# Patient Record
Sex: MALE | Race: BLACK OR AFRICAN AMERICAN | NOT HISPANIC OR LATINO | Employment: OTHER | URBAN - METROPOLITAN AREA
[De-identification: names, ages, dates, MRNs, and addresses within clinical notes are randomized per-mention and may not be internally consistent; named-entity substitution may affect disease eponyms.]

---

## 2021-04-09 ENCOUNTER — DOCUMENTATION ONLY (OUTPATIENT)
Dept: CARDIOTHORACIC SURGERY | Facility: CLINIC | Age: 61
End: 2021-04-09

## 2021-04-12 DIAGNOSIS — I25.10 CORONARY ARTERY DISEASE INVOLVING NATIVE CORONARY ARTERY OF NATIVE HEART WITHOUT ANGINA PECTORIS: Primary | ICD-10-CM

## 2021-04-12 DIAGNOSIS — I25.110 CORONARY ARTERY DISEASE INVOLVING NATIVE CORONARY ARTERY OF NATIVE HEART WITH UNSTABLE ANGINA PECTORIS: Primary | ICD-10-CM

## 2021-04-12 DIAGNOSIS — I25.9 CHEST PAIN DUE TO MYOCARDIAL ISCHEMIA, UNSPECIFIED ISCHEMIC CHEST PAIN TYPE: ICD-10-CM

## 2021-04-13 ENCOUNTER — TELEPHONE (OUTPATIENT)
Dept: PREADMISSION TESTING | Facility: HOSPITAL | Age: 61
End: 2021-04-13

## 2021-04-13 ENCOUNTER — TELEPHONE (OUTPATIENT)
Dept: CARDIOTHORACIC SURGERY | Facility: CLINIC | Age: 61
End: 2021-04-13

## 2021-04-14 ENCOUNTER — TELEPHONE (OUTPATIENT)
Dept: PREADMISSION TESTING | Facility: HOSPITAL | Age: 61
End: 2021-04-14

## 2021-04-14 ENCOUNTER — DOCUMENTATION ONLY (OUTPATIENT)
Dept: CARDIOTHORACIC SURGERY | Facility: CLINIC | Age: 61
End: 2021-04-14

## 2021-04-14 ENCOUNTER — HOSPITAL ENCOUNTER (OUTPATIENT)
Dept: PREADMISSION TESTING | Facility: HOSPITAL | Age: 61
Discharge: HOME | End: 2021-04-14
Attending: THORACIC SURGERY (CARDIOTHORACIC VASCULAR SURGERY)
Payer: OTHER GOVERNMENT

## 2021-04-27 ENCOUNTER — LAB VISIT (OUTPATIENT)
Dept: INTERNAL MEDICINE | Facility: CLINIC | Age: 61
End: 2021-04-27
Payer: OTHER GOVERNMENT

## 2021-04-27 DIAGNOSIS — I25.110 CORONARY ARTERY DISEASE INVOLVING NATIVE CORONARY ARTERY OF NATIVE HEART WITH UNSTABLE ANGINA PECTORIS: ICD-10-CM

## 2021-04-27 PROCEDURE — U0003 INFECTIOUS AGENT DETECTION BY NUCLEIC ACID (DNA OR RNA); SEVERE ACUTE RESPIRATORY SYNDROME CORONAVIRUS 2 (SARS-COV-2) (CORONAVIRUS DISEASE [COVID-19]), AMPLIFIED PROBE TECHNIQUE, MAKING USE OF HIGH THROUGHPUT TECHNOLOGIES AS DESCRIBED BY CMS-2020-01-R: HCPCS | Performed by: THORACIC SURGERY (CARDIOTHORACIC VASCULAR SURGERY)

## 2021-04-27 PROCEDURE — U0005 INFEC AGEN DETEC AMPLI PROBE: HCPCS | Performed by: THORACIC SURGERY (CARDIOTHORACIC VASCULAR SURGERY)

## 2021-04-28 ENCOUNTER — OFFICE VISIT (OUTPATIENT)
Dept: CARDIOTHORACIC SURGERY | Facility: CLINIC | Age: 61
End: 2021-04-28
Payer: OTHER GOVERNMENT

## 2021-04-28 ENCOUNTER — DOCUMENTATION ONLY (OUTPATIENT)
Dept: CARDIOTHORACIC SURGERY | Facility: CLINIC | Age: 61
End: 2021-04-28

## 2021-04-28 ENCOUNTER — HOSPITAL ENCOUNTER (OUTPATIENT)
Dept: CARDIOLOGY | Facility: CLINIC | Age: 61
Discharge: HOME | End: 2021-04-28
Payer: OTHER GOVERNMENT

## 2021-04-28 VITALS
BODY MASS INDEX: 20.88 KG/M2 | WEIGHT: 141 LBS | HEIGHT: 69 IN | DIASTOLIC BLOOD PRESSURE: 68 MMHG | HEART RATE: 58 BPM | SYSTOLIC BLOOD PRESSURE: 151 MMHG | OXYGEN SATURATION: 99 % | RESPIRATION RATE: 18 BRPM

## 2021-04-28 DIAGNOSIS — I25.110 CORONARY ARTERY DISEASE INVOLVING NATIVE CORONARY ARTERY OF NATIVE HEART WITH UNSTABLE ANGINA PECTORIS: Primary | ICD-10-CM

## 2021-04-28 DIAGNOSIS — I25.110 CORONARY ARTERY DISEASE INVOLVING NATIVE CORONARY ARTERY OF NATIVE HEART WITH UNSTABLE ANGINA PECTORIS: ICD-10-CM

## 2021-04-28 DIAGNOSIS — I20.0 UNSTABLE ANGINA PECTORIS: ICD-10-CM

## 2021-04-28 LAB — SARS-COV-2 RNA RESP QL NAA+PROBE: NOT DETECTED

## 2021-04-28 PROCEDURE — 99204 PR OFFICE/OUTPT VISIT, NEW, LEVL IV, 45-59 MIN: ICD-10-PCS | Mod: S$PBB,,, | Performed by: THORACIC SURGERY (CARDIOTHORACIC VASCULAR SURGERY)

## 2021-04-28 PROCEDURE — 99999 PR PBB SHADOW E&M-EST. PATIENT-LVL V: CPT | Mod: PBBFAC,,, | Performed by: THORACIC SURGERY (CARDIOTHORACIC VASCULAR SURGERY)

## 2021-04-28 PROCEDURE — 99204 OFFICE O/P NEW MOD 45 MIN: CPT | Mod: S$PBB,,, | Performed by: THORACIC SURGERY (CARDIOTHORACIC VASCULAR SURGERY)

## 2021-04-28 PROCEDURE — 99999 PR PBB SHADOW E&M-EST. PATIENT-LVL V: ICD-10-PCS | Mod: PBBFAC,,, | Performed by: THORACIC SURGERY (CARDIOTHORACIC VASCULAR SURGERY)

## 2021-04-28 PROCEDURE — 93005 ELECTROCARDIOGRAM TRACING: CPT | Mod: PBBFAC | Performed by: INTERNAL MEDICINE

## 2021-04-28 PROCEDURE — 93010 ELECTROCARDIOGRAM REPORT: CPT | Mod: S$PBB,,, | Performed by: INTERNAL MEDICINE

## 2021-04-28 PROCEDURE — 99215 OFFICE O/P EST HI 40 MIN: CPT | Mod: PBBFAC,25 | Performed by: THORACIC SURGERY (CARDIOTHORACIC VASCULAR SURGERY)

## 2021-04-28 PROCEDURE — 93010 EKG 12-LEAD: ICD-10-PCS | Mod: S$PBB,,, | Performed by: INTERNAL MEDICINE

## 2021-04-28 RX ORDER — AMLODIPINE BESYLATE 10 MG/1
10 TABLET ORAL
Status: ON HOLD | COMMUNITY
End: 2022-02-09 | Stop reason: HOSPADM

## 2021-04-28 RX ORDER — CITALOPRAM 10 MG/1
TABLET ORAL
COMMUNITY

## 2021-04-28 RX ORDER — AMLODIPINE AND BENAZEPRIL HYDROCHLORIDE 5; 20 MG/1; MG/1
CAPSULE ORAL
Status: ON HOLD | COMMUNITY
Start: 2021-02-09 | End: 2022-02-09 | Stop reason: HOSPADM

## 2021-04-28 RX ORDER — TAMSULOSIN HYDROCHLORIDE 0.4 MG/1
CAPSULE ORAL
COMMUNITY
Start: 2021-04-20

## 2021-04-28 RX ORDER — NITROGLYCERIN 0.4 MG/1
TABLET SUBLINGUAL
COMMUNITY
Start: 2021-04-08

## 2021-04-28 RX ORDER — ROSUVASTATIN CALCIUM 20 MG/1
20 TABLET, COATED ORAL
Status: ON HOLD | COMMUNITY
End: 2022-01-10 | Stop reason: HOSPADM

## 2021-04-28 RX ORDER — METFORMIN HYDROCHLORIDE 500 MG/1
500 TABLET ORAL
Status: ON HOLD | COMMUNITY
End: 2022-02-09 | Stop reason: HOSPADM

## 2021-04-28 RX ORDER — DIVALPROEX SODIUM 125 MG/1
500 TABLET, DELAYED RELEASE ORAL
Status: ON HOLD | COMMUNITY
End: 2022-01-10 | Stop reason: HOSPADM

## 2021-04-28 RX ORDER — METFORMIN HYDROCHLORIDE 1000 MG/1
TABLET ORAL
Status: ON HOLD | COMMUNITY
Start: 2021-02-09 | End: 2022-02-09 | Stop reason: HOSPADM

## 2021-04-28 RX ORDER — INSULIN GLARGINE 100 [IU]/ML
INJECTION, SOLUTION SUBCUTANEOUS
Status: ON HOLD | COMMUNITY
Start: 2021-02-26 | End: 2022-02-09 | Stop reason: HOSPADM

## 2021-04-28 RX ORDER — LISINOPRIL 2.5 MG/1
2.5 TABLET ORAL
Status: ON HOLD | COMMUNITY
End: 2022-01-10 | Stop reason: HOSPADM

## 2021-04-28 RX ORDER — LISINOPRIL 10 MG/1
TABLET ORAL
Status: ON HOLD | COMMUNITY
Start: 2020-07-31 | End: 2022-02-09 | Stop reason: HOSPADM

## 2021-04-28 RX ORDER — QUETIAPINE FUMARATE 25 MG/1
TABLET, FILM COATED ORAL
COMMUNITY
Start: 2020-10-13

## 2021-04-28 RX ORDER — ATORVASTATIN CALCIUM 80 MG/1
TABLET, FILM COATED ORAL
COMMUNITY
Start: 2021-02-09

## 2021-04-28 RX ORDER — LAMOTRIGINE 200 MG/1
TABLET ORAL
COMMUNITY
Start: 2021-04-07

## 2021-04-28 RX ORDER — METOPROLOL TARTRATE 50 MG/1
TABLET ORAL
Status: ON HOLD | COMMUNITY
Start: 2020-10-14 | End: 2022-01-10 | Stop reason: HOSPADM

## 2021-04-28 RX ORDER — POTASSIUM CHLORIDE 750 MG/1
TABLET, EXTENDED RELEASE ORAL
Status: ON HOLD | COMMUNITY
Start: 2021-04-09 | End: 2022-02-09 | Stop reason: HOSPADM

## 2021-04-28 RX ORDER — CETIRIZINE HYDROCHLORIDE 10 MG/1
TABLET ORAL
COMMUNITY
Start: 2020-08-14

## 2021-04-28 RX ORDER — VARENICLINE TARTRATE 0.5 (11)-1
KIT ORAL
Status: ON HOLD | COMMUNITY
Start: 2021-04-09 | End: 2022-02-09 | Stop reason: HOSPADM

## 2021-04-28 RX ORDER — TRIAMCINOLONE ACETONIDE 1 MG/G
CREAM TOPICAL
COMMUNITY
Start: 2021-04-20

## 2021-04-28 RX ORDER — LACOSAMIDE 200 MG/1
200 TABLET ORAL
COMMUNITY

## 2021-04-28 RX ORDER — INSULIN GLARGINE 100 [IU]/ML
20 INJECTION, SOLUTION SUBCUTANEOUS
Status: ON HOLD | COMMUNITY
End: 2022-02-09 | Stop reason: HOSPADM

## 2021-04-28 RX ORDER — ROSUVASTATIN CALCIUM 40 MG/1
TABLET, COATED ORAL
COMMUNITY
Start: 2020-08-06

## 2021-04-28 RX ORDER — CHOLECALCIFEROL (VITAMIN D3) 50 MCG
TABLET ORAL
COMMUNITY
Start: 2021-02-09

## 2021-06-07 ENCOUNTER — HOSPITAL ENCOUNTER (OUTPATIENT)
Dept: CARDIOLOGY | Facility: HOSPITAL | Age: 61
Discharge: HOME | End: 2021-06-07
Attending: THORACIC SURGERY (CARDIOTHORACIC VASCULAR SURGERY)
Payer: OTHER GOVERNMENT

## 2021-06-07 VITALS
WEIGHT: 141 LBS | BODY MASS INDEX: 20.88 KG/M2 | HEIGHT: 69 IN | DIASTOLIC BLOOD PRESSURE: 70 MMHG | SYSTOLIC BLOOD PRESSURE: 105 MMHG | HEART RATE: 57 BPM

## 2021-06-07 DIAGNOSIS — I25.110 CORONARY ARTERY DISEASE INVOLVING NATIVE CORONARY ARTERY OF NATIVE HEART WITH UNSTABLE ANGINA PECTORIS: ICD-10-CM

## 2021-06-07 LAB
ASCENDING AORTA: 3.23 CM
AV INDEX (PROSTH): 0.55
AV MEAN GRADIENT: 3 MMHG
AV PEAK GRADIENT: 6 MMHG
AV VALVE AREA: 2.24 CM2
AV VELOCITY RATIO: 0.62
BSA FOR ECHO PROCEDURE: 1.76 M2
CV ECHO LV RWT: 0.3 CM
DOP CALC AO PEAK VEL: 1.27 M/S
DOP CALC AO VTI: 29.5 CM
DOP CALC LVOT AREA: 4.1 CM2
DOP CALC LVOT DIAMETER: 2.28 CM
DOP CALC LVOT PEAK VEL: 0.79 M/S
DOP CALC LVOT STROKE VOLUME: 66.07 CM3
DOP CALCLVOT PEAK VEL VTI: 16.19 CM
E WAVE DECELERATION TIME: 106.67 MSEC
E/A RATIO: 2.08
E/E' RATIO: 9.22 M/S
ECHO LV POSTERIOR WALL: 0.79 CM (ref 0.6–1.1)
EJECTION FRACTION: 50 %
FRACTIONAL SHORTENING: 29 % (ref 28–44)
INTERVENTRICULAR SEPTUM: 0.86 CM (ref 0.6–1.1)
LA MAJOR: 4.65 CM
LA MINOR: 4.21 CM
LA WIDTH: 3.83 CM
LEFT ATRIUM SIZE: 3.71 CM
LEFT ATRIUM VOLUME INDEX MOD: 28.5 ML/M2
LEFT ATRIUM VOLUME INDEX: 30 ML/M2
LEFT ATRIUM VOLUME MOD: 50.79 CM3
LEFT ATRIUM VOLUME: 53.37 CM3
LEFT INTERNAL DIMENSION IN SYSTOLE: 3.72 CM (ref 2.1–4)
LEFT VENTRICLE DIASTOLIC VOLUME INDEX: 75.16 ML/M2
LEFT VENTRICLE DIASTOLIC VOLUME: 133.79 ML
LEFT VENTRICLE MASS INDEX: 87 G/M2
LEFT VENTRICLE SYSTOLIC VOLUME INDEX: 33.2 ML/M2
LEFT VENTRICLE SYSTOLIC VOLUME: 59.02 ML
LEFT VENTRICULAR INTERNAL DIMENSION IN DIASTOLE: 5.27 CM (ref 3.5–6)
LEFT VENTRICULAR MASS: 154.53 G
LV LATERAL E/E' RATIO: 6.92 M/S
LV SEPTAL E/E' RATIO: 13.83 M/S
MV A" WAVE DURATION": 12.18 MSEC
MV PEAK A VEL: 0.4 M/S
MV PEAK E VEL: 0.83 M/S
MV STENOSIS PRESSURE HALF TIME: 30.93 MS
MV VALVE AREA P 1/2 METHOD: 7.11 CM2
PISA TR MAX VEL: 2.25 M/S
PULM VEIN S/D RATIO: 0.88
PV PEAK D VEL: 0.49 M/S
PV PEAK S VEL: 0.43 M/S
QEF: 50 %
RA MAJOR: 4.08 CM
RA PRESSURE: 3 MMHG
RA WIDTH: 3.56 CM
RIGHT VENTRICULAR END-DIASTOLIC DIMENSION: 2.84 CM
RV TISSUE DOPPLER FREE WALL SYSTOLIC VELOCITY 1 (APICAL 4 CHAMBER VIEW): 14.71 CM/S
SINUS: 3.2 CM
STJ: 2.1 CM
TDI LATERAL: 0.12 M/S
TDI SEPTAL: 0.06 M/S
TDI: 0.09 M/S
TR MAX PG: 20 MMHG
TRICUSPID ANNULAR PLANE SYSTOLIC EXCURSION: 2.05 CM
TV REST PULMONARY ARTERY PRESSURE: 23 MMHG

## 2021-06-07 PROCEDURE — 93306 TTE W/DOPPLER COMPLETE: CPT | Mod: 26,,, | Performed by: INTERNAL MEDICINE

## 2021-06-07 PROCEDURE — 93306 TTE W/DOPPLER COMPLETE: CPT

## 2021-06-07 PROCEDURE — 93306 ECHO (CUPID ONLY): ICD-10-PCS | Mod: 26,,, | Performed by: INTERNAL MEDICINE

## 2021-07-15 ENCOUNTER — TELEPHONE (OUTPATIENT)
Dept: SURGERY | Facility: CLINIC | Age: 61
End: 2021-07-15

## 2021-07-15 DIAGNOSIS — I25.110 CORONARY ARTERY DISEASE INVOLVING NATIVE CORONARY ARTERY OF NATIVE HEART WITH UNSTABLE ANGINA PECTORIS: Primary | ICD-10-CM

## 2021-07-19 ENCOUNTER — DOCUMENTATION ONLY (OUTPATIENT)
Dept: CARDIOTHORACIC SURGERY | Facility: CLINIC | Age: 61
End: 2021-07-19

## 2022-01-04 ENCOUNTER — HOSPITAL ENCOUNTER (INPATIENT)
Facility: HOSPITAL | Age: 62
LOS: 35 days | Discharge: HOSPICE | DRG: 871 | End: 2022-02-09
Attending: EMERGENCY MEDICINE | Admitting: HOSPITALIST
Payer: OTHER GOVERNMENT

## 2022-01-04 DIAGNOSIS — N49.2 SCROTAL ABSCESS: Primary | ICD-10-CM

## 2022-01-04 DIAGNOSIS — R73.9 HYPERGLYCEMIA: ICD-10-CM

## 2022-01-04 DIAGNOSIS — R41.82 ALTERED MENTAL STATUS: ICD-10-CM

## 2022-01-04 DIAGNOSIS — G93.40 ACUTE ENCEPHALOPATHY: ICD-10-CM

## 2022-01-04 DIAGNOSIS — R07.9 CHEST PAIN: ICD-10-CM

## 2022-01-04 DIAGNOSIS — N50.89 SCROTUM SWELLING: ICD-10-CM

## 2022-01-04 DIAGNOSIS — N48.21 PENILE ABSCESS: ICD-10-CM

## 2022-01-04 DIAGNOSIS — I50.9 CONGESTIVE HEART FAILURE: ICD-10-CM

## 2022-01-04 DIAGNOSIS — I10 HTN (HYPERTENSION): ICD-10-CM

## 2022-01-04 PROBLEM — I51.7 CARDIOMEGALY: Status: ACTIVE | Noted: 2022-01-04

## 2022-01-04 PROBLEM — I51.7 CARDIOMEGALY: Chronic | Status: ACTIVE | Noted: 2022-01-04

## 2022-01-04 PROBLEM — G40.909 SEIZURE DISORDER: Status: ACTIVE | Noted: 2022-01-04

## 2022-01-04 PROBLEM — Z72.0 TOBACCO USER: Chronic | Status: ACTIVE | Noted: 2022-01-04

## 2022-01-04 PROBLEM — E11.9 DIABETES MELLITUS TYPE 2, CONTROLLED: Chronic | Status: ACTIVE | Noted: 2020-08-03

## 2022-01-04 PROBLEM — A41.9 SEPSIS: Status: ACTIVE | Noted: 2022-01-04

## 2022-01-04 PROBLEM — E11.9 DIABETES MELLITUS TYPE 2, CONTROLLED: Status: ACTIVE | Noted: 2020-08-03

## 2022-01-04 PROBLEM — F14.21 COCAINE DEPENDENCE IN REMISSION: Status: ACTIVE | Noted: 2022-01-04

## 2022-01-04 PROBLEM — F14.21 COCAINE DEPENDENCE IN REMISSION: Chronic | Status: ACTIVE | Noted: 2022-01-04

## 2022-01-04 PROBLEM — F43.21 ADJUSTMENT DISORDER WITH DEPRESSED MOOD: Status: ACTIVE | Noted: 2022-01-04

## 2022-01-04 PROBLEM — I25.9 CHRONIC ISCHEMIC HEART DISEASE: Chronic | Status: ACTIVE | Noted: 2022-01-04

## 2022-01-04 PROBLEM — I25.9 CHRONIC ISCHEMIC HEART DISEASE: Status: ACTIVE | Noted: 2022-01-04

## 2022-01-04 PROBLEM — G40.909 SEIZURE DISORDER: Chronic | Status: ACTIVE | Noted: 2022-01-04

## 2022-01-04 PROBLEM — Z72.0 TOBACCO USER: Status: ACTIVE | Noted: 2022-01-04

## 2022-01-04 PROBLEM — E78.5 HYPERLIPIDEMIA: Status: ACTIVE | Noted: 2022-01-04

## 2022-01-04 PROBLEM — E78.5 HYPERLIPIDEMIA: Chronic | Status: ACTIVE | Noted: 2022-01-04

## 2022-01-04 PROBLEM — F43.21 ADJUSTMENT DISORDER WITH DEPRESSED MOOD: Chronic | Status: ACTIVE | Noted: 2022-01-04

## 2022-01-04 LAB
ALBUMIN SERPL-MCNC: 2.3 G/DL (ref 3.3–5.5)
ALLENS TEST: ABNORMAL
ALLENS TEST: ABNORMAL
ALP SERPL-CCNC: 258 U/L (ref 42–141)
BACTERIA #/AREA URNS HPF: ABNORMAL /HPF
BILIRUB SERPL-MCNC: 0.6 MG/DL (ref 0.2–1.6)
BILIRUB UR QL STRIP: NEGATIVE
BUN SERPL-MCNC: 12 MG/DL (ref 7–22)
CALCIUM SERPL-MCNC: 9.7 MG/DL (ref 8–10.3)
CHLORIDE SERPL-SCNC: 99 MMOL/L (ref 98–108)
CLARITY UR: ABNORMAL
COLOR UR: YELLOW
CREAT SERPL-MCNC: 1.4 MG/DL (ref 0.6–1.2)
CTP QC/QA: YES
GLUCOSE SERPL-MCNC: 318 MG/DL (ref 73–118)
GLUCOSE UR QL STRIP: ABNORMAL
HCO3 UR-SCNC: 22.8 MMOL/L (ref 24–28)
HCO3 UR-SCNC: 24.8 MMOL/L (ref 24–28)
HGB UR QL STRIP: ABNORMAL
HYALINE CASTS #/AREA URNS LPF: 0 /LPF
KETONES UR QL STRIP: NEGATIVE
LDH SERPL L TO P-CCNC: 2.14 MMOL/L (ref 0.5–2.2)
LDH SERPL L TO P-CCNC: 3.11 MMOL/L (ref 0.5–2.2)
LEUKOCYTE ESTERASE UR QL STRIP: ABNORMAL
MICROSCOPIC COMMENT: ABNORMAL
NITRITE UR QL STRIP: NEGATIVE
PCO2 BLDA: 42.9 MMHG (ref 35–45)
PCO2 BLDA: 43.5 MMHG (ref 35–45)
PH SMN: 7.33 [PH] (ref 7.35–7.45)
PH SMN: 7.37 [PH] (ref 7.35–7.45)
PH UR STRIP: 5 [PH] (ref 5–8)
PO2 BLDA: 15 MMHG (ref 40–60)
PO2 BLDA: 18 MMHG (ref 40–60)
POC ALT (SGPT): 34 U/L (ref 10–47)
POC AST (SGOT): 29 U/L (ref 11–38)
POC B-TYPE NATRIURETIC PEPTIDE: 807 PG/ML (ref 0–100)
POC BE: -1 MMOL/L
POC BE: -3 MMOL/L
POC CARDIAC TROPONIN I: 0.03 NG/ML
POC SATURATED O2: 17 % (ref 95–100)
POC SATURATED O2: 25 % (ref 95–100)
POC TCO2: 24 MMOL/L (ref 18–33)
POC TCO2: 24 MMOL/L (ref 24–29)
POC TCO2: 26 MMOL/L (ref 24–29)
POCT GLUCOSE: 213 MG/DL (ref 70–110)
POCT GLUCOSE: 243 MG/DL (ref 70–110)
POCT GLUCOSE: 370 MG/DL (ref 70–110)
POTASSIUM BLD-SCNC: 3.3 MMOL/L (ref 3.6–5.1)
PROT UR QL STRIP: ABNORMAL
PROTEIN, POC: 7.1 G/DL (ref 6.4–8.1)
RBC #/AREA URNS HPF: 10 /HPF (ref 0–4)
SAMPLE: ABNORMAL
SAMPLE: ABNORMAL
SAMPLE: NORMAL
SARS-COV-2 RDRP RESP QL NAA+PROBE: NEGATIVE
SITE: ABNORMAL
SITE: ABNORMAL
SODIUM BLD-SCNC: 138 MMOL/L (ref 128–145)
SP GR UR STRIP: 1.01 (ref 1–1.03)
URN SPEC COLLECT METH UR: ABNORMAL
UROBILINOGEN UR STRIP-ACNC: NEGATIVE EU/DL
WBC #/AREA URNS HPF: 100 /HPF (ref 0–5)
WBC CLUMPS URNS QL MICRO: ABNORMAL
YEAST URNS QL MICRO: ABNORMAL

## 2022-01-04 PROCEDURE — 84484 ASSAY OF TROPONIN QUANT: CPT | Mod: ER

## 2022-01-04 PROCEDURE — 96365 THER/PROPH/DIAG IV INF INIT: CPT | Mod: ER

## 2022-01-04 PROCEDURE — 93010 EKG 12-LEAD: ICD-10-PCS | Mod: ,,, | Performed by: INTERNAL MEDICINE

## 2022-01-04 PROCEDURE — 99291 CRITICAL CARE FIRST HOUR: CPT | Mod: 25,ER

## 2022-01-04 PROCEDURE — G0378 HOSPITAL OBSERVATION PER HR: HCPCS

## 2022-01-04 PROCEDURE — 87086 URINE CULTURE/COLONY COUNT: CPT | Performed by: EMERGENCY MEDICINE

## 2022-01-04 PROCEDURE — 25500020 PHARM REV CODE 255: Mod: ER | Performed by: EMERGENCY MEDICINE

## 2022-01-04 PROCEDURE — 96375 TX/PRO/DX INJ NEW DRUG ADDON: CPT | Mod: ER

## 2022-01-04 PROCEDURE — 25000003 PHARM REV CODE 250: Mod: ER | Performed by: EMERGENCY MEDICINE

## 2022-01-04 PROCEDURE — 96372 THER/PROPH/DIAG INJ SC/IM: CPT | Mod: 59,ER

## 2022-01-04 PROCEDURE — 87106 FUNGI IDENTIFICATION YEAST: CPT | Performed by: EMERGENCY MEDICINE

## 2022-01-04 PROCEDURE — 25000003 PHARM REV CODE 250: Performed by: HOSPITALIST

## 2022-01-04 PROCEDURE — 96361 HYDRATE IV INFUSION ADD-ON: CPT | Mod: ER

## 2022-01-04 PROCEDURE — U0002 COVID-19 LAB TEST NON-CDC: HCPCS | Mod: ER | Performed by: EMERGENCY MEDICINE

## 2022-01-04 PROCEDURE — 82803 BLOOD GASES ANY COMBINATION: CPT | Mod: ER

## 2022-01-04 PROCEDURE — 96367 TX/PROPH/DG ADDL SEQ IV INF: CPT | Mod: ER

## 2022-01-04 PROCEDURE — 81000 URINALYSIS NONAUTO W/SCOPE: CPT | Performed by: EMERGENCY MEDICINE

## 2022-01-04 PROCEDURE — 81003 URINALYSIS AUTO W/O SCOPE: CPT | Mod: ER

## 2022-01-04 PROCEDURE — 85025 COMPLETE CBC W/AUTO DIFF WBC: CPT | Mod: ER

## 2022-01-04 PROCEDURE — 80053 COMPREHEN METABOLIC PANEL: CPT | Mod: ER

## 2022-01-04 PROCEDURE — 87040 BLOOD CULTURE FOR BACTERIA: CPT | Mod: 59 | Performed by: EMERGENCY MEDICINE

## 2022-01-04 PROCEDURE — 93010 ELECTROCARDIOGRAM REPORT: CPT | Mod: ,,, | Performed by: INTERNAL MEDICINE

## 2022-01-04 PROCEDURE — 82962 GLUCOSE BLOOD TEST: CPT | Mod: ER

## 2022-01-04 PROCEDURE — 87088 URINE BACTERIA CULTURE: CPT | Performed by: EMERGENCY MEDICINE

## 2022-01-04 PROCEDURE — 93005 ELECTROCARDIOGRAM TRACING: CPT | Mod: ER

## 2022-01-04 PROCEDURE — 83880 ASSAY OF NATRIURETIC PEPTIDE: CPT | Mod: ER

## 2022-01-04 PROCEDURE — 63600175 PHARM REV CODE 636 W HCPCS: Mod: ER | Performed by: EMERGENCY MEDICINE

## 2022-01-04 RX ORDER — MORPHINE SULFATE 4 MG/ML
4 INJECTION, SOLUTION INTRAMUSCULAR; INTRAVENOUS
Status: COMPLETED | OUTPATIENT
Start: 2022-01-04 | End: 2022-01-04

## 2022-01-04 RX ORDER — ATORVASTATIN CALCIUM 40 MG/1
80 TABLET, FILM COATED ORAL DAILY
Status: DISCONTINUED | OUTPATIENT
Start: 2022-01-05 | End: 2022-01-25

## 2022-01-04 RX ORDER — IPRATROPIUM BROMIDE AND ALBUTEROL SULFATE 2.5; .5 MG/3ML; MG/3ML
3 SOLUTION RESPIRATORY (INHALATION) EVERY 4 HOURS PRN
Status: DISCONTINUED | OUTPATIENT
Start: 2022-01-04 | End: 2022-01-10

## 2022-01-04 RX ORDER — CIPROFLOXACIN 2 MG/ML
400 INJECTION, SOLUTION INTRAVENOUS
Status: COMPLETED | OUTPATIENT
Start: 2022-01-04 | End: 2022-01-04

## 2022-01-04 RX ORDER — TAMSULOSIN HYDROCHLORIDE 0.4 MG/1
0.4 CAPSULE ORAL DAILY
Status: DISCONTINUED | OUTPATIENT
Start: 2022-01-05 | End: 2022-01-25

## 2022-01-04 RX ORDER — AMLODIPINE BESYLATE 5 MG/1
10 TABLET ORAL DAILY
Status: DISCONTINUED | OUTPATIENT
Start: 2022-01-05 | End: 2022-01-25

## 2022-01-04 RX ORDER — POLYETHYLENE GLYCOL 3350 17 G/17G
17 POWDER, FOR SOLUTION ORAL DAILY
Status: DISCONTINUED | OUTPATIENT
Start: 2022-01-05 | End: 2022-01-25

## 2022-01-04 RX ORDER — QUETIAPINE FUMARATE 25 MG/1
25 TABLET, FILM COATED ORAL NIGHTLY
Status: DISCONTINUED | OUTPATIENT
Start: 2022-01-04 | End: 2022-01-10

## 2022-01-04 RX ORDER — MORPHINE SULFATE 4 MG/ML
4 INJECTION, SOLUTION INTRAMUSCULAR; INTRAVENOUS EVERY 6 HOURS PRN
Status: DISCONTINUED | OUTPATIENT
Start: 2022-01-05 | End: 2022-01-05 | Stop reason: SDUPTHER

## 2022-01-04 RX ORDER — PROCHLORPERAZINE EDISYLATE 5 MG/ML
5 INJECTION INTRAMUSCULAR; INTRAVENOUS EVERY 6 HOURS PRN
Status: DISCONTINUED | OUTPATIENT
Start: 2022-01-04 | End: 2022-02-09 | Stop reason: HOSPADM

## 2022-01-04 RX ORDER — TALC
6 POWDER (GRAM) TOPICAL NIGHTLY PRN
Status: DISCONTINUED | OUTPATIENT
Start: 2022-01-04 | End: 2022-01-20

## 2022-01-04 RX ORDER — LAMOTRIGINE 100 MG/1
200 TABLET ORAL 2 TIMES DAILY
Status: DISCONTINUED | OUTPATIENT
Start: 2022-01-04 | End: 2022-01-18

## 2022-01-04 RX ORDER — GLUCAGON 1 MG
1 KIT INJECTION
Status: DISCONTINUED | OUTPATIENT
Start: 2022-01-04 | End: 2022-02-09 | Stop reason: HOSPADM

## 2022-01-04 RX ORDER — IBUPROFEN 200 MG
16 TABLET ORAL
Status: DISCONTINUED | OUTPATIENT
Start: 2022-01-04 | End: 2022-02-09 | Stop reason: HOSPADM

## 2022-01-04 RX ORDER — IBUPROFEN 200 MG
24 TABLET ORAL
Status: DISCONTINUED | OUTPATIENT
Start: 2022-01-04 | End: 2022-02-09 | Stop reason: HOSPADM

## 2022-01-04 RX ORDER — MAG HYDROX/ALUMINUM HYD/SIMETH 200-200-20
30 SUSPENSION, ORAL (FINAL DOSE FORM) ORAL 4 TIMES DAILY PRN
Status: DISCONTINUED | OUTPATIENT
Start: 2022-01-04 | End: 2022-02-09 | Stop reason: HOSPADM

## 2022-01-04 RX ORDER — VANCOMYCIN HCL IN 5 % DEXTROSE 1G/250ML
1000 PLASTIC BAG, INJECTION (ML) INTRAVENOUS
Status: COMPLETED | OUTPATIENT
Start: 2022-01-04 | End: 2022-01-04

## 2022-01-04 RX ORDER — SIMETHICONE 80 MG
1 TABLET,CHEWABLE ORAL 4 TIMES DAILY PRN
Status: DISCONTINUED | OUTPATIENT
Start: 2022-01-04 | End: 2022-02-09 | Stop reason: HOSPADM

## 2022-01-04 RX ORDER — NALOXONE HCL 0.4 MG/ML
0.02 VIAL (ML) INJECTION
Status: DISCONTINUED | OUTPATIENT
Start: 2022-01-04 | End: 2022-02-09 | Stop reason: HOSPADM

## 2022-01-04 RX ORDER — ACETAMINOPHEN 325 MG/1
650 TABLET ORAL EVERY 6 HOURS PRN
Status: DISCONTINUED | OUTPATIENT
Start: 2022-01-04 | End: 2022-02-09 | Stop reason: HOSPADM

## 2022-01-04 RX ORDER — ASPIRIN 81 MG/1
81 TABLET ORAL DAILY
Status: DISCONTINUED | OUTPATIENT
Start: 2022-01-05 | End: 2022-01-25

## 2022-01-04 RX ORDER — ONDANSETRON 2 MG/ML
4 INJECTION INTRAMUSCULAR; INTRAVENOUS EVERY 8 HOURS PRN
Status: DISCONTINUED | OUTPATIENT
Start: 2022-01-04 | End: 2022-02-09 | Stop reason: HOSPADM

## 2022-01-04 RX ORDER — METOPROLOL SUCCINATE 50 MG/1
50 TABLET, EXTENDED RELEASE ORAL DAILY
Status: DISCONTINUED | OUTPATIENT
Start: 2022-01-05 | End: 2022-01-25

## 2022-01-04 RX ORDER — SODIUM CHLORIDE 0.9 % (FLUSH) 0.9 %
10 SYRINGE (ML) INJECTION EVERY 8 HOURS PRN
Status: DISCONTINUED | OUTPATIENT
Start: 2022-01-04 | End: 2022-01-21

## 2022-01-04 RX ORDER — DIVALPROEX SODIUM 250 MG/1
500 TABLET, FILM COATED, EXTENDED RELEASE ORAL DAILY
Status: DISCONTINUED | OUTPATIENT
Start: 2022-01-05 | End: 2022-01-12

## 2022-01-04 RX ORDER — POTASSIUM CHLORIDE 7.45 MG/ML
10 INJECTION INTRAVENOUS
Status: COMPLETED | OUTPATIENT
Start: 2022-01-04 | End: 2022-01-04

## 2022-01-04 RX ORDER — CITALOPRAM 10 MG/1
10 TABLET ORAL DAILY
Status: DISCONTINUED | OUTPATIENT
Start: 2022-01-05 | End: 2022-01-06

## 2022-01-04 RX ADMIN — CIPROFLOXACIN 400 MG: 2 INJECTION, SOLUTION INTRAVENOUS at 07:01

## 2022-01-04 RX ADMIN — POTASSIUM CHLORIDE 10 MEQ: 7.46 INJECTION, SOLUTION INTRAVENOUS at 06:01

## 2022-01-04 RX ADMIN — QUETIAPINE FUMARATE 25 MG: 25 TABLET ORAL at 11:01

## 2022-01-04 RX ADMIN — IOHEXOL 100 ML: 350 INJECTION, SOLUTION INTRAVENOUS at 05:01

## 2022-01-04 RX ADMIN — LAMOTRIGINE 200 MG: 100 TABLET ORAL at 11:01

## 2022-01-04 RX ADMIN — VANCOMYCIN HYDROCHLORIDE 1000 MG: 1 INJECTION, POWDER, LYOPHILIZED, FOR SOLUTION INTRAVENOUS at 08:01

## 2022-01-04 RX ADMIN — MORPHINE SULFATE 4 MG: 4 INJECTION, SOLUTION INTRAMUSCULAR; INTRAVENOUS at 11:01

## 2022-01-04 RX ADMIN — INSULIN HUMAN 4 UNITS: 100 INJECTION, SOLUTION PARENTERAL at 06:01

## 2022-01-04 RX ADMIN — PIPERACILLIN AND TAZOBACTAM 4.5 G: 4; .5 INJECTION, POWDER, LYOPHILIZED, FOR SOLUTION INTRAVENOUS; PARENTERAL at 06:01

## 2022-01-04 RX ADMIN — SODIUM CHLORIDE 1000 ML: 0.9 INJECTION, SOLUTION INTRAVENOUS at 05:01

## 2022-01-04 RX ADMIN — INSULIN HUMAN 2 UNITS: 100 INJECTION, SOLUTION PARENTERAL at 06:01

## 2022-01-04 RX ADMIN — MORPHINE SULFATE 4 MG: 4 INJECTION, SOLUTION INTRAMUSCULAR; INTRAVENOUS at 05:01

## 2022-01-04 NOTE — LETTER
February 7, 2022         [place] [address]  Phone: [phone]  Fax: [phone]       Patient: Abhishek Zhao   YOB: 1960  Date of Visit: 02/07/2022    To Whom It May Concern:    Dionna Zhao  was hospitalized at [facility] on 1/5/22 and remains in our inpatient unit. The patient has diagnoses of Acute respiratory failure with advance care planning. According to our assessments, the patient is not able to make decisions or sign paperwork for himself at this time.     The patient's spouse:Rossi Zhao, will need acecess to patient's financial banking statements due to the patient's incapacitated mental state.     If you have any questions or concerns, or if I can be of further assistance, please do not hesitate to contact me.    Sincerely,    Yahaira Nix RN

## 2022-01-04 NOTE — ED PROVIDER NOTES
Encounter Date: 1/4/2022    SCRIBE #1 NOTE: I, Rodrick Long, am scribing for, and in the presence of, Anali Hagen DO.       History     Chief Complaint   Patient presents with    Testicle Pain     Pt states he is having severe testicle swelling that started 3 days ago     61 year old male with PMHx of HLD, HTN, and Seizures who presents to the ED with complaints of testicular pain and swelling for four days. Endorses radiation of pain to rectum and difficulty urinating. Denies any abdominal pain, chest pain, shortness of breath, nausea, vomiting, diarrhea, fever, or chills. No other complaints at this time.      The history is provided by the patient. No  was used.     Review of patient's allergies indicates:  No Known Allergies  Past Medical History:   Diagnosis Date    High cholesterol     Hypertension     Seizures      History reviewed. No pertinent surgical history.  History reviewed. No pertinent family history.  Social History     Tobacco Use    Smoking status: Current Every Day Smoker     Packs/day: 1.50     Types: Cigarettes   Substance Use Topics    Alcohol use: Yes     Review of Systems   Constitutional: Negative for chills and fever.   HENT: Negative for sore throat.    Respiratory: Negative for shortness of breath.    Cardiovascular: Negative for chest pain.   Gastrointestinal: Negative for abdominal pain, diarrhea, nausea and vomiting.   Genitourinary: Positive for difficulty urinating, scrotal swelling and testicular pain. Negative for dysuria.   Musculoskeletal: Negative for back pain.   Skin: Negative for rash.   Neurological: Negative for weakness.   Hematological: Does not bruise/bleed easily.   All other systems reviewed and are negative.      Physical Exam     Patient gave consent to have physical exam performed.    Initial Vitals [01/04/22 1312]   BP Pulse Resp Temp SpO2   136/77 97 18 98.2 °F (36.8 °C) 99 %      MAP       --         Physical Exam    Nursing note and  vitals reviewed.  Constitutional: He appears well-developed and well-nourished.   HENT:   Head: Normocephalic and atraumatic.   Right Ear: External ear normal.   Left Ear: External ear normal.   Mouth/Throat: Oropharynx is clear and moist.   Eyes: Conjunctivae and EOM are normal. Pupils are equal, round, and reactive to light.   Neck: Neck supple.   Normal range of motion.  Cardiovascular: Regular rhythm, normal heart sounds and intact distal pulses. Tachycardia present.  Exam reveals no gallop and no friction rub.    No murmur heard.  Pulmonary/Chest: Breath sounds normal. No respiratory distress. He has no wheezes. He has no rhonchi. He has no rales.   Abdominal: Abdomen is soft. Bowel sounds are normal. He exhibits no distension. There is no abdominal tenderness. There is no rebound and no guarding.   Genitourinary: Right testis shows mass, swelling and tenderness. Left testis shows mass, swelling and tenderness.    Genitourinary Comments: Diffuse swelling of the entire  area including scrotum, penis, and shaft of penis. Tenderness to the perineum extending back to the rectum.      Musculoskeletal:         General: No tenderness or edema. Normal range of motion.      Cervical back: Normal range of motion and neck supple.     Lymphadenopathy: Inguinal adenopathy noted on the right and left side.   Neurological: He is alert and oriented to person, place, and time.   Skin: Skin is warm and dry.   Psychiatric: He has a normal mood and affect. His behavior is normal.             ED Course   Critical Care    Date/Time: 1/4/2022 5:52 PM  Performed by: Anali Hagen DO  Authorized by: Anali Hagen DO   Direct patient critical care time: 8 minutes  Additional history critical care time: 8 minutes  Ordering / reviewing critical care time: 8 minutes  Documentation critical care time: 8 minutes  Consulting other physicians critical care time: 12 minutes  Total critical care time (exclusive of procedural time) : 44  minutes  Critical care was necessary to treat or prevent imminent or life-threatening deterioration of the following conditions: metabolic crisis, sepsis, circulatory failure and renal failure.  Critical care was time spent personally by me on the following activities: evaluation of patient's response to treatment, examination of patient, obtaining history from patient or surrogate, ordering and performing treatments and interventions, ordering and review of laboratory studies, ordering and review of radiographic studies, pulse oximetry, re-evaluation of patient's condition and review of old charts.        Labs Reviewed   POCT GLUCOSE - Abnormal; Notable for the following components:       Result Value    POCT Glucose 370 (*)     All other components within normal limits   ISTAT PROCEDURE - Abnormal; Notable for the following components:    POC PH 7.327 (*)     POC PO2 15 (*)     POC HCO3 22.8 (*)     POC SATURATED O2 17 (*)     POC Lactate 3.11 (*)     All other components within normal limits   POCT CMP - Abnormal; Notable for the following components:    Alkaline Phosphatase,  (*)     POC Creatinine 1.4 (*)     POC Glucose 318 (*)     POC Potassium 3.3 (*)     All other components within normal limits   POCT B-TYPE NATRIURETIC PEPTIDE (BNP) - Abnormal; Notable for the following components:    POC B-Type Natriuretic Peptide 807 (*)     All other components within normal limits   ISTAT PROCEDURE - Abnormal; Notable for the following components:    POC PO2 18 (*)     POC SATURATED O2 25 (*)     All other components within normal limits   CULTURE, BLOOD   CULTURE, BLOOD   CULTURE, URINE   TROPONIN ISTAT   URINALYSIS   POCT CBC   SARS-COV-2 RDRP GENE   POCT B-TYPE NATRIURETIC PEPTIDE (BNP)   POCT CMP   POCT TROPONIN             EKG Readings: (Independently Interpreted)   No STEMI. Rate of 88 bpm. Normal Sinus Rhythm. Normal Axis. Abnormal EKG. QTc normal at 452. When compared to prior EKG dated 4/8/21 rate  increased by 30 bpm.       ECG Results          EKG 12-lead (Final result)  Result time 01/04/22 16:28:05    Final result by Interface, Lab In [facility] (01/04/22 16:28:05)                 Narrative:    Test Reason : R73.9,    Vent. Rate : 088 BPM     Atrial Rate : 088 BPM     P-R Int : 126 ms          QRS Dur : 084 ms      QT Int : 374 ms       P-R-T Axes : 056 037 058 degrees     QTc Int : 452 ms    Normal sinus rhythm  Cannot rule out Anterior infarct ,age undetermined  Abnormal ECG  When compared with ECG of 28-APR-2021 07:56,  Vent. rate has increased BY  30 BPM  ST no longer elevated in Anterior leads  Confirmed by Emigdio Prado MD ([id number]) on 1/4/2022 4:27:52 PM    Referred By: [email or web address]   SELF           Confirmed By:Emigdio Prado MD                            Imaging Results          CT Abdomen Pelvis With Contrast (Final result)  Result time 01/04/22 17:44:38    Final result by Chuck Bustamante MD (01/04/22 17:44:38)                 Impression:      Abnormal examination findings of marked inflammatory changes in the region of the scrotum and perineum with multiple rim enhancing collections as above.    Largest collection within the posterior scrotal sac measuring 7.0 x 5.8 cm, additional rim enhancing collection in the right aspect of the perineum and additional enhancement along the penile shaft extending into the prostatic urethra.  These are concerning for multiple abscesses.  Urology consultation is recommended.    Circumferential wall thickening of the urinary bladder with chronic appearing severe bilateral hydroureteronephrosis.  Findings progressed compared to the prior examination.    Extensive calcifications involving the pancreas, suggestive of chronic pancreatitis.    Additional findings as above.    Case discussed with Dr. Hagen on 01/04/2012 17:41.      Electronically signed by: Chuck Bustamante MD  Date:    01/04/2022  Time:    17:44             Narrative:    EXAMINATION:  CT ABDOMEN PELVIS WITH  CONTRAST    CLINICAL HISTORY:  Scrotal mass or lump (Ped 0-18y);Rule out Berna's gangrene;    TECHNIQUE:  Low dose axial images, sagittal and coronal reformations were obtained from the lung bases to the pubic symphysis following the IV administration of 100 mL of Omnipaque 350 .  Oral contrast was not given.    COMPARISON:  CT scan abdomen pelvis dated 08/29/2016 and scrotal ultrasound dated 01/04/2022.    FINDINGS:  There are no pleural effusions.  There is no evidence of a pneumothorax.  There is no evidence of pneumomediastinum.  No airspace opacity is present.  No pulmonary nodule is identified.    The heart is unremarkable.  There are extensive calcifications along the course of the abdominal aorta and its branch vessels.  There are multiple foci of calcified and noncalcified thrombi in the bilateral common iliac, internal, and external iliac arteries.  The celiac trunk, SMA, and JUSTYN are intact.  The portal veins and mesenteric veins appear unremarkable.  The IVC and the remainder of the venous structures are unremarkable.    The esophagus is unremarkable.  There is persistent wall thickening of the stomach.  This has slightly progressed compared to the prior examination.  The duodenum is within normal limits.  The small bowel loops are unremarkable.  The appendix is not definitely identified.  There are no secondary findings of acute appendicitis.  There is colonic diverticula without evidence of acute diverticulitis.    The liver is unremarkable.  The gallbladder is unremarkable.  There is mild intrahepatic and extrahepatic biliary dilatation.  The spleen is unremarkable.  There are interval calcifications involving the entire pancreas.  The adrenal glands are unremarkable.    There is no evidence of nephrolithiasis.  There has been progression of severe bilateral hydroureteronephrosis.  There is also persistent wall thickening involving the urinary bladder.  The prostate gland is enlarged.  There are  calcifications in the prostate gland.    There is a dorsal collection in the perineum within the scrotal sac measuring 7.0 x 5.8 cm with peripheral enhancement.  An additional 3.0 x 1.8 cm rim enhancing collection identified in the right aspect of the perineum.  There is also a large collection extending from the penile shaft into the region of prostatic urethra with peripheral rim enhancement.    There is no evidence of free air.  There is no evidence of pneumatosis.  No portal venous air is identified.    The psoas margins are unremarkable.  There is edematous appearance of the abdominal wall.  There are degenerative changes in the osseous structures.  There is no evidence of a fracture.                                US Scrotum And Testicles (Final result)  Result time 01/04/22 15:41:46    Final result by Trevor Hatfield MD (01/04/22 15:41:46)                 Impression:      Complex mass within the scrotum caudal to the testicles of mixed echogenicity although there appear to be fluid components.  This may represent an abscess.  Diffuse scrotal wall thickening is also present.  Equivocal increased vascularity of the tail of the right epididymis raises the possibility of epididymitis.    Small bilateral hydroceles.    This report was flagged in Epic as abnormal.      Electronically signed by: Trevor Hatfield MD  Date:    01/04/2022  Time:    15:41             Narrative:    EXAMINATION:  US SCROTUM AND TESTICLES    CLINICAL HISTORY:  Other specified disorders of the male genital organs    TECHNIQUE:  Sonography of the scrotum and testes.    COMPARISON:  None.    FINDINGS:  Right Testicle:    *Size: 3.7 x 2.3 x 2.6 cm  *Appearance: Normal.  *Flow: Normal arterial and venous flow  *Epididymis: There is a complex cyst within the right epididymal head measuring 0.8 x 0.8 x 1.0 cm which may represent spermatocele.  There appears to be an epididymal appendix on the right measuring 0.4 x 0.4 x 0.4 cm.  There is equivocal  increased vascularity of the tail of the right epididymis and epididymitis cannot be excluded.  *Hydrocele: Small.  *Varicocele: None.  .    Left Testicle:    *Size: 3.7 x 2.4 x 2.6 cm  *Appearance: Normal.  *Flow: Normal arterial and venous flow  *Epididymis: Normal.  *Hydrocele: Small.  *Varicocele: None.  .    Other findings: There is diffuse scrotal thickening present.  There is a complex collection within the scrotum caudal to the testicles measuring approximately 6.0 x 6.1 x 6.6 cm.  This complex collection may be partially solid although internal blood flow is not clearly delineated.  There are areas within this mass which appear fluid-filled.  Scrotal abscess cannot be excluded.                                 Medications   vancomycin in dextrose 5 % 1 gram/250 mL IVPB 1,000 mg (has no administration in time range)   ciprofloxacin (CIPRO)400mg/200ml D5W IVPB 400 mg (has no administration in time range)   piperacillin-tazobactam 4.5 g in dextrose 5 % 100 mL IVPB (ready to mix system) (has no administration in time range)   sodium chloride 0.9% bolus 1,000 mL (1,000 mLs Intravenous New Bag 1/4/22 1757)   potassium chloride 10 mEq in 100 mL IVPB (has no administration in time range)   iohexoL (OMNIPAQUE 350) injection 100 mL (100 mLs Intravenous Given 1/4/22 1727)   morphine injection 4 mg (4 mg Intravenous Given 1/4/22 1753)         Medical decision making   Chief complaint: testicular pain and swelling x 4 days  Differential diagnosis: DKA, hyperglycemia, sepsis, za's gangrene, scrotal abscess, penile abscess  Treatment in the ED Physical Exam, X-ray, CT, US, EKG, labs, Vancomycin, Cipro, piperacillin-tazobactam 4.5 g in dextrose 5 % 100 mL IVPB, NaCl Bolus.   Patient reports feeling better after treatment in the ER.    Discussed labs, imaging results, diagnosis, and need for further evaluation and services not available at this facility with the patient.      Limited IV fluids secondary to CHF history  with elevated BNP.    Patient and family are agreeable to transfer & admission if necessary at [facility].    Consulted Urology Dr Rangel.  Urology requesting admit to hospitalist.  NPO after midnight.  Notify specialist immediately if patient's clinical picture changes.    I contacted Bella with [facility] at 5:50pm.  Requesting consultation with hospitalist for services not available at this facility.   Discussed patient's presentation, past medical history, physical exam, labs, radiology results, vital signs, and ED course.  Consultation with DR Layne for transfer and admission.  At this time patient will be transferred via EMS to accepting facility.     [facility]  6 Hours Sepsis Perfusion Exam   Provider Attestation    I attest, a sepsis perfusion exam was performed at 6pm within 6 hours of Sirs presentation, following fluid resuscitation.  Repeat lactic significantly improved.      At time of transfer patient is awake alert oriented x4 speaking clearly in full sentences and moving all 4 extremities.          Scribe Attestation:   Scribe #1: I performed the above scribed service and the documentation accurately describes the services I performed. I attest to the accuracy of the note.                  I, Dr. Anali Hagen, personally performed the services described in this documentation. This document was produced by a scribe under my direction and in my presence. All medical record entries made by the scribe were at my direction and in my presence.  I have reviewed the chart and agree that the record reflects my personal performance and is accurate and complete. Anali Hagen, .     01/04/2022 5:52 PM      Clinical Impression:   Final diagnoses:  [N50.89] Scrotum swelling  [R73.9] Hyperglycemia  [I10] HTN (hypertension)  [N49.2] Scrotal abscess (Primary)  [N48.21] Penile abscess          ED Disposition Condition    Transfer to Another Facility Stable              Anali Hagen  DO  01/04/22 1927

## 2022-01-05 ENCOUNTER — ANESTHESIA EVENT (OUTPATIENT)
Dept: SURGERY | Facility: HOSPITAL | Age: 62
DRG: 871 | End: 2022-01-05
Payer: OTHER GOVERNMENT

## 2022-01-05 ENCOUNTER — ANESTHESIA (OUTPATIENT)
Dept: SURGERY | Facility: HOSPITAL | Age: 62
DRG: 871 | End: 2022-01-05
Payer: OTHER GOVERNMENT

## 2022-01-05 LAB
ANION GAP SERPL CALC-SCNC: 12 MMOL/L (ref 8–16)
ANISOCYTOSIS BLD QL SMEAR: SLIGHT
BASOPHILS # BLD AUTO: ABNORMAL K/UL (ref 0–0.2)
BASOPHILS NFR BLD: 0 % (ref 0–1.9)
BUN SERPL-MCNC: 14 MG/DL (ref 6–20)
BURR CELLS BLD QL SMEAR: ABNORMAL
CALCIUM SERPL-MCNC: 8.5 MG/DL (ref 8.7–10.5)
CHLORIDE SERPL-SCNC: 106 MMOL/L (ref 95–110)
CO2 SERPL-SCNC: 16 MMOL/L (ref 23–29)
CREAT SERPL-MCNC: 1.4 MG/DL (ref 0.5–1.4)
DIFFERENTIAL METHOD: ABNORMAL
EOSINOPHIL # BLD AUTO: ABNORMAL K/UL (ref 0–0.5)
EOSINOPHIL NFR BLD: 0 % (ref 0–8)
ERYTHROCYTE [DISTWIDTH] IN BLOOD BY AUTOMATED COUNT: 12.5 % (ref 11.5–14.5)
EST. GFR  (AFRICAN AMERICAN): >60 ML/MIN/1.73 M^2
EST. GFR  (NON AFRICAN AMERICAN): 55 ML/MIN/1.73 M^2
ESTIMATED AVG GLUCOSE: 220 MG/DL (ref 68–131)
GLUCOSE SERPL-MCNC: 93 MG/DL (ref 70–110)
HBA1C MFR BLD: 9.3 % (ref 4–5.6)
HCT VFR BLD AUTO: 32.4 % (ref 40–54)
HGB BLD-MCNC: 10.7 G/DL (ref 14–18)
IMM GRANULOCYTES # BLD AUTO: ABNORMAL K/UL (ref 0–0.04)
IMM GRANULOCYTES NFR BLD AUTO: ABNORMAL % (ref 0–0.5)
LYMPHOCYTES # BLD AUTO: ABNORMAL K/UL (ref 1–4.8)
LYMPHOCYTES NFR BLD: 7 % (ref 18–48)
MCH RBC QN AUTO: 29.8 PG (ref 27–31)
MCHC RBC AUTO-ENTMCNC: 33 G/DL (ref 32–36)
MCV RBC AUTO: 90 FL (ref 82–98)
MONOCYTES # BLD AUTO: ABNORMAL K/UL (ref 0.3–1)
MONOCYTES NFR BLD: 6 % (ref 4–15)
MYELOCYTES NFR BLD MANUAL: 1 %
NEUTROPHILS NFR BLD: 19 % (ref 38–73)
NEUTS BAND NFR BLD MANUAL: 67 %
NRBC BLD-RTO: 0 /100 WBC
PLATELET # BLD AUTO: 281 K/UL (ref 150–450)
PLATELET BLD QL SMEAR: ABNORMAL
PMV BLD AUTO: 11.4 FL (ref 9.2–12.9)
POCT GLUCOSE: 102 MG/DL (ref 70–110)
POCT GLUCOSE: 213 MG/DL (ref 70–110)
POIKILOCYTOSIS BLD QL SMEAR: SLIGHT
POTASSIUM SERPL-SCNC: 3.7 MMOL/L (ref 3.5–5.1)
RBC # BLD AUTO: 3.59 M/UL (ref 4.6–6.2)
SODIUM SERPL-SCNC: 134 MMOL/L (ref 136–145)
WBC # BLD AUTO: 17.3 K/UL (ref 3.9–12.7)

## 2022-01-05 PROCEDURE — 11404 PR EXC SKIN BENIG 3.1-4 CM TRUNK,ARM,LEG: ICD-10-PCS | Mod: ,,, | Performed by: UROLOGY

## 2022-01-05 PROCEDURE — 11000001 HC ACUTE MED/SURG PRIVATE ROOM

## 2022-01-05 PROCEDURE — 87070 CULTURE OTHR SPECIMN AEROBIC: CPT | Performed by: HOSPITALIST

## 2022-01-05 PROCEDURE — 80048 BASIC METABOLIC PNL TOTAL CA: CPT | Performed by: HOSPITALIST

## 2022-01-05 PROCEDURE — 85007 BL SMEAR W/DIFF WBC COUNT: CPT | Performed by: ANESTHESIOLOGY

## 2022-01-05 PROCEDURE — 63600175 PHARM REV CODE 636 W HCPCS: Performed by: UROLOGY

## 2022-01-05 PROCEDURE — 25000003 PHARM REV CODE 250: Performed by: UROLOGY

## 2022-01-05 PROCEDURE — 88305 TISSUE EXAM BY PATHOLOGIST: CPT | Performed by: PATHOLOGY

## 2022-01-05 PROCEDURE — 27200651 HC AIRWAY, LMA: Performed by: ANESTHESIOLOGY

## 2022-01-05 PROCEDURE — 25000003 PHARM REV CODE 250: Performed by: STUDENT IN AN ORGANIZED HEALTH CARE EDUCATION/TRAINING PROGRAM

## 2022-01-05 PROCEDURE — 63600175 PHARM REV CODE 636 W HCPCS: Performed by: STUDENT IN AN ORGANIZED HEALTH CARE EDUCATION/TRAINING PROGRAM

## 2022-01-05 PROCEDURE — 87075 CULTR BACTERIA EXCEPT BLOOD: CPT | Performed by: HOSPITALIST

## 2022-01-05 PROCEDURE — D9220A PRA ANESTHESIA: Mod: ,,, | Performed by: ANESTHESIOLOGY

## 2022-01-05 PROCEDURE — 36000706: Performed by: UROLOGY

## 2022-01-05 PROCEDURE — 88305 TISSUE EXAM BY PATHOLOGIST: ICD-10-PCS | Mod: 26,,, | Performed by: PATHOLOGY

## 2022-01-05 PROCEDURE — 99214 PR OFFICE/OUTPT VISIT, EST, LEVL IV, 30-39 MIN: ICD-10-PCS | Mod: 25,,, | Performed by: UROLOGY

## 2022-01-05 PROCEDURE — C1758 CATHETER, URETERAL: HCPCS | Performed by: UROLOGY

## 2022-01-05 PROCEDURE — 25000003 PHARM REV CODE 250: Performed by: HOSPITALIST

## 2022-01-05 PROCEDURE — C1729 CATH, DRAINAGE: HCPCS | Performed by: UROLOGY

## 2022-01-05 PROCEDURE — 87206 SMEAR FLUORESCENT/ACID STAI: CPT | Performed by: UROLOGY

## 2022-01-05 PROCEDURE — 87102 FUNGUS ISOLATION CULTURE: CPT | Performed by: UROLOGY

## 2022-01-05 PROCEDURE — 87186 SC STD MICRODIL/AGAR DIL: CPT | Performed by: HOSPITALIST

## 2022-01-05 PROCEDURE — 87106 FUNGI IDENTIFICATION YEAST: CPT | Performed by: UROLOGY

## 2022-01-05 PROCEDURE — 87116 MYCOBACTERIA CULTURE: CPT | Performed by: UROLOGY

## 2022-01-05 PROCEDURE — 74450 X-RAY URETHRA/BLADDER: CPT | Mod: 26,,, | Performed by: UROLOGY

## 2022-01-05 PROCEDURE — 74450 PR  X-RAY URETHROCYSTOGRAM: ICD-10-PCS | Mod: 26,,, | Performed by: UROLOGY

## 2022-01-05 PROCEDURE — 99214 OFFICE O/P EST MOD 30 MIN: CPT | Mod: 25,,, | Performed by: UROLOGY

## 2022-01-05 PROCEDURE — 71000033 HC RECOVERY, INTIAL HOUR: Performed by: UROLOGY

## 2022-01-05 PROCEDURE — 63600175 PHARM REV CODE 636 W HCPCS: Performed by: HOSPITALIST

## 2022-01-05 PROCEDURE — 36415 COLL VENOUS BLD VENIPUNCTURE: CPT | Performed by: ANESTHESIOLOGY

## 2022-01-05 PROCEDURE — 88305 TISSUE EXAM BY PATHOLOGIST: CPT | Mod: 26,,, | Performed by: PATHOLOGY

## 2022-01-05 PROCEDURE — 11404 EXC TR-EXT B9+MARG 3.1-4 CM: CPT | Mod: ,,, | Performed by: UROLOGY

## 2022-01-05 PROCEDURE — 52000 PR CYSTOURETHROSCOPY: ICD-10-PCS | Mod: 51,,, | Performed by: UROLOGY

## 2022-01-05 PROCEDURE — 71000039 HC RECOVERY, EACH ADD'L HOUR: Performed by: UROLOGY

## 2022-01-05 PROCEDURE — 37000008 HC ANESTHESIA 1ST 15 MINUTES: Performed by: UROLOGY

## 2022-01-05 PROCEDURE — 36000707: Performed by: UROLOGY

## 2022-01-05 PROCEDURE — 37000009 HC ANESTHESIA EA ADD 15 MINS: Performed by: UROLOGY

## 2022-01-05 PROCEDURE — 83036 HEMOGLOBIN GLYCOSYLATED A1C: CPT | Performed by: FAMILY MEDICINE

## 2022-01-05 PROCEDURE — 87077 CULTURE AEROBIC IDENTIFY: CPT | Performed by: HOSPITALIST

## 2022-01-05 PROCEDURE — 25500020 PHARM REV CODE 255: Performed by: UROLOGY

## 2022-01-05 PROCEDURE — D9220A PRA ANESTHESIA: ICD-10-PCS | Mod: ,,, | Performed by: ANESTHESIOLOGY

## 2022-01-05 PROCEDURE — 52000 CYSTOURETHROSCOPY: CPT | Mod: 51,,, | Performed by: UROLOGY

## 2022-01-05 PROCEDURE — 85027 COMPLETE CBC AUTOMATED: CPT | Performed by: ANESTHESIOLOGY

## 2022-01-05 RX ORDER — FENTANYL CITRATE 50 UG/ML
INJECTION, SOLUTION INTRAMUSCULAR; INTRAVENOUS
Status: DISCONTINUED | OUTPATIENT
Start: 2022-01-05 | End: 2022-01-05

## 2022-01-05 RX ORDER — MIDAZOLAM HYDROCHLORIDE 1 MG/ML
INJECTION, SOLUTION INTRAMUSCULAR; INTRAVENOUS
Status: DISCONTINUED | OUTPATIENT
Start: 2022-01-05 | End: 2022-01-05

## 2022-01-05 RX ORDER — INSULIN ASPART 100 [IU]/ML
0-5 INJECTION, SOLUTION INTRAVENOUS; SUBCUTANEOUS EVERY 6 HOURS PRN
Status: DISCONTINUED | OUTPATIENT
Start: 2022-01-05 | End: 2022-02-09 | Stop reason: HOSPADM

## 2022-01-05 RX ORDER — HYDROMORPHONE HYDROCHLORIDE 2 MG/ML
0.2 INJECTION, SOLUTION INTRAMUSCULAR; INTRAVENOUS; SUBCUTANEOUS EVERY 5 MIN PRN
Status: DISCONTINUED | OUTPATIENT
Start: 2022-01-05 | End: 2022-01-05 | Stop reason: HOSPADM

## 2022-01-05 RX ORDER — GENTAMICIN SULFATE 40 MG/ML
INJECTION, SOLUTION INTRAMUSCULAR; INTRAVENOUS
Status: DISCONTINUED | OUTPATIENT
Start: 2022-01-05 | End: 2022-01-05 | Stop reason: HOSPADM

## 2022-01-05 RX ORDER — ONDANSETRON 2 MG/ML
INJECTION INTRAMUSCULAR; INTRAVENOUS
Status: DISCONTINUED | OUTPATIENT
Start: 2022-01-05 | End: 2022-01-05

## 2022-01-05 RX ORDER — PHENYLEPHRINE HYDROCHLORIDE 10 MG/ML
INJECTION INTRAVENOUS
Status: DISCONTINUED | OUTPATIENT
Start: 2022-01-05 | End: 2022-01-05

## 2022-01-05 RX ORDER — HYDROCODONE BITARTRATE AND ACETAMINOPHEN 10; 325 MG/1; MG/1
1 TABLET ORAL EVERY 4 HOURS PRN
Status: DISCONTINUED | OUTPATIENT
Start: 2022-01-05 | End: 2022-01-10

## 2022-01-05 RX ORDER — PROPOFOL 10 MG/ML
VIAL (ML) INTRAVENOUS
Status: DISCONTINUED | OUTPATIENT
Start: 2022-01-05 | End: 2022-01-05

## 2022-01-05 RX ORDER — SODIUM CHLORIDE 0.9 % (FLUSH) 0.9 %
10 SYRINGE (ML) INJECTION
Status: DISCONTINUED | OUTPATIENT
Start: 2022-01-05 | End: 2022-01-21

## 2022-01-05 RX ORDER — HYDROCODONE BITARTRATE AND ACETAMINOPHEN 5; 325 MG/1; MG/1
1 TABLET ORAL EVERY 4 HOURS PRN
Status: DISCONTINUED | OUTPATIENT
Start: 2022-01-05 | End: 2022-01-10

## 2022-01-05 RX ORDER — LIDOCAINE HYDROCHLORIDE 20 MG/ML
INJECTION INTRAVENOUS
Status: DISCONTINUED | OUTPATIENT
Start: 2022-01-05 | End: 2022-01-05

## 2022-01-05 RX ORDER — DEXAMETHASONE SODIUM PHOSPHATE 4 MG/ML
INJECTION, SOLUTION INTRA-ARTICULAR; INTRALESIONAL; INTRAMUSCULAR; INTRAVENOUS; SOFT TISSUE
Status: DISCONTINUED | OUTPATIENT
Start: 2022-01-05 | End: 2022-01-05

## 2022-01-05 RX ADMIN — FENTANYL CITRATE 25 MCG: 50 INJECTION, SOLUTION INTRAMUSCULAR; INTRAVENOUS at 10:01

## 2022-01-05 RX ADMIN — ONDANSETRON 4 MG: 2 INJECTION, SOLUTION INTRAMUSCULAR; INTRAVENOUS at 10:01

## 2022-01-05 RX ADMIN — PHENYLEPHRINE HYDROCHLORIDE 100 MCG: 10 INJECTION INTRAVENOUS at 10:01

## 2022-01-05 RX ADMIN — CITALOPRAM HYDROBROMIDE 10 MG: 10 TABLET ORAL at 05:01

## 2022-01-05 RX ADMIN — PIPERACILLIN AND TAZOBACTAM 100 G: 4; .5 INJECTION, POWDER, LYOPHILIZED, FOR SOLUTION INTRAVENOUS; PARENTERAL at 10:01

## 2022-01-05 RX ADMIN — LAMOTRIGINE 200 MG: 100 TABLET ORAL at 09:01

## 2022-01-05 RX ADMIN — PROPOFOL 120 MG: 10 INJECTION, EMULSION INTRAVENOUS at 10:01

## 2022-01-05 RX ADMIN — LIDOCAINE HYDROCHLORIDE 100 MG: 20 INJECTION, SOLUTION INTRAVENOUS at 10:01

## 2022-01-05 RX ADMIN — HYDROCODONE BITARTRATE AND ACETAMINOPHEN 1 TABLET: 10; 325 TABLET ORAL at 09:01

## 2022-01-05 RX ADMIN — GLYCOPYRROLATE 0.2 MG: 0.2 INJECTION, SOLUTION INTRAMUSCULAR; INTRAVITREAL at 10:01

## 2022-01-05 RX ADMIN — PIPERACILLIN AND TAZOBACTAM 4.5 G: 4; .5 INJECTION, POWDER, LYOPHILIZED, FOR SOLUTION INTRAVENOUS; PARENTERAL at 06:01

## 2022-01-05 RX ADMIN — MORPHINE SULFATE 4 MG: 4 INJECTION, SOLUTION INTRAMUSCULAR; INTRAVENOUS at 06:01

## 2022-01-05 RX ADMIN — METOPROLOL SUCCINATE 50 MG: 50 TABLET, EXTENDED RELEASE ORAL at 06:01

## 2022-01-05 RX ADMIN — QUETIAPINE FUMARATE 25 MG: 25 TABLET ORAL at 09:01

## 2022-01-05 RX ADMIN — ATORVASTATIN CALCIUM 80 MG: 40 TABLET, FILM COATED ORAL at 05:01

## 2022-01-05 RX ADMIN — DIVALPROEX SODIUM 500 MG: 250 TABLET, EXTENDED RELEASE ORAL at 05:01

## 2022-01-05 RX ADMIN — DEXAMETHASONE SODIUM PHOSPHATE 4 MG: 4 INJECTION, SOLUTION INTRAMUSCULAR; INTRAVENOUS at 10:01

## 2022-01-05 RX ADMIN — MIDAZOLAM HYDROCHLORIDE 2 MG: 1 INJECTION, SOLUTION INTRAMUSCULAR; INTRAVENOUS at 10:01

## 2022-01-05 RX ADMIN — HYDROCODONE BITARTRATE AND ACETAMINOPHEN 1 TABLET: 10; 325 TABLET ORAL at 03:01

## 2022-01-05 RX ADMIN — PIPERACILLIN AND TAZOBACTAM 4.5 G: 4; .5 INJECTION, POWDER, LYOPHILIZED, FOR SOLUTION INTRAVENOUS; PARENTERAL at 02:01

## 2022-01-05 RX ADMIN — SODIUM CHLORIDE, SODIUM LACTATE, POTASSIUM CHLORIDE, AND CALCIUM CHLORIDE: .6; .31; .03; .02 INJECTION, SOLUTION INTRAVENOUS at 10:01

## 2022-01-05 RX ADMIN — AMLODIPINE BESYLATE 10 MG: 5 TABLET ORAL at 05:01

## 2022-01-05 RX ADMIN — SODIUM CHLORIDE, SODIUM LACTATE, POTASSIUM CHLORIDE, AND CALCIUM CHLORIDE: .6; .31; .03; .02 INJECTION, SOLUTION INTRAVENOUS at 09:01

## 2022-01-05 RX ADMIN — VANCOMYCIN HYDROCHLORIDE 750 MG: 750 INJECTION, POWDER, LYOPHILIZED, FOR SOLUTION INTRAVENOUS at 09:01

## 2022-01-05 RX ADMIN — TAMSULOSIN HYDROCHLORIDE 0.4 MG: 0.4 CAPSULE ORAL at 05:01

## 2022-01-05 NOTE — ANESTHESIA PREPROCEDURE EVALUATION
01/05/2022  Abhishek Zhao is a 61 y.o., male.  Pre-operative evaluation for Procedure(s) (LRB):  CYSTOSCOPY, WITH RETROGRADE PYELOGRAM I &D SCROTAL ABSCESS (N/A)    NPO >8    Vitals:    01/04/22 2236 01/04/22 2339 01/05/22 0448 01/05/22 0614   BP: (!) 143/67  (!) 120/56    BP Location: Left arm  Left arm    Patient Position: Lying  Lying    Pulse: 84  81    Resp: 18 20 17 (!) 21   Temp: 37.2 °C (98.9 °F)  37 °C (98.6 °F)    TempSrc: Oral  Oral    SpO2: 99%  97%    Weight: 57.4 kg (126 lb 8.7 oz)      Height:             Patient Active Problem List   Diagnosis    Scrotal abscess    Adjustment disorder with depressed mood    Chronic ischemic heart disease    Cocaine dependence in remission    Cardiomegaly    Hyperlipidemia    Essential hypertension    Seizure disorder    Tobacco user    Diabetes mellitus type 2, controlled    Sepsis       Review of patient's allergies indicates:  No Known Allergies    No current facility-administered medications on file prior to encounter.     Current Outpatient Medications on File Prior to Encounter   Medication Sig Dispense Refill    amLODIPine (NORVASC) 10 MG tablet Take 10 mg by mouth.      amlodipine-benazepril 5-20 mg (LOTREL) 5-20 mg per capsule TAKE 1 CAPSULE BY MOUTH EVERY DAY FOR HEART AND BLOOD PRESSURE      ASCORBATE CALCIUM (VITAMIN C ORAL) Take by mouth.      aspirin (ECOTRIN) 81 MG EC tablet Take 81 mg by mouth once daily.      atorvastatin (LIPITOR) 80 MG tablet TAKE ONE TABLET BY MOUTH EVERY DAY FOR CHOLESTEROL      cetirizine (ZYRTEC) 10 MG tablet TAKE ONE TABLET BY MOUTH DAILY .  TAKE ONE DAILY AS NEEDED FOR ITCHING. MAY TAKE UP TO 4 TIMES DAILY AS  NEEDD .  TAKE ONE DAILY AS NEEDED FOR ITCHING. MAY TAKE UP TO 4 TIMES DAILY AS  NEEDD      cholecalciferol, vitamin D3, (VITAMIN D3) 50 mcg (2,000 unit) Tab TAKE ONE TABLET BY MOUTH EVERY DAY AS  A VITAMIN SUPPLEMENT      citalopram (CELEXA) 10 MG tablet Take by mouth.      divalproex (DEPAKOTE) 125 MG EC tablet Take 500 mg by mouth.      divalproex (DEPAKOTE) 500 MG Tb24 Take 500 mg by mouth once daily.      HYDROPHILIC CREAM TOP APPLY LIBERAL AMOUNT TO THE SKIN TWICE A DAY . APPLY TO THE SKIN TWICE DAILY AS NEEDED FOR DRY SKIN AND ITCING      insulin detemir U-100 (LEVEMIR) 100 unit/mL injection Inject 15 Units into the skin.      insulin glargine (LANTUS) 100 unit/mL injection Inject 20 Units into the skin.      insulin glargine 100 units/mL (3mL) SubQ pen INJECT 18 UNITS SUBCUTANEOUSLY EVERY MORNING AND INJECT 14 UNITS AT BEDTIME      lacosamide (VIMPAT) 200 mg Tab tablet Take 200 mg by mouth.      lamoTRIgine (LAMICTAL) 200 MG tablet TAKE ONE TABLET BY MOUTH TWICE A DAY FOR SEIZURES      lisinopriL (PRINIVIL,ZESTRIL) 2.5 MG tablet Take 2.5 mg by mouth.      lisinopriL 10 MG tablet TAKE ONE TABLET BY MOUTH DAILY FOR HEART/ BLOOD PRESSURE      metFORMIN (GLUCOPHAGE) 1000 MG tablet TAKE ONE TABLET BY MOUTH TWICE A DAY WITH FOOD FOR DIABETES      metFORMIN (GLUCOPHAGE) 500 MG tablet Take 500 mg by mouth.      metoprolol succinate (TOPROL-XL) 50 MG 24 hr tablet Take 50 mg by mouth once daily.      metoprolol tartrate (LOPRESSOR) 50 MG tablet TAKE ONE-HALF TABLET BY MOUTH TWICE A DAY FOR HEART/ BLOOD PRESSURE      nitroGLYCERIN (NITROSTAT) 0.4 MG SL tablet DISSOLVE ONE TABLET UNDER TONGUE Q5MX3 FOR CHEST PAIN      oxycodone-acetaminophen (PERCOCET) 5-325 mg per tablet Take 1 tablet by mouth every 4 (four) hours as needed for Pain (do not drink alcohol, drive, or operate heavy machinery while taking this medication.). (Patient not taking: Reported on 4/28/2021) 20 tablet 0    pantoprazole (PROTONIX) 40 MG tablet Take 1 tablet (40 mg total) by mouth once daily. 30 tablet 3    potassium chloride (KLOR-CON) 10 MEQ TbSR TAKE FOUR TABLETS BY MOUTH ONCE DAILY TO INCREASE POTASSIUM      QUEtiapine  (SEROQUEL) 25 MG Tab TAKE ONE-HALF TABLET BY MOUTH AT BEDTIME FOR MOOD OR PSYCHOSIS AND INSOMNIA.. MAY USE WHOLE TABLET IF NECESSARY      rosuvastatin (CRESTOR) 20 MG tablet Take 20 mg by mouth.      rosuvastatin (CRESTOR) 40 MG Tab TAKE ONE TABLET BY MOUTH DAILY FOR CHOLESTEROL (REPLACES ATORVASTATIN)      tamsulosin (FLOMAX) 0.4 mg Cap TAKE 1 CAPSULE BY MOUTH EVERY DAY FOR BPH      triamcinolone acetonide 0.1% (KENALOG) 0.1 % cream APPLY MODERATE AMOUNT TOPICALLY TWICE A DAY      varenicline (CHANTIX JOSE) 0.5 mg (11)- 1 mg (42) tablet TAKE AS DIRECTED BY MOUTH UD FOR SMOKING CESSATION         History reviewed. No pertinent surgical history.    Social History     Socioeconomic History    Marital status:    Tobacco Use    Smoking status: Current Every Day Smoker     Packs/day: 1.50     Types: Cigarettes   Substance and Sexual Activity    Alcohol use: Yes         CBC: No results for input(s): WBC, RBC, HGB, HCT, PLT, MCV, MCH, MCHC in the last 72 hours.    CMP:   Recent Labs     01/05/22  0400   *   K 3.7      CO2 16*   BUN 14   CREATININE 1.4   GLU 93   CALCIUM 8.5*       INR  No results for input(s): PT, INR, PROTIME, APTT in the last 72 hours.    covid neg 1/4/22    Diagnostic Studies:    CT abd/pelvis  Impression:     Abnormal examination findings of marked inflammatory changes in the region of the scrotum and perineum with multiple rim enhancing collections as above.     Largest collection within the posterior scrotal sac measuring 7.0 x 5.8 cm, additional rim enhancing collection in the right aspect of the perineum and additional enhancement along the penile shaft extending into the prostatic urethra.  These are concerning for multiple abscesses.  Urology consultation is recommended.     Circumferential wall thickening of the urinary bladder with chronic appearing severe bilateral hydroureteronephrosis.  Findings progressed compared to the prior examination.     Extensive  calcifications involving the pancreas, suggestive of chronic pancreatitis.     Additional findings as above.     Case discussed with Dr. Hagen on 01/04/2012 17:41.  CXR  Impression:     No acute intrathoracic abnormality detected.    EKG:  Vent. Rate : 088 BPM     Atrial Rate : 088 BPM      P-R Int : 126 ms          QRS Dur : 084 ms       QT Int : 374 ms       P-R-T Axes : 056 037 058 degrees      QTc Int : 452 ms     Normal sinus rhythm   Cannot rule out Anterior infarct ,age undetermined   Abnormal ECG   When compared with ECG of 28-APR-2021 07:56,   Vent. rate has increased BY  30 BPM   ST no longer elevated in Anterior leads   Confirmed by Emigdio Prado MD ([id number]) on 1/4/2022 4:27:52 PM    2D Echo:  No results found for this or any previous visit.  6/7/21  · The left ventricle is normal in size with low normal systolic function and evidence of prior RCA territory infarction. The estimated ejection fraction is 50%.  · Normal right ventricular size with normal right ventricular systolic function.  · Normal left ventricular diastolic function.  · The estimated PA systolic pressure is 23 mmHg.  · Normal central venous pressure (3 mmHg).        Anesthesia Evaluation    I have reviewed the Patient Summary Reports.    I have reviewed the Nursing Notes. I have reviewed the NPO Status.   I have reviewed the Medications.     Review of Systems  Anesthesia Hx:  No problems with previous Anesthesia  History of prior surgery of interest to airway management or planning:  Denies Personal Hx of Anesthesia complications.   Social:  Smoker, Alcohol Use    Hematology/Oncology:  Hematology Normal   Oncology Normal     EENT/Dental:EENT/Dental Normal   Cardiovascular:   Hypertension hyperlipidemia ECG has been reviewed.    Renal/:  Renal/ Normal     Hepatic/GI:   GERD    Neurological:   CVA Seizures Last seizure 3 weeks ago, no adjustments to medications made.    Residual left UE weakness. Walks with cane   Endocrine:    Diabetes, type 2, using insulin    Psych:   Psychiatric History          Physical Exam  General:  Malnutrition     Eyes/Ears/Nose:  EYES/EARS/NOSE FINDINGS: Normal         Mental Status:  Mental Status Findings: Normal        Anesthesia Plan  Type of Anesthesia, risks & benefits discussed:  Anesthesia Type:  general    Patient's Preference:   Plan Factors:          Intra-op Monitoring Plan: standard ASA monitors  Intra-op Monitoring Plan Comments:   Post Op Pain Control Plan: multimodal analgesia  Post Op Pain Control Plan Comments:     Induction:   IV  Beta Blocker:  Patient is on a Beta-Blocker and has received one dose within the past 24 hours (No further documentation required).       Informed Consent: Patient understands risks and agrees with Anesthesia plan.  Questions answered. Anesthesia consent signed with patient.  ASA Score: 3     Day of Surgery Review of History & Physical:  There are no significant changes.          Ready For Surgery From Anesthesia Perspective.

## 2022-01-05 NOTE — ANESTHESIA PROCEDURE NOTES
Intubation    Date/Time: 1/5/2022 10:11 AM  Performed by: Monica Wahl CRNA  Authorized by: Brandyn Judd MD     Intubation:     Induction:  Intravenous    Intubated:  Postinduction    Mask Ventilation:  N/a    Attempts:  1    Attempted By:  CRNA    Difficult Airway Encountered?: No      Complications:  None    Airway Device:  Supraglottic airway/LMA    Airway Device Size:  4.0    Style/Cuff Inflation:  Cuffed (inflated to minimal occlusive pressure)    Secured at:  The lips    Placement Verified By:  Capnometry    Complicating Factors:  None    Findings Post-Intubation:  BS equal bilateral and atraumatic/condition of teeth unchanged

## 2022-01-05 NOTE — SUBJECTIVE & OBJECTIVE
Past Medical History:   Diagnosis Date    High cholesterol     Hypertension     Seizures        History reviewed. No pertinent surgical history.    Review of patient's allergies indicates:  No Known Allergies    Family History    None         Tobacco Use    Smoking status: Current Every Day Smoker     Packs/day: 1.50     Types: Cigarettes    Smokeless tobacco: Not on file   Substance and Sexual Activity    Alcohol use: Yes    Drug use: Not on file    Sexual activity: Not on file       Review of Systems   Constitutional: Negative.  Negative for fever.   HENT: Negative.    Eyes: Negative.    Respiratory: Negative for cough, chest tightness and shortness of breath.    Cardiovascular: Negative for chest pain.   Gastrointestinal: Negative.  Negative for constipation, diarrhea and nausea.   Genitourinary: Positive for difficulty urinating, penile pain, penile swelling, scrotal swelling and testicular pain.   Musculoskeletal: Negative.    Neurological: Negative.    Psychiatric/Behavioral: Negative.        Objective:     Temp:  [98.2 °F (36.8 °C)-99.2 °F (37.3 °C)] 98.3 °F (36.8 °C)  Pulse:  [76-97] 76  Resp:  [17-25] 18  SpO2:  [97 %-100 %] 98 %  BP: (111-187)/(56-88) 111/57     Body mass index is 18.69 kg/m².           Drains     None                 Physical Exam  Vitals and nursing note reviewed.   Constitutional:       Appearance: He is well-developed and well-nourished.   HENT:      Head: Normocephalic.   Eyes:      Conjunctiva/sclera: Conjunctivae normal.   Neck:      Thyroid: No thyromegaly.      Trachea: No tracheal deviation.   Cardiovascular:      Rate and Rhythm: Normal rate.      Heart sounds: Normal heart sounds.   Pulmonary:      Effort: Pulmonary effort is normal. No respiratory distress.      Breath sounds: Normal breath sounds. No wheezing.   Abdominal:      General: Bowel sounds are normal.      Palpations: Abdomen is soft. There is no hepatosplenomegaly.      Tenderness: There is no abdominal  tenderness. There is no CVA tenderness or rebound.      Hernia: No hernia is present.   Genitourinary:     Penis: Erythema and discharge present.       Comments: Purulence noted around the meatus.  Penile shaft edema.  He has significant scrotal edema with denuded skin inferiorly.  Significant tenderness on examination.    Suprapubic examination reveals an old suprapubic site.  No abdominal scars.  Musculoskeletal:         General: No tenderness or edema. Normal range of motion.      Cervical back: Normal range of motion and neck supple.   Lymphadenopathy:      Cervical: No cervical adenopathy.   Skin:     General: Skin is warm and dry.      Findings: No erythema or rash.   Neurological:      Mental Status: He is alert and oriented to person, place, and time.   Psychiatric:         Mood and Affect: Mood and affect normal.         Behavior: Behavior normal.         Thought Content: Thought content normal.         Judgment: Judgment normal.         Significant Labs:    BMP:  Recent Labs   Lab 01/05/22  0400   *   K 3.7      CO2 16*   BUN 14   CREATININE 1.4   CALCIUM 8.5*       CBC:  No results for input(s): WBC, HGB, HCT, PLT in the last 168 hours.    Blood Culture: No results for input(s): LABBLOO in the last 168 hours.  Urine Culture: No results for input(s): LABURIN in the last 168 hours.    Significant Imaging:  CT: I have reviewed all results within the past 24 hours and my personal findings are:  Bilateral hydronephrosis hydroureter thickening noted of the ureters down to a thickened bladder wall.  Stranding within the structures of the penis and possible abscesses within the corpora.  Scrotal edema with a fluid collection at the posterior inferior portion of the scrotum.  U/S: I have reviewed all results within the past 24 hours and my personal findings are:  Complex fluid collection within the scrotum.

## 2022-01-05 NOTE — HOSPITAL COURSE
61 y.o. 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 He underwent debridement with urology.     His hospital course was long (one month) and had numerous complications, including:  - aspiration  - abrupt changes in mental status (EEG/MRI negative)  - inability to tolerate po requiring TPN and then PEG tube  - severe debility/deconditioning  - recurrent  abscesses  - new covid diagosis w/ hypoxia  - severe protein calorie nutrition  - worsening mentation, lethargy    Ultimately, after a prolonged hospital course and minimal chance to return to a meaningful quality of life, the patient's wife decided to take him home w/ hospice. These wishes were honored, and the patient was discharged to hospice.

## 2022-01-05 NOTE — ED NOTES
Report given to EUGENE Chadwick at [facility] at this time; patient resting comfortable; vital signs stable. Awaiting transport at this time.

## 2022-01-05 NOTE — PROGRESS NOTES
Pharmacokinetic Initial Assessment: IV Vancomycin    Assessment/Plan:    Initiate intravenous vancomycin with loading dose of 1000 mg once followed by a maintenance dose of vancomycin 750 mg IV every 24 hours  Desired empiric serum trough concentration is 10 to 20 mcg/mL  Draw vancomycin trough level 60 min prior to third dose on 1/6/22 at approximately 19:00  Pharmacy will continue to follow and monitor vancomycin.      Please contact pharmacy at extension [phone] with any questions regarding this assessment.     Thank you for the consult,   Britney Muñoz       Patient brief summary:  Abhishek Zhao is a 61 y.o. male initiated on antimicrobial therapy with IV Vancomycin for treatment of suspected skin & soft tissue infection    Drug Allergies:   Review of patient's allergies indicates:  No Known Allergies    Actual Body Weight:   57.4 kg    Renal Function:   Estimated Creatinine Clearance: 46.1 mL/min (based on SCr of 1.4 mg/dL).,     Dialysis Method (if applicable):  N/A    CBC (last 72 hours):  No results for input(s): WHITE BLOOD CELL COUNT, HEMOGLOBIN, HEMATOCRIT, PLATELETS, GRAN%, LYMPH%, MONO%, EOSINOPHIL%, BASOPHIL%, DIFFERENTIAL METHOD in the last 72 hours.    Metabolic Panel (last 72 hours):  Recent Labs   Lab Result Units 01/04/22  1544 01/05/22  0400   Sodium mmol/L  --  134*   Potassium mmol/L  --  3.7   Chloride mmol/L  --  106   CO2 mmol/L  --  16*   Glucose mg/dL  --  93   Glucose, UA  4+*  --    BUN mg/dL  --  14   Creatinine mg/dL  --  1.4       Drug levels (last 3 results):  No results for input(s): VANCOMYCINRA, VANCOMYCINPE, VANCOMYCINTR in the last 72 hours.    Microbiologic Results:  Microbiology Results (last 7 days)       Procedure Component Value Units Date/Time    Culture, Anaerobe [[id number]]     Order Status: No result Specimen: Abscess     Aerobic culture [[id number]]     Order Status: No result Specimen: Abscess     Urine culture **CANNOT BE ORDERED STAT** [[id number]] Collected:  01/04/22 1804    Order Status: Sent Specimen: Urine, Clean Catch Updated: 01/04/22 2229    Blood Culture #2 **CANNOT BE ORDERED STAT** [[id number]] Collected: 01/04/22 1744    Order Status: Sent Specimen: Blood from Peripheral, Antecubital, Right Updated: 01/04/22 1745    Blood Culture #1 **CANNOT BE ORDERED STAT** [[id number]] Collected: 01/04/22 1744    Order Status: Sent Specimen: Blood from Peripheral, Lower Arm, Left Updated: 01/04/22 1745

## 2022-01-05 NOTE — SUBJECTIVE & OBJECTIVE
Past Medical History:   Diagnosis Date    High cholesterol     Hypertension     Seizures        History reviewed. No pertinent surgical history.    Review of patient's allergies indicates:  No Known Allergies    No current facility-administered medications on file prior to encounter.     Current Outpatient Medications on File Prior to Encounter   Medication Sig    amLODIPine (NORVASC) 10 MG tablet Take 10 mg by mouth.    amlodipine-benazepril 5-20 mg (LOTREL) 5-20 mg per capsule TAKE 1 CAPSULE BY MOUTH EVERY DAY FOR HEART AND BLOOD PRESSURE    ASCORBATE CALCIUM (VITAMIN C ORAL) Take by mouth.    aspirin (ECOTRIN) 81 MG EC tablet Take 81 mg by mouth once daily.    atorvastatin (LIPITOR) 80 MG tablet TAKE ONE TABLET BY MOUTH EVERY DAY FOR CHOLESTEROL    cetirizine (ZYRTEC) 10 MG tablet TAKE ONE TABLET BY MOUTH DAILY .  TAKE ONE DAILY AS NEEDED FOR ITCHING. MAY TAKE UP TO 4 TIMES DAILY AS  NEEDD .  TAKE ONE DAILY AS NEEDED FOR ITCHING. MAY TAKE UP TO 4 TIMES DAILY AS  NEEDD    cholecalciferol, vitamin D3, (VITAMIN D3) 50 mcg (2,000 unit) Tab TAKE ONE TABLET BY MOUTH EVERY DAY AS A VITAMIN SUPPLEMENT    citalopram (CELEXA) 10 MG tablet Take by mouth.    divalproex (DEPAKOTE) 125 MG EC tablet Take 500 mg by mouth.    divalproex (DEPAKOTE) 500 MG Tb24 Take 500 mg by mouth once daily.    HYDROPHILIC CREAM TOP APPLY LIBERAL AMOUNT TO THE SKIN TWICE A DAY . APPLY TO THE SKIN TWICE DAILY AS NEEDED FOR DRY SKIN AND ITCING    insulin detemir U-100 (LEVEMIR) 100 unit/mL injection Inject 15 Units into the skin.    insulin glargine (LANTUS) 100 unit/mL injection Inject 20 Units into the skin.    insulin glargine 100 units/mL (3mL) SubQ pen INJECT 18 UNITS SUBCUTANEOUSLY EVERY MORNING AND INJECT 14 UNITS AT BEDTIME    lacosamide (VIMPAT) 200 mg Tab tablet Take 200 mg by mouth.    lamoTRIgine (LAMICTAL) 200 MG tablet TAKE ONE TABLET BY MOUTH TWICE A DAY FOR SEIZURES    lisinopriL (PRINIVIL,ZESTRIL) 2.5 MG tablet  Take 2.5 mg by mouth.    lisinopriL 10 MG tablet TAKE ONE TABLET BY MOUTH DAILY FOR HEART/ BLOOD PRESSURE    metFORMIN (GLUCOPHAGE) 1000 MG tablet TAKE ONE TABLET BY MOUTH TWICE A DAY WITH FOOD FOR DIABETES    metFORMIN (GLUCOPHAGE) 500 MG tablet Take 500 mg by mouth.    metoprolol succinate (TOPROL-XL) 50 MG 24 hr tablet Take 50 mg by mouth once daily.    metoprolol tartrate (LOPRESSOR) 50 MG tablet TAKE ONE-HALF TABLET BY MOUTH TWICE A DAY FOR HEART/ BLOOD PRESSURE    nitroGLYCERIN (NITROSTAT) 0.4 MG SL tablet DISSOLVE ONE TABLET UNDER TONGUE Q5MX3 FOR CHEST PAIN    oxycodone-acetaminophen (PERCOCET) 5-325 mg per tablet Take 1 tablet by mouth every 4 (four) hours as needed for Pain (do not drink alcohol, drive, or operate heavy machinery while taking this medication.). (Patient not taking: Reported on 4/28/2021)    pantoprazole (PROTONIX) 40 MG tablet Take 1 tablet (40 mg total) by mouth once daily.    potassium chloride (KLOR-CON) 10 MEQ TbSR TAKE FOUR TABLETS BY MOUTH ONCE DAILY TO INCREASE POTASSIUM    QUEtiapine (SEROQUEL) 25 MG Tab TAKE ONE-HALF TABLET BY MOUTH AT BEDTIME FOR MOOD OR PSYCHOSIS AND INSOMNIA.. MAY USE WHOLE TABLET IF NECESSARY    rosuvastatin (CRESTOR) 20 MG tablet Take 20 mg by mouth.    rosuvastatin (CRESTOR) 40 MG Tab TAKE ONE TABLET BY MOUTH DAILY FOR CHOLESTEROL (REPLACES ATORVASTATIN)    tamsulosin (FLOMAX) 0.4 mg Cap TAKE 1 CAPSULE BY MOUTH EVERY DAY FOR BPH    triamcinolone acetonide 0.1% (KENALOG) 0.1 % cream APPLY MODERATE AMOUNT TOPICALLY TWICE A DAY    varenicline (CHANTIX JOSE) 0.5 mg (11)- 1 mg (42) tablet TAKE AS DIRECTED BY MOUTH UD FOR SMOKING CESSATION     Family History    None       Tobacco Use    Smoking status: Current Every Day Smoker     Packs/day: 1.50     Types: Cigarettes    Smokeless tobacco: Not on file   Substance and Sexual Activity    Alcohol use: Yes    Drug use: Not on file    Sexual activity: Not on file     Review of Systems    Constitutional: Negative for chills and fever.   Eyes: Negative for photophobia and visual disturbance.   Respiratory: Negative for cough and shortness of breath.    Cardiovascular: Negative for chest pain, palpitations and leg swelling.   Gastrointestinal: Negative for abdominal pain, diarrhea, nausea and vomiting.   Genitourinary: Positive for decreased urine volume, difficulty urinating, penile discharge, penile pain, penile swelling, scrotal swelling and testicular pain. Negative for frequency, hematuria and urgency.   Skin: Negative for pallor, rash and wound.   Neurological: Negative for light-headedness and headaches.   Psychiatric/Behavioral: Negative for confusion and decreased concentration.     Objective:     Vital Signs (Most Recent):  Temp: 98.9 °F (37.2 °C) (01/04/22 2236)  Pulse: 84 (01/04/22 2236)  Resp: 18 (01/04/22 2236)  BP: (!) 143/67 (01/04/22 2236)  SpO2: 99 % (01/04/22 2236) Vital Signs (24h Range):  Temp:  [98.2 °F (36.8 °C)-99.2 °F (37.3 °C)] 98.9 °F (37.2 °C)  Pulse:  [79-97] 84  Resp:  [18-25] 18  SpO2:  [98 %-100 %] 99 %  BP: (136-187)/(67-88) 143/67     Weight: 57.4 kg (126 lb 8.7 oz)  Body mass index is 18.69 kg/m².    Physical Exam  Vitals and nursing note reviewed. Exam conducted with a chaperone present.   Constitutional:       General: He is not in acute distress.     Appearance: He is well-developed and well-nourished.   HENT:      Head: Normocephalic and atraumatic.      Right Ear: External ear normal.      Left Ear: External ear normal.      Nose: Nose normal.      Mouth/Throat:      Mouth: Oropharynx is clear and moist.   Eyes:      Extraocular Movements: EOM normal.      Conjunctiva/sclera: Conjunctivae normal.      Pupils: Pupils are equal, round, and reactive to light.   Cardiovascular:      Rate and Rhythm: Normal rate and regular rhythm.      Pulses: Intact distal pulses.   Pulmonary:      Effort: Pulmonary effort is normal. No respiratory distress.      Breath sounds:  Normal breath sounds. No wheezing or rales.   Abdominal:      General: Bowel sounds are normal. There is no distension.      Palpations: Abdomen is soft.      Tenderness: There is no abdominal tenderness.      Comments: No palpable hepatomegaly or splenomegaly   Genitourinary:     Penis: Erythema, tenderness, discharge and swelling present.       Testes:         Right: Tenderness and swelling present.         Left: Tenderness and swelling present.   Musculoskeletal:         General: No tenderness or edema. Normal range of motion.      Cervical back: Normal range of motion and neck supple.   Skin:     General: Skin is warm and dry.   Neurological:      Mental Status: He is alert and oriented to person, place, and time.   Psychiatric:         Mood and Affect: Mood and affect normal. Affect is tearful.         Thought Content: Thought content normal.           CRANIAL NERVES     CN III, IV, VI   Pupils are equal, round, and reactive to light.  Extraocular motions are normal.        Significant Labs: All pertinent labs within the past 24 hours have been reviewed.    Significant Imaging: I have reviewed all pertinent imaging results/findings within the past 24 hours.

## 2022-01-05 NOTE — NURSING
Pt transferred to [facility] via stretcher. VSS. White board updated and explained.  Pt oriented to room and call light, bed locked and in lowest position, bedside table and call light in reach. Pt in no distress. Will cont to monitor.

## 2022-01-05 NOTE — PLAN OF CARE
01/05/22 1000   Discharge Planning   Assessment Type Discharge Planning Brief Assessment   Resource/Environmental Concerns none   Support Systems Spouse/significant other   Equipment Currently Used at Home none   Current Living Arrangements home/apartment/condo   Patient/Family Anticipates Transition to home   Patient/Family Anticipated Services at Transition none   DME Needed Upon Discharge  none   Discharge Plan A Home with family  (with follow up)

## 2022-01-05 NOTE — HPI
Scrotal Swelling  Abhishek Zhao is a 61 y.o. male who was been experiencing scrotal pain and swelling since 12/31/2021.  He denies any fever.  He has had issues voiding since the swelling began.  Hemostat having issues in the past with urinary problems.  He denies having any previous issues with scrotal infection or swelling.  He recalls that he had procedures in the past but cannot recall specifically what to place.  He does not have urologist locally but moved back from [place] about 1 year ago.    Review of care everywhere shows that he had a cystoscopy with urethral dilation of a urethral stricture in the bulbar urethra in 2019 at [facility].  And there are reports that in approximately 2015 [facility]

## 2022-01-05 NOTE — PLAN OF CARE
Problem: Adult Inpatient Plan of Care  Goal: Plan of Care Review  Outcome: Ongoing, Progressing  Goal: Patient-Specific Goal (Individualized)  Outcome: Ongoing, Progressing  Goal: Optimal Comfort and Wellbeing  Outcome: Ongoing, Progressing  Goal: Readiness for Transition of Care  Outcome: Ongoing, Progressing   Pt alert and oriented. Pt free from falls, injury or any further trauma throughout shift. Continued medications as ordered. Complaints of pain, prn pain medication given. Pt on room air. Voids per urinal. Pt in no distress. Will cont to monitor.

## 2022-01-05 NOTE — ED NOTES
Patient personal cane, sweater, pants, underwear, phone, and cellular phone  label with patient name and given to EMS at this time.

## 2022-01-05 NOTE — ASSESSMENT & PLAN NOTE
Continue IV antibiotics, Urology consult,Urology doing,cystoscopy with possible DVIU, possible urethral dilation, possible suprapubic tube placement.  Plan for incision and drainage of scrotal abscess

## 2022-01-05 NOTE — ASSESSMENT & PLAN NOTE
Keep NPO  Continue Cipro and Zosyn    Plan for cystoscopy with possible DVIU, possible urethral dilation, possible suprapubic tube placement.  Plan for incision and drainage of scrotal abscess    I explained to the patient that he will likely have a prolonged hospital stay requiring dressing changes.  Depending on the exact findings in the OR he will require a urethral catheter possible suprapubic tube for an extended period of time.  Structures within his penis do appear to be somewhat involved with the infection and it is unlikely, however possible, that he would need portions of his penis removed.  Ultimately he would need repair of his stricture disease a [facility].    The patient understands and agrees consent performed.

## 2022-01-05 NOTE — HPI
61 y.o. male with adjustment disorder with depressed mood, chronic ischemic heart disease, cocaine dependence in remission, cardiomegaly, HLD, HTN, swizure disorder, tobacco use, and DM 2 presents with a complaint of testicular pain.  Associated with swelling and redness to the scrotum and penis along with difficulty urinating, pain is rated as severe and radiates to the rectum.  Denies fever, chills, cough, SOB, chest pain, n/v/d.  In the ED, he was found to be tachypneic, with leukocytosis and elevated lactic.  CT and US shows evidence of multiple scrotal and perineal abscesses with some extension into the penile shaft.  UA with evidence of infection.  Sepsis treatment initiated, blood and urine cultures obtained, IV fluids and IV antibiotics initiated.  Urology consulted.

## 2022-01-05 NOTE — ASSESSMENT & PLAN NOTE
"This patient does have evidence of infective focus  My overall impression is sepsis. Vital signs were reviewed and noted in progress note.  Antibiotics given-   Antibiotics (From admission, onward)            Start     Stop Route Frequency Ordered    01/05/22 0200  piperacillin-tazobactam 4.5 g in dextrose 5 % 100 mL IVPB (ready to mix system)         -- IV Every 8 hours (non-standard times) 01/04/22 2241 01/04/22 2241  vancomycin - pharmacy to dose  (vancomycin IVPB)        "And" Linked Group Details    -- IV pharmacy to manage frequency 01/04/22 2241        Cultures were taken-   Microbiology Results (last 7 days)     Procedure Component Value Units Date/Time    Urine culture **CANNOT BE ORDERED STAT** [[id number]] Collected: 01/04/22 1804    Order Status: Sent Specimen: Urine, Clean Catch Updated: 01/04/22 2229    Blood Culture #2 **CANNOT BE ORDERED STAT** [[id number]] Collected: 01/04/22 1744    Order Status: Sent Specimen: Blood from Peripheral, Antecubital, Right Updated: 01/04/22 1745    Blood Culture #1 **CANNOT BE ORDERED STAT** [[id number]] Collected: 01/04/22 1744    Order Status: Sent Specimen: Blood from Peripheral, Lower Arm, Left Updated: 01/04/22 1745        Latest lactate reviewed, they are-  No results for input(s): LACTATE in the last 72 hours.    Organ dysfunction indicated by n/a  Source- abscesses    Source control Achieved by- Urology consult    "

## 2022-01-05 NOTE — NURSING TRANSFER
Nursing Transfer Note      1/5/2022     Reason patient is being transferred: Inpatient Admit    Transfer From: PACU; To: Med-Surg unit 407    Transfer via stretcher    Transfer with dentures    Transported by House transport tech    Medicines sent: LR infusing    Any special needs or follow-up needed: No    Chart send with patient: Yes    Notified: patient    Patient reassessed at: 1300 on 1/5/2022    Pt is AAO x4 and denies having any pain at current time. Pt appears to be in no apparent distress at current time. Pt has IV fluid infusing and is on room air. Incision to scrotum  which is dressed in gauze with mesh underwear remains c/d/I. Vitals are stable and Jorge score documented per flow sheet. Pt was given dentures while in PACU and dentures placed on pt's stretcher to be brought to unit.

## 2022-01-05 NOTE — ED NOTES
Patient still awaiting transport at this time; attempted to call report to [facility] 4W.  Charge RN reports primary RN for patient admission bed 407B unavailable at this time; callback number left with charge RN.  Will call back to give report prior to patient transport.

## 2022-01-05 NOTE — SUBJECTIVE & OBJECTIVE
Past Medical History:   Diagnosis Date    High cholesterol     Hypertension     Seizures        History reviewed. No pertinent surgical history.    Review of patient's allergies indicates:  No Known Allergies    No current facility-administered medications on file prior to encounter.     Current Outpatient Medications on File Prior to Encounter   Medication Sig    amLODIPine (NORVASC) 10 MG tablet Take 10 mg by mouth.    amlodipine-benazepril 5-20 mg (LOTREL) 5-20 mg per capsule TAKE 1 CAPSULE BY MOUTH EVERY DAY FOR HEART AND BLOOD PRESSURE    ASCORBATE CALCIUM (VITAMIN C ORAL) Take by mouth.    aspirin (ECOTRIN) 81 MG EC tablet Take 81 mg by mouth once daily.    atorvastatin (LIPITOR) 80 MG tablet TAKE ONE TABLET BY MOUTH EVERY DAY FOR CHOLESTEROL    cetirizine (ZYRTEC) 10 MG tablet TAKE ONE TABLET BY MOUTH DAILY .  TAKE ONE DAILY AS NEEDED FOR ITCHING. MAY TAKE UP TO 4 TIMES DAILY AS  NEEDD .  TAKE ONE DAILY AS NEEDED FOR ITCHING. MAY TAKE UP TO 4 TIMES DAILY AS  NEEDD    cholecalciferol, vitamin D3, (VITAMIN D3) 50 mcg (2,000 unit) Tab TAKE ONE TABLET BY MOUTH EVERY DAY AS A VITAMIN SUPPLEMENT    citalopram (CELEXA) 10 MG tablet Take by mouth.    divalproex (DEPAKOTE) 125 MG EC tablet Take 500 mg by mouth.    divalproex (DEPAKOTE) 500 MG Tb24 Take 500 mg by mouth once daily.    HYDROPHILIC CREAM TOP APPLY LIBERAL AMOUNT TO THE SKIN TWICE A DAY . APPLY TO THE SKIN TWICE DAILY AS NEEDED FOR DRY SKIN AND ITCING    insulin detemir U-100 (LEVEMIR) 100 unit/mL injection Inject 15 Units into the skin.    insulin glargine (LANTUS) 100 unit/mL injection Inject 20 Units into the skin.    insulin glargine 100 units/mL (3mL) SubQ pen INJECT 18 UNITS SUBCUTANEOUSLY EVERY MORNING AND INJECT 14 UNITS AT BEDTIME    lacosamide (VIMPAT) 200 mg Tab tablet Take 200 mg by mouth.    lamoTRIgine (LAMICTAL) 200 MG tablet TAKE ONE TABLET BY MOUTH TWICE A DAY FOR SEIZURES    lisinopriL (PRINIVIL,ZESTRIL) 2.5 MG tablet  Take 2.5 mg by mouth.    lisinopriL 10 MG tablet TAKE ONE TABLET BY MOUTH DAILY FOR HEART/ BLOOD PRESSURE    metFORMIN (GLUCOPHAGE) 1000 MG tablet TAKE ONE TABLET BY MOUTH TWICE A DAY WITH FOOD FOR DIABETES    metFORMIN (GLUCOPHAGE) 500 MG tablet Take 500 mg by mouth.    metoprolol succinate (TOPROL-XL) 50 MG 24 hr tablet Take 50 mg by mouth once daily.    metoprolol tartrate (LOPRESSOR) 50 MG tablet TAKE ONE-HALF TABLET BY MOUTH TWICE A DAY FOR HEART/ BLOOD PRESSURE    nitroGLYCERIN (NITROSTAT) 0.4 MG SL tablet DISSOLVE ONE TABLET UNDER TONGUE [id number] FOR CHEST PAIN    oxycodone-acetaminophen (PERCOCET) 5-325 mg per tablet Take 1 tablet by mouth every 4 (four) hours as needed for Pain (do not drink alcohol, drive, or operate heavy machinery while taking this medication.). (Patient not taking: Reported on 4/28/2021)    pantoprazole (PROTONIX) 40 MG tablet Take 1 tablet (40 mg total) by mouth once daily.    potassium chloride (KLOR-CON) 10 MEQ TbSR TAKE FOUR TABLETS BY MOUTH ONCE DAILY TO INCREASE POTASSIUM    QUEtiapine (SEROQUEL) 25 MG Tab TAKE ONE-HALF TABLET BY MOUTH AT BEDTIME FOR MOOD OR PSYCHOSIS AND INSOMNIA.. MAY USE WHOLE TABLET IF NECESSARY    rosuvastatin (CRESTOR) 20 MG tablet Take 20 mg by mouth.    rosuvastatin (CRESTOR) 40 MG Tab TAKE ONE TABLET BY MOUTH DAILY FOR CHOLESTEROL (REPLACES ATORVASTATIN)    tamsulosin (FLOMAX) 0.4 mg Cap TAKE 1 CAPSULE BY MOUTH EVERY DAY FOR BPH    triamcinolone acetonide 0.1% (KENALOG) 0.1 % cream APPLY MODERATE AMOUNT TOPICALLY TWICE A DAY    varenicline (CHANTIX JOSE) 0.5 mg (11)- 1 mg (42) tablet TAKE AS DIRECTED BY MOUTH UD FOR SMOKING CESSATION     Family History    None       Tobacco Use    Smoking status: Current Every Day Smoker     Packs/day: 1.50     Types: Cigarettes    Smokeless tobacco: Not on file   Substance and Sexual Activity    Alcohol use: Yes    Drug use: Not on file    Sexual activity: Not on file     Review of Systems    Constitutional: Negative for chills and fever.   Eyes: Negative for photophobia and visual disturbance.   Respiratory: Negative for cough and shortness of breath.    Cardiovascular: Negative for chest pain, palpitations and leg swelling.   Gastrointestinal: Negative for abdominal pain, diarrhea, nausea and vomiting.   Genitourinary: Positive for decreased urine volume, difficulty urinating, penile discharge, penile pain, penile swelling, scrotal swelling and testicular pain. Negative for frequency, hematuria and urgency.   Skin: Negative for pallor, rash and wound.   Neurological: Negative for light-headedness and headaches.   Psychiatric/Behavioral: Negative for confusion and decreased concentration.     Objective:     Vital Signs (Most Recent):  Temp: 98.3 °F (36.8 °C) (01/05/22 0740)  Pulse: 76 (01/05/22 0740)  Resp: 18 (01/05/22 0740)  BP: (!) 111/57 (01/05/22 0740)  SpO2: 98 % (01/05/22 0740) Vital Signs (24h Range):  Temp:  [98.2 °F (36.8 °C)-99.2 °F (37.3 °C)] 98.3 °F (36.8 °C)  Pulse:  [76-97] 76  Resp:  [17-25] 18  SpO2:  [97 %-100 %] 98 %  BP: (111-187)/(56-88) 111/57     Weight: 57.4 kg (126 lb 8.7 oz)  Body mass index is 18.69 kg/m².    Physical Exam  Vitals and nursing note reviewed. Exam conducted with a chaperone present.   Constitutional:       General: He is not in acute distress.     Appearance: He is well-developed.   HENT:      Head: Normocephalic and atraumatic.      Right Ear: External ear normal.      Left Ear: External ear normal.      Nose: Nose normal.   Eyes:      Extraocular Movements: EOM normal.      Conjunctiva/sclera: Conjunctivae normal.      Pupils: Pupils are equal, round, and reactive to light.   Cardiovascular:      Rate and Rhythm: Normal rate and regular rhythm.   Pulmonary:      Effort: Pulmonary effort is normal. No respiratory distress.      Breath sounds: Normal breath sounds. No wheezing or rales.   Abdominal:      General: Bowel sounds are normal. There is no  distension.      Palpations: Abdomen is soft.      Tenderness: There is no abdominal tenderness.      Comments: No palpable hepatomegaly or splenomegaly   Genitourinary:     Penis: Erythema, tenderness, discharge and swelling present.       Testes:         Right: Tenderness and swelling present.         Left: Tenderness and swelling present.   Musculoskeletal:         General: No tenderness. Normal range of motion.      Cervical back: Normal range of motion and neck supple.   Skin:     General: Skin is warm and dry.   Neurological:      Mental Status: He is alert and oriented to person, place, and time.   Psychiatric:         Mood and Affect: Affect is tearful.         Thought Content: Thought content normal.           CRANIAL NERVES     CN III, IV, VI   Pupils are equal, round, and reactive to light.  Extraocular motions are normal.        Significant Labs: All pertinent labs within the past 24 hours have been reviewed.    Significant Imaging: I have reviewed all pertinent imaging results/findings within the past 24 hours.

## 2022-01-05 NOTE — CONSULTS
[facility] Surg  Urology  Consult Note    Patient Name: Abhishek Zhao  MRN: [id number]  Admission Date: 1/4/2022  Hospital Length of Stay: 0   Code Status: Full Code   Attending Provider: Quinten Rosario MD   Consulting Provider: LATASHA Rangel MD  Primary Care Physician: To Obtain Unable  Principal Problem:Scrotal abscess    Inpatient consult to Urology  Consult performed by: ANIBAL Rangel MD  Consult ordered by: Anali Hagen DO          Subjective:     HPI:  Scrotal Swelling  Abhishek Zhao is a 61 y.o. male who was been experiencing scrotal pain and swelling since 12/31/2021.  He denies any fever.  He has had issues voiding since the swelling began.  Hemostat having issues in the past with urinary problems.  He denies having any previous issues with scrotal infection or swelling.  He recalls that he had procedures in the past but cannot recall specifically what to place.  He does not have urologist locally but moved back from [place] about 1 year ago.    Review of care everywhere shows that he had a cystoscopy with urethral dilation of a urethral stricture in the bulbar urethra in 2019 at [facility].  And there are reports that [facility] approximately [facility]      Past Medical History:   Diagnosis Date    High cholesterol     Hypertension     Seizures        History reviewed. No pertinent surgical history.    Review of patient's allergies indicates:  No Known Allergies    Family History    None         Tobacco Use    Smoking status: Current Every Day Smoker     Packs/day: 1.50     Types: Cigarettes    Smokeless tobacco: Not on file   Substance and Sexual Activity    Alcohol use: Yes    Drug use: Not on file    Sexual activity: Not on file       Review of Systems   Constitutional: Negative.  Negative for fever.   HENT: Negative.    Eyes: Negative.    Respiratory: Negative for cough, chest tightness and shortness of breath.    Cardiovascular: Negative  for chest pain.   Gastrointestinal: Negative.  Negative for constipation, diarrhea and nausea.   Genitourinary: Positive for difficulty urinating, penile pain, penile swelling, scrotal swelling and testicular pain.   Musculoskeletal: Negative.    Neurological: Negative.    Psychiatric/Behavioral: Negative.        Objective:     Temp:  [98.2 °F (36.8 °C)-99.2 °F (37.3 °C)] 98.3 °F (36.8 °C)  Pulse:  [76-97] 76  Resp:  [17-25] 18  SpO2:  [97 %-100 %] 98 %  BP: (111-187)/(56-88) 111/57     Body mass index is 18.69 kg/m².           Drains     None                 Physical Exam  Vitals and nursing note reviewed.   Constitutional:       Appearance: He is well-developed and well-nourished.   HENT:      Head: Normocephalic.   Eyes:      Conjunctiva/sclera: Conjunctivae normal.   Neck:      Thyroid: No thyromegaly.      Trachea: No tracheal deviation.   Cardiovascular:      Rate and Rhythm: Normal rate.      Heart sounds: Normal heart sounds.   Pulmonary:      Effort: Pulmonary effort is normal. No respiratory distress.      Breath sounds: Normal breath sounds. No wheezing.   Abdominal:      General: Bowel sounds are normal.      Palpations: Abdomen is soft. There is no hepatosplenomegaly.      Tenderness: There is no abdominal tenderness. There is no CVA tenderness or rebound.      Hernia: No hernia is present.   Genitourinary:     Penis: Erythema and discharge present.       Comments: Purulence noted around the meatus.  Penile shaft edema.  He has significant scrotal edema with denuded skin inferiorly.  Significant tenderness on examination.    Suprapubic examination reveals an old suprapubic site.  No abdominal scars.  Musculoskeletal:         General: No tenderness or edema. Normal range of motion.      Cervical back: Normal range of motion and neck supple.   Lymphadenopathy:      Cervical: No cervical adenopathy.   Skin:     General: Skin is warm and dry.      Findings: No erythema or rash.   Neurological:      Mental  Status: He is alert and oriented to person, place, and time.   Psychiatric:         Mood and Affect: Mood and affect normal.         Behavior: Behavior normal.         Thought Content: Thought content normal.         Judgment: Judgment normal.         Significant Labs:    BMP:  Recent Labs   Lab 01/05/22  0400   *   K 3.7      CO2 16*   BUN 14   CREATININE 1.4   CALCIUM 8.5*       CBC:  No results for input(s): WBC, HGB, HCT, PLT in the last 168 hours.    Blood Culture: No results for input(s): LABBLOO in the last 168 hours.  Urine Culture: No results for input(s): LABURIN in the last 168 hours.    Significant Imaging:  CT: I have reviewed all results within the past 24 hours and my personal findings are:  Bilateral hydronephrosis hydroureter thickening noted of the ureters down to a thickened bladder wall.  Stranding within the structures of the penis and possible abscesses within the corpora.  Scrotal edema with a fluid collection at the posterior inferior portion of the scrotum.  U/S: I have reviewed all results within the past 24 hours and my personal findings are:  Complex fluid collection within the scrotum.                    Assessment and Plan:     * Scrotal abscess  Keep NPO  Continue Cipro and Zosyn    Plan for cystoscopy with possible DVIU, possible urethral dilation, possible suprapubic tube placement.  Plan for incision and drainage of scrotal abscess    I explained to the patient that he will likely have a prolonged hospital stay requiring dressing changes.  Depending on the exact findings in the OR he will require a urethral catheter possible suprapubic tube for an extended period of time.  Structures within his penis do appear to be somewhat involved with the infection and it is unlikely, however possible, that he would need portions of his penis removed.  Ultimately he would need repair of his stricture disease a [facility].    The patient  understands and agrees consent performed.        VTE Risk Mitigation (From admission, onward)         Ordered     IP VTE LOW RISK PATIENT  Once         01/04/22 2241     Place sequential compression device  Until discontinued         01/04/22 2241                Thank you for your consult. I will follow-up with patient. Please contact us if you have any additional questions.    LATASHA Rangel MD  Urology  [facility] S[facility]

## 2022-01-05 NOTE — PROGRESS NOTES
[facility] Medicine  Progress Note    Patient Name: Abhishek Zhao  MRN: [id number]  Patient Class: OP- Observation   Admission Date: 1/4/2022  Length of Stay: 0 days  Attending Physician: Quinten Rosario MD  Primary Care Provider: To Obtain Unable        Subjective:     Principal Problem:Scrotal abscess        HPI:  61 y.o. male with adjustment disorder with depressed mood, chronic ischemic heart disease, cocaine dependence in remission, cardiomegaly, HLD, HTN, swizure disorder, tobacco use, and DM 2 presents with a complaint of testicular pain.  Associated with swelling and redness to the scrotum and penis along with difficulty urinating, pain is rated as severe and radiates to the rectum.  Denies fever, chills, cough, SOB, chest pain, n/v/d.  In the ED, he was found to be tachypneic, with leukocytosis and elevated lactic.  CT and US shows evidence of multiple scrotal and perineal abscesses with some extension into the penile shaft.  UA with evidence of infection.  Sepsis treatment initiated, blood and urine cultures obtained, IV fluids and IV antibiotics initiated.  Urology consulted.      Overview/Hospital Course:  61 y.o. male with adjustment disorder with depressed mood, chronic ischemic heart disease, cocaine dependence in remission, cardiomegaly, HLD, HTN, swizure disorder, tobacco use, and DM 2 presents with a complaint of testicular pain.  Associated with swelling and redness to the scrotum and penis along with difficulty urinating, pain is rated as severe and radiates to the rectum.  Denies fever, chills, cough, SOB, chest pain, n/v/d.  In the ED, he was found to be tachypneic, with leukocytosis and elevated lactic.  CT and US shows evidence of multiple scrotal and perineal abscesses with some extension into the penile shaft.  UA with evidence of infection.  Sepsis treatment initiated, blood and urine cultures obtained, IV fluids and IV antibiotics initiated.  Urology  consulted.  Urology doing,cystoscopy with possible DVIU, possible urethral dilation, possible suprapubic tube placement.  Plan for incision and drainage of scrotal abscess      Past Medical History:   Diagnosis Date    High cholesterol     Hypertension     Seizures        History reviewed. No pertinent surgical history.    Review of patient's allergies indicates:  No Known Allergies    No current facility-administered medications on file prior to encounter.     Current Outpatient Medications on File Prior to Encounter   Medication Sig    amLODIPine (NORVASC) 10 MG tablet Take 10 mg by mouth.    amlodipine-benazepril 5-20 mg (LOTREL) 5-20 mg per capsule TAKE 1 CAPSULE BY MOUTH EVERY DAY FOR HEART AND BLOOD PRESSURE    ASCORBATE CALCIUM (VITAMIN C ORAL) Take by mouth.    aspirin (ECOTRIN) 81 MG EC tablet Take 81 mg by mouth once daily.    atorvastatin (LIPITOR) 80 MG tablet TAKE ONE TABLET BY MOUTH EVERY DAY FOR CHOLESTEROL    cetirizine (ZYRTEC) 10 MG tablet TAKE ONE TABLET BY MOUTH DAILY .  TAKE ONE DAILY AS NEEDED FOR ITCHING. MAY TAKE UP TO 4 TIMES DAILY AS  NEEDD .  TAKE ONE DAILY AS NEEDED FOR ITCHING. MAY TAKE UP TO 4 TIMES DAILY AS  NEEDD    cholecalciferol, vitamin D3, (VITAMIN D3) 50 mcg (2,000 unit) Tab TAKE ONE TABLET BY MOUTH EVERY DAY AS A VITAMIN SUPPLEMENT    citalopram (CELEXA) 10 MG tablet Take by mouth.    divalproex (DEPAKOTE) 125 MG EC tablet Take 500 mg by mouth.    divalproex (DEPAKOTE) 500 MG Tb24 Take 500 mg by mouth once daily.    HYDROPHILIC CREAM TOP APPLY LIBERAL AMOUNT TO THE SKIN TWICE A DAY . APPLY TO THE SKIN TWICE DAILY AS NEEDED FOR DRY SKIN AND ITCING    insulin detemir U-100 (LEVEMIR) 100 unit/mL injection Inject 15 Units into the skin.    insulin glargine (LANTUS) 100 unit/mL injection Inject 20 Units into the skin.    insulin glargine 100 units/mL (3mL) SubQ pen INJECT 18 UNITS SUBCUTANEOUSLY EVERY MORNING AND INJECT 14 UNITS AT BEDTIME    lacosamide (VIMPAT)  200 mg Tab tablet Take 200 mg by mouth.    lamoTRIgine (LAMICTAL) 200 MG tablet TAKE ONE TABLET BY MOUTH TWICE A DAY FOR SEIZURES    lisinopriL (PRINIVIL,ZESTRIL) 2.5 MG tablet Take 2.5 mg by mouth.    lisinopriL 10 MG tablet TAKE ONE TABLET BY MOUTH DAILY FOR HEART/ BLOOD PRESSURE    metFORMIN (GLUCOPHAGE) 1000 MG tablet TAKE ONE TABLET BY MOUTH TWICE A DAY WITH FOOD FOR DIABETES    metFORMIN (GLUCOPHAGE) 500 MG tablet Take 500 mg by mouth.    metoprolol succinate (TOPROL-XL) 50 MG 24 hr tablet Take 50 mg by mouth once daily.    metoprolol tartrate (LOPRESSOR) 50 MG tablet TAKE ONE-HALF TABLET BY MOUTH TWICE A DAY FOR HEART/ BLOOD PRESSURE    nitroGLYCERIN (NITROSTAT) 0.4 MG SL tablet DISSOLVE ONE TABLET UNDER TONGUE [id number] FOR CHEST PAIN    oxycodone-acetaminophen (PERCOCET) 5-325 mg per tablet Take 1 tablet by mouth every 4 (four) hours as needed for Pain (do not drink alcohol, drive, or operate heavy machinery while taking this medication.). (Patient not taking: Reported on 4/28/2021)    pantoprazole (PROTONIX) 40 MG tablet Take 1 tablet (40 mg total) by mouth once daily.    potassium chloride (KLOR-CON) 10 MEQ TbSR TAKE FOUR TABLETS BY MOUTH ONCE DAILY TO INCREASE POTASSIUM    QUEtiapine (SEROQUEL) 25 MG Tab TAKE ONE-HALF TABLET BY MOUTH AT BEDTIME FOR MOOD OR PSYCHOSIS AND INSOMNIA.. MAY USE WHOLE TABLET IF NECESSARY    rosuvastatin (CRESTOR) 20 MG tablet Take 20 mg by mouth.    rosuvastatin (CRESTOR) 40 MG Tab TAKE ONE TABLET BY MOUTH DAILY FOR CHOLESTEROL (REPLACES ATORVASTATIN)    tamsulosin (FLOMAX) 0.4 mg Cap TAKE 1 CAPSULE BY MOUTH EVERY DAY FOR BPH    triamcinolone acetonide 0.1% (KENALOG) 0.1 % cream APPLY MODERATE AMOUNT TOPICALLY TWICE A DAY    varenicline (CHANTIX JOSE) 0.5 mg (11)- 1 mg (42) tablet TAKE AS DIRECTED BY MOUTH UD FOR SMOKING CESSATION     Family History    None       Tobacco Use    Smoking status: Current Every Day Smoker     Packs/day: 1.50     Types: Cigarettes     Smokeless tobacco: Not on file   Substance and Sexual Activity    Alcohol use: Yes    Drug use: Not on file    Sexual activity: Not on file     Review of Systems   Constitutional: Negative for chills and fever.   Eyes: Negative for photophobia and visual disturbance.   Respiratory: Negative for cough and shortness of breath.    Cardiovascular: Negative for chest pain, palpitations and leg swelling.   Gastrointestinal: Negative for abdominal pain, diarrhea, nausea and vomiting.   Genitourinary: Positive for decreased urine volume, difficulty urinating, penile discharge, penile pain, penile swelling, scrotal swelling and testicular pain. Negative for frequency, hematuria and urgency.   Skin: Negative for pallor, rash and wound.   Neurological: Negative for light-headedness and headaches.   Psychiatric/Behavioral: Negative for confusion and decreased concentration.     Objective:     Vital Signs (Most Recent):  Temp: 98.3 °F (36.8 °C) (01/05/22 0740)  Pulse: 76 (01/05/22 0740)  Resp: 18 (01/05/22 0740)  BP: (!) 111/57 (01/05/22 0740)  SpO2: 98 % (01/05/22 0740) Vital Signs (24h Range):  Temp:  [98.2 °F (36.8 °C)-99.2 °F (37.3 °C)] 98.3 °F (36.8 °C)  Pulse:  [76-97] 76  Resp:  [17-25] 18  SpO2:  [97 %-100 %] 98 %  BP: (111-187)/(56-88) 111/57     Weight: 57.4 kg (126 lb 8.7 oz)  Body mass index is 18.69 kg/m².    Physical Exam  Vitals and nursing note reviewed. Exam conducted with a chaperone present.   Constitutional:       General: He is not in acute distress.     Appearance: He is well-developed.   HENT:      Head: Normocephalic and atraumatic.      Right Ear: External ear normal.      Left Ear: External ear normal.      Nose: Nose normal.   Eyes:      Extraocular Movements: EOM normal.      Conjunctiva/sclera: Conjunctivae normal.      Pupils: Pupils are equal, round, and reactive to light.   Cardiovascular:      Rate and Rhythm: Normal rate and regular rhythm.   Pulmonary:      Effort: Pulmonary effort is  "normal. No respiratory distress.      Breath sounds: Normal breath sounds. No wheezing or rales.   Abdominal:      General: Bowel sounds are normal. There is no distension.      Palpations: Abdomen is soft.      Tenderness: There is no abdominal tenderness.      Comments: No palpable hepatomegaly or splenomegaly   Genitourinary:     Penis: Erythema, tenderness, discharge and swelling present.       Testes:         Right: Tenderness and swelling present.         Left: Tenderness and swelling present.   Musculoskeletal:         General: No tenderness. Normal range of motion.      Cervical back: Normal range of motion and neck supple.   Skin:     General: Skin is warm and dry.   Neurological:      Mental Status: He is alert and oriented to person, place, and time.   Psychiatric:         Mood and Affect: Affect is tearful.         Thought Content: Thought content normal.           CRANIAL NERVES     CN III, IV, VI   Pupils are equal, round, and reactive to light.  Extraocular motions are normal.        Significant Labs: All pertinent labs within the past 24 hours have been reviewed.    Significant Imaging: I have reviewed all pertinent imaging results/findings within the past 24 hours.      Assessment/Plan:      * Scrotal abscess  Continue IV antibiotics, Urology consult,Urology doing,cystoscopy with possible DVIU, possible urethral dilation, possible suprapubic tube placement.  Plan for incision and drainage of scrotal abscess    Sepsis  This patient does have evidence of infective focus  My overall impression is sepsis. Vital signs were reviewed and noted in progress note.  Antibiotics given-   Antibiotics (From admission, onward)            Start     Stop Route Frequency Ordered    01/05/22 0200  piperacillin-tazobactam 4.5 g in dextrose 5 % 100 mL IVPB (ready to mix system)         -- IV Every 8 hours (non-standard times) 01/04/22 2241    01/04/22 2241  vancomycin - pharmacy to dose  (vancomycin IVPB)        "And" " Linked Group Details    -- IV pharmacy to manage frequency 01/04/22 2241        Cultures were taken-   Microbiology Results (last 7 days)     Procedure Component Value Units Date/Time    Urine culture **CANNOT BE ORDERED STAT** [[id number]] Collected: 01/04/22 1804    Order Status: Sent Specimen: Urine, Clean Catch Updated: 01/04/22 2229    Blood Culture #2 **CANNOT BE ORDERED STAT** [[id number]] Collected: 01/04/22 1744    Order Status: Sent Specimen: Blood from Peripheral, Antecubital, Right Updated: 01/04/22 1745    Blood Culture #1 **CANNOT BE ORDERED STAT** [[id number]] Collected: 01/04/22 1744    Order Status: Sent Specimen: Blood from Peripheral, Lower Arm, Left Updated: 01/04/22 1745        Latest lactate reviewed, they are-  No results for input(s): LACTATE in the last 72 hours.    Organ dysfunction indicated by n/a  Source- abscesses    Source control Achieved by- Urology consult      Diabetes mellitus type 2, controlled  Check a1c  Hold oral antihyperglycemics while inpatient  PRN sliding scale insulin  ACHS glucose monitoring   ADA diet     Tobacco user  5 minutes spent counseling the patient on smoking cessation and he is not currently ready to stop smoking. He will be offereded a nicotine transdermal patch while hospitalized and monitored for withdrawal.  Will provide additional smoking cessation counseling prior to discharge.     Seizure disorder  Continue home regimen of depakote and lamotrigine    Essential hypertension  Well controlled, continue home medications and monitor blood pressure, adjust as needed.     Hyperlipidemia  Continue statin    Cardiomegaly  No evidence of acute decompensation or fluid overload, continue home regimen    Cocaine dependence in remission  Counseled     Chronic ischemic heart disease  No acute issue    Adjustment disorder with depressed mood  Continue home regimen of citalopram and quetiapine      VTE Risk Mitigation (From admission, onward)         Ordered     IP VTE  LOW RISK PATIENT  Once         01/04/22 2241     Place sequential compression device  Until discontinued         01/04/22 2241                Discharge Planning   CHUCK:      Code Status: Full Code   Is the patient medically ready for discharge?:     Reason for patient still in hospital (select all that apply): Patient trending condition                     Quinten Rosario MD  Department of [facility]   [facility] [place] - [facility]

## 2022-01-05 NOTE — TRANSFER OF CARE
"Anesthesia Transfer of Care Note    Patient: Abhishek Zhao    Procedure(s) Performed: Procedure(s) (LRB):  CYSTOSCOPY, WITH RETROGRADE PYELOGRAM I &D SCROTAL ABSCESS (N/A)    Patient location: PACU    Anesthesia Type: general    Transport from OR: Transported from OR on 6-10 L/min O2 by face mask with adequate spontaneous ventilation    Post pain: adequate analgesia    Post assessment: tolerated procedure well and no apparent anesthetic complications    Post vital signs: stable    Level of consciousness: sedated and responds to stimulation    Nausea/Vomiting: no nausea/vomiting    Complications: none    Transfer of care protocol was followed      Last vitals:   Visit Vitals  /60 (Patient Position: Lying)   Pulse 75   Temp 36.2 °C (97.2 °F) (Oral)   Resp 16   Ht 5' 9" (1.753 m)   Wt 57.4 kg (126 lb 8.7 oz)   SpO2 100%   BMI 18.69 kg/m²     "

## 2022-01-05 NOTE — OP NOTE
Date of Procedure: 01/05/2022     Preoperative Diagnosis:  Scrotal abscess, history of urethral stricture, bilateral hydronephrosis    Postoperative Diagnosis:  Berna's gangrene, urethrocutaneous fistula, bilateral hydronephrosis    Primary Surgeon: ANIBAL Rangel MD    Anesthesia:  General    Procedure Performed:  Cystoscopy, retrograde urethrogram, fistulogram, Sahu catheter placement, fluoroscopy, excision and debridement of Berna's gangrene of the scrotum    Estimated Blood Loss:  Less than 20 mL    Drains:  20 [place] Ailey tip catheter, Kerlix packing    Specimens Removed:  Wound cultures, necrotic scrotal skin    Complications:  None    Indications: Abhishek Zhao is a 61-year-old male with a 6 day history of scrotal swelling and pain.  Preoperative imaging showed a complex fluid collection.  Imaging did not suggest air within the wound.  He has a history of urethral stricture last dilated in 2019 at an outside facility.    Procedure in Detail:    Abhishek Zhao was taken to the operating room where he was positively identified by armband.  He has placed supine the operating room table.  Following induction of adequate general anesthesia he was placed in the dorsal lithotomy position his external genitalia and lower abdomen were prepped and draped in usual sterile fashion.    A  film was taking using fluoroscopy.  Contrast from his CT scan was still within the renal collecting systems, ureters, and bladder.     The inferior scrotal skin appeared necrotic.  On examination in the operating room purulence began draining from midline perineal wound.    A 22 [place] rigid cystoscope was passed per urethra.  There is since significant amount of purulence noted within the urethra.  I was able to follow the wall of the urethra until I countered the bulbar urethra then the prostatic urethra and into the bladder.  The bladder was drained.    The bladder was irrigated and drain with significant purulence  draining.    Fluoroscopy was used once the bladder was drained.  Both renal collecting systems appear less dilated and the ureters had drained.  At this point I did not feel as though retrograde pyelogram or stent placement was necessary.    I then inspected the bladder I did not see any evidence of any obvious fistula.  Position was somewhat limited due to continued purulence and edema within the bladder wall.    A repeat pull the scope back to further inspect the urethra.  There was less purulence upon further inspection.  Within the mid bulbar urethra evidence of his previous stricture was noted.  There was an opening at the 06:00 o'clock 07:00 o'clock and 05:00 o'clock positions.    Then performed a retrograde urethrogram contrast was seen traveling through the prostatic urethra and into the bladder.  There was no evidence of any fistula within the bladder prostatic urethra appeared normal.  Within the bulbar urethra there was evidence of contrast traveling beyond the urethra and into the abscess cavity.    I placed the scope back into the bladder and then inserted a superstiff wire.  I then withdrew the scope.    I then passed a 20 [place] tip catheter over the wire to the bladder without difficulty.  The catheter was then placed to gravity drainage.    I then used electrocautery to excise the necrotic skin.  Purulence was seen draining from the wound.  Cultures were taken.  I excised the skin and necrotic tissue until the tissues began to bleed.  There was a pocket of abscess tracking down the perineum towards the rectum.  On upon palpation it did not appear to enter into the rectum.    I then cauterized the wound edges.  A cauterized the base of the wound areas of bleeding.  I then packed the wound with gentamicin soaked Kerlix gauze.    Sponge counts needle counts were instrument count was reported correct.    His anesthesia was then reversed was taken to recovery room in stable condition.

## 2022-01-05 NOTE — H&P
[facility] Medicine  History & Physical    Patient Name: Abhishek Zhao  MRN: [id number]  Patient Class: OP- Observation  Admission Date: 1/4/2022  Attending Physician: Jaswinder Simms MD   Primary Care Provider: To Obtain Unable         Patient information was obtained from patient, past medical records and ER records.     Subjective:     Principal Problem:Scrotal abscess    Chief Complaint:   Chief Complaint   Patient presents with    Testicle Pain     Pt states he is having severe testicle swelling that started 3 days ago        HPI: 61 y.o. male with adjustment disorder with depressed mood, chronic ischemic heart disease, cocaine dependence in remission, cardiomegaly, HLD, HTN, swizure disorder, tobacco use, and DM 2 presents with a complaint of testicular pain.  Associated with swelling and redness to the scrotum and penis along with difficulty urinating, pain is rated as severe and radiates to the rectum.  Denies fever, chills, cough, SOB, chest pain, n/v/d.  In the ED, he was found to be tachypneic, with leukocytosis and elevated lactic.  CT and US shows evidence of multiple scrotal and perineal abscesses with some extension into the penile shaft.  UA with evidence of infection.  Sepsis treatment initiated, blood and urine cultures obtained, IV fluids and IV antibiotics initiated.  Urology consulted.      Past Medical History:   Diagnosis Date    High cholesterol     Hypertension     Seizures        History reviewed. No pertinent surgical history.    Review of patient's allergies indicates:  No Known Allergies    No current facility-administered medications on file prior to encounter.     Current Outpatient Medications on File Prior to Encounter   Medication Sig    amLODIPine (NORVASC) 10 MG tablet Take 10 mg by mouth.    amlodipine-benazepril 5-20 mg (LOTREL) 5-20 mg per capsule TAKE 1 CAPSULE BY MOUTH EVERY DAY FOR HEART AND BLOOD PRESSURE    ASCORBATE CALCIUM (VITAMIN C ORAL) Take by  mouth.    aspirin (ECOTRIN) 81 MG EC tablet Take 81 mg by mouth once daily.    atorvastatin (LIPITOR) 80 MG tablet TAKE ONE TABLET BY MOUTH EVERY DAY FOR CHOLESTEROL    cetirizine (ZYRTEC) 10 MG tablet TAKE ONE TABLET BY MOUTH DAILY .  TAKE ONE DAILY AS NEEDED FOR ITCHING. MAY TAKE UP TO 4 TIMES DAILY AS  NEEDD .  TAKE ONE DAILY AS NEEDED FOR ITCHING. MAY TAKE UP TO 4 TIMES DAILY AS  NEEDD    cholecalciferol, vitamin D3, (VITAMIN D3) 50 mcg (2,000 unit) Tab TAKE ONE TABLET BY MOUTH EVERY DAY AS A VITAMIN SUPPLEMENT    citalopram (CELEXA) 10 MG tablet Take by mouth.    divalproex (DEPAKOTE) 125 MG EC tablet Take 500 mg by mouth.    divalproex (DEPAKOTE) 500 MG Tb24 Take 500 mg by mouth once daily.    HYDROPHILIC CREAM TOP APPLY LIBERAL AMOUNT TO THE SKIN TWICE A DAY . APPLY TO THE SKIN TWICE DAILY AS NEEDED FOR DRY SKIN AND ITCING    insulin detemir U-100 (LEVEMIR) 100 unit/mL injection Inject 15 Units into the skin.    insulin glargine (LANTUS) 100 unit/mL injection Inject 20 Units into the skin.    insulin glargine 100 units/mL (3mL) SubQ pen INJECT 18 UNITS SUBCUTANEOUSLY EVERY MORNING AND INJECT 14 UNITS AT BEDTIME    lacosamide (VIMPAT) 200 mg Tab tablet Take 200 mg by mouth.    lamoTRIgine (LAMICTAL) 200 MG tablet TAKE ONE TABLET BY MOUTH TWICE A DAY FOR SEIZURES    lisinopriL (PRINIVIL,ZESTRIL) 2.5 MG tablet Take 2.5 mg by mouth.    lisinopriL 10 MG tablet TAKE ONE TABLET BY MOUTH DAILY FOR HEART/ BLOOD PRESSURE    metFORMIN (GLUCOPHAGE) 1000 MG tablet TAKE ONE TABLET BY MOUTH TWICE A DAY WITH FOOD FOR DIABETES    metFORMIN (GLUCOPHAGE) 500 MG tablet Take 500 mg by mouth.    metoprolol succinate (TOPROL-XL) 50 MG 24 hr tablet Take 50 mg by mouth once daily.    metoprolol tartrate (LOPRESSOR) 50 MG tablet TAKE ONE-HALF TABLET BY MOUTH TWICE A DAY FOR HEART/ BLOOD PRESSURE    nitroGLYCERIN (NITROSTAT) 0.4 MG SL tablet DISSOLVE ONE TABLET UNDER TONGUE [id number] FOR CHEST PAIN     oxycodone-acetaminophen (PERCOCET) 5-325 mg per tablet Take 1 tablet by mouth every 4 (four) hours as needed for Pain (do not drink alcohol, drive, or operate heavy machinery while taking this medication.). (Patient not taking: Reported on 4/28/2021)    pantoprazole (PROTONIX) 40 MG tablet Take 1 tablet (40 mg total) by mouth once daily.    potassium chloride (KLOR-CON) 10 MEQ TbSR TAKE FOUR TABLETS BY MOUTH ONCE DAILY TO INCREASE POTASSIUM    QUEtiapine (SEROQUEL) 25 MG Tab TAKE ONE-HALF TABLET BY MOUTH AT BEDTIME FOR MOOD OR PSYCHOSIS AND INSOMNIA.. MAY USE WHOLE TABLET IF NECESSARY    rosuvastatin (CRESTOR) 20 MG tablet Take 20 mg by mouth.    rosuvastatin (CRESTOR) 40 MG Tab TAKE ONE TABLET BY MOUTH DAILY FOR CHOLESTEROL (REPLACES ATORVASTATIN)    tamsulosin (FLOMAX) 0.4 mg Cap TAKE 1 CAPSULE BY MOUTH EVERY DAY FOR BPH    triamcinolone acetonide 0.1% (KENALOG) 0.1 % cream APPLY MODERATE AMOUNT TOPICALLY TWICE A DAY    varenicline (CHANTIX JOSE) 0.5 mg (11)- 1 mg (42) tablet TAKE AS DIRECTED BY MOUTH UD FOR SMOKING CESSATION     Family History    None       Tobacco Use    Smoking status: Current Every Day Smoker     Packs/day: 1.50     Types: Cigarettes    Smokeless tobacco: Not on file   Substance and Sexual Activity    Alcohol use: Yes    Drug use: Not on file    Sexual activity: Not on file     Review of Systems   Constitutional: Negative for chills and fever.   Eyes: Negative for photophobia and visual disturbance.   Respiratory: Negative for cough and shortness of breath.    Cardiovascular: Negative for chest pain, palpitations and leg swelling.   Gastrointestinal: Negative for abdominal pain, diarrhea, nausea and vomiting.   Genitourinary: Positive for decreased urine volume, difficulty urinating, penile discharge, penile pain, penile swelling, scrotal swelling and testicular pain. Negative for frequency, hematuria and urgency.   Skin: Negative for pallor, rash and wound.   Neurological:  Negative for light-headedness and headaches.   Psychiatric/Behavioral: Negative for confusion and decreased concentration.     Objective:     Vital Signs (Most Recent):  Temp: 98.9 °F (37.2 °C) (01/04/22 2236)  Pulse: 84 (01/04/22 2236)  Resp: 18 (01/04/22 2236)  BP: (!) 143/67 (01/04/22 2236)  SpO2: 99 % (01/04/22 2236) Vital Signs (24h Range):  Temp:  [98.2 °F (36.8 °C)-99.2 °F (37.3 °C)] 98.9 °F (37.2 °C)  Pulse:  [79-97] 84  Resp:  [18-25] 18  SpO2:  [98 %-100 %] 99 %  BP: (136-187)/(67-88) 143/67     Weight: 57.4 kg (126 lb 8.7 oz)  Body mass index is 18.69 kg/m².    Physical Exam  Vitals and nursing note reviewed. Exam conducted with a chaperone present.   Constitutional:       General: He is not in acute distress.     Appearance: He is well-developed and well-nourished.   HENT:      Head: Normocephalic and atraumatic.      Right Ear: External ear normal.      Left Ear: External ear normal.      Nose: Nose normal.      Mouth/Throat:      Mouth: Oropharynx is clear and moist.   Eyes:      Extraocular Movements: EOM normal.      Conjunctiva/sclera: Conjunctivae normal.      Pupils: Pupils are equal, round, and reactive to light.   Cardiovascular:      Rate and Rhythm: Normal rate and regular rhythm.      Pulses: Intact distal pulses.   Pulmonary:      Effort: Pulmonary effort is normal. No respiratory distress.      Breath sounds: Normal breath sounds. No wheezing or rales.   Abdominal:      General: Bowel sounds are normal. There is no distension.      Palpations: Abdomen is soft.      Tenderness: There is no abdominal tenderness.      Comments: No palpable hepatomegaly or splenomegaly   Genitourinary:     Penis: Erythema, tenderness, discharge and swelling present.       Testes:         Right: Tenderness and swelling present.         Left: Tenderness and swelling present.   Musculoskeletal:         General: No tenderness or edema. Normal range of motion.      Cervical back: Normal range of motion and neck  "supple.   Skin:     General: Skin is warm and dry.   Neurological:      Mental Status: He is alert and oriented to person, place, and time.   Psychiatric:         Mood and Affect: Mood and affect normal. Affect is tearful.         Thought Content: Thought content normal.           CRANIAL NERVES     CN III, IV, VI   Pupils are equal, round, and reactive to light.  Extraocular motions are normal.        Significant Labs: All pertinent labs within the past 24 hours have been reviewed.    Significant Imaging: I have reviewed all pertinent imaging results/findings within the past 24 hours.    Assessment/Plan:     * Scrotal abscess  Continue IV antibiotics, Urology consult, NPO p MN    Sepsis  This patient does have evidence of infective focus  My overall impression is sepsis. Vital signs were reviewed and noted in progress note.  Antibiotics given-   Antibiotics (From admission, onward)            Start     Stop Route Frequency Ordered    01/05/22 0200  piperacillin-tazobactam 4.5 g in dextrose 5 % 100 mL IVPB (ready to mix system)         -- IV Every 8 hours (non-standard times) 01/04/22 2241 01/04/22 2241  vancomycin - pharmacy to dose  (vancomycin IVPB)        "And" Linked Group Details    -- IV pharmacy to manage frequency 01/04/22 2241        Cultures were taken-   Microbiology Results (last 7 days)     Procedure Component Value Units Date/Time    Urine culture **CANNOT BE ORDERED STAT** [[id number]] Collected: 01/04/22 1804    Order Status: Sent Specimen: Urine, Clean Catch Updated: 01/04/22 2229    Blood Culture #2 **CANNOT BE ORDERED STAT** [[id number]] Collected: 01/04/22 1744    Order Status: Sent Specimen: Blood from Peripheral, Antecubital, Right Updated: 01/04/22 1745    Blood Culture #1 **CANNOT BE ORDERED STAT** [[id number]] Collected: 01/04/22 1744    Order Status: Sent Specimen: Blood from Peripheral, Lower Arm, Left Updated: 01/04/22 1745        Latest lactate reviewed, they are-  No results for " input(s): LACTATE in the last 72 hours.    Organ dysfunction indicated by n/a  Source- abscesses    Source control Achieved by- Urology consult      Diabetes mellitus type 2, controlled  Check a1c  Hold oral antihyperglycemics while inpatient  PRN sliding scale insulin  ACHS glucose monitoring   ADA diet     Tobacco user  5 minutes spent counseling the patient on smoking cessation and he is not currently ready to stop smoking. He will be offereded a nicotine transdermal patch while hospitalized and monitored for withdrawal.  Will provide additional smoking cessation counseling prior to discharge.     Seizure disorder  Continue home regimen of depakote and lamotrigine    Essential hypertension  Well controlled, continue home medications and monitor blood pressure, adjust as needed.     Hyperlipidemia  Continue statin    Cardiomegaly  No evidence of acute decompensation or fluid overload, continue home regimen    Cocaine dependence in remission  Counseled     Chronic ischemic heart disease  No acute issue    Adjustment disorder with depressed mood  Continue home regimen of citalopram and quetiapine      VTE Risk Mitigation (From admission, onward)         Ordered     IP VTE LOW RISK PATIENT  Once         01/04/22 2241     Place sequential compression device  Until discontinued         01/04/22 2241               As clarification, on 01/04/2022, patient should be placed in hospital observation services under my care in collaboration with MD Gopi Barbour Jr., APRN, AGACNP-BC  Hospitalist - Department of Hospital Medicine  [facility] [address] [place] [address]  Office #: [phone]; Pager #: [phone]

## 2022-01-05 NOTE — BRIEF OP NOTE
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    Estimated Blood Loss: * No values recorded between 1/5/2022 10:22 AM and 1/5/2022 10:56 AM *    Estimated Blood Loss has not been documented. EBL = 20 ml.         Specimens:   Specimen (24h ago, onward)            None          [id number]

## 2022-01-06 PROBLEM — N49.3 FOURNIER'S GANGRENE: Status: ACTIVE | Noted: 2022-01-06

## 2022-01-06 LAB
ALBUMIN SERPL BCP-MCNC: 1.5 G/DL (ref 3.5–5.2)
ALP SERPL-CCNC: 238 U/L (ref 55–135)
ALT SERPL W/O P-5'-P-CCNC: 21 U/L (ref 10–44)
ANION GAP SERPL CALC-SCNC: 12 MMOL/L (ref 8–16)
AST SERPL-CCNC: 19 U/L (ref 10–40)
BILIRUB SERPL-MCNC: 0.2 MG/DL (ref 0.1–1)
BUN SERPL-MCNC: 30 MG/DL (ref 6–20)
CALCIUM SERPL-MCNC: 8.3 MG/DL (ref 8.7–10.5)
CHLORIDE SERPL-SCNC: 105 MMOL/L (ref 95–110)
CO2 SERPL-SCNC: 20 MMOL/L (ref 23–29)
CREAT SERPL-MCNC: 1.6 MG/DL (ref 0.5–1.4)
EST. GFR  (AFRICAN AMERICAN): 54 ML/MIN/1.73 M^2
EST. GFR  (NON AFRICAN AMERICAN): 46 ML/MIN/1.73 M^2
FINAL PATHOLOGIC DIAGNOSIS: NORMAL
GLUCOSE SERPL-MCNC: 280 MG/DL (ref 70–110)
GROSS: NORMAL
Lab: NORMAL
POCT GLUCOSE: 303 MG/DL (ref 70–110)
POCT GLUCOSE: 354 MG/DL (ref 70–110)
POCT GLUCOSE: 362 MG/DL (ref 70–110)
POCT GLUCOSE: 375 MG/DL (ref 70–110)
POTASSIUM SERPL-SCNC: 3.7 MMOL/L (ref 3.5–5.1)
PROT SERPL-MCNC: 5.5 G/DL (ref 6–8.4)
SODIUM SERPL-SCNC: 137 MMOL/L (ref 136–145)
VANCOMYCIN TROUGH SERPL-MCNC: 8.5 UG/ML (ref 10–22)

## 2022-01-06 PROCEDURE — 36415 COLL VENOUS BLD VENIPUNCTURE: CPT | Performed by: UROLOGY

## 2022-01-06 PROCEDURE — 99233 SBSQ HOSP IP/OBS HIGH 50: CPT | Mod: ,,, | Performed by: UROLOGY

## 2022-01-06 PROCEDURE — 99233 PR SUBSEQUENT HOSPITAL CARE,LEVL III: ICD-10-PCS | Mod: ,,, | Performed by: UROLOGY

## 2022-01-06 PROCEDURE — 25000003 PHARM REV CODE 250: Performed by: HOSPITALIST

## 2022-01-06 PROCEDURE — 25000003 PHARM REV CODE 250: Performed by: UROLOGY

## 2022-01-06 PROCEDURE — 11000001 HC ACUTE MED/SURG PRIVATE ROOM

## 2022-01-06 PROCEDURE — 80053 COMPREHEN METABOLIC PANEL: CPT | Performed by: UROLOGY

## 2022-01-06 PROCEDURE — 63700000 PHARM REV CODE 250 ALT 637 W/O HCPCS: Performed by: HOSPITALIST

## 2022-01-06 PROCEDURE — 80202 ASSAY OF VANCOMYCIN: CPT | Performed by: UROLOGY

## 2022-01-06 PROCEDURE — 63600175 PHARM REV CODE 636 W HCPCS: Performed by: UROLOGY

## 2022-01-06 RX ORDER — VANCOMYCIN HCL IN 5 % DEXTROSE 1G/250ML
1000 PLASTIC BAG, INJECTION (ML) INTRAVENOUS
Status: DISCONTINUED | OUTPATIENT
Start: 2022-01-07 | End: 2022-01-07

## 2022-01-06 RX ORDER — SODIUM CHLORIDE 450 MG/100ML
INJECTION, SOLUTION INTRAVENOUS CONTINUOUS
Status: DISCONTINUED | OUTPATIENT
Start: 2022-01-06 | End: 2022-01-10

## 2022-01-06 RX ORDER — FLUCONAZOLE 100 MG/1
400 TABLET ORAL DAILY
Status: DISCONTINUED | OUTPATIENT
Start: 2022-01-06 | End: 2022-01-16

## 2022-01-06 RX ORDER — MUPIROCIN 20 MG/G
OINTMENT TOPICAL 2 TIMES DAILY
Status: COMPLETED | OUTPATIENT
Start: 2022-01-06 | End: 2022-01-11

## 2022-01-06 RX ADMIN — QUETIAPINE FUMARATE 25 MG: 25 TABLET ORAL at 09:01

## 2022-01-06 RX ADMIN — MUPIROCIN: 20 OINTMENT TOPICAL at 10:01

## 2022-01-06 RX ADMIN — ASPIRIN 81 MG: 81 TABLET, COATED ORAL at 08:01

## 2022-01-06 RX ADMIN — PIPERACILLIN AND TAZOBACTAM 4.5 G: 4; .5 INJECTION, POWDER, LYOPHILIZED, FOR SOLUTION INTRAVENOUS; PARENTERAL at 09:01

## 2022-01-06 RX ADMIN — INSULIN ASPART 3 UNITS: 100 INJECTION, SOLUTION INTRAVENOUS; SUBCUTANEOUS at 09:01

## 2022-01-06 RX ADMIN — HYDROCODONE BITARTRATE AND ACETAMINOPHEN 1 TABLET: 10; 325 TABLET ORAL at 09:01

## 2022-01-06 RX ADMIN — LAMOTRIGINE 200 MG: 100 TABLET ORAL at 08:01

## 2022-01-06 RX ADMIN — PIPERACILLIN AND TAZOBACTAM 4.5 G: 4; .5 INJECTION, POWDER, LYOPHILIZED, FOR SOLUTION INTRAVENOUS; PARENTERAL at 04:01

## 2022-01-06 RX ADMIN — HYDROCODONE BITARTRATE AND ACETAMINOPHEN 1 TABLET: 10; 325 TABLET ORAL at 01:01

## 2022-01-06 RX ADMIN — AMLODIPINE BESYLATE 10 MG: 5 TABLET ORAL at 08:01

## 2022-01-06 RX ADMIN — LAMOTRIGINE 200 MG: 100 TABLET ORAL at 09:01

## 2022-01-06 RX ADMIN — FLUCONAZOLE 400 MG: 100 TABLET ORAL at 02:01

## 2022-01-06 RX ADMIN — INSULIN ASPART 5 UNITS: 100 INJECTION, SOLUTION INTRAVENOUS; SUBCUTANEOUS at 01:01

## 2022-01-06 RX ADMIN — CITALOPRAM HYDROBROMIDE 10 MG: 10 TABLET ORAL at 08:01

## 2022-01-06 RX ADMIN — SODIUM CHLORIDE: 0.45 INJECTION, SOLUTION INTRAVENOUS at 03:01

## 2022-01-06 RX ADMIN — METOPROLOL SUCCINATE 50 MG: 50 TABLET, EXTENDED RELEASE ORAL at 08:01

## 2022-01-06 RX ADMIN — TAMSULOSIN HYDROCHLORIDE 0.4 MG: 0.4 CAPSULE ORAL at 08:01

## 2022-01-06 RX ADMIN — INSULIN ASPART 5 UNITS: 100 INJECTION, SOLUTION INTRAVENOUS; SUBCUTANEOUS at 05:01

## 2022-01-06 RX ADMIN — HYDROCODONE BITARTRATE AND ACETAMINOPHEN 1 TABLET: 10; 325 TABLET ORAL at 08:01

## 2022-01-06 RX ADMIN — DIVALPROEX SODIUM 500 MG: 250 TABLET, EXTENDED RELEASE ORAL at 08:01

## 2022-01-06 RX ADMIN — ATORVASTATIN CALCIUM 80 MG: 40 TABLET, FILM COATED ORAL at 08:01

## 2022-01-06 RX ADMIN — VANCOMYCIN HYDROCHLORIDE 750 MG: 750 INJECTION, POWDER, LYOPHILIZED, FOR SOLUTION INTRAVENOUS at 09:01

## 2022-01-06 RX ADMIN — HYDROCODONE BITARTRATE AND ACETAMINOPHEN 1 TABLET: 10; 325 TABLET ORAL at 04:01

## 2022-01-06 RX ADMIN — INSULIN ASPART 3 UNITS: 100 INJECTION, SOLUTION INTRAVENOUS; SUBCUTANEOUS at 08:01

## 2022-01-06 RX ADMIN — PIPERACILLIN AND TAZOBACTAM 4.5 G: 4; .5 INJECTION, POWDER, LYOPHILIZED, FOR SOLUTION INTRAVENOUS; PARENTERAL at 02:01

## 2022-01-06 RX ADMIN — POLYETHYLENE GLYCOL 3350 17 G: 17 POWDER, FOR SOLUTION ORAL at 08:01

## 2022-01-06 NOTE — PROGRESS NOTES
[facility] Medicine  Progress Note    Patient Name: Abhishek Zhao  MRN: [id number]  Patient Class: IP- Inpatient   Admission Date: 1/4/2022  Length of Stay: 1 days  Attending Physician: Quinten Rosario MD  Primary Care Provider: To Obtain Unable        Subjective:     Principal Problem:Scrotal abscess        HPI:  61 y.o. male with adjustment disorder with depressed mood, chronic ischemic heart disease, cocaine dependence in remission, cardiomegaly, HLD, HTN, swizure disorder, tobacco use, and DM 2 presents with a complaint of testicular pain.  Associated with swelling and redness to the scrotum and penis along with difficulty urinating, pain is rated as severe and radiates to the rectum.  Denies fever, chills, cough, SOB, chest pain, n/v/d.  In the ED, he was found to be tachypneic, with leukocytosis and elevated lactic.  CT and US shows evidence of multiple scrotal and perineal abscesses with some extension into the penile shaft.  UA with evidence of infection.  Sepsis treatment initiated, blood and urine cultures obtained, IV fluids and IV antibiotics initiated.  Urology consulted.      Overview/Hospital Course:  61 y.o. male with adjustment disorder with depressed mood, chronic ischemic heart disease, cocaine dependence in remission, cardiomegaly, HLD, HTN, swizure disorder, tobacco use, and DM 2 presents with a complaint of testicular pain.  Associated with swelling and redness to the scrotum and penis along with difficulty urinating, pain is rated as severe and radiates to the rectum.  Denies fever, chills, cough, SOB, chest pain, n/v/d.  In the ED, he was found to be tachypneic, with leukocytosis and elevated lactic.  CT and US shows evidence of multiple scrotal and perineal abscesses with some extension into the penile shaft.  UA with evidence of infection.  Sepsis treatment initiated, blood and urine cultures obtained, IV fluids and IV antibiotics initiated.  Urology  consulted.  Urology doing,cystoscopy with possible DVIU, possible urethral dilation, possible suprapubic tube placement.  Plan for incision and drainage of scrotal abscess  S/P I&D by Urology,cultures are pending.      Past Medical History:   Diagnosis Date    High cholesterol     Hypertension     Seizures        History reviewed. No pertinent surgical history.    Review of patient's allergies indicates:  No Known Allergies    No current facility-administered medications on file prior to encounter.     Current Outpatient Medications on File Prior to Encounter   Medication Sig    amLODIPine (NORVASC) 10 MG tablet Take 10 mg by mouth.    amlodipine-benazepril 5-20 mg (LOTREL) 5-20 mg per capsule TAKE 1 CAPSULE BY MOUTH EVERY DAY FOR HEART AND BLOOD PRESSURE    ASCORBATE CALCIUM (VITAMIN C ORAL) Take by mouth.    aspirin (ECOTRIN) 81 MG EC tablet Take 81 mg by mouth once daily.    atorvastatin (LIPITOR) 80 MG tablet TAKE ONE TABLET BY MOUTH EVERY DAY FOR CHOLESTEROL    cetirizine (ZYRTEC) 10 MG tablet TAKE ONE TABLET BY MOUTH DAILY .  TAKE ONE DAILY AS NEEDED FOR ITCHING. MAY TAKE UP TO 4 TIMES DAILY AS  NEEDD .  TAKE ONE DAILY AS NEEDED FOR ITCHING. MAY TAKE UP TO 4 TIMES DAILY AS  NEEDD    cholecalciferol, vitamin D3, (VITAMIN D3) 50 mcg (2,000 unit) Tab TAKE ONE TABLET BY MOUTH EVERY DAY AS A VITAMIN SUPPLEMENT    citalopram (CELEXA) 10 MG tablet Take by mouth.    divalproex (DEPAKOTE) 125 MG EC tablet Take 500 mg by mouth.    divalproex (DEPAKOTE) 500 MG Tb24 Take 500 mg by mouth once daily.    HYDROPHILIC CREAM TOP APPLY LIBERAL AMOUNT TO THE SKIN TWICE A DAY . APPLY TO THE SKIN TWICE DAILY AS NEEDED FOR DRY SKIN AND ITCING    insulin detemir U-100 (LEVEMIR) 100 unit/mL injection Inject 15 Units into the skin.    insulin glargine (LANTUS) 100 unit/mL injection Inject 20 Units into the skin.    insulin glargine 100 units/mL (3mL) SubQ pen INJECT 18 UNITS SUBCUTANEOUSLY EVERY MORNING AND INJECT 14  UNITS AT BEDTIME    lacosamide (VIMPAT) 200 mg Tab tablet Take 200 mg by mouth.    lamoTRIgine (LAMICTAL) 200 MG tablet TAKE ONE TABLET BY MOUTH TWICE A DAY FOR SEIZURES    lisinopriL (PRINIVIL,ZESTRIL) 2.5 MG tablet Take 2.5 mg by mouth.    lisinopriL 10 MG tablet TAKE ONE TABLET BY MOUTH DAILY FOR HEART/ BLOOD PRESSURE    metFORMIN (GLUCOPHAGE) 1000 MG tablet TAKE ONE TABLET BY MOUTH TWICE A DAY WITH FOOD FOR DIABETES    metFORMIN (GLUCOPHAGE) 500 MG tablet Take 500 mg by mouth.    metoprolol succinate (TOPROL-XL) 50 MG 24 hr tablet Take 50 mg by mouth once daily.    metoprolol tartrate (LOPRESSOR) 50 MG tablet TAKE ONE-HALF TABLET BY MOUTH TWICE A DAY FOR HEART/ BLOOD PRESSURE    nitroGLYCERIN (NITROSTAT) 0.4 MG SL tablet DISSOLVE ONE TABLET UNDER TONGUE [id number] FOR CHEST PAIN    oxycodone-acetaminophen (PERCOCET) 5-325 mg per tablet Take 1 tablet by mouth every 4 (four) hours as needed for Pain (do not drink alcohol, drive, or operate heavy machinery while taking this medication.). (Patient not taking: Reported on 4/28/2021)    pantoprazole (PROTONIX) 40 MG tablet Take 1 tablet (40 mg total) by mouth once daily.    potassium chloride (KLOR-CON) 10 MEQ TbSR TAKE FOUR TABLETS BY MOUTH ONCE DAILY TO INCREASE POTASSIUM    QUEtiapine (SEROQUEL) 25 MG Tab TAKE ONE-HALF TABLET BY MOUTH AT BEDTIME FOR MOOD OR PSYCHOSIS AND INSOMNIA.. MAY USE WHOLE TABLET IF NECESSARY    rosuvastatin (CRESTOR) 20 MG tablet Take 20 mg by mouth.    rosuvastatin (CRESTOR) 40 MG Tab TAKE ONE TABLET BY MOUTH DAILY FOR CHOLESTEROL (REPLACES ATORVASTATIN)    tamsulosin (FLOMAX) 0.4 mg Cap TAKE 1 CAPSULE BY MOUTH EVERY DAY FOR BPH    triamcinolone acetonide 0.1% (KENALOG) 0.1 % cream APPLY MODERATE AMOUNT TOPICALLY TWICE A DAY    varenicline (CHANTIX JOSE) 0.5 mg (11)- 1 mg (42) tablet TAKE AS DIRECTED BY MOUTH UD FOR SMOKING CESSATION     Family History    None       Tobacco Use    Smoking status: Current Every Day Smoker      Packs/day: 1.50     Types: Cigarettes    Smokeless tobacco: Not on file   Substance and Sexual Activity    Alcohol use: Yes    Drug use: Not on file    Sexual activity: Not on file     Review of Systems   Constitutional: Negative for chills and fever.   Eyes: Negative for photophobia and visual disturbance.   Respiratory: Negative for cough and shortness of breath.    Cardiovascular: Negative for chest pain, palpitations and leg swelling.   Gastrointestinal: Negative for abdominal pain, diarrhea, nausea and vomiting.   Genitourinary: Positive for decreased urine volume, difficulty urinating, penile discharge, penile pain, penile swelling, scrotal swelling and testicular pain. Negative for frequency, hematuria and urgency.   Skin: Negative for pallor, rash and wound.   Neurological: Negative for light-headedness and headaches.   Psychiatric/Behavioral: Negative for confusion and decreased concentration.     Objective:     Vital Signs (Most Recent):  Temp: 96.5 °F (35.8 °C) (01/06/22 1106)  Pulse: (!) 55 (01/06/22 1106)  Resp: 19 (01/06/22 1312)  BP: (!) 93/51 (01/06/22 1106)  SpO2: 98 % (01/06/22 1106) Vital Signs (24h Range):  Temp:  [96.5 °F (35.8 °C)-98.3 °F (36.8 °C)] 96.5 °F (35.8 °C)  Pulse:  [55-75] 55  Resp:  [17-19] 19  SpO2:  [96 %-100 %] 98 %  BP: ()/(51-67) 93/51     Weight: 57.4 kg (126 lb 8.7 oz)  Body mass index is 18.69 kg/m².    Physical Exam  Vitals and nursing note reviewed. Exam conducted with a chaperone present.   Constitutional:       General: He is not in acute distress.     Appearance: He is well-developed.   HENT:      Head: Normocephalic and atraumatic.      Right Ear: External ear normal.      Left Ear: External ear normal.      Nose: Nose normal.   Eyes:      Extraocular Movements: EOM normal.      Conjunctiva/sclera: Conjunctivae normal.      Pupils: Pupils are equal, round, and reactive to light.   Cardiovascular:      Rate and Rhythm: Normal rate and regular rhythm.    Pulmonary:      Effort: Pulmonary effort is normal. No respiratory distress.      Breath sounds: Normal breath sounds. No wheezing or rales.   Abdominal:      General: Bowel sounds are normal. There is no distension.      Palpations: Abdomen is soft.      Tenderness: There is no abdominal tenderness.      Comments: No palpable hepatomegaly or splenomegaly   Genitourinary:     Penis: Erythema, tenderness, discharge and swelling present.       Testes:         Right: Tenderness and swelling present.         Left: Tenderness and swelling present.   Musculoskeletal:         General: No tenderness. Normal range of motion.      Cervical back: Normal range of motion and neck supple.   Skin:     General: Skin is warm and dry.   Neurological:      Mental Status: He is alert and oriented to person, place, and time.   Psychiatric:         Mood and Affect: Affect is tearful.         Thought Content: Thought content normal.           CRANIAL NERVES     CN III, IV, VI   Pupils are equal, round, and reactive to light.  Extraocular motions are normal.        Significant Labs: All pertinent labs within the past 24 hours have been reviewed.    Significant Imaging: I have reviewed all pertinent imaging results/findings within the past 24 hours.      Assessment/Plan:      * Scrotal abscess  Continue IV antibiotics, Urology consult,Urology doing,cystoscopy with possible DVIU, possible urethral dilation, possible suprapubic tube placement.  Plan for incision and drainage of scrotal abscess.  S/P I&D by Urology on 1.5.22,cultures are pending.    Berna's gangrene  S/P I&D by Urology,cultures are pending.on gracie sp[ectrum IV Abx,wound care per urology.      Sepsis  This patient does have evidence of infective focus  My overall impression is sepsis. Vital signs were reviewed and noted in progress note.  Antibiotics given-   Antibiotics (From admission, onward)            Start     Stop Route Frequency Ordered    01/05/22 0200   "piperacillin-tazobactam 4.5 g in dextrose 5 % 100 mL IVPB (ready to mix system)         -- IV Every 8 hours (non-standard times) 01/04/22 2241 01/04/22 2241  vancomycin - pharmacy to dose  (vancomycin IVPB)        "And" Linked Group Details    -- IV pharmacy to manage frequency 01/04/22 2241        Cultures were taken-   Microbiology Results (last 7 days)     Procedure Component Value Units Date/Time    Urine culture **CANNOT BE ORDERED STAT** [[id number]] Collected: 01/04/22 1804    Order Status: Sent Specimen: Urine, Clean Catch Updated: 01/04/22 2229    Blood Culture #2 **CANNOT BE ORDERED STAT** [[id number]] Collected: 01/04/22 1744    Order Status: Sent Specimen: Blood from Peripheral, Antecubital, Right Updated: 01/04/22 1745    Blood Culture #1 **CANNOT BE ORDERED STAT** [[id number]] Collected: 01/04/22 1744    Order Status: Sent Specimen: Blood from Peripheral, Lower Arm, Left Updated: 01/04/22 1745        Latest lactate reviewed, they are-  No results for input(s): LACTATE in the last 72 hours.    Organ dysfunction indicated by n/a  Source- abscesses    Source control Achieved by- Urology consult      Diabetes mellitus type 2, controlled  Check a1c  Hold oral antihyperglycemics while inpatient  PRN sliding scale insulin  ACHS glucose monitoring   ADA diet     Tobacco user  5 minutes spent counseling the patient on smoking cessation and he is not currently ready to stop smoking. He will be offereded a nicotine transdermal patch while hospitalized and monitored for withdrawal.  Will provide additional smoking cessation counseling prior to discharge.     Seizure disorder  Continue home regimen of depakote and lamotrigine    Essential hypertension  Well controlled, continue home medications and monitor blood pressure, adjust as needed.     Hyperlipidemia  Continue statin    Cardiomegaly  No evidence of acute decompensation or fluid overload, continue home regimen    Cocaine dependence in remission  Counseled "     Chronic ischemic heart disease  No acute issue    Adjustment disorder with depressed mood  Continue home regimen of citalopram and quetiapine      VTE Risk Mitigation (From admission, onward)         Ordered     IP VTE LOW RISK PATIENT  Once         01/04/22 2241     Place sequential compression device  Until discontinued         01/04/22 2241                Discharge Planning   CHUCK:      Code Status: Full Code   Is the patient medically ready for discharge?:     Reason for patient still in hospital (select all that apply): Patient trending condition  Discharge Plan A: Home with family (with follow up)                  Quinten Rosario MD  Department of Hospital Medicine   [facility]

## 2022-01-06 NOTE — PROGRESS NOTES
[facility] S[facility]  Urology  Progress Note    Patient Name: Abhishek Zhao  MRN: [id number]  Admission Date: 1/4/2022  Hospital Length of Stay: 1 days  Code Status: Full Code   Attending Provider: Quinten Rosario MD   Primary Care Physician: To Obtain Unable    Subjective:     HPI:  Scrotal Swelling  Abhishek Zhao is a 61 y.o. male who was been experiencing scrotal pain and swelling since 12/31/2021.  He denies any fever.  He has had issues voiding since the swelling began.  Hemostat having issues in the past with urinary problems.  He denies having any previous issues with scrotal infection or swelling.  He recalls that he had procedures in the past but cannot recall specifically what to place.  He does not have urologist locally but moved back from [place] about 1 year ago.    Review of care everywhere shows that he had a cystoscopy with urethral dilation of a urethral stricture in the bulbar urethra in 2019 at [facility].  And there are reports that [facility] approximately 2015 [facility]      Interval History: c/o pain at wound site.     Review of Systems   Constitutional: Negative.  Negative for fever.   HENT: Negative.    Eyes: Negative.    Respiratory: Negative for cough, chest tightness and shortness of breath.    Cardiovascular: Negative for chest pain.   Gastrointestinal: Negative.  Negative for constipation, diarrhea and nausea.   Genitourinary: Positive for difficulty urinating, penile pain, penile swelling, scrotal swelling and testicular pain.   Musculoskeletal: Negative.    Neurological: Negative.    Psychiatric/Behavioral: Negative.      Objective:     Temp:  [96.6 °F (35.9 °C)-98.3 °F (36.8 °C)] 96.6 °F (35.9 °C)  Pulse:  [58-76] 58  Resp:  [11-19] 18  SpO2:  [96 %-100 %] 96 %  BP: (100-153)/(54-71) 107/60     Body mass index is 18.69 kg/m².           Drains     Drain                 Urethral Catheter 01/05/22 1050 Double-lumen 20 Fr. <1 day                 Physical Exam  Vitals and nursing note reviewed.   Constitutional:       Appearance: He is well-developed.   HENT:      Head: Normocephalic.   Eyes:      Conjunctiva/sclera: Conjunctivae normal.   Neck:      Thyroid: No thyromegaly.      Trachea: No tracheal deviation.   Cardiovascular:      Rate and Rhythm: Normal rate.      Heart sounds: Normal heart sounds.   Pulmonary:      Effort: Pulmonary effort is normal. No respiratory distress.      Breath sounds: Normal breath sounds. No wheezing.   Abdominal:      General: Bowel sounds are normal.      Palpations: Abdomen is soft.      Tenderness: There is no abdominal tenderness. There is no rebound.      Hernia: No hernia is present.   Genitourinary:     Penis: Erythema and discharge present.       Comments: Penile shaft edema. Sahu draining raissa urine. Large scrotal defect inferiorly with viable skin edges. No necrotic areas noted. No fluctuance.    Musculoskeletal:         General: No tenderness. Normal range of motion.      Cervical back: Normal range of motion and neck supple.   Lymphadenopathy:      Cervical: No cervical adenopathy.   Skin:     General: Skin is warm and dry.      Findings: No erythema or rash.   Neurological:      Mental Status: He is alert and oriented to person, place, and time.   Psychiatric:         Behavior: Behavior normal.         Thought Content: Thought content normal.         Judgment: Judgment normal.         Significant Labs:    BMP:  Recent Labs   Lab 01/05/22  0400 01/06/22  0406   * 137   K 3.7 3.7    105   CO2 16* 20*   BUN 14 30*   CREATININE 1.4 1.6*   CALCIUM 8.5* 8.3*       CBC:   Recent Labs   Lab 01/05/22  0955   WBC 17.30*   HGB 10.7*   HCT 32.4*          Blood Culture:   Recent Labs   Lab 01/04/22  1744   LABBLOO No Growth to date  No Growth to date     Urine Culture:   Recent Labs   Lab 01/04/22  1804   LABURIN YEAST   10,000 - 49,999 cfu/ml  Identification pending  *     Urine Studies:   Recent  Labs   Lab 01/04/22  1544   COLORU Yellow   APPEARANCEUA Hazy*   PHUR 5.0   SPECGRAV 1.010   PROTEINUA 1+*   GLUCUA 4+*   KETONESU Negative   BILIRUBINUA Negative   OCCULTUA 1+*   NITRITE Negative   UROBILINOGEN Negative   LEUKOCYTESUR 3+*   RBCUA 10*   WBCUA 100*   BACTERIA Many*   HYALINECASTS 0       Significant Imaging:  All pertinent imaging results/findings from the past 24 hours have been reviewed.                  Assessment/Plan:     * Scrotal abscess  Continue Cipro and Zosyn    s/p cystoscopy with santiago placement and debridement of abscess/necrosis of scrotum 1/5/22.   Dressing changed today by MD. Wet Kerlix gauze placed.     Wound care consultation. Dressing can be changed by RN.   Will likely need plastics consultation in future for wound closure/grafting    Berna's gangrene   - s/p debridement 1/5/22        VTE Risk Mitigation (From admission, onward)         Ordered     IP VTE LOW RISK PATIENT  Once         01/04/22 2241     Place sequential compression device  Until discontinued         01/04/22 2241                Mary Booth MD  Urology  [facility] [place] [facility]

## 2022-01-06 NOTE — CONSULTS
[facility] Surg  Wound Care    Patient Name:  Abhishek Zhao   MRN:  [id number]  Date: 1/6/2022  Diagnosis: Scrotal abscess    History:     Past Medical History:   Diagnosis Date    High cholesterol     Hypertension     Seizures        Social History     Socioeconomic History    Marital status:    Tobacco Use    Smoking status: Current Every Day Smoker     Packs/day: 1.50     Types: Cigarettes   Substance and Sexual Activity    Alcohol use: Yes       Precautions:     Allergies as of 01/04/2022    (No Known Allergies)       [facility] Assessment Details/Treatment   Consulted for wounds scrotum  A 61 year old male admitted 1/4/22 from home with scrotal abscess; sepsis; DM II controlled; tobacco user; seizure disorder; essential hypertension; HLD; cardiomegaly; cocaine dependence in remission; adjustment disorder with depressed mood  According to reports, patient had cystoscopy with urethral dilation of urethral stricture in the bulbar urethra in 2019 at [facility]; in approximately [facility]  1/5 WBC 17.3 Hgb 10.7 Hct 32.4  A1C 9.3   1/6 Alb 1.5  1/5 S/P Cystoscopy, retrograde urethrogram, fistulogram, santiago catheter placement, fluoroscopy, excision and debridement of Berna's gangrene of the scrotum per Dr. Rangel for Berna's gangrene, urethrocutaneous fistula and bilateral hydronephrosis  On Isoflex mattress; independent bed mobility; Constantin score 20; continent; santiago catheter  Assessment:  Dressing changed today by MD with wet Kerlix gauze; dressing can be changed by RN; likely need plastics consultation in future for wound closure/grafting  Photodocumentation    Inferior scrotum- Wound 7.5 cm circular opening with moderate amount serous drainage. No purulence on dressing removed. Edematous. Pain when wound care performed.   Treatment:  Applied Vashe moistened gauze to open wound and covered with ABD pad secured with mesh pants.   Nursing to change dressing every  shift and prn soiled or not intact. Treatment plan discussed with nursing.     01/06/2022

## 2022-01-06 NOTE — SUBJECTIVE & OBJECTIVE
Past Medical History:   Diagnosis Date    High cholesterol     Hypertension     Seizures        History reviewed. No pertinent surgical history.    Review of patient's allergies indicates:  No Known Allergies    No current facility-administered medications on file prior to encounter.     Current Outpatient Medications on File Prior to Encounter   Medication Sig    amLODIPine (NORVASC) 10 MG tablet Take 10 mg by mouth.    amlodipine-benazepril 5-20 mg (LOTREL) 5-20 mg per capsule TAKE 1 CAPSULE BY MOUTH EVERY DAY FOR HEART AND BLOOD PRESSURE    ASCORBATE CALCIUM (VITAMIN C ORAL) Take by mouth.    aspirin (ECOTRIN) 81 MG EC tablet Take 81 mg by mouth once daily.    atorvastatin (LIPITOR) 80 MG tablet TAKE ONE TABLET BY MOUTH EVERY DAY FOR CHOLESTEROL    cetirizine (ZYRTEC) 10 MG tablet TAKE ONE TABLET BY MOUTH DAILY .  TAKE ONE DAILY AS NEEDED FOR ITCHING. MAY TAKE UP TO 4 TIMES DAILY AS  NEEDD .  TAKE ONE DAILY AS NEEDED FOR ITCHING. MAY TAKE UP TO 4 TIMES DAILY AS  NEEDD    cholecalciferol, vitamin D3, (VITAMIN D3) 50 mcg (2,000 unit) Tab TAKE ONE TABLET BY MOUTH EVERY DAY AS A VITAMIN SUPPLEMENT    citalopram (CELEXA) 10 MG tablet Take by mouth.    divalproex (DEPAKOTE) 125 MG EC tablet Take 500 mg by mouth.    divalproex (DEPAKOTE) 500 MG Tb24 Take 500 mg by mouth once daily.    HYDROPHILIC CREAM TOP APPLY LIBERAL AMOUNT TO THE SKIN TWICE A DAY . APPLY TO THE SKIN TWICE DAILY AS NEEDED FOR DRY SKIN AND ITCING    insulin detemir U-100 (LEVEMIR) 100 unit/mL injection Inject 15 Units into the skin.    insulin glargine (LANTUS) 100 unit/mL injection Inject 20 Units into the skin.    insulin glargine 100 units/mL (3mL) SubQ pen INJECT 18 UNITS SUBCUTANEOUSLY EVERY MORNING AND INJECT 14 UNITS AT BEDTIME    lacosamide (VIMPAT) 200 mg Tab tablet Take 200 mg by mouth.    lamoTRIgine (LAMICTAL) 200 MG tablet TAKE ONE TABLET BY MOUTH TWICE A DAY FOR SEIZURES    lisinopriL (PRINIVIL,ZESTRIL) 2.5 MG tablet  Take 2.5 mg by mouth.    lisinopriL 10 MG tablet TAKE ONE TABLET BY MOUTH DAILY FOR HEART/ BLOOD PRESSURE    metFORMIN (GLUCOPHAGE) 1000 MG tablet TAKE ONE TABLET BY MOUTH TWICE A DAY WITH FOOD FOR DIABETES    metFORMIN (GLUCOPHAGE) 500 MG tablet Take 500 mg by mouth.    metoprolol succinate (TOPROL-XL) 50 MG 24 hr tablet Take 50 mg by mouth once daily.    metoprolol tartrate (LOPRESSOR) 50 MG tablet TAKE ONE-HALF TABLET BY MOUTH TWICE A DAY FOR HEART/ BLOOD PRESSURE    nitroGLYCERIN (NITROSTAT) 0.4 MG SL tablet DISSOLVE ONE TABLET UNDER TONGUE Q5MX3 FOR CHEST PAIN    oxycodone-acetaminophen (PERCOCET) 5-325 mg per tablet Take 1 tablet by mouth every 4 (four) hours as needed for Pain (do not drink alcohol, drive, or operate heavy machinery while taking this medication.). (Patient not taking: Reported on 4/28/2021)    pantoprazole (PROTONIX) 40 MG tablet Take 1 tablet (40 mg total) by mouth once daily.    potassium chloride (KLOR-CON) 10 MEQ TbSR TAKE FOUR TABLETS BY MOUTH ONCE DAILY TO INCREASE POTASSIUM    QUEtiapine (SEROQUEL) 25 MG Tab TAKE ONE-HALF TABLET BY MOUTH AT BEDTIME FOR MOOD OR PSYCHOSIS AND INSOMNIA.. MAY USE WHOLE TABLET IF NECESSARY    rosuvastatin (CRESTOR) 20 MG tablet Take 20 mg by mouth.    rosuvastatin (CRESTOR) 40 MG Tab TAKE ONE TABLET BY MOUTH DAILY FOR CHOLESTEROL (REPLACES ATORVASTATIN)    tamsulosin (FLOMAX) 0.4 mg Cap TAKE 1 CAPSULE BY MOUTH EVERY DAY FOR BPH    triamcinolone acetonide 0.1% (KENALOG) 0.1 % cream APPLY MODERATE AMOUNT TOPICALLY TWICE A DAY    varenicline (CHANTIX JOSE) 0.5 mg (11)- 1 mg (42) tablet TAKE AS DIRECTED BY MOUTH UD FOR SMOKING CESSATION     Family History    None       Tobacco Use    Smoking status: Current Every Day Smoker     Packs/day: 1.50     Types: Cigarettes    Smokeless tobacco: Not on file   Substance and Sexual Activity    Alcohol use: Yes    Drug use: Not on file    Sexual activity: Not on file     Review of Systems    Constitutional: Negative for chills and fever.   Eyes: Negative for photophobia and visual disturbance.   Respiratory: Negative for cough and shortness of breath.    Cardiovascular: Negative for chest pain, palpitations and leg swelling.   Gastrointestinal: Negative for abdominal pain, diarrhea, nausea and vomiting.   Genitourinary: Positive for decreased urine volume, difficulty urinating, penile discharge, penile pain, penile swelling, scrotal swelling and testicular pain. Negative for frequency, hematuria and urgency.   Skin: Negative for pallor, rash and wound.   Neurological: Negative for light-headedness and headaches.   Psychiatric/Behavioral: Negative for confusion and decreased concentration.     Objective:     Vital Signs (Most Recent):  Temp: 96.5 °F (35.8 °C) (01/06/22 1106)  Pulse: (!) 55 (01/06/22 1106)  Resp: 19 (01/06/22 1312)  BP: (!) 93/51 (01/06/22 1106)  SpO2: 98 % (01/06/22 1106) Vital Signs (24h Range):  Temp:  [96.5 °F (35.8 °C)-98.3 °F (36.8 °C)] 96.5 °F (35.8 °C)  Pulse:  [55-75] 55  Resp:  [17-19] 19  SpO2:  [96 %-100 %] 98 %  BP: ()/(51-67) 93/51     Weight: 57.4 kg (126 lb 8.7 oz)  Body mass index is 18.69 kg/m².    Physical Exam  Vitals and nursing note reviewed. Exam conducted with a chaperone present.   Constitutional:       General: He is not in acute distress.     Appearance: He is well-developed.   HENT:      Head: Normocephalic and atraumatic.      Right Ear: External ear normal.      Left Ear: External ear normal.      Nose: Nose normal.   Eyes:      Extraocular Movements: EOM normal.      Conjunctiva/sclera: Conjunctivae normal.      Pupils: Pupils are equal, round, and reactive to light.   Cardiovascular:      Rate and Rhythm: Normal rate and regular rhythm.   Pulmonary:      Effort: Pulmonary effort is normal. No respiratory distress.      Breath sounds: Normal breath sounds. No wheezing or rales.   Abdominal:      General: Bowel sounds are normal. There is no  distension.      Palpations: Abdomen is soft.      Tenderness: There is no abdominal tenderness.      Comments: No palpable hepatomegaly or splenomegaly   Genitourinary:     Penis: Erythema, tenderness, discharge and swelling present.       Testes:         Right: Tenderness and swelling present.         Left: Tenderness and swelling present.   Musculoskeletal:         General: No tenderness. Normal range of motion.      Cervical back: Normal range of motion and neck supple.   Skin:     General: Skin is warm and dry.   Neurological:      Mental Status: He is alert and oriented to person, place, and time.   Psychiatric:         Mood and Affect: Affect is tearful.         Thought Content: Thought content normal.           CRANIAL NERVES     CN III, IV, VI   Pupils are equal, round, and reactive to light.  Extraocular motions are normal.        Significant Labs: All pertinent labs within the past 24 hours have been reviewed.    Significant Imaging: I have reviewed all pertinent imaging results/findings within the past 24 hours.

## 2022-01-06 NOTE — ASSESSMENT & PLAN NOTE
Continue IV antibiotics, Urology consult,Urology doing,cystoscopy with possible DVIU, possible urethral dilation, possible suprapubic tube placement.  Plan for incision and drainage of scrotal abscess.  S/P I&D by Urology on 1.5.22,cultures are pending.

## 2022-01-06 NOTE — SUBJECTIVE & OBJECTIVE
Interval History: c/o pain at wound site.     Review of Systems   Constitutional: Negative.  Negative for fever.   HENT: Negative.    Eyes: Negative.    Respiratory: Negative for cough, chest tightness and shortness of breath.    Cardiovascular: Negative for chest pain.   Gastrointestinal: Negative.  Negative for constipation, diarrhea and nausea.   Genitourinary: Positive for difficulty urinating, penile pain, penile swelling, scrotal swelling and testicular pain.   Musculoskeletal: Negative.    Neurological: Negative.    Psychiatric/Behavioral: Negative.      Objective:     Temp:  [96.6 °F (35.9 °C)-98.3 °F (36.8 °C)] 96.6 °F (35.9 °C)  Pulse:  [58-76] 58  Resp:  [11-19] 18  SpO2:  [96 %-100 %] 96 %  BP: (100-153)/(54-71) 107/60     Body mass index is 18.69 kg/m².           Drains     Drain                 Urethral Catheter 01/05/22 1050 Double-lumen 20 Fr. <1 day                Physical Exam  Vitals and nursing note reviewed.   Constitutional:       Appearance: He is well-developed.   HENT:      Head: Normocephalic.   Eyes:      Conjunctiva/sclera: Conjunctivae normal.   Neck:      Thyroid: No thyromegaly.      Trachea: No tracheal deviation.   Cardiovascular:      Rate and Rhythm: Normal rate.      Heart sounds: Normal heart sounds.   Pulmonary:      Effort: Pulmonary effort is normal. No respiratory distress.      Breath sounds: Normal breath sounds. No wheezing.   Abdominal:      General: Bowel sounds are normal.      Palpations: Abdomen is soft.      Tenderness: There is no abdominal tenderness. There is no rebound.      Hernia: No hernia is present.   Genitourinary:     Penis: Erythema and discharge present.       Comments: Penile shaft edema. Sahu draining raissa urine. Large scrotal defect inferiorly with viable skin edges. No necrotic areas noted. No fluctuance.    Musculoskeletal:         General: No tenderness. Normal range of motion.      Cervical back: Normal range of motion and neck supple.    Lymphadenopathy:      Cervical: No cervical adenopathy.   Skin:     General: Skin is warm and dry.      Findings: No erythema or rash.   Neurological:      Mental Status: He is alert and oriented to person, place, and time.   Psychiatric:         Behavior: Behavior normal.         Thought Content: Thought content normal.         Judgment: Judgment normal.         Significant Labs:    BMP:  Recent Labs   Lab 01/05/22  0400 01/06/22  0406   * 137   K 3.7 3.7    105   CO2 16* 20*   BUN 14 30*   CREATININE 1.4 1.6*   CALCIUM 8.5* 8.3*       CBC:   Recent Labs   Lab 01/05/22  0955   WBC 17.30*   HGB 10.7*   HCT 32.4*          Blood Culture:   Recent Labs   Lab 01/04/22  1744   LABBLOO No Growth to date  No Growth to date     Urine Culture:   Recent Labs   Lab 01/04/22  1804   LABURIN YEAST   10,000 - 49,999 cfu/ml  Identification pending  *     Urine Studies:   Recent Labs   Lab 01/04/22  1544   COLORU Yellow   APPEARANCEUA Hazy*   PHUR 5.0   SPECGRAV 1.010   PROTEINUA 1+*   GLUCUA 4+*   KETONESU Negative   BILIRUBINUA Negative   OCCULTUA 1+*   NITRITE Negative   UROBILINOGEN Negative   LEUKOCYTESUR 3+*   RBCUA 10*   WBCUA 100*   BACTERIA Many*   HYALINECASTS 0       Significant Imaging:  All pertinent imaging results/findings from the past 24 hours have been reviewed.

## 2022-01-07 PROBLEM — A41.50 SEPSIS DUE TO GRAM NEGATIVE BACTERIA: Status: ACTIVE | Noted: 2022-01-07

## 2022-01-07 PROBLEM — N17.9 AKI (ACUTE KIDNEY INJURY): Status: ACTIVE | Noted: 2022-01-07

## 2022-01-07 LAB
ALBUMIN SERPL BCP-MCNC: 1.5 G/DL (ref 3.5–5.2)
ALP SERPL-CCNC: 242 U/L (ref 55–135)
ALT SERPL W/O P-5'-P-CCNC: 24 U/L (ref 10–44)
ANION GAP SERPL CALC-SCNC: 8 MMOL/L (ref 8–16)
AST SERPL-CCNC: 20 U/L (ref 10–40)
BACTERIA SPEC AEROBE CULT: ABNORMAL
BACTERIA SPEC AEROBE CULT: ABNORMAL
BACTERIA UR CULT: ABNORMAL
BASOPHILS # BLD AUTO: 0.04 K/UL (ref 0–0.2)
BASOPHILS NFR BLD: 0.3 % (ref 0–1.9)
BILIRUB SERPL-MCNC: 0.1 MG/DL (ref 0.1–1)
BUN SERPL-MCNC: 25 MG/DL (ref 6–20)
CALCIUM SERPL-MCNC: 8.4 MG/DL (ref 8.7–10.5)
CHLORIDE SERPL-SCNC: 109 MMOL/L (ref 95–110)
CO2 SERPL-SCNC: 18 MMOL/L (ref 23–29)
CREAT SERPL-MCNC: 1.5 MG/DL (ref 0.5–1.4)
DIFFERENTIAL METHOD: ABNORMAL
EOSINOPHIL # BLD AUTO: 0 K/UL (ref 0–0.5)
EOSINOPHIL NFR BLD: 0.2 % (ref 0–8)
ERYTHROCYTE [DISTWIDTH] IN BLOOD BY AUTOMATED COUNT: 12.5 % (ref 11.5–14.5)
EST. GFR  (AFRICAN AMERICAN): 58 ML/MIN/1.73 M^2
EST. GFR  (NON AFRICAN AMERICAN): 50 ML/MIN/1.73 M^2
GLUCOSE SERPL-MCNC: 160 MG/DL (ref 70–110)
HCT VFR BLD AUTO: 33.2 % (ref 40–54)
HGB BLD-MCNC: 10.4 G/DL (ref 14–18)
IMM GRANULOCYTES # BLD AUTO: 0.06 K/UL (ref 0–0.04)
IMM GRANULOCYTES NFR BLD AUTO: 0.5 % (ref 0–0.5)
LYMPHOCYTES # BLD AUTO: 1.9 K/UL (ref 1–4.8)
LYMPHOCYTES NFR BLD: 15.9 % (ref 18–48)
MCH RBC QN AUTO: 29.1 PG (ref 27–31)
MCHC RBC AUTO-ENTMCNC: 31.3 G/DL (ref 32–36)
MCV RBC AUTO: 93 FL (ref 82–98)
MONOCYTES # BLD AUTO: 0.7 K/UL (ref 0.3–1)
MONOCYTES NFR BLD: 5.4 % (ref 4–15)
NEUTROPHILS # BLD AUTO: 9.4 K/UL (ref 1.8–7.7)
NEUTROPHILS NFR BLD: 77.7 % (ref 38–73)
NRBC BLD-RTO: 0 /100 WBC
PLATELET # BLD AUTO: 345 K/UL (ref 150–450)
PMV BLD AUTO: 10.2 FL (ref 9.2–12.9)
POCT GLUCOSE: 127 MG/DL (ref 70–110)
POCT GLUCOSE: 149 MG/DL (ref 70–110)
POCT GLUCOSE: 165 MG/DL (ref 70–110)
POCT GLUCOSE: 198 MG/DL (ref 70–110)
POTASSIUM SERPL-SCNC: 3.8 MMOL/L (ref 3.5–5.1)
PROT SERPL-MCNC: 5.8 G/DL (ref 6–8.4)
RBC # BLD AUTO: 3.58 M/UL (ref 4.6–6.2)
SODIUM SERPL-SCNC: 135 MMOL/L (ref 136–145)
WBC # BLD AUTO: 12.11 K/UL (ref 3.9–12.7)

## 2022-01-07 PROCEDURE — 63700000 PHARM REV CODE 250 ALT 637 W/O HCPCS: Performed by: HOSPITALIST

## 2022-01-07 PROCEDURE — 94761 N-INVAS EAR/PLS OXIMETRY MLT: CPT

## 2022-01-07 PROCEDURE — 94799 UNLISTED PULMONARY SVC/PX: CPT

## 2022-01-07 PROCEDURE — 25000003 PHARM REV CODE 250: Performed by: UROLOGY

## 2022-01-07 PROCEDURE — 99233 SBSQ HOSP IP/OBS HIGH 50: CPT | Mod: ,,, | Performed by: STUDENT IN AN ORGANIZED HEALTH CARE EDUCATION/TRAINING PROGRAM

## 2022-01-07 PROCEDURE — 25000003 PHARM REV CODE 250: Performed by: HOSPITALIST

## 2022-01-07 PROCEDURE — 85025 COMPLETE CBC W/AUTO DIFF WBC: CPT | Performed by: HOSPITALIST

## 2022-01-07 PROCEDURE — 99900035 HC TECH TIME PER 15 MIN (STAT)

## 2022-01-07 PROCEDURE — 63600175 PHARM REV CODE 636 W HCPCS: Performed by: UROLOGY

## 2022-01-07 PROCEDURE — 36415 COLL VENOUS BLD VENIPUNCTURE: CPT | Performed by: HOSPITALIST

## 2022-01-07 PROCEDURE — 80053 COMPREHEN METABOLIC PANEL: CPT | Performed by: HOSPITALIST

## 2022-01-07 PROCEDURE — 99233 PR SUBSEQUENT HOSPITAL CARE,LEVL III: ICD-10-PCS | Mod: ,,, | Performed by: STUDENT IN AN ORGANIZED HEALTH CARE EDUCATION/TRAINING PROGRAM

## 2022-01-07 PROCEDURE — 11000001 HC ACUTE MED/SURG PRIVATE ROOM

## 2022-01-07 RX ADMIN — SODIUM CHLORIDE: 0.45 INJECTION, SOLUTION INTRAVENOUS at 08:01

## 2022-01-07 RX ADMIN — LAMOTRIGINE 200 MG: 100 TABLET ORAL at 08:01

## 2022-01-07 RX ADMIN — DIVALPROEX SODIUM 500 MG: 250 TABLET, EXTENDED RELEASE ORAL at 08:01

## 2022-01-07 RX ADMIN — HYDROCODONE BITARTRATE AND ACETAMINOPHEN 1 TABLET: 10; 325 TABLET ORAL at 01:01

## 2022-01-07 RX ADMIN — PIPERACILLIN AND TAZOBACTAM 4.5 G: 4; .5 INJECTION, POWDER, LYOPHILIZED, FOR SOLUTION INTRAVENOUS; PARENTERAL at 01:01

## 2022-01-07 RX ADMIN — TAMSULOSIN HYDROCHLORIDE 0.4 MG: 0.4 CAPSULE ORAL at 08:01

## 2022-01-07 RX ADMIN — MUPIROCIN: 20 OINTMENT TOPICAL at 08:01

## 2022-01-07 RX ADMIN — FLUCONAZOLE 400 MG: 100 TABLET ORAL at 08:01

## 2022-01-07 RX ADMIN — PIPERACILLIN AND TAZOBACTAM 4.5 G: 4; .5 INJECTION, POWDER, LYOPHILIZED, FOR SOLUTION INTRAVENOUS; PARENTERAL at 11:01

## 2022-01-07 RX ADMIN — ASPIRIN 81 MG: 81 TABLET, COATED ORAL at 08:01

## 2022-01-07 RX ADMIN — QUETIAPINE FUMARATE 25 MG: 25 TABLET ORAL at 08:01

## 2022-01-07 RX ADMIN — ATORVASTATIN CALCIUM 80 MG: 40 TABLET, FILM COATED ORAL at 08:01

## 2022-01-07 RX ADMIN — HYDROCODONE BITARTRATE AND ACETAMINOPHEN 1 TABLET: 10; 325 TABLET ORAL at 05:01

## 2022-01-07 RX ADMIN — HYDROCODONE BITARTRATE AND ACETAMINOPHEN 1 TABLET: 10; 325 TABLET ORAL at 07:01

## 2022-01-07 RX ADMIN — PIPERACILLIN AND TAZOBACTAM 4.5 G: 4; .5 INJECTION, POWDER, LYOPHILIZED, FOR SOLUTION INTRAVENOUS; PARENTERAL at 08:01

## 2022-01-07 NOTE — SUBJECTIVE & OBJECTIVE
Interval History: increased confusion with pain medication , RNs changing dressing without complication, afebrile      Review of Systems   Constitutional: Negative for activity change, appetite change, chills, diaphoresis and fever.   HENT: Negative for congestion and rhinorrhea.    Eyes: Positive for visual disturbance.   Respiratory: Negative for choking and shortness of breath.    Gastrointestinal: Negative for abdominal distention, abdominal pain, constipation, diarrhea, nausea and vomiting.   Genitourinary: Positive for difficulty urinating, penile swelling and scrotal swelling. Negative for dysuria, flank pain, hematuria, testicular pain and urgency.   Skin: Negative for color change, pallor and wound.   Neurological: Negative for dizziness and syncope.   Psychiatric/Behavioral: Positive for confusion. Negative for hallucinations.     Objective:     Temp:  [96.5 °F (35.8 °C)-98.4 °F (36.9 °C)] 97.2 °F (36.2 °C)  Pulse:  [51-57] 56  Resp:  [17-56] 19  SpO2:  [95 %-99 %] 99 %  BP: ()/(51-62) 109/61     Body mass index is 18.69 kg/m².           Drains     Drain                 Urethral Catheter 01/05/22 1050 Double-lumen 20 Fr. 1 day                Physical Exam  Constitutional:       General: He is not in acute distress.     Appearance: He is well-developed. He is not ill-appearing, toxic-appearing or diaphoretic.   HENT:      Head: Normocephalic and atraumatic.      Mouth/Throat:      Mouth: Mucous membranes are moist.   Eyes:      Conjunctiva/sclera: Conjunctivae normal.   Cardiovascular:      Rate and Rhythm: Normal rate and regular rhythm.   Pulmonary:      Effort: Pulmonary effort is normal. No respiratory distress.   Abdominal:      General: Abdomen is flat. There is no distension.      Palpations: Abdomen is soft.      Tenderness: There is no abdominal tenderness. There is no right CVA tenderness, left CVA tenderness or guarding.   Genitourinary:     Comments: Scrotal wound, viable  edges  Penoscrotal swelling  Sahu in place not secured with stat lock or leg strap  Musculoskeletal:         General: No swelling or deformity.      Cervical back: Neck supple.   Skin:     General: Skin is warm.      Capillary Refill: Capillary refill takes less than 2 seconds.      Findings: No rash.   Neurological:      Mental Status: He is alert and oriented to person, place, and time.      Gait: Gait normal.   Psychiatric:         Mood and Affect: Mood normal.         Thought Content: Thought content normal.         Judgment: Judgment normal.         Significant Labs:    BMP:  Recent Labs   Lab 01/05/22  0400 01/06/22  0406   * 137   K 3.7 3.7    105   CO2 16* 20*   BUN 14 30*   CREATININE 1.4 1.6*   CALCIUM 8.5* 8.3*       CBC:   Recent Labs   Lab 01/05/22  0955 01/07/22  0652   WBC 17.30* 12.11   HGB 10.7* 10.4*   HCT 32.4* 33.2*    345

## 2022-01-07 NOTE — ASSESSMENT & PLAN NOTE
s/p cystoscopy with santiago placement and debridement of abscess/necrosis of scrotum 1/5/22 with Dr. Rangel.    Continue abx per primary  Cultures growing yeast and GNR  Improved leukocytosis, afebrile  Appreciate wound care RN and floor RN dressing changes  Will continue to monitor condition    Will likely need plastics consultation in future for wound closure/grafting

## 2022-01-07 NOTE — PROGRESS NOTES
Vancomycin consult follow-up:    Patient reviewed, renal function stable, no new levels, continue current therapy; Next levels due: trough due 1/9/2022 at 2000

## 2022-01-07 NOTE — PROGRESS NOTES
POD #2  Nursing changed dressing this am with Vashe.   Patient sleeping at this time. Nursing to continue dressing changes every shift with Vashe moistened gauze.

## 2022-01-07 NOTE — PROGRESS NOTES
Pharmacokinetic Assessment Follow Up: IV Vancomycin    Vancomycin serum concentration assessment(s):    The trough level was drawn correctly and can be used to guide therapy at this time. The measurement is below the desired definitive target range of 10 to 20 mcg/mL.    Vancomycin Regimen Plan:    Change regimen to Vancomycin 1000 mg IV every 24 hours with next serum trough concentration measured at 2000 prior to 3rd dose on 1/9/22    Drug levels (last 3 results):  Recent Labs   Lab Result Units 01/06/22  1858   Vancomycin-Trough ug/mL 8.5*       Pharmacy will continue to follow and monitor vancomycin.    Please contact pharmacy at extension [phone] for questions regarding this assessment.    Thank you for the consult,   Javier Miles       Patient brief summary:  Abhishek Zhao is a 61 y.o. male initiated on antimicrobial therapy with IV Vancomycin for treatment of skin & soft tissue infection    The patient's current regimen is Vancomycin 1000mg q24h    Drug Allergies:   Review of patient's allergies indicates:  No Known Allergies    Actual Body Weight:   57.4 kg    Renal Function:   Estimated Creatinine Clearance: 40.4 mL/min (A) (based on SCr of 1.6 mg/dL (H)).,     Dialysis Method (if applicable):  N/A    CBC (last 72 hours):  Recent Labs   Lab Result Units 01/05/22  0400 01/05/22  0955   WBC K/uL  --  17.30*   Hemoglobin g/dL  --  10.7*   Hemoglobin A1C % 9.3*  --    Hematocrit %  --  32.4*   Platelets K/uL  --  281   Gran % %  --  19.0*   Lymph % %  --  7.0*   Mono % %  --  6.0   Eosinophil % %  --  0.0   Basophil % %  --  0.0   Differential Method   --  Manual       Metabolic Panel (last 72 hours):  Recent Labs   Lab Result Units 01/04/22  1544 01/05/22  0400 01/06/22  0406   Sodium mmol/L  --  134* 137   Potassium mmol/L  --  3.7 3.7   Chloride mmol/L  --  106 105   CO2 mmol/L  --  16* 20*   Glucose mg/dL  --  93 280*   Glucose, UA  4+*  --   --    BUN mg/dL  --  14 30*   Creatinine mg/dL  --  1.4  1.6*   Albumin g/dL  --   --  1.5*   Total Bilirubin mg/dL  --   --  0.2   Alkaline Phosphatase U/L  --   --  238*   AST U/L  --   --  19   ALT U/L  --   --  21       Vancomycin Administrations:  vancomycin given in the last 96 hours                     vancomycin 750 mg in dextrose 5 % 250 mL IVPB (ready to mix system) (mg) 750 mg New Bag 01/06/22 2103     750 mg New Bag 01/05/22 2117    vancomycin in dextrose 5 % 1 gram/250 mL IVPB 1,000 mg (mg) 1,000 mg New Bag 01/04/22 2017                    Microbiologic Results:  Microbiology Results (last 7 days)       Procedure Component Value Units Date/Time    Blood Culture #1 **CANNOT BE ORDERED STAT** [[id number]] Collected: 01/04/22 1744    Order Status: Completed Specimen: Blood from Peripheral, Lower Arm, Left Updated: 01/06/22 1303     Blood Culture, Routine No Growth to date      No Growth to date    Blood Culture #2 **CANNOT BE ORDERED STAT** [[id number]] Collected: 01/04/22 1744    Order Status: Completed Specimen: Blood from Peripheral, Antecubital, Right Updated: 01/06/22 1303     Blood Culture, Routine No Growth to date      No Growth to date    AFB Culture & Smear [[id number]] Collected: 01/05/22 1055    Order Status: Sent Specimen: Abscess from Groin Updated: 01/06/22 1141    Aerobic culture [[id number]]  (Abnormal) Collected: 01/05/22 1055    Order Status: Completed Specimen: Abscess from Groin Updated: 01/06/22 0925     Aerobic Bacterial Culture GRAM NEGATIVE RICHIE  Moderate  Identification and susceptibility pending      Urine culture **CANNOT BE ORDERED STAT** [[id number]]  (Abnormal) Collected: 01/04/22 1804    Order Status: Completed Specimen: Urine, Clean Catch Updated: 01/06/22 0837     Urine Culture, Routine YEAST   10,000 - 49,999 cfu/ml  Identification pending      Narrative:      Indicated criteria for high risk culture:->Other  Other (specify):->Protocol    Fungus culture [[id number]] Collected: 01/05/22 1055    Order Status: Sent Specimen: Abscess from  Groin Updated: 01/05/22 1401    Culture, Anaerobe [[id number]] Collected: 01/05/22 1055    Order Status: Sent Specimen: Abscess from Groin Updated: 01/05/22 1359    Culture, Anaerobe [[id number]] Collected: 01/05/22 1055    Order Status: Canceled Specimen: Abscess from Groin     Aerobic culture [[id number]] Collected: 01/05/22 1055    Order Status: Canceled Specimen: Abscess from Groin

## 2022-01-07 NOTE — PLAN OF CARE
Problem: Adult Inpatient Plan of Care  Goal: Plan of Care Review  Outcome: Ongoing, Progressing     Pt A&Ox4, free from falls/injury, and able to make needs known during shift. VSS on RA. Medications and fluids continued per orders. PRN pain meds given during shift with moderate relief. Telemetry monitoring continued on box #9691, visible and audible on monitor at nurse's station. Blood glucose monitoring continued and supplemental insulin given per orders. Sahu catheter in place draining yellow urine. Wound care to be completed per orders. No acute distress noted. Bed locked and in lowest position. Bedside table and call light in reach. Will cont to monitor.

## 2022-01-07 NOTE — PROGRESS NOTES
[facility] Surg  Urology  Progress Note    Patient Name: Abhishek Zhao  MRN: [id number]  Admission Date: 1/4/2022  Hospital Length of Stay: 2 days  Code Status: Full Code   Attending Provider: Elijah Ann MD   Primary Care Physician: To Obtain Unable    Subjective:     HPI:  Scrotal Swelling  Abhishek Zhao is a 61 y.o. male who was been experiencing scrotal pain and swelling since 12/31/2021.  He denies any fever.  He has had issues voiding since the swelling began.  Hemostat having issues in the past with urinary problems.  He denies having any previous issues with scrotal infection or swelling.  He recalls that he had procedures in the past but cannot recall specifically what to place.  He does not have urologist locally but moved back from [place] about 1 year ago.    Review of care everywhere shows that he had a cystoscopy with urethral dilation of a urethral stricture in the bulbar urethra in 2019 at [facility].  And there are reports that [facility] approximately 2015 [facility]      Interval History: increased confusion with pain medication , RNs changing dressing without complication, afebrile      Review of Systems   Constitutional: Negative for activity change, appetite change, chills, diaphoresis and fever.   HENT: Negative for congestion and rhinorrhea.    Eyes: Positive for visual disturbance.   Respiratory: Negative for choking and shortness of breath.    Gastrointestinal: Negative for abdominal distention, abdominal pain, constipation, diarrhea, nausea and vomiting.   Genitourinary: Positive for difficulty urinating, penile swelling and scrotal swelling. Negative for dysuria, flank pain, hematuria, testicular pain and urgency.   Skin: Negative for color change, pallor and wound.   Neurological: Negative for dizziness and syncope.   Psychiatric/Behavioral: Positive for confusion. Negative for hallucinations.     Objective:     Temp:  [96.5 °F (35.8 °C)-98.4 °F  (36.9 °C)] 97.2 °F (36.2 °C)  Pulse:  [51-57] 56  Resp:  [17-56] 19  SpO2:  [95 %-99 %] 99 %  BP: ()/(51-62) 109/61     Body mass index is 18.69 kg/m².           Drains     Drain                 Urethral Catheter 01/05/22 1050 Double-lumen 20 Fr. 1 day                Physical Exam  Constitutional:       General: He is not in acute distress.     Appearance: He is well-developed. He is not ill-appearing, toxic-appearing or diaphoretic.   HENT:      Head: Normocephalic and atraumatic.      Mouth/Throat:      Mouth: Mucous membranes are moist.   Eyes:      Conjunctiva/sclera: Conjunctivae normal.   Cardiovascular:      Rate and Rhythm: Normal rate and regular rhythm.   Pulmonary:      Effort: Pulmonary effort is normal. No respiratory distress.   Abdominal:      General: Abdomen is flat. There is no distension.      Palpations: Abdomen is soft.      Tenderness: There is no abdominal tenderness. There is no right CVA tenderness, left CVA tenderness or guarding.   Genitourinary:     Comments: Scrotal wound, viable edges  Penoscrotal swelling  Sahu in place not secured with stat lock or leg strap  Musculoskeletal:         General: No swelling or deformity.      Cervical back: Neck supple.   Skin:     General: Skin is warm.      Capillary Refill: Capillary refill takes less than 2 seconds.      Findings: No rash.   Neurological:      Mental Status: He is alert and oriented to person, place, and time.      Gait: Gait normal.   Psychiatric:         Mood and Affect: Mood normal.         Thought Content: Thought content normal.         Judgment: Judgment normal.         Significant Labs:    BMP:  Recent Labs   Lab 01/05/22  0400 01/06/22  0406   * 137   K 3.7 3.7    105   CO2 16* 20*   BUN 14 30*   CREATININE 1.4 1.6*   CALCIUM 8.5* 8.3*       CBC:   Recent Labs   Lab 01/05/22  0955 01/07/22  0652   WBC 17.30* 12.11   HGB 10.7* 10.4*   HCT 32.4* 33.2*    345                   Assessment/Plan:     *  Scrotal abscess  s/p cystoscopy with santiago placement and debridement of abscess/necrosis of scrotum 1/5/22 with Dr. Rangel.    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

## 2022-01-07 NOTE — CONSULTS
[facility]  Telemedicine Consult Note    Thank you for your consult to [facility]. We have reviewed the patient chart. This patient does meet criteria for [facility] service at this time. Will assume care on 1/7/22 at 7AM.      Elijah Ann MD  Department of Hospital Medicine   [facility]

## 2022-01-08 LAB
POCT GLUCOSE: 103 MG/DL (ref 70–110)
POCT GLUCOSE: 105 MG/DL (ref 70–110)
POCT GLUCOSE: 111 MG/DL (ref 70–110)
POCT GLUCOSE: 96 MG/DL (ref 70–110)

## 2022-01-08 PROCEDURE — 25000003 PHARM REV CODE 250: Performed by: UROLOGY

## 2022-01-08 PROCEDURE — 25000003 PHARM REV CODE 250: Performed by: INTERNAL MEDICINE

## 2022-01-08 PROCEDURE — 63700000 PHARM REV CODE 250 ALT 637 W/O HCPCS: Performed by: HOSPITALIST

## 2022-01-08 PROCEDURE — 11000001 HC ACUTE MED/SURG PRIVATE ROOM

## 2022-01-08 PROCEDURE — 99232 PR SUBSEQUENT HOSPITAL CARE,LEVL II: ICD-10-PCS | Mod: ,,, | Performed by: UROLOGY

## 2022-01-08 PROCEDURE — 63600175 PHARM REV CODE 636 W HCPCS: Performed by: UROLOGY

## 2022-01-08 PROCEDURE — 99223 PR INITIAL HOSPITAL CARE,LEVL III: ICD-10-PCS | Mod: ,,, | Performed by: INTERNAL MEDICINE

## 2022-01-08 PROCEDURE — 63600175 PHARM REV CODE 636 W HCPCS: Performed by: INTERNAL MEDICINE

## 2022-01-08 PROCEDURE — 25000003 PHARM REV CODE 250: Performed by: HOSPITALIST

## 2022-01-08 PROCEDURE — 99232 SBSQ HOSP IP/OBS MODERATE 35: CPT | Mod: ,,, | Performed by: UROLOGY

## 2022-01-08 PROCEDURE — 99223 1ST HOSP IP/OBS HIGH 75: CPT | Mod: ,,, | Performed by: INTERNAL MEDICINE

## 2022-01-08 RX ORDER — METRONIDAZOLE 500 MG/1
500 TABLET ORAL EVERY 8 HOURS
Status: DISCONTINUED | OUTPATIENT
Start: 2022-01-08 | End: 2022-01-12

## 2022-01-08 RX ADMIN — METRONIDAZOLE 500 MG: 500 TABLET ORAL at 04:01

## 2022-01-08 RX ADMIN — MUPIROCIN: 20 OINTMENT TOPICAL at 08:01

## 2022-01-08 RX ADMIN — DIVALPROEX SODIUM 500 MG: 250 TABLET, EXTENDED RELEASE ORAL at 09:01

## 2022-01-08 RX ADMIN — CEFTRIAXONE 1 G: 1 INJECTION, SOLUTION INTRAVENOUS at 12:01

## 2022-01-08 RX ADMIN — LAMOTRIGINE 200 MG: 100 TABLET ORAL at 09:01

## 2022-01-08 RX ADMIN — METOPROLOL SUCCINATE 50 MG: 50 TABLET, EXTENDED RELEASE ORAL at 09:01

## 2022-01-08 RX ADMIN — ASPIRIN 81 MG: 81 TABLET, COATED ORAL at 09:01

## 2022-01-08 RX ADMIN — QUETIAPINE FUMARATE 25 MG: 25 TABLET ORAL at 08:01

## 2022-01-08 RX ADMIN — HYDROCODONE BITARTRATE AND ACETAMINOPHEN 1 TABLET: 10; 325 TABLET ORAL at 04:01

## 2022-01-08 RX ADMIN — SODIUM CHLORIDE: 0.45 INJECTION, SOLUTION INTRAVENOUS at 08:01

## 2022-01-08 RX ADMIN — METRONIDAZOLE 500 MG: 500 TABLET ORAL at 10:01

## 2022-01-08 RX ADMIN — TAMSULOSIN HYDROCHLORIDE 0.4 MG: 0.4 CAPSULE ORAL at 09:01

## 2022-01-08 RX ADMIN — AMLODIPINE BESYLATE 10 MG: 5 TABLET ORAL at 09:01

## 2022-01-08 RX ADMIN — SODIUM CHLORIDE: 0.45 INJECTION, SOLUTION INTRAVENOUS at 04:01

## 2022-01-08 RX ADMIN — LAMOTRIGINE 200 MG: 100 TABLET ORAL at 08:01

## 2022-01-08 RX ADMIN — ATORVASTATIN CALCIUM 80 MG: 40 TABLET, FILM COATED ORAL at 09:01

## 2022-01-08 RX ADMIN — MUPIROCIN: 20 OINTMENT TOPICAL at 09:01

## 2022-01-08 RX ADMIN — PIPERACILLIN AND TAZOBACTAM 4.5 G: 4; .5 INJECTION, POWDER, LYOPHILIZED, FOR SOLUTION INTRAVENOUS; PARENTERAL at 04:01

## 2022-01-08 RX ADMIN — FLUCONAZOLE 400 MG: 100 TABLET ORAL at 09:01

## 2022-01-08 NOTE — ASSESSMENT & PLAN NOTE
- Berna's gangrene, s/p debridement, followed by Urology  - E.coli and Candida albicans growing on culture, thus far  - urine culture also grew Candida albicans  - okay to de-escalate to CTX and po Flagyl  - continue fluconazole  - continue wound care  - hopeful transition to po soon (e.g., Augmentin plus Diflucan)

## 2022-01-08 NOTE — HPI
Abhishek Zhao is a 61-year-old male with HTN and seizures who presented to the ED with complaints of testicular pain and swelling for four days. At that time, he endorsed radiation of pain to rectum and difficulty urinating. He was originally planning on going to the ED before th [place] game but decided to wait until afterwards. In the ED, imaging showed a complex fluid collection(s).  He has a history of urethral stricture last dilated in 2019 at an outside facility ([facility]). The patient was admitted and started on empiric abx. 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 Post-operatively, the patient has improved with diminishing leukocytosis. Surgical cultures have grown E.coli and Candida, thus far. ID is consulted for Berna's gangrene.

## 2022-01-08 NOTE — SUBJECTIVE & OBJECTIVE
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 Negative for activity change and fever.   Respiratory: Negative for cough and shortness of breath.    Gastrointestinal: Negative for abdominal pain and nausea.     Objective:     Vital Signs (Most Recent):  Temp: 97.9 °F (36.6 °C) (01/07/22 2007)  Pulse: 66 (01/07/22 2007)  Resp: 18 (01/07/22 2007)  BP: 134/68 (01/07/22 2007)  SpO2: 97 % (01/07/22 2007) Vital Signs (24h Range):  Temp:  [96.6 °F (35.9 °C)-98.4 °F (36.9 °C)] 97.9 °F (36.6 °C)  Pulse:  [51-66] 66  Resp:  [17-19] 18  SpO2:  [95 %-99 %] 97 %  BP: (105-134)/(57-68) 134/68     Weight: 57.4 kg (126 lb 8.7 oz)  Body mass index is 18.69 kg/m².    Intake/Output Summary (Last 24 hours) at 1/7/2022 2137  Last data filed at 1/7/2022 1730  Gross per 24 hour   Intake 720 ml   Output 2400 ml   Net -1680 ml      Physical Exam  Constitutional:       Appearance: Normal appearance.   Pulmonary:      Effort: Pulmonary effort is normal.      Breath sounds: No wheezing.   Abdominal:      General: Abdomen is flat. There is no distension.   Musculoskeletal:         General: No swelling or tenderness.   Skin:     Coloration: Skin is not jaundiced or pale.   Neurological:      General: No focal deficit present.      Mental Status: He is alert and oriented to person, place, and time.   Psychiatric:         Mood and Affect: Mood normal.         Behavior: Behavior normal.         Significant Labs: All pertinent labs within the past 24 hours have been reviewed.    Significant Imaging: I have reviewed all pertinent imaging results/findings within the past 24 hours.

## 2022-01-08 NOTE — SUBJECTIVE & OBJECTIVE
Interval History:        Review of Systems   Constitutional: Negative for activity change, appetite change, chills, diaphoresis and fever.   HENT: Negative for congestion and rhinorrhea.    Eyes: Positive for visual disturbance.   Respiratory: Negative for choking and shortness of breath.    Gastrointestinal: Negative for abdominal distention, abdominal pain, constipation, diarrhea, nausea and vomiting.   Genitourinary: Positive for difficulty urinating, penile swelling and scrotal swelling. Negative for dysuria, flank pain, hematuria, testicular pain and urgency.   Skin: Negative for color change, pallor and wound.   Neurological: Negative for dizziness and syncope.   Psychiatric/Behavioral: Positive for confusion. Negative for hallucinations.     Objective:     Temp:  [96.6 °F (35.9 °C)-98.1 °F (36.7 °C)] 98.1 °F (36.7 °C)  Pulse:  [60-66] 63  Resp:  [17-19] 17  SpO2:  [97 %-99 %] 99 %  BP: (119-134)/(62-70) 134/68     Body mass index is 18.69 kg/m².    Date 01/08/22 0700 - 01/09/22 0659   Shift 2403-7564 9885-2119 0010-3946 24 Hour Total   INTAKE   P.O. 120   120   Shift Total(mL/kg) 120(2.1)   120(2.1)   OUTPUT   Shift Total(mL/kg)       Weight (kg) 57.4 57.4 57.4 57.4          Drains     Drain                 Urethral Catheter 01/05/22 1050 Double-lumen 20 Fr. 1 day                Physical Exam  Constitutional:       General: He is not in acute distress.     Appearance: He is well-developed. He is not ill-appearing, toxic-appearing or diaphoretic.   HENT:      Head: Normocephalic and atraumatic.      Mouth/Throat:      Mouth: Mucous membranes are moist.   Eyes:      Conjunctiva/sclera: Conjunctivae normal.   Cardiovascular:      Rate and Rhythm: Normal rate and regular rhythm.   Pulmonary:      Effort: Pulmonary effort is normal. No respiratory distress.   Abdominal:      General: Abdomen is flat. There is no distension.      Palpations: Abdomen is soft.      Tenderness: There is no abdominal tenderness. There  is no right CVA tenderness, left CVA tenderness or guarding.   Genitourinary:     Comments: Scrotal wound, viable edges  Penoscrotal swelling  Musculoskeletal:         General: No swelling or deformity.      Cervical back: Neck supple.   Skin:     General: Skin is warm.      Capillary Refill: Capillary refill takes less than 2 seconds.      Findings: No rash.   Neurological:      Mental Status: He is alert and oriented to person, place, and time.      Gait: Gait normal.   Psychiatric:         Mood and Affect: Mood normal.         Thought Content: Thought content normal.         Judgment: Judgment normal.         Significant Labs:    BMP:  Recent Labs   Lab 01/05/22  0400 01/06/22  0406 01/07/22  0652   * 137 135*   K 3.7 3.7 3.8    105 109   CO2 16* 20* 18*   BUN 14 30* 25*   CREATININE 1.4 1.6* 1.5*   CALCIUM 8.5* 8.3* 8.4*       CBC:   Recent Labs   Lab 01/05/22  0955 01/07/22  0652   WBC 17.30* 12.11   HGB 10.7* 10.4*   HCT 32.4* 33.2*    345

## 2022-01-08 NOTE — CONSULTS
[facility]  Infectious Disease  Consult Note    Patient Name: Abhishek Zhao  MRN: [id number]  Admission Date: 1/4/2022  Hospital Length of Stay: 3 days  Attending Physician: Elijah Ann MD  Primary Care Provider: To Obtain Unable     Isolation Status: No active isolations    Patient information was obtained from patient, past medical records and ER records.      Inpatient consult to Infectious Diseases  Consult performed by: Demarco Combs MD  Consult ordered by: Elijah Ann MD        Assessment/Plan:     * Scrotal abscess  - Berna's gangrene, s/p debridement, followed by Urology  - E.coli and Candida albicans growing on culture, thus far  - urine culture also grew Candida albicans  - okay to de-escalate to CTX and po Flagyl  - continue fluconazole  - continue wound care  - hopeful transition to po soon (e.g., Augmentin plus Diflucan)    Thank you for your consult. I will follow-up with patient. Please contact us if you have any additional questions.    Demarco Combs MD  Infectious Disease  [facility]    Subjective:     Principal Problem: Scrotal abscess    HPI: Abhishek Zhao is a 61-year-old male with HTN and seizures who presented to the ED with complaints of testicular pain and swelling for four days. At that time, he endorsed radiation of pain to rectum and difficulty urinating. He was originally planning on going to the ED before th [place] game but decided to wait until afterwards. In the ED, imaging showed a complex fluid collection(s).  He has a history of urethral stricture last dilated in 2019 at an outside facility ([facility]). The patient was admitted and started on empiric abx. Urology was consulted and performed cystoscopy, retrograde urethrogram, fistulogram, Sahu catheter placement, fluoroscopy, excision and debridement of Benra's gangrene of the scrotum. 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 Post-operatively, the patient has improved with diminishing  leukocytosis. Surgical cultures have grown E.coli and Candida, thus far. ID is consulted for Berna's gangrene.      Past Medical History:   Diagnosis Date    High cholesterol     Hypertension     Seizures        Past Surgical History:   Procedure Laterality Date    CYSTOSCOPY  1/5/2022    Procedure: CYSTOSCOPY;  Surgeon: ANIBAL Rangel MD;  Location: [facility] OR;  Service: Urology;;    FISTULOGRAM N/A 1/5/2022    Procedure: FISTULOGRAM;  Surgeon: ANIBAL Rangel MD;  Location: [facility] OR;  Service: Urology;  Laterality: N/A;    SURGICAL REMOVAL OF ABSCESS  1/5/2022    Procedure: EXCISION, ABSCESS;  Surgeon: ANIBAL Rangel MD;  Location: [facility] OR;  Service: Urology;;       Review of patient's allergies indicates:  No Known Allergies    Medications:  Medications Prior to Admission   Medication Sig    amLODIPine (NORVASC) 10 MG tablet Take 10 mg by mouth.    amlodipine-benazepril 5-20 mg (LOTREL) 5-20 mg per capsule TAKE 1 CAPSULE BY MOUTH EVERY DAY FOR HEART AND BLOOD PRESSURE    ASCORBATE CALCIUM (VITAMIN C ORAL) Take by mouth.    aspirin (ECOTRIN) 81 MG EC tablet Take 81 mg by mouth once daily.    atorvastatin (LIPITOR) 80 MG tablet TAKE ONE TABLET BY MOUTH EVERY DAY FOR CHOLESTEROL    cetirizine (ZYRTEC) 10 MG tablet TAKE ONE TABLET BY MOUTH DAILY .  TAKE ONE DAILY AS NEEDED FOR ITCHING. MAY TAKE UP TO 4 TIMES DAILY AS  NEEDD .  TAKE ONE DAILY AS NEEDED FOR ITCHING. MAY TAKE UP TO 4 TIMES DAILY AS  NEEDD    cholecalciferol, vitamin D3, (VITAMIN D3) 50 mcg (2,000 unit) Tab TAKE ONE TABLET BY MOUTH EVERY DAY AS A VITAMIN SUPPLEMENT    citalopram (CELEXA) 10 MG tablet Take by mouth.    divalproex (DEPAKOTE) 125 MG EC tablet Take 500 mg by mouth.    divalproex (DEPAKOTE) 500 MG Tb24 Take 500 mg by mouth once daily.    HYDROPHILIC CREAM TOP APPLY LIBERAL AMOUNT TO THE SKIN TWICE A DAY . APPLY TO THE SKIN TWICE DAILY AS NEEDED FOR DRY SKIN AND ITCING    insulin detemir U-100 (LEVEMIR) 100 unit/mL  injection Inject 15 Units into the skin.    insulin glargine (LANTUS) 100 unit/mL injection Inject 20 Units into the skin.    insulin glargine 100 units/mL (3mL) SubQ pen INJECT 18 UNITS SUBCUTANEOUSLY EVERY MORNING AND INJECT 14 UNITS AT BEDTIME    lacosamide (VIMPAT) 200 mg Tab tablet Take 200 mg by mouth.    lamoTRIgine (LAMICTAL) 200 MG tablet TAKE ONE TABLET BY MOUTH TWICE A DAY FOR SEIZURES    lisinopriL (PRINIVIL,ZESTRIL) 2.5 MG tablet Take 2.5 mg by mouth.    lisinopriL 10 MG tablet TAKE ONE TABLET BY MOUTH DAILY FOR HEART/ BLOOD PRESSURE    metFORMIN (GLUCOPHAGE) 1000 MG tablet TAKE ONE TABLET BY MOUTH TWICE A DAY WITH FOOD FOR DIABETES    metFORMIN (GLUCOPHAGE) 500 MG tablet Take 500 mg by mouth.    metoprolol succinate (TOPROL-XL) 50 MG 24 hr tablet Take 50 mg by mouth once daily.    metoprolol tartrate (LOPRESSOR) 50 MG tablet TAKE ONE-HALF TABLET BY MOUTH TWICE A DAY FOR HEART/ BLOOD PRESSURE    nitroGLYCERIN (NITROSTAT) 0.4 MG SL tablet DISSOLVE ONE TABLET UNDER TONGUE Q5MX3 FOR CHEST PAIN    oxycodone-acetaminophen (PERCOCET) 5-325 mg per tablet Take 1 tablet by mouth every 4 (four) hours as needed for Pain (do not drink alcohol, drive, or operate heavy machinery while taking this medication.). (Patient not taking: Reported on 4/28/2021)    pantoprazole (PROTONIX) 40 MG tablet Take 1 tablet (40 mg total) by mouth once daily.    potassium chloride (KLOR-CON) 10 MEQ TbSR TAKE FOUR TABLETS BY MOUTH ONCE DAILY TO INCREASE POTASSIUM    QUEtiapine (SEROQUEL) 25 MG Tab TAKE ONE-HALF TABLET BY MOUTH AT BEDTIME FOR MOOD OR PSYCHOSIS AND INSOMNIA.. MAY USE WHOLE TABLET IF NECESSARY    rosuvastatin (CRESTOR) 20 MG tablet Take 20 mg by mouth.    rosuvastatin (CRESTOR) 40 MG Tab TAKE ONE TABLET BY MOUTH DAILY FOR CHOLESTEROL (REPLACES ATORVASTATIN)    tamsulosin (FLOMAX) 0.4 mg Cap TAKE 1 CAPSULE BY MOUTH EVERY DAY FOR BPH    triamcinolone acetonide 0.1% (KENALOG) 0.1 % cream APPLY MODERATE  AMOUNT TOPICALLY TWICE A DAY    varenicline (CHANTIX JOSE) 0.5 mg (11)- 1 mg (42) tablet TAKE AS DIRECTED BY MOUTH UD FOR SMOKING CESSATION     Antibiotics (From admission, onward)            Start     Stop Route Frequency Ordered    01/06/22 2100  mupirocin 2 % ointment         01/11 2059 Nasl 2 times daily 01/06/22 1337    01/05/22 0200  piperacillin-tazobactam 4.5 g in dextrose 5 % 100 mL IVPB (ready to mix system)         -- IV Every 8 hours (non-standard times) 01/04/22 2241        Antifungals (From admission, onward)            Start     Stop Route Frequency Ordered    01/06/22 1345  fluconazole tablet 400 mg         -- Oral Daily 01/06/22 1334        Antivirals (From admission, onward)    None           There is no immunization history for the selected administration types on file for this patient.    Family History    None       Social History     Socioeconomic History    Marital status:    Tobacco Use    Smoking status: Current Every Day Smoker     Packs/day: 1.50     Types: Cigarettes   Substance and Sexual Activity    Alcohol use: Yes     Review of Systems   Constitutional: Negative for chills and fever.   Genitourinary: Positive for difficulty urinating, scrotal swelling and urgency.   Skin: Positive for wound.   All other systems reviewed and are negative.    Objective:     Vital Signs (Most Recent):  Temp: 98.1 °F (36.7 °C) (01/08/22 0744)  Pulse: 63 (01/08/22 0744)  Resp: 17 (01/08/22 0744)  BP: 134/68 (01/08/22 0744)  SpO2: 99 % (01/08/22 0744) Vital Signs (24h Range):  Temp:  [96.6 °F (35.9 °C)-98.1 °F (36.7 °C)] 98.1 °F (36.7 °C)  Pulse:  [60-66] 63  Resp:  [17-19] 17  SpO2:  [97 %-99 %] 99 %  BP: (119-134)/(62-70) 134/68     Weight: 57.4 kg (126 lb 8.7 oz)  Body mass index is 18.69 kg/m².    Estimated Creatinine Clearance: 43.1 mL/min (A) (based on SCr of 1.5 mg/dL (H)).    Physical Exam  Vitals and nursing note reviewed.   Constitutional:       General: He is not in acute distress.      Appearance: Normal appearance. He is not ill-appearing, toxic-appearing or diaphoretic.   HENT:      Head: Normocephalic.      Right Ear: External ear normal.      Left Ear: External ear normal.   Eyes:      Extraocular Movements: Extraocular movements intact.      Conjunctiva/sclera: Conjunctivae normal.      Pupils: Pupils are equal, round, and reactive to light.   Genitourinary:     Comments: Dressing in place (dry); tender  Musculoskeletal:         General: No swelling.   Skin:     Findings: No erythema or rash.   Neurological:      General: No focal deficit present.      Mental Status: He is alert and oriented to person, place, and time. Mental status is at baseline.   Psychiatric:         Mood and Affect: Mood normal.         Behavior: Behavior normal.         Thought Content: Thought content normal.         Judgment: Judgment normal.       Pre-op:         Significant Labs:   Blood Culture:   Recent Labs   Lab 01/04/22  1744   LABBLOO No Growth to date  No Growth to date  No Growth to date  No Growth to date  No Growth to date  No Growth to date     CBC:   Recent Labs   Lab 01/07/22  0652   WBC 12.11   HGB 10.4*   HCT 33.2*        CMP:   Recent Labs   Lab 01/07/22  0652   *   K 3.8      CO2 18*   *   BUN 25*   CREATININE 1.5*   CALCIUM 8.4*   PROT 5.8*   ALBUMIN 1.5*   BILITOT 0.1   ALKPHOS 242*   AST 20   ALT 24   ANIONGAP 8   EGFRNONAA 50*     Urine Culture:   Recent Labs   Lab 01/04/22  1804   LABURIN CANDIDA ALBICANS  10,000 - 49,999 cfu/ml  Treatment of asymptomatic candiduria is not recommended (except for   specific populations). Candida isolated in the urine typically   represents colonization. If an indwelling urinary catheter is present  it should be removed or replaced.  *     Wound Culture:   Recent Labs   Lab 01/05/22  1055   LABAERO ESCHERICHIA COLI  Moderate  *  YAMILET ALBICANS  Few  *       Significant Imaging: I have reviewed all pertinent imaging  results/findings within the past 24 hours.

## 2022-01-08 NOTE — NURSING
Report received from Yahaira KNIGHT. Patient remains free from falls and injury. No distress noted. VSS. Questions encouraged and answered. Patient verbalized understanding. Bed locked and in low position; safety maintained. Call light in reach. White board updated, and explained. No complaint of pain, n/v, diarrhea, or SOB.  Will continue to monitor, will continue with plan of care.

## 2022-01-08 NOTE — ASSESSMENT & PLAN NOTE
Continue IV antibiotics, Urology consult,Urology doing,cystoscopy with possible DVIU, possible urethral dilation, possible suprapubic tube placement.  Plan for incision and drainage of scrotal abscess.  S/P I&D by Urology on 1.5.22,cultures are pending.    1/7- POD 2 from I an D; cultures are growing E. Coli and candida; stopping vanc; cont zosyn and fluconazole; ID has been consulted for long term abx recs

## 2022-01-08 NOTE — PROGRESS NOTES
[facility]  Urology  Progress Note    Patient Name: Abhishek Zhao  MRN: [id number]  Admission Date: 1/4/2022  Hospital Length of Stay: 3 days  Code Status: Full Code   Attending Provider: Elijah Ann MD   Primary Care Physician: To Obtain Unable    Subjective:     HPI:  Scrotal Swelling  Abhishek Zhao is a 61 y.o. male who was been experiencing scrotal pain and swelling since 12/31/2021.  He denies any fever.  He has had issues voiding since the swelling began.  Hemostat having issues in the past with urinary problems.  He denies having any previous issues with scrotal infection or swelling.  He recalls that he had procedures in the past but cannot recall specifically what to place.  He does not have urologist locally but moved back from [place] about 1 year ago.    Review of care everywhere shows that he had a cystoscopy with urethral dilation of a urethral stricture in the bulbar urethra in 2019 at [facility].  And there are reports that in approximately 2015 [facility].      Interval History:        Review of Systems   Constitutional: Negative for activity change, appetite change, chills, diaphoresis and fever.   HENT: Negative for congestion and rhinorrhea.    Eyes: Positive for visual disturbance.   Respiratory: Negative for choking and shortness of breath.    Gastrointestinal: Negative for abdominal distention, abdominal pain, constipation, diarrhea, nausea and vomiting.   Genitourinary: Positive for difficulty urinating, penile swelling and scrotal swelling. Negative for dysuria, flank pain, hematuria, testicular pain and urgency.   Skin: Negative for color change, pallor and wound.   Neurological: Negative for dizziness and syncope.   Psychiatric/Behavioral: Positive for confusion. Negative for hallucinations.     Objective:     Temp:  [96.6 °F (35.9 °C)-98.1 °F (36.7 °C)] 98.1 °F (36.7 °C)  Pulse:  [60-66] 63  Resp:  [17-19] 17  SpO2:  [97 %-99 %] 99 %  BP:  (119-134)/(62-70) 134/68     Body mass index is 18.69 kg/m².    Date 01/08/22 0700 - 01/09/22 0659   Shift 4362-3104 6434-6757 5263-0963 24 Hour Total   INTAKE   P.O. 120   120   Shift Total(mL/kg) 120(2.1)   120(2.1)   OUTPUT   Shift Total(mL/kg)       Weight (kg) 57.4 57.4 57.4 57.4          Drains     Drain                 Urethral Catheter 01/05/22 1050 Double-lumen 20 Fr. 1 day                Physical Exam  Constitutional:       General: He is not in acute distress.     Appearance: He is well-developed. He is not ill-appearing, toxic-appearing or diaphoretic.   HENT:      Head: Normocephalic and atraumatic.      Mouth/Throat:      Mouth: Mucous membranes are moist.   Eyes:      Conjunctiva/sclera: Conjunctivae normal.   Cardiovascular:      Rate and Rhythm: Normal rate and regular rhythm.   Pulmonary:      Effort: Pulmonary effort is normal. No respiratory distress.   Abdominal:      General: Abdomen is flat. There is no distension.      Palpations: Abdomen is soft.      Tenderness: There is no abdominal tenderness. There is no right CVA tenderness, left CVA tenderness or guarding.   Genitourinary:     Comments: Scrotal wound, viable edges  Penoscrotal swelling  Musculoskeletal:         General: No swelling or deformity.      Cervical back: Neck supple.   Skin:     General: Skin is warm.      Capillary Refill: Capillary refill takes less than 2 seconds.      Findings: No rash.   Neurological:      Mental Status: He is alert and oriented to person, place, and time.      Gait: Gait normal.   Psychiatric:         Mood and Affect: Mood normal.         Thought Content: Thought content normal.         Judgment: Judgment normal.         Significant Labs:    BMP:  Recent Labs   Lab 01/05/22  0400 01/06/22  0406 01/07/22  0652   * 137 135*   K 3.7 3.7 3.8    105 109   CO2 16* 20* 18*   BUN 14 30* 25*   CREATININE 1.4 1.6* 1.5*   CALCIUM 8.5* 8.3* 8.4*       CBC:   Recent Labs   Lab 01/05/22  0994  01/07/22  0652   WBC 17.30* 12.11   HGB 10.7* 10.4*   HCT 32.4* 33.2*    345                   Assessment/Plan:     * Scrotal abscess  s/p cystoscopy with santiaog placement and debridement of abscess/necrosis of scrotum 1/5/22 with Dr. Rangel.    Continue abx per primary  Cultures growing yeast and GNR  Improved leukocytosis, afebrile  Appreciate wound care RN and floor RN dressing changes  Will continue to monitor condition    Will likely need plastics consultation in future for wound closure/grafting    Berna's gangrene   - s/p debridement 1/5/22        VTE Risk Mitigation (From admission, onward)         Ordered     IP VTE LOW RISK PATIENT  Once         01/04/22 2241     Place sequential compression device  Until discontinued         01/04/22 2241                Herrera Qiu MD  Urology  [facility]

## 2022-01-08 NOTE — SUBJECTIVE & OBJECTIVE
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 Negative for activity change and fever.   Respiratory: Negative for cough and shortness of breath.    Gastrointestinal: Negative for abdominal pain and nausea.     Objective:     Vital Signs (Most Recent):  Temp: 98.3 °F (36.8 °C) (01/08/22 1638)  Pulse: 62 (01/08/22 1638)  Resp: 19 (01/08/22 1638)  BP: 130/69 (01/08/22 1638)  SpO2: 98 % (01/08/22 1638) Vital Signs (24h Range):  Temp:  [97.6 °F (36.4 °C)-98.3 °F (36.8 °C)] 98.3 °F (36.8 °C)  Pulse:  [62-66] 62  Resp:  [17-19] 19  SpO2:  [97 %-99 %] 98 %  BP: (126-163)/(65-72) 130/69     Weight: 57.4 kg (126 lb 8.7 oz)  Body mass index is 18.69 kg/m².    Intake/Output Summary (Last 24 hours) at 1/8/2022 1723  Last data filed at 1/8/2022 1210  Gross per 24 hour   Intake 600 ml   Output 2400 ml   Net -1800 ml      Physical Exam  Constitutional:       Appearance: Normal appearance.   Pulmonary:      Effort: Pulmonary effort is normal.      Breath sounds: No wheezing.   Abdominal:      General: Abdomen is flat. There is no distension.   Musculoskeletal:         General: No swelling or tenderness.   Skin:     Coloration: Skin is not jaundiced or pale.   Neurological:      General: No focal deficit present.      Mental Status: He is alert and oriented to person, place, and time.   Psychiatric:         Mood and Affect: Mood normal.         Behavior: Behavior normal.         Significant Labs: All pertinent labs within the past 24 hours have been reviewed.    Significant Imaging: I have reviewed all pertinent imaging  results/findings within the past 24 hours.

## 2022-01-08 NOTE — SUBJECTIVE & OBJECTIVE
Past Medical History:   Diagnosis Date    High cholesterol     Hypertension     Seizures        Past Surgical History:   Procedure Laterality Date    CYSTOSCOPY  1/5/2022    Procedure: CYSTOSCOPY;  Surgeon: ANIBAL Rangel MD;  Location: [facility] OR;  Service: Urology;;    FISTULOGRAM N/A 1/5/2022    Procedure: FISTULOGRAM;  Surgeon: ANIBAL Rangel MD;  Location: [facility] OR;  Service: Urology;  Laterality: N/A;    SURGICAL REMOVAL OF ABSCESS  1/5/2022    Procedure: EXCISION, ABSCESS;  Surgeon: ANIBAL Rangel MD;  Location: [facility] OR;  Service: Urology;;       Review of patient's allergies indicates:  No Known Allergies    Medications:  Medications Prior to Admission   Medication Sig    amLODIPine (NORVASC) 10 MG tablet Take 10 mg by mouth.    amlodipine-benazepril 5-20 mg (LOTREL) 5-20 mg per capsule TAKE 1 CAPSULE BY MOUTH EVERY DAY FOR HEART AND BLOOD PRESSURE    ASCORBATE CALCIUM (VITAMIN C ORAL) Take by mouth.    aspirin (ECOTRIN) 81 MG EC tablet Take 81 mg by mouth once daily.    atorvastatin (LIPITOR) 80 MG tablet TAKE ONE TABLET BY MOUTH EVERY DAY FOR CHOLESTEROL    cetirizine (ZYRTEC) 10 MG tablet TAKE ONE TABLET BY MOUTH DAILY .  TAKE ONE DAILY AS NEEDED FOR ITCHING. MAY TAKE UP TO 4 TIMES DAILY AS  NEEDD .  TAKE ONE DAILY AS NEEDED FOR ITCHING. MAY TAKE UP TO 4 TIMES DAILY AS  NEEDD    cholecalciferol, vitamin D3, (VITAMIN D3) 50 mcg (2,000 unit) Tab TAKE ONE TABLET BY MOUTH EVERY DAY AS A VITAMIN SUPPLEMENT    citalopram (CELEXA) 10 MG tablet Take by mouth.    divalproex (DEPAKOTE) 125 MG EC tablet Take 500 mg by mouth.    divalproex (DEPAKOTE) 500 MG Tb24 Take 500 mg by mouth once daily.    HYDROPHILIC CREAM TOP APPLY LIBERAL AMOUNT TO THE SKIN TWICE A DAY . APPLY TO THE SKIN TWICE DAILY AS NEEDED FOR DRY SKIN AND ITCING    insulin detemir U-100 (LEVEMIR) 100 unit/mL injection Inject 15 Units into the skin.    insulin glargine (LANTUS) 100 unit/mL injection Inject 20 Units into the  skin.    insulin glargine 100 units/mL (3mL) SubQ pen INJECT 18 UNITS SUBCUTANEOUSLY EVERY MORNING AND INJECT 14 UNITS AT BEDTIME    lacosamide (VIMPAT) 200 mg Tab tablet Take 200 mg by mouth.    lamoTRIgine (LAMICTAL) 200 MG tablet TAKE ONE TABLET BY MOUTH TWICE A DAY FOR SEIZURES    lisinopriL (PRINIVIL,ZESTRIL) 2.5 MG tablet Take 2.5 mg by mouth.    lisinopriL 10 MG tablet TAKE ONE TABLET BY MOUTH DAILY FOR HEART/ BLOOD PRESSURE    metFORMIN (GLUCOPHAGE) 1000 MG tablet TAKE ONE TABLET BY MOUTH TWICE A DAY WITH FOOD FOR DIABETES    metFORMIN (GLUCOPHAGE) 500 MG tablet Take 500 mg by mouth.    metoprolol succinate (TOPROL-XL) 50 MG 24 hr tablet Take 50 mg by mouth once daily.    metoprolol tartrate (LOPRESSOR) 50 MG tablet TAKE ONE-HALF TABLET BY MOUTH TWICE A DAY FOR HEART/ BLOOD PRESSURE    nitroGLYCERIN (NITROSTAT) 0.4 MG SL tablet DISSOLVE ONE TABLET UNDER TONGUE Q5MX3 FOR CHEST PAIN    oxycodone-acetaminophen (PERCOCET) 5-325 mg per tablet Take 1 tablet by mouth every 4 (four) hours as needed for Pain (do not drink alcohol, drive, or operate heavy machinery while taking this medication.). (Patient not taking: Reported on 4/28/2021)    pantoprazole (PROTONIX) 40 MG tablet Take 1 tablet (40 mg total) by mouth once daily.    potassium chloride (KLOR-CON) 10 MEQ TbSR TAKE FOUR TABLETS BY MOUTH ONCE DAILY TO INCREASE POTASSIUM    QUEtiapine (SEROQUEL) 25 MG Tab TAKE ONE-HALF TABLET BY MOUTH AT BEDTIME FOR MOOD OR PSYCHOSIS AND INSOMNIA.. MAY USE WHOLE TABLET IF NECESSARY    rosuvastatin (CRESTOR) 20 MG tablet Take 20 mg by mouth.    rosuvastatin (CRESTOR) 40 MG Tab TAKE ONE TABLET BY MOUTH DAILY FOR CHOLESTEROL (REPLACES ATORVASTATIN)    tamsulosin (FLOMAX) 0.4 mg Cap TAKE 1 CAPSULE BY MOUTH EVERY DAY FOR BPH    triamcinolone acetonide 0.1% (KENALOG) 0.1 % cream APPLY MODERATE AMOUNT TOPICALLY TWICE A DAY    varenicline (CHANTIX JOSE) 0.5 mg (11)- 1 mg (42) tablet TAKE AS DIRECTED BY MOUTH UD FOR  SMOKING CESSATION     Antibiotics (From admission, onward)            Start     Stop Route Frequency Ordered    01/06/22 2100  mupirocin 2 % ointment         01/11 2059 Nasl 2 times daily 01/06/22 1337    01/05/22 0200  piperacillin-tazobactam 4.5 g in dextrose 5 % 100 mL IVPB (ready to mix system)         -- IV Every 8 hours (non-standard times) 01/04/22 2241        Antifungals (From admission, onward)            Start     Stop Route Frequency Ordered    01/06/22 1345  fluconazole tablet 400 mg         -- Oral Daily 01/06/22 1334        Antivirals (From admission, onward)    None           There is no immunization history for the selected administration types on file for this patient.    Family History    None       Social History     Socioeconomic History    Marital status:    Tobacco Use    Smoking status: Current Every Day Smoker     Packs/day: 1.50     Types: Cigarettes   Substance and Sexual Activity    Alcohol use: Yes     Review of Systems   Constitutional: Negative for chills and fever.   Genitourinary: Positive for difficulty urinating, scrotal swelling and urgency.   Skin: Positive for wound.   All other systems reviewed and are negative.    Objective:     Vital Signs (Most Recent):  Temp: 98.1 °F (36.7 °C) (01/08/22 0744)  Pulse: 63 (01/08/22 0744)  Resp: 17 (01/08/22 0744)  BP: 134/68 (01/08/22 0744)  SpO2: 99 % (01/08/22 0744) Vital Signs (24h Range):  Temp:  [96.6 °F (35.9 °C)-98.1 °F (36.7 °C)] 98.1 °F (36.7 °C)  Pulse:  [60-66] 63  Resp:  [17-19] 17  SpO2:  [97 %-99 %] 99 %  BP: (119-134)/(62-70) 134/68     Weight: 57.4 kg (126 lb 8.7 oz)  Body mass index is 18.69 kg/m².    Estimated Creatinine Clearance: 43.1 mL/min (A) (based on SCr of 1.5 mg/dL (H)).    Physical Exam  Vitals and nursing note reviewed.   Constitutional:       General: He is not in acute distress.     Appearance: Normal appearance. He is not ill-appearing, toxic-appearing or diaphoretic.   HENT:      Head: Normocephalic.       Right Ear: External ear normal.      Left Ear: External ear normal.   Eyes:      Extraocular Movements: Extraocular movements intact.      Conjunctiva/sclera: Conjunctivae normal.      Pupils: Pupils are equal, round, and reactive to light.   Genitourinary:     Comments: Dressing in place (dry); tender  Musculoskeletal:         General: No swelling.   Skin:     Findings: No erythema or rash.   Neurological:      General: No focal deficit present.      Mental Status: He is alert and oriented to person, place, and time. Mental status is at baseline.   Psychiatric:         Mood and Affect: Mood normal.         Behavior: Behavior normal.         Thought Content: Thought content normal.         Judgment: Judgment normal.       Pre-op:         Significant Labs:   Blood Culture:   Recent Labs   Lab 01/04/22  1744   LABBLOO No Growth to date  No Growth to date  No Growth to date  No Growth to date  No Growth to date  No Growth to date     CBC:   Recent Labs   Lab 01/07/22  0652   WBC 12.11   HGB 10.4*   HCT 33.2*        CMP:   Recent Labs   Lab 01/07/22  0652   *   K 3.8      CO2 18*   *   BUN 25*   CREATININE 1.5*   CALCIUM 8.4*   PROT 5.8*   ALBUMIN 1.5*   BILITOT 0.1   ALKPHOS 242*   AST 20   ALT 24   ANIONGAP 8   EGFRNONAA 50*     Urine Culture:   Recent Labs   Lab 01/04/22  1804   LABURIN CANDIDA ALBICANS  10,000 - 49,999 cfu/ml  Treatment of asymptomatic candiduria is not recommended (except for   specific populations). Candida isolated in the urine typically   represents colonization. If an indwelling urinary catheter is present  it should be removed or replaced.  *     Wound Culture:   Recent Labs   Lab 01/05/22  1055   LABAERO ESCHERICHIA COLI  Moderate  *  YAMILET ALBICANS  Few  *       Significant Imaging: I have reviewed all pertinent imaging results/findings within the past 24 hours.

## 2022-01-08 NOTE — ASSESSMENT & PLAN NOTE
Continue IV antibiotics, Urology consult,Urology doing,cystoscopy with possible DVIU, possible urethral dilation, possible suprapubic tube placement.  Plan for incision and drainage of scrotal abscess.  S/P I&D by Urology on 1.5.22,cultures are pending.    1/7- POD 2 from I an D; cultures are growing E. Coli and candida; stopping vanc; cont zosyn and fluconazole; ID has been consulted for long term abx recs     1/8- POD 3 from I an D; patient is having pus come out of his urethra today on the side of his santiago; will repeat U/S scrotum and re-consult Urology to evaluate for another debridement;

## 2022-01-08 NOTE — PLAN OF CARE
Problem: Adult Inpatient Plan of Care  Goal: Plan of Care Review  Outcome: Ongoing, Progressing        Pt A&Ox4, free from falls/injury, and able to make needs known during shift. VSS on RA. Medications and fluids continued per orders. PRN pain meds given during shift with moderate relief. Telemetry monitoring continued on box #[id number], visible and audible on monitor at nurse's station. Blood glucose monitoring continued per orders. Sahu catheter in place draining clear, yellow urine. Wound care to be completed per orders. No acute distress noted. Bed locked and in lowest position. Bedside table and call light in reach. Will cont to monitor.

## 2022-01-08 NOTE — ASSESSMENT & PLAN NOTE
- see scrotal abscess     This patient does have evidence of infective focus  My overall impression is sepsis. Vital signs were reviewed and noted in progress note.  Antibiotics given-   Antibiotics (From admission, onward)            Start     Stop Route Frequency Ordered    01/06/22 2100  mupirocin 2 % ointment         01/11 2059 Nasl 2 times daily 01/06/22 1337    01/05/22 0200  piperacillin-tazobactam 4.5 g in dextrose 5 % 100 mL IVPB (ready to mix system)         -- IV Every 8 hours (non-standard times) 01/04/22 2241        Cultures were taken-   Microbiology Results (last 7 days)     Procedure Component Value Units Date/Time    AFB Culture & Smear [[id number]] Collected: 01/05/22 1055    Order Status: Completed Specimen: Abscess from Groin Updated: 01/07/22 2127     AFB Culture & Smear Culture in progress     AFB CULTURE STAIN No acid fast bacilli seen.    Blood Culture #1 **CANNOT BE ORDERED STAT** [[id number]] Collected: 01/04/22 1744    Order Status: Completed Specimen: Blood from Peripheral, Lower Arm, Left Updated: 01/07/22 1303     Blood Culture, Routine No Growth to date      No Growth to date      No Growth to date    Blood Culture #2 **CANNOT BE ORDERED STAT** [[id number]] Collected: 01/04/22 1744    Order Status: Completed Specimen: Blood from Peripheral, Antecubital, Right Updated: 01/07/22 1303     Blood Culture, Routine No Growth to date      No Growth to date      No Growth to date    Urine culture **CANNOT BE ORDERED STAT** [[id number]]  (Abnormal) Collected: 01/04/22 1804    Order Status: Completed Specimen: Urine, Clean Catch Updated: 01/07/22 1118     Urine Culture, Routine YAMILET ALBICANS  10,000 - 49,999 cfu/ml  Treatment of asymptomatic candiduria is not recommended (except for   specific populations). Candida isolated in the urine typically   represents colonization. If an indwelling urinary catheter is present  it should be removed or replaced.      Narrative:      Indicated criteria for  high risk culture:->Other  Other (specify):->Protocol    Aerobic culture [[id number]]  (Abnormal)  (Susceptibility) Collected: 01/05/22 1055    Order Status: Completed Specimen: Abscess from Groin Updated: 01/07/22 0846     Aerobic Bacterial Culture ESCHERICHIA COLI  Moderate        YAMILET ALBICANS  Few      Culture, Anaerobe [[id number]] Collected: 01/05/22 1055    Order Status: Completed Specimen: Abscess from Groin Updated: 01/07/22 0733     Anaerobic Culture Culture in progress    Fungus culture [[id number]] Collected: 01/05/22 1055    Order Status: Sent Specimen: Abscess from Groin Updated: 01/05/22 1401    Culture, Anaerobe [[id number]] Collected: 01/05/22 1055    Order Status: Canceled Specimen: Abscess from Groin     Aerobic culture [[id number]] Collected: 01/05/22 1055    Order Status: Canceled Specimen: Abscess from Groin         Latest lactate reviewed, they are-  No results for input(s): LACTATE in the last 72 hours.    Organ dysfunction indicated by n/a  Source- abscesses    Source control Achieved by- Urology consult

## 2022-01-08 NOTE — PROGRESS NOTES
[facility] Medicine  Telemedicine Progress Note    Patient Name: Abhishek Zhao  MRN: [id number]  Patient Class: IP- Inpatient   Admission Date: 1/4/2022  Length of Stay: 3 days  Attending Physician: Elijah Ann MD  Primary Care Provider: To Obtain Unable          Subjective:     Principal Problem:Scrotal abscess        HPI:  61 y.o. male with adjustment disorder with depressed mood, chronic ischemic heart disease, cocaine dependence in remission, cardiomegaly, HLD, HTN, swizure disorder, tobacco use, and DM 2 presents with a complaint of testicular pain.  Associated with swelling and redness to the scrotum and penis along with difficulty urinating, pain is rated as severe and radiates to the rectum.  Denies fever, chills, cough, SOB, chest pain, n/v/d.  In the ED, he was found to be tachypneic, with leukocytosis and elevated lactic.  CT and US shows evidence of multiple scrotal and perineal abscesses with some extension into the penile shaft.  UA with evidence of infection.  Sepsis treatment initiated, blood and urine cultures obtained, IV fluids and IV antibiotics initiated.  Urology consulted.      Overview/Hospital Course:  61 y.o. male with adjustment disorder with depressed mood, chronic ischemic heart disease, cocaine dependence in remission, cardiomegaly, HLD, HTN, swizure disorder, tobacco use, and DM 2 presents with a complaint of testicular pain.  Associated with swelling and redness to the scrotum and penis along with difficulty urinating, pain is rated as severe and radiates to the rectum.  Denies fever, chills, cough, SOB, chest pain, n/v/d.  In the ED, he was found to be tachypneic, with leukocytosis and elevated lactic.  CT and US shows evidence of multiple scrotal and perineal abscesses with some extension into the penile shaft.  UA with evidence of infection.  Sepsis treatment initiated, blood and urine cultures obtained, IV fluids and IV antibiotics initiated.  Urology  consulted.  Urology doing,cystoscopy with possible DVIU, possible urethral dilation, possible suprapubic tube placement.  Plan for incision and drainage of scrotal abscess  S/P I&D by Urology,cultures are pending.      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 Negative for activity change and fever.   Respiratory: Negative for cough and shortness of breath.    Gastrointestinal: Negative for abdominal pain and nausea.     Objective:     Vital Signs (Most Recent):  Temp: 98.3 °F (36.8 °C) (01/08/22 1638)  Pulse: 62 (01/08/22 1638)  Resp: 19 (01/08/22 1638)  BP: 130/69 (01/08/22 1638)  SpO2: 98 % (01/08/22 1638) Vital Signs (24h Range):  Temp:  [97.6 °F (36.4 °C)-98.3 °F (36.8 °C)] 98.3 °F (36.8 °C)  Pulse:  [62-66] 62  Resp:  [17-19] 19  SpO2:  [97 %-99 %] 98 %  BP: (126-163)/(65-72) 130/69     Weight: 57.4 kg (126 lb 8.7 oz)  Body mass index is 18.69 kg/m².    Intake/Output Summary (Last 24 hours) at 1/8/2022 1723  Last data filed at 1/8/2022 1210  Gross per 24 hour   Intake 600 ml   Output 2400 ml   Net -1800 ml      Physical Exam  Constitutional:       Appearance: Normal appearance.   Pulmonary:      Effort: Pulmonary effort is normal.      Breath sounds: No wheezing.   Abdominal:      General: Abdomen is flat. There is no distension.   Musculoskeletal:         General: No swelling or tenderness.   Skin:     Coloration: Skin is not jaundiced or pale.   Neurological:      General: No focal deficit present.      Mental Status: He is alert and oriented to person, place, and time.   Psychiatric:         Mood  and Affect: Mood normal.         Behavior: Behavior normal.         Significant Labs: All pertinent labs within the past 24 hours have been reviewed.    Significant Imaging: I have reviewed all pertinent imaging results/findings within the past 24 hours.      Assessment/Plan:      * Scrotal abscess  Continue IV antibiotics, Urology consult,Urology doing,cystoscopy with possible DVIU, possible urethral dilation, possible suprapubic tube placement.  Plan for incision and drainage of scrotal abscess.  S/P I&D by Urology on 1.5.22,cultures are pending.    1/7- POD 2 from I an D; cultures are growing E. Coli and candida; stopping vanc; cont zosyn and fluconazole; ID has been consulted for long term abx recs     1/8- POD 3 from I an D; patient is having pus come out of his urethra today on the side of his santiago; will repeat U/S scrotum and re-consult Urology to evaluate for another debridement;     Berna's gangrene  S/P I&D by Urology,cultures are pending.on gracie sp[ectrum IV Abx,wound care per urology.      JOI (acute kidney injury)  - improving with IVFs, cont  - cont santiago cath       Sepsis  - see scrotal abscess     This patient does have evidence of infective focus  My overall impression is sepsis. Vital signs were reviewed and noted in progress note.  Antibiotics given-   Antibiotics (From admission, onward)            Start     Stop Route Frequency Ordered    01/06/22 2100  mupirocin 2 % ointment         01/11 2059 Nasl 2 times daily 01/06/22 1337    01/05/22 0200  piperacillin-tazobactam 4.5 g in dextrose 5 % 100 mL IVPB (ready to mix system)         -- IV Every 8 hours (non-standard times) 01/04/22 2241        Cultures were taken-   Microbiology Results (last 7 days)     Procedure Component Value Units Date/Time    AFB Culture & Smear [[id number]] Collected: 01/05/22 1055    Order Status: Completed Specimen: Abscess from Groin Updated: 01/07/22 2127     AFB Culture & Smear Culture in progress     AFB CULTURE  STAIN No acid fast bacilli seen.    Blood Culture #1 **CANNOT BE ORDERED STAT** [[id number]] Collected: 01/04/22 1744    Order Status: Completed Specimen: Blood from Peripheral, Lower Arm, Left Updated: 01/07/22 1303     Blood Culture, Routine No Growth to date      No Growth to date      No Growth to date    Blood Culture #2 **CANNOT BE ORDERED STAT** [[id number]] Collected: 01/04/22 1744    Order Status: Completed Specimen: Blood from Peripheral, Antecubital, Right Updated: 01/07/22 1303     Blood Culture, Routine No Growth to date      No Growth to date      No Growth to date    Urine culture **CANNOT BE ORDERED STAT** [[id number]]  (Abnormal) Collected: 01/04/22 1804    Order Status: Completed Specimen: Urine, Clean Catch Updated: 01/07/22 1118     Urine Culture, Routine YAMILET ALBICANS  10,000 - 49,999 cfu/ml  Treatment of asymptomatic candiduria is not recommended (except for   specific populations). Candida isolated in the urine typically   represents colonization. If an indwelling urinary catheter is present  it should be removed or replaced.      Narrative:      Indicated criteria for high risk culture:->Other  Other (specify):->Protocol    Aerobic culture [[id number]]  (Abnormal)  (Susceptibility) Collected: 01/05/22 1055    Order Status: Completed Specimen: Abscess from Groin Updated: 01/07/22 0846     Aerobic Bacterial Culture ESCHERICHIA COLI  Moderate        YAMILET ALBICANS  Few      Culture, Anaerobe [[id number]] Collected: 01/05/22 1055    Order Status: Completed Specimen: Abscess from Groin Updated: 01/07/22 0733     Anaerobic Culture Culture in progress    Fungus culture [[id number]] Collected: 01/05/22 1055    Order Status: Sent Specimen: Abscess from Groin Updated: 01/05/22 1401    Culture, Anaerobe [[id number]] Collected: 01/05/22 1055    Order Status: Canceled Specimen: Abscess from Groin     Aerobic culture [[id number]] Collected: 01/05/22 1055    Order Status: Canceled Specimen: Abscess from Groin          Latest lactate reviewed, they are-  No results for input(s): LACTATE in the last 72 hours.    Organ dysfunction indicated by n/a  Source- abscesses    Source control Achieved by- Urology consult      Diabetes mellitus type 2, controlled  Check a1c  Hold oral antihyperglycemics while inpatient  PRN sliding scale insulin  ACHS glucose monitoring   ADA diet     Tobacco user  5 minutes spent counseling the patient on smoking cessation and he is not currently ready to stop smoking. He will be offereded a nicotine transdermal patch while hospitalized and monitored for withdrawal.  Will provide additional smoking cessation counseling prior to discharge.     Seizure disorder  Continue home regimen of depakote and lamotrigine    Essential hypertension  Well controlled, continue home medications and monitor blood pressure, adjust as needed.     Hyperlipidemia  Continue statin    Cardiomegaly  No evidence of acute decompensation or fluid overload, continue home regimen    Cocaine dependence in remission  Counseled     Chronic ischemic heart disease  No acute issue    Adjustment disorder with depressed mood  Continue home regimen of citalopram and quetiapine      VTE Risk Mitigation (From admission, onward)         Ordered     IP VTE LOW RISK PATIENT  Once         01/04/22 2241     Place sequential compression device  Until discontinued         01/04/22 2241                      I have assessed these finding virtually using telemed platform and with assistance of bedside nurse                 The attending portion of this evaluation, treatment, and documentation was performed per Elijah Ann MD via Telemedicine AudioVisual using the secure [employer] software platform with 2 way audio/video. The provider was located off-site and the patient is located in the hospital. The aforementioned video software was utilized to document the relevant history and physical exam    Elijah Ann MD  Department of Hospital Medicine    [facility] [place] [facility]

## 2022-01-08 NOTE — PROGRESS NOTES
[facility] Medicine  Telemedicine Progress Note    Patient Name: Abhishek Zhao  MRN: [id number]  Patient Class: IP- Inpatient   Admission Date: 1/4/2022  Length of Stay: 2 days  Attending Physician: Elijah Ann MD  Primary Care Provider: To Obtain Unable          Subjective:     Principal Problem:Scrotal abscess        HPI:  61 y.o. male with adjustment disorder with depressed mood, chronic ischemic heart disease, cocaine dependence in remission, cardiomegaly, HLD, HTN, swizure disorder, tobacco use, and DM 2 presents with a complaint of testicular pain.  Associated with swelling and redness to the scrotum and penis along with difficulty urinating, pain is rated as severe and radiates to the rectum.  Denies fever, chills, cough, SOB, chest pain, n/v/d.  In the ED, he was found to be tachypneic, with leukocytosis and elevated lactic.  CT and US shows evidence of multiple scrotal and perineal abscesses with some extension into the penile shaft.  UA with evidence of infection.  Sepsis treatment initiated, blood and urine cultures obtained, IV fluids and IV antibiotics initiated.  Urology consulted.      Overview/Hospital Course:  61 y.o. male with adjustment disorder with depressed mood, chronic ischemic heart disease, cocaine dependence in remission, cardiomegaly, HLD, HTN, swizure disorder, tobacco use, and DM 2 presents with a complaint of testicular pain.  Associated with swelling and redness to the scrotum and penis along with difficulty urinating, pain is rated as severe and radiates to the rectum.  Denies fever, chills, cough, SOB, chest pain, n/v/d.  In the ED, he was found to be tachypneic, with leukocytosis and elevated lactic.  CT and US shows evidence of multiple scrotal and perineal abscesses with some extension into the penile shaft.  UA with evidence of infection.  Sepsis treatment initiated, blood and urine cultures obtained, IV fluids and IV antibiotics initiated.  Urology  consulted.  Urology doing,cystoscopy with possible DVIU, possible urethral dilation, possible suprapubic tube placement.  Plan for incision and drainage of scrotal abscess  S/P I&D by Urology,cultures are pending.      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 Negative for activity change and fever.   Respiratory: Negative for cough and shortness of breath.    Gastrointestinal: Negative for abdominal pain and nausea.     Objective:     Vital Signs (Most Recent):  Temp: 97.9 °F (36.6 °C) (01/07/22 2007)  Pulse: 66 (01/07/22 2007)  Resp: 18 (01/07/22 2007)  BP: 134/68 (01/07/22 2007)  SpO2: 97 % (01/07/22 2007) Vital Signs (24h Range):  Temp:  [96.6 °F (35.9 °C)-98.4 °F (36.9 °C)] 97.9 °F (36.6 °C)  Pulse:  [51-66] 66  Resp:  [17-19] 18  SpO2:  [95 %-99 %] 97 %  BP: (105-134)/(57-68) 134/68     Weight: 57.4 kg (126 lb 8.7 oz)  Body mass index is 18.69 kg/m².    Intake/Output Summary (Last 24 hours) at 1/7/2022 2137  Last data filed at 1/7/2022 1730  Gross per 24 hour   Intake 720 ml   Output 2400 ml   Net -1680 ml      Physical Exam  Constitutional:       Appearance: Normal appearance.   Pulmonary:      Effort: Pulmonary effort is normal.      Breath sounds: No wheezing.   Abdominal:      General: Abdomen is flat. There is no distension.   Musculoskeletal:         General: No swelling or tenderness.   Skin:     Coloration: Skin is not jaundiced or pale.   Neurological:      General: No focal deficit present.      Mental Status: He is alert and oriented to person, place, and time.   Psychiatric:         Mood and Affect: Mood normal.         Behavior: Behavior normal.         Significant Labs: All pertinent labs within the past 24  hours have been reviewed.    Significant Imaging: I have reviewed all pertinent imaging results/findings within the past 24 hours.      Assessment/Plan:      * Scrotal abscess  Continue IV antibiotics, Urology consult,Urology doing,cystoscopy with possible DVIU, possible urethral dilation, possible suprapubic tube placement.  Plan for incision and drainage of scrotal abscess.  S/P I&D by Urology on 1.5.22,cultures are pending.    1/7- POD 2 from I an D; cultures are growing E. Coli and candida; stopping vanc; cont zosyn and fluconazole; ID has been consulted for long term abx recs     Berna's gangrene  S/P I&D by Urology,cultures are pending.on gracie sp[ectrum IV Abx,wound care per urology.      JOI (acute kidney injury)  - improving with IVFs, cont  - cont santiago cath       Sepsis  - see scrotal abscess     This patient does have evidence of infective focus  My overall impression is sepsis. Vital signs were reviewed and noted in progress note.  Antibiotics given-   Antibiotics (From admission, onward)            Start     Stop Route Frequency Ordered    01/06/22 2100  mupirocin 2 % ointment         01/11 2059 Nasl 2 times daily 01/06/22 1337    01/05/22 0200  piperacillin-tazobactam 4.5 g in dextrose 5 % 100 mL IVPB (ready to mix system)         -- IV Every 8 hours (non-standard times) 01/04/22 2241        Cultures were taken-   Microbiology Results (last 7 days)     Procedure Component Value Units Date/Time    AFB Culture & Smear [[id number]] Collected: 01/05/22 1055    Order Status: Completed Specimen: Abscess from Groin Updated: 01/07/22 2127     AFB Culture & Smear Culture in progress     AFB CULTURE STAIN No acid fast bacilli seen.    Blood Culture #1 **CANNOT BE ORDERED STAT** [[id number]] Collected: 01/04/22 1744    Order Status: Completed Specimen: Blood from Peripheral, Lower Arm, Left Updated: 01/07/22 1303     Blood Culture, Routine No Growth to date      No Growth to date      No Growth to date    Blood  Culture #2 **CANNOT BE ORDERED STAT** [[id number]] Collected: 01/04/22 1744    Order Status: Completed Specimen: Blood from Peripheral, Antecubital, Right Updated: 01/07/22 1303     Blood Culture, Routine No Growth to date      No Growth to date      No Growth to date    Urine culture **CANNOT BE ORDERED STAT** [[id number]]  (Abnormal) Collected: 01/04/22 1804    Order Status: Completed Specimen: Urine, Clean Catch Updated: 01/07/22 1118     Urine Culture, Routine YAMILET ALBICANS  10,000 - 49,999 cfu/ml  Treatment of asymptomatic candiduria is not recommended (except for   specific populations). Candida isolated in the urine typically   represents colonization. If an indwelling urinary catheter is present  it should be removed or replaced.      Narrative:      Indicated criteria for high risk culture:->Other  Other (specify):->Protocol    Aerobic culture [[id number]]  (Abnormal)  (Susceptibility) Collected: 01/05/22 1055    Order Status: Completed Specimen: Abscess from Groin Updated: 01/07/22 0846     Aerobic Bacterial Culture ESCHERICHIA COLI  Moderate        YAMILET ALBICANS  Few      Culture, Anaerobe [[id number]] Collected: 01/05/22 1055    Order Status: Completed Specimen: Abscess from Groin Updated: 01/07/22 0733     Anaerobic Culture Culture in progress    Fungus culture [[id number]] Collected: 01/05/22 1055    Order Status: Sent Specimen: Abscess from Groin Updated: 01/05/22 1401    Culture, Anaerobe [[id number]] Collected: 01/05/22 1055    Order Status: Canceled Specimen: Abscess from Groin     Aerobic culture [[id number]] Collected: 01/05/22 1055    Order Status: Canceled Specimen: Abscess from Groin         Latest lactate reviewed, they are-  No results for input(s): LACTATE in the last 72 hours.    Organ dysfunction indicated by n/a  Source- abscesses    Source control Achieved by- Urology consult      Diabetes mellitus type 2, controlled  Check a1c  Hold oral antihyperglycemics while inpatient  PRN sliding  scale insulin  ACHS glucose monitoring   ADA diet     Tobacco user  5 minutes spent counseling the patient on smoking cessation and he is not currently ready to stop smoking. He will be offereded a nicotine transdermal patch while hospitalized and monitored for withdrawal.  Will provide additional smoking cessation counseling prior to discharge.     Seizure disorder  Continue home regimen of depakote and lamotrigine    Essential hypertension  Well controlled, continue home medications and monitor blood pressure, adjust as needed.     Hyperlipidemia  Continue statin    Cardiomegaly  No evidence of acute decompensation or fluid overload, continue home regimen    Cocaine dependence in remission  Counseled     Chronic ischemic heart disease  No acute issue    Adjustment disorder with depressed mood  Continue home regimen of citalopram and quetiapine      VTE Risk Mitigation (From admission, onward)         Ordered     IP VTE LOW RISK PATIENT  Once         01/04/22 2241     Place sequential compression device  Until discontinued         01/04/22 2241                      I have assessed these finding virtually using telemed platform and with assistance of bedside nurse                 The attending portion of this evaluation, treatment, and documentation was performed per Elijah Ann MD via Telemedicine AudioVisual using the secure [employer] software platform with 2 way audio/video. The provider was located off-site and the patient is located in the hospital. The aforementioned video software was utilized to document the relevant history and physical exam    Elijah Ann MD  Department of Hospital Medicine   [facility] S[facility]

## 2022-01-09 PROBLEM — E87.6 HYPOKALEMIA: Status: ACTIVE | Noted: 2022-01-09

## 2022-01-09 LAB
ANION GAP SERPL CALC-SCNC: 6 MMOL/L (ref 8–16)
BACTERIA BLD CULT: NORMAL
BACTERIA BLD CULT: NORMAL
BACTERIA SPEC ANAEROBE CULT: NORMAL
BASOPHILS # BLD AUTO: 0.04 K/UL (ref 0–0.2)
BASOPHILS NFR BLD: 0.3 % (ref 0–1.9)
BUN SERPL-MCNC: 15 MG/DL (ref 6–20)
CALCIUM SERPL-MCNC: 8 MG/DL (ref 8.7–10.5)
CHLORIDE SERPL-SCNC: 111 MMOL/L (ref 95–110)
CO2 SERPL-SCNC: 22 MMOL/L (ref 23–29)
CREAT SERPL-MCNC: 1.1 MG/DL (ref 0.5–1.4)
DIFFERENTIAL METHOD: ABNORMAL
EOSINOPHIL # BLD AUTO: 0 K/UL (ref 0–0.5)
EOSINOPHIL NFR BLD: 0.2 % (ref 0–8)
ERYTHROCYTE [DISTWIDTH] IN BLOOD BY AUTOMATED COUNT: 12.5 % (ref 11.5–14.5)
EST. GFR  (AFRICAN AMERICAN): >60 ML/MIN/1.73 M^2
EST. GFR  (NON AFRICAN AMERICAN): >60 ML/MIN/1.73 M^2
GLUCOSE SERPL-MCNC: 66 MG/DL (ref 70–110)
HCT VFR BLD AUTO: 33.1 % (ref 40–54)
HGB BLD-MCNC: 10.5 G/DL (ref 14–18)
IMM GRANULOCYTES # BLD AUTO: 0.12 K/UL (ref 0–0.04)
IMM GRANULOCYTES NFR BLD AUTO: 0.9 % (ref 0–0.5)
LYMPHOCYTES # BLD AUTO: 1.9 K/UL (ref 1–4.8)
LYMPHOCYTES NFR BLD: 15.2 % (ref 18–48)
MCH RBC QN AUTO: 29 PG (ref 27–31)
MCHC RBC AUTO-ENTMCNC: 31.7 G/DL (ref 32–36)
MCV RBC AUTO: 91 FL (ref 82–98)
MONOCYTES # BLD AUTO: 0.6 K/UL (ref 0.3–1)
MONOCYTES NFR BLD: 5.1 % (ref 4–15)
NEUTROPHILS # BLD AUTO: 9.9 K/UL (ref 1.8–7.7)
NEUTROPHILS NFR BLD: 78.3 % (ref 38–73)
NRBC BLD-RTO: 0 /100 WBC
PLATELET # BLD AUTO: 305 K/UL (ref 150–450)
PMV BLD AUTO: 10.3 FL (ref 9.2–12.9)
POCT GLUCOSE: 125 MG/DL (ref 70–110)
POCT GLUCOSE: 145 MG/DL (ref 70–110)
POCT GLUCOSE: 55 MG/DL (ref 70–110)
POTASSIUM SERPL-SCNC: 2.9 MMOL/L (ref 3.5–5.1)
RBC # BLD AUTO: 3.62 M/UL (ref 4.6–6.2)
SODIUM SERPL-SCNC: 139 MMOL/L (ref 136–145)
WBC # BLD AUTO: 12.65 K/UL (ref 3.9–12.7)

## 2022-01-09 PROCEDURE — 25000003 PHARM REV CODE 250: Performed by: UROLOGY

## 2022-01-09 PROCEDURE — 25000003 PHARM REV CODE 250: Performed by: HOSPITALIST

## 2022-01-09 PROCEDURE — 36415 COLL VENOUS BLD VENIPUNCTURE: CPT | Performed by: HOSPITALIST

## 2022-01-09 PROCEDURE — 63700000 PHARM REV CODE 250 ALT 637 W/O HCPCS: Performed by: HOSPITALIST

## 2022-01-09 PROCEDURE — 25000003 PHARM REV CODE 250: Performed by: INTERNAL MEDICINE

## 2022-01-09 PROCEDURE — 11000001 HC ACUTE MED/SURG PRIVATE ROOM

## 2022-01-09 PROCEDURE — 99232 SBSQ HOSP IP/OBS MODERATE 35: CPT | Mod: ,,, | Performed by: UROLOGY

## 2022-01-09 PROCEDURE — 80048 BASIC METABOLIC PNL TOTAL CA: CPT | Performed by: HOSPITALIST

## 2022-01-09 PROCEDURE — 63600175 PHARM REV CODE 636 W HCPCS: Performed by: INTERNAL MEDICINE

## 2022-01-09 PROCEDURE — 85025 COMPLETE CBC W/AUTO DIFF WBC: CPT | Performed by: HOSPITALIST

## 2022-01-09 PROCEDURE — 99232 PR SUBSEQUENT HOSPITAL CARE,LEVL II: ICD-10-PCS | Mod: ,,, | Performed by: UROLOGY

## 2022-01-09 RX ORDER — POTASSIUM CHLORIDE 20 MEQ/1
40 TABLET, EXTENDED RELEASE ORAL ONCE
Status: COMPLETED | OUTPATIENT
Start: 2022-01-09 | End: 2022-01-09

## 2022-01-09 RX ADMIN — CEFTRIAXONE 1 G: 1 INJECTION, SOLUTION INTRAVENOUS at 02:01

## 2022-01-09 RX ADMIN — ASPIRIN 81 MG: 81 TABLET, COATED ORAL at 10:01

## 2022-01-09 RX ADMIN — POLYETHYLENE GLYCOL 3350 17 G: 17 POWDER, FOR SOLUTION ORAL at 10:01

## 2022-01-09 RX ADMIN — DIVALPROEX SODIUM 500 MG: 250 TABLET, EXTENDED RELEASE ORAL at 10:01

## 2022-01-09 RX ADMIN — METRONIDAZOLE 500 MG: 500 TABLET ORAL at 02:01

## 2022-01-09 RX ADMIN — SODIUM CHLORIDE: 0.45 INJECTION, SOLUTION INTRAVENOUS at 04:01

## 2022-01-09 RX ADMIN — ATORVASTATIN CALCIUM 80 MG: 40 TABLET, FILM COATED ORAL at 10:01

## 2022-01-09 RX ADMIN — POTASSIUM CHLORIDE 40 MEQ: 1500 TABLET, EXTENDED RELEASE ORAL at 04:01

## 2022-01-09 RX ADMIN — QUETIAPINE FUMARATE 25 MG: 25 TABLET ORAL at 09:01

## 2022-01-09 RX ADMIN — AMLODIPINE BESYLATE 10 MG: 5 TABLET ORAL at 10:01

## 2022-01-09 RX ADMIN — LAMOTRIGINE 200 MG: 100 TABLET ORAL at 10:01

## 2022-01-09 RX ADMIN — MUPIROCIN: 20 OINTMENT TOPICAL at 09:01

## 2022-01-09 RX ADMIN — SODIUM CHLORIDE: 0.45 INJECTION, SOLUTION INTRAVENOUS at 02:01

## 2022-01-09 RX ADMIN — POTASSIUM CHLORIDE 40 MEQ: 1500 TABLET, EXTENDED RELEASE ORAL at 06:01

## 2022-01-09 RX ADMIN — MUPIROCIN: 20 OINTMENT TOPICAL at 10:01

## 2022-01-09 RX ADMIN — LAMOTRIGINE 200 MG: 100 TABLET ORAL at 09:01

## 2022-01-09 RX ADMIN — FLUCONAZOLE 400 MG: 100 TABLET ORAL at 10:01

## 2022-01-09 RX ADMIN — HYDROCODONE BITARTRATE AND ACETAMINOPHEN 1 TABLET: 10; 325 TABLET ORAL at 04:01

## 2022-01-09 RX ADMIN — METRONIDAZOLE 500 MG: 500 TABLET ORAL at 09:01

## 2022-01-09 RX ADMIN — METOPROLOL SUCCINATE 50 MG: 50 TABLET, EXTENDED RELEASE ORAL at 10:01

## 2022-01-09 RX ADMIN — METRONIDAZOLE 500 MG: 500 TABLET ORAL at 05:01

## 2022-01-09 RX ADMIN — SODIUM CHLORIDE: 0.45 INJECTION, SOLUTION INTRAVENOUS at 11:01

## 2022-01-09 RX ADMIN — TAMSULOSIN HYDROCHLORIDE 0.4 MG: 0.4 CAPSULE ORAL at 10:01

## 2022-01-09 NOTE — PROGRESS NOTES
[facility] Surg  Urology  Progress Note    Patient Name: Abhishek Zhao  MRN: [id number]  Admission Date: 1/4/2022  Hospital Length of Stay: 4 days  Code Status: Full Code   Attending Provider: Elijah Ann MD   Primary Care Physician: To Obtain Unable    Subjective:     HPI:  Scrotal Swelling  Abhishek Zhao is a 61 y.o. male who was been experiencing scrotal pain and swelling since 12/31/2021.  He denies any fever.  He has had issues voiding since the swelling began.  Hemostat having issues in the past with urinary problems.  He denies having any previous issues with scrotal infection or swelling.  He recalls that he had procedures in the past but cannot recall specifically what to place.  He does not have urologist locally but moved back from [place] about 1 year ago.    Review of care everywhere shows that he had a cystoscopy with urethral dilation of a urethral stricture in the bulbar urethra in 2019 at [facility].  And there are reports that in approximately 2015 [facility]      Interval History:        Review of Systems  Objective:     Temp:  [97.5 °F (36.4 °C)-98.3 °F (36.8 °C)] 98.2 °F (36.8 °C)  Pulse:  [51-62] 58  Resp:  [16-19] 18  SpO2:  [96 %-99 %] 96 %  BP: (127-157)/(58-76) 148/76     Body mass index is 18.69 kg/m².           Drains     Drain                 Urethral Catheter 01/05/22 1050 Double-lumen 20 Fr. 1 day                Physical Exam  Constitutional:       General: He is not in acute distress.     Appearance: He is well-developed. He is not ill-appearing, toxic-appearing or diaphoretic.   HENT:      Head: Normocephalic and atraumatic.      Mouth/Throat:      Mouth: Mucous membranes are moist.   Eyes:      Conjunctiva/sclera: Conjunctivae normal.   Cardiovascular:      Rate and Rhythm: Normal rate and regular rhythm.   Pulmonary:      Effort: Pulmonary effort is normal. No respiratory distress.   Abdominal:      General: Abdomen is flat. There is no  distension.      Palpations: Abdomen is soft.      Tenderness: There is no abdominal tenderness. There is no right CVA tenderness, left CVA tenderness or guarding.   Genitourinary:     Comments: Scrotal wound, viable edges  Penoscrotal swelling  Musculoskeletal:         General: No swelling or deformity.      Cervical back: Neck supple.   Skin:     General: Skin is warm.      Capillary Refill: Capillary refill takes less than 2 seconds.      Findings: No rash.   Neurological:      Mental Status: He is alert and oriented to person, place, and time.      Gait: Gait normal.   Psychiatric:         Mood and Affect: Mood normal.         Thought Content: Thought content normal.         Judgment: Judgment normal.         Significant Labs:    BMP:  Recent Labs   Lab 01/06/22  0406 01/07/22  0652 01/09/22  0430    135* 139   K 3.7 3.8 2.9*    109 111*   CO2 20* 18* 22*   BUN 30* 25* 15   CREATININE 1.6* 1.5* 1.1   CALCIUM 8.3* 8.4* 8.0*       CBC:   Recent Labs   Lab 01/05/22  0955 01/07/22  0652 01/09/22  0430   WBC 17.30* 12.11 12.65   HGB 10.7* 10.4* 10.5*   HCT 32.4* 33.2* 33.1*    345 305                   Assessment/Plan:     * Scrotal abscess  s/p cystoscopy with santiago placement and debridement of abscess/necrosis of scrotum 1/5/22 with Dr. Rangel.    Continue abx per primary  Cultures growing yeast and GNR  Improved leukocytosis, afebrile  Appreciate wound care RN and floor RN dressing changes  Will continue to monitor condition    Will likely need plastics consultation in future for wound closure/grafting    Berna's gangrene   - s/p debridement 1/5/22        VTE Risk Mitigation (From admission, onward)         Ordered     IP VTE LOW RISK PATIENT  Once         01/04/22 2241     Place sequential compression device  Until discontinued         01/04/22 2241                Herrera Qiu MD  Urology  [facility] Surg

## 2022-01-09 NOTE — SUBJECTIVE & OBJECTIVE
Interval History:        Review of Systems  Objective:     Temp:  [97.5 °F (36.4 °C)-98.3 °F (36.8 °C)] 98.2 °F (36.8 °C)  Pulse:  [51-62] 58  Resp:  [16-19] 18  SpO2:  [96 %-99 %] 96 %  BP: (127-157)/(58-76) 148/76     Body mass index is 18.69 kg/m².           Drains     Drain                 Urethral Catheter 01/05/22 1050 Double-lumen 20 Fr. 1 day                Physical Exam  Constitutional:       General: He is not in acute distress.     Appearance: He is well-developed. He is not ill-appearing, toxic-appearing or diaphoretic.   HENT:      Head: Normocephalic and atraumatic.      Mouth/Throat:      Mouth: Mucous membranes are moist.   Eyes:      Conjunctiva/sclera: Conjunctivae normal.   Cardiovascular:      Rate and Rhythm: Normal rate and regular rhythm.   Pulmonary:      Effort: Pulmonary effort is normal. No respiratory distress.   Abdominal:      General: Abdomen is flat. There is no distension.      Palpations: Abdomen is soft.      Tenderness: There is no abdominal tenderness. There is no right CVA tenderness, left CVA tenderness or guarding.   Genitourinary:     Comments: Scrotal wound, viable edges  Penoscrotal swelling  Musculoskeletal:         General: No swelling or deformity.      Cervical back: Neck supple.   Skin:     General: Skin is warm.      Capillary Refill: Capillary refill takes less than 2 seconds.      Findings: No rash.   Neurological:      Mental Status: He is alert and oriented to person, place, and time.      Gait: Gait normal.   Psychiatric:         Mood and Affect: Mood normal.         Thought Content: Thought content normal.         Judgment: Judgment normal.         Significant Labs:    BMP:  Recent Labs   Lab 01/06/22  0406 01/07/22  0652 01/09/22  0430    135* 139   K 3.7 3.8 2.9*    109 111*   CO2 20* 18* 22*   BUN 30* 25* 15   CREATININE 1.6* 1.5* 1.1   CALCIUM 8.3* 8.4* 8.0*       CBC:   Recent Labs   Lab 01/05/22  0955 01/07/22  0652 01/09/22  0430   WBC 17.30*  12.11 12.65   HGB 10.7* 10.4* 10.5*   HCT 32.4* 33.2* 33.1*    345 305

## 2022-01-09 NOTE — PLAN OF CARE
Problem: Adult Inpatient Plan of Care  Goal: Plan of Care Review  Outcome: Ongoing, Progressing       Pt A&Ox4, free from falls/injury, and able to make needs known during shift. VSS on RA. Medications and fluids continued per orders. PRN pain meds given during shift with moderate relief. Telemetry monitoring continued on box #[id number], visible and audible on monitor at nurse's station. Blood glucose monitoring continued per orders. Sahu catheter in place draining clear, yellow urine. Wound care to be completed per orders. No acute distress noted. Bed locked and in lowest position. Bedside table and call light in reach. Will cont to monitor.

## 2022-01-10 PROBLEM — J96.01 ACUTE RESPIRATORY FAILURE WITH HYPOXIA AND HYPERCARBIA: Status: ACTIVE | Noted: 2022-01-10

## 2022-01-10 PROBLEM — J96.02 ACUTE RESPIRATORY FAILURE WITH HYPOXIA AND HYPERCARBIA: Status: ACTIVE | Noted: 2022-01-10

## 2022-01-10 PROBLEM — J18.9 HCAP (HEALTHCARE-ASSOCIATED PNEUMONIA): Status: ACTIVE | Noted: 2022-01-10

## 2022-01-10 PROBLEM — G93.41 ENCEPHALOPATHY, METABOLIC: Status: ACTIVE | Noted: 2022-01-10

## 2022-01-10 LAB
ALLENS TEST: ABNORMAL
AMMONIA PLAS-SCNC: 18 UMOL/L (ref 10–50)
ANION GAP SERPL CALC-SCNC: 5 MMOL/L (ref 8–16)
BUN SERPL-MCNC: 11 MG/DL (ref 6–20)
CALCIUM SERPL-MCNC: 8.1 MG/DL (ref 8.7–10.5)
CHLORIDE SERPL-SCNC: 110 MMOL/L (ref 95–110)
CO2 SERPL-SCNC: 23 MMOL/L (ref 23–29)
CREAT SERPL-MCNC: 1 MG/DL (ref 0.5–1.4)
DELSYS: ABNORMAL
EST. GFR  (AFRICAN AMERICAN): >60 ML/MIN/1.73 M^2
EST. GFR  (NON AFRICAN AMERICAN): >60 ML/MIN/1.73 M^2
GLUCOSE SERPL-MCNC: 102 MG/DL (ref 70–110)
HCO3 UR-SCNC: 21 MMOL/L (ref 24–28)
LACTATE SERPL-SCNC: 0.9 MMOL/L (ref 0.5–2.2)
MAGNESIUM SERPL-MCNC: 1.9 MG/DL (ref 1.6–2.6)
PCO2 BLDA: 39.7 MMHG (ref 35–45)
PH SMN: 7.33 [PH] (ref 7.35–7.45)
PO2 BLDA: 167 MMHG (ref 80–100)
POC BE: -5 MMOL/L
POC PCO2 TEMP: 39.7 MMHG
POC PH TEMP: 7.33
POC PO2 TEMP: 167 MMHG
POC SATURATED O2: 99 % (ref 95–100)
POC TCO2: 22 MMOL/L (ref 23–27)
POC TEMPERATURE: ABNORMAL
POCT GLUCOSE: 100 MG/DL (ref 70–110)
POCT GLUCOSE: 143 MG/DL (ref 70–110)
POCT GLUCOSE: 146 MG/DL (ref 70–110)
POCT GLUCOSE: 153 MG/DL (ref 70–110)
POCT GLUCOSE: 173 MG/DL (ref 70–110)
POTASSIUM SERPL-SCNC: 3.4 MMOL/L (ref 3.5–5.1)
SAMPLE: ABNORMAL
SITE: ABNORMAL
SODIUM SERPL-SCNC: 138 MMOL/L (ref 136–145)

## 2022-01-10 PROCEDURE — 95816 PR EEG,W/AWAKE & DROWSY RECORD: ICD-10-PCS | Mod: 26,,, | Performed by: PSYCHIATRY & NEUROLOGY

## 2022-01-10 PROCEDURE — 11000001 HC ACUTE MED/SURG PRIVATE ROOM

## 2022-01-10 PROCEDURE — 87205 SMEAR GRAM STAIN: CPT | Performed by: HOSPITALIST

## 2022-01-10 PROCEDURE — 25000003 PHARM REV CODE 250: Performed by: UROLOGY

## 2022-01-10 PROCEDURE — 63600175 PHARM REV CODE 636 W HCPCS: Performed by: HOSPITALIST

## 2022-01-10 PROCEDURE — 95816 EEG AWAKE AND DROWSY: CPT | Mod: 26,,, | Performed by: PSYCHIATRY & NEUROLOGY

## 2022-01-10 PROCEDURE — 99233 SBSQ HOSP IP/OBS HIGH 50: CPT | Mod: ,,, | Performed by: INTERNAL MEDICINE

## 2022-01-10 PROCEDURE — 87070 CULTURE OTHR SPECIMN AEROBIC: CPT | Performed by: HOSPITALIST

## 2022-01-10 PROCEDURE — 87186 SC STD MICRODIL/AGAR DIL: CPT | Performed by: HOSPITALIST

## 2022-01-10 PROCEDURE — 99222 PR INITIAL HOSPITAL CARE,LEVL II: ICD-10-PCS | Mod: ,,, | Performed by: PSYCHIATRY & NEUROLOGY

## 2022-01-10 PROCEDURE — 99233 PR SUBSEQUENT HOSPITAL CARE,LEVL III: ICD-10-PCS | Mod: ,,, | Performed by: STUDENT IN AN ORGANIZED HEALTH CARE EDUCATION/TRAINING PROGRAM

## 2022-01-10 PROCEDURE — 83735 ASSAY OF MAGNESIUM: CPT | Performed by: HOSPITALIST

## 2022-01-10 PROCEDURE — 25000003 PHARM REV CODE 250: Performed by: INTERNAL MEDICINE

## 2022-01-10 PROCEDURE — 63600175 PHARM REV CODE 636 W HCPCS: Performed by: INTERNAL MEDICINE

## 2022-01-10 PROCEDURE — 82140 ASSAY OF AMMONIA: CPT | Performed by: HOSPITALIST

## 2022-01-10 PROCEDURE — 25000003 PHARM REV CODE 250: Performed by: HOSPITALIST

## 2022-01-10 PROCEDURE — 87040 BLOOD CULTURE FOR BACTERIA: CPT | Performed by: HOSPITALIST

## 2022-01-10 PROCEDURE — 94640 AIRWAY INHALATION TREATMENT: CPT

## 2022-01-10 PROCEDURE — 99233 SBSQ HOSP IP/OBS HIGH 50: CPT | Mod: ,,, | Performed by: STUDENT IN AN ORGANIZED HEALTH CARE EDUCATION/TRAINING PROGRAM

## 2022-01-10 PROCEDURE — 25000242 PHARM REV CODE 250 ALT 637 W/ HCPCS: Performed by: STUDENT IN AN ORGANIZED HEALTH CARE EDUCATION/TRAINING PROGRAM

## 2022-01-10 PROCEDURE — 80048 BASIC METABOLIC PNL TOTAL CA: CPT | Performed by: HOSPITALIST

## 2022-01-10 PROCEDURE — 63700000 PHARM REV CODE 250 ALT 637 W/O HCPCS: Performed by: HOSPITALIST

## 2022-01-10 PROCEDURE — 87077 CULTURE AEROBIC IDENTIFY: CPT | Performed by: HOSPITALIST

## 2022-01-10 PROCEDURE — 95816 EEG AWAKE AND DROWSY: CPT

## 2022-01-10 PROCEDURE — 36415 COLL VENOUS BLD VENIPUNCTURE: CPT | Performed by: HOSPITALIST

## 2022-01-10 PROCEDURE — 83605 ASSAY OF LACTIC ACID: CPT | Performed by: HOSPITALIST

## 2022-01-10 PROCEDURE — 99233 PR SUBSEQUENT HOSPITAL CARE,LEVL III: ICD-10-PCS | Mod: ,,, | Performed by: INTERNAL MEDICINE

## 2022-01-10 PROCEDURE — 99222 1ST HOSP IP/OBS MODERATE 55: CPT | Mod: ,,, | Performed by: PSYCHIATRY & NEUROLOGY

## 2022-01-10 RX ORDER — ACETYLCYSTEINE 100 MG/ML
4 SOLUTION ORAL; RESPIRATORY (INHALATION)
Status: DISCONTINUED | OUTPATIENT
Start: 2022-01-10 | End: 2022-01-22

## 2022-01-10 RX ORDER — IPRATROPIUM BROMIDE AND ALBUTEROL SULFATE 2.5; .5 MG/3ML; MG/3ML
3 SOLUTION RESPIRATORY (INHALATION) EVERY 4 HOURS
Status: DISCONTINUED | OUTPATIENT
Start: 2022-01-10 | End: 2022-01-10

## 2022-01-10 RX ORDER — ACETYLCYSTEINE 100 MG/ML
4 SOLUTION ORAL; RESPIRATORY (INHALATION)
Status: DISCONTINUED | OUTPATIENT
Start: 2022-01-10 | End: 2022-01-10

## 2022-01-10 RX ORDER — IPRATROPIUM BROMIDE AND ALBUTEROL SULFATE 2.5; .5 MG/3ML; MG/3ML
3 SOLUTION RESPIRATORY (INHALATION) EVERY 4 HOURS PRN
Status: DISCONTINUED | OUTPATIENT
Start: 2022-01-10 | End: 2022-02-09 | Stop reason: HOSPADM

## 2022-01-10 RX ORDER — AMOXICILLIN AND CLAVULANATE POTASSIUM 875; 125 MG/1; MG/1
1 TABLET, FILM COATED ORAL EVERY 12 HOURS
Qty: 20 TABLET | Refills: 0 | Status: SHIPPED | OUTPATIENT
Start: 2022-01-10 | End: 2022-02-09 | Stop reason: HOSPADM

## 2022-01-10 RX ORDER — IPRATROPIUM BROMIDE AND ALBUTEROL SULFATE 2.5; .5 MG/3ML; MG/3ML
3 SOLUTION RESPIRATORY (INHALATION)
Status: DISCONTINUED | OUTPATIENT
Start: 2022-01-10 | End: 2022-01-22

## 2022-01-10 RX ORDER — METRONIDAZOLE 500 MG/1
500 TABLET ORAL EVERY 8 HOURS
Qty: 30 TABLET | Refills: 0 | Status: SHIPPED | OUTPATIENT
Start: 2022-01-10 | End: 2022-02-09

## 2022-01-10 RX ORDER — FLUCONAZOLE 200 MG/1
400 TABLET ORAL DAILY
Qty: 20 TABLET | Refills: 0 | Status: SHIPPED | OUTPATIENT
Start: 2022-01-11 | End: 2022-02-09 | Stop reason: HOSPADM

## 2022-01-10 RX ORDER — LACOSAMIDE 50 MG/1
200 TABLET ORAL EVERY 12 HOURS
Status: DISCONTINUED | OUTPATIENT
Start: 2022-01-10 | End: 2022-01-14

## 2022-01-10 RX ORDER — POTASSIUM CHLORIDE 20 MEQ/1
40 TABLET, EXTENDED RELEASE ORAL ONCE
Status: COMPLETED | OUTPATIENT
Start: 2022-01-10 | End: 2022-01-10

## 2022-01-10 RX ADMIN — HYDROCODONE BITARTRATE AND ACETAMINOPHEN 1 TABLET: 10; 325 TABLET ORAL at 10:01

## 2022-01-10 RX ADMIN — MUPIROCIN: 20 OINTMENT TOPICAL at 09:01

## 2022-01-10 RX ADMIN — FLUCONAZOLE 400 MG: 100 TABLET ORAL at 09:01

## 2022-01-10 RX ADMIN — HYDROCODONE BITARTRATE AND ACETAMINOPHEN 1 TABLET: 10; 325 TABLET ORAL at 03:01

## 2022-01-10 RX ADMIN — ACETYLCYSTEINE 4 ML: 100 SOLUTION ORAL; RESPIRATORY (INHALATION) at 07:01

## 2022-01-10 RX ADMIN — ATORVASTATIN CALCIUM 80 MG: 40 TABLET, FILM COATED ORAL at 09:01

## 2022-01-10 RX ADMIN — CEFTRIAXONE 1 G: 1 INJECTION, SOLUTION INTRAVENOUS at 03:01

## 2022-01-10 RX ADMIN — IPRATROPIUM BROMIDE AND ALBUTEROL SULFATE 3 ML: 2.5; .5 SOLUTION RESPIRATORY (INHALATION) at 07:01

## 2022-01-10 RX ADMIN — LACOSAMIDE 200 MG: 50 TABLET, FILM COATED ORAL at 03:01

## 2022-01-10 RX ADMIN — METRONIDAZOLE 500 MG: 500 TABLET ORAL at 09:01

## 2022-01-10 RX ADMIN — LAMOTRIGINE 200 MG: 100 TABLET ORAL at 09:01

## 2022-01-10 RX ADMIN — TAMSULOSIN HYDROCHLORIDE 0.4 MG: 0.4 CAPSULE ORAL at 09:01

## 2022-01-10 RX ADMIN — METOPROLOL SUCCINATE 50 MG: 50 TABLET, EXTENDED RELEASE ORAL at 09:01

## 2022-01-10 RX ADMIN — ASPIRIN 81 MG: 81 TABLET, COATED ORAL at 09:01

## 2022-01-10 RX ADMIN — AMLODIPINE BESYLATE 10 MG: 5 TABLET ORAL at 09:01

## 2022-01-10 RX ADMIN — LACOSAMIDE 200 MG: 50 TABLET, FILM COATED ORAL at 09:01

## 2022-01-10 RX ADMIN — METRONIDAZOLE 500 MG: 500 TABLET ORAL at 06:01

## 2022-01-10 RX ADMIN — DIVALPROEX SODIUM 500 MG: 250 TABLET, EXTENDED RELEASE ORAL at 09:01

## 2022-01-10 RX ADMIN — PIPERACILLIN AND TAZOBACTAM 4.5 G: 4; .5 INJECTION, POWDER, LYOPHILIZED, FOR SOLUTION INTRAVENOUS; PARENTERAL at 08:01

## 2022-01-10 RX ADMIN — METRONIDAZOLE 500 MG: 500 TABLET ORAL at 03:01

## 2022-01-10 RX ADMIN — POTASSIUM CHLORIDE 40 MEQ: 1500 TABLET, EXTENDED RELEASE ORAL at 03:01

## 2022-01-10 NOTE — PLAN OF CARE
JUSTO notified JUSTO Douglass [facility] that patient has a potential discharge for today. JUSTO informed that patient will need a follow up with Urology, Plastics, and ID. JUSTO informed that patient will need home health for wound care. Evonne inquired if JUSTO could send a list of wound care supplies needed.     JUSTO sent message to Maryann for wound care supplies.   Maryann stated that patient will need 4x4 gauze, ABD pads, Vashe and mesh pants.

## 2022-01-10 NOTE — ASSESSMENT & PLAN NOTE
- Berna's gangrene, s/p debridement, followed by Urology  - E.coli and Candida albicans growing on culture  - urine culture also grew Candida albicans  - continue fluconazole  - continue wound care  - ok to transition to po upon discharge (e.g., Augmentin plus Diflucan x 10 days)

## 2022-01-10 NOTE — ANESTHESIA POSTPROCEDURE EVALUATION
Anesthesia Post Evaluation    Patient: Abhishek Zhao    Procedure(s) Performed: Procedure(s) (LRB):  CYSTOSCOPY, RETROGRADE URETHROGRAM, FISTULAGRAM, EXCISION OF NECROTIC SCROTAL TISSUE ABSCESS, BARKER PLACEMENT (N/A)  EXCISION, ABSCESS  CYSTOSCOPY  FISTULOGRAM (N/A)    Final Anesthesia Type: general      Patient location during evaluation: PACU  Patient participation: Yes- Able to Participate  Level of consciousness: awake and alert  Post-procedure vital signs: reviewed and stable  Pain management: adequate  Airway patency: patent    PONV status at discharge: No PONV  Anesthetic complications: no      Cardiovascular status: blood pressure returned to baseline and hemodynamically stable  Respiratory status: unassisted and spontaneous ventilation  Hydration status: euvolemic  Follow-up not needed.          Vitals Value Taken Time   /67 01/10/22 0337   Temp 36.4 °C (97.6 °F) 01/10/22 0337   Pulse 54 01/10/22 0337   Resp 18 01/10/22 0340   SpO2 96 % 01/10/22 0337         Event Time   Out of Recovery 01/05/2022 13:18:00         Pain/Jorge Score: Pain Rating Prior to Med Admin: 8 (1/10/2022  3:40 AM)

## 2022-01-10 NOTE — SUBJECTIVE & OBJECTIVE
Interval History: denies pain, afebrile      Review of Systems   Constitutional: Negative for activity change, appetite change, chills, diaphoresis and fever.   HENT: Negative for congestion and rhinorrhea.    Respiratory: Negative for choking and shortness of breath.    Gastrointestinal: Negative for abdominal distention, abdominal pain, constipation, diarrhea, nausea and vomiting.   Genitourinary: Positive for difficulty urinating, penile swelling and scrotal swelling. Negative for dysuria, flank pain, hematuria, testicular pain and urgency.   Skin: Negative for color change, pallor and wound.   Neurological: Negative for dizziness and syncope.   Psychiatric/Behavioral: Negative for confusion and hallucinations.     Objective:     Temp:  [97.5 °F (36.4 °C)-97.8 °F (36.6 °C)] 97.8 °F (36.6 °C)  Pulse:  [53-63] 55  Resp:  [17-18] 18  SpO2:  [93 %-99 %] 98 %  BP: (104-170)/(57-83) 161/79     Body mass index is 20.15 kg/m².    Date 01/10/22 0700 - 01/11/22 0659   Shift 1803-6850 0086-0300 9598-5674 24 Hour Total   INTAKE   P.O. 120   120   Shift Total(mL/kg) 120(1.9)   120(1.9)   OUTPUT   Shift Total(mL/kg)       Weight (kg) 61.9 61.9 61.9 61.9          Drains     Drain                 Urethral Catheter 01/05/22 1050 Double-lumen 20 Fr. 5 days                Physical Exam  Constitutional:       General: He is not in acute distress.     Appearance: He is well-developed. He is not ill-appearing, toxic-appearing or diaphoretic.   HENT:      Head: Normocephalic and atraumatic.      Mouth/Throat:      Mouth: Mucous membranes are moist.   Eyes:      Conjunctiva/sclera: Conjunctivae normal.   Cardiovascular:      Rate and Rhythm: Normal rate and regular rhythm.   Pulmonary:      Effort: Pulmonary effort is normal. No respiratory distress.   Abdominal:      General: Abdomen is flat. There is no distension.      Palpations: Abdomen is soft. There is no mass.      Tenderness: There is no abdominal tenderness. There is no right  CVA tenderness, left CVA tenderness or guarding.   Genitourinary:     Comments: Sahu with CYU  No purulence noted from meatus  Wound dressing changed by me, viable wound edges, granulation tissue noted, no additional purulence expressed while probing wound for additional pockets  Penoscrotal swelling improving  Musculoskeletal:         General: No swelling or deformity.      Cervical back: Neck supple.   Skin:     General: Skin is warm.      Capillary Refill: Capillary refill takes less than 2 seconds.      Findings: No rash.   Neurological:      Mental Status: He is alert and oriented to person, place, and time.      Gait: Gait normal.   Psychiatric:         Mood and Affect: Mood normal.         Thought Content: Thought content normal.         Judgment: Judgment normal.         Significant Labs:    BMP:  Recent Labs   Lab 01/07/22  0652 01/09/22  0430 01/10/22  0506   * 139 138   K 3.8 2.9* 3.4*    111* 110   CO2 18* 22* 23   BUN 25* 15 11   CREATININE 1.5* 1.1 1.0   CALCIUM 8.4* 8.0* 8.1*       CBC:   Recent Labs   Lab 01/05/22  0955 01/07/22  0652 01/09/22  0430   WBC 17.30* 12.11 12.65   HGB 10.7* 10.4* 10.5*   HCT 32.4* 33.2* 33.1*    345 305

## 2022-01-10 NOTE — PLAN OF CARE
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]  Yes     Priority: 2     Sepsis due to Gram negative bacteria [A41.50]  Yes     Priority: 3     JOI (acute kidney injury) [N17.9]  Yes     Priority: 4     Hypokalemia [E87.6]  No    Adjustment disorder with depressed mood [F43.21]  Yes     Chronic    Chronic ischemic heart disease [I25.9]  Yes     Chronic    Cocaine dependence in remission [F14.21]  Yes     Chronic    Cardiomegaly [I51.7]  Yes     Chronic    Hyperlipidemia [E78.5]  Yes     Chronic    Essential hypertension [I10]  Yes     Chronic    Seizure disorder [G40.909]  Yes     Chronic    Tobacco user [Z72.0]  Yes     Chronic    Diabetes mellitus type 2, controlled [E11.9]  Yes     Chronic     Last Assessment & Plan:   Formatting of this note might be different from the original.  Today we note that you have Diabetes and it is stable.    Continue our treatment plan as discussed.    Make sure to monitor for any side effects or symptoms.    We are ordering labs to reassess your health.    Please complete labs as instructed, preferably while fasting for a minimum of 8 hours.    Follow up in our office as planned.        Resolved Hospital Problems   No resolved problems to display.       Follow Up Appointments:  Future Appointments   Date Time Provider Department Center   1/10/2022  3:00 PM EEG, [facility]       Allergies:Review of patient's allergies indicates:  No Known Allergies    Medications: Review discharge medications with patient and family and provide education.    Current Facility-Administered Medications   Medication  Dose Route Frequency Provider Last Rate Last Admin    0.45% NaCl infusion   Intravenous Continuous Quinten Rosario  mL/hr at 01/09/22 2333 New Bag at 01/09/22 2333    acetaminophen tablet 650 mg  650 mg Oral Q6H PRN W. Manny Rangel MD        albuterol-ipratropium 2.5 mg-0.5 mg/3 mL nebulizer solution 3 mL  3 mL Nebulization Q4H PRN W. Manny Rangel MD        aluminum-magnesium hydroxide-simethicone 200-200-20 mg/5 mL suspension 30 mL  30 mL Oral QID PRN W. Manny Rangel MD        amLODIPine tablet 10 mg  10 mg Oral Daily W. Manny Rangel MD   10 mg at 01/10/22 0953    aspirin EC tablet 81 mg  81 mg Oral Daily W. Manny Rangel MD   81 mg at 01/10/22 0953    atorvastatin tablet 80 mg  80 mg Oral Daily W. Manny Rangel MD   80 mg at 01/10/22 0953    cefTRIAXone (ROCEPHIN) 1 g/50 mL D5W IVPB  1 g Intravenous Q24H Demarco Combs MD   Stopped at 01/09/22 1521    dextrose 50% injection 12.5 g  12.5 g Intravenous PRN W. Manny Rangel MD        dextrose 50% injection 25 g  25 g Intravenous PRN W. Manny Rangel MD        divalproex ER 24 hr tablet 500 mg  500 mg Oral Daily W. Manny Rangel MD   500 mg at 01/10/22 0953    fluconazole tablet 400 mg  400 mg Oral Daily Quinten Rosario MD   400 mg at 01/10/22 0953    glucagon (human recombinant) injection 1 mg  1 mg Intramuscular PRN W. Manny Rangel MD        glucose chewable tablet 16 g  16 g Oral PRN W. Manny Rangel MD        glucose chewable tablet 24 g  24 g Oral PRN W. Manny Rangel MD        influenza (QUADRIVALENT PF) vaccine 0.5 mL  0.5 mL Intramuscular vaccine x 1 dose Elijah Ann MD        insulin aspart U-100 pen 0-5 Units  0-5 Units Subcutaneous Q6H PRN W. Manny Rangel MD   3 Units at 01/06/22 2108    lacosamide tablet 200 mg  200 mg Oral Q12H Elijah Ann MD        lamoTRIgine tablet 200 mg  200 mg Oral BID WLidia Rangel MD   200 mg at 01/10/22 0953    melatonin tablet 6 mg  6 mg Oral Nightly PRN  W. Manny Rangel MD        metoprolol succinate (TOPROL-XL) 24 hr tablet 50 mg  50 mg Oral Daily WLidia Rangel MD   50 mg at 01/10/22 0953    metroNIDAZOLE tablet 500 mg  500 mg Oral Q8H Demarco Combs MD   500 mg at 01/10/22 0650    mupirocin 2 % ointment   Nasal BID Quinten Rosario MD   Given at 01/10/22 0953    naloxone 0.4 mg/mL injection 0.02 mg  0.02 mg Intravenous PRN W. Manny Rangel MD        ondansetron injection 4 mg  4 mg Intravenous Q8H PRN W. Manny Rangel MD        polyethylene glycol packet 17 g  17 g Oral Daily W. Manny Rangel MD   17 g at 01/09/22 1046    potassium chloride SA CR tablet 40 mEq  40 mEq Oral Once Elijah Ann MD        prochlorperazine injection Soln 5 mg  5 mg Intravenous Q6H PRN W. Manny Rangel MD        QUEtiapine tablet 25 mg  25 mg Oral QHS W. Manny Rangel MD   25 mg at 01/09/22 2101    simethicone chewable tablet 80 mg  1 tablet Oral QID PRN W. Manny Rangel MD        sodium chloride 0.9% flush 10 mL  10 mL Intravenous Q8H PRN W. Manny Rangel MD        sodium chloride 0.9% flush 10 mL  10 mL Intravenous PRN W. Manny Rangel MD        tamsulosin 24 hr capsule 0.4 mg  0.4 mg Oral Daily W. Manny Rangel MD   0.4 mg at 01/10/22 0953     Current Discharge Medication List      START taking these medications    Details   amoxicillin-clavulanate 875-125mg (AUGMENTIN) 875-125 mg per tablet Take 1 tablet by mouth every 12 (twelve) hours. for 10 days  Qty: 20 tablet, Refills: 0      fluconazole (DIFLUCAN) 200 MG Tab Take 2 tablets (400 mg total) by mouth once daily. for 10 days  Qty: 20 tablet, Refills: 0      metroNIDAZOLE (FLAGYL) 500 MG tablet Take 1 tablet (500 mg total) by mouth every 8 (eight) hours. for 10 days  Qty: 30 tablet, Refills: 0         CONTINUE these medications which have NOT CHANGED    Details   amLODIPine (NORVASC) 10 MG tablet Take 10 mg by mouth.      amlodipine-benazepril 5-20 mg (LOTREL) 5-20 mg per capsule TAKE 1  CAPSULE BY MOUTH EVERY DAY FOR HEART AND BLOOD PRESSURE      ASCORBATE CALCIUM (VITAMIN C ORAL) Take by mouth.      aspirin (ECOTRIN) 81 MG EC tablet Take 81 mg by mouth once daily.      atorvastatin (LIPITOR) 80 MG tablet TAKE ONE TABLET BY MOUTH EVERY DAY FOR CHOLESTEROL      cetirizine (ZYRTEC) 10 MG tablet TAKE ONE TABLET BY MOUTH DAILY .  TAKE ONE DAILY AS NEEDED FOR ITCHING. MAY TAKE UP TO 4 TIMES DAILY AS  NEEDD .  TAKE ONE DAILY AS NEEDED FOR ITCHING. MAY TAKE UP TO 4 TIMES DAILY AS  NEEDD      cholecalciferol, vitamin D3, (VITAMIN D3) 50 mcg (2,000 unit) Tab TAKE ONE TABLET BY MOUTH EVERY DAY AS A VITAMIN SUPPLEMENT      citalopram (CELEXA) 10 MG tablet Take by mouth.      divalproex (DEPAKOTE) 500 MG Tb24 Take 500 mg by mouth once daily.      HYDROPHILIC CREAM TOP APPLY LIBERAL AMOUNT TO THE SKIN TWICE A DAY . APPLY TO THE SKIN TWICE DAILY AS NEEDED FOR DRY SKIN AND ITCING      insulin detemir U-100 (LEVEMIR) 100 unit/mL injection Inject 15 Units into the skin.      insulin glargine (LANTUS) 100 unit/mL injection Inject 20 Units into the skin.      insulin glargine 100 units/mL (3mL) SubQ pen INJECT 18 UNITS SUBCUTANEOUSLY EVERY MORNING AND INJECT 14 UNITS AT BEDTIME      lacosamide (VIMPAT) 200 mg Tab tablet Take 200 mg by mouth.      lamoTRIgine (LAMICTAL) 200 MG tablet TAKE ONE TABLET BY MOUTH TWICE A DAY FOR SEIZURES      lisinopriL 10 MG tablet TAKE ONE TABLET BY MOUTH DAILY FOR HEART/ BLOOD PRESSURE      !! metFORMIN (GLUCOPHAGE) 1000 MG tablet TAKE ONE TABLET BY MOUTH TWICE A DAY WITH FOOD FOR DIABETES      !! metFORMIN (GLUCOPHAGE) 500 MG tablet Take 500 mg by mouth.      metoprolol succinate (TOPROL-XL) 50 MG 24 hr tablet Take 50 mg by mouth once daily.      nitroGLYCERIN (NITROSTAT) 0.4 MG SL tablet DISSOLVE ONE TABLET UNDER TONGUE Q5MX3 FOR CHEST PAIN      oxycodone-acetaminophen (PERCOCET) 5-325 mg per tablet Take 1 tablet by mouth every 4 (four) hours as needed for Pain (do not drink alcohol,  drive, or operate heavy machinery while taking this medication.).  Qty: 20 tablet, Refills: 0      pantoprazole (PROTONIX) 40 MG tablet Take 1 tablet (40 mg total) by mouth once daily.  Qty: 30 tablet, Refills: 3      potassium chloride (KLOR-CON) 10 MEQ TbSR TAKE FOUR TABLETS BY MOUTH ONCE DAILY TO INCREASE POTASSIUM      QUEtiapine (SEROQUEL) 25 MG Tab TAKE ONE-HALF TABLET BY MOUTH AT BEDTIME FOR MOOD OR PSYCHOSIS AND INSOMNIA.. MAY USE WHOLE TABLET IF NECESSARY      rosuvastatin (CRESTOR) 40 MG Tab TAKE ONE TABLET BY MOUTH DAILY FOR CHOLESTEROL (REPLACES ATORVASTATIN)      tamsulosin (FLOMAX) 0.4 mg Cap TAKE 1 CAPSULE BY MOUTH EVERY DAY FOR BPH      triamcinolone acetonide 0.1% (KENALOG) 0.1 % cream APPLY MODERATE AMOUNT TOPICALLY TWICE A DAY      varenicline (CHANTIX JOSE) 0.5 mg (11)- 1 mg (42) tablet TAKE AS DIRECTED BY MOUTH UD FOR SMOKING CESSATION       !! - Potential duplicate medications found. Please discuss with provider.      STOP taking these medications       divalproex (DEPAKOTE) 125 MG EC tablet Comments:   Reason for Stopping:         metoprolol tartrate (LOPRESSOR) 50 MG tablet Comments:   Reason for Stopping:                 I have seen and examined this patient within the last 30 days. My clinical findings that support the need for the home health skilled services and home bound status are the following:no   Weakness/numbness causing balance and gait disturbance due to Infection and Weakness/Debility making it taxing to leave home.     Diet:   diabetic diet 2000 calorie        Referrals/ Consults  Physical Therapy to evaluate and treat. Evaluate for home safety and equipment needs; Establish/upgrade home exercise program. Perform / instruct on therapeutic exercises, gait training, transfer training, and Range of Motion.  Occupational Therapy to evaluate and treat. Evaluate home environment for safety and equipment needs. Perform/Instruct on transfers, ADL training, ROM, and therapeutic  exercises.    Activities:   activity as tolerated    Nursing:   Agency to admit patient within 24 hours of hospital discharge unless specified on physician order or at patient request    SN to complete comprehensive assessment including routine vital signs. Instruct on disease process and s/s of complications to report to MD. Review/verify medication list sent home with the patient at time of discharge  and instruct patient/caregiver as needed. Frequency may be adjusted depending on start of care date.     Skilled nurse to perform up to 3 visits PRN for symptoms related to diagnosis    Notify MD if SBP > 160 or < 90; DBP > 90 or < 50; HR > 120 or < 50; Temp > 101; O2 < 88%; Other:       Ok to schedule additional visits based on staff availability and patient request on consecutive days within the home health episode.    When multiple disciplines ordered:    Start of Care occurs on Sunday - Wednesday schedule remaining discipline evaluations as ordered on separate consecutive days following the start of care.    Thursday SOC -schedule subsequent evaluations Friday and Monday the following week.     Friday - Saturday SOC - schedule subsequent discipline evaluations on consecutive days starting Monday of the following week.    For all post-discharge communication and subsequent orders please contact patient's primary care physician. If unable to reach primary care physician or do not receive response within 30 minutes, please contact [phone] for clinical staff order clarification        SN to do santiago care    Wound Care Orders    Local wound care to scrotal wound every 12 hours and prn soiled or not intact- Cleanse with Vashe. Fill wound with Vashe moistened gauze. Cover with ABD pad and secure with mesh pants.    I certify that this patient is confined to his home and needs intermittent skilled nursing care, physical therapy and occupational therapy.

## 2022-01-10 NOTE — PROGRESS NOTES
[facility]  Infectious Disease  Progress Note    Patient Name: Abhishek Zhao  MRN: [id number]  Admission Date: 1/4/2022  Length of Stay: 5 days  Attending Physician: Elijah Ann MD  Primary Care Provider: To Obtain Unable    Isolation Status: No active isolations     Assessment/Plan:      * Scrotal abscess  - Berna's gangrene, s/p debridement, followed by Urology  - E.coli and Candida albicans growing on culture  - urine culture also grew Candida albicans  - continue fluconazole  - continue wound care  - ok to transition to po upon discharge (e.g., Augmentin plus Diflucan x 10 days)      Demarco Combs MD  Infectious Diseases    Subjective:     Principal Problem:Scrotal abscess    HPI: Abhishek Zhao is a 61-year-old male with HTN and seizures who presented to the ED with complaints of testicular pain and swelling for four days. At that time, he endorsed radiation of pain to rectum and difficulty urinating. He was originally planning on going to the ED before th [place] game but decided to wait until afterwards. In the ED, imaging showed a complex fluid collection(s).  He has a history of urethral stricture last dilated in 2019 at an outside facility ([facility]). The patient was admitted and started on empiric abx. 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 Post-operatively, the patient has improved with diminishing leukocytosis. Surgical cultures have grown E.coli and Candida, thus far. ID is consulted for Berna's gangrene.    Interval History: Feeling better. Denies fever or chills. Wound hurts less.    Review of Systems   Skin: Positive for wound.   All other systems reviewed and are negative.    Objective:     Vital Signs (Most Recent):  Temp: 97.5 °F (36.4 °C) (01/10/22 0735)  Pulse: (!) 55 (01/10/22  0735)  Resp: 18 (01/10/22 1033)  BP: (!) 147/67 (01/10/22 0735)  SpO2: (!) 93 % (01/10/22 0735) Vital Signs (24h Range):  Temp:  [97.5 °F (36.4 °C)-98.2 °F (36.8 °C)] 97.5 °F (36.4 °C)  Pulse:  [53-63] 55  Resp:  [17-18] 18  SpO2:  [93 %-99 %] 93 %  BP: (104-170)/(57-83) 147/67     Weight: 61.9 kg (136 lb 7.4 oz)  Body mass index is 20.15 kg/m².    Estimated Creatinine Clearance: 69.6 mL/min (based on SCr of 1 mg/dL).    Physical Exam  Vitals and nursing note reviewed.   Constitutional:       General: He is not in acute distress.     Appearance: Normal appearance. He is not ill-appearing, toxic-appearing or diaphoretic.   HENT:      Head: Normocephalic and atraumatic.      Right Ear: External ear normal.      Left Ear: External ear normal.      Mouth/Throat:      Mouth: Mucous membranes are moist.   Eyes:      Extraocular Movements: Extraocular movements intact.      Conjunctiva/sclera: Conjunctivae normal.      Pupils: Pupils are equal, round, and reactive to light.   Cardiovascular:      Rate and Rhythm: Normal rate.   Pulmonary:      Effort: Pulmonary effort is normal.      Breath sounds: Normal breath sounds.   Abdominal:      Tenderness: There is no abdominal tenderness. There is no guarding.   Genitourinary:     Comments: Floey, scrotum less edematous  Neurological:      General: No focal deficit present.      Mental Status: He is alert and oriented to person, place, and time.   Psychiatric:         Mood and Affect: Mood normal.         Behavior: Behavior normal.         Thought Content: Thought content normal.         Judgment: Judgment normal.         Significant Labs:   CBC:   Recent Labs   Lab 01/09/22  0430   WBC 12.65   HGB 10.5*   HCT 33.1*        Wound Culture:   Recent Labs   Lab 01/05/22  1055   LABAERO ESCHERICHIA COLI  Moderate  *  YAMILET ALBICANS  Few  *       Significant Imaging: None

## 2022-01-10 NOTE — ASSESSMENT & PLAN NOTE
Continue IV antibiotics, Urology consult,Urology doing,cystoscopy with possible DVIU, possible urethral dilation, possible suprapubic tube placement.  Plan for incision and drainage of scrotal abscess.  S/P I&D by Urology on 1.5.22,cultures are pending.    1/7- POD 2 from I an D; cultures are growing E. Coli and candida; stopping vanc; cont zosyn and fluconazole; ID has been consulted for long term abx recs     1/8- POD 3 from I an D; patient is having pus come out of his urethra today on the side of his santiago; will repeat U/S scrotum and re-consult Urology to evaluate for another debridement;     1/9- POD 4 from I an D; ID has placed patient on rocephin and flagyl and cont diflucan ; hopefully can transition to PO abx for d/c; cont wound care

## 2022-01-10 NOTE — NURSING
Patient appears more lethargic and increasingly confused. Family at the bedside confirms worsening confusion. Patient experiencing a new onset cough with copious amounts of clear/cloudy thick oral secretions. Suction at the bedside. Patient and family educated on yanker use, with return demonstration. MD notified. CT of the head, Chest XRAY, respiratory cultures,  blood cultures, and EEG ordered.

## 2022-01-10 NOTE — SIGNIFICANT EVENT
Hospital Medicine Rapid Response Note      Admit Date: 1/4/2022  LOS: 5  Code Status: Full Code   CC: Scrotal abscess  Date of Consult: 01/10/2022  RRT Indication(s): Hypoxia  Attending Physician: Hugo Aviles MD  Primary Service: Networked reference to record PCT     SUBJECTIVE:     Interval history: 60 yo admitted with scrotal abscess, DM and seizure disorder admitted for Berna's gangrene.  Urology consulted, underwent debridement of scrotal wounds 1/5 and cystoscopy with santiago placement. Placed on virtual medicine with persistent wound care consulted. On appropriate IV antibiotics and seizure medications. Today, patient was more lethargic and has been having oral secretions with difficulty clearing. On virtual medicine team. Patient has had work up today including CXR, CT head as well as EEG. CXR noting possible layering left pleural effusion and CT head with no acute findings. Pending EEG report but no obvious seizure activity. No new medications started today and lab work unrevealing. ABG with O2 167. Rapid response called due to hypoxia and difficulty recovering. Placed on rebreather and saturating 100% on my arrival. Patient is alert and oriented but does appear somewhat lethargic though wakes up to verbal stimuli. Ordered mucomyst and duonebs to help with secretion clearance. Nasal suctioning and significant improvement in breath sounds and oxygenation with deep suctioning. Recommend patient be placed back on in person team. Will go to team 1 now. Secure chatted Dr. Ann to notify him of changes. Neurology also consulted. Family at bedside and updated on plan of care.      OBJECTIVE:     Vital Signs (Most Recent):  Temp: 98 °F (36.7 °C) (01/10/22 1713)  Pulse: (!) 59 (01/10/22 1729)  Resp: 19 (01/10/22 1713)  BP: (!) 161/77 (01/10/22 1729)  SpO2: 100 % (01/10/22 1729) Vital Signs (24h Range):  Temp:  [97.5 °F (36.4 °C)-98 °F (36.7 °C)] 98 °F (36.7 °C)  Pulse:  [53-62] 59  Resp:  [17-19] 19  SpO2:   "[84 %-100 %] 100 %  BP: (104-163)/(57-79) 161/77     I & O (24h):    Intake/Output Summary (Last 24 hours) at 1/10/2022 1750  Last data filed at 1/10/2022 1230  Gross per 24 hour   Intake 600 ml   Output 2275 ml   Net -1675 ml        Physical Exam: Physical Exam   Constitutional: He appears ill. No distress.   HENT:   Mouth/Throat: Oropharynx is clear.   Eyes: Conjunctivae are normal.   Cardiovascular: Normal rate, regular rhythm and normal pulses.   Abdominal: Bowel sounds are normal. He exhibits no distension.   Neurological: He is alert.   No focal deficit but slowed reaction time. Follows commands appropriately, answers appropriately     Normal effort. Patient does have transmitted breath sounds throughout lungs as well as crackles at left base. Once deep suctioned and patient able to clear secretions, these rhonchi resolved.  Lines/Drains/Airway:          Urethral Catheter 01/05/22 1050 Double-lumen 20 Fr. (Active)   Site Assessment Clean;Intact 01/10/22 0800   Collection Container Urimeter 01/09/22 2100   Securement Method secured to top of thigh w/ adhesive device 01/09/22 2100   Catheter Care Performed yes 01/09/22 2100   Reason for Continuing Urinary Catheterization Placed by Urology Service 01/09/22 2100   CAUTI Prevention Bundle StatLock in place w 1" slack;Drainage bag/urimeter off the floor;Intact seal between catheter & drainage tubing;Sheeting clip in use;No dependent loops or kinks;Drainage bag/urimeter not overfilled (<2/3 full);Drainage bag/urimeter below bladder 01/09/22 2100   Output (mL) 800 mL 01/10/22 0337        Nutrition:  Current Diet Order   Procedures    Diet diabetic [facility]; 2000 Calorie     Order Specific Question:   Indicate patient location for additional diet options:     Answer:   [facility]     Order Specific Question:   Total calories:     Answer:   2000 Calorie         Labs/Imaging- All pertinent labs within the past 24 hours have been reviewed.  X-Ray Chest 1 " View   Final Result      1. Left lower lobe atelectasis or consolidation with a suspected layering effusion.         Electronically signed by: Charlie Tomas MD   Date:    01/10/2022   Time:    15:49      CT Head Without Contrast   Final Result      No acute intracranial process.      Minor right maxillary sinusitis.         Electronically signed by: Zion Colmenares MD   Date:    01/10/2022   Time:    14:06      US Scrotum And Testicles   Final Result      Decreased size of complex scrotal fluid collection following drainage as above.         Electronically signed by: James Mcduffie MD   Date:    01/09/2022   Time:    13:30      SURG FL Surgery Retrograde Pyelogram   Final Result      X-Ray Chest AP Portable   Final Result      No acute intrathoracic abnormality detected.         Electronically signed by: Amelia Nogueira   Date:    01/04/2022   Time:    19:44      CT Abdomen Pelvis With Contrast   Final Result      Abnormal examination findings of marked inflammatory changes in the region of the scrotum and perineum with multiple rim enhancing collections as above.      Largest collection within the posterior scrotal sac measuring 7.0 x 5.8 cm, additional rim enhancing collection in the right aspect of the perineum and additional enhancement along the penile shaft extending into the prostatic urethra.  These are concerning for multiple abscesses.  Urology consultation is recommended.      Circumferential wall thickening of the urinary bladder with chronic appearing severe bilateral hydroureteronephrosis.  Findings progressed compared to the prior examination.      Extensive calcifications involving the pancreas, suggestive of chronic pancreatitis.      Additional findings as above.      Case discussed with Dr. Hagen on 01/04/2012 17:41.         Electronically signed by: Chuck Bustamante MD   Date:    01/04/2022   Time:    17:44      US Scrotum And Testicles   Final Result   Abnormal      Complex mass within the  scrotum caudal to the testicles of mixed echogenicity although there appear to be fluid components.  This may represent an abscess.  Diffuse scrotal wall thickening is also present.  Equivocal increased vascularity of the tail of the right epididymis raises the possibility of epididymitis.      Small bilateral hydroceles.      This report was flagged in Epic as abnormal.         Electronically signed by: Trevor Hatfield MD   Date:    01/04/2022   Time:    15:41          Diagnostics/Interventions during Rapid response     Deep suctioning, mucomyst and duonebs ordered. NPO for now. Dentures removed. Place back on in person team.    ASSESSMENT/PLAN:     Active Hospital Problems    Diagnosis    *Scrotal abscess    Hypokalemia    Sepsis due to Gram negative bacteria    JOI (acute kidney injury)    Berna's gangrene    Adjustment disorder with depressed mood    Chronic ischemic heart disease    Cocaine dependence in remission    Cardiomegaly    Hyperlipidemia    Essential hypertension    Seizure disorder    Tobacco user    Diabetes mellitus type 2, controlled     Last Assessment & Plan:   Formatting of this note might be different from the original.  Today we note that you have Diabetes and it is stable.    Continue our treatment plan as discussed.    Make sure to monitor for any side effects or symptoms.    We are ordering labs to reassess your health.    Please complete labs as instructed, preferably while fasting for a minimum of 8 hours.    Follow up in our office as planned.                  Uninterrupted Critical Care time (not including procedures): 40

## 2022-01-10 NOTE — SUBJECTIVE & OBJECTIVE
Interval History: Feeling better. Denies fever or chills. Wound hurts less.    Review of Systems   Skin: Positive for wound.   All other systems reviewed and are negative.    Objective:     Vital Signs (Most Recent):  Temp: 97.5 °F (36.4 °C) (01/10/22 0735)  Pulse: (!) 55 (01/10/22 0735)  Resp: 18 (01/10/22 1033)  BP: (!) 147/67 (01/10/22 0735)  SpO2: (!) 93 % (01/10/22 0735) Vital Signs (24h Range):  Temp:  [97.5 °F (36.4 °C)-98.2 °F (36.8 °C)] 97.5 °F (36.4 °C)  Pulse:  [53-63] 55  Resp:  [17-18] 18  SpO2:  [93 %-99 %] 93 %  BP: (104-170)/(57-83) 147/67     Weight: 61.9 kg (136 lb 7.4 oz)  Body mass index is 20.15 kg/m².    Estimated Creatinine Clearance: 69.6 mL/min (based on SCr of 1 mg/dL).    Physical Exam  Vitals and nursing note reviewed.   Constitutional:       General: He is not in acute distress.     Appearance: Normal appearance. He is not ill-appearing, toxic-appearing or diaphoretic.   HENT:      Head: Normocephalic and atraumatic.      Right Ear: External ear normal.      Left Ear: External ear normal.      Mouth/Throat:      Mouth: Mucous membranes are moist.   Eyes:      Extraocular Movements: Extraocular movements intact.      Conjunctiva/sclera: Conjunctivae normal.      Pupils: Pupils are equal, round, and reactive to light.   Cardiovascular:      Rate and Rhythm: Normal rate.   Pulmonary:      Effort: Pulmonary effort is normal.      Breath sounds: Normal breath sounds.   Abdominal:      Tenderness: There is no abdominal tenderness. There is no guarding.   Genitourinary:     Comments: Floey, scrotum less edematous  Neurological:      General: No focal deficit present.      Mental Status: He is alert and oriented to person, place, and time.   Psychiatric:         Mood and Affect: Mood normal.         Behavior: Behavior normal.         Thought Content: Thought content normal.         Judgment: Judgment normal.         Significant Labs:   CBC:   Recent Labs   Lab 01/09/22  0430   WBC 12.65   HGB  10.5*   HCT 33.1*        Wound Culture:   Recent Labs   Lab 01/05/22  1055   LABAERO ESCHERICHIA COLI  Moderate  *  YAMILET ALBICANS  Few  *       Significant Imaging: None

## 2022-01-10 NOTE — ASSESSMENT & PLAN NOTE
s/p cystoscopy with santiago placement and debridement of abscess/necrosis of scrotum 1/5/22 with Dr. Rangel.    Continue abx per primary  Cultures growing yeast and GNR  Improved leukocytosis, afebrile  Appreciate wound care RN and floor RN dressing changes  Will continue to monitor condition    Scrotal US obtained 1/9/2022 with concern for fluid pocket posterior to testicles, exam did not reveal additional loculation. Can consider repeat CT pelvis with contrast to assess residual collections.     Will likely need plastics consultation in future for wound closure/grafting

## 2022-01-10 NOTE — PROGRESS NOTES
[facility]  Urology  Progress Note    Patient Name: Abhishek Zhao  MRN: [id number]  Admission Date: 1/4/2022  Hospital Length of Stay: 5 days  Code Status: Full Code   Attending Provider: Elijah Ann MD   Primary Care Physician: To Obtain Unable    Subjective:     HPI:  Scrotal Swelling  Abhishek Zhao is a 61 y.o. male who was been experiencing scrotal pain and swelling since 12/31/2021.  He denies any fever.  He has had issues voiding since the swelling began.  Hemostat having issues in the past with urinary problems.  He denies having any previous issues with scrotal infection or swelling.  He recalls that he had procedures in the past but cannot recall specifically what to place.  He does not have urologist locally but moved back from [place] about 1 year ago.    Review of care everywhere shows that he had a cystoscopy with urethral dilation of a urethral stricture in the bulbar urethra in 2019 at [facility].  And there are reports that in approximately 2015 [facility].      Interval History: denies pain, afebrile      Review of Systems   Constitutional: Negative for activity change, appetite change, chills, diaphoresis and fever.   HENT: Negative for congestion and rhinorrhea.    Respiratory: Negative for choking and shortness of breath.    Gastrointestinal: Negative for abdominal distention, abdominal pain, constipation, diarrhea, nausea and vomiting.   Genitourinary: Positive for difficulty urinating, penile swelling and scrotal swelling. Negative for dysuria, flank pain, hematuria, testicular pain and urgency.   Skin: Negative for color change, pallor and wound.   Neurological: Negative for dizziness and syncope.   Psychiatric/Behavioral: Negative for confusion and hallucinations.     Objective:     Temp:  [97.5 °F (36.4 °C)-97.8 °F (36.6 °C)] 97.8 °F (36.6 °C)  Pulse:  [53-63] 55  Resp:  [17-18] 18  SpO2:  [93 %-99 %] 98 %  BP: (104-170)/(57-83) 161/79      Body mass index is 20.15 kg/m².    Date 01/10/22 0700 - 01/11/22 0659   Shift 6703-9962 0153-4075 3963-4686 24 Hour Total   INTAKE   P.O. 120   120   Shift Total(mL/kg) 120(1.9)   120(1.9)   OUTPUT   Shift Total(mL/kg)       Weight (kg) 61.9 61.9 61.9 61.9          Drains     Drain                 Urethral Catheter 01/05/22 1050 Double-lumen 20 Fr. 5 days                Physical Exam  Constitutional:       General: He is not in acute distress.     Appearance: He is well-developed. He is not ill-appearing, toxic-appearing or diaphoretic.   HENT:      Head: Normocephalic and atraumatic.      Mouth/Throat:      Mouth: Mucous membranes are moist.   Eyes:      Conjunctiva/sclera: Conjunctivae normal.   Cardiovascular:      Rate and Rhythm: Normal rate and regular rhythm.   Pulmonary:      Effort: Pulmonary effort is normal. No respiratory distress.   Abdominal:      General: Abdomen is flat. There is no distension.      Palpations: Abdomen is soft. There is no mass.      Tenderness: There is no abdominal tenderness. There is no right CVA tenderness, left CVA tenderness or guarding.   Genitourinary:     Comments: Sahu with CYU  No purulence noted from meatus  Wound dressing changed by me, viable wound edges, granulation tissue noted, no additional purulence expressed while probing wound for additional pockets  Penoscrotal swelling improving  Musculoskeletal:         General: No swelling or deformity.      Cervical back: Neck supple.   Skin:     General: Skin is warm.      Capillary Refill: Capillary refill takes less than 2 seconds.      Findings: No rash.   Neurological:      Mental Status: He is alert and oriented to person, place, and time.      Gait: Gait normal.   Psychiatric:         Mood and Affect: Mood normal.         Thought Content: Thought content normal.         Judgment: Judgment normal.         Significant Labs:    BMP:  Recent Labs   Lab 01/07/22  0652 01/09/22  0430 01/10/22  0506   * 139 138   K  3.8 2.9* 3.4*    111* 110   CO2 18* 22* 23   BUN 25* 15 11   CREATININE 1.5* 1.1 1.0   CALCIUM 8.4* 8.0* 8.1*       CBC:   Recent Labs   Lab 01/05/22  0955 01/07/22  0652 01/09/22  0430   WBC 17.30* 12.11 12.65   HGB 10.7* 10.4* 10.5*   HCT 32.4* 33.2* 33.1*    345 305                 Assessment/Plan:     * Scrotal abscess  s/p cystoscopy with santiago placement and debridement of abscess/necrosis of scrotum 1/5/22 with Dr. Rangel.    Continue abx per primary  Cultures growing yeast and GNR  Improved leukocytosis, afebrile  Appreciate wound care RN and floor RN dressing changes  Will continue to monitor condition    Scrotal US obtained 1/9/2022 with concern for fluid pocket posterior to testicles, exam did not reveal additional loculation. Can consider repeat CT pelvis with contrast to assess residual collections.     Will likely need plastics consultation in future for wound closure/grafting    Berna's gangrene   - s/p debridement 1/5/22        VTE Risk Mitigation (From admission, onward)         Ordered     IP VTE LOW RISK PATIENT  Once         01/04/22 2241     Place sequential compression device  Until discontinued         01/04/22 2241                Melinda Mitchell MD  Urology  [facility]

## 2022-01-10 NOTE — SUBJECTIVE & OBJECTIVE
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 Negative for activity change and fever.   Respiratory: Negative for cough and shortness of breath.    Gastrointestinal: Negative for abdominal pain and nausea.     Objective:     Vital Signs (Most Recent):  Temp: 97.6 °F (36.4 °C) (01/09/22 1931)  Pulse: 62 (01/09/22 1931)  Resp: 17 (01/09/22 1931)  BP: 132/72 (01/09/22 1931)  SpO2: 99 % (01/09/22 1931) Vital Signs (24h Range):  Temp:  [97.5 °F (36.4 °C)-98.2 °F (36.8 °C)] 97.6 °F (36.4 °C)  Pulse:  [51-63] 62  Resp:  [16-19] 17  SpO2:  [96 %-99 %] 99 %  BP: (127-170)/(58-83) 132/72     Weight: 57.4 kg (126 lb 8.7 oz)  Body mass index is 18.69 kg/m².    Intake/Output Summary (Last 24 hours) at 1/9/2022 2149  Last data filed at 1/9/2022 1800  Gross per 24 hour   Intake 480 ml   Output 1375 ml   Net -895 ml      Physical Exam  Constitutional:       Appearance: Normal appearance.   Pulmonary:      Effort: Pulmonary effort is normal.      Breath sounds: No wheezing.   Abdominal:      General: Abdomen is flat. There is no distension.   Musculoskeletal:         General: No swelling or tenderness.   Skin:     Coloration: Skin is not jaundiced or pale.   Neurological:      General: No focal deficit present.      Mental Status: He is alert and oriented to person, place, and time.   Psychiatric:         Mood and Affect: Mood normal.         Behavior: Behavior normal.         Significant Labs: All pertinent labs within the past 24 hours have been reviewed.    Significant Imaging: I have reviewed all pertinent imaging results/findings within the  past 24 hours.

## 2022-01-10 NOTE — PROGRESS NOTES
[facility] Medicine  Telemedicine Progress Note    Patient Name: Abhishek Zhao  MRN: [id number]  Patient Class: IP- Inpatient   Admission Date: 1/4/2022  Length of Stay: 4 days  Attending Physician: Elijah Ann MD  Primary Care Provider: To Obtain Unable          Subjective:     Principal Problem:Scrotal abscess        HPI:  61 y.o. male with adjustment disorder with depressed mood, chronic ischemic heart disease, cocaine dependence in remission, cardiomegaly, HLD, HTN, swizure disorder, tobacco use, and DM 2 presents with a complaint of testicular pain.  Associated with swelling and redness to the scrotum and penis along with difficulty urinating, pain is rated as severe and radiates to the rectum.  Denies fever, chills, cough, SOB, chest pain, n/v/d.  In the ED, he was found to be tachypneic, with leukocytosis and elevated lactic.  CT and US shows evidence of multiple scrotal and perineal abscesses with some extension into the penile shaft.  UA with evidence of infection.  Sepsis treatment initiated, blood and urine cultures obtained, IV fluids and IV antibiotics initiated.  Urology consulted.      Overview/Hospital Course:  61 y.o. male with adjustment disorder with depressed mood, chronic ischemic heart disease, cocaine dependence in remission, cardiomegaly, HLD, HTN, swizure disorder, tobacco use, and DM 2 presents with a complaint of testicular pain.  Associated with swelling and redness to the scrotum and penis along with difficulty urinating, pain is rated as severe and radiates to the rectum.  Denies fever, chills, cough, SOB, chest pain, n/v/d.  In the ED, he was found to be tachypneic, with leukocytosis and elevated lactic.  CT and US shows evidence of multiple scrotal and perineal abscesses with some extension into the penile shaft.  UA with evidence of infection.  Sepsis treatment initiated, blood and urine cultures obtained, IV fluids and IV antibiotics initiated.  Urology  consulted.  Urology doing,cystoscopy with possible DVIU, possible urethral dilation, possible suprapubic tube placement.  Plan for incision and drainage of scrotal abscess  S/P I&D by Urology,cultures are pending.      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 Negative for activity change and fever.   Respiratory: Negative for cough and shortness of breath.    Gastrointestinal: Negative for abdominal pain and nausea.     Objective:     Vital Signs (Most Recent):  Temp: 97.6 °F (36.4 °C) (01/09/22 1931)  Pulse: 62 (01/09/22 1931)  Resp: 17 (01/09/22 1931)  BP: 132/72 (01/09/22 1931)  SpO2: 99 % (01/09/22 1931) Vital Signs (24h Range):  Temp:  [97.5 °F (36.4 °C)-98.2 °F (36.8 °C)] 97.6 °F (36.4 °C)  Pulse:  [51-63] 62  Resp:  [16-19] 17  SpO2:  [96 %-99 %] 99 %  BP: (127-170)/(58-83) 132/72     Weight: 57.4 kg (126 lb 8.7 oz)  Body mass index is 18.69 kg/m².    Intake/Output Summary (Last 24 hours) at 1/9/2022 8049  Last data filed at 1/9/2022 1800  Gross per 24 hour   Intake 480 ml   Output 1375 ml   Net -895 ml      Physical Exam  Constitutional:       Appearance: Normal appearance.   Pulmonary:      Effort: Pulmonary effort is normal.      Breath sounds: No wheezing.   Abdominal:      General: Abdomen is flat. There is no distension.   Musculoskeletal:         General: No swelling or tenderness.   Skin:     Coloration: Skin is not jaundiced or pale.   Neurological:      General: No focal deficit present.      Mental Status: He is alert and oriented to person, place, and time.   Psychiatric:         Mood and Affect: Mood  normal.         Behavior: Behavior normal.         Significant Labs: All pertinent labs within the past 24 hours have been reviewed.    Significant Imaging: I have reviewed all pertinent imaging results/findings within the past 24 hours.      Assessment/Plan:      * Scrotal abscess  Continue IV antibiotics, Urology consult,Urology doing,cystoscopy with possible DVIU, possible urethral dilation, possible suprapubic tube placement.  Plan for incision and drainage of scrotal abscess.  S/P I&D by Urology on 1.5.22,cultures are pending.    1/7- POD 2 from I an D; cultures are growing E. Coli and candida; stopping vanc; cont zosyn and fluconazole; ID has been consulted for long term abx recs     1/8- POD 3 from I an D; patient is having pus come out of his urethra today on the side of his santiago; will repeat U/S scrotum and re-consult Urology to evaluate for another debridement;     1/9- POD 4 from I an D; ID has placed patient on rocephin and flagyl and cont diflucan ; hopefully can transition to PO abx for d/c; cont wound care     Berna's gangrene  S/P I&D by Urology,cultures are pending.on gracie sp[ectrum IV Abx,wound care per urology.      JOI (acute kidney injury)  - improving with IVFs, cont  - cont santiago cath     1/9- back to baseline       Hypokalemia  1/9- replace     Sepsis  - see scrotal abscess     This patient does have evidence of infective focus  My overall impression is sepsis. Vital signs were reviewed and noted in progress note.  Antibiotics given-   Antibiotics (From admission, onward)            Start     Stop Route Frequency Ordered    01/06/22 2100  mupirocin 2 % ointment         01/11 2059 Nasl 2 times daily 01/06/22 1337    01/05/22 0200  piperacillin-tazobactam 4.5 g in dextrose 5 % 100 mL IVPB (ready to mix system)         -- IV Every 8 hours (non-standard times) 01/04/22 2241        Cultures were taken-   Microbiology Results (last 7 days)     Procedure Component Value Units Date/Time    AFB  Culture & Smear [[id number]] Collected: 01/05/22 1055    Order Status: Completed Specimen: Abscess from Groin Updated: 01/07/22 2127     AFB Culture & Smear Culture in progress     AFB CULTURE STAIN No acid fast bacilli seen.    Blood Culture #1 **CANNOT BE ORDERED STAT** [[id number]] Collected: 01/04/22 1744    Order Status: Completed Specimen: Blood from Peripheral, Lower Arm, Left Updated: 01/07/22 1303     Blood Culture, Routine No Growth to date      No Growth to date      No Growth to date    Blood Culture #2 **CANNOT BE ORDERED STAT** [[id number]] Collected: 01/04/22 1744    Order Status: Completed Specimen: Blood from Peripheral, Antecubital, Right Updated: 01/07/22 1303     Blood Culture, Routine No Growth to date      No Growth to date      No Growth to date    Urine culture **CANNOT BE ORDERED STAT** [[id number]]  (Abnormal) Collected: 01/04/22 1804    Order Status: Completed Specimen: Urine, Clean Catch Updated: 01/07/22 1118     Urine Culture, Routine YAMILET ALBICANS  10,000 - 49,999 cfu/ml  Treatment of asymptomatic candiduria is not recommended (except for   specific populations). Candida isolated in the urine typically   represents colonization. If an indwelling urinary catheter is present  it should be removed or replaced.      Narrative:      Indicated criteria for high risk culture:->Other  Other (specify):->Protocol    Aerobic culture [[id number]]  (Abnormal)  (Susceptibility) Collected: 01/05/22 1055    Order Status: Completed Specimen: Abscess from Groin Updated: 01/07/22 0846     Aerobic Bacterial Culture ESCHERICHIA COLI  Moderate        YAMILET ALBICANS  Few      Culture, Anaerobe [[id number]] Collected: 01/05/22 1055    Order Status: Completed Specimen: Abscess from Groin Updated: 01/07/22 0733     Anaerobic Culture Culture in progress    Fungus culture [[id number]] Collected: 01/05/22 1055    Order Status: Sent Specimen: Abscess from Groin Updated: 01/05/22 1401    Culture, Anaerobe [[id number]]  Collected: 01/05/22 1055    Order Status: Canceled Specimen: Abscess from Groin     Aerobic culture [[id number]] Collected: 01/05/22 1055    Order Status: Canceled Specimen: Abscess from Groin         Latest lactate reviewed, they are-  No results for input(s): LACTATE in the last 72 hours.    Organ dysfunction indicated by n/a  Source- abscesses    Source control Achieved by- Urology consult      Diabetes mellitus type 2, controlled  Check a1c  Hold oral antihyperglycemics while inpatient  PRN sliding scale insulin  ACHS glucose monitoring   ADA diet     Tobacco user  5 minutes spent counseling the patient on smoking cessation and he is not currently ready to stop smoking. He will be offereded a nicotine transdermal patch while hospitalized and monitored for withdrawal.  Will provide additional smoking cessation counseling prior to discharge.     Seizure disorder  Continue home regimen of depakote and lamotrigine    Essential hypertension  Well controlled, continue home medications and monitor blood pressure, adjust as needed.     Hyperlipidemia  Continue statin    Cardiomegaly  No evidence of acute decompensation or fluid overload, continue home regimen    Cocaine dependence in remission  Counseled     Chronic ischemic heart disease  No acute issue    Adjustment disorder with depressed mood  Continue home regimen of citalopram and quetiapine      VTE Risk Mitigation (From admission, onward)         Ordered     IP VTE LOW RISK PATIENT  Once         01/04/22 2241     Place sequential compression device  Until discontinued         01/04/22 2241                      I have assessed these finding virtually using telemed platform and with assistance of bedside nurse                 The attending portion of this evaluation, treatment, and documentation was performed per Elijah Ann MD via Telemedicine AudioVisual using the secure [employer] software platform with 2 way audio/video. The provider was located off-site and the  patient is located in the hospital. The aforementioned video software was utilized to document the relevant history and physical exam    Elijah Ann MD  Department of Hospital Medicine   [facility]

## 2022-01-11 LAB
ALBUMIN SERPL BCP-MCNC: 1.6 G/DL (ref 3.5–5.2)
ALP SERPL-CCNC: 588 U/L (ref 55–135)
ALT SERPL W/O P-5'-P-CCNC: 24 U/L (ref 10–44)
ANION GAP SERPL CALC-SCNC: 12 MMOL/L (ref 8–16)
AST SERPL-CCNC: 23 U/L (ref 10–40)
BASOPHILS # BLD AUTO: 0.05 K/UL (ref 0–0.2)
BASOPHILS NFR BLD: 0.3 % (ref 0–1.9)
BILIRUB SERPL-MCNC: <0.1 MG/DL (ref 0.1–1)
BUN SERPL-MCNC: 8 MG/DL (ref 6–20)
CALCIUM SERPL-MCNC: 8.2 MG/DL (ref 8.7–10.5)
CHLORIDE SERPL-SCNC: 108 MMOL/L (ref 95–110)
CO2 SERPL-SCNC: 20 MMOL/L (ref 23–29)
CREAT SERPL-MCNC: 1.1 MG/DL (ref 0.5–1.4)
DIFFERENTIAL METHOD: ABNORMAL
EOSINOPHIL # BLD AUTO: 0 K/UL (ref 0–0.5)
EOSINOPHIL NFR BLD: 0 % (ref 0–8)
ERYTHROCYTE [DISTWIDTH] IN BLOOD BY AUTOMATED COUNT: 12.8 % (ref 11.5–14.5)
EST. GFR  (AFRICAN AMERICAN): >60 ML/MIN/1.73 M^2
EST. GFR  (NON AFRICAN AMERICAN): >60 ML/MIN/1.73 M^2
GLUCOSE SERPL-MCNC: 151 MG/DL (ref 70–110)
HCT VFR BLD AUTO: 39.3 % (ref 40–54)
HGB BLD-MCNC: 13 G/DL (ref 14–18)
IMM GRANULOCYTES # BLD AUTO: 0.16 K/UL (ref 0–0.04)
IMM GRANULOCYTES NFR BLD AUTO: 1 % (ref 0–0.5)
LYMPHOCYTES # BLD AUTO: 1 K/UL (ref 1–4.8)
LYMPHOCYTES NFR BLD: 6.1 % (ref 18–48)
MCH RBC QN AUTO: 29.1 PG (ref 27–31)
MCHC RBC AUTO-ENTMCNC: 33.1 G/DL (ref 32–36)
MCV RBC AUTO: 88 FL (ref 82–98)
MONOCYTES # BLD AUTO: 0.4 K/UL (ref 0.3–1)
MONOCYTES NFR BLD: 2.5 % (ref 4–15)
NEUTROPHILS # BLD AUTO: 15 K/UL (ref 1.8–7.7)
NEUTROPHILS NFR BLD: 90.1 % (ref 38–73)
NRBC BLD-RTO: 0 /100 WBC
PLATELET # BLD AUTO: 387 K/UL (ref 150–450)
PMV BLD AUTO: 9.7 FL (ref 9.2–12.9)
POCT GLUCOSE: 166 MG/DL (ref 70–110)
POCT GLUCOSE: 171 MG/DL (ref 70–110)
POCT GLUCOSE: 193 MG/DL (ref 70–110)
POCT GLUCOSE: 294 MG/DL (ref 70–110)
POTASSIUM SERPL-SCNC: 3.6 MMOL/L (ref 3.5–5.1)
PROT SERPL-MCNC: 6.7 G/DL (ref 6–8.4)
RBC # BLD AUTO: 4.47 M/UL (ref 4.6–6.2)
SODIUM SERPL-SCNC: 140 MMOL/L (ref 136–145)
VALPROATE SERPL-MCNC: 35.8 UG/ML (ref 50–100)
WBC # BLD AUTO: 16.66 K/UL (ref 3.9–12.7)

## 2022-01-11 PROCEDURE — 94640 AIRWAY INHALATION TREATMENT: CPT

## 2022-01-11 PROCEDURE — 80175 DRUG SCREEN QUAN LAMOTRIGINE: CPT | Performed by: PSYCHIATRY & NEUROLOGY

## 2022-01-11 PROCEDURE — 25000003 PHARM REV CODE 250: Performed by: UROLOGY

## 2022-01-11 PROCEDURE — 63700000 PHARM REV CODE 250 ALT 637 W/O HCPCS: Performed by: HOSPITALIST

## 2022-01-11 PROCEDURE — 80053 COMPREHEN METABOLIC PANEL: CPT | Performed by: HOSPITALIST

## 2022-01-11 PROCEDURE — 63600175 PHARM REV CODE 636 W HCPCS: Performed by: HOSPITALIST

## 2022-01-11 PROCEDURE — 25000003 PHARM REV CODE 250: Performed by: HOSPITALIST

## 2022-01-11 PROCEDURE — 11000001 HC ACUTE MED/SURG PRIVATE ROOM

## 2022-01-11 PROCEDURE — 80235 DRUG ASSAY LACOSAMIDE: CPT | Performed by: PSYCHIATRY & NEUROLOGY

## 2022-01-11 PROCEDURE — 25000003 PHARM REV CODE 250: Performed by: INTERNAL MEDICINE

## 2022-01-11 PROCEDURE — 80164 ASSAY DIPROPYLACETIC ACD TOT: CPT | Performed by: PSYCHIATRY & NEUROLOGY

## 2022-01-11 PROCEDURE — 85025 COMPLETE CBC W/AUTO DIFF WBC: CPT | Performed by: HOSPITALIST

## 2022-01-11 PROCEDURE — 92610 EVALUATE SWALLOWING FUNCTION: CPT

## 2022-01-11 PROCEDURE — 27100171 HC OXYGEN HIGH FLOW UP TO 24 HOURS

## 2022-01-11 PROCEDURE — 25000242 PHARM REV CODE 250 ALT 637 W/ HCPCS: Performed by: STUDENT IN AN ORGANIZED HEALTH CARE EDUCATION/TRAINING PROGRAM

## 2022-01-11 PROCEDURE — 94761 N-INVAS EAR/PLS OXIMETRY MLT: CPT

## 2022-01-11 PROCEDURE — 99232 PR SUBSEQUENT HOSPITAL CARE,LEVL II: ICD-10-PCS | Mod: ,,, | Performed by: UROLOGY

## 2022-01-11 PROCEDURE — 36415 COLL VENOUS BLD VENIPUNCTURE: CPT | Performed by: PSYCHIATRY & NEUROLOGY

## 2022-01-11 PROCEDURE — 99232 SBSQ HOSP IP/OBS MODERATE 35: CPT | Mod: ,,, | Performed by: UROLOGY

## 2022-01-11 RX ADMIN — INSULIN ASPART 1 UNITS: 100 INJECTION, SOLUTION INTRAVENOUS; SUBCUTANEOUS at 11:01

## 2022-01-11 RX ADMIN — IPRATROPIUM BROMIDE AND ALBUTEROL SULFATE 3 ML: 2.5; .5 SOLUTION RESPIRATORY (INHALATION) at 08:01

## 2022-01-11 RX ADMIN — PIPERACILLIN AND TAZOBACTAM 4.5 G: 4; .5 INJECTION, POWDER, LYOPHILIZED, FOR SOLUTION INTRAVENOUS; PARENTERAL at 04:01

## 2022-01-11 RX ADMIN — FLUCONAZOLE 400 MG: 100 TABLET ORAL at 08:01

## 2022-01-11 RX ADMIN — ACETYLCYSTEINE 4 ML: 100 SOLUTION ORAL; RESPIRATORY (INHALATION) at 07:01

## 2022-01-11 RX ADMIN — ATORVASTATIN CALCIUM 80 MG: 40 TABLET, FILM COATED ORAL at 08:01

## 2022-01-11 RX ADMIN — MUPIROCIN: 20 OINTMENT TOPICAL at 08:01

## 2022-01-11 RX ADMIN — ACETAMINOPHEN 650 MG: 325 TABLET ORAL at 03:01

## 2022-01-11 RX ADMIN — LAMOTRIGINE 200 MG: 100 TABLET ORAL at 08:01

## 2022-01-11 RX ADMIN — POLYETHYLENE GLYCOL 3350 17 G: 17 POWDER, FOR SOLUTION ORAL at 08:01

## 2022-01-11 RX ADMIN — METRONIDAZOLE 500 MG: 500 TABLET ORAL at 03:01

## 2022-01-11 RX ADMIN — LACOSAMIDE 200 MG: 50 TABLET, FILM COATED ORAL at 08:01

## 2022-01-11 RX ADMIN — ACETYLCYSTEINE 4 ML: 100 SOLUTION ORAL; RESPIRATORY (INHALATION) at 12:01

## 2022-01-11 RX ADMIN — IPRATROPIUM BROMIDE AND ALBUTEROL SULFATE 3 ML: 2.5; .5 SOLUTION RESPIRATORY (INHALATION) at 07:01

## 2022-01-11 RX ADMIN — IPRATROPIUM BROMIDE AND ALBUTEROL SULFATE 3 ML: 2.5; .5 SOLUTION RESPIRATORY (INHALATION) at 12:01

## 2022-01-11 RX ADMIN — METOPROLOL SUCCINATE 50 MG: 50 TABLET, EXTENDED RELEASE ORAL at 08:01

## 2022-01-11 RX ADMIN — ASPIRIN 81 MG: 81 TABLET, COATED ORAL at 08:01

## 2022-01-11 RX ADMIN — METRONIDAZOLE 500 MG: 500 TABLET ORAL at 09:01

## 2022-01-11 RX ADMIN — TAMSULOSIN HYDROCHLORIDE 0.4 MG: 0.4 CAPSULE ORAL at 08:01

## 2022-01-11 RX ADMIN — METRONIDAZOLE 500 MG: 500 TABLET ORAL at 05:01

## 2022-01-11 RX ADMIN — ACETYLCYSTEINE 4 ML: 100 SOLUTION ORAL; RESPIRATORY (INHALATION) at 08:01

## 2022-01-11 RX ADMIN — PIPERACILLIN AND TAZOBACTAM 4.5 G: 4; .5 INJECTION, POWDER, LYOPHILIZED, FOR SOLUTION INTRAVENOUS; PARENTERAL at 08:01

## 2022-01-11 RX ADMIN — AMLODIPINE BESYLATE 10 MG: 5 TABLET ORAL at 08:01

## 2022-01-11 RX ADMIN — DIVALPROEX SODIUM 500 MG: 250 TABLET, EXTENDED RELEASE ORAL at 08:01

## 2022-01-11 RX ADMIN — PIPERACILLIN AND TAZOBACTAM 4.5 G: 4; .5 INJECTION, POWDER, LYOPHILIZED, FOR SOLUTION INTRAVENOUS; PARENTERAL at 01:01

## 2022-01-11 NOTE — PLAN OF CARE
[place] [facility]  Discharge Reassessment    Primary Care Provider: To Obtain Unable    Expected Discharge Date:      [facility].     Reassessment (most recent)       Discharge Reassessment - 01/11/22 1416          Discharge Reassessment    Assessment Type Discharge Planning Reassessment (P)      Did the patient's condition or plan change since previous assessment? No (P)      Discharge Plan discussed with: Spouse/sig other (P)      Name(s) and Number(s) Rossi Zhao [phone] (P)      Communicated CHUCK with patient/caregiver Date not available/Unable to determine (P)      Discharge Plan A Home Health (P)      Discharge Plan B Home with family (P)      DME Needed Upon Discharge  none (P)      Discharge Barriers Identified None (P)      Why the patient remains in the hospital Requires continued medical care (P)         Post-Acute Status    Post-Acute Authorization Other (P)      Other Status No Post-Acute Service Needs (P)      Discharge Delays None known at this time (P)

## 2022-01-11 NOTE — PROCEDURES
EEG REPORT      Abhishek Zhao  [id number]  1960    DATE OF SERVICE: 1/10/2022         METHODOLOGY      Extended electroencephalographic recording is made while the patient is ambulatory and continuing normal daily activities.  Electrodes are placed according to the International 10-20 placement system and included T1 and T2 electrode placement.  Twenty four (24) channels of digital signal (sampling rate of 512/sec) was simultaneously recorded from the scalp including EKG and eye monitors.  Recording band pass was 0.1 to 100 hz and all data was stored digitally on the recorder.  The patient is instructed to press an event button when clinical symptoms occur and write the symptoms into a diary. Activation procedures which include photic stimulation, hyperventilation and instructing patients to perform simple task are done in selected patients.        The EEG is displayed on a monitor screen and can be reformatted into different montages for evaluation.  The entire recoding is submitted for computer assisted analysis to detect spike and electrographic seizure activity.  The entire recording is visually reviewed and the times identified by computer analysis as being spikes or seizures are reviewed again.  Compresses spectral analysis (CSA) is also performed on the activity recorded from each individual channel.  This is displayed as a power display of frequencies from 0 to 30 Hz over time.   The CSA analysis is done and displayed continuously.  This is reviewed for asymmetries in power between homologous areas of the scalp and for presence of changes in power which canbe seen when seizures occur.  Sections of suspected abnormalities on the CSA is then compared with the original EEG recording.  .     [employer] software was also utilized in the review of this study.  This software suite analyzes the EEG recording in multiple domains.  Coherence and rhythmicity is computed to identify EEG sections which may  contain organized seizures.  Each channel undergoes analysis to detect presence of spike and sharp waves which have special and morphological characteristic of epileptic activity.  The routine EEG recording is converted from spacial into frequency domain.  This is then displayed comparing homologous areas to identify areas of significant asymmetry.  Algorithm to identify non-cortically generated artifact is used to separate eye movement, EMG and other artifact from the EEG     Recording Times    A total of 00:25:55 hours of EEG was recorded.      EEG FINDINGS:  Background activity:   The background rhythm was characterized by theta and alpha activity with poor organization and absent posterior dominant alpha rhythm.   Symmetry and continuity: the background was continuous and symmetric     Sleep:   No sleep transients but there are periods of increased slowing consistent with state change.    Activation procedures:   Answers some but not all questions correctly    Abnormal activity:   No epileptiform discharges, periodic discharges, lateralized rhythmic delta activity or electrographic seizures were seen.    IMPRESSION:   Abnormal EEG due to a mild generalized nonspecific cerebral dysfunction.  No electrographic seizures or indications of seizure tendency      Medhat Alexis MD  Neurology-Epilepsy.  [facility].

## 2022-01-11 NOTE — SUBJECTIVE & OBJECTIVE
Interval History: Confusion noted yesterday. Wound stable. WBC rising.     Review of Systems   Constitutional: Negative.  Negative for fever.   HENT: Negative.    Eyes: Negative.    Respiratory: Negative for cough, chest tightness and shortness of breath.    Cardiovascular: Negative for chest pain.   Gastrointestinal: Negative.  Negative for constipation, diarrhea and nausea.   Genitourinary: Positive for difficulty urinating, penile pain, penile swelling, scrotal swelling and testicular pain.   Musculoskeletal: Negative.    Neurological: Negative.    Psychiatric/Behavioral: Negative.      Objective:     Temp:  [97 °F (36.1 °C)-98.8 °F (37.1 °C)] 97.5 °F (36.4 °C)  Pulse:  [55-72] 71  Resp:  [18-30] 19  SpO2:  [81 %-100 %] 93 %  BP: (125-168)/(66-80) 149/75     Body mass index is 20.15 kg/m².    Date 01/11/22 0700 - 01/12/22 0659   Shift 8225-9881 7494-4901 7765-0062 24 Hour Total   INTAKE   P.O. 0   0   Shift Total(mL/kg) 0(0)   0(0)   OUTPUT   Shift Total(mL/kg)       Weight (kg) 61.9 61.9 61.9 61.9          Drains     Drain                 Urethral Catheter 01/05/22 1050 Double-lumen 20 Fr. 5 days                Physical Exam  Vitals and nursing note reviewed.   Constitutional:       Appearance: He is well-developed.   HENT:      Head: Normocephalic.   Eyes:      Conjunctiva/sclera: Conjunctivae normal.   Neck:      Thyroid: No thyromegaly.      Trachea: No tracheal deviation.   Cardiovascular:      Rate and Rhythm: Normal rate.      Heart sounds: Normal heart sounds.   Pulmonary:      Effort: Pulmonary effort is normal. No respiratory distress.      Breath sounds: Normal breath sounds. No wheezing.   Abdominal:      General: Bowel sounds are normal.      Palpations: Abdomen is soft.      Tenderness: There is no abdominal tenderness. There is no rebound.      Hernia: No hernia is present.   Genitourinary:     Penis: Erythema and discharge present.       Comments: Penile shaft edema. Erythema of glans. Sahu  draining raissa urine. Large scrotal defect inferiorly with viable skin edges. No necrotic areas noted. No fluctuance.    Musculoskeletal:         General: No tenderness. Normal range of motion.      Cervical back: Normal range of motion and neck supple.   Lymphadenopathy:      Cervical: No cervical adenopathy.   Skin:     General: Skin is warm and dry.      Findings: No erythema or rash.   Neurological:      Mental Status: He is alert.   Psychiatric:         Behavior: Behavior normal.         Thought Content: Thought content normal.         Judgment: Judgment normal.         Significant Labs:    BMP:  Recent Labs   Lab 01/09/22  0430 01/10/22  0506 01/11/22  0403    138 140   K 2.9* 3.4* 3.6   * 110 108   CO2 22* 23 20*   BUN 15 11 8   CREATININE 1.1 1.0 1.1   CALCIUM 8.0* 8.1* 8.2*       CBC:   Recent Labs   Lab 01/07/22  0652 01/09/22  0430 01/11/22  0403   WBC 12.11 12.65 16.66*   HGB 10.4* 10.5* 13.0*   HCT 33.2* 33.1* 39.3*    305 387       Blood Culture:   Recent Labs   Lab 01/04/22  1744 01/10/22  1411   LABBLOO No Growth after 4 days.   No Growth after 4 days.  No Growth to date  No Growth to date     Urine Culture:   Recent Labs   Lab 01/04/22  1804   LABURIN CANDIDA ALBICANS  10,000 - 49,999 cfu/ml  Treatment of asymptomatic candiduria is not recommended (except for   specific populations). Candida isolated in the urine typically   represents colonization. If an indwelling urinary catheter is present  it should be removed or replaced.  *     Urine Studies:   Recent Labs   Lab 01/04/22  1544   COLORU Yellow   APPEARANCEUA Hazy*   PHUR 5.0   SPECGRAV 1.010   PROTEINUA 1+*   GLUCUA 4+*   KETONESU Negative   BILIRUBINUA Negative   OCCULTUA 1+*   NITRITE Negative   UROBILINOGEN Negative   LEUKOCYTESUR 3+*   RBCUA 10*   WBCUA 100*   BACTERIA Many*   HYALINECASTS 0       Significant Imaging:  All pertinent imaging results/findings from the past 24 hours have been reviewed.

## 2022-01-11 NOTE — ASSESSMENT & PLAN NOTE
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 Cultures growing ampicillin-resistant Ecoli and C albicans.   - urology following  - cont zosyn (re-started for HCAP)  - ID recommending augmentin at dc, however cultures show intermediate susceptibility, will likely use alternate agent

## 2022-01-11 NOTE — ASSESSMENT & PLAN NOTE
- patient was cough productive sputum, cultures ordered  - CXR shows new LLL PNA  - rocephin switched to zosyn   - placed on oxygen  - ABG reviewed;   - ledy  - follow up blood cultures

## 2022-01-11 NOTE — PROGRESS NOTES
[facility] Surg  Urology  Progress Note    Patient Name: Abhishek Zhao  MRN: [id number]  Admission Date: 1/4/2022  Hospital Length of Stay: 6 days  Code Status: Full Code   Attending Provider: Hugo Aviles MD   Primary Care Physician: To Obtain Unable    Subjective:     HPI:  Scrotal Swelling  Abhishek Zhao is a 61 y.o. male who was been experiencing scrotal pain and swelling since 12/31/2021.  He denies any fever.  He has had issues voiding since the swelling began.  Hemostat having issues in the past with urinary problems.  He denies having any previous issues with scrotal infection or swelling.  He recalls that he had procedures in the past but cannot recall specifically what to place.  He does not have urologist locally but moved back from [place] about 1 year ago.    Review of care everywhere shows that he had a cystoscopy with urethral dilation of a urethral stricture in the bulbar urethra in 2019 at [facility].  And there are reports that in approximately 2015 [facility].      Interval History: Confusion noted yesterday. Wound stable. WBC rising.     Review of Systems   Constitutional: Negative.  Negative for fever.   HENT: Negative.    Eyes: Negative.    Respiratory: Negative for cough, chest tightness and shortness of breath.    Cardiovascular: Negative for chest pain.   Gastrointestinal: Negative.  Negative for constipation, diarrhea and nausea.   Genitourinary: Positive for difficulty urinating, penile pain, penile swelling, scrotal swelling and testicular pain.   Musculoskeletal: Negative.    Neurological: Negative.    Psychiatric/Behavioral: Negative.      Objective:     Temp:  [97 °F (36.1 °C)-98.8 °F (37.1 °C)] 97.5 °F (36.4 °C)  Pulse:  [55-72] 71  Resp:  [18-30] 19  SpO2:  [81 %-100 %] 93 %  BP: (125-168)/(66-80) 149/75     Body mass index is 20.15 kg/m².    Date 01/11/22 0700 - 01/12/22 0659   Shift 1013-0888 1400-7545 6509-0026 24 Hour Total   INTAKE    P.O. 0   0   Shift Total(mL/kg) 0(0)   0(0)   OUTPUT   Shift Total(mL/kg)       Weight (kg) 61.9 61.9 61.9 61.9          Drains     Drain                 Urethral Catheter 01/05/22 1050 Double-lumen 20 Fr. 5 days                Physical Exam  Vitals and nursing note reviewed.   Constitutional:       Appearance: He is well-developed.   HENT:      Head: Normocephalic.   Eyes:      Conjunctiva/sclera: Conjunctivae normal.   Neck:      Thyroid: No thyromegaly.      Trachea: No tracheal deviation.   Cardiovascular:      Rate and Rhythm: Normal rate.      Heart sounds: Normal heart sounds.   Pulmonary:      Effort: Pulmonary effort is normal. No respiratory distress.      Breath sounds: Normal breath sounds. No wheezing.   Abdominal:      General: Bowel sounds are normal.      Palpations: Abdomen is soft.      Tenderness: There is no abdominal tenderness. There is no rebound.      Hernia: No hernia is present.   Genitourinary:     Penis: Erythema and discharge present.       Comments: Penile shaft edema. Erythema of glans. Sahu draining raissa urine. Large scrotal defect inferiorly with viable skin edges. No necrotic areas noted. No fluctuance.    Musculoskeletal:         General: No tenderness. Normal range of motion.      Cervical back: Normal range of motion and neck supple.   Lymphadenopathy:      Cervical: No cervical adenopathy.   Skin:     General: Skin is warm and dry.      Findings: No erythema or rash.   Neurological:      Mental Status: He is alert.   Psychiatric:         Behavior: Behavior normal.         Thought Content: Thought content normal.         Judgment: Judgment normal.         Significant Labs:    BMP:  Recent Labs   Lab 01/09/22  0430 01/10/22  0506 01/11/22  0403    138 140   K 2.9* 3.4* 3.6   * 110 108   CO2 22* 23 20*   BUN 15 11 8   CREATININE 1.1 1.0 1.1   CALCIUM 8.0* 8.1* 8.2*       CBC:   Recent Labs   Lab 01/07/22  0652 01/09/22  0430 01/11/22  0403   WBC 12.11 12.65 16.66*    HGB 10.4* 10.5* 13.0*   HCT 33.2* 33.1* 39.3*    305 387       Blood Culture:   Recent Labs   Lab 01/04/22  1744 01/10/22  1411   LABBLOO No Growth after 4 days.   No Growth after 4 days.  No Growth to date  No Growth to date     Urine Culture:   Recent Labs   Lab 01/04/22  1804   LABURIN CANDIDA ALBICANS  10,000 - 49,999 cfu/ml  Treatment of asymptomatic candiduria is not recommended (except for   specific populations). Candida isolated in the urine typically   represents colonization. If an indwelling urinary catheter is present  it should be removed or replaced.  *     Urine Studies:   Recent Labs   Lab 01/04/22  1544   COLORU Yellow   APPEARANCEUA Hazy*   PHUR 5.0   SPECGRAV 1.010   PROTEINUA 1+*   GLUCUA 4+*   KETONESU Negative   BILIRUBINUA Negative   OCCULTUA 1+*   NITRITE Negative   UROBILINOGEN Negative   LEUKOCYTESUR 3+*   RBCUA 10*   WBCUA 100*   BACTERIA Many*   HYALINECASTS 0       Significant Imaging:  All pertinent imaging results/findings from the past 24 hours have been reviewed.                  Assessment/Plan:     * Scrotal abscess  s/p cystoscopy with santiago placement and debridement of abscess/necrosis of scrotum 1/5/22 with Dr. Rangel.    Continue abx per primary  Cultures growing Candida and E coli  Appreciate wound care RN and floor RN dressing changes  Will continue to monitor condition    Scrotal US obtained 1/9/2022 with concern for fluid pocket posterior to testicles, exam did not reveal additional loculation. Can consider repeat CT pelvis with contrast to assess residual collections.     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

## 2022-01-11 NOTE — CONSULTS
"[facility] Surg  Neurology  Consult Note    Patient Name: Abhishek Zhao  MRN: [id number]  Admission Date: 1/4/2022  Hospital Length of Stay: 6 days  Code Status: Full Code   Attending Provider: Hugo Aviles MD   Consulting Provider: Ashvin Giraldo MD  Primary Care Physician: To Obtain Unable  Principal Problem:Scrotal abscess    Inpatient consult to Neurology  Consult performed by: Ashvin Giraldo MD  Consult ordered by: Aaron Pineda MD        Subjective:     Chief Complaint: Seizures?    HPI: 62 y/o male presented initially with a complaint of testicular pain. Associated with swelling and redness to the scrotum and penis. Pt was found to have a scrotal abscess with Berna's gangrene. Urology is following as well as ID. Today pt had an episode in which he became poorly responsive. He had a persistent cough with copious amounts of mucus. He has slowly recovered but is exhibiting "picking" behavior and is intermittently answering questions.    Past Medical History:   Diagnosis Date    High cholesterol     Hypertension     Seizures        Past Surgical History:   Procedure Laterality Date    CYSTOSCOPY  1/5/2022    Procedure: CYSTOSCOPY;  Surgeon: ANIBAL Rangel MD;  Location: [facility] OR;  Service: Urology;;    FISTULOGRAM N/A 1/5/2022    Procedure: FISTULOGRAM;  Surgeon: ANIBAL Rangel MD;  Location: [facility] OR;  Service: Urology;  Laterality: N/A;    SURGICAL REMOVAL OF ABSCESS  1/5/2022    Procedure: EXCISION, ABSCESS;  Surgeon: ANIBAL Rangel MD;  Location: [facility] OR;  Service: Urology;;       Review of patient's allergies indicates:  No Known Allergies    Current Neurological Medications:     No current facility-administered medications on file prior to encounter.     Current Outpatient Medications on File Prior to Encounter   Medication Sig    amLODIPine (NORVASC) 10 MG tablet Take 10 mg by mouth.    amlodipine-benazepril 5-20 mg (LOTREL) 5-20 mg per capsule TAKE 1 CAPSULE BY MOUTH EVERY DAY " FOR HEART AND BLOOD PRESSURE    ASCORBATE CALCIUM (VITAMIN C ORAL) Take by mouth.    aspirin (ECOTRIN) 81 MG EC tablet Take 81 mg by mouth once daily.    atorvastatin (LIPITOR) 80 MG tablet TAKE ONE TABLET BY MOUTH EVERY DAY FOR CHOLESTEROL    cetirizine (ZYRTEC) 10 MG tablet TAKE ONE TABLET BY MOUTH DAILY .  TAKE ONE DAILY AS NEEDED FOR ITCHING. MAY TAKE UP TO 4 TIMES DAILY AS  NEEDD .  TAKE ONE DAILY AS NEEDED FOR ITCHING. MAY TAKE UP TO 4 TIMES DAILY AS  NEEDD    cholecalciferol, vitamin D3, (VITAMIN D3) 50 mcg (2,000 unit) Tab TAKE ONE TABLET BY MOUTH EVERY DAY AS A VITAMIN SUPPLEMENT    citalopram (CELEXA) 10 MG tablet Take by mouth.    divalproex (DEPAKOTE) 500 MG Tb24 Take 500 mg by mouth once daily.    HYDROPHILIC CREAM TOP APPLY LIBERAL AMOUNT TO THE SKIN TWICE A DAY . APPLY TO THE SKIN TWICE DAILY AS NEEDED FOR DRY SKIN AND ITCING    insulin detemir U-100 (LEVEMIR) 100 unit/mL injection Inject 15 Units into the skin.    insulin glargine (LANTUS) 100 unit/mL injection Inject 20 Units into the skin.    insulin glargine 100 units/mL (3mL) SubQ pen INJECT 18 UNITS SUBCUTANEOUSLY EVERY MORNING AND INJECT 14 UNITS AT BEDTIME    lacosamide (VIMPAT) 200 mg Tab tablet Take 200 mg by mouth.    lamoTRIgine (LAMICTAL) 200 MG tablet TAKE ONE TABLET BY MOUTH TWICE A DAY FOR SEIZURES    lisinopriL 10 MG tablet TAKE ONE TABLET BY MOUTH DAILY FOR HEART/ BLOOD PRESSURE    metFORMIN (GLUCOPHAGE) 1000 MG tablet TAKE ONE TABLET BY MOUTH TWICE A DAY WITH FOOD FOR DIABETES    metFORMIN (GLUCOPHAGE) 500 MG tablet Take 500 mg by mouth.    metoprolol succinate (TOPROL-XL) 50 MG 24 hr tablet Take 50 mg by mouth once daily.    nitroGLYCERIN (NITROSTAT) 0.4 MG SL tablet DISSOLVE ONE TABLET UNDER TONGUE Q5MX3 FOR CHEST PAIN    oxycodone-acetaminophen (PERCOCET) 5-325 mg per tablet Take 1 tablet by mouth every 4 (four) hours as needed for Pain (do not drink alcohol, drive, or operate heavy machinery while taking this  medication.). (Patient not taking: Reported on 4/28/2021)    pantoprazole (PROTONIX) 40 MG tablet Take 1 tablet (40 mg total) by mouth once daily.    potassium chloride (KLOR-CON) 10 MEQ TbSR TAKE FOUR TABLETS BY MOUTH ONCE DAILY TO INCREASE POTASSIUM    QUEtiapine (SEROQUEL) 25 MG Tab TAKE ONE-HALF TABLET BY MOUTH AT BEDTIME FOR MOOD OR PSYCHOSIS AND INSOMNIA.. MAY USE WHOLE TABLET IF NECESSARY    rosuvastatin (CRESTOR) 40 MG Tab TAKE ONE TABLET BY MOUTH DAILY FOR CHOLESTEROL (REPLACES ATORVASTATIN)    tamsulosin (FLOMAX) 0.4 mg Cap TAKE 1 CAPSULE BY MOUTH EVERY DAY FOR BPH    triamcinolone acetonide 0.1% (KENALOG) 0.1 % cream APPLY MODERATE AMOUNT TOPICALLY TWICE A DAY    varenicline (CHANTIX JOSE) 0.5 mg (11)- 1 mg (42) tablet TAKE AS DIRECTED BY MOUTH UD FOR SMOKING CESSATION      Family History    None       Tobacco Use    Smoking status: Current Every Day Smoker     Packs/day: 1.50     Types: Cigarettes    Smokeless tobacco: Not on file   Substance and Sexual Activity    Alcohol use: Yes    Drug use: Not on file    Sexual activity: Not on file     Review of Systems   Unable to perform ROS: Mental status change     Objective:     Vital Signs (Most Recent):  Temp: 97.5 °F (36.4 °C) (01/11/22 0731)  Pulse: 71 (01/11/22 0742)  Resp: 19 (01/11/22 0742)  BP: (!) 149/75 (01/11/22 0731)  SpO2: (!) 93 % (01/11/22 0742) Vital Signs (24h Range):  Temp:  [97 °F (36.1 °C)-98.8 °F (37.1 °C)] 97.5 °F (36.4 °C)  Pulse:  [55-72] 71  Resp:  [18-30] 19  SpO2:  [81 %-100 %] 93 %  BP: (125-168)/(66-80) 149/75     Weight: 61.9 kg (136 lb 7.4 oz)  Body mass index is 20.15 kg/m².    Physical Exam  Constitutional:       General: He is not in acute distress.  HENT:      Head: Normocephalic.      Right Ear: External ear normal.      Left Ear: External ear normal.   Eyes:      General:         Right eye: No discharge.         Left eye: No discharge.   Cardiovascular:      Rate and Rhythm: Normal rate.   Pulmonary:      Effort:  Pulmonary effort is normal.   Abdominal:      Palpations: Abdomen is soft.   Musculoskeletal:         General: No tenderness.      Cervical back: Neck supple.   Skin:     General: Skin is warm.   Psychiatric:         Speech: Speech is slurred.         NEUROLOGICAL EXAMINATION:     MENTAL STATUS   Speech: slurred   Level of consciousness: drowsy       Oriented to self  Follow commands     CRANIAL NERVES     CN III, IV, VI   Right pupil: Size: 2 mm. Shape: regular.   Left pupil: Size: 2 mm. Shape: regular.   Nystagmus: none   Conjugate gaze: present    CN VII   Right facial weakness: none  Left facial weakness: none    CN XII   Tongue deviation: none    MOTOR EXAM        AROM of UE's and LE's against gravity       Significant Labs:   CBC:   Recent Labs   Lab 01/11/22  0403 01/12/22  1208   WBC 16.66* 28.22*   HGB 13.0* 11.7*   HCT 39.3* 35.4*    342     CMP:   Recent Labs   Lab 01/11/22  0403 01/12/22  1208   * 229*    140   K 3.6 3.0*    109   CO2 20* 24   BUN 8 7   CREATININE 1.1 1.1   CALCIUM 8.2* 8.1*   PROT 6.7 6.3   ALBUMIN 1.6* 1.5*   BILITOT <0.1* 0.2   ALKPHOS 588* 493*   AST 23 24   ALT 24 21   ANIONGAP 12 7*   EGFRNONAA >60 >60       Significant Imaging: I have reviewed all pertinent imaging results/findings within the past 24 hours.     Head CT  FINDINGS:  No acute intracranial blood products.  No large territory vascular infarction.  No extra-axial mass or fluid collection.  No mass or mass effect.     Ventricles are symmetric and within normal limits.  No evidence of hydrocephalus.  Mild confluent hypodensity in the periventricular white matter, likely sequelae of microvascular ischemic change.     Osseous structures are intact.  Minimal mucosal thickening of the right maxillary sinus.  Mastoid air cells are clear.     Impression:     No acute intracranial process.     Minor right maxillary sinusitis.        Electronically signed by: Zion Colmenares MD  Date:                                             01/10/2022  Time:                                           14:06    Assessment and Plan:     62 y/o male consulted for AMS    1. Encephalopathy: on exam pt seem to be in a state of delirium but improving. Uncertain if this was preceded by a seizure given his Hx.   On exam no unilateral findings suggesting stroke.   -Head CT showed no acute abnormalities.   -EEG showed no ongoing seizures   -Will obtain level of AED's    Active Diagnoses:    Diagnosis Date Noted POA    PRINCIPAL PROBLEM:  Scrotal abscess [N49.2] 01/04/2022 Yes    Acute respiratory failure with hypoxia and hypercarbia [J96.01, J96.02] 01/10/2022 No    HCAP (healthcare-associated pneumonia) [J18.9] 01/10/2022 No    Encephalopathy, metabolic [G93.41] 01/10/2022 No    Hypokalemia [E87.6] 01/09/2022 No    Sepsis due to Gram negative bacteria [A41.50] 01/07/2022 Yes    JOI (acute kidney injury) [N17.9] 01/07/2022 Yes    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] 01/06/2022 Yes    Adjustment disorder with depressed mood [F43.21] 01/04/2022 Yes     Chronic    Chronic ischemic heart disease [I25.9] 01/04/2022 Yes     Chronic    Cocaine dependence in remission [F14.21] 01/04/2022 Yes     Chronic    Cardiomegaly [I51.7] 01/04/2022 Yes     Chronic    Hyperlipidemia [E78.5] 01/04/2022 Yes     Chronic    Essential hypertension [I10] 01/04/2022 Yes     Chronic    Seizure disorder [G40.909] 01/04/2022 Yes     Chronic    Tobacco user [Z72.0] 01/04/2022 Yes     Chronic    Diabetes mellitus type 2, controlled [E11.9] 08/03/2020 Yes     Chronic      Problems Resolved During this Admission:       VTE Risk Mitigation (From admission, onward)         Ordered     IP VTE LOW RISK PATIENT  Once         01/04/22 2241     Place sequential compression device  Until discontinued         01/04/22 2241                Thank you for your consult. I will follow-up with patient. Please contact us if you have any additional questions.    Ashvin Giraldo  MD  Neurology  [facility] Surg

## 2022-01-11 NOTE — PROGRESS NOTES
[facility] Medicine  Telemedicine Progress Note    Patient Name: Abhishek Zhao  MRN: [id number]  Patient Class: IP- Inpatient   Admission Date: 1/4/2022  Length of Stay: 5 days  Attending Physician: Hugo Aviles MD  Primary Care Provider: To Obtain Unable          Subjective:     Principal Problem:Scrotal abscess        HPI:  61 y.o. male with adjustment disorder with depressed mood, chronic ischemic heart disease, cocaine dependence in remission, cardiomegaly, HLD, HTN, swizure disorder, tobacco use, and DM 2 presents with a complaint of testicular pain.  Associated with swelling and redness to the scrotum and penis along with difficulty urinating, pain is rated as severe and radiates to the rectum.  Denies fever, chills, cough, SOB, chest pain, n/v/d.  In the ED, he was found to be tachypneic, with leukocytosis and elevated lactic.  CT and US shows evidence of multiple scrotal and perineal abscesses with some extension into the penile shaft.  UA with evidence of infection.  Sepsis treatment initiated, blood and urine cultures obtained, IV fluids and IV antibiotics initiated.  Urology consulted.      Overview/Hospital Course:  61 y.o. male with adjustment disorder with depressed mood, chronic ischemic heart disease, cocaine dependence in remission, cardiomegaly, HLD, HTN, swizure disorder, tobacco use, and DM 2 presents with a complaint of testicular pain.  Associated with swelling and redness to the scrotum and penis along with difficulty urinating, pain is rated as severe and radiates to the rectum.  Denies fever, chills, cough, SOB, chest pain, n/v/d.  In the ED, he was found to be tachypneic, with leukocytosis and elevated lactic.  CT and US shows evidence of multiple scrotal and perineal abscesses with some extension into the penile shaft.  UA with evidence of infection.  Sepsis treatment initiated, blood and urine cultures obtained, IV fluids and IV antibiotics initiated.  Urology  consulted.  Urology doing,cystoscopy with possible DVIU, possible urethral dilation, possible suprapubic tube placement.  Plan for incision and drainage of scrotal abscess  S/P I&D by Urology,cultures are pending.      Interval History: patient is very lethargic and confused today; sister at the bedside; very acute change;   - patient is now coughing up green sputum and sob  - CT head shows no acute process  - CXR shows new LLL PNA  - resp cultures and blood cultures ordered    - rocephin switched to zosyn   - patient was a rapid response and was placed on oxygen and ABG done and reviewed  - patient being transferred to inpatient team    - EEG also pending as patient has h/o seizures; vimpat restarted  - all narcotics stopped      Review of Systems   Constitutional: Positive for fatigue. Negative for activity change and fever.   Respiratory: Positive for cough and shortness of breath.    Gastrointestinal: Negative for abdominal pain and nausea.   Psychiatric/Behavioral: Positive for confusion.     Objective:     Vital Signs (Most Recent):  Temp: 98 °F (36.7 °C) (01/10/22 1713)  Pulse: (!) 59 (01/10/22 1804)  Resp: 19 (01/10/22 1713)  BP: (!) 156/76 (01/10/22 1804)  SpO2: 95 % (01/10/22 1815) Vital Signs (24h Range):  Temp:  [97.5 °F (36.4 °C)-98 °F (36.7 °C)] 98 °F (36.7 °C)  Pulse:  [53-62] 59  Resp:  [17-19] 19  SpO2:  [81 %-100 %] 95 %  BP: (104-163)/(57-79) 156/76     Weight: 61.9 kg (136 lb 7.4 oz)  Body mass index is 20.15 kg/m².    Intake/Output Summary (Last 24 hours) at 1/10/2022 1929  Last data filed at 1/10/2022 1730  Gross per 24 hour   Intake 480 ml   Output 2600 ml   Net -2120 ml      Physical Exam  Constitutional:       General: He is in acute distress.      Appearance: Normal appearance. He is ill-appearing.   HENT:      Head: Normocephalic and atraumatic.   Pulmonary:      Effort: Respiratory distress present.      Breath sounds: No wheezing.   Abdominal:      General: Abdomen is flat. There is no  distension.   Musculoskeletal:         General: No swelling or tenderness.   Skin:     Coloration: Skin is not jaundiced or pale.   Neurological:      General: No focal deficit present.      Mental Status: He is alert. He is disoriented.      Comments: Patient is very lethargic and confused          Significant Labs: All pertinent labs within the past 24 hours have been reviewed.    Significant Imaging: I have reviewed all pertinent imaging results/findings within the past 24 hours.      Assessment/Plan:      * Scrotal abscess  Continue IV antibiotics, Urology consult,Urology doing,cystoscopy with possible DVIU, possible urethral dilation, possible suprapubic tube placement.  Plan for incision and drainage of scrotal abscess.  S/P I&D by Urology on 1.5.22,cultures are pending.    1/7- POD 2 from I an D; cultures are growing E. Coli and candida; stopping vanc; cont zosyn and fluconazole; ID has been consulted for long term abx recs     1/8- POD 3 from I an D; patient is having pus come out of his urethra today on the side of his santiago; will repeat U/S scrotum and re-consult Urology to evaluate for another debridement;     1/9- POD 4 from I an D; ID has placed patient on rocephin and flagyl and cont diflucan ; hopefully can transition to PO abx for d/c; cont wound care     Acute respiratory failure with hypoxia and hypercarbia  - patient was cough productive sputum, cultures ordered  - CXR shows new LLL PNA  - rocephin switched to zosyn   - placed on oxygen  - ABG reviewed;   - duonebs  - follow up blood cultures       HCAP (healthcare-associated pneumonia)  - see respiratory failure       Berna's gangrene  S/P I&D by Urology,cultures are pending.on gracie sp[ectrum IV Abx,wound care per urology.      Encephalopathy, metabolic  - likely due to infection  - CT head no acute process  - EEG pending       JOI (acute kidney injury)  - improving with IVFs, cont  - cont santiago cath     1/9- back to baseline        Hypokalemia  1/9- replace     Sepsis  - see scrotal abscess     This patient does have evidence of infective focus  My overall impression is sepsis. Vital signs were reviewed and noted in progress note.  Antibiotics given-   Antibiotics (From admission, onward)            Start     Stop Route Frequency Ordered    01/06/22 2100  mupirocin 2 % ointment         01/11 2059 Nasl 2 times daily 01/06/22 1337    01/05/22 0200  piperacillin-tazobactam 4.5 g in dextrose 5 % 100 mL IVPB (ready to mix system)         -- IV Every 8 hours (non-standard times) 01/04/22 2241        Cultures were taken-   Microbiology Results (last 7 days)     Procedure Component Value Units Date/Time    AFB Culture & Smear [[id number]] Collected: 01/05/22 1055    Order Status: Completed Specimen: Abscess from Groin Updated: 01/07/22 2127     AFB Culture & Smear Culture in progress     AFB CULTURE STAIN No acid fast bacilli seen.    Blood Culture #1 **CANNOT BE ORDERED STAT** [[id number]] Collected: 01/04/22 1744    Order Status: Completed Specimen: Blood from Peripheral, Lower Arm, Left Updated: 01/07/22 1303     Blood Culture, Routine No Growth to date      No Growth to date      No Growth to date    Blood Culture #2 **CANNOT BE ORDERED STAT** [[id number]] Collected: 01/04/22 1744    Order Status: Completed Specimen: Blood from Peripheral, Antecubital, Right Updated: 01/07/22 1303     Blood Culture, Routine No Growth to date      No Growth to date      No Growth to date    Urine culture **CANNOT BE ORDERED STAT** [[id number]]  (Abnormal) Collected: 01/04/22 1804    Order Status: Completed Specimen: Urine, Clean Catch Updated: 01/07/22 1118     Urine Culture, Routine YAMILET ALBICANS  10,000 - 49,999 cfu/ml  Treatment of asymptomatic candiduria is not recommended (except for   specific populations). Candida isolated in the urine typically   represents colonization. If an indwelling urinary catheter is present  it should be removed or replaced.       Narrative:      Indicated criteria for high risk culture:->Other  Other (specify):->Protocol    Aerobic culture [[id number]]  (Abnormal)  (Susceptibility) Collected: 01/05/22 1055    Order Status: Completed Specimen: Abscess from Groin Updated: 01/07/22 0846     Aerobic Bacterial Culture ESCHERICHIA COLI  Moderate        YAMILET ALBICANS  Few      Culture, Anaerobe [[id number]] Collected: 01/05/22 1055    Order Status: Completed Specimen: Abscess from Groin Updated: 01/07/22 0733     Anaerobic Culture Culture in progress    Fungus culture [[id number]] Collected: 01/05/22 1055    Order Status: Sent Specimen: Abscess from Groin Updated: 01/05/22 1401    Culture, Anaerobe [[id number]] Collected: 01/05/22 1055    Order Status: Canceled Specimen: Abscess from Groin     Aerobic culture [[id number]] Collected: 01/05/22 1055    Order Status: Canceled Specimen: Abscess from Groin         Latest lactate reviewed, they are-  No results for input(s): LACTATE in the last 72 hours.    Organ dysfunction indicated by n/a  Source- abscesses    Source control Achieved by- Urology consult      Diabetes mellitus type 2, controlled  Check a1c  Hold oral antihyperglycemics while inpatient  PRN sliding scale insulin  ACHS glucose monitoring   ADA diet     Tobacco user  5 minutes spent counseling the patient on smoking cessation and he is not currently ready to stop smoking. He will be offereded a nicotine transdermal patch while hospitalized and monitored for withdrawal.  Will provide additional smoking cessation counseling prior to discharge.     Seizure disorder  Continue home regimen of depakote and lamotrigine    1/10- restarting vimpat; getting EEG;    Essential hypertension  Well controlled, continue home medications and monitor blood pressure, adjust as needed.     Hyperlipidemia  Continue statin    Cardiomegaly  No evidence of acute decompensation or fluid overload, continue home regimen    Cocaine dependence in remission  Counseled      Chronic ischemic heart disease  No acute issue    Adjustment disorder with depressed mood  Continue home regimen of citalopram and quetiapine      VTE Risk Mitigation (From admission, onward)         Ordered     IP VTE LOW RISK PATIENT  Once         01/04/22 2241     Place sequential compression device  Until discontinued         01/04/22 2241                      I have assessed these finding virtually using telemed platform and with assistance of bedside nurse                 The attending portion of this evaluation, treatment, and documentation was performed per Elijah Ann MD via Telemedicine AudioVisual using the secure [email or web address] software platform with 2 way audio/video. The provider was located off-site and the patient is located in the hospital. The aforementioned video software was utilized to document the relevant history and physical exam    Elijah Ann MD  Department of Hospital Medicine   [facility] Surg

## 2022-01-11 NOTE — SUBJECTIVE & OBJECTIVE
Interval History: Yesterday pt became altered and hypoxic, imaging concerning for new LLL PNA    Review of Systems   Constitutional: Negative for chills and fever.   Respiratory: Positive for cough. Negative for shortness of breath.    Cardiovascular: Negative for chest pain and leg swelling.   Gastrointestinal: Negative for abdominal distention and abdominal pain.     Objective:     Vital Signs (Most Recent):  Temp: 97.6 °F (36.4 °C) (01/11/22 1126)  Pulse: 73 (01/11/22 1126)  Resp: 19 (01/11/22 1126)  BP: (!) 163/76 (01/11/22 1126)  SpO2: 100 % (01/11/22 1126) Vital Signs (24h Range):  Temp:  [97 °F (36.1 °C)-98.8 °F (37.1 °C)] 97.6 °F (36.4 °C)  Pulse:  [58-73] 73  Resp:  [19-30] 19  SpO2:  [81 %-100 %] 100 %  BP: (125-168)/(66-80) 163/76     Weight: 61.9 kg (136 lb 7.4 oz)  Body mass index is 20.15 kg/m².    Intake/Output Summary (Last 24 hours) at 1/11/2022 1253  Last data filed at 1/11/2022 1100  Gross per 24 hour   Intake 100 ml   Output 1450 ml   Net -1350 ml      Physical Exam  Constitutional:       General: He is not in acute distress.     Appearance: He is ill-appearing. He is not toxic-appearing or diaphoretic.   Cardiovascular:      Rate and Rhythm: Normal rate and regular rhythm.      Heart sounds: No murmur heard.  No gallop.    Pulmonary:      Comments: Reduced breath sounds at L base, good aeration, no wheezing or crackles  Abdominal:      General: Bowel sounds are normal. There is no distension.      Palpations: Abdomen is soft.      Tenderness: There is no abdominal tenderness.   Genitourinary:     Comments: No palpable pockets of induration        Significant Labs: All pertinent labs within the past 24 hours have been reviewed.    Significant Imaging: I have reviewed all pertinent imaging results/findings within the past 24 hours.

## 2022-01-11 NOTE — PLAN OF CARE
Problem: SLP Goal  Goal: SLP Goal  Description: Goals:  1. Pt will tolerate mech soft diet (IDDSI-5) with thin liquids w/o overt s/s of aspiration.   Outcome: Ongoing, Progressing     Pt b/s assessment completed. ST recs mech soft diet (IDDSI-5) with thin liquids at this time 2/2 pt being edentulous. ST will cont to follow.

## 2022-01-11 NOTE — SUBJECTIVE & OBJECTIVE
Interval History: patient is very lethargic and confused today; sister at the bedside; very acute change;   - patient is now coughing up green sputum and sob  - CT head shows no acute process  - CXR shows new LLL PNA  - resp cultures and blood cultures ordered    - rocephin switched to zosyn   - patient was a rapid response and was placed on oxygen and ABG done and reviewed  - patient being transferred to inpatient team    - EEG also pending as patient has h/o seizures; vimpat restarted  - all narcotics stopped      Review of Systems   Constitutional: Positive for fatigue. Negative for activity change and fever.   Respiratory: Positive for cough and shortness of breath.    Gastrointestinal: Negative for abdominal pain and nausea.   Psychiatric/Behavioral: Positive for confusion.     Objective:     Vital Signs (Most Recent):  Temp: 98 °F (36.7 °C) (01/10/22 1713)  Pulse: (!) 59 (01/10/22 1804)  Resp: 19 (01/10/22 1713)  BP: (!) 156/76 (01/10/22 1804)  SpO2: 95 % (01/10/22 1815) Vital Signs (24h Range):  Temp:  [97.5 °F (36.4 °C)-98 °F (36.7 °C)] 98 °F (36.7 °C)  Pulse:  [53-62] 59  Resp:  [17-19] 19  SpO2:  [81 %-100 %] 95 %  BP: (104-163)/(57-79) 156/76     Weight: 61.9 kg (136 lb 7.4 oz)  Body mass index is 20.15 kg/m².    Intake/Output Summary (Last 24 hours) at 1/10/2022 1929  Last data filed at 1/10/2022 1730  Gross per 24 hour   Intake 480 ml   Output 2600 ml   Net -2120 ml      Physical Exam  Constitutional:       General: He is in acute distress.      Appearance: Normal appearance. He is ill-appearing.   HENT:      Head: Normocephalic and atraumatic.   Pulmonary:      Effort: Respiratory distress present.      Breath sounds: No wheezing.   Abdominal:      General: Abdomen is flat. There is no distension.   Musculoskeletal:         General: No swelling or tenderness.   Skin:     Coloration: Skin is not jaundiced or pale.   Neurological:      General: No focal deficit present.      Mental Status: He is alert.  He is disoriented.      Comments: Patient is very lethargic and confused          Significant Labs: All pertinent labs within the past 24 hours have been reviewed.    Significant Imaging: I have reviewed all pertinent imaging results/findings within the past 24 hours.

## 2022-01-11 NOTE — ASSESSMENT & PLAN NOTE
Episode of AMS 1/10 likely due to aspiration/infection not seizure  - cont home AEDs  - neurology consulted

## 2022-01-11 NOTE — PROGRESS NOTES
[facility] Medicine  Progress Note    Patient Name: Abhishek Zhao  MRN: [id number]  Patient Class: IP- Inpatient   Admission Date: 1/4/2022  Length of Stay: 6 days  Attending Physician: Hugo Aviles MD  Primary Care Provider: To Obtain Unable        Subjective:     Principal Problem:Scrotal abscess        HPI:  61 y.o. male with adjustment disorder with depressed mood, chronic ischemic heart disease, cocaine dependence in remission, cardiomegaly, HLD, HTN, swizure disorder, tobacco use, and DM 2 presents with a complaint of testicular pain.  Associated with swelling and redness to the scrotum and penis along with difficulty urinating, pain is rated as severe and radiates to the rectum.  Denies fever, chills, cough, SOB, chest pain, n/v/d.  In the ED, he was found to be tachypneic, with leukocytosis and elevated lactic.  CT and US shows evidence of multiple scrotal and perineal abscesses with some extension into the penile shaft.  UA with evidence of infection.  Sepsis treatment initiated, blood and urine cultures obtained, IV fluids and IV antibiotics initiated.  Urology consulted.      Overview/Hospital Course:  61 y.o. male with adjustment disorder with depressed mood, chronic ischemic heart disease, cocaine dependence in remission, cardiomegaly, HLD, HTN, swizure disorder, tobacco use, and DM 2 presents with a complaint of testicular pain, found to have multiple scrotal and perineal abscesses. Placed on broad spectrum antibiotics, underwent I&D with urology.          Interval History: Yesterday pt became altered and hypoxic, imaging concerning for new LLL PNA    Review of Systems   Constitutional: Negative for chills and fever.   Respiratory: Positive for cough. Negative for shortness of breath.    Cardiovascular: Negative for chest pain and leg swelling.   Gastrointestinal: Negative for abdominal distention and abdominal pain.     Objective:     Vital Signs (Most Recent):  Temp: 97.6 °F  (36.4 °C) (01/11/22 1126)  Pulse: 73 (01/11/22 1126)  Resp: 19 (01/11/22 1126)  BP: (!) 163/76 (01/11/22 1126)  SpO2: 100 % (01/11/22 1126) Vital Signs (24h Range):  Temp:  [97 °F (36.1 °C)-98.8 °F (37.1 °C)] 97.6 °F (36.4 °C)  Pulse:  [58-73] 73  Resp:  [19-30] 19  SpO2:  [81 %-100 %] 100 %  BP: (125-168)/(66-80) 163/76     Weight: 61.9 kg (136 lb 7.4 oz)  Body mass index is 20.15 kg/m².    Intake/Output Summary (Last 24 hours) at 1/11/2022 1253  Last data filed at 1/11/2022 1100  Gross per 24 hour   Intake 100 ml   Output 1450 ml   Net -1350 ml      Physical Exam  Constitutional:       General: He is not in acute distress.     Appearance: He is ill-appearing. He is not toxic-appearing or diaphoretic.   Cardiovascular:      Rate and Rhythm: Normal rate and regular rhythm.      Heart sounds: No murmur heard.  No gallop.    Pulmonary:      Comments: Reduced breath sounds at L base, good aeration, no wheezing or crackles  Abdominal:      General: Bowel sounds are normal. There is no distension.      Palpations: Abdomen is soft.      Tenderness: There is no abdominal tenderness.   Genitourinary:     Comments: No palpable pockets of induration        Significant Labs: All pertinent labs within the past 24 hours have been reviewed.    Significant Imaging: I have reviewed all pertinent imaging results/findings within the past 24 hours.      Assessment/Plan:      * Scrotal abscess  Multiple abscesses c/w Berna's gangrene, s/p I&D by urology on 1/5. Cultures growing ampicillin-resistant Ecoli and C albicans.   - urology following  - cont zosyn (re-started for HCAP)  - ID recommending augmentin at dc, however cultures show intermediate susceptibility, will likely use alternate agent          Encephalopathy, metabolic  CT head and EEG without acute process; likely due to infection.   - neurology consulted      HCAP (healthcare-associated pneumonia)  - see respiratory failure       Acute respiratory failure with hypoxia and  hypercarbia  Cough + new LLL opacity on imaging c/f pneumonia.   - respiratory cultures pending; gram stain w/ GNR  - cont zosyn  - stop vancomycin  - will interrogate w/ US for effusion      Hypokalemia  Replete PRN    JOI (acute kidney injury)  Resolved      Sepsis due to Gram negative bacteria  Resolved      Berna's gangrene  As above    Sepsis  - see scrotal abscess     This patient does have evidence of infective focus  My overall impression is sepsis. Vital signs were reviewed and noted in progress note.  Antibiotics given-   Antibiotics (From admission, onward)            Start     Stop Route Frequency Ordered    01/06/22 2100  mupirocin 2 % ointment         01/11 2059 Nasl 2 times daily 01/06/22 1337    01/05/22 0200  piperacillin-tazobactam 4.5 g in dextrose 5 % 100 mL IVPB (ready to mix system)         -- IV Every 8 hours (non-standard times) 01/04/22 2241        Cultures were taken-   Microbiology Results (last 7 days)     Procedure Component Value Units Date/Time    AFB Culture & Smear [[id number]] Collected: 01/05/22 1055    Order Status: Completed Specimen: Abscess from Groin Updated: 01/07/22 2127     AFB Culture & Smear Culture in progress     AFB CULTURE STAIN No acid fast bacilli seen.    Blood Culture #1 **CANNOT BE ORDERED STAT** [[id number]] Collected: 01/04/22 1744    Order Status: Completed Specimen: Blood from Peripheral, Lower Arm, Left Updated: 01/07/22 1303     Blood Culture, Routine No Growth to date      No Growth to date      No Growth to date    Blood Culture #2 **CANNOT BE ORDERED STAT** [[id number]] Collected: 01/04/22 1744    Order Status: Completed Specimen: Blood from Peripheral, Antecubital, Right Updated: 01/07/22 1303     Blood Culture, Routine No Growth to date      No Growth to date      No Growth to date    Urine culture **CANNOT BE ORDERED STAT** [[id number]]  (Abnormal) Collected: 01/04/22 1804    Order Status: Completed Specimen: Urine, Clean Catch Updated: 01/07/22 1118      Urine Culture, Routine YAMILET ALBICANS  10,000 - 49,999 cfu/ml  Treatment of asymptomatic candiduria is not recommended (except for   specific populations). Candida isolated in the urine typically   represents colonization. If an indwelling urinary catheter is present  it should be removed or replaced.      Narrative:      Indicated criteria for high risk culture:->Other  Other (specify):->Protocol    Aerobic culture [[id number]]  (Abnormal)  (Susceptibility) Collected: 01/05/22 1055    Order Status: Completed Specimen: Abscess from Groin Updated: 01/07/22 0846     Aerobic Bacterial Culture ESCHERICHIA COLI  Moderate        YAMILET ALBICANS  Few      Culture, Anaerobe [[id number]] Collected: 01/05/22 1055    Order Status: Completed Specimen: Abscess from Groin Updated: 01/07/22 0733     Anaerobic Culture Culture in progress    Fungus culture [[id number]] Collected: 01/05/22 1055    Order Status: Sent Specimen: Abscess from Groin Updated: 01/05/22 1401    Culture, Anaerobe [[id number]] Collected: 01/05/22 1055    Order Status: Canceled Specimen: Abscess from Groin     Aerobic culture [[id number]] Collected: 01/05/22 1055    Order Status: Canceled Specimen: Abscess from Groin         Latest lactate reviewed, they are-  No results for input(s): LACTATE in the last 72 hours.    Organ dysfunction indicated by n/a  Source- abscesses    Source control Achieved by- Urology consult      Diabetes mellitus type 2, controlled  Check a1c  Hold oral antihyperglycemics while inpatient  PRN sliding scale insulin  ACHS glucose monitoring   ADA diet     Tobacco user  5 minutes spent counseling the patient on smoking cessation and he is not currently ready to stop smoking. He will be offereded a nicotine transdermal patch while hospitalized and monitored for withdrawal.  Will provide additional smoking cessation counseling prior to discharge.     Seizure disorder  Episode of AMS 1/10 likely due to aspiration/infection not seizure  -  cont home AEDs  - neurology consulted    Essential hypertension  Well controlled, continue home medications and monitor blood pressure, adjust as needed.     Hyperlipidemia  Continue statin    Cardiomegaly  No evidence of acute decompensation or fluid overload, continue home regimen    Cocaine dependence in remission  Counseled     Chronic ischemic heart disease  No acute issue    Adjustment disorder with depressed mood  Continue home regimen of citalopram and quetiapine      VTE Risk Mitigation (From admission, onward)         Ordered     IP VTE LOW RISK PATIENT  Once         01/04/22 2241     Place sequential compression device  Until discontinued         01/04/22 2241                Discharge Planning   CHUCK:      Code Status: Full Code   Is the patient medically ready for discharge?:     Reason for patient still in hospital (select all that apply): Patient trending condition, Laboratory test, Treatment, Consult recommendations and Pending disposition  Discharge Plan A: Home with family (with follow up)                  Hugo Aviles MD  Department of Hospital Medicine   [facility]

## 2022-01-11 NOTE — ASSESSMENT & PLAN NOTE
Cough + new LLL opacity on imaging c/f pneumonia.   - respiratory cultures pending; gram stain w/ GNR  - cont zosyn  - stop vancomycin  - will interrogate w/ US for effusion

## 2022-01-11 NOTE — PLAN OF CARE
Problem: Adult Inpatient Plan of Care  Goal: Plan of Care Review  Outcome: Ongoing, Progressing  Flowsheets (Taken 1/11/2022 1158)  Plan of Care Reviewed With: patient  Goal: Patient-Specific Goal (Individualized)  Outcome: Ongoing, Progressing  Goal: Absence of Hospital-Acquired Illness or Injury  Outcome: Ongoing, Progressing  Intervention: Identify and Manage Fall Risk  Flowsheets (Taken 1/11/2022 1158)  Safety Promotion/Fall Prevention:   assistive device/personal item within reach   bed alarm set   Fall Risk reviewed with patient/family   high risk medications identified   nonskid shoes/socks when out of bed   medications reviewed   side rails raised x 2   room near unit station   instructed to call staff for mobility  Intervention: Prevent and Manage VTE (Venous Thromboembolism) Risk  Flowsheets (Taken 1/11/2022 1158)  Activity Management:   Ankle pumps - L1   Arm raise - L1   Rolling - L1   Straight leg raise - L1  VTE Prevention/Management:   ambulation promoted   bleeding precations maintained   bleeding risk assessed  Range of Motion: active ROM (range of motion) encouraged  Goal: Optimal Comfort and Wellbeing  Outcome: Ongoing, Progressing  Goal: Readiness for Transition of Care  Outcome: Ongoing, Progressing     Problem: Diabetes Comorbidity  Goal: Blood Glucose Level Within Targeted Range  Outcome: Ongoing, Progressing     Problem: Infection  Goal: Absence of Infection Signs and Symptoms  Outcome: Ongoing, Progressing  Intervention: Prevent or Manage Infection  Flowsheets (Taken 1/11/2022 1158)  Infection Management: aseptic technique maintained     Problem: Adjustment to Illness (Sepsis/Septic Shock)  Goal: Optimal Coping  Outcome: Ongoing, Progressing     Problem: Bleeding (Sepsis/Septic Shock)  Goal: Absence of Bleeding  Outcome: Ongoing, Progressing     Problem: Glycemic Control Impaired (Sepsis/Septic Shock)  Goal: Blood Glucose Level Within Desired Range  Outcome: Ongoing, Progressing      Problem: Infection Progression (Sepsis/Septic Shock)  Goal: Absence of Infection Signs and Symptoms  Outcome: Ongoing, Progressing  Intervention: Initiate Sepsis Management  Flowsheets (Taken 1/11/2022 1156)  Infection Management: aseptic technique maintained     Problem: Nutrition Impaired (Sepsis/Septic Shock)  Goal: Optimal Nutrition Intake  Outcome: Ongoing, Progressing     Problem: Fall Injury Risk  Goal: Absence of Fall and Fall-Related Injury  Outcome: Ongoing, Progressing  Intervention: Identify and Manage Contributors  Flowsheets (Taken 1/11/2022 1154)  Self-Care Promotion:   BADL personal objects within reach   BADL personal routines maintained   independence encouraged   meal set-up provided  Medication Review/Management:   medications reviewed   high-risk medications identified  Intervention: Promote Injury-Free Environment  Flowsheets (Taken 1/11/2022 115)  Safety Promotion/Fall Prevention:   assistive device/personal item within reach   bed alarm set   Fall Risk reviewed with patient/family   high risk medications identified   nonskid shoes/socks when out of bed   medications reviewed   side rails raised x 2   room near unit station   instructed to call staff for mobility     Problem: Fluid and Electrolyte Imbalance (Acute Kidney Injury/Impairment)  Goal: Fluid and Electrolyte Balance  Outcome: Ongoing, Progressing     Problem: Oral Intake Inadequate (Acute Kidney Injury/Impairment)  Goal: Optimal Nutrition Intake  Outcome: Ongoing, Progressing     Problem: Renal Function Impairment (Acute Kidney Injury/Impairment)  Goal: Effective Renal Function  Outcome: Ongoing, Progressing     Problem: Fluid Imbalance (Pneumonia)  Goal: Fluid Balance  Outcome: Ongoing, Progressing     Problem: Infection (Pneumonia)  Goal: Resolution of Infection Signs and Symptoms  Outcome: Ongoing, Progressing  Intervention: Prevent Infection Progression  Flowsheets (Taken 1/11/2022 1154)  Infection Management: aseptic  technique maintained     Problem: Respiratory Compromise (Pneumonia)  Goal: Effective Oxygenation and Ventilation  Outcome: Ongoing, Progressing     Problem: Skin Injury Risk Increased  Goal: Skin Health and Integrity  Outcome: Ongoing, Progressing  Intervention: Optimize Skin Protection  Flowsheets (Taken 1/11/2022 1158)  Pressure Reduction Techniques:   frequent weight shift encouraged   weight shift assistance provided  Skin Protection:   tubing/devices free from skin contact   adhesive use limited  Head of Bed (HOB) Positioning: HOB at 30-45 degrees

## 2022-01-11 NOTE — PT/OT/SLP EVAL
"Speech Language Pathology Evaluation  Bedside Swallow    Patient Name:  Abhishek Zhao   MRN:  [id number]  Admitting Diagnosis: Scrotal abscess    Recommendations:                 General Recommendations:  Dysphagia therapy  Diet recommendations:  Mechanical soft,Finely chopped meat, Thin   Aspiration Precautions: 1 bite/sip at a time, Alternating bites/sips, Assistance with meals, Meds whole 1 at a time and Small bites/sips   General Precautions: Standard,    Communication strategies:  none    History:     Past Medical History:   Diagnosis Date    High cholesterol     Hypertension     Seizures        Past Surgical History:   Procedure Laterality Date    CYSTOSCOPY  1/5/2022    Procedure: CYSTOSCOPY;  Surgeon: ANIBAL Rangel MD;  Location: [facility] OR;  Service: Urology;;    FISTULOGRAM N/A 1/5/2022    Procedure: FISTULOGRAM;  Surgeon: ANIBAL Rangel MD;  Location: [facility] OR;  Service: Urology;  Laterality: N/A;    SURGICAL REMOVAL OF ABSCESS  1/5/2022    Procedure: EXCISION, ABSCESS;  Surgeon: ANIBAL Rangel MD;  Location: [facility] OR;  Service: Urology;;       Social History: Patient lives with his wife, and was indep for all ADL's prior to admit, per pt's sister. Pt walked with a cane, and has had a "steady decline" in physical strength over the past few years, however, his sister states that cognitively he has been intact. New, disorientation and confusion is new, per sister.     Modified Barium Swallow: none    Chest X-Rays:   1/10/2022    FINDINGS:  Mediastinal structures are midline.  Hilar contours are unremarkable.  Cardiac silhouette is normal in size.  Lung volumes are symmetric.  There is increased attenuation within the left lower lung zone most concerning for atelectasis or consolidation with probable layering effusion.  No pneumothorax.  No free air beneath the diaphragm.  No acute osseous abnormalities.  Visualized soft tissues are within normal limits.     Impression:     1. Left lower lobe " atelectasis or consolidation with a suspected layering effusion.    Prior diet: Unrestricted per sister. Pt has upper and lower dentures, however, he takes them out to eat.     Subjective     The pt was awake, with sister present at bedside upon ST entrance. The pt greeted ST and was agreeable to swallowing assessment.     Pt's sister notified ST that the pt has had a change in cognition over the past few days, stating he is not oriented to place or situation, which is different from baseline. Upon ST questioning, the pt was able to state his name only, and was disoriented to place and situation, as well as his birthday.     Patient goals: Pt did not state.      Pain/Comfort:  · Pain Rating 1: 0/10    Respiratory Status: High flow nasal cannula 10L/min    Objective:     Oral Musculature Evaluation  · Oral Musculature: WFL  · Dentition: edentulous  · Oral Labial Strength and Mobility: WFL  · Lingual Strength and Mobility: WFL  · Voice Prior to PO Intake: Clear and audible    Bedside Swallow Eval:   Consistencies Assessed:  · Thin liquids ~One cup of water via straw  · Puree ~Half a cup of applesauce  · Solids - x1 bite of dino cracker     Oral Phase:   · Prolonged mastication  · Lingual residue    Pharyngeal Phase:   · no overt clinical signs/symptoms of aspiration  · no overt clinical signs/symptoms of pharyngeal dysphagia    Compensatory Strategies  · None    Treatment: It should be noted that silent aspiration cannot be r/o via b/s assessment. Pt's SpO2 remained btw % during all po trials.     Assessment:     Abhishek Zhao is a 61 y.o. male with a dx of Scrotal abscess, as well as dcr respiratory status, requiring 10L/min on high-flow nasal cannula. The pt presents with adequate oral motor function, and is safe to advance to [facility] soft diet (IDDSI-5) with thin liquids at this time. ST noted prolonged mastication of solids which is negatively impacted by pt being edentulous. ST will f/u x1 to ensure pt's  safety with current diet.     Goals:   Multidisciplinary Problems     SLP Goals        Problem: SLP Goal    Goal Priority Disciplines Outcome   SLP Goal    Medium SLP Ongoing, Progressing   Description: Goals:  1. Pt will tolerate mech soft diet (IDDSI-5) with thin liquids w/o overt s/s of aspiration.                    Plan:     · Patient to be seen:  3 x/week   · Plan of Care expires:     · Plan of Care reviewed with:  patient,sibling   · SLP Follow-Up:  Yes       Discharge recommendations:      Barriers to Discharge:  None    Time Tracking:     SLP Treatment Date:      Speech Start Time:  0155  Speech Stop Time:  0207     Speech Total Time (min):  12 min    Billable Minutes: Eval Swallow and Oral Function 12 01/11/2022

## 2022-01-11 NOTE — RESPIRATORY THERAPY
Patient received on non-rebreather. sp02 98%. States patient took oxygen off. sp02 decreased 86% Aerosol treatment given. Will placed patient on HFNC 10 lpm after nurse assessment. Patient is now on non-rebreather    2030:  patient placed HFNC 10 LPM. SP02 100%. Will wean as tolerated.    2350: sp02 100%. Currently HFNC 8 LPM

## 2022-01-11 NOTE — ASSESSMENT & PLAN NOTE
s/p cystoscopy with santiago placement and debridement of abscess/necrosis of scrotum 1/5/22 with Dr. Rangel.    Continue abx per primary  Cultures growing Candida and E coli  Appreciate wound care RN and floor RN dressing changes  Will continue to monitor condition    Scrotal US obtained 1/9/2022 with concern for fluid pocket posterior to testicles, exam did not reveal additional loculation. Can consider repeat CT pelvis with contrast to assess residual collections.     Will likely need plastics consultation in future for wound closure/grafting

## 2022-01-11 NOTE — PLAN OF CARE
Problem: Adult Inpatient Plan of Care  Goal: Plan of Care Review  Outcome: Ongoing, Progressing  Goal: Patient-Specific Goal (Individualized)  Outcome: Ongoing, Progressing  Goal: Absence of Hospital-Acquired Illness or Injury  Outcome: Ongoing, Progressing  Goal: Optimal Comfort and Wellbeing  Outcome: Ongoing, Progressing  Goal: Readiness for Transition of Care  Outcome: Ongoing, Progressing     Problem: Diabetes Comorbidity  Goal: Blood Glucose Level Within Targeted Range  Outcome: Ongoing, Progressing     Problem: Infection  Goal: Absence of Infection Signs and Symptoms  Outcome: Ongoing, Progressing     Problem: Adjustment to Illness (Sepsis/Septic Shock)  Goal: Optimal Coping  Outcome: Ongoing, Progressing     Problem: Bleeding (Sepsis/Septic Shock)  Goal: Absence of Bleeding  Outcome: Ongoing, Progressing     Problem: Glycemic Control Impaired (Sepsis/Septic Shock)  Goal: Blood Glucose Level Within Desired Range  Outcome: Ongoing, Progressing     Problem: Infection Progression (Sepsis/Septic Shock)  Goal: Absence of Infection Signs and Symptoms  Outcome: Ongoing, Progressing     Problem: Nutrition Impaired (Sepsis/Septic Shock)  Goal: Optimal Nutrition Intake  Outcome: Ongoing, Progressing     Problem: Fall Injury Risk  Goal: Absence of Fall and Fall-Related Injury  Outcome: Ongoing, Progressing     Problem: Fluid and Electrolyte Imbalance (Acute Kidney Injury/Impairment)  Goal: Fluid and Electrolyte Balance  Outcome: Ongoing, Progressing     Problem: Oral Intake Inadequate (Acute Kidney Injury/Impairment)  Goal: Optimal Nutrition Intake  Outcome: Ongoing, Progressing     Problem: Renal Function Impairment (Acute Kidney Injury/Impairment)  Goal: Effective Renal Function  Outcome: Ongoing, Progressing     Problem: Fluid Imbalance (Pneumonia)  Goal: Fluid Balance  Outcome: Ongoing, Progressing     Problem: Infection (Pneumonia)  Goal: Resolution of Infection Signs and Symptoms  Outcome: Ongoing, Progressing      Problem: Respiratory Compromise (Pneumonia)  Goal: Effective Oxygenation and Ventilation  Outcome: Ongoing, Progressing     Problem: Skin Injury Risk Increased  Goal: Skin Health and Integrity  Outcome: Ongoing, Progressing

## 2022-01-12 LAB
ALBUMIN SERPL BCP-MCNC: 1.5 G/DL (ref 3.5–5.2)
ALP SERPL-CCNC: 493 U/L (ref 55–135)
ALT SERPL W/O P-5'-P-CCNC: 21 U/L (ref 10–44)
ANION GAP SERPL CALC-SCNC: 7 MMOL/L (ref 8–16)
AST SERPL-CCNC: 24 U/L (ref 10–40)
BACTERIA SPEC AEROBE CULT: ABNORMAL
BASOPHILS # BLD AUTO: 0.11 K/UL (ref 0–0.2)
BASOPHILS NFR BLD: 0.4 % (ref 0–1.9)
BILIRUB SERPL-MCNC: 0.2 MG/DL (ref 0.1–1)
BUN SERPL-MCNC: 7 MG/DL (ref 6–20)
CALCIUM SERPL-MCNC: 8.1 MG/DL (ref 8.7–10.5)
CHLORIDE SERPL-SCNC: 109 MMOL/L (ref 95–110)
CO2 SERPL-SCNC: 24 MMOL/L (ref 23–29)
CREAT SERPL-MCNC: 1.1 MG/DL (ref 0.5–1.4)
DIFFERENTIAL METHOD: ABNORMAL
EOSINOPHIL # BLD AUTO: 0 K/UL (ref 0–0.5)
EOSINOPHIL NFR BLD: 0.1 % (ref 0–8)
ERYTHROCYTE [DISTWIDTH] IN BLOOD BY AUTOMATED COUNT: 13 % (ref 11.5–14.5)
EST. GFR  (AFRICAN AMERICAN): >60 ML/MIN/1.73 M^2
EST. GFR  (NON AFRICAN AMERICAN): >60 ML/MIN/1.73 M^2
GLUCOSE SERPL-MCNC: 229 MG/DL (ref 70–110)
GRAM STN SPEC: ABNORMAL
HCT VFR BLD AUTO: 35.4 % (ref 40–54)
HGB BLD-MCNC: 11.7 G/DL (ref 14–18)
IMM GRANULOCYTES # BLD AUTO: 0.36 K/UL (ref 0–0.04)
IMM GRANULOCYTES NFR BLD AUTO: 1.3 % (ref 0–0.5)
LYMPHOCYTES # BLD AUTO: 1 K/UL (ref 1–4.8)
LYMPHOCYTES NFR BLD: 3.4 % (ref 18–48)
MCH RBC QN AUTO: 28.7 PG (ref 27–31)
MCHC RBC AUTO-ENTMCNC: 33.1 G/DL (ref 32–36)
MCV RBC AUTO: 87 FL (ref 82–98)
MONOCYTES # BLD AUTO: 0.3 K/UL (ref 0.3–1)
MONOCYTES NFR BLD: 0.9 % (ref 4–15)
NEUTROPHILS # BLD AUTO: 26.5 K/UL (ref 1.8–7.7)
NEUTROPHILS NFR BLD: 93.9 % (ref 38–73)
NRBC BLD-RTO: 0 /100 WBC
PLATELET # BLD AUTO: 342 K/UL (ref 150–450)
PMV BLD AUTO: 9.6 FL (ref 9.2–12.9)
POCT GLUCOSE: 159 MG/DL (ref 70–110)
POCT GLUCOSE: 250 MG/DL (ref 70–110)
POTASSIUM SERPL-SCNC: 3 MMOL/L (ref 3.5–5.1)
PROT SERPL-MCNC: 6.3 G/DL (ref 6–8.4)
RBC # BLD AUTO: 4.08 M/UL (ref 4.6–6.2)
SODIUM SERPL-SCNC: 140 MMOL/L (ref 136–145)
WBC # BLD AUTO: 28.22 K/UL (ref 3.9–12.7)

## 2022-01-12 PROCEDURE — 63600175 PHARM REV CODE 636 W HCPCS: Performed by: HOSPITALIST

## 2022-01-12 PROCEDURE — 36415 COLL VENOUS BLD VENIPUNCTURE: CPT | Performed by: STUDENT IN AN ORGANIZED HEALTH CARE EDUCATION/TRAINING PROGRAM

## 2022-01-12 PROCEDURE — 25000003 PHARM REV CODE 250: Performed by: UROLOGY

## 2022-01-12 PROCEDURE — 25000242 PHARM REV CODE 250 ALT 637 W/ HCPCS: Performed by: STUDENT IN AN ORGANIZED HEALTH CARE EDUCATION/TRAINING PROGRAM

## 2022-01-12 PROCEDURE — 92526 ORAL FUNCTION THERAPY: CPT

## 2022-01-12 PROCEDURE — 63600175 PHARM REV CODE 636 W HCPCS: Performed by: UROLOGY

## 2022-01-12 PROCEDURE — 25000003 PHARM REV CODE 250: Performed by: INTERNAL MEDICINE

## 2022-01-12 PROCEDURE — 94645 CONT INHLJ TX EACH ADDL HOUR: CPT

## 2022-01-12 PROCEDURE — 25000003 PHARM REV CODE 250: Performed by: HOSPITALIST

## 2022-01-12 PROCEDURE — 94760 N-INVAS EAR/PLS OXIMETRY 1: CPT

## 2022-01-12 PROCEDURE — 94640 AIRWAY INHALATION TREATMENT: CPT

## 2022-01-12 PROCEDURE — 99232 PR SUBSEQUENT HOSPITAL CARE,LEVL II: ICD-10-PCS | Mod: ,,, | Performed by: PSYCHIATRY & NEUROLOGY

## 2022-01-12 PROCEDURE — 27100171 HC OXYGEN HIGH FLOW UP TO 24 HOURS

## 2022-01-12 PROCEDURE — 99232 SBSQ HOSP IP/OBS MODERATE 35: CPT | Mod: ,,, | Performed by: PSYCHIATRY & NEUROLOGY

## 2022-01-12 PROCEDURE — 94761 N-INVAS EAR/PLS OXIMETRY MLT: CPT

## 2022-01-12 PROCEDURE — 11000001 HC ACUTE MED/SURG PRIVATE ROOM

## 2022-01-12 PROCEDURE — 85025 COMPLETE CBC W/AUTO DIFF WBC: CPT | Performed by: STUDENT IN AN ORGANIZED HEALTH CARE EDUCATION/TRAINING PROGRAM

## 2022-01-12 PROCEDURE — 80053 COMPREHEN METABOLIC PANEL: CPT | Performed by: STUDENT IN AN ORGANIZED HEALTH CARE EDUCATION/TRAINING PROGRAM

## 2022-01-12 PROCEDURE — 99233 PR SUBSEQUENT HOSPITAL CARE,LEVL III: ICD-10-PCS | Mod: ,,, | Performed by: STUDENT IN AN ORGANIZED HEALTH CARE EDUCATION/TRAINING PROGRAM

## 2022-01-12 PROCEDURE — 63700000 PHARM REV CODE 250 ALT 637 W/O HCPCS: Performed by: HOSPITALIST

## 2022-01-12 PROCEDURE — 99233 SBSQ HOSP IP/OBS HIGH 50: CPT | Mod: ,,, | Performed by: STUDENT IN AN ORGANIZED HEALTH CARE EDUCATION/TRAINING PROGRAM

## 2022-01-12 RX ORDER — DIVALPROEX SODIUM 250 MG/1
750 TABLET, FILM COATED, EXTENDED RELEASE ORAL DAILY
Status: DISCONTINUED | OUTPATIENT
Start: 2022-01-13 | End: 2022-01-14

## 2022-01-12 RX ADMIN — ATORVASTATIN CALCIUM 80 MG: 40 TABLET, FILM COATED ORAL at 10:01

## 2022-01-12 RX ADMIN — Medication 6 MG: at 08:01

## 2022-01-12 RX ADMIN — IPRATROPIUM BROMIDE AND ALBUTEROL SULFATE 3 ML: 2.5; .5 SOLUTION RESPIRATORY (INHALATION) at 04:01

## 2022-01-12 RX ADMIN — ONDANSETRON 4 MG: 2 INJECTION INTRAMUSCULAR; INTRAVENOUS at 12:01

## 2022-01-12 RX ADMIN — INSULIN ASPART 2 UNITS: 100 INJECTION, SOLUTION INTRAVENOUS; SUBCUTANEOUS at 12:01

## 2022-01-12 RX ADMIN — METOPROLOL SUCCINATE 50 MG: 50 TABLET, EXTENDED RELEASE ORAL at 10:01

## 2022-01-12 RX ADMIN — METRONIDAZOLE 500 MG: 500 TABLET ORAL at 06:01

## 2022-01-12 RX ADMIN — ACETYLCYSTEINE 4 ML: 100 SOLUTION ORAL; RESPIRATORY (INHALATION) at 08:01

## 2022-01-12 RX ADMIN — IPRATROPIUM BROMIDE AND ALBUTEROL SULFATE 3 ML: 2.5; .5 SOLUTION RESPIRATORY (INHALATION) at 08:01

## 2022-01-12 RX ADMIN — LAMOTRIGINE 200 MG: 100 TABLET ORAL at 10:01

## 2022-01-12 RX ADMIN — TAMSULOSIN HYDROCHLORIDE 0.4 MG: 0.4 CAPSULE ORAL at 10:01

## 2022-01-12 RX ADMIN — PIPERACILLIN AND TAZOBACTAM 4.5 G: 4; .5 INJECTION, POWDER, LYOPHILIZED, FOR SOLUTION INTRAVENOUS; PARENTERAL at 08:01

## 2022-01-12 RX ADMIN — ASPIRIN 81 MG: 81 TABLET, COATED ORAL at 10:01

## 2022-01-12 RX ADMIN — DIVALPROEX SODIUM 500 MG: 250 TABLET, EXTENDED RELEASE ORAL at 10:01

## 2022-01-12 RX ADMIN — POLYETHYLENE GLYCOL 3350 17 G: 17 POWDER, FOR SOLUTION ORAL at 10:01

## 2022-01-12 RX ADMIN — LAMOTRIGINE 200 MG: 100 TABLET ORAL at 08:01

## 2022-01-12 RX ADMIN — PIPERACILLIN AND TAZOBACTAM 4.5 G: 4; .5 INJECTION, POWDER, LYOPHILIZED, FOR SOLUTION INTRAVENOUS; PARENTERAL at 04:01

## 2022-01-12 RX ADMIN — LACOSAMIDE 200 MG: 50 TABLET, FILM COATED ORAL at 08:01

## 2022-01-12 RX ADMIN — AMLODIPINE BESYLATE 10 MG: 5 TABLET ORAL at 10:01

## 2022-01-12 RX ADMIN — PIPERACILLIN AND TAZOBACTAM 4.5 G: 4; .5 INJECTION, POWDER, LYOPHILIZED, FOR SOLUTION INTRAVENOUS; PARENTERAL at 12:01

## 2022-01-12 RX ADMIN — LACOSAMIDE 200 MG: 50 TABLET, FILM COATED ORAL at 10:01

## 2022-01-12 RX ADMIN — FLUCONAZOLE 400 MG: 100 TABLET ORAL at 10:01

## 2022-01-12 NOTE — PLAN OF CARE
JUSTO spoke with patient's daughter, Ruiz via phone. Ruiz expressed concern for patient returning home and inquired if SNF would be a better option. Ruiz stated that patient was ambulatory prior to hospitalization and now he is not. Ruiz stated that she does not think patient's wife will be able to take care of patient if he is not walking. Ruiz stated that patient has declined in health and didn't recognize her when she spoke to him.  SW explained that patient uses his [facility] benefits and it would be up to the [facility] if patient goes to SNF. SW informed that they are in the process of working on home health for patient. SW discussed care that patient will receive from home health. Ruiz stated that patient needs a sitter. SW informed that the [facility] pays for that as well. SW stated that she will call [facility] to discuss concerns and call her back.     JUSTO spoke with JUSTO Douglass at [facility]. JUSTO expressed daughter's concerns. Evonne informed SW that they don't pay for sitters but can arrange for extra assistance. JUSTO inquired about SNF. Evonne stated that patient can go to SNF. Evonne instructed SW to send doctor's notes, PT/OT notes recommending SNF, and wound care notes. Evonne stated that the [facility] may not be accepting new patient's due to SNF. Evonne said that if it is the case, she will send SW a list of SNF they contract with.     JUSTO contacted Dr. Aviles, via secure chat. JUSTO expressed daughter's concerns and inquired if patient would benefit from SNF and if a consult can be placed for PT/OT eval.  Dr. Aviles stated that patient would benefit from SNF. Dr. Aviles stated that he will consult PT/OT.

## 2022-01-12 NOTE — PROGRESS NOTES
[facility]  Urology  Progress Note    Patient Name: Abhishek Zhao  MRN: [id number]  Admission Date: 1/4/2022  Hospital Length of Stay: 7 days  Code Status: Full Code   Attending Provider: Hugo Aviles MD   Primary Care Physician: To Obtain Unable    Subjective:     HPI:  Scrotal Swelling  Abhishek Zhao is a 61 y.o. male who was been experiencing scrotal pain and swelling since 12/31/2021.  He denies any fever.  He has had issues voiding since the swelling began.  Hemostat having issues in the past with urinary problems.  He denies having any previous issues with scrotal infection or swelling.  He recalls that he had procedures in the past but cannot recall specifically what to place.  He does not have urologist locally but moved back from [place] about 1 year ago.    Review of care everywhere shows that he had a cystoscopy with urethral dilation of a urethral stricture in the bulbar urethra in 2019 at [facility].  And there are reports that in approximately 2015 [facility].      Interval History: tolerating diet with family present at bedside, santiago draining    Review of Systems   Unable to perform ROS: Mental status change     Objective:     Temp:  [97.3 °F (36.3 °C)-98.4 °F (36.9 °C)] 97.3 °F (36.3 °C)  Pulse:  [62-77] 68  Resp:  [18-20] 18  SpO2:  [93 %-100 %] 96 %  BP: (137-179)/(68-87) 147/70     Body mass index is 20.15 kg/m².    Date 01/12/22 0700 - 01/13/22 0659   Shift 6319-0677 6888-1905 7873-6734 24 Hour Total   INTAKE   Shift Total(mL/kg)       OUTPUT   Urine(mL/kg/hr) 1000(2)   1000   Shift Total(mL/kg) 1000(16.2)   1000(16.2)   Weight (kg) 61.9 61.9 61.9 61.9          Drains     Drain                 Urethral Catheter 01/05/22 1050 Double-lumen 20 Fr. 7 days                Physical Exam  Constitutional:       General: He is not in acute distress.     Appearance: He is well-developed. He is not ill-appearing or diaphoretic.   HENT:      Head:  Normocephalic and atraumatic.      Mouth/Throat:      Mouth: Mucous membranes are moist.   Eyes:      Conjunctiva/sclera: Conjunctivae normal.   Cardiovascular:      Rate and Rhythm: Normal rate and regular rhythm.   Pulmonary:      Comments: NC in place  Abdominal:      General: Abdomen is flat. There is no distension.      Tenderness: There is no abdominal tenderness.   Genitourinary:     Comments: Viable wound edges, santiago draining well  Musculoskeletal:         General: No swelling or deformity.      Cervical back: Neck supple.   Skin:     General: Skin is warm.      Capillary Refill: Capillary refill takes less than 2 seconds.      Findings: No rash.   Neurological:      Mental Status: He is oriented to person, place, and time.      Gait: Gait normal.   Psychiatric:         Mood and Affect: Mood normal.         Thought Content: Thought content normal.         Judgment: Judgment normal.         Significant Labs:    BMP:  Recent Labs   Lab 01/10/22  0506 01/11/22  0403 01/12/22  1208    140 140   K 3.4* 3.6 3.0*    108 109   CO2 23 20* 24   BUN 11 8 7   CREATININE 1.0 1.1 1.1   CALCIUM 8.1* 8.2* 8.1*       CBC:   Recent Labs   Lab 01/09/22  0430 01/11/22  0403 01/12/22  1208   WBC 12.65 16.66* 28.22*   HGB 10.5* 13.0* 11.7*   HCT 33.1* 39.3* 35.4*    387 342                 Assessment/Plan:     * Scrotal abscess  s/p cystoscopy with santiago placement and debridement of abscess/necrosis of scrotum 1/5/22 with Dr. Rangel.    Continue abx per primary  Cultures growing Candida and E coli  Appreciate wound care RN and floor RN dressing changes  Will continue to monitor condition    Scrotal US obtained 1/9/2022 with concern for fluid pocket posterior to testicles, exam did not reveal additional loculation.     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

## 2022-01-12 NOTE — PT/OT/SLP PROGRESS
Speech Language Pathology Treatment    Patient Name:  Abhishek Zhao   MRN:  [id number]  Admitting Diagnosis: Scrotal abscess    Recommendations:                 General Recommendations:  Dysphagia therapy  Diet recommendations:  Mechanical soft,Finely chopped meat, Liquid Diet Level: Thin   Aspiration Precautions: 1 bite/sip at a time, Alternating bites/sips, Assistance with meals, Meds whole buried in puree and Wear oxygen during intake   General Precautions: Standard,    Communication strategies:  none    Subjective     Upon ST entrance, pt was consuming lunch tray with A from his sister, with pt's wife present at bedside. ST noted pt's dcr ability to sustain alertness, as well as his dcr ability to hold a conversation with ST compared to yesterday. A rapid response was called earlier today 2/2 potential seizure per pt's wife.     Pain/Comfort:  · Pain Rating 1: 0/10    Respiratory Status: High flow nasal cannula 4L/min    Objective:     Has the patient been evaluated by SLP for swallowing?   Yes  Keep patient NPO? No   Current Respiratory Status:        The pt consumed ~25% of his [facility] soft lunch tray, with increased difficulty masticating chopped chicken. ST noted pt's prolonged mastication and mild oral resiude for all consistencies 2/2 being edentulous, as well as dcr alertness. ST educated the pt's sister and wife in regards to only feeding the pt when he is fully alert and awake to reduce the risk of aspiration, as well as to provide the pt with small bites of food followed by sips of liquids. Both verbalized a good understanding of all information presented.     Assessment:     ST recs the pt cont mech soft diet (IDDSI 5) ONLY when he is fully alert and awake. All trays should be HELD if the pt is not FULLY alert and awake. Meds given whole in puree, or crushed in puree if the pt cannot tolerate. ST will cont to follow.       Goals:   Multidisciplinary Problems     SLP Goals        Problem: SLP Goal    Goal  Priority Disciplines Outcome   SLP Goal    Medium SLP Ongoing, Progressing   Description: Goals:  1. Pt will tolerate mech soft diet (IDDSI-5) with thin liquids w/o overt s/s of aspiration.                    Plan:     · Patient to be seen:  3 x/week   · Plan of Care expires:     · Plan of Care reviewed with:  patient,sibling   · SLP Follow-Up:  Yes       Discharge recommendations:      Barriers to Discharge:  None    Time Tracking:     SLP Treatment Date:   01/12/22  Speech Start Time:  1245  Speech Stop Time:  1256     Speech Total Time (min):  11 min    Billable Minutes: Treatment Swallowing Dysfunction 11    01/12/2022

## 2022-01-12 NOTE — ASSESSMENT & PLAN NOTE
Cough + new LLL opacity on imaging c/f pneumonia.   - respiratory cultures pending; gram stain w/ GNR  - cont zosyn  - will interrogate w/ US for effusion

## 2022-01-12 NOTE — PLAN OF CARE
Problem: SLP Goal  Goal: SLP Goal  Description: Goals:  1. Pt will tolerate [facility] soft diet (IDDSI-5) with thin liquids w/o overt s/s of aspiration.   Outcome: Ongoing, Progressing

## 2022-01-12 NOTE — ASSESSMENT & PLAN NOTE
s/p cystoscopy with santiago placement and debridement of abscess/necrosis of scrotum 1/5/22 with Dr. Rangel.    Continue abx per primary  Cultures growing Candida and E coli  Appreciate wound care RN and floor RN dressing changes  Will continue to monitor condition    Scrotal US obtained 1/9/2022 with concern for fluid pocket posterior to testicles, exam did not reveal additional loculation.     Increase in WBC known pneumonia, recommend CTabdomen pelvis with contrast to ensure no other process is ongoing    Will likely need plastics consultation in future for wound closure/grafting

## 2022-01-12 NOTE — PROGRESS NOTES
[facility] Medicine  Progress Note    Patient Name: Abhishek Zhao  MRN: [id number]  Patient Class: IP- Inpatient   Admission Date: 1/4/2022  Length of Stay: 7 days  Attending Physician: Hugo Aviles MD  Primary Care Provider: To Obtain Unable        Subjective:     Principal Problem:Scrotal abscess        HPI:  61 y.o. male with adjustment disorder with depressed mood, chronic ischemic heart disease, cocaine dependence in remission, cardiomegaly, HLD, HTN, swizure disorder, tobacco use, and DM 2 presents with a complaint of testicular pain.  Associated with swelling and redness to the scrotum and penis along with difficulty urinating, pain is rated as severe and radiates to the rectum.  Denies fever, chills, cough, SOB, chest pain, n/v/d.  In the ED, he was found to be tachypneic, with leukocytosis and elevated lactic.  CT and US shows evidence of multiple scrotal and perineal abscesses with some extension into the penile shaft.  UA with evidence of infection.  Sepsis treatment initiated, blood and urine cultures obtained, IV fluids and IV antibiotics initiated.  Urology consulted.      Overview/Hospital Course:  61 y.o. male with adjustment disorder with depressed mood, chronic ischemic heart disease, cocaine dependence in remission, cardiomegaly, HLD, HTN, swizure disorder, tobacco use, and DM 2 presents with a complaint of testicular pain, found to have multiple scrotal and perineal abscesses. Placed on broad spectrum antibiotics, underwent I&D with urology.          Interval History: No events overnight. O2 being weaned.     Review of Systems   Constitutional: Negative for chills and fever.   Respiratory: Negative for cough and shortness of breath.    Cardiovascular: Negative for chest pain and leg swelling.   Gastrointestinal: Negative for abdominal distention and abdominal pain.     Objective:     Vital Signs (Most Recent):  Temp: 97.3 °F (36.3 °C) (01/12/22 0756)  Pulse: 68 (01/12/22  0756)  Resp: 18 (01/12/22 0756)  BP: (!) 167/73 (01/12/22 0756)  SpO2: 99 % (01/12/22 0756) Vital Signs (24h Range):  Temp:  [97.3 °F (36.3 °C)-98.4 °F (36.9 °C)] 97.3 °F (36.3 °C)  Pulse:  [61-73] 68  Resp:  [18-20] 18  SpO2:  [93 %-100 %] 99 %  BP: (137-179)/(68-87) 167/73     Weight: 61.9 kg (136 lb 7.4 oz)  Body mass index is 20.15 kg/m².    Intake/Output Summary (Last 24 hours) at 1/12/2022 0759  Last data filed at 1/12/2022 0700  Gross per 24 hour   Intake 120 ml   Output 1700 ml   Net -1580 ml      Physical Exam  Constitutional:       General: He is not in acute distress.     Appearance: He is ill-appearing. He is not toxic-appearing or diaphoretic.   Cardiovascular:      Rate and Rhythm: Normal rate and regular rhythm.      Heart sounds: No murmur heard.  No gallop.    Pulmonary:      Breath sounds: No stridor. No wheezing or rales.      Comments: Reduced sounds at L base, improved from prior  Abdominal:      General: Bowel sounds are normal. There is no distension.      Palpations: Abdomen is soft.      Tenderness: There is no abdominal tenderness.         Significant Labs: All pertinent labs within the past 24 hours have been reviewed.    Significant Imaging: I have reviewed all pertinent imaging results/findings within the past 24 hours.      Assessment/Plan:      * Scrotal abscess  Multiple abscesses c/w Berna's gangrene, s/p I&D by urology on 1/5. Cultures growing ampicillin-resistant Ecoli and C albicans.   - urology following  - cont zosyn (re-started for HCAP)  - ID recommending augmentin at dc, however cultures show intermediate susceptibility, will likely use alternate agent          Encephalopathy, metabolic  CT head and EEG without acute process; likely due to infection.   - neurology consulted      HCAP (healthcare-associated pneumonia)  - see respiratory failure       Acute respiratory failure with hypoxia and hypercarbia  Cough + new LLL opacity on imaging c/f pneumonia.   - respiratory  cultures pending; gram stain w/ GNR  - cont zosyn  - will interrogate w/ US for effusion      Hypokalemia  Replete PRN    JOI (acute kidney injury)  Resolved      Sepsis due to Gram negative bacteria  Resolved      Berna's gangrene  As above    Sepsis  - see scrotal abscess     This patient does have evidence of infective focus  My overall impression is sepsis. Vital signs were reviewed and noted in progress note.  Antibiotics given-   Antibiotics (From admission, onward)            Start     Stop Route Frequency Ordered    01/06/22 2100  mupirocin 2 % ointment         01/11 2059 Nasl 2 times daily 01/06/22 1337    01/05/22 0200  piperacillin-tazobactam 4.5 g in dextrose 5 % 100 mL IVPB (ready to mix system)         -- IV Every 8 hours (non-standard times) 01/04/22 2241        Cultures were taken-   Microbiology Results (last 7 days)     Procedure Component Value Units Date/Time    AFB Culture & Smear [[id number]] Collected: 01/05/22 1055    Order Status: Completed Specimen: Abscess from Groin Updated: 01/07/22 2127     AFB Culture & Smear Culture in progress     AFB CULTURE STAIN No acid fast bacilli seen.    Blood Culture #1 **CANNOT BE ORDERED STAT** [[id number]] Collected: 01/04/22 1744    Order Status: Completed Specimen: Blood from Peripheral, Lower Arm, Left Updated: 01/07/22 1303     Blood Culture, Routine No Growth to date      No Growth to date      No Growth to date    Blood Culture #2 **CANNOT BE ORDERED STAT** [[id number]] Collected: 01/04/22 1744    Order Status: Completed Specimen: Blood from Peripheral, Antecubital, Right Updated: 01/07/22 1303     Blood Culture, Routine No Growth to date      No Growth to date      No Growth to date    Urine culture **CANNOT BE ORDERED STAT** [[id number]]  (Abnormal) Collected: 01/04/22 1804    Order Status: Completed Specimen: Urine, Clean Catch Updated: 01/07/22 1118     Urine Culture, Routine YAMILET ALBICANS  10,000 - 49,999 cfu/ml  Treatment of asymptomatic  candiduria is not recommended (except for   specific populations). Candida isolated in the urine typically   represents colonization. If an indwelling urinary catheter is present  it should be removed or replaced.      Narrative:      Indicated criteria for high risk culture:->Other  Other (specify):->Protocol    Aerobic culture [[id number]]  (Abnormal)  (Susceptibility) Collected: 01/05/22 1055    Order Status: Completed Specimen: Abscess from Groin Updated: 01/07/22 0846     Aerobic Bacterial Culture ESCHERICHIA COLI  Moderate        YAMILET ALBICANS  Few      Culture, Anaerobe [[id number]] Collected: 01/05/22 1055    Order Status: Completed Specimen: Abscess from Groin Updated: 01/07/22 0733     Anaerobic Culture Culture in progress    Fungus culture [[id number]] Collected: 01/05/22 1055    Order Status: Sent Specimen: Abscess from Groin Updated: 01/05/22 1401    Culture, Anaerobe [[id number]] Collected: 01/05/22 1055    Order Status: Canceled Specimen: Abscess from Groin     Aerobic culture [[id number]] Collected: 01/05/22 1055    Order Status: Canceled Specimen: Abscess from Groin         Latest lactate reviewed, they are-  No results for input(s): LACTATE in the last 72 hours.    Organ dysfunction indicated by n/a  Source- abscesses    Source control Achieved by- Urology consult      Diabetes mellitus type 2, controlled  Check a1c  Hold oral antihyperglycemics while inpatient  PRN sliding scale insulin  ACHS glucose monitoring   ADA diet     Tobacco user  5 minutes spent counseling the patient on smoking cessation and he is not currently ready to stop smoking. He will be offereded a nicotine transdermal patch while hospitalized and monitored for withdrawal.  Will provide additional smoking cessation counseling prior to discharge.     Seizure disorder  Episode of AMS 1/10 likely due to aspiration/infection not seizure  - cont home AEDs  - neurology consulted    Essential hypertension  Well controlled, continue home  medications and monitor blood pressure, adjust as needed.     Hyperlipidemia  Continue statin    Cardiomegaly  No evidence of acute decompensation or fluid overload, continue home regimen    Cocaine dependence in remission  Counseled     Chronic ischemic heart disease  No acute issue    Adjustment disorder with depressed mood  Continue home regimen of citalopram and quetiapine      VTE Risk Mitigation (From admission, onward)         Ordered     IP VTE LOW RISK PATIENT  Once         01/04/22 2241     Place sequential compression device  Until discontinued         01/04/22 2241                Discharge Planning   CHUCK:      Code Status: Full Code   Is the patient medically ready for discharge?:     Reason for patient still in hospital (select all that apply): Patient trending condition, Laboratory test, Treatment, Consult recommendations and Pending disposition  Discharge Plan A: Home Health   Discharge Delays: None known at this time              Hugo Aviles MD  Department of Hospital Medicine   [facility]

## 2022-01-12 NOTE — SUBJECTIVE & OBJECTIVE
Interval History: tolerating diet with family present at bedside, santiago draining    Review of Systems   Unable to perform ROS: Mental status change     Objective:     Temp:  [97.3 °F (36.3 °C)-98.4 °F (36.9 °C)] 97.3 °F (36.3 °C)  Pulse:  [62-77] 68  Resp:  [18-20] 18  SpO2:  [93 %-100 %] 96 %  BP: (137-179)/(68-87) 147/70     Body mass index is 20.15 kg/m².    Date 01/12/22 0700 - 01/13/22 0659   Shift 2958-7763 4493-7880 9262-0872 24 Hour Total   INTAKE   Shift Total(mL/kg)       OUTPUT   Urine(mL/kg/hr) 1000(2)   1000   Shift Total(mL/kg) 1000(16.2)   1000(16.2)   Weight (kg) 61.9 61.9 61.9 61.9          Drains     Drain                 Urethral Catheter 01/05/22 1050 Double-lumen 20 Fr. 7 days                Physical Exam  Constitutional:       General: He is not in acute distress.     Appearance: He is well-developed. He is not ill-appearing or diaphoretic.   HENT:      Head: Normocephalic and atraumatic.      Mouth/Throat:      Mouth: Mucous membranes are moist.   Eyes:      Conjunctiva/sclera: Conjunctivae normal.   Cardiovascular:      Rate and Rhythm: Normal rate and regular rhythm.   Pulmonary:      Comments: NC in place  Abdominal:      General: Abdomen is flat. There is no distension.      Tenderness: There is no abdominal tenderness.   Genitourinary:     Comments: Viable wound edges, santiago draining well  Musculoskeletal:         General: No swelling or deformity.      Cervical back: Neck supple.   Skin:     General: Skin is warm.      Capillary Refill: Capillary refill takes less than 2 seconds.      Findings: No rash.   Neurological:      Mental Status: He is oriented to person, place, and time.      Gait: Gait normal.   Psychiatric:         Mood and Affect: Mood normal.         Thought Content: Thought content normal.         Judgment: Judgment normal.         Significant Labs:    BMP:  Recent Labs   Lab 01/10/22  0506 01/11/22  0403 01/12/22  1208    140 140   K 3.4* 3.6 3.0*    108 109    CO2 23 20* 24   BUN 11 8 7   CREATININE 1.0 1.1 1.1   CALCIUM 8.1* 8.2* 8.1*       CBC:   Recent Labs   Lab 01/09/22  0430 01/11/22  0403 01/12/22  1208   WBC 12.65 16.66* 28.22*   HGB 10.5* 13.0* 11.7*   HCT 33.1* 39.3* 35.4*    387 342

## 2022-01-12 NOTE — SUBJECTIVE & OBJECTIVE
Interval History: No events overnight. O2 being weaned.     Review of Systems   Constitutional: Negative for chills and fever.   Respiratory: Negative for cough and shortness of breath.    Cardiovascular: Negative for chest pain and leg swelling.   Gastrointestinal: Negative for abdominal distention and abdominal pain.     Objective:     Vital Signs (Most Recent):  Temp: 97.3 °F (36.3 °C) (01/12/22 0756)  Pulse: 68 (01/12/22 0756)  Resp: 18 (01/12/22 0756)  BP: (!) 167/73 (01/12/22 0756)  SpO2: 99 % (01/12/22 0756) Vital Signs (24h Range):  Temp:  [97.3 °F (36.3 °C)-98.4 °F (36.9 °C)] 97.3 °F (36.3 °C)  Pulse:  [61-73] 68  Resp:  [18-20] 18  SpO2:  [93 %-100 %] 99 %  BP: (137-179)/(68-87) 167/73     Weight: 61.9 kg (136 lb 7.4 oz)  Body mass index is 20.15 kg/m².    Intake/Output Summary (Last 24 hours) at 1/12/2022 0759  Last data filed at 1/12/2022 0700  Gross per 24 hour   Intake 120 ml   Output 1700 ml   Net -1580 ml      Physical Exam  Constitutional:       General: He is not in acute distress.     Appearance: He is ill-appearing. He is not toxic-appearing or diaphoretic.   Cardiovascular:      Rate and Rhythm: Normal rate and regular rhythm.      Heart sounds: No murmur heard.  No gallop.    Pulmonary:      Breath sounds: No stridor. No wheezing or rales.      Comments: Reduced sounds at L base, improved from prior  Abdominal:      General: Bowel sounds are normal. There is no distension.      Palpations: Abdomen is soft.      Tenderness: There is no abdominal tenderness.         Significant Labs: All pertinent labs within the past 24 hours have been reviewed.    Significant Imaging: I have reviewed all pertinent imaging results/findings within the past 24 hours.

## 2022-01-12 NOTE — PROGRESS NOTES
[facility]  Rapid Response Nurse Note     Rapid Response Metrics:     Admit Date: 2022  LOS: 7  Code Status: Full Code   Date of Consult: 2022  : 1960  Age: 61 y.o.  Weight:   Wt Readings from Last 1 Encounters:   01/10/22 61.9 kg (136 lb 7.4 oz)     Sex: male  Race: Black or    [facility]: [facility]:   MRN: [id number]    Time Rapid Response Team page Received: 1115  Time Rapid Response Team at Bedside: 1117  Time Rapid Response Team left Bedside: 1125    Was the patient discharged from an ICU this admission?   no  Was the patient discharged from a PACU within last 24 hours?  no  Did the patient receive conscious sedation/general anesthesia within last 24 hours?  no  Was the patient in the ED within the past 24 hours?  no  Was the patient started on NIPPV within the past 24 hours?  no  Did this progress into an ARC or CPA:  no    Attending Physician: Hugo Aviles MD  Rapid Response Indication(s): poss seizure activity     SITUATION:     Reason for Call:   Called to evaluate the patient for Neuro    BACKGROUND:     Why is the patient in the hospital?: scrotal abcess  What changed?: mental status    ASSESSMENT:     What did you find: pt alert by the time RR team at bedside. Primary MD at bedside already    RECOMMENDATIONS:     We recommend: neuro consult/ follow up    FOLLOW-UP/CONTINGENCY PLAN:     Patient needs a second visit at : 1230  Call the rapid response Nurse at [phone] for additional questions or concerns.      PHYSICIAN ESCALATION:     Orders received and case discussed with MD Aviles    Disposition: Remain in room 407.

## 2022-01-13 LAB
ALBUMIN SERPL BCP-MCNC: 1.5 G/DL (ref 3.5–5.2)
ALP SERPL-CCNC: 477 U/L (ref 55–135)
ALT SERPL W/O P-5'-P-CCNC: 23 U/L (ref 10–44)
ANION GAP SERPL CALC-SCNC: 10 MMOL/L (ref 8–16)
AST SERPL-CCNC: 25 U/L (ref 10–40)
BASOPHILS # BLD AUTO: 0.12 K/UL (ref 0–0.2)
BASOPHILS NFR BLD: 0.5 % (ref 0–1.9)
BILIRUB SERPL-MCNC: 0.1 MG/DL (ref 0.1–1)
BUN SERPL-MCNC: 5 MG/DL (ref 6–20)
CALCIUM SERPL-MCNC: 8.5 MG/DL (ref 8.7–10.5)
CHLORIDE SERPL-SCNC: 106 MMOL/L (ref 95–110)
CO2 SERPL-SCNC: 23 MMOL/L (ref 23–29)
CREAT SERPL-MCNC: 1 MG/DL (ref 0.5–1.4)
DIFFERENTIAL METHOD: ABNORMAL
EOSINOPHIL # BLD AUTO: 0 K/UL (ref 0–0.5)
EOSINOPHIL NFR BLD: 0.1 % (ref 0–8)
ERYTHROCYTE [DISTWIDTH] IN BLOOD BY AUTOMATED COUNT: 12.9 % (ref 11.5–14.5)
EST. GFR  (AFRICAN AMERICAN): >60 ML/MIN/1.73 M^2
EST. GFR  (NON AFRICAN AMERICAN): >60 ML/MIN/1.73 M^2
GLUCOSE SERPL-MCNC: 139 MG/DL (ref 70–110)
HCT VFR BLD AUTO: 35.5 % (ref 40–54)
HGB BLD-MCNC: 12.2 G/DL (ref 14–18)
IMM GRANULOCYTES # BLD AUTO: 0.76 K/UL (ref 0–0.04)
IMM GRANULOCYTES NFR BLD AUTO: 2.9 % (ref 0–0.5)
LACOSAMIDE: 1.6 MCG/ML (ref 1–10)
LAMOTRIGINE SERPL-MCNC: 12.7 UG/ML (ref 2–15)
LYMPHOCYTES # BLD AUTO: 1.2 K/UL (ref 1–4.8)
LYMPHOCYTES NFR BLD: 4.5 % (ref 18–48)
MCH RBC QN AUTO: 29.4 PG (ref 27–31)
MCHC RBC AUTO-ENTMCNC: 34.4 G/DL (ref 32–36)
MCV RBC AUTO: 86 FL (ref 82–98)
MONOCYTES # BLD AUTO: 0.4 K/UL (ref 0.3–1)
MONOCYTES NFR BLD: 1.4 % (ref 4–15)
NEUTROPHILS # BLD AUTO: 23.6 K/UL (ref 1.8–7.7)
NEUTROPHILS NFR BLD: 90.6 % (ref 38–73)
NRBC BLD-RTO: 0 /100 WBC
PLATELET # BLD AUTO: 301 K/UL (ref 150–450)
PMV BLD AUTO: 10 FL (ref 9.2–12.9)
POCT GLUCOSE: 150 MG/DL (ref 70–110)
POCT GLUCOSE: 180 MG/DL (ref 70–110)
POCT GLUCOSE: 181 MG/DL (ref 70–110)
POCT GLUCOSE: 246 MG/DL (ref 70–110)
POCT GLUCOSE: 281 MG/DL (ref 70–110)
POTASSIUM SERPL-SCNC: 2.9 MMOL/L (ref 3.5–5.1)
PROT SERPL-MCNC: 6.6 G/DL (ref 6–8.4)
RBC # BLD AUTO: 4.15 M/UL (ref 4.6–6.2)
SODIUM SERPL-SCNC: 139 MMOL/L (ref 136–145)
WBC # BLD AUTO: 26 K/UL (ref 3.9–12.7)

## 2022-01-13 PROCEDURE — 99024 PR POST-OP FOLLOW-UP VISIT: ICD-10-PCS | Mod: ,,, | Performed by: UROLOGY

## 2022-01-13 PROCEDURE — 11000001 HC ACUTE MED/SURG PRIVATE ROOM

## 2022-01-13 PROCEDURE — 25000242 PHARM REV CODE 250 ALT 637 W/ HCPCS: Performed by: STUDENT IN AN ORGANIZED HEALTH CARE EDUCATION/TRAINING PROGRAM

## 2022-01-13 PROCEDURE — 63700000 PHARM REV CODE 250 ALT 637 W/O HCPCS: Performed by: HOSPITALIST

## 2022-01-13 PROCEDURE — 63600175 PHARM REV CODE 636 W HCPCS: Performed by: STUDENT IN AN ORGANIZED HEALTH CARE EDUCATION/TRAINING PROGRAM

## 2022-01-13 PROCEDURE — 27100171 HC OXYGEN HIGH FLOW UP TO 24 HOURS

## 2022-01-13 PROCEDURE — 25000003 PHARM REV CODE 250: Performed by: UROLOGY

## 2022-01-13 PROCEDURE — 97166 OT EVAL MOD COMPLEX 45 MIN: CPT

## 2022-01-13 PROCEDURE — 94761 N-INVAS EAR/PLS OXIMETRY MLT: CPT

## 2022-01-13 PROCEDURE — 94640 AIRWAY INHALATION TREATMENT: CPT

## 2022-01-13 PROCEDURE — 25000003 PHARM REV CODE 250: Performed by: STUDENT IN AN ORGANIZED HEALTH CARE EDUCATION/TRAINING PROGRAM

## 2022-01-13 PROCEDURE — 25000003 PHARM REV CODE 250: Performed by: HOSPITALIST

## 2022-01-13 PROCEDURE — 99233 SBSQ HOSP IP/OBS HIGH 50: CPT | Mod: ,,, | Performed by: INTERNAL MEDICINE

## 2022-01-13 PROCEDURE — 63600175 PHARM REV CODE 636 W HCPCS: Performed by: HOSPITALIST

## 2022-01-13 PROCEDURE — 99233 PR SUBSEQUENT HOSPITAL CARE,LEVL III: ICD-10-PCS | Mod: ,,, | Performed by: INTERNAL MEDICINE

## 2022-01-13 PROCEDURE — 97161 PT EVAL LOW COMPLEX 20 MIN: CPT

## 2022-01-13 PROCEDURE — 80053 COMPREHEN METABOLIC PANEL: CPT | Performed by: STUDENT IN AN ORGANIZED HEALTH CARE EDUCATION/TRAINING PROGRAM

## 2022-01-13 PROCEDURE — 36415 COLL VENOUS BLD VENIPUNCTURE: CPT | Performed by: STUDENT IN AN ORGANIZED HEALTH CARE EDUCATION/TRAINING PROGRAM

## 2022-01-13 PROCEDURE — 99900035 HC TECH TIME PER 15 MIN (STAT)

## 2022-01-13 PROCEDURE — 25000003 PHARM REV CODE 250: Performed by: PSYCHIATRY & NEUROLOGY

## 2022-01-13 PROCEDURE — 99024 POSTOP FOLLOW-UP VISIT: CPT | Mod: ,,, | Performed by: UROLOGY

## 2022-01-13 PROCEDURE — 25500020 PHARM REV CODE 255: Performed by: STUDENT IN AN ORGANIZED HEALTH CARE EDUCATION/TRAINING PROGRAM

## 2022-01-13 PROCEDURE — 85025 COMPLETE CBC W/AUTO DIFF WBC: CPT | Performed by: STUDENT IN AN ORGANIZED HEALTH CARE EDUCATION/TRAINING PROGRAM

## 2022-01-13 RX ORDER — POTASSIUM CHLORIDE 20 MEQ/1
40 TABLET, EXTENDED RELEASE ORAL 3 TIMES DAILY
Status: COMPLETED | OUTPATIENT
Start: 2022-01-13 | End: 2022-01-13

## 2022-01-13 RX ORDER — ENOXAPARIN SODIUM 100 MG/ML
40 INJECTION SUBCUTANEOUS EVERY 24 HOURS
Status: DISCONTINUED | OUTPATIENT
Start: 2022-01-13 | End: 2022-02-09 | Stop reason: HOSPADM

## 2022-01-13 RX ADMIN — PIPERACILLIN AND TAZOBACTAM 4.5 G: 4; .5 INJECTION, POWDER, LYOPHILIZED, FOR SOLUTION INTRAVENOUS; PARENTERAL at 11:01

## 2022-01-13 RX ADMIN — IPRATROPIUM BROMIDE AND ALBUTEROL SULFATE 3 ML: 2.5; .5 SOLUTION RESPIRATORY (INHALATION) at 01:01

## 2022-01-13 RX ADMIN — PIPERACILLIN AND TAZOBACTAM 4.5 G: 4; .5 INJECTION, POWDER, LYOPHILIZED, FOR SOLUTION INTRAVENOUS; PARENTERAL at 05:01

## 2022-01-13 RX ADMIN — LAMOTRIGINE 200 MG: 100 TABLET ORAL at 11:01

## 2022-01-13 RX ADMIN — LACOSAMIDE 200 MG: 50 TABLET, FILM COATED ORAL at 11:01

## 2022-01-13 RX ADMIN — ACETYLCYSTEINE 4 ML: 100 SOLUTION ORAL; RESPIRATORY (INHALATION) at 08:01

## 2022-01-13 RX ADMIN — ATORVASTATIN CALCIUM 80 MG: 40 TABLET, FILM COATED ORAL at 10:01

## 2022-01-13 RX ADMIN — METOPROLOL SUCCINATE 50 MG: 50 TABLET, EXTENDED RELEASE ORAL at 10:01

## 2022-01-13 RX ADMIN — ENOXAPARIN SODIUM 40 MG: 40 INJECTION SUBCUTANEOUS at 05:01

## 2022-01-13 RX ADMIN — IPRATROPIUM BROMIDE AND ALBUTEROL SULFATE 3 ML: 2.5; .5 SOLUTION RESPIRATORY (INHALATION) at 07:01

## 2022-01-13 RX ADMIN — FLUCONAZOLE 400 MG: 100 TABLET ORAL at 10:01

## 2022-01-13 RX ADMIN — LACOSAMIDE 200 MG: 50 TABLET, FILM COATED ORAL at 10:01

## 2022-01-13 RX ADMIN — LAMOTRIGINE 200 MG: 100 TABLET ORAL at 10:01

## 2022-01-13 RX ADMIN — POLYETHYLENE GLYCOL 3350 17 G: 17 POWDER, FOR SOLUTION ORAL at 10:01

## 2022-01-13 RX ADMIN — PIPERACILLIN AND TAZOBACTAM 4.5 G: 4; .5 INJECTION, POWDER, LYOPHILIZED, FOR SOLUTION INTRAVENOUS; PARENTERAL at 01:01

## 2022-01-13 RX ADMIN — POTASSIUM CHLORIDE 40 MEQ: 20 TABLET, EXTENDED RELEASE ORAL at 11:01

## 2022-01-13 RX ADMIN — ASPIRIN 81 MG: 81 TABLET, COATED ORAL at 10:01

## 2022-01-13 RX ADMIN — POTASSIUM CHLORIDE 40 MEQ: 20 TABLET, EXTENDED RELEASE ORAL at 10:01

## 2022-01-13 RX ADMIN — INSULIN ASPART 3 UNITS: 100 INJECTION, SOLUTION INTRAVENOUS; SUBCUTANEOUS at 06:01

## 2022-01-13 RX ADMIN — IPRATROPIUM BROMIDE AND ALBUTEROL SULFATE 3 ML: 2.5; .5 SOLUTION RESPIRATORY (INHALATION) at 08:01

## 2022-01-13 RX ADMIN — POTASSIUM CHLORIDE 40 MEQ: 20 TABLET, EXTENDED RELEASE ORAL at 03:01

## 2022-01-13 RX ADMIN — ACETYLCYSTEINE 4 ML: 100 SOLUTION ORAL; RESPIRATORY (INHALATION) at 01:01

## 2022-01-13 RX ADMIN — IOHEXOL 75 ML: 350 INJECTION, SOLUTION INTRAVENOUS at 04:01

## 2022-01-13 RX ADMIN — DIVALPROEX SODIUM 750 MG: 250 TABLET, EXTENDED RELEASE ORAL at 10:01

## 2022-01-13 RX ADMIN — TAMSULOSIN HYDROCHLORIDE 0.4 MG: 0.4 CAPSULE ORAL at 10:01

## 2022-01-13 RX ADMIN — QUETIAPINE FUMARATE 12.5 MG: 25 TABLET ORAL at 11:01

## 2022-01-13 RX ADMIN — IPRATROPIUM BROMIDE AND ALBUTEROL SULFATE 3 ML: 2.5; .5 SOLUTION RESPIRATORY (INHALATION) at 05:01

## 2022-01-13 RX ADMIN — AMLODIPINE BESYLATE 10 MG: 5 TABLET ORAL at 10:01

## 2022-01-13 NOTE — SUBJECTIVE & OBJECTIVE
Interval History: Tolerating dressing changes.  Tolerating Sahu.      Review of Systems   Constitutional: Negative.    HENT: Negative.    Eyes: Negative.    Respiratory: Negative for cough, chest tightness and shortness of breath.    Cardiovascular: Negative for chest pain.   Gastrointestinal: Negative.  Negative for constipation, diarrhea and nausea.   Genitourinary: Positive for scrotal swelling. Negative for hematuria.   Musculoskeletal: Negative.    Neurological: Negative.    Psychiatric/Behavioral: Negative.      Objective:     Temp:  [97.3 °F (36.3 °C)-98.9 °F (37.2 °C)] 97.6 °F (36.4 °C)  Pulse:  [64-82] 71  Resp:  [16-20] 16  SpO2:  [93 %-100 %] 95 %  BP: (147-170)/(70-80) 154/79     Body mass index is 20.15 kg/m².    Date 01/13/22 0700 - 01/14/22 0659   Shift 8189-9568 0994-7289 7080-2299 24 Hour Total   INTAKE   IV Piggyback 479   479   Shift Total(mL/kg) 479(7.7)   479(7.7)   OUTPUT   Shift Total(mL/kg)       Weight (kg) 61.9 61.9 61.9 61.9          Drains     Drain                 Urethral Catheter 01/05/22 1050 Double-lumen 20 Fr. 7 days                Physical Exam  Vitals and nursing note reviewed.   Constitutional:       Appearance: He is well-developed and well-nourished.   HENT:      Head: Normocephalic.   Eyes:      Conjunctiva/sclera: Conjunctivae normal.   Neck:      Thyroid: No thyromegaly.      Trachea: No tracheal deviation.   Cardiovascular:      Rate and Rhythm: Normal rate.      Heart sounds: Normal heart sounds.   Pulmonary:      Effort: Pulmonary effort is normal. No respiratory distress.      Breath sounds: Normal breath sounds. No wheezing.   Abdominal:      General: Bowel sounds are normal.      Palpations: Abdomen is soft. There is no hepatosplenomegaly.      Tenderness: There is no abdominal tenderness. There is no CVA tenderness or rebound.      Hernia: No hernia is present.   Genitourinary:     Comments: Sahu in place with yellow urine  Penile edema much improved, now  minimal  No fluctuance  Scrotal Edema much improved, wound with granulation tissue.  Musculoskeletal:         General: No tenderness or edema. Normal range of motion.      Cervical back: Normal range of motion and neck supple.   Lymphadenopathy:      Cervical: No cervical adenopathy.   Skin:     General: Skin is warm and dry.      Findings: No erythema or rash.   Neurological:      Mental Status: He is alert and oriented to person, place, and time.   Psychiatric:         Mood and Affect: Mood and affect normal.         Behavior: Behavior normal.         Thought Content: Thought content normal.         Judgment: Judgment normal.         Significant Labs:    BMP:  Recent Labs   Lab 01/11/22  0403 01/12/22  1208 01/13/22  0356    140 139   K 3.6 3.0* 2.9*    109 106   CO2 20* 24 23   BUN 8 7 5*   CREATININE 1.1 1.1 1.0   CALCIUM 8.2* 8.1* 8.5*       CBC:   Recent Labs   Lab 01/11/22  0403 01/12/22  1208 01/13/22  0356   WBC 16.66* 28.22* 26.00*   HGB 13.0* 11.7* 12.2*   HCT 39.3* 35.4* 35.5*    342 301       Blood Culture:   Recent Labs   Lab 01/10/22  1411   LABBLOO No Growth to date  No Growth to date  No Growth to date  No Growth to date  No Growth to date  No Growth to date     Urine Culture: No results for input(s): LABURIN in the last 168 hours.    Significant Imaging:

## 2022-01-13 NOTE — ASSESSMENT & PLAN NOTE
- Berna's gangrene, s/p debridement, followed by Urology  - E.coli and Candida albicans growing on culture  - urine culture also grew Candida albicans  - now with Pseudomonas in sputum and evidence of pneumonia  - check PCT  - continue Zosyn for now

## 2022-01-13 NOTE — NURSING
Report received from CHARLENE Slater RN. Patient lying in the bed resting comfortably. Oxygen therapy via nasal cannula. Melisa sys at bedside.  No acute cardiac or respiratory distress noted. Safety measures maintained; wheels locked, bed in lowest position, bed alarm active and engaged, and call light in reach. Will continue to monitor.

## 2022-01-13 NOTE — ASSESSMENT & PLAN NOTE
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 Cultures growing ampicillin-resistant Ecoli and C albicans.   - urology following  - cont zosyn (re-started for HCAP)  - further ID recs pending  - ordered CT A/P due to urology concern for occult intraabdominal process

## 2022-01-13 NOTE — ASSESSMENT & PLAN NOTE
Cough + new LLL opacity on imaging c/f pneumonia.   - respiratory cultures w/ pseudomonas  - cont zosyn

## 2022-01-13 NOTE — SUBJECTIVE & OBJECTIVE
Interval History: Events noted. Aspirated? On oxygen.     Review of Systems   Respiratory: Positive for shortness of breath. Negative for cough.    Skin: Positive for wound.   All other systems reviewed and are negative.    Objective:     Vital Signs (Most Recent):  Temp: 97.6 °F (36.4 °C) (01/13/22 1137)  Pulse: 68 (01/13/22 1137)  Resp: 17 (01/13/22 1137)  BP: (!) 168/86 (01/13/22 1137)  SpO2: 99 % (01/13/22 1137) Vital Signs (24h Range):  Temp:  [97.3 °F (36.3 °C)-98.9 °F (37.2 °C)] 97.6 °F (36.4 °C)  Pulse:  [64-82] 68  Resp:  [16-20] 17  SpO2:  [93 %-100 %] 99 %  BP: (147-170)/(70-86) 168/86     Weight: 61.9 kg (136 lb 7.4 oz)  Body mass index is 20.15 kg/m².    Estimated Creatinine Clearance: 69.6 mL/min (based on SCr of 1 mg/dL).    Physical Exam  Vitals and nursing note reviewed.   Constitutional:       General: He is not in acute distress.     Appearance: Normal appearance. He is not ill-appearing, toxic-appearing or diaphoretic.   HENT:      Head: Normocephalic and atraumatic.      Right Ear: External ear normal.      Left Ear: External ear normal.   Eyes:      Extraocular Movements: Extraocular movements intact.      Conjunctiva/sclera: Conjunctivae normal.      Pupils: Pupils are equal, round, and reactive to light.   Pulmonary:      Breath sounds: Rales present.   Abdominal:      Tenderness: There is no guarding or rebound.   Musculoskeletal:         General: Normal range of motion.   Skin:     General: Skin is warm.   Neurological:      General: No focal deficit present.      Mental Status: He is alert.   Psychiatric:         Mood and Affect: Mood normal.         Behavior: Behavior normal.         Thought Content: Thought content normal.         Judgment: Judgment normal.         Significant Labs:   Blood Culture:   Recent Labs   Lab 01/04/22  1744 01/10/22  1411   LABBLOO No Growth after 4 days.   No Growth after 4 days.  No Growth to date  No Growth to date  No Growth to date  No Growth to date   No Growth to date  No Growth to date     Respiratory Culture:   Recent Labs   Lab 01/10/22  1653   GSRESP <10 epithelial cells per low power field.  No WBC's  No organisms seen   RESPIRATORYC PSEUDOMONAS AERUGINOSA  Moderate  *     Wound Culture:   Recent Labs   Lab 01/05/22  1055   LABAERO ESCHERICHIA COLI  Moderate  *  YAMILET ALBICANS  Few  *       Significant Imaging: None

## 2022-01-13 NOTE — PLAN OF CARE
Recommendations  1) Continue Level 5 Minced and Moist diet - Add diabetic restriction   2) Add Boost Glucose Control TID   3) Assistance with meals and encourage intake    Goals: Pt to meet > 50% EEN by RD follow up  Nutrition Goal Status: new  Communication of RD Recs: reviewed with physician    Assessment and Plan  Nutrition Problem  Inadequate oral intake    Related to (etiology):   Decreased alertness, lethargy    Signs and Symptoms (as evidenced by):   Pt consuming 0-50% meals, ST noting decreased alertness and awareness    Interventions(treatment strategy):  Collaboration with other providers  Texture modified diet    Nutrition Diagnosis Status:   New

## 2022-01-13 NOTE — PROGRESS NOTES
"[facility] [place]  Neurology  Progress Note    Patient Name: Abhishek Zhao  MRN: [id number]  Admission Date: 1/4/2022  Hospital Length of Stay: 8 days  Code Status: Full Code   Attending Provider: Hugo Aviles MD  Primary Care Physician: To Obtain Unable   Principal Problem:Scrotal abscess    Subjective:     Interval History: 62 y/o male presented initially with a complaint of testicular pain. Associated with swelling and redness to the scrotum and penis. Pt was found to have a scrotal abscess with Berna's gangrene. Urology is following as well as ID. Today pt had an episode in which he became poorly responsive. He had a persistent cough with copious amounts of mucus. He has slowly recovered but is exhibiting "picking" behavior and is intermittently answering questions.    -1/12/22: Pt had an episode in which he was poorly responsive and arms flailing around.    Current Neurological Medications:     Current Facility-Administered Medications   Medication Dose Route Frequency Provider Last Rate Last Admin    acetaminophen tablet 650 mg  650 mg Oral Q6H PRN W. Manny Rangel MD   650 mg at 01/11/22 1525    acetylcysteine 100 mg/ml (10%) solution 4 mL  4 mL Nebulization TID WAKE Hugo Aviles MD   4 mL at 01/12/22 2007    albuterol-ipratropium 2.5 mg-0.5 mg/3 mL nebulizer solution 3 mL  3 mL Nebulization Q4H PRN Aaron Pineda MD        albuterol-ipratropium 2.5 mg-0.5 mg/3 mL nebulizer solution 3 mL  3 mL Nebulization Q4H WAKE Hugo Aviles MD   3 mL at 01/13/22 0749    aluminum-magnesium hydroxide-simethicone 200-200-20 mg/5 mL suspension 30 mL  30 mL Oral QID PRN W. Manny Rangel MD        amLODIPine tablet 10 mg  10 mg Oral Daily W. Manny Rangel MD   10 mg at 01/13/22 1020    aspirin EC tablet 81 mg  81 mg Oral Daily W. Manny Rangel MD   81 mg at 01/13/22 1021    atorvastatin tablet 80 mg  80 mg Oral Daily W. Manny Rangel MD   80 mg at 01/13/22 1022    dextrose 50% injection 12.5 g  " 12.5 g Intravenous PRN W. Manny Rangel MD        dextrose 50% injection 25 g  25 g Intravenous PRN W. Manny Rangel MD        divalproex ER 24 hr tablet 750 mg  750 mg Oral Daily Ashvin Giraldo MD   750 mg at 01/13/22 1021    fluconazole tablet 400 mg  400 mg Oral Daily Quinten Rosario MD   400 mg at 01/13/22 1021    glucagon (human recombinant) injection 1 mg  1 mg Intramuscular PRN W. Manny Rangel MD        glucose chewable tablet 16 g  16 g Oral PRN W. Manny Rangel MD        glucose chewable tablet 24 g  24 g Oral PRN W. Manny Rangel MD        influenza (QUADRIVALENT PF) vaccine 0.5 mL  0.5 mL Intramuscular vaccine x 1 dose Elijah Ann MD        insulin aspart U-100 pen 0-5 Units  0-5 Units Subcutaneous Q6H PRN W. Manny Rangel MD   2 Units at 01/12/22 1227    lacosamide tablet 200 mg  200 mg Oral Q12H Elijah Ann MD   200 mg at 01/13/22 1021    lamoTRIgine tablet 200 mg  200 mg Oral BID W. Manny Rangel MD   200 mg at 01/13/22 1021    melatonin tablet 6 mg  6 mg Oral Nightly PRN W. Manny Rangel MD   6 mg at 01/12/22 2046    metoprolol succinate (TOPROL-XL) 24 hr tablet 50 mg  50 mg Oral Daily W. Manny Rangel MD   50 mg at 01/13/22 1022    naloxone 0.4 mg/mL injection 0.02 mg  0.02 mg Intravenous PRN W. Manyn Rangel MD        ondansetron injection 4 mg  4 mg Intravenous Q8H PRN W. Manny Rangel MD   4 mg at 01/12/22 1219    piperacillin-tazobactam 4.5 g in dextrose 5 % 100 mL IVPB (ready to mix system)  4.5 g Intravenous Q8H Elijah Ann MD   Stopped at 01/13/22 0900    polyethylene glycol packet 17 g  17 g Oral Daily W. Manny Rangel MD   17 g at 01/13/22 1021    potassium chloride SA CR tablet 40 mEq  40 mEq Oral TID Hugo Aviles MD   40 mEq at 01/13/22 1021    prochlorperazine injection Soln 5 mg  5 mg Intravenous Q6H PRN ANIBAL Rangel MD        simethicone chewable tablet 80 mg  1 tablet Oral QID PRN ANIBAL Rangel MD        sodium chloride 0.9%  flush 10 mL  10 mL Intravenous Q8H PRN ANIBAL Rangel MD        sodium chloride 0.9% flush 10 mL  10 mL Intravenous PRN ANIBAL Rangel MD        tamsulosin 24 hr capsule 0.4 mg  0.4 mg Oral Daily ANIBAL Rangel MD   0.4 mg at 01/13/22 1021       Review of Systems   Unable to perform ROS: Mental status change     Objective:     Vital Signs (Most Recent):  Temp: 97.6 °F (36.4 °C) (01/13/22 0820)  Pulse: 71 (01/13/22 0820)  Resp: 16 (01/13/22 0820)  BP: (!) 154/79 (01/13/22 0820)  SpO2: 95 % (01/13/22 0820) Vital Signs (24h Range):  Temp:  [97.3 °F (36.3 °C)-98.9 °F (37.2 °C)] 97.6 °F (36.4 °C)  Pulse:  [64-82] 71  Resp:  [16-20] 16  SpO2:  [93 %-100 %] 95 %  BP: (147-170)/(70-80) 154/79     Weight: 61.9 kg (136 lb 7.4 oz)  Body mass index is 20.15 kg/m².    Physical Exam  Constitutional:       General: He is not in acute distress.  HENT:      Head: Normocephalic.      Right Ear: External ear normal.      Left Ear: External ear normal.   Eyes:      General:         Right eye: No discharge.         Left eye: No discharge.   Cardiovascular:      Rate and Rhythm: Normal rate.   Pulmonary:      Effort: Pulmonary effort is normal.   Abdominal:      Palpations: Abdomen is soft.   Musculoskeletal:         General: No tenderness.      Cervical back: Neck supple.   Skin:     General: Skin is warm.   Psychiatric:         Speech: Speech is slurred.            NEUROLOGICAL EXAMINATION:      MENTAL STATUS   Speech: slurred   Level of consciousness: drowsy       Oriented to self  Follow commands      CRANIAL NERVES      CN III, IV, VI   Right pupil: Size: 2 mm. Shape: regular.   Left pupil: Size: 2 mm. Shape: regular.   Nystagmus: none   Conjugate gaze: present     CN VII   Right facial weakness: none  Left facial weakness: none     CN XII   Tongue deviation: none     MOTOR EXAM        AROM of UE's and LE's against gravity        Significant Labs:   CBC:   Recent Labs   Lab 01/12/22  1208 01/13/22  0356   WBC 28.22*  26.00*   HGB 11.7* 12.2*   HCT 35.4* 35.5*    301     CMP:   Recent Labs   Lab 01/12/22  1208 01/13/22  0356   * 139*    139   K 3.0* 2.9*    106   CO2 24 23   BUN 7 5*   CREATININE 1.1 1.0   CALCIUM 8.1* 8.5*   PROT 6.3 6.6   ALBUMIN 1.5* 1.5*   BILITOT 0.2 0.1   ALKPHOS 493* 477*   AST 24 25   ALT 21 23   ANIONGAP 7* 10   EGFRNONAA >60 >60       Significant Imaging: I have reviewed all pertinent imaging results/findings within the past 24 hours.    Assessment and Plan:     60 y/o male consulted for AMS     1. Encephalopathy: on exam pt seem to be in a state of delirium but improving. Uncertain if this was preceded by a seizure given his Hx. Another episode today.           On exam no unilateral findings suggesting stroke.           -Head CT showed no acute abnormalities.           -EEG showed no ongoing seizures           -VPA level is subtherapeutic will increase does from 500 mg ER to 750 mg ER.   -Level of lamotrigine and lacosamide pending.    Active Diagnoses:    Diagnosis Date Noted POA    PRINCIPAL PROBLEM:  Scrotal abscess [N49.2] 01/04/2022 Yes    Acute respiratory failure with hypoxia and hypercarbia [J96.01, J96.02] 01/10/2022 No    HCAP (healthcare-associated pneumonia) [J18.9] 01/10/2022 No    Encephalopathy, metabolic [G93.41] 01/10/2022 No    Hypokalemia [E87.6] 01/09/2022 No    Sepsis due to Gram negative bacteria [A41.50] 01/07/2022 Yes    JOI (acute kidney injury) [N17.9] 01/07/2022 Yes    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] 01/06/2022 Yes    Adjustment disorder with depressed mood [F43.21] 01/04/2022 Yes     Chronic    Chronic ischemic heart disease [I25.9] 01/04/2022 Yes     Chronic    Cocaine dependence in remission [F14.21] 01/04/2022 Yes     Chronic    Cardiomegaly [I51.7] 01/04/2022 Yes     Chronic    Hyperlipidemia [E78.5] 01/04/2022 Yes     Chronic    Essential hypertension [I10] 01/04/2022 Yes     Chronic    Seizure disorder [G40.909] 01/04/2022 Yes      Chronic    Tobacco user [Z72.0] 01/04/2022 Yes     Chronic    Diabetes mellitus type 2, controlled [E11.9] 08/03/2020 Yes     Chronic      Problems Resolved During this Admission:       VTE Risk Mitigation (From admission, onward)         Ordered     IP VTE LOW RISK PATIENT  Once         01/04/22 2241     Place sequential compression device  Until discontinued         01/04/22 2241                Ashvin Giraldo MD  Neurology  [facility] ER Surg

## 2022-01-13 NOTE — PROGRESS NOTES
[facility] Surg  Infectious Disease  Progress Note    Patient Name: Abhishek Zhao  MRN: [id number]  Admission Date: 1/4/2022  Length of Stay: 8 days  Attending Physician: Hugo Aviles MD  Primary Care Provider: To Obtain Unable    Isolation Status: No active isolations     Assessment/Plan:      * Scrotal abscess  - Berna's gangrene, s/p debridement, followed by Urology  - E.coli and Candida albicans growing on culture  - urine culture also grew Candida albicans  - now with Pseudomonas in sputum and evidence of pneumonia  - check PCT  - continue Zosyn for now      Demarco Combs MD  Infectious Disease  [facility] Surg    Subjective:     Principal Problem:Scrotal abscess    HPI: Abhishek Zhao is a 61-year-old male with HTN and seizures who presented to the ED with complaints of testicular pain and swelling for four days. At that time, he endorsed radiation of pain to rectum and difficulty urinating. He was originally planning on going to the ED before th [place] game but decided to wait until afterwards. In the ED, imaging showed a complex fluid collection(s).  He has a history of urethral stricture last dilated in 2019 at an outside facility ([facility]). The patient was admitted and started on empiric abx. 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 Post-operatively, the patient has improved with diminishing leukocytosis. Surgical cultures have grown E.coli and Candida, thus far. ID is consulted for Berna's gangrene.    Interval History: Events noted. Aspirated? On oxygen.     Review of Systems   Respiratory: Positive for shortness of breath. Negative for cough.    Skin: Positive for wound.   All other systems reviewed and are negative.    Objective:     Vital Signs (Most Recent):  Temp: 97.6 °F (36.4 °C)  (01/13/22 1137)  Pulse: 68 (01/13/22 1137)  Resp: 17 (01/13/22 1137)  BP: (!) 168/86 (01/13/22 1137)  SpO2: 99 % (01/13/22 1137) Vital Signs (24h Range):  Temp:  [97.3 °F (36.3 °C)-98.9 °F (37.2 °C)] 97.6 °F (36.4 °C)  Pulse:  [64-82] 68  Resp:  [16-20] 17  SpO2:  [93 %-100 %] 99 %  BP: (147-170)/(70-86) 168/86     Weight: 61.9 kg (136 lb 7.4 oz)  Body mass index is 20.15 kg/m².    Estimated Creatinine Clearance: 69.6 mL/min (based on SCr of 1 mg/dL).    Physical Exam  Vitals and nursing note reviewed.   Constitutional:       General: He is not in acute distress.     Appearance: Normal appearance. He is not ill-appearing, toxic-appearing or diaphoretic.   HENT:      Head: Normocephalic and atraumatic.      Right Ear: External ear normal.      Left Ear: External ear normal.   Eyes:      Extraocular Movements: Extraocular movements intact.      Conjunctiva/sclera: Conjunctivae normal.      Pupils: Pupils are equal, round, and reactive to light.   Pulmonary:      Breath sounds: Rales present.   Abdominal:      Tenderness: There is no guarding or rebound.   Musculoskeletal:         General: Normal range of motion.   Skin:     General: Skin is warm.   Neurological:      General: No focal deficit present.      Mental Status: He is alert.   Psychiatric:         Mood and Affect: Mood normal.         Behavior: Behavior normal.         Thought Content: Thought content normal.         Judgment: Judgment normal.         Significant Labs:   Blood Culture:   Recent Labs   Lab 01/04/22  1744 01/10/22  1411   LABBLOO No Growth after 4 days.   No Growth after 4 days.  No Growth to date  No Growth to date  No Growth to date  No Growth to date  No Growth to date  No Growth to date     Respiratory Culture:   Recent Labs   Lab 01/10/22  1653   GSRESP <10 epithelial cells per low power field.  No WBC's  No organisms seen   RESPIRATORYC PSEUDOMONAS AERUGINOSA  Moderate  *     Wound Culture:   Recent Labs   Lab 01/05/22  1055    LABAERO ESCHERICHIA COLI  Moderate  *  YAMILET ALBICANS  Few  *       Significant Imaging: None

## 2022-01-13 NOTE — PT/OT/SLP EVAL
Occupational Therapy   Evaluation    Name: Abhishek Zhao  MRN: [id number]  Admitting Diagnosis:  Scrotal abscess  Recent Surgery: Procedure(s) (LRB):  CYSTOSCOPY, RETROGRADE URETHROGRAM, FISTULAGRAM, EXCISION OF NECROTIC SCROTAL TISSUE ABSCESS, BARKER PLACEMENT (N/A)  EXCISION, ABSCESS  CYSTOSCOPY  FISTULOGRAM (N/A) 8 Days Post-Op    Recommendations:     Discharge Recommendations: nursing facility, skilled  Discharge Equipment Recommendations:   (TBD)  Barriers to discharge:   (not at PLOF; on 6L HF NC; high risk of falls, unplanned readmission, and morbidity if d/c home)    Assessment:     Abhishek Zhao is a 61 y.o. male with a medical diagnosis of Scrotal abscess. Performance deficits affecting function: weakness,decreased upper extremity function,decreased ROM,impaired cardiopulmonary response to activity,impaired endurance,impaired balance,decreased lower extremity function,decreased safety awareness,impaired cognition,impaired self care skills,impaired skin,impaired functional mobilty,decreased coordination,impaired fine motor,pain.      Pt on 6L HF NC. Confused, difficulty with attention to task and following simple commands; calm. Total A sit>supine and MIN A sit>supine d/t not feeling well. Attempted to retrieve BP seated EOB, but pt laid himself back down: 140/83. spO2 90-97% during session on 6L HF NC.    Rehab Prognosis: Fair +; patient would benefit from acute skilled OT services to address these deficits and reach maximum level of function.       Plan:     Patient to be seen 5 x/week to address the above listed problems via self-care/home management,therapeutic activities,therapeutic exercises  · Plan of Care Expires: 01/27/22  · Plan of Care Reviewed with: patient    Subjective     Chief Complaint: wanting to lay down d/t not feeling well   Patient/Family Comments/goals: agreeable to session; no family present     Occupational Profile: pt unable to provide PLOF and no family present.   Living  Environment: pt lives with wife   Previous level of function: ambulatory, not confused like he is now per daughter   Roles and Routines:  ?   Equipment Used at Home:  none  Assistance upon Discharge: wife     Pain/Comfort:  Pain Rating 1:  (pt reported not feeling well seated EOB, but unable to describe to therapy further)  Pain Addressed 1: Reposition,Cessation of Activity    Patients cultural, spiritual, Spiritism conflicts given the current situation: no    Objective:     Communicated with: nurseSherrell, prior to session.  Patient found HOB elevated with bed alarm,oxygen,peripheral IV,telemetry,pressure relief boots,santiago catheter,SCD,pulse ox (continuous) (avasys), avasys upon OT entry to room.    General Precautions: Standard, fall,respiratory,seizure   Orthopedic Precautions:N/A   Braces: N/A  Respiratory Status: High flow, flow 6 L/min    Occupational Performance:    Pt found awake upon arrival. Pt followed minimal one step commands and inattentive. Pt demo'd delayed processing with simple tasks. Pt was able to state his name and some of his birthday; however, pt reported that he isn't  then said that he lives with his wife. Pt declined having any children, but then mentioned his daughter later. Pt fell asleep quickly upon laying back down.     Bed Mobility:    · Patient completed Rolling/Turning to Right with minimum assistance  · Patient completed Bridging with stand by assistance  · Patient completed Supine to Sit with total assistance, 2 persons and HOB elevated  · Patient completed Sit to Supine with minimum assistance    Functional Mobility/Transfers:  · Unable to assess at this time 2* laying himself down briefly upon sitting up.     Activities of Daily Living:  · Lower Body Dressing: total assistance to doff/don socks     Cognitive/Visual Perceptual:  Cognitive/Psychosocial Skills:     -       Oriented to: first/last name, month/day of his birthday, age, location    -       Follows  Commands/attention:Inattentive and Easily distracted  -       Communication: minimally clear, mostly mumbling/unintelligible   -       Memory: Impaired STM, Impaired LTM and Poor immediate recall  -       Safety awareness/insight to disability: impaired   -       Mood/Affect/Coping skills/emotional control: confused, calm   Visual/Perceptual:      -Impaired  tracking, acuity and R/L discrimination      Physical Exam:  Balance:    -       seated: CGA/MIN  Postural examination/scapula alignment:    -       Rounded shoulders  -       Forward head  Skin integrity: Visible skin intact and dressing to sacrum   Edema:  None noted  Upper Extremity Range of Motion:     -       Right Upper Extremity: WFL  -       Left Upper Extremity: WFL  Upper Extremity Strength:    -       R/L Upper Extremity: pt unable to follow commands; appears globally weak    Strength:    -       R/L Upper Extremity: pt had difficulty maintaining sustained  to side rail seated EOB; difficulty following commands for other tasks  Fine Motor Coordination:    -       Impaired  Left hand, manipulation of objects   and Right hand, manipulation of objects    Gross motor coordination:   impaired 2* deconditioning, weakness, and confusion    AMPAC 6 Click ADL:  AMPAC Total Score: 9    Treatment & Education:  · Pt educated on OT role.    · Importance of EOB activity with therapy assistance.  · Safety during functional mobility    · Self-care tasks/functional mobility completed- assistance level noted above   · All questions/concerns answered within OT scope of practice     Education:    Patient left HOB elevated R sidelying with B/L pressure relief boots and SCDs in place with all lines intact, call button in reach, bed alarm on, avasys present and all needs met/within reach; lights on, blinds opened, tv on    GOALS:   Multidisciplinary Problems     Occupational Therapy Goals        Problem: Occupational Therapy Goal    Goal Priority Disciplines Outcome  Interventions   Occupational Therapy Goal     OT, PT/OT Ongoing, Progressing    Description: Goals to be met by: 01/27/22     Patient will increase functional independence with ADLs by performing:    Feeding with Modified [place].  UE Dressing with Supervision.  LE Dressing with Minimal Assistance.  Grooming while seated with Stand-by Assistance.  Sitting at edge of bed x15 minutes with Supervision.  Supine to sit with Contact Guard Assistance.  Upper extremity exercise program x10 reps per handout, with supervision.    Step transfer- to be assessed pending further pt participation   Toilet transfer to bedside commode - to be assessed pending further pt participation                     History:     Past Medical History:   Diagnosis Date    High cholesterol     Hypertension     Seizures          Past Surgical History:   Procedure Laterality Date    CYSTOSCOPY  1/5/2022    Procedure: CYSTOSCOPY;  Surgeon: ANIBAL Rangel MD;  Location: [facility] OR;  Service: Urology;;    FISTULOGRAM N/A 1/5/2022    Procedure: FISTULOGRAM;  Surgeon: ANIBAL Rangel MD;  Location: [facility] OR;  Service: Urology;  Laterality: N/A;    SURGICAL REMOVAL OF ABSCESS  1/5/2022    Procedure: EXCISION, ABSCESS;  Surgeon: ANIBAL Rangel MD;  Location: [facility] OR;  Service: Urology;;       Time Tracking:     OT Date of Treatment: 01/13/22  OT Start Time: 0925  OT Stop Time: 0946  OT Total Time (min): 21 min    Billable Minutes:Evaluation 21 min (co-eval with PT)    1/13/2022

## 2022-01-13 NOTE — PROGRESS NOTES
[facility] Medicine  Progress Note    Patient Name: Abhishek Zhao  MRN: [id number]  Patient Class: IP- Inpatient   Admission Date: 1/4/2022  Length of Stay: 8 days  Attending Physician: Hugo Aviles MD  Primary Care Provider: To Obtain Unable        Subjective:     Principal Problem:Scrotal abscess        HPI:  61 y.o. male with adjustment disorder with depressed mood, chronic ischemic heart disease, cocaine dependence in remission, cardiomegaly, HLD, HTN, swizure disorder, tobacco use, and DM 2 presents with a complaint of testicular pain.  Associated with swelling and redness to the scrotum and penis along with difficulty urinating, pain is rated as severe and radiates to the rectum.  Denies fever, chills, cough, SOB, chest pain, n/v/d.  In the ED, he was found to be tachypneic, with leukocytosis and elevated lactic.  CT and US shows evidence of multiple scrotal and perineal abscesses with some extension into the penile shaft.  UA with evidence of infection.  Sepsis treatment initiated, blood and urine cultures obtained, IV fluids and IV antibiotics initiated.  Urology consulted.      Overview/Hospital Course:  61 y.o. male with adjustment disorder with depressed mood, chronic ischemic heart disease, cocaine dependence in remission, cardiomegaly, HLD, HTN, swizure disorder, tobacco use, and DM 2 presents with a complaint of testicular pain, found to have multiple scrotal and perineal abscesses. Placed on broad spectrum antibiotics, underwent I&D with urology.    Pt w/ aspiration event 1/11 w/ subsequent desaturation. Respiratory culture grew pseudomonas.          Interval History: Pt confused, disoriented this AM    Review of Systems   Unable to perform ROS: Mental status change     Objective:     Vital Signs (Most Recent):  Temp: 97.6 °F (36.4 °C) (01/13/22 1137)  Pulse: 67 (01/13/22 1310)  Resp: 16 (01/13/22 1310)  BP: (!) 168/86 (01/13/22 1137)  SpO2: 96 % (01/13/22 1310) Vital Signs (24h  Range):  Temp:  [97.6 °F (36.4 °C)-98.9 °F (37.2 °C)] 97.6 °F (36.4 °C)  Pulse:  [64-82] 67  Resp:  [16-20] 16  SpO2:  [93 %-100 %] 96 %  BP: (154-170)/(77-86) 168/86     Weight: 61.9 kg (136 lb 7.4 oz)  Body mass index is 20.15 kg/m².    Intake/Output Summary (Last 24 hours) at 1/13/2022 1606  Last data filed at 1/13/2022 1227  Gross per 24 hour   Intake 838.98 ml   Output 1000 ml   Net -161.02 ml      Physical Exam  Constitutional:       General: He is not in acute distress.     Appearance: He is ill-appearing. He is not toxic-appearing or diaphoretic.   Cardiovascular:      Rate and Rhythm: Normal rate and regular rhythm.      Heart sounds: No murmur heard.  No gallop.    Pulmonary:      Effort: Pulmonary effort is normal.      Breath sounds: Normal breath sounds. No wheezing or rales.      Comments: Reduced sound at L lung base  Abdominal:      General: Bowel sounds are normal. There is no distension.      Palpations: Abdomen is soft.      Tenderness: There is no abdominal tenderness.         Significant Labs: All pertinent labs within the past 24 hours have been reviewed.    Significant Imaging: I have reviewed all pertinent imaging results/findings within the past 24 hours.      Assessment/Plan:      * Scrotal abscess  Multiple abscesses c/w Berna's gangrene, s/p I&D by urology on 1/5. Cultures growing ampicillin-resistant Ecoli and C albicans.   - urology following  - cont zosyn (re-started for HCAP)  - further ID recs pending  - ordered CT A/P due to urology concern for occult intraabdominal process          Encephalopathy, metabolic  CT head and EEG without acute process; likely delirium secondary to infection and prolonged hospitalization  - neurology consulted  - delirium precautions  - restart home seroquel  - continue home vpa      HCAP (healthcare-associated pneumonia)  - see respiratory failure       Acute respiratory failure with hypoxia and hypercarbia  Cough + new LLL opacity on imaging c/f  pneumonia.   - respiratory cultures w/ pseudomonas  - cont zosyn        Hypokalemia  Replete PRN    JOI (acute kidney injury)  Resolved      Sepsis due to Gram negative bacteria  Resolved      Berna's gangrene  As above    Sepsis  - see scrotal abscess     This patient does have evidence of infective focus  My overall impression is sepsis. Vital signs were reviewed and noted in progress note.  Antibiotics given-   Antibiotics (From admission, onward)            Start     Stop Route Frequency Ordered    01/06/22 2100  mupirocin 2 % ointment         01/11 2059 Nasl 2 times daily 01/06/22 1337    01/05/22 0200  piperacillin-tazobactam 4.5 g in dextrose 5 % 100 mL IVPB (ready to mix system)         -- IV Every 8 hours (non-standard times) 01/04/22 2241        Cultures were taken-   Microbiology Results (last 7 days)     Procedure Component Value Units Date/Time    AFB Culture & Smear [[id number]] Collected: 01/05/22 1055    Order Status: Completed Specimen: Abscess from Groin Updated: 01/07/22 2127     AFB Culture & Smear Culture in progress     AFB CULTURE STAIN No acid fast bacilli seen.    Blood Culture #1 **CANNOT BE ORDERED STAT** [[id number]] Collected: 01/04/22 1744    Order Status: Completed Specimen: Blood from Peripheral, Lower Arm, Left Updated: 01/07/22 1303     Blood Culture, Routine No Growth to date      No Growth to date      No Growth to date    Blood Culture #2 **CANNOT BE ORDERED STAT** [[id number]] Collected: 01/04/22 1744    Order Status: Completed Specimen: Blood from Peripheral, Antecubital, Right Updated: 01/07/22 1303     Blood Culture, Routine No Growth to date      No Growth to date      No Growth to date    Urine culture **CANNOT BE ORDERED STAT** [[id number]]  (Abnormal) Collected: 01/04/22 1804    Order Status: Completed Specimen: Urine, Clean Catch Updated: 01/07/22 1118     Urine Culture, Routine YAMILET ALBICANS  10,000 - 49,999 cfu/ml  Treatment of asymptomatic candiduria is not  recommended (except for   specific populations). Candida isolated in the urine typically   represents colonization. If an indwelling urinary catheter is present  it should be removed or replaced.      Narrative:      Indicated criteria for high risk culture:->Other  Other (specify):->Protocol    Aerobic culture [[id number]]  (Abnormal)  (Susceptibility) Collected: 01/05/22 1055    Order Status: Completed Specimen: Abscess from Groin Updated: 01/07/22 0846     Aerobic Bacterial Culture ESCHERICHIA COLI  Moderate        YAMILET ALBICANS  Few      Culture, Anaerobe [[id number]] Collected: 01/05/22 1055    Order Status: Completed Specimen: Abscess from Groin Updated: 01/07/22 0733     Anaerobic Culture Culture in progress    Fungus culture [[id number]] Collected: 01/05/22 1055    Order Status: Sent Specimen: Abscess from Groin Updated: 01/05/22 1401    Culture, Anaerobe [[id number]] Collected: 01/05/22 1055    Order Status: Canceled Specimen: Abscess from Groin     Aerobic culture [[id number]] Collected: 01/05/22 1055    Order Status: Canceled Specimen: Abscess from Groin         Latest lactate reviewed, they are-  No results for input(s): LACTATE in the last 72 hours.    Organ dysfunction indicated by n/a  Source- abscesses    Source control Achieved by- Urology consult      Diabetes mellitus type 2, controlled  Check a1c  Hold oral antihyperglycemics while inpatient  PRN sliding scale insulin  ACHS glucose monitoring   ADA diet     Tobacco user  5 minutes spent counseling the patient on smoking cessation and he is not currently ready to stop smoking. He will be offereded a nicotine transdermal patch while hospitalized and monitored for withdrawal.  Will provide additional smoking cessation counseling prior to discharge.     Seizure disorder  Episode of AMS 1/10 likely due to aspiration/infection not seizure  - cont home AEDs  - neurology consulted    Essential hypertension  Well controlled, continue home medications and  monitor blood pressure, adjust as needed.     Hyperlipidemia  Continue statin    Cardiomegaly  No evidence of acute decompensation or fluid overload, continue home regimen    Cocaine dependence in remission  Counseled     Chronic ischemic heart disease  No acute issue    Adjustment disorder with depressed mood  Continue home citalopram, restart home seroquel      VTE Risk Mitigation (From admission, onward)         Ordered     IP VTE LOW RISK PATIENT  Once         01/04/22 2241     Place sequential compression device  Until discontinued         01/04/22 2241                Discharge Planning   CHUCK:      Code Status: Full Code   Is the patient medically ready for discharge?:     Reason for patient still in hospital (select all that apply): Patient trending condition, Laboratory test, Treatment, Consult recommendations and Pending disposition  Discharge Plan A: Home Health   Discharge Delays: None known at this time              Hugo Aviles MD  Department of Hospital Medicine   [facility]

## 2022-01-13 NOTE — PROGRESS NOTES
"[facility] - Med Surg  Adult Nutrition  Progress Note    SUMMARY     Recommendations  1) Continue Level 5 Minced and Moist diet - Add diabetic restriction   2) Add Boost Glucose Control TID   3) Assistance with meals and encourage intake    Goals: Pt to meet > 50% EEN by RD follow up  Nutrition Goal Status: new  Communication of RD Recs: reviewed with physician    Assessment and Plan  Nutrition Problem  Inadequate oral intake    Related to (etiology):   Decreased alertness, lethargy    Signs and Symptoms (as evidenced by):   Pt consuming 0-50% meals, ST noting decreased alertness and awareness    Interventions(treatment strategy):  Collaboration with other providers  Texture modified diet    Nutrition Diagnosis Status:   New    Reason for Assessment  Reason For Assessment: length of stay  Diagnosis: other (see comments) (HTN, scrotal abscess)  Relevant Medical History: HTN, seizure disorder, adjustment disorder, chronic ischemic heart disease, cocaine dependence in remission, cardiomegaly, HLD, HTN, DM2, tobacco use  Interdisciplinary Rounds: did not attend  General Information Comments: Per chart, pt with 25-50% meal intake, with decreased alertness. ST since 1/10 for difficulty clearing oral secretions, now on Level 5 minced and moist diet. UBW ~ 141 lbs over the last 9 months showing 5 lbs wt loss (3.5%). Pt is at risk for malnutrition. NFPE deferred to follow up for remote assessment.  Nutrition Discharge Planning: Pending clinical course    Nutrition Risk Screen  Nutrition Risk Screen: no indicators present    Anthropometrics  Temp: 97.6 °F (36.4 °C)  Height: 5' 9" (175.3 cm)  Height (inches): 69 in  Weight Method: Bed Scale  Weight: 61.9 kg (136 lb 7.4 oz)  Weight (lb): 136.47 lb  Ideal Body Weight (IBW), Male: 160 lb  % Ideal Body Weight, Male (lb): 87.5 %  BMI (Calculated): 20.1  Usual Body Weight (UBW), k.09 kg  % Usual Body Weight: 96.79     Lab/Procedures/Meds  Pertinent Labs Reviewed: " reviewed  Pertinent Labs Comments: K 2.9, BUN 5, Glu 139, Ca 8.5, Alk Phos 477, POC -250, Hgb A1c 9.3 on 1/5/22  Pertinent Medications Reviewed: reviewed  Pertinent Medications Comments: atorvastatin, polyethylene glycol, KCl    Physical Findings/Assessment  Incision to groin and scrotum     Estimated/Assessed Needs  Weight Used For Calorie Calculations: 61.7 kg (136 lb)  Energy Calorie Requirements (kcal): 1848 kcal/day based on MSJ x 1.3 SF  Energy Need Method: Cartwright-St Jeor  Protein Requirements: 74-87 g/day based on 1.2-1.4 g/kg for wound healing  Weight Used For Protein Calculations: 61.7 kg (136 lb)  Fluid Requirements (mL): 1 mL/kcal or per MD  Estimated Fluid Requirement Method: RDA Method  RDA Method (mL): 1848  CHO Requirement: 231 g CHO/day based on 50% kcal from CHO    Nutrition Prescription Ordered  Current Diet Order: Level 5 Minced and Moist    Evaluation of Received Nutrient/Fluid Intake  Energy Calories Required: not meeting needs  Protein Required: not meeting needs  Fluid Required: not meeting needs  Comments: LBM 1/10  Tolerance: tolerating (when alert)  % Intake of Estimated Energy Needs: 0 - 25 %  % Meal Intake: 0 - 50 %    Nutrition Risk  Level of Risk/Frequency of Follow-up: low (follow up weekly)     Monitor and Evaluation  Food and Nutrient Intake: energy intake,food and beverage intake  Food and Nutrient Adminstration: diet order  Knowledge/Beliefs/Attitudes: food and nutrition knowledge/skill  Physical Activity and Function: nutrition-related ADLs and IADLs  Anthropometric Measurements: weight,weight change  Biochemical Data, Medical Tests and Procedures: electrolyte and renal panel,lipid profile,gastrointestinal profile,glucose/endocrine profile,inflammatory profile  Nutrition-Focused Physical Findings: overall appearance,skin     Nutrition Follow-Up  RD Follow-up?: Yes

## 2022-01-13 NOTE — SUBJECTIVE & OBJECTIVE
Interval History: Pt confused, disoriented this AM    Review of Systems   Unable to perform ROS: Mental status change     Objective:     Vital Signs (Most Recent):  Temp: 97.6 °F (36.4 °C) (01/13/22 1137)  Pulse: 67 (01/13/22 1310)  Resp: 16 (01/13/22 1310)  BP: (!) 168/86 (01/13/22 1137)  SpO2: 96 % (01/13/22 1310) Vital Signs (24h Range):  Temp:  [97.6 °F (36.4 °C)-98.9 °F (37.2 °C)] 97.6 °F (36.4 °C)  Pulse:  [64-82] 67  Resp:  [16-20] 16  SpO2:  [93 %-100 %] 96 %  BP: (154-170)/(77-86) 168/86     Weight: 61.9 kg (136 lb 7.4 oz)  Body mass index is 20.15 kg/m².    Intake/Output Summary (Last 24 hours) at 1/13/2022 1606  Last data filed at 1/13/2022 1227  Gross per 24 hour   Intake 838.98 ml   Output 1000 ml   Net -161.02 ml      Physical Exam  Constitutional:       General: He is not in acute distress.     Appearance: He is ill-appearing. He is not toxic-appearing or diaphoretic.   Cardiovascular:      Rate and Rhythm: Normal rate and regular rhythm.      Heart sounds: No murmur heard.  No gallop.    Pulmonary:      Effort: Pulmonary effort is normal.      Breath sounds: Normal breath sounds. No wheezing or rales.      Comments: Reduced sound at L lung base  Abdominal:      General: Bowel sounds are normal. There is no distension.      Palpations: Abdomen is soft.      Tenderness: There is no abdominal tenderness.         Significant Labs: All pertinent labs within the past 24 hours have been reviewed.    Significant Imaging: I have reviewed all pertinent imaging results/findings within the past 24 hours.

## 2022-01-13 NOTE — PLAN OF CARE
Problem: Adult Inpatient Plan of Care  Goal: Plan of Care Review  Outcome: Ongoing, Progressing     Problem: Diabetes Comorbidity  Goal: Blood Glucose Level Within Targeted Range  Outcome: Ongoing, Progressing     Problem: Infection  Goal: Absence of Infection Signs and Symptoms  Outcome: Ongoing, Progressing     Problem: Infection Progression (Sepsis/Septic Shock)  Goal: Absence of Infection Signs and Symptoms  Outcome: Ongoing, Progressing     Problem: Fall Injury Risk  Goal: Absence of Fall and Fall-Related Injury  Outcome: Ongoing, Progressing     Problem: Confusion Chronic  Goal: Optimal Cognitive Function  Outcome: Ongoing, Progressing     Problem: Seizure Disorder Comorbidity  Goal: Maintenance of Seizure Control  Outcome: Ongoing, Progressing

## 2022-01-13 NOTE — PLAN OF CARE
Problem: Occupational Therapy Goal  Goal: Occupational Therapy Goal  Description: Goals to be met by: 01/27/22     Patient will increase functional independence with ADLs by performing:    Feeding with Modified [place].  UE Dressing with Supervision.  LE Dressing with Minimal Assistance.  Grooming while seated with Stand-by Assistance.  Sitting at edge of bed x15 minutes with Supervision.  Supine to sit with Contact Guard Assistance.  Upper extremity exercise program x10 reps per handout, with supervision.    Step transfer- to be assessed pending further pt participation   Toilet transfer to bedside commode - to be assessed pending further pt participation    Outcome: Ongoing, Progressing     Pt on 6L HF NC. Confused, difficulty with attention to task and following simple commands; calm. Total A sit>supine and MIN A sit>supine d/t not feeling well. Attempted to retrieve BP seated EOB, but pt laid himself back down: 140/83. spO2 90-97% during session.

## 2022-01-13 NOTE — PT/OT/SLP EVAL
Physical Therapy Evaluation    Patient Name:  Abhishek Zhao   MRN:  [id number]    Recommendations:     Discharge Recommendations:  nursing facility, skilled   Discharge Equipment Recommendations:  (Ongoing assessment pending pt progress)   Barriers to discharge: Decreased caregiver support and Pt not functioning at prior level and is at increased risk for falls, readmission, and morbididity if he returns home at present time.  Severe confusion and debilitation from prolonged hospital stay     Assessment:     Abhishek Zhao is a 61 y.o. male admitted with a medical diagnosis of Scrotal abscess.  He presents with the following impairments/functional limitations:  weakness,impaired balance,decreased safety awareness,impaired endurance,impaired cognition,impaired skin,impaired cardiopulmonary response to activity,impaired self care skills,decreased coordination,impaired functional mobilty,impaired coordination .    Rehab Prognosis: Fair; patient would benefit from acute skilled PT services to address these deficits and reach maximum level of function.    Recent Surgery: Procedure(s) (LRB):  CYSTOSCOPY, RETROGRADE URETHROGRAM, FISTULAGRAM, EXCISION OF NECROTIC SCROTAL TISSUE ABSCESS, BARKER PLACEMENT (N/A)  EXCISION, ABSCESS  CYSTOSCOPY  FISTULOGRAM (N/A) 8 Days Post-Op    Plan:     During this hospitalization, patient to be seen  (3-5x/wk) to address the identified rehab impairments via gait training,therapeutic activities,therapeutic exercises,neuromuscular re-education,wheelchair management/training and progress toward the following goals:    · Plan of Care Expires:  01/27/22    Subjective     Chief Complaint: Dizzy sitting at EOB, Needs to lay down.  When asked if he felt bad he stated yes  Patient/Family Comments/goals: to lay down  Pain/Comfort:  Pain Rating 1: 0/10    Patients cultural, spiritual, Protestant conflicts given the current situation: no    Living Environment:  According to chart, pt lives with his  spouse  Prior to admission, patients level of function was According to chart independent with ambulation.  Equipment used at home:  (Unknown, per chart pt has a cane?).  DME owned (not currently used): unknown.  Upon discharge, patient will have assistance from Spouse?  [employer]augh[employer]??  Per chart, Daughter states that patient is unable to return home unless ambulatory and that they would also need a sitter 2/2.     .    Objective:     Communicated with nsg prior to session.  Patient found HOB elevated with bed alarm,oxygen,peripheral IV,telemetry,pressure relief boots,santiago catheter,SCD,pulse ox (continuous)  upon PT entry to room.    General Precautions: Standard, fall,respiratory   Orthopedic Precautions:N/A   Braces: N/A  Respiratory Status: High flow, flow 6 L/min, concentration 100%%    Exams:  · Cognitive Exam:  Patient is oriented to Person and Place, Difficulty following commands, Perseverating on commands with inability to shift focus lethargic, Staring in to space, unable to focus on therapist, speaking non-sensical  · Gross Motor Coordination:  Impaired 2/2 weakness and deconditioing  · Postural Exam:  Patient presented with the following abnormalities:    · -       Rounded shoulders  · -       Forward head  · Sensation:    · -       Intact  light/touch B LE's  · Skin Integrity/Edema:      · -       Skin integrity: Visible skin intact  · RLE ROM: PROM WFL's  · RLE Strength: able to lift against gravity  · LLE ROM: PROM WFL's  · LLE Strength: able to lift against gravity    Functional Mobility:  · Bed Mobility:     · Rolling Right: minimum assistance and with VC/TC for reaching for BR  · Scooting: total assistance and to HOB in supine  · Bridging: stand by assistance  · Supine to Sit: total assistance and with Vc/TC for body mechanics  · Sit to Supine: minimum assistance  · Transfers: N/T  · Gait: N/T  · Balance: Fair sitting at EOB, required CGA/Min A, leaning to Right, wanting to lay down    Therapeutic  Activities and Exercises:   Limited Eval only.  /83, SPO2 98-99%    AM-PAC 6 CLICK MOBILITY  Total Score:9     Patient left HOB elevated with all lines intact, call button in reach, bed alarm on and nsg notified.    GOALS:   Multidisciplinary Problems     Physical Therapy Goals        Problem: Physical Therapy Goal    Goal Priority Disciplines Outcome Goal Variances Interventions   Physical Therapy Goal     PT, PT/OT      Description: Goals to be met by: 22     Patient will increase functional independence with mobility by performin. Supine to sit with Stand-by Assistance  2. Rolling to Left and Right with Stand-by Assistance.  3. Sit to stand transfer to be assessed   4. Gait to be assessed    5. Sitting at edge of bed x15 minutes with Stand-by Assistance  6. Lower extremity exercise program x10 reps per handout, with assistance as needed                     History:     Past Medical History:   Diagnosis Date    High cholesterol     Hypertension     Seizures        Past Surgical History:   Procedure Laterality Date    CYSTOSCOPY  2022    Procedure: CYSTOSCOPY;  Surgeon: ANIBAL Rangel MD;  Location: [facility] OR;  Service: Urology;;    FISTULOGRAM N/A 2022    Procedure: FISTULOGRAM;  Surgeon: ANIBAL Rangel MD;  Location: [facility] OR;  Service: Urology;  Laterality: N/A;    SURGICAL REMOVAL OF ABSCESS  2022    Procedure: EXCISION, ABSCESS;  Surgeon: ANIBAL Rangel MD;  Location: [facility] OR;  Service: Urology;;       Time Tracking:     PT Received On: 22  PT Start Time: 925     PT Stop Time: 946  PT Total Time (min): 21 min     Billable Minutes: Evaluation 21 co-evaluation with OT      2022

## 2022-01-13 NOTE — PROGRESS NOTES
[facility] Surg  Urology  Progress Note    Patient Name: Abhishek Zhao  MRN: [id number]  Admission Date: 1/4/2022  Hospital Length of Stay: 8 days  Code Status: Full Code   Attending Provider: Hugo Aviles MD   Primary Care Physician: To Obtain Unable    Subjective:     HPI:  Scrotal Swelling  Abhishek Zhao is a 61 y.o. male who was been experiencing scrotal pain and swelling since 12/31/2021.  He denies any fever.  He has had issues voiding since the swelling began.  Hemostat having issues in the past with urinary problems.  He denies having any previous issues with scrotal infection or swelling.  He recalls that he had procedures in the past but cannot recall specifically what to place.  He does not have urologist locally but moved back from [place] about 1 year ago.    Review of care everywhere shows that he had a cystoscopy with urethral dilation of a urethral stricture in the bulbar urethra in 2019 at [facility].  And there are reports that in approximately 2015 [facility].      Interval History: Tolerating dressing changes.  Tolerating Sahu.      Review of Systems   Constitutional: Negative.    HENT: Negative.    Eyes: Negative.    Respiratory: Negative for cough, chest tightness and shortness of breath.    Cardiovascular: Negative for chest pain.   Gastrointestinal: Negative.  Negative for constipation, diarrhea and nausea.   Genitourinary: Positive for scrotal swelling. Negative for hematuria.   Musculoskeletal: Negative.    Neurological: Negative.    Psychiatric/Behavioral: Negative.      Objective:     Temp:  [97.3 °F (36.3 °C)-98.9 °F (37.2 °C)] 97.6 °F (36.4 °C)  Pulse:  [64-82] 71  Resp:  [16-20] 16  SpO2:  [93 %-100 %] 95 %  BP: (147-170)/(70-80) 154/79     Body mass index is 20.15 kg/m².    Date 01/13/22 0700 - 01/14/22 0659   Shift 9043-7528 6691-0349 2852-8478 24 Hour Total   INTAKE   IV Piggyback 479   479   Shift Total(mL/kg) 479(7.7)   479(7.7)    OUTPUT   Shift Total(mL/kg)       Weight (kg) 61.9 61.9 61.9 61.9          Drains     Drain                 Urethral Catheter 01/05/22 1050 Double-lumen 20 Fr. 7 days                Physical Exam  Vitals and nursing note reviewed.   Constitutional:       Appearance: He is well-developed and well-nourished.   HENT:      Head: Normocephalic.   Eyes:      Conjunctiva/sclera: Conjunctivae normal.   Neck:      Thyroid: No thyromegaly.      Trachea: No tracheal deviation.   Cardiovascular:      Rate and Rhythm: Normal rate.      Heart sounds: Normal heart sounds.   Pulmonary:      Effort: Pulmonary effort is normal. No respiratory distress.      Breath sounds: Normal breath sounds. No wheezing.   Abdominal:      General: Bowel sounds are normal.      Palpations: Abdomen is soft. There is no hepatosplenomegaly.      Tenderness: There is no abdominal tenderness. There is no CVA tenderness or rebound.      Hernia: No hernia is present.   Genitourinary:     Comments: Sahu in place with yellow urine  Penile edema much improved, now minimal  No fluctuance  Scrotal Edema much improved, wound with granulation tissue.  Musculoskeletal:         General: No tenderness or edema. Normal range of motion.      Cervical back: Normal range of motion and neck supple.   Lymphadenopathy:      Cervical: No cervical adenopathy.   Skin:     General: Skin is warm and dry.      Findings: No erythema or rash.   Neurological:      Mental Status: He is alert and oriented to person, place, and time.   Psychiatric:         Mood and Affect: Mood and affect normal.         Behavior: Behavior normal.         Thought Content: Thought content normal.         Judgment: Judgment normal.         Significant Labs:    BMP:  Recent Labs   Lab 01/11/22  0403 01/12/22  1208 01/13/22  0356    140 139   K 3.6 3.0* 2.9*    109 106   CO2 20* 24 23   BUN 8 7 5*   CREATININE 1.1 1.1 1.0   CALCIUM 8.2* 8.1* 8.5*       CBC:   Recent Labs   Lab  01/11/22  0403 01/12/22  1208 01/13/22  0356   WBC 16.66* 28.22* 26.00*   HGB 13.0* 11.7* 12.2*   HCT 39.3* 35.4* 35.5*    342 301       Blood Culture:   Recent Labs   Lab 01/10/22  1411   LABBLOO No Growth to date  No Growth to date  No Growth to date  No Growth to date  No Growth to date  No Growth to date     Urine Culture: No results for input(s): LABURIN in the last 168 hours.    Significant Imaging:                    Assessment/Plan:     * Scrotal abscess  s/p cystoscopy with santiago placement and debridement of abscess/necrosis of scrotum 1/5/22 with Dr. Rangel.    Continue abx per primary  Cultures growing Candida and E coli  Appreciate wound care RN and floor RN dressing changes  Will continue to monitor condition    Scrotal US obtained 1/9/2022 with concern for fluid pocket posterior to testicles, exam did not reveal additional loculation.     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

## 2022-01-14 PROBLEM — G93.40 ENCEPHALOPATHY ACUTE: Status: ACTIVE | Noted: 2022-01-14

## 2022-01-14 LAB
ALBUMIN SERPL BCP-MCNC: 1.5 G/DL (ref 3.5–5.2)
ALLENS TEST: ABNORMAL
ALP SERPL-CCNC: 453 U/L (ref 55–135)
ALT SERPL W/O P-5'-P-CCNC: 17 U/L (ref 10–44)
AMMONIA PLAS-SCNC: 29 UMOL/L (ref 10–50)
ANION GAP SERPL CALC-SCNC: 10 MMOL/L (ref 8–16)
AST SERPL-CCNC: 23 U/L (ref 10–40)
AV INDEX (PROSTH): 0.66
AV MEAN GRADIENT: 2 MMHG
AV PEAK GRADIENT: 4 MMHG
AV VALVE AREA: 1.94 CM2
AV VELOCITY RATIO: 0.68
BACTERIA BLD CULT: NORMAL
BACTERIA BLD CULT: NORMAL
BASOPHILS # BLD AUTO: 0.13 K/UL (ref 0–0.2)
BASOPHILS NFR BLD: 0.8 % (ref 0–1.9)
BILIRUB SERPL-MCNC: 0.1 MG/DL (ref 0.1–1)
BSA FOR ECHO PROCEDURE: 1.73 M2
BUN SERPL-MCNC: 6 MG/DL (ref 6–20)
CALCIUM SERPL-MCNC: 8.1 MG/DL (ref 8.7–10.5)
CHLORIDE SERPL-SCNC: 106 MMOL/L (ref 95–110)
CO2 SERPL-SCNC: 25 MMOL/L (ref 23–29)
CREAT SERPL-MCNC: 0.9 MG/DL (ref 0.5–1.4)
CV ECHO LV RWT: 0.83 CM
DELSYS: ABNORMAL
DIFFERENTIAL METHOD: ABNORMAL
DOP CALC AO PEAK VEL: 1.02 M/S
DOP CALC AO VTI: 24.48 CM
DOP CALC LVOT AREA: 2.9 CM2
DOP CALC LVOT DIAMETER: 1.93 CM
DOP CALC LVOT PEAK VEL: 0.69 M/S
DOP CALC LVOT STROKE VOLUME: 47.43 CM3
DOP CALCLVOT PEAK VEL VTI: 16.22 CM
E WAVE DECELERATION TIME: 167.02 MSEC
E/A RATIO: 1.33
E/E' RATIO: 11.85 M/S
ECHO LV POSTERIOR WALL: 1.47 CM (ref 0.6–1.1)
EJECTION FRACTION: 50 %
EOSINOPHIL # BLD AUTO: 0 K/UL (ref 0–0.5)
EOSINOPHIL NFR BLD: 0.2 % (ref 0–8)
ERYTHROCYTE [DISTWIDTH] IN BLOOD BY AUTOMATED COUNT: 12.9 % (ref 11.5–14.5)
EST. GFR  (AFRICAN AMERICAN): >60 ML/MIN/1.73 M^2
EST. GFR  (NON AFRICAN AMERICAN): >60 ML/MIN/1.73 M^2
FLOW: 4
FRACTIONAL SHORTENING: 15 % (ref 28–44)
GLUCOSE SERPL-MCNC: 121 MG/DL (ref 70–110)
HCO3 UR-SCNC: 27.7 MMOL/L (ref 24–28)
HCT VFR BLD AUTO: 35 % (ref 40–54)
HGB BLD-MCNC: 11.9 G/DL (ref 14–18)
IMM GRANULOCYTES # BLD AUTO: 0.62 K/UL (ref 0–0.04)
IMM GRANULOCYTES NFR BLD AUTO: 3.6 % (ref 0–0.5)
INTERVENTRICULAR SEPTUM: 1.54 CM (ref 0.6–1.1)
IVRT: 161.48 MSEC
LA MAJOR: 6.51 CM
LA MINOR: 6.18 CM
LA WIDTH: 4.34 CM
LACTATE SERPL-SCNC: 1.3 MMOL/L (ref 0.5–2.2)
LEFT INTERNAL DIMENSION IN SYSTOLE: 3.03 CM (ref 2.1–4)
LEFT VENTRICLE DIASTOLIC VOLUME INDEX: 30.06 ML/M2
LEFT VENTRICLE DIASTOLIC VOLUME: 52.6 ML
LEFT VENTRICLE MASS INDEX: 113 G/M2
LEFT VENTRICLE SYSTOLIC VOLUME INDEX: 20.4 ML/M2
LEFT VENTRICLE SYSTOLIC VOLUME: 35.75 ML
LEFT VENTRICULAR INTERNAL DIMENSION IN DIASTOLE: 3.55 CM (ref 3.5–6)
LEFT VENTRICULAR MASS: 198.25 G
LV LATERAL E/E' RATIO: 11 M/S
LV SEPTAL E/E' RATIO: 12.83 M/S
LYMPHOCYTES # BLD AUTO: 1.2 K/UL (ref 1–4.8)
LYMPHOCYTES NFR BLD: 7 % (ref 18–48)
MAGNESIUM SERPL-MCNC: 1.7 MG/DL (ref 1.6–2.6)
MCH RBC QN AUTO: 28.4 PG (ref 27–31)
MCHC RBC AUTO-ENTMCNC: 34 G/DL (ref 32–36)
MCV RBC AUTO: 84 FL (ref 82–98)
MODE: ABNORMAL
MONOCYTES # BLD AUTO: 0.3 K/UL (ref 0.3–1)
MONOCYTES NFR BLD: 1.5 % (ref 4–15)
MV PEAK A VEL: 0.58 M/S
MV PEAK E VEL: 0.77 M/S
MV STENOSIS PRESSURE HALF TIME: 48.43 MS
MV VALVE AREA P 1/2 METHOD: 4.54 CM2
NEUTROPHILS # BLD AUTO: 15.1 K/UL (ref 1.8–7.7)
NEUTROPHILS NFR BLD: 86.9 % (ref 38–73)
NRBC BLD-RTO: 0 /100 WBC
PCO2 BLDA: 42.6 MMHG (ref 35–45)
PH SMN: 7.42 [PH] (ref 7.35–7.45)
PISA TR MAX VEL: 1.75 M/S
PLATELET # BLD AUTO: 255 K/UL (ref 150–450)
PMV BLD AUTO: 9.8 FL (ref 9.2–12.9)
PO2 BLDA: 67 MMHG (ref 80–100)
POC BE: 3 MMOL/L
POC SATURATED O2: 93 % (ref 95–100)
POC TCO2: 29 MMOL/L (ref 23–27)
POCT GLUCOSE: 119 MG/DL (ref 70–110)
POCT GLUCOSE: 130 MG/DL (ref 70–110)
POCT GLUCOSE: 149 MG/DL (ref 70–110)
POTASSIUM SERPL-SCNC: 3.1 MMOL/L (ref 3.5–5.1)
PROCALCITONIN SERPL IA-MCNC: 1.03 NG/ML
PROT SERPL-MCNC: 6.1 G/DL (ref 6–8.4)
PULM VEIN S/D RATIO: 0.84
PV PEAK D VEL: 0.43 M/S
PV PEAK S VEL: 0.36 M/S
PV PEAK VELOCITY: 0.79 CM/S
RA MAJOR: 5.4 CM
RA PRESSURE: 15 MMHG
RA WIDTH: 5.4 CM
RBC # BLD AUTO: 4.19 M/UL (ref 4.6–6.2)
RIGHT VENTRICULAR END-DIASTOLIC DIMENSION: 4.22 CM
SAMPLE: ABNORMAL
SINUS: 3.41 CM
SITE: ABNORMAL
SODIUM SERPL-SCNC: 141 MMOL/L (ref 136–145)
STJ: 2.69 CM
T4 FREE SERPL-MCNC: 0.69 NG/DL (ref 0.71–1.51)
TDI LATERAL: 0.07 M/S
TDI SEPTAL: 0.06 M/S
TDI: 0.07 M/S
TR MAX PG: 12 MMHG
TRICUSPID ANNULAR PLANE SYSTOLIC EXCURSION: 1.87 CM
TROPONIN I SERPL DL<=0.01 NG/ML-MCNC: <0.006 NG/ML (ref 0–0.03)
TSH SERPL DL<=0.005 MIU/L-ACNC: 5.16 UIU/ML (ref 0.4–4)
TV REST PULMONARY ARTERY PRESSURE: 27 MMHG
WBC # BLD AUTO: 17.3 K/UL (ref 3.9–12.7)

## 2022-01-14 PROCEDURE — 84145 PROCALCITONIN (PCT): CPT | Performed by: STUDENT IN AN ORGANIZED HEALTH CARE EDUCATION/TRAINING PROGRAM

## 2022-01-14 PROCEDURE — 36415 COLL VENOUS BLD VENIPUNCTURE: CPT | Performed by: PHYSICIAN ASSISTANT

## 2022-01-14 PROCEDURE — 63600175 PHARM REV CODE 636 W HCPCS: Performed by: PSYCHIATRY & NEUROLOGY

## 2022-01-14 PROCEDURE — 84439 ASSAY OF FREE THYROXINE: CPT | Performed by: PHYSICIAN ASSISTANT

## 2022-01-14 PROCEDURE — 63600175 PHARM REV CODE 636 W HCPCS: Performed by: HOSPITALIST

## 2022-01-14 PROCEDURE — 63600175 PHARM REV CODE 636 W HCPCS: Performed by: STUDENT IN AN ORGANIZED HEALTH CARE EDUCATION/TRAINING PROGRAM

## 2022-01-14 PROCEDURE — 99222 1ST HOSP IP/OBS MODERATE 55: CPT | Mod: ,,, | Performed by: PSYCHIATRY & NEUROLOGY

## 2022-01-14 PROCEDURE — 99024 PR POST-OP FOLLOW-UP VISIT: ICD-10-PCS | Mod: ,,, | Performed by: UROLOGY

## 2022-01-14 PROCEDURE — 95720 EEG PHY/QHP EA INCR W/VEEG: CPT | Mod: ,,, | Performed by: PSYCHIATRY & NEUROLOGY

## 2022-01-14 PROCEDURE — 97110 THERAPEUTIC EXERCISES: CPT

## 2022-01-14 PROCEDURE — 99233 PR SUBSEQUENT HOSPITAL CARE,LEVL III: ICD-10-PCS | Mod: ,,, | Performed by: INTERNAL MEDICINE

## 2022-01-14 PROCEDURE — 25000003 PHARM REV CODE 250: Performed by: PSYCHIATRY & NEUROLOGY

## 2022-01-14 PROCEDURE — 82140 ASSAY OF AMMONIA: CPT | Performed by: PHYSICIAN ASSISTANT

## 2022-01-14 PROCEDURE — 80053 COMPREHEN METABOLIC PANEL: CPT | Performed by: STUDENT IN AN ORGANIZED HEALTH CARE EDUCATION/TRAINING PROGRAM

## 2022-01-14 PROCEDURE — 25000242 PHARM REV CODE 250 ALT 637 W/ HCPCS: Performed by: STUDENT IN AN ORGANIZED HEALTH CARE EDUCATION/TRAINING PROGRAM

## 2022-01-14 PROCEDURE — 36415 COLL VENOUS BLD VENIPUNCTURE: CPT | Performed by: STUDENT IN AN ORGANIZED HEALTH CARE EDUCATION/TRAINING PROGRAM

## 2022-01-14 PROCEDURE — 84484 ASSAY OF TROPONIN QUANT: CPT | Performed by: PHYSICIAN ASSISTANT

## 2022-01-14 PROCEDURE — 95720 PR EEG, W/VIDEO, CONT RECORD, I&R, >12<26 HRS: ICD-10-PCS | Mod: ,,, | Performed by: PSYCHIATRY & NEUROLOGY

## 2022-01-14 PROCEDURE — 99232 PR SUBSEQUENT HOSPITAL CARE,LEVL II: ICD-10-PCS | Mod: ,,, | Performed by: PSYCHIATRY & NEUROLOGY

## 2022-01-14 PROCEDURE — 95714 VEEG EA 12-26 HR UNMNTR: CPT

## 2022-01-14 PROCEDURE — 83735 ASSAY OF MAGNESIUM: CPT | Performed by: PHYSICIAN ASSISTANT

## 2022-01-14 PROCEDURE — 84443 ASSAY THYROID STIM HORMONE: CPT | Performed by: PHYSICIAN ASSISTANT

## 2022-01-14 PROCEDURE — 25000003 PHARM REV CODE 250: Performed by: HOSPITALIST

## 2022-01-14 PROCEDURE — 99222 PR INITIAL HOSPITAL CARE,LEVL II: ICD-10-PCS | Mod: ,,, | Performed by: PSYCHIATRY & NEUROLOGY

## 2022-01-14 PROCEDURE — 99024 POSTOP FOLLOW-UP VISIT: CPT | Mod: ,,, | Performed by: UROLOGY

## 2022-01-14 PROCEDURE — 99232 SBSQ HOSP IP/OBS MODERATE 35: CPT | Mod: ,,, | Performed by: PSYCHIATRY & NEUROLOGY

## 2022-01-14 PROCEDURE — 20000000 HC ICU ROOM

## 2022-01-14 PROCEDURE — 94761 N-INVAS EAR/PLS OXIMETRY MLT: CPT

## 2022-01-14 PROCEDURE — 83605 ASSAY OF LACTIC ACID: CPT | Performed by: PHYSICIAN ASSISTANT

## 2022-01-14 PROCEDURE — 94640 AIRWAY INHALATION TREATMENT: CPT

## 2022-01-14 PROCEDURE — 63600175 PHARM REV CODE 636 W HCPCS: Performed by: PHYSICIAN ASSISTANT

## 2022-01-14 PROCEDURE — 85025 COMPLETE CBC W/AUTO DIFF WBC: CPT | Performed by: STUDENT IN AN ORGANIZED HEALTH CARE EDUCATION/TRAINING PROGRAM

## 2022-01-14 PROCEDURE — C9254 INJECTION, LACOSAMIDE: HCPCS | Performed by: PSYCHIATRY & NEUROLOGY

## 2022-01-14 PROCEDURE — 27000221 HC OXYGEN, UP TO 24 HOURS

## 2022-01-14 PROCEDURE — 99233 SBSQ HOSP IP/OBS HIGH 50: CPT | Mod: ,,, | Performed by: INTERNAL MEDICINE

## 2022-01-14 PROCEDURE — 99900035 HC TECH TIME PER 15 MIN (STAT)

## 2022-01-14 PROCEDURE — 25000003 PHARM REV CODE 250: Performed by: PHYSICIAN ASSISTANT

## 2022-01-14 PROCEDURE — 95700 EEG CONT REC W/VID EEG TECH: CPT

## 2022-01-14 RX ORDER — POTASSIUM CHLORIDE 7.45 MG/ML
10 INJECTION INTRAVENOUS
Status: COMPLETED | OUTPATIENT
Start: 2022-01-14 | End: 2022-01-14

## 2022-01-14 RX ORDER — FUROSEMIDE 10 MG/ML
40 INJECTION INTRAMUSCULAR; INTRAVENOUS ONCE
Status: COMPLETED | OUTPATIENT
Start: 2022-01-14 | End: 2022-01-14

## 2022-01-14 RX ORDER — LORAZEPAM 2 MG/ML
3 INJECTION INTRAMUSCULAR ONCE AS NEEDED
Status: DISCONTINUED | OUTPATIENT
Start: 2022-01-14 | End: 2022-02-09 | Stop reason: HOSPADM

## 2022-01-14 RX ORDER — LEVETIRACETAM 500 MG/5ML
2000 INJECTION, SOLUTION, CONCENTRATE INTRAVENOUS ONCE
Status: COMPLETED | OUTPATIENT
Start: 2022-01-14 | End: 2022-01-14

## 2022-01-14 RX ORDER — MAGNESIUM SULFATE 1 G/100ML
1 INJECTION INTRAVENOUS ONCE
Status: COMPLETED | OUTPATIENT
Start: 2022-01-14 | End: 2022-01-14

## 2022-01-14 RX ADMIN — ACETYLCYSTEINE 4 ML: 100 SOLUTION ORAL; RESPIRATORY (INHALATION) at 09:01

## 2022-01-14 RX ADMIN — THIAMINE HYDROCHLORIDE 500 MG: 100 INJECTION, SOLUTION INTRAMUSCULAR; INTRAVENOUS at 05:01

## 2022-01-14 RX ADMIN — SODIUM CHLORIDE 200 MG: 9 INJECTION, SOLUTION INTRAVENOUS at 11:01

## 2022-01-14 RX ADMIN — ACETYLCYSTEINE 4 ML: 100 SOLUTION ORAL; RESPIRATORY (INHALATION) at 08:01

## 2022-01-14 RX ADMIN — POTASSIUM CHLORIDE 10 MEQ: 7.46 INJECTION, SOLUTION INTRAVENOUS at 10:01

## 2022-01-14 RX ADMIN — IPRATROPIUM BROMIDE AND ALBUTEROL SULFATE 3 ML: 2.5; .5 SOLUTION RESPIRATORY (INHALATION) at 01:01

## 2022-01-14 RX ADMIN — PIPERACILLIN AND TAZOBACTAM 4.5 G: 4; .5 INJECTION, POWDER, LYOPHILIZED, FOR SOLUTION INTRAVENOUS; PARENTERAL at 01:01

## 2022-01-14 RX ADMIN — POTASSIUM CHLORIDE 10 MEQ: 7.46 INJECTION, SOLUTION INTRAVENOUS at 08:01

## 2022-01-14 RX ADMIN — POTASSIUM CHLORIDE 10 MEQ: 7.46 INJECTION, SOLUTION INTRAVENOUS at 12:01

## 2022-01-14 RX ADMIN — DEXTROSE 500 MG: 50 INJECTION, SOLUTION INTRAVENOUS at 10:01

## 2022-01-14 RX ADMIN — PIPERACILLIN AND TAZOBACTAM 4.5 G: 4; .5 INJECTION, POWDER, LYOPHILIZED, FOR SOLUTION INTRAVENOUS; PARENTERAL at 08:01

## 2022-01-14 RX ADMIN — LEVETIRACETAM 2000 MG: 100 INJECTION INTRAVENOUS at 05:01

## 2022-01-14 RX ADMIN — IPRATROPIUM BROMIDE AND ALBUTEROL SULFATE 3 ML: 2.5; .5 SOLUTION RESPIRATORY (INHALATION) at 09:01

## 2022-01-14 RX ADMIN — ACETYLCYSTEINE 4 ML: 100 SOLUTION ORAL; RESPIRATORY (INHALATION) at 01:01

## 2022-01-14 RX ADMIN — FUROSEMIDE 40 MG: 10 INJECTION, SOLUTION INTRAMUSCULAR; INTRAVENOUS at 08:01

## 2022-01-14 RX ADMIN — SODIUM CHLORIDE 200 MG: 9 INJECTION, SOLUTION INTRAVENOUS at 12:01

## 2022-01-14 RX ADMIN — IPRATROPIUM BROMIDE AND ALBUTEROL SULFATE 3 ML: 2.5; .5 SOLUTION RESPIRATORY (INHALATION) at 08:01

## 2022-01-14 RX ADMIN — FOLIC ACID 1 MG: 5 INJECTION, SOLUTION INTRAMUSCULAR; INTRAVENOUS; SUBCUTANEOUS at 05:01

## 2022-01-14 RX ADMIN — PIPERACILLIN AND TAZOBACTAM 4.5 G: 4; .5 INJECTION, POWDER, LYOPHILIZED, FOR SOLUTION INTRAVENOUS; PARENTERAL at 04:01

## 2022-01-14 RX ADMIN — MAGNESIUM SULFATE 1 G: 1 INJECTION INTRAVENOUS at 05:01

## 2022-01-14 RX ADMIN — IPRATROPIUM BROMIDE AND ALBUTEROL SULFATE 3 ML: 2.5; .5 SOLUTION RESPIRATORY (INHALATION) at 05:01

## 2022-01-14 RX ADMIN — ENOXAPARIN SODIUM 40 MG: 40 INJECTION SUBCUTANEOUS at 05:01

## 2022-01-14 NOTE — PT/OT/SLP PROGRESS
"Occupational Therapy   Treatment    Name: Abhishek Zhao  MRN: [id number]  Admitting Diagnosis:  Scrotal abscess  9 Days Post-Op    Recommendations:     Discharge Recommendations: nursing facility, skilled  Discharge Equipment Recommendations:   (TBD)  Barriers to discharge:   (not at PLOF; dependent bed mobility; disoriented; total A for ADLs)    Assessment:     Abhishek Zhao is a 61 y.o. male with a medical diagnosis of Scrotal abscess. Performance deficits affecting function are weakness,impaired cognition,impaired endurance,decreased ROM,visual deficits,decreased coordination,impaired fine motor,decreased upper extremity function,impaired self care skills,decreased lower extremity function,impaired functional mobilty,impaired skin,decreased safety awareness,pain,impaired balance,impaired cardiopulmonary response to activity.     Pt transferred to ICU since session yesterday.      Yesterday, pt eyes not tracking but able to focus to therapist with cueing and communicate some (primarily confused speech, but able to say name, month/day of , [facility]) and pt sat EOB briefly.     Today, pt not making any eye contact nor following most simple commands. Not following commands for AROM, but able to complete AAROM inconsistently. Once OT removed pt's dentures and with max prompting of pt's first name, pt able to state "Abhishek" and "yeah I am here." dependent assist to roll and scoot.    Pt did not respond to blink reflex at bed level. Pt did lightly squeeze OTs fingers with BUE with faint grasp to each hand.     Rehab Prognosis:  Fair; patient would benefit from acute skilled OT services to address these deficits and reach maximum level of function.       Plan:     Patient to be seen  (3-5x/week) to address the above listed problems via self-care/home management,therapeutic activities,therapeutic exercises  · Plan of Care Expires: 22  · Plan of Care Reviewed with: patient,spouse    Subjective     Chief complaint: " "wife concerned- pt is not like this and does not like to see him like this   Patient/family comments/ goals: RUE shoulder flex pain d/t old CVA; however, pt did move this UE more with active assist compared to LUE     Wife provided PLOF: pt and wife live in a H with no concerns at entry. TTB in tub. min A prn for ADLs; pt used str c vs rollator vs wheelchair for mobility with MIN A prn.     Pain/Comfort:  · Pain Rating 1:  (initially said "yes" to pain then "yes" to RUE movement)  · Pain Addressed 1: Reposition,Cessation of Activity,Distraction    Objective:     Communicated with: nurseEva, prior to session.  Patient found HOB elevated with peripheral IV,telemetry,blood pressure cuff,pulse ox (continuous),oxygen,santiago catheter (warming blanket) upon OT entry to room.    General Precautions: Standard, fall,respiratory   Orthopedic Precautions:N/A   Braces: N/A  Respiratory Status: High flow, flow 5.5 L/min    Pre-tx: 58 bpm, 137/68, 100%   Post-tx: 60 bpm, 149/77, 100%      Occupational Performance:     Bed Mobility:    · Patient completed Rolling/Turning to Right with dependent  · Patient completed Scooting with dependent and 2 persons     Activities of Daily Living:  · Unable to complete 2* mental status       [facility] 6 Click ADL: 6    Treatment & Education:  · Pt's wife educated on OT role/POC.   · Top dentures in patient's mouth- OT provided denture cup and placed dentures there; pt label on cup and placed at bedside with wife aware/present. Max verbal cueing, tactiel cueing to his chin, and increased time required for pt to open his mouth for OT to assist to remove dentures.   · AAROM to BUE:   · 2x10 elbow flex/ext, shoulder flex/ext, shoulder horizontal ab/dduction   · Static stretch to BUE elbow extension x~30 sec   · Short cue for pt to visually attend to hand when placed directly in front of face with no focus noted. Pt unable to follow cue for UE "up"/ "down" with elbow ROM with distractions " eliminated.   · Inconsistently moved B/L shoulders AAROM, R>LUE; wife reports chronic R shoulder pain from h/o CVA.   · All questions/concerns answered within OT scope of practice   · Wedge provided and pt put in sidelying; edu wife on importance of pressure relief with wedge and boots       Patient left HOB elevated R sidelying with B/L pressure relief boots on and warming blanket back in place with all lines intact, call button in reach, bed alarm on, nurse, Eva, notified, wife present and blinds opened, lights onEducation:      GOALS:   Multidisciplinary Problems     Occupational Therapy Goals        Problem: Occupational Therapy Goal    Goal Priority Disciplines Outcome Interventions   Occupational Therapy Goal     OT, PT/OT Ongoing, Not Progressing    Description: Goals to be met by: 01/27/21     Patient will increase functional independence with ADLs by performing:    Feeding with Modified [place].  UE Dressing with Supervision.  LE Dressing with Minimal Assistance.  Grooming while seated with Stand-by Assistance.  Sitting at edge of bed x15 minutes with Supervision.  Supine to sit with Contact Guard Assistance.  Upper extremity exercise program x10 reps per handout, with supervision.    Step transfer- to be assessed pending further pt participation   Toilet transfer to bedside commode - to be assessed pending further pt participation                     Time Tracking:     OT Date of Treatment: 01/14/22  OT Start Time: 0923  OT Stop Time: 0951  OT Total Time (min): 28 min    Billable Minutes:Therapeutic Exercise 14 min  Total Time 28 min (co-treat with PT)    OT/ALOK: OT          1/14/2022

## 2022-01-14 NOTE — ASSESSMENT & PLAN NOTE
s/p cystoscopy with santiago placement and debridement of abscess/necrosis of scrotum 1/5/22 with Dr. Rangel.    Continue abx per primary  Cultures growing Candida and E coli  Appreciate wound care RN and floor RN dressing changes  Will continue to monitor condition      WBC improving, fluid collection drained at bedside.  Will reexamine tomorrow.    Will likely need plastics consultation in future for wound closure/grafting

## 2022-01-14 NOTE — PT/OT/SLP PROGRESS
"Physical Therapy Treatment    Patient Name:  Abhishek Zhao   MRN:  [id number]    Recommendations:     Discharge Recommendations:  nursing facility, skilled   Discharge Equipment Recommendations:  (Ongoing assessment pending pt progress)   Barriers to discharge: Pt azaelrentlu in ICU with decreased LOC    Assessment:     Abhishek Zhao is a 61 y.o. male admitted with a medical diagnosis of Scrotal abscess.  He presents with the following impairments/functional limitations:  weakness,decreased safety awareness,impaired skin,impaired endurance,pain,visual deficits,impaired cognition,impaired cardiopulmonary response to activity,impaired self care skills,decreased coordination,decreased ROM,impaired joint extensibility,impaired functional mobilty,decreased upper extremity function,impaired coordination,decreased lower extremity function,impaired fine motor Pt limited today by decreased LOC.    Rehab Prognosis: Fair; patient would benefit from acute skilled PT services to address these deficits and reach maximum level of function.    Recent Surgery: Procedure(s) (LRB):  CYSTOSCOPY, RETROGRADE URETHROGRAM, FISTULAGRAM, EXCISION OF NECROTIC SCROTAL TISSUE ABSCESS, BARKER PLACEMENT (N/A)  EXCISION, ABSCESS  CYSTOSCOPY  FISTULOGRAM (N/A) 9 Days Post-Op    Plan:     During this hospitalization, patient to be seen  (3-5x/wk) to address the identified rehab impairments via gait training,therapeutic activities,therapeutic exercises,neuromuscular re-education,wheelchair management/training and progress toward the following goals:    · Plan of Care Expires:  01/27/22    Subjective     Chief Complaint: pain, pt non-verbal unless aroused, pt arouses to "Abhishek" in loud voice  Patient/Family Comments/goals: Unable to state.  Pt able to answer yes/no questions and state his full name intelligably after dentures removed.  Pt able to follow simple commands.    Pt with blank stare and no blink reflex.  Pt responded "I'm here" when PT said " " "I know your'e in there"  Pain/Comfort:  · Pain Rating 1:  (When asked if in pain , he responded yes.  With ROM R UE, pt became restless and answered yes to pain in R UE)  · Pain Addressed 1: Reposition,Distraction,Cessation of Activity,Nurse notified  · Pain Rating Post-Intervention 1:  (No ouward signs of pain at rest)      Objective:     Communicated with Nsg prior to session.  Patient found slid down in bed with peripheral IV,telemetry,blood pressure cuff,pulse ox (continuous),oxygen,santiago catheter (ICU monitoring) upon PT entry to room.     General Precautions: Standard, respiratory,fall   Orthopedic Precautions:N/A   Braces: N/A  Respiratory Status: High flow, flow 5 L/min, concentration      Functional Mobility:  · Bed Mobility:     · Rolling Right: total assistance with VC/TC for reaching for BR, Pt acknowledged need to help roll  · Scooting: dependence      AM-PAC 6 CLICK MOBILITY  Turning over in bed (including adjusting bedclothes, sheets and blankets)?: 2  Sitting down on and standing up from a chair with arms (e.g., wheelchair, bedside commode, etc.): 1  Moving from lying on back to sitting on the side of the bed?: 1  Moving to and from a bed to a chair (including a wheelchair)?: 1  Need to walk in hospital room?: 1  Climbing 3-5 steps with a railing?: 1  Basic Mobility Total Score: 7       Therapeutic Activities and Exercises:   Pt received B HCS 3x30 sec and P/AAROM to B LE's in available planes x 10 reps ea.  Pt repositioned to HOB and R side with wedge.  Pressure relief boots applied    Patient left as above with all lines intact, call button in reach, nsg notified and spouse present..    GOALS:   Multidisciplinary Problems     Physical Therapy Goals        Problem: Physical Therapy Goal    Goal Priority Disciplines Outcome Goal Variances Interventions   Physical Therapy Goal     PT, PT/OT Ongoing, Progressing     Description: Goals to be met by: 1/27/22     Patient will increase functional " independence with mobility by performin. Supine to sit with Stand-by Assistance  2. Rolling to Left and Right with Stand-by Assistance.  3. Sit to stand transfer to be assessed   4. Gait to be assessed    5. Sitting at edge of bed x15 minutes with Stand-by Assistance  6. Lower extremity exercise program x10 reps per handout, with assistance as needed                     Time Tracking:     PT Received On: 22  PT Start Time: 923     PT Stop Time: 951  PT Total Time (min): 28 min     Billable Minutes: TE 14 Co-treat with OT    Treatment Type: Treatment  PT/PTA: PT     PTA Visit Number: 0     2022

## 2022-01-14 NOTE — NURSING
Pct concerned that patient was different and not acting right, patient is pale, difficult to arouse, patient will open eyes with deep sternal rub, speech is slurred, difficult to understand, unable to keep eyes open, patient reaching out with right arm, unable to follow commands, slight left side facial dropping, code stroke called

## 2022-01-14 NOTE — ASSESSMENT & PLAN NOTE
Patient w/ worsened level of alertness and attention with dysarthria, unknown onset of change, noted difference since yesterday. Has had delirium treated for several days, also w/ seizure d/o on lamictal and vimpat. Keppra 2g loaded just before I got on video. He also got seroquel in the evening which he hadn't gotten in a few nights, but at a lower dose - so unclear if that has played a role.At this time he displays bilateral / global weakness, slurred speech and reduced attention span and has disorientation.    He is out of window for thrombolytic therapy.  CT w/o acute changes.  Recommend MRI brain w/o contrast to r/o CNS disease however this appears clinically to be more encephalopathy/global dysfunction than any focal or brainstem process.

## 2022-01-14 NOTE — SUBJECTIVE & OBJECTIVE
"  Woke up with symptoms?: unknown    Recent bleeding noted: no  Does the patient take any Blood Thinners? no  Medications: lamictal, lamotrigine, keppra      Past Medical History:   Past Medical History:   Diagnosis Date    High cholesterol     Hypertension     Seizures      Past Surgical History: no major surgeries within the last 2 weeks    Family History: no relevant history    Social History: no smoking, no drinking, no drugs    Allergies: No Known Allergies No relevant allergies    Review of Systems   Unable to perform ROS: Mental status change     Objective:   Vitals: Blood pressure (!) 169/74, pulse 60, temperature 97.1 °F (36.2 °C), temperature source Axillary, resp. rate 17, height 5' 9" (1.753 m), weight 61.9 kg (136 lb 7.4 oz), SpO2 95 %.     CT READ: Yes  No hemmorhage. No mass effect. No early infarct signs.     Physical Exam  Constitutional:       General: He is not in acute distress.     Appearance: He is well-nourished.   HENT:      Head: Atraumatic.      Right Ear: External ear normal.      Left Ear: External ear normal.   Eyes:      General: No scleral icterus.     Conjunctiva/sclera: Conjunctivae normal.   Pulmonary:      Effort: Pulmonary effort is normal.   Abdominal:      General: There is no distension.      Tenderness: There is no guarding.   Musculoskeletal:         General: No deformity. Normal range of motion.      Cervical back: Normal range of motion.   Skin:     General: Skin is warm and dry.   Neurological:      Mental Status: He is alert.   Psychiatric:         Mood and Affect: Mood and affect normal.           "

## 2022-01-14 NOTE — PROGRESS NOTES
[facility] [place] Intensive Care  Infectious Disease  Progress Note    Patient Name: Abhishek Zhao  MRN: [id number]  Admission Date: 1/4/2022  Length of Stay: 9 days  Attending Physician: Hugo Aviles MD  Primary Care Provider: To Obtain Unable    Isolation Status: No active isolations     Assessment/Plan:      * Scrotal abscess  - Berna's gangrene, s/p debridement, followed by Urology  - E.coli and Candida albicans growing on culture  - urine culture also grew Candida albicans  - now with Pseudomonas in sputum and evidence of pneumonia  - PCT 1  - continue Zosyn and fluconazole        Demarco Combs MD  Infectious Disease  [facility] [place] - Intensive Care    Subjective:     Principal Problem:Scrotal abscess    HPI: Abhishek Zhao is a 61-year-old male with HTN and seizures who presented to the ED with complaints of testicular pain and swelling for four days. At that time, he endorsed radiation of pain to rectum and difficulty urinating. He was originally planning on going to the ED before th [place] game but decided to wait until afterwards. In the ED, imaging showed a complex fluid collection(s).  He has a history of urethral stricture last dilated in 2019 at an outside facility ([facility]). The patient was admitted and started on empiric abx. 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 Post-operatively, the patient has improved with diminishing leukocytosis. Surgical cultures have grown E.coli and Candida, thus far. ID is consulted for Berna's gangrene.    Interval History: Worsening AMS, now in ICU. Just back from MRI. Wife at bedside.    Review of Systems   Unable to perform ROS: Mental status change     Objective:     Vital Signs (Most Recent):  Temp: 96.9 °F (36.1 °C) (01/14/22 0551)  Pulse: (!) 58 (01/14/22 0819)  Resp: (!)  28 (01/14/22 0819)  BP: (!) 165/90 (01/14/22 0745)  SpO2: 98 % (01/14/22 0819) Vital Signs (24h Range):  Temp:  [96.1 °F (35.6 °C)-97.7 °F (36.5 °C)] 96.9 °F (36.1 °C)  Pulse:  [55-67] 58  Resp:  [16-50] 28  SpO2:  [86 %-100 %] 98 %  BP: (145-169)/(68-93) 165/90     Weight: 61.2 kg (135 lb)  Body mass index is 19.94 kg/m².    Estimated Creatinine Clearance: 76.5 mL/min (based on SCr of 0.9 mg/dL).    Physical Exam  Vitals and nursing note reviewed.   Constitutional:       General: He is not in acute distress.     Appearance: Normal appearance. He is not ill-appearing, toxic-appearing or diaphoretic.   HENT:      Head: Normocephalic and atraumatic.      Mouth/Throat:      Mouth: Mucous membranes are moist.   Eyes:      Extraocular Movements: Extraocular movements intact.   Cardiovascular:      Rate and Rhythm: Normal rate.   Pulmonary:      Breath sounds: Normal breath sounds.   Abdominal:      Palpations: Abdomen is soft.      Tenderness: There is no abdominal tenderness. There is no guarding.   Musculoskeletal:         General: No swelling or tenderness.      Cervical back: Neck supple. No rigidity.   Skin:     Findings: No erythema.   Neurological:      Mental Status: He is alert.   Psychiatric:         Behavior: Behavior normal.         Significant Labs:   Blood Culture:   Recent Labs   Lab 01/04/22  1744 01/10/22  1411   LABBLOO No Growth after 4 days.   No Growth after 4 days.  No Growth to date  No Growth to date  No Growth to date  No Growth to date  No Growth to date  No Growth to date  No Growth to date  No Growth to date     CBC:   Recent Labs   Lab 01/13/22  0356 01/14/22 0429   WBC 26.00* 17.30*   HGB 12.2* 11.9*   HCT 35.5* 35.0*    255     CMP:   Recent Labs   Lab 01/13/22  0356 01/14/22 0429    141   K 2.9* 3.1*    106   CO2 23 25   * 121*   BUN 5* 6   CREATININE 1.0 0.9   CALCIUM 8.5* 8.1*   PROT 6.6 6.1   ALBUMIN 1.5* 1.5*   BILITOT 0.1 0.1   ALKPHOS 477* 453*    AST 25 23   ALT 23 17   ANIONGAP 10 10   EGFRNONAA >60 >60     Wound Culture:   Recent Labs   Lab 01/05/22  1055   LABAERO ESCHERICHIA COLI  Moderate  *  YAMILET ALBICANS  Few  *       Significant Imaging: I have reviewed all pertinent imaging results/findings within the past 24 hours.

## 2022-01-14 NOTE — SUBJECTIVE & OBJECTIVE
Interval History: transferred to ICU for MS changes.     Review of Systems   Unable to perform ROS: Mental status change     Objective:     Temp:  [96.1 °F (35.6 °C)-97.7 °F (36.5 °C)] 96.9 °F (36.1 °C)  Pulse:  [56-68] 56  Resp:  [16-50] 20  SpO2:  [86 %-100 %] 100 %  BP: (145-169)/(68-93) 165/90     Body mass index is 19.94 kg/m².           Drains     Drain                 Urethral Catheter 01/05/22 1050 Double-lumen 20 Fr. 8 days                Physical Exam  Vitals and nursing note reviewed.   Constitutional:       Appearance: He is well-developed and well-nourished.   HENT:      Head: Normocephalic.   Eyes:      Conjunctiva/sclera: Conjunctivae normal.   Neck:      Thyroid: No thyromegaly.      Trachea: No tracheal deviation.   Cardiovascular:      Rate and Rhythm: Normal rate.      Heart sounds: Normal heart sounds.   Pulmonary:      Effort: Pulmonary effort is normal. No respiratory distress.      Breath sounds: Normal breath sounds. No wheezing.   Abdominal:      General: Bowel sounds are normal.      Palpations: Abdomen is soft. There is no hepatosplenomegaly.      Tenderness: There is no abdominal tenderness. There is no CVA tenderness or rebound.      Hernia: No hernia is present.   Genitourinary:     Penis: Normal.       Comments: Sahu in place with yellow urine  Wound not packed  Wound palpated, small amount of blood tinged drainage expressed after opening fluctuant area.Wound packed.  Musculoskeletal:         General: No tenderness or edema. Normal range of motion.      Cervical back: Normal range of motion and neck supple.   Lymphadenopathy:      Cervical: No cervical adenopathy.   Skin:     General: Skin is warm and dry.      Findings: No erythema or rash.   Neurological:      Mental Status: He is alert and oriented to person, place, and time.   Psychiatric:         Mood and Affect: Mood and affect normal.         Behavior: Behavior normal.         Thought Content: Thought content normal.          Judgment: Judgment normal.         Significant Labs:    BMP:  Recent Labs   Lab 01/12/22  1208 01/13/22  0356 01/14/22  0429    139 141   K 3.0* 2.9* 3.1*    106 106   CO2 24 23 25   BUN 7 5* 6   CREATININE 1.1 1.0 0.9   CALCIUM 8.1* 8.5* 8.1*       CBC:   Recent Labs   Lab 01/12/22  1208 01/13/22  0356 01/14/22  0429   WBC 28.22* 26.00* 17.30*   HGB 11.7* 12.2* 11.9*   HCT 35.4* 35.5* 35.0*    301 255       Blood Culture:   Recent Labs   Lab 01/10/22  1411   LABBLOO No Growth to date  No Growth to date  No Growth to date  No Growth to date  No Growth to date  No Growth to date  No Growth to date  No Growth to date     Urine Culture: No results for input(s): LABURIN in the last 168 hours.    Significant Imaging:  CT: I have reviewed all results within the past 24 hours and my personal findings are:  fluid collection right of bulbar urethra

## 2022-01-14 NOTE — PLAN OF CARE
Problem: Physical Therapy Goal  Goal: Physical Therapy Goal  Description: Goals to be met by: 22     Patient will increase functional independence with mobility by performin. Supine to sit with Stand-by Assistance  2. Rolling to Left and Right with Stand-by Assistance.  3. Sit to stand transfer to be assessed   4. Gait to be assessed    5. Sitting at edge of bed x15 minutes with Stand-by Assistance  6. Lower extremity exercise program x10 reps per handout, with assistance as needed    Outcome: Ongoing, Progressing   Pt limited today by decreased LOC.  Pt lethargic with static gaze and no blink reflex.  Responds to Name Abhishek, Able to say his full name, able to answer yes/no questions, and follow simple commands.  Continue with POC, SNF recommended.

## 2022-01-14 NOTE — ASSESSMENT & PLAN NOTE
CT head and EEG without acute process; likely delirium secondary to infection and prolonged hospitalization. Code stroke called the night of 1/13, however no focal neuro findings, CT head without acute findings  - neurology consulted  - delirium precautions  - MRI pending  - EEG pending  - tx of infection as noted above

## 2022-01-14 NOTE — PLAN OF CARE
Problem: Adult Inpatient Plan of Care  Goal: Plan of Care Review  Outcome: Ongoing, Progressing  Flowsheets (Taken 1/13/2022 1140)  Plan of Care Reviewed With:   patient   spouse   family  Goal: Patient-Specific Goal (Individualized)  Outcome: Ongoing, Progressing  Goal: Absence of Hospital-Acquired Illness or Injury  Outcome: Ongoing, Progressing  Goal: Optimal Comfort and Wellbeing  Outcome: Ongoing, Progressing  Goal: Readiness for Transition of Care  Outcome: Ongoing, Progressing     Problem: Diabetes Comorbidity  Goal: Blood Glucose Level Within Targeted Range  Outcome: Ongoing, Progressing     Problem: Infection  Goal: Absence of Infection Signs and Symptoms  Outcome: Ongoing, Progressing     Problem: Adjustment to Illness (Sepsis/Septic Shock)  Goal: Optimal Coping  Outcome: Ongoing, Progressing     Problem: Bleeding (Sepsis/Septic Shock)  Goal: Absence of Bleeding  Outcome: Ongoing, Progressing     Problem: Glycemic Control Impaired (Sepsis/Septic Shock)  Goal: Blood Glucose Level Within Desired Range  Outcome: Ongoing, Progressing     Problem: Infection Progression (Sepsis/Septic Shock)  Goal: Absence of Infection Signs and Symptoms  Outcome: Ongoing, Progressing     Problem: Nutrition Impaired (Sepsis/Septic Shock)  Goal: Optimal Nutrition Intake  Outcome: Ongoing, Progressing     Problem: Fall Injury Risk  Goal: Absence of Fall and Fall-Related Injury  Outcome: Ongoing, Progressing     Problem: Fluid and Electrolyte Imbalance (Acute Kidney Injury/Impairment)  Goal: Fluid and Electrolyte Balance  Outcome: Ongoing, Progressing     Problem: Oral Intake Inadequate (Acute Kidney Injury/Impairment)  Goal: Optimal Nutrition Intake  Outcome: Ongoing, Progressing     Problem: Renal Function Impairment (Acute Kidney Injury/Impairment)  Goal: Effective Renal Function  Outcome: Ongoing, Progressing     Problem: Fluid Imbalance (Pneumonia)  Goal: Fluid Balance  Outcome: Ongoing, Progressing     Problem: Infection  (Pneumonia)  Goal: Resolution of Infection Signs and Symptoms  Outcome: Ongoing, Progressing     Problem: Respiratory Compromise (Pneumonia)  Goal: Effective Oxygenation and Ventilation  Outcome: Ongoing, Progressing     Problem: Skin Injury Risk Increased  Goal: Skin Health and Integrity  Outcome: Ongoing, Progressing     Problem: Seizure Disorder Comorbidity  Goal: Maintenance of Seizure Control  Outcome: Ongoing, Progressing     Problem: Confusion Chronic  Goal: Optimal Cognitive Function  Outcome: Ongoing, Progressing

## 2022-01-14 NOTE — SUBJECTIVE & OBJECTIVE
Interval History: Pt moved to ICU given c/f stroke overnight. Deemed not to have stroke by teleneurology. MRI/EEG pending. This AM he is somnolent but arousable, speech mostly incoherent    Review of Systems   Unable to perform ROS: Mental status change     Objective:     Vital Signs (Most Recent):  Temp: 96.9 °F (36.1 °C) (01/14/22 0551)  Pulse: (!) 56 (01/14/22 0745)  Resp: 20 (01/14/22 0745)  BP: (!) 165/90 (01/14/22 0745)  SpO2: 100 % (01/14/22 0745) Vital Signs (24h Range):  Temp:  [96.1 °F (35.6 °C)-97.7 °F (36.5 °C)] 96.9 °F (36.1 °C)  Pulse:  [56-71] 56  Resp:  [16-50] 20  SpO2:  [86 %-100 %] 100 %  BP: (145-169)/(68-93) 165/90     Weight: 61.5 kg (135 lb 9.3 oz)  Body mass index is 20.02 kg/m².    Intake/Output Summary (Last 24 hours) at 1/14/2022 0758  Last data filed at 1/14/2022 0553  Gross per 24 hour   Intake 360 ml   Output 2150 ml   Net -1790 ml      Physical Exam  Constitutional:       General: He is not in acute distress.     Appearance: He is ill-appearing and toxic-appearing. He is not diaphoretic.   Cardiovascular:      Rate and Rhythm: Normal rate and regular rhythm.      Heart sounds: No murmur heard.  No gallop.    Pulmonary:      Effort: Pulmonary effort is normal. No respiratory distress.      Breath sounds: Rales present. No wheezing.   Abdominal:      General: Bowel sounds are normal. There is no distension.      Palpations: Abdomen is soft.      Tenderness: There is no abdominal tenderness.         Significant Labs: All pertinent labs within the past 24 hours have been reviewed.    Significant Imaging: I have reviewed all pertinent imaging results/findings within the past 24 hours.

## 2022-01-14 NOTE — ASSESSMENT & PLAN NOTE
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 Cultures growing ampicillin-resistant Ecoli and C albicans. Repeat CT A/P 1/13 w/ 4cm fluid collection c/f persistent abscess  - urology following  - cont zosyn (re-started for HCAP)  - further ID recs pending

## 2022-01-14 NOTE — CONSULTS
[facility]  Vascular Neurology  Comprehensive Stroke Center  TeleVascular Neurology Acute Consultation Note      Inpatient consult to Neurology  Consult performed by: Salvatore Hansen MD  Consult ordered by: Oliver Deshpande PA-C          Consulting Provider: OLIVER DESHPANDE.  Current Providers  No providers found    Patient Location:  [facility] ICU IP Unit  Spoke hospital nurse at bedside with patient assisting consultant.     Patient information was obtained from nursing staff.         Assessment/Plan:       Diagnoses:   Encephalopathy acute  Patient w/ worsened level of alertness and attention with dysarthria, unknown onset of change, noted difference since yesterday. Has had delirium treated for several days, also w/ seizure d/o on lamictal and vimpat. Keppra 2g loaded just before I got on video. He also got seroquel in the evening which he hadn't gotten in a few nights, but at a lower dose - so unclear if that has played a role.At this time he displays bilateral / global weakness, slurred speech and reduced attention span and has disorientation.    He is out of window for thrombolytic therapy.  CT w/o acute changes.  Recommend MRI brain stat w/o contrast to r/o CNS disease given the limited exam, however this appears clinically to be more encephalopathy/global dysfunction than any focal or brainstem process.      STROKE DOCUMENTATION     Acute Stroke Times:   Acute Stroke Times   Last Known Normal Time: 2200  Stroke Team Called Time: 0510  Stroke Team Arrival Time: 0515  CT Interpretation Time: 0515  Alteplase Recommended: No    NIH Scale:  1a. Level of Consciousness: 1-->Not alert, but arousable by minor stimulation to obey, answer, or respond  1b. LOC Questions: 2-->Answers neither question correctly  1c. LOC Commands: 0-->Performs both tasks correctly  2. Best Gaze: 0-->Normal  3. Visual: 0-->No visual loss  4. Facial Palsy: 0-->Normal symmetrical movements  5a. Motor  "Arm, Left: 1-->Drift, limb holds 90 (or 45) degrees, but drifts down before full 10 seconds, does not hit bed or other support  5b. Motor Arm, Right: 1-->Drift, limb holds 90 (or 45) degrees, but drifts down before full 10 secs, does not hit bed or other support  6a. Motor Leg, Left: 3-->No effort against gravity, leg falls to bed immediately  6b. Motor Leg, Right: 3-->No effort against gravity, leg falls to bed immediately  7. Limb Ataxia: 0-->Absent  8. Sensory: 0-->Normal, no sensory loss  9. Best Language: 1-->Mild-to-moderate aphasia, some obvious loss of fluency or facility of comprehension, without significant limitation on ideas expressed or form of expression. Reduction of speech and/or comprehension, however, makes conversation. . . (see row details)  10. Dysarthria: 2-->Severe dysarthria, patients speech is so slurred as to be unintelligible in the absence of or out of proportion to any dysphasia, or is mute/anarthric  11. Extinction and Inattention (formerly Neglect): 0-->No abnormality  Total (NIH Stroke Scale): 14     Modified Dex    [place] Coma Scale:    ABCD2 Score:    [id number] Score:   HAS -BLED Score:   ICH Score:   Hunt & Her Classification:       Blood pressure (!) 169/74, pulse 60, temperature 97.1 °F (36.2 °C), temperature source Axillary, resp. rate 17, height 5' 9" (1.753 m), weight 61.9 kg (136 lb 7.4 oz), SpO2 95 %.  Alteplase Eligible?: No  Alteplase Recommendation: Alteplase not recommended due to Outside of treatment window   Possible Interventional Revascularization Candidate? No; at this time symptoms not suggestive of large vessel occlusion    Disposition Recommendation: [place] [facility]     Subjective:     History of Present Illness:  61M w decreased consciousness and bilateral weakness. Admitted for over a week, scrotal abscess, HCAP, has had a mental status decline over the last week w/ delirium, also w/ seizure hx on vimpat and lamictal.        Woke up with " "symptoms?: unknown    Recent bleeding noted: no  Does the patient take any Blood Thinners? no  Medications: lamictal, lamotrigine, keppra      Past Medical History:   Past Medical History:   Diagnosis Date    High cholesterol     Hypertension     Seizures      Past Surgical History: no major surgeries within the last 2 weeks    Family History: no relevant history    Social History: no smoking, no drinking, no drugs    Allergies: No Known Allergies No relevant allergies    Review of Systems   Unable to perform ROS: Mental status change     Objective:   Vitals: Blood pressure (!) 169/74, pulse 60, temperature 97.1 °F (36.2 °C), temperature source Axillary, resp. rate 17, height 5' 9" (1.753 m), weight 61.9 kg (136 lb 7.4 oz), SpO2 95 %.     CT READ: Yes  No hemmorhage. No mass effect. No early infarct signs.     Physical Exam  Constitutional:       General: He is not in acute distress.     Appearance: He is well-nourished.   HENT:      Head: Atraumatic.      Right Ear: External ear normal.      Left Ear: External ear normal.   Eyes:      General: No scleral icterus.     Conjunctiva/sclera: Conjunctivae normal.   Pulmonary:      Effort: Pulmonary effort is normal.   Abdominal:      General: There is no distension.      Tenderness: There is no guarding.   Musculoskeletal:         General: No deformity. Normal range of motion.      Cervical back: Normal range of motion.   Skin:     General: Skin is warm and dry.   Neurological:      Mental Status: He is alert.   Psychiatric:         Mood and Affect: Mood and affect normal.               Recommended the emergency room physician to have a brief discussion with the patient and/or family if available regarding the risks and benefits of treatment, and to briefly document the occurrence of that discussion in his clinical encounter note.     The attending portion of this evaluation, treatment, and documentation was performed per Salvatore Hansen MD via audiovisual.    In your " opinion, this was a: Tier 2 Van Negative    Consult End Time: 5:47 AM     Salvatore Hansen MD  Comprehensive Stroke Center  Vascular Neurology   [facility]

## 2022-01-14 NOTE — NURSING
Spoke with wife leslye and let her know that patient was moved to icu, patient stated she is unsure when patient had last seizure, states that he is normally post-ictal about 30 minutes after seizure activity, she states that he does not have any metal in body

## 2022-01-14 NOTE — PLAN OF CARE
"  Problem: Occupational Therapy Goal  Goal: Occupational Therapy Goal  Description: Goals to be met by: 21     Patient will increase functional independence with ADLs by performing:    Feeding with Modified Dane.  UE Dressing with Supervision.  LE Dressing with Minimal Assistance.  Grooming while seated with Stand-by Assistance.  Sitting at edge of bed x15 minutes with Supervision.  Supine to sit with Contact Guard Assistance.  Upper extremity exercise program x10 reps per handout, with supervision.    Step transfer- to be assessed pending further pt participation   Toilet transfer to bedside commode - to be assessed pending further pt participation    Outcome: Ongoing, Not Progressing     Pt transferred to ICU since session yesterday. Wife present and provided PLOF: min A prn for ADLs; pt used str c vs rollator vs wheelchair for mobility with MIN A prn.     Yesterday, pt eyes not tracking but able to focus to therapist with cueing and communicate some (primarily confused speech, but able to say name, month/day of , [facility]) and sit EOB briefly.    Today, pt not making any eye contact nor following most simple commands. Not following commands for AROM, but able to complete AAROM inconsistently. Once OT removed pt's dentures and with max prompting of pt's first name, pt able to state "Abhishek" and "yeah I am here." dependent assist to roll and scoot.   "

## 2022-01-14 NOTE — PROGRESS NOTES
[facility] Medicine  Progress Note    Patient Name: Abhishek Zhao  MRN: [id number]  Patient Class: IP- Inpatient   Admission Date: 1/4/2022  Length of Stay: 9 days  Attending Physician: Hugo Aviles MD  Primary Care Provider: To Obtain Unable        Subjective:     Principal Problem:Scrotal abscess        HPI:  61 y.o. male with adjustment disorder with depressed mood, chronic ischemic heart disease, cocaine dependence in remission, cardiomegaly, HLD, HTN, swizure disorder, tobacco use, and DM 2 presents with a complaint of testicular pain.  Associated with swelling and redness to the scrotum and penis along with difficulty urinating, pain is rated as severe and radiates to the rectum.  Denies fever, chills, cough, SOB, chest pain, n/v/d.  In the ED, he was found to be tachypneic, with leukocytosis and elevated lactic.  CT and US shows evidence of multiple scrotal and perineal abscesses with some extension into the penile shaft.  UA with evidence of infection.  Sepsis treatment initiated, blood and urine cultures obtained, IV fluids and IV antibiotics initiated.  Urology consulted.      Overview/Hospital Course:  61 y.o. male with adjustment disorder with depressed mood, chronic ischemic heart disease, cocaine dependence in remission, cardiomegaly, HLD, HTN, swizure disorder, tobacco use, and DM 2 presents with a complaint of testicular pain, found to have multiple scrotal and perineal abscesses. Placed on broad spectrum antibiotics, underwent I&D with urology.    Pt w/ aspiration event 1/11 w/ subsequent desaturation. Respiratory culture grew pseudomonas. Kept on Zosyn. On 1/13 pt had abrupt reduction in responsiveness. Code stroke called, patient transferred to ICU. No stroke found on imaging. Started on extended EEG, which showed.          Interval History: Pt moved to ICU given c/f stroke overnight. Deemed not to have stroke by teleneurology. MRI/EEG pending. This AM he is somnolent  but arousable, speech mostly incoherent    Review of Systems   Unable to perform ROS: Mental status change     Objective:     Vital Signs (Most Recent):  Temp: 96.9 °F (36.1 °C) (01/14/22 0551)  Pulse: (!) 56 (01/14/22 0745)  Resp: 20 (01/14/22 0745)  BP: (!) 165/90 (01/14/22 0745)  SpO2: 100 % (01/14/22 0745) Vital Signs (24h Range):  Temp:  [96.1 °F (35.6 °C)-97.7 °F (36.5 °C)] 96.9 °F (36.1 °C)  Pulse:  [56-71] 56  Resp:  [16-50] 20  SpO2:  [86 %-100 %] 100 %  BP: (145-169)/(68-93) 165/90     Weight: 61.5 kg (135 lb 9.3 oz)  Body mass index is 20.02 kg/m².    Intake/Output Summary (Last 24 hours) at 1/14/2022 0755  Last data filed at 1/14/2022 0553  Gross per 24 hour   Intake 360 ml   Output 2150 ml   Net -1790 ml      Physical Exam  Constitutional:       General: He is not in acute distress.     Appearance: He is ill-appearing and toxic-appearing. He is not diaphoretic.   Cardiovascular:      Rate and Rhythm: Normal rate and regular rhythm.      Heart sounds: No murmur heard.  No gallop.    Pulmonary:      Effort: Pulmonary effort is normal. No respiratory distress.      Breath sounds: Rales present. No wheezing.   Abdominal:      General: Bowel sounds are normal. There is no distension.      Palpations: Abdomen is soft.      Tenderness: There is no abdominal tenderness.         Significant Labs: All pertinent labs within the past 24 hours have been reviewed.    Significant Imaging: I have reviewed all pertinent imaging results/findings within the past 24 hours.      Assessment/Plan:      * Scrotal abscess  Multiple abscesses c/w Berna's gangrene, s/p I&D by urology on 1/5. Cultures growing ampicillin-resistant Ecoli and C albicans. Repeat CT A/P 1/13 w/ 4cm fluid collection c/f persistent abscess  - urology following  - cont zosyn (re-started for HCAP)  - further ID recs pending            Encephalopathy acute  See metabolic encephalopathy      Encephalopathy, metabolic  CT head and EEG without acute  process; likely delirium secondary to infection and prolonged hospitalization. Code stroke called the night of 1/13, however no focal neuro findings, CT head without acute findings  - neurology consulted  - delirium precautions  - MRI pending  - EEG pending  - tx of infection as noted above    HCAP (healthcare-associated pneumonia)  - see respiratory failure       Acute respiratory failure with hypoxia and hypercarbia  Cough + new LLL opacity on imaging c/f pneumonia, however imaging w/ pleural effusion and atelectasis  - respiratory cultures w/ pseudomonas  - cont zosyn  - trial furosemide x 1  - IS        Hypokalemia  Replete PRN    JOI (acute kidney injury)  Resolved      Sepsis due to Gram negative bacteria  Resolved      Berna's gangrene  As above    Sepsis  - see scrotal abscess     This patient does have evidence of infective focus  My overall impression is sepsis. Vital signs were reviewed and noted in progress note.  Antibiotics given-   Antibiotics (From admission, onward)            Start     Stop Route Frequency Ordered    01/06/22 2100  mupirocin 2 % ointment         01/11 2059 Nasl 2 times daily 01/06/22 1337    01/05/22 0200  piperacillin-tazobactam 4.5 g in dextrose 5 % 100 mL IVPB (ready to mix system)         -- IV Every 8 hours (non-standard times) 01/04/22 2241        Cultures were taken-   Microbiology Results (last 7 days)     Procedure Component Value Units Date/Time    AFB Culture & Smear [[id number]] Collected: 01/05/22 1055    Order Status: Completed Specimen: Abscess from Groin Updated: 01/07/22 2127     AFB Culture & Smear Culture in progress     AFB CULTURE STAIN No acid fast bacilli seen.    Blood Culture #1 **CANNOT BE ORDERED STAT** [[id number]] Collected: 01/04/22 1744    Order Status: Completed Specimen: Blood from Peripheral, Lower Arm, Left Updated: 01/07/22 1303     Blood Culture, Routine No Growth to date      No Growth to date      No Growth to date    Blood Culture #2  **CANNOT BE ORDERED STAT** [[id number]] Collected: 01/04/22 1744    Order Status: Completed Specimen: Blood from Peripheral, Antecubital, Right Updated: 01/07/22 1303     Blood Culture, Routine No Growth to date      No Growth to date      No Growth to date    Urine culture **CANNOT BE ORDERED STAT** [[id number]]  (Abnormal) Collected: 01/04/22 1804    Order Status: Completed Specimen: Urine, Clean Catch Updated: 01/07/22 1118     Urine Culture, Routine YAMILET ALBICANS  10,000 - 49,999 cfu/ml  Treatment of asymptomatic candiduria is not recommended (except for   specific populations). Candida isolated in the urine typically   represents colonization. If an indwelling urinary catheter is present  it should be removed or replaced.      Narrative:      Indicated criteria for high risk culture:->Other  Other (specify):->Protocol    Aerobic culture [[id number]]  (Abnormal)  (Susceptibility) Collected: 01/05/22 1055    Order Status: Completed Specimen: Abscess from Groin Updated: 01/07/22 0846     Aerobic Bacterial Culture ESCHERICHIA COLI  Moderate        YAMILET ALBICANS  Few      Culture, Anaerobe [[id number]] Collected: 01/05/22 1055    Order Status: Completed Specimen: Abscess from Groin Updated: 01/07/22 0733     Anaerobic Culture Culture in progress    Fungus culture [[id number]] Collected: 01/05/22 1055    Order Status: Sent Specimen: Abscess from Groin Updated: 01/05/22 1401    Culture, Anaerobe [[id number]] Collected: 01/05/22 1055    Order Status: Canceled Specimen: Abscess from Groin     Aerobic culture [[id number]] Collected: 01/05/22 1055    Order Status: Canceled Specimen: Abscess from Groin         Latest lactate reviewed, they are-  No results for input(s): LACTATE in the last 72 hours.    Organ dysfunction indicated by n/a  Source- abscesses    Source control Achieved by- Urology consult      Diabetes mellitus type 2, controlled  Check a1c  Hold oral antihyperglycemics while inpatient  PRN sliding scale  insulin  ACHS glucose monitoring   ADA diet     Tobacco user  5 minutes spent counseling the patient on smoking cessation and he is not currently ready to stop smoking. He will be offereded a nicotine transdermal patch while hospitalized and monitored for withdrawal.  Will provide additional smoking cessation counseling prior to discharge.     Seizure disorder  Repeated episodes of AMS c/f seizures w/ post ictal period  - cont home AEDs  - neurology consulted  - EEG today  - MRI pending    Essential hypertension  Well controlled, continue home medications and monitor blood pressure, adjust as needed.     Hyperlipidemia  Continue statin    Cardiomegaly  Pulmonary edema seen on imaging  - TTE pending  - trial lasix x 1    Cocaine dependence in remission  Counseled     Chronic ischemic heart disease  No acute issue    Adjustment disorder with depressed mood  Continue home citalopram, hold home seroquel due to somnolence      VTE Risk Mitigation (From admission, onward)         Ordered     enoxaparin injection 40 mg  Daily         01/13/22 1612     IP VTE LOW RISK PATIENT  Once         01/04/22 2241     Place sequential compression device  Until discontinued         01/04/22 2241                Discharge Planning   CHUCK:      Code Status: Full Code   Is the patient medically ready for discharge?:     Reason for patient still in hospital (select all that apply): Patient trending condition, Laboratory test, Treatment, Consult recommendations and Pending disposition  Discharge Plan A: Home Health   Discharge Delays: None known at this time        Critical care time spent on the evaluation and treatment of severe organ dysfunction, review of pertinent labs and imaging studies, discussions with consulting providers and discussions with patient/family: 55 minutes.      Hugo Aviles MD  Department of Hospital Medicine   [facility] [place] [facility] Int[facility]

## 2022-01-14 NOTE — PROGRESS NOTES
[facility] [place] Intensive [facility]  Urology  Progress Note    Patient Name: Abhishek Zhao  MRN: [id number]  Admission Date: 1/4/2022  Hospital Length of Stay: 9 days  Code Status: Full Code   Attending Provider: Hugo Aviles MD   Primary Care Physician: To Obtain Unable    Subjective:     HPI:  Scrotal Swelling  Abhishek Zhao is a 61 y.o. male who was been experiencing scrotal pain and swelling since 12/31/2021.  He denies any fever.  He has had issues voiding since the swelling began.  Hemostat having issues in the past with urinary problems.  He denies having any previous issues with scrotal infection or swelling.  He recalls that he had procedures in the past but cannot recall specifically what to place.  He does not have urologist locally but moved back from [place] about 1 year ago.    Review of care everywhere shows that he had a cystoscopy with urethral dilation of a urethral stricture in the bulbar urethra in 2019 at [facility].  And there are reports that in approximately 2015 [facility].      Interval History: transferred to ICU for MS changes.     Review of Systems   Unable to perform ROS: Mental status change     Objective:     Temp:  [96.1 °F (35.6 °C)-97.7 °F (36.5 °C)] 96.9 °F (36.1 °C)  Pulse:  [56-68] 56  Resp:  [16-50] 20  SpO2:  [86 %-100 %] 100 %  BP: (145-169)/(68-93) 165/90     Body mass index is 19.94 kg/m².           Drains     Drain                 Urethral Catheter 01/05/22 1050 Double-lumen 20 Fr. 8 days                Physical Exam  Vitals and nursing note reviewed.   Constitutional:       Appearance: He is well-developed and well-nourished.   HENT:      Head: Normocephalic.   Eyes:      Conjunctiva/sclera: Conjunctivae normal.   Neck:      Thyroid: No thyromegaly.      Trachea: No tracheal deviation.   Cardiovascular:      Rate and Rhythm: Normal rate.      Heart sounds: Normal heart sounds.   Pulmonary:      Effort: Pulmonary effort is normal. No  respiratory distress.      Breath sounds: Normal breath sounds. No wheezing.   Abdominal:      General: Bowel sounds are normal.      Palpations: Abdomen is soft. There is no hepatosplenomegaly.      Tenderness: There is no abdominal tenderness. There is no CVA tenderness or rebound.      Hernia: No hernia is present.   Genitourinary:     Penis: Normal.       Comments: Sahu in place with yellow urine  Wound not packed  Wound palpated, small amount of blood tinged drainage expressed after opening fluctuant area.Wound packed.  Musculoskeletal:         General: No tenderness or edema. Normal range of motion.      Cervical back: Normal range of motion and neck supple.   Lymphadenopathy:      Cervical: No cervical adenopathy.   Skin:     General: Skin is warm and dry.      Findings: No erythema or rash.   Neurological:      Mental Status: He is alert and oriented to person, place, and time.   Psychiatric:         Mood and Affect: Mood and affect normal.         Behavior: Behavior normal.         Thought Content: Thought content normal.         Judgment: Judgment normal.         Significant Labs:    BMP:  Recent Labs   Lab 01/12/22  1208 01/13/22  0356 01/14/22  0429    139 141   K 3.0* 2.9* 3.1*    106 106   CO2 24 23 25   BUN 7 5* 6   CREATININE 1.1 1.0 0.9   CALCIUM 8.1* 8.5* 8.1*       CBC:   Recent Labs   Lab 01/12/22  1208 01/13/22  0356 01/14/22  0429   WBC 28.22* 26.00* 17.30*   HGB 11.7* 12.2* 11.9*   HCT 35.4* 35.5* 35.0*    301 255       Blood Culture:   Recent Labs   Lab 01/10/22  1411   LABBLOO No Growth to date  No Growth to date  No Growth to date  No Growth to date  No Growth to date  No Growth to date  No Growth to date  No Growth to date     Urine Culture: No results for input(s): LABURIN in the last 168 hours.    Significant Imaging:  CT: I have reviewed all results within the past 24 hours and my personal findings are:  fluid collection right of bulbar  urethra                  Assessment/Plan:     * Scrotal abscess  s/p cystoscopy with santiago placement and debridement of abscess/necrosis of scrotum 1/5/22 with Dr. Rangel.    Continue abx per primary  Cultures growing Candida and E coli  Appreciate wound care RN and floor RN dressing changes  Will continue to monitor condition      WBC improving, fluid collection drained at bedside.  Will reexamine tomorrow.    Will likely need plastics consultation in future for wound closure/grafting    Berna's gangrene   - s/p debridement 1/5/22        VTE Risk Mitigation (From admission, onward)         Ordered     enoxaparin injection 40 mg  Daily         01/13/22 1612     IP VTE LOW RISK PATIENT  Once         01/04/22 2241     Place sequential compression device  Until discontinued         01/04/22 2241                LATASHA Rangel MD  Urology  [facility] - [place] - Intensive Care

## 2022-01-14 NOTE — HPI
61M w decreased consciousness and bilateral weakness. Admitted for over a week, scrotal abscess, HCAP, has had a mental status decline over the last week w/ delirium, also w/ seizure hx on vimpat and lamictal.

## 2022-01-14 NOTE — SUBJECTIVE & OBJECTIVE
Interval History: Worsening AMS, now in ICU. Just back from MRI. Wife at bedside.    Review of Systems   Unable to perform ROS: Mental status change     Objective:     Vital Signs (Most Recent):  Temp: 96.9 °F (36.1 °C) (01/14/22 0551)  Pulse: (!) 58 (01/14/22 0819)  Resp: (!) 28 (01/14/22 0819)  BP: (!) 165/90 (01/14/22 0745)  SpO2: 98 % (01/14/22 0819) Vital Signs (24h Range):  Temp:  [96.1 °F (35.6 °C)-97.7 °F (36.5 °C)] 96.9 °F (36.1 °C)  Pulse:  [55-67] 58  Resp:  [16-50] 28  SpO2:  [86 %-100 %] 98 %  BP: (145-169)/(68-93) 165/90     Weight: 61.2 kg (135 lb)  Body mass index is 19.94 kg/m².    Estimated Creatinine Clearance: 76.5 mL/min (based on SCr of 0.9 mg/dL).    Physical Exam  Vitals and nursing note reviewed.   Constitutional:       General: He is not in acute distress.     Appearance: Normal appearance. He is not ill-appearing, toxic-appearing or diaphoretic.   HENT:      Head: Normocephalic and atraumatic.      Mouth/Throat:      Mouth: Mucous membranes are moist.   Eyes:      Extraocular Movements: Extraocular movements intact.   Cardiovascular:      Rate and Rhythm: Normal rate.   Pulmonary:      Breath sounds: Normal breath sounds.   Abdominal:      Palpations: Abdomen is soft.      Tenderness: There is no abdominal tenderness. There is no guarding.   Musculoskeletal:         General: No swelling or tenderness.      Cervical back: Neck supple. No rigidity.   Skin:     Findings: No erythema.   Neurological:      Mental Status: He is alert.   Psychiatric:         Behavior: Behavior normal.         Significant Labs:   Blood Culture:   Recent Labs   Lab 01/04/22  1744 01/10/22  1411   LABBLOO No Growth after 4 days.   No Growth after 4 days.  No Growth to date  No Growth to date  No Growth to date  No Growth to date  No Growth to date  No Growth to date  No Growth to date  No Growth to date     CBC:   Recent Labs   Lab 01/13/22  0356 01/14/22  0429   WBC 26.00* 17.30*   HGB 12.2* 11.9*   HCT  35.5* 35.0*    255     CMP:   Recent Labs   Lab 01/13/22  0356 01/14/22  0429    141   K 2.9* 3.1*    106   CO2 23 25   * 121*   BUN 5* 6   CREATININE 1.0 0.9   CALCIUM 8.5* 8.1*   PROT 6.6 6.1   ALBUMIN 1.5* 1.5*   BILITOT 0.1 0.1   ALKPHOS 477* 453*   AST 25 23   ALT 23 17   ANIONGAP 10 10   EGFRNONAA >60 >60     Wound Culture:   Recent Labs   Lab 01/05/22  1055   LABAERO ESCHERICHIA COLI  Moderate  *  YAMILET ALBICANS  Few  *       Significant Imaging: I have reviewed all pertinent imaging results/findings within the past 24 hours.

## 2022-01-14 NOTE — PLAN OF CARE
JUSTO Douglass at [facility] emailed [facility] to inform that patient has been approved for SNF upon discharge. Evonne emailed [facility] a list of approved facilities. [facility]O informed Evonne that patient is now in ICU.

## 2022-01-14 NOTE — ASSESSMENT & PLAN NOTE
Repeated episodes of AMS c/f seizures w/ post ictal period  - cont home AEDs  - neurology consulted  - EEG today  - MRI pending

## 2022-01-14 NOTE — ASSESSMENT & PLAN NOTE
- Berna's gangrene, s/p debridement, followed by Urology  - E.coli and Candida albicans growing on culture  - urine culture also grew Candida albicans  - now with Pseudomonas in sputum and evidence of pneumonia  - PCT 1  - continue Zosyn and fluconazole

## 2022-01-14 NOTE — PLAN OF CARE
JUSTO Douglass at [facility] sent [facility] an email stating the patient will be reviewed by the CLC tomorrow for SNF. Evonne stated that she believes that patient will likely get auth for community placement for SNF. Evonne stated that she will keep SW posted.

## 2022-01-14 NOTE — ASSESSMENT & PLAN NOTE
Cough + new LLL opacity on imaging c/f pneumonia, however imaging w/ pleural effusion and atelectasis  - respiratory cultures w/ pseudomonas  - cont zosyn  - trial furosemide x 1  - IS

## 2022-01-14 NOTE — PROGRESS NOTES
"[facility] Intensive Care  Neurology  Progress Note    Patient Name: Abhishek Zhao  MRN: [id number]  Admission Date: 1/4/2022  Hospital Length of Stay: 9 days  Code Status: Full Code   Attending Provider: Hugo Aviles MD  Primary Care Physician: To Obtain Unable   Principal Problem:Scrotal abscess    Subjective:     Interval History: 62 y/o male presented initially with a complaint of testicular pain. Associated with swelling and redness to the scrotum and penis. 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 Urology is following as well as ID. Today pt had an episode in which he became poorly responsive. He had a persistent cough with copious amounts of mucus. He has slowly recovered but is exhibiting "picking" behavior and is intermittently answering questions.     -1/12/22: Pt had an episode in which he was poorly responsive and arms flailing around.    -1/14/22: Pt poorly responsive overnight. Stroke CODE was called. Transferred to ICU.    Current Neurological Medications:     Current Facility-Administered Medications   Medication Dose Route Frequency Provider Last Rate Last Admin    acetaminophen tablet 650 mg  650 mg Oral Q6H PRN ANIBAL Rangel MD   650 mg at 01/11/22 1525    acetylcysteine 100 mg/ml (10%) solution 4 mL  4 mL Nebulization TID WAKE Hugo Aviles MD   4 mL at 01/14/22 0800    albuterol-ipratropium 2.5 mg-0.5 mg/3 mL nebulizer solution 3 mL  3 mL Nebulization Q4H PRN Aaron Pineda MD   3 mL at 01/13/22 1310    albuterol-ipratropium 2.5 mg-0.5 mg/3 mL nebulizer solution 3 mL  3 mL Nebulization Q4H WAKE Hugo Aviles MD   3 mL at 01/14/22 0800    aluminum-magnesium hydroxide-simethicone 200-200-20 mg/5 mL suspension 30 mL  30 mL Oral QID PRN ANIBAL Rangel MD        amLODIPine tablet 10 mg  10 mg Oral Daily ANIBAL Rangel MD   10 mg at 01/13/22 1020    aspirin EC tablet 81 mg  81 mg Oral Daily ANIBAL Rangel MD   81 mg at 01/13/22 1021    " atorvastatin tablet 80 mg  80 mg Oral Daily W. Manny Rangel MD   80 mg at 01/13/22 1022    dextrose 50% injection 12.5 g  12.5 g Intravenous PRN W. Manny Rangel MD        dextrose 50% injection 25 g  25 g Intravenous PRN W. Manny Rangel MD        enoxaparin injection 40 mg  40 mg Subcutaneous Daily Hugo Aviles MD   40 mg at 01/13/22 1725    fluconazole tablet 400 mg  400 mg Oral Daily Quinten Rosario MD   400 mg at 01/13/22 1021    glucagon (human recombinant) injection 1 mg  1 mg Intramuscular PRN W. Manny Rangel MD        glucose chewable tablet 16 g  16 g Oral PRN W. Manny Rangel MD        glucose chewable tablet 24 g  24 g Oral PRN W. Manny Rangel MD        influenza (QUADRIVALENT PF) vaccine 0.5 mL  0.5 mL Intramuscular vaccine x 1 dose Elijah Ann MD        insulin aspart U-100 pen 0-5 Units  0-5 Units Subcutaneous Q6H PRN W. Manny Rangel MD   3 Units at 01/13/22 1817    lacosamide (VIMPAT) 200 mg in sodium chloride 0.9% 100 mL IVPB  200 mg Intravenous Q12H Ashvin Giraldo MD        lamoTRIgine tablet 200 mg  200 mg Oral BID W. Manny Rangel MD   200 mg at 01/13/22 2307    lorazepam injection 3 mg  3 mg Intravenous Once PRN Anitha Deshpande PA-C        melatonin tablet 6 mg  6 mg Oral Nightly PRN W. Manny Rangel MD   6 mg at 01/12/22 2046    metoprolol succinate (TOPROL-XL) 24 hr tablet 50 mg  50 mg Oral Daily W. Manny Rangel MD   50 mg at 01/13/22 1022    naloxone 0.4 mg/mL injection 0.02 mg  0.02 mg Intravenous PRN W. Manny Rangel MD        ondansetron injection 4 mg  4 mg Intravenous Q8H PRN W. Manny Rangel MD   4 mg at 01/12/22 1219    piperacillin-tazobactam 4.5 g in dextrose 5 % 100 mL IVPB (ready to mix system)  4.5 g Intravenous Q8H Elijah Ann MD   Stopped at 01/14/22 0800    polyethylene glycol packet 17 g  17 g Oral Daily W. Manny Rangel MD   17 g at 01/13/22 1021    prochlorperazine injection Soln 5 mg  5 mg Intravenous Q6H PRN W.  Manny Rangel MD        simethicone chewable tablet 80 mg  1 tablet Oral QID PRN W. Manny Rangel MD        sodium chloride 0.9% flush 10 mL  10 mL Intravenous Q8H PRN W. Manny Rangel MD        sodium chloride 0.9% flush 10 mL  10 mL Intravenous PRN W. Manny Rangel MD        tamsulosin 24 hr capsule 0.4 mg  0.4 mg Oral Daily W. Manny Rangel MD   0.4 mg at 01/13/22 1021    valproate (DEPACON) 500 mg in dextrose 5 % 50 mL IVPB  500 mg Intravenous Q12H Ashvin Giraldo MD 50 mL/hr at 01/14/22 1018 500 mg at 01/14/22 1018       Review of Systems  Objective:     Vital Signs (Most Recent):  Temp: 96.9 °F (36.1 °C) (01/14/22 0551)  Pulse: (!) 58 (01/14/22 0819)  Resp: (!) 28 (01/14/22 0819)  BP: (!) 165/90 (01/14/22 0745)  SpO2: 98 % (01/14/22 0819) Vital Signs (24h Range):  Temp:  [96.1 °F (35.6 °C)-97.7 °F (36.5 °C)] 96.9 °F (36.1 °C)  Pulse:  [55-67] 58  Resp:  [16-50] 28  SpO2:  [86 %-100 %] 98 %  BP: (145-169)/(68-93) 165/90     Weight: 61.2 kg (135 lb)  Body mass index is 19.94 kg/m².    Physical Exam  Constitutional:       General: He is not in acute distress.  HENT:      Head: Normocephalic.      Right Ear: External ear normal.      Left Ear: External ear normal.   Eyes:      General:         Right eye: No discharge.         Left eye: No discharge.   Cardiovascular:      Rate and Rhythm: Normal rate.   Pulmonary:      Effort: Pulmonary effort is normal.   Abdominal:      Palpations: Abdomen is soft.   Musculoskeletal:         General: No tenderness.      Cervical back: Neck supple.   Skin:     General: Skin is warm.   Psychiatric:         Speech: Speech is slurred.            NEUROLOGICAL EXAMINATION:      MENTAL STATUS   Speech: nonverbal  Level of consciousness: drowsy       Staring   Not following commands     CRANIAL NERVES      CN III, IV, VI   Right pupil: Size: 2 mm. Shape: regular.   Left pupil: Size: 2 mm. Shape: regular.   Nystagmus: none   Conjugate gaze: present     CN VII   Right facial  weakness: none  Left facial weakness: none     CN XII   Tongue deviation: none     MOTOR EXAM        Spontaneous AROM of UE's and LE's against gravity        Significant Labs:   CBC:   Recent Labs   Lab 01/13/22 0356 01/14/22 0429   WBC 26.00* 17.30*   HGB 12.2* 11.9*   HCT 35.5* 35.0*    255     CMP:   Recent Labs   Lab 01/13/22 0356 01/14/22 0429 01/14/22  0532   * 121*  --     141  --    K 2.9* 3.1*  --     106  --    CO2 23 25  --    BUN 5* 6  --    CREATININE 1.0 0.9  --    CALCIUM 8.5* 8.1*  --    MG  --   --  1.7   PROT 6.6 6.1  --    ALBUMIN 1.5* 1.5*  --    BILITOT 0.1 0.1  --    ALKPHOS 477* 453*  --    AST 25 23  --    ALT 23 17  --    ANIONGAP 10 10  --    EGFRNONAA >60 >60  --        Significant Imaging: I have reviewed all pertinent imaging results/findings within the past 24 hours.     MRI brain  Impression:     Motion artifact.     No acute intracranial process.  Scattered small areas of focal signal abnormality within the bilateral frontal parietal cortex and subcortical white matter thought most likely to represent chronically gliosis from chronic cortical infarcts.  Gliosis in the left anterior temporal lobe may be posttraumatic if clinically consistent.     There is loss of the left carotid flow void which may reflect slow flow versus occlusion.  Ultrasound versus delayed CTA to be considered as clinically warranted.        Electronically signed by: Bo Bowman  Date:                                            01/14/2022  Time:                                           11:39    Assessment and Plan:     62 y/o male consulted for AMS     1. Encephalopathy: pt encephalopathic again. No hypotension recorded. No unilateral weakness.   -MRI brain showed no acute stroke. No signs of space-occupying lesion.           -EEG ordered. Will do long-term    -For now pt is unable to take oral meds. Lacosamide 200 mg IV BID.  mg IV BID. Unable to have lamotrigine for now  as it only comes in oral formulation.    2. Hx of seizures: uncertain of pt having breakthrough events.   Initial EEG showed no ongoing seizures.   -EEG today.   -Decreased VPA from 750 mg BID to 500 mg BID.    -Lacosamide 200 mg BID.   -Holding lamotrigine as pt is unable to take oral meds for now.               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] 01/06/2022 Yes    Adjustment disorder with depressed mood [F43.21] 01/04/2022 Yes     Chronic    Chronic ischemic heart disease [I25.9] 01/04/2022 Yes     Chronic    Cocaine dependence in remission [F14.21] 01/04/2022 Yes     Chronic    Cardiomegaly [I51.7] 01/04/2022 Yes     Chronic    Hyperlipidemia [E78.5] 01/04/2022 Yes     Chronic    Essential hypertension [I10] 01/04/2022 Yes     Chronic    Seizure disorder [G40.909] 01/04/2022 Yes     Chronic    Tobacco user [Z72.0] 01/04/2022 Yes     Chronic    Diabetes mellitus type 2, controlled [E11.9] 08/03/2020 Yes     Chronic      Problems Resolved During this Admission:       VTE Risk Mitigation (From admission, onward)         Ordered     enoxaparin injection 40 mg  Daily         01/13/22 1612     IP VTE LOW RISK PATIENT  Once         01/04/22 2241     Place sequential compression device  Until discontinued         01/04/22 2241                Ashvin Giraldo MD  Neurology  [facility] - Intensive Care

## 2022-01-14 NOTE — EICU
New Patient Evaluation    61 M history of adjustment disorder, seizure disorder, hypertension, dyslipidemia, DM (HbA1C 9.3), HFrEF 50%, urethral stricture. Initially admitted on 1/4 for dysuria and scrotal swelling. Completed piperacillin tazobactam for UTI/scrotal abscess. Underwent cystoscopy, retrograde urethrogram, fistulogram with excision and debridement of Berna's gangrene with growth of Ecoli. 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    Piperacillin tazobactam resumed 1/10 for HCAP with sputum growth of pseudomonas.    Transferred to the ICU overnight for altered sensorium. Code stroke called. Head CT with no note of bleed    Patient undergoing tele neuro assessment when I camera'd in.  Patient appears to be following simple commands with no focal neuro deficit    /93  HR 56  O2 99%    Lab with hypokalemia and no other significant electrolyte abnormality    · Patient has been loaded with Keppra as possibly post ictal. Plan for MRI. Check ABG   hard copy, drawn during this pregnancy

## 2022-01-14 NOTE — PT/OT/SLP PROGRESS
Speech Language Pathology      Abhishek Zhao  MRN: [id number]    Patient not seen today secondary to pt being somnolent, and not appropriate at this time for bedside swallow assessment. ST recs the pt remain NPO at this time, with hydration/meds via IV. ST will attempt to follow-up 1/15 to perform b/s assessment, if appropriate.     Deloris Newby, CCC-SLP

## 2022-01-15 PROBLEM — A41.9 SEPSIS: Status: RESOLVED | Noted: 2022-01-04 | Resolved: 2022-01-15

## 2022-01-15 PROBLEM — I31.39 PERICARDIAL EFFUSION: Status: ACTIVE | Noted: 2022-01-15

## 2022-01-15 LAB
ALBUMIN SERPL BCP-MCNC: 1.4 G/DL (ref 3.5–5.2)
ALP SERPL-CCNC: 430 U/L (ref 55–135)
ALT SERPL W/O P-5'-P-CCNC: 16 U/L (ref 10–44)
ANION GAP SERPL CALC-SCNC: 9 MMOL/L (ref 8–16)
AST SERPL-CCNC: 24 U/L (ref 10–40)
BASOPHILS # BLD AUTO: 0.08 K/UL (ref 0–0.2)
BASOPHILS NFR BLD: 0.4 % (ref 0–1.9)
BILIRUB SERPL-MCNC: 0.1 MG/DL (ref 0.1–1)
BUN SERPL-MCNC: 6 MG/DL (ref 6–20)
CALCIUM SERPL-MCNC: 8.3 MG/DL (ref 8.7–10.5)
CHLORIDE SERPL-SCNC: 105 MMOL/L (ref 95–110)
CO2 SERPL-SCNC: 27 MMOL/L (ref 23–29)
CREAT SERPL-MCNC: 1.1 MG/DL (ref 0.5–1.4)
DIFFERENTIAL METHOD: ABNORMAL
EOSINOPHIL # BLD AUTO: 0 K/UL (ref 0–0.5)
EOSINOPHIL NFR BLD: 0.1 % (ref 0–8)
ERYTHROCYTE [DISTWIDTH] IN BLOOD BY AUTOMATED COUNT: 12.9 % (ref 11.5–14.5)
EST. GFR  (AFRICAN AMERICAN): >60 ML/MIN/1.73 M^2
EST. GFR  (NON AFRICAN AMERICAN): >60 ML/MIN/1.73 M^2
GLUCOSE SERPL-MCNC: 107 MG/DL (ref 70–110)
HCT VFR BLD AUTO: 36.8 % (ref 40–54)
HGB BLD-MCNC: 12.8 G/DL (ref 14–18)
IMM GRANULOCYTES # BLD AUTO: 0.39 K/UL (ref 0–0.04)
IMM GRANULOCYTES NFR BLD AUTO: 2.2 % (ref 0–0.5)
LYMPHOCYTES # BLD AUTO: 1.2 K/UL (ref 1–4.8)
LYMPHOCYTES NFR BLD: 6.6 % (ref 18–48)
MCH RBC QN AUTO: 28.8 PG (ref 27–31)
MCHC RBC AUTO-ENTMCNC: 34.8 G/DL (ref 32–36)
MCV RBC AUTO: 83 FL (ref 82–98)
MONOCYTES # BLD AUTO: 0.3 K/UL (ref 0.3–1)
MONOCYTES NFR BLD: 1.8 % (ref 4–15)
NEUTROPHILS # BLD AUTO: 15.9 K/UL (ref 1.8–7.7)
NEUTROPHILS NFR BLD: 88.9 % (ref 38–73)
NRBC BLD-RTO: 0 /100 WBC
PLATELET # BLD AUTO: 226 K/UL (ref 150–450)
PMV BLD AUTO: 10.1 FL (ref 9.2–12.9)
POCT GLUCOSE: 103 MG/DL (ref 70–110)
POCT GLUCOSE: 113 MG/DL (ref 70–110)
POCT GLUCOSE: 131 MG/DL (ref 70–110)
POCT GLUCOSE: 83 MG/DL (ref 70–110)
POTASSIUM SERPL-SCNC: 3.5 MMOL/L (ref 3.5–5.1)
PROT SERPL-MCNC: 6.6 G/DL (ref 6–8.4)
RBC # BLD AUTO: 4.44 M/UL (ref 4.6–6.2)
SODIUM SERPL-SCNC: 141 MMOL/L (ref 136–145)
WBC # BLD AUTO: 17.94 K/UL (ref 3.9–12.7)

## 2022-01-15 PROCEDURE — 99233 SBSQ HOSP IP/OBS HIGH 50: CPT | Mod: ,,, | Performed by: INTERNAL MEDICINE

## 2022-01-15 PROCEDURE — 25000003 PHARM REV CODE 250: Performed by: PSYCHIATRY & NEUROLOGY

## 2022-01-15 PROCEDURE — 99232 PR SUBSEQUENT HOSPITAL CARE,LEVL II: ICD-10-PCS | Mod: ,,, | Performed by: PSYCHIATRY & NEUROLOGY

## 2022-01-15 PROCEDURE — 85025 COMPLETE CBC W/AUTO DIFF WBC: CPT | Performed by: STUDENT IN AN ORGANIZED HEALTH CARE EDUCATION/TRAINING PROGRAM

## 2022-01-15 PROCEDURE — 99024 POSTOP FOLLOW-UP VISIT: CPT | Mod: ,,, | Performed by: UROLOGY

## 2022-01-15 PROCEDURE — 80053 COMPREHEN METABOLIC PANEL: CPT | Performed by: STUDENT IN AN ORGANIZED HEALTH CARE EDUCATION/TRAINING PROGRAM

## 2022-01-15 PROCEDURE — 27000221 HC OXYGEN, UP TO 24 HOURS

## 2022-01-15 PROCEDURE — C9254 INJECTION, LACOSAMIDE: HCPCS | Performed by: PSYCHIATRY & NEUROLOGY

## 2022-01-15 PROCEDURE — 36415 COLL VENOUS BLD VENIPUNCTURE: CPT | Performed by: STUDENT IN AN ORGANIZED HEALTH CARE EDUCATION/TRAINING PROGRAM

## 2022-01-15 PROCEDURE — 97530 THERAPEUTIC ACTIVITIES: CPT

## 2022-01-15 PROCEDURE — 94761 N-INVAS EAR/PLS OXIMETRY MLT: CPT

## 2022-01-15 PROCEDURE — 25000242 PHARM REV CODE 250 ALT 637 W/ HCPCS: Performed by: STUDENT IN AN ORGANIZED HEALTH CARE EDUCATION/TRAINING PROGRAM

## 2022-01-15 PROCEDURE — 20000000 HC ICU ROOM

## 2022-01-15 PROCEDURE — 92526 ORAL FUNCTION THERAPY: CPT

## 2022-01-15 PROCEDURE — 94640 AIRWAY INHALATION TREATMENT: CPT

## 2022-01-15 PROCEDURE — 99232 SBSQ HOSP IP/OBS MODERATE 35: CPT | Mod: ,,, | Performed by: PSYCHIATRY & NEUROLOGY

## 2022-01-15 PROCEDURE — 63600175 PHARM REV CODE 636 W HCPCS: Performed by: HOSPITALIST

## 2022-01-15 PROCEDURE — 99233 PR SUBSEQUENT HOSPITAL CARE,LEVL III: ICD-10-PCS | Mod: ,,, | Performed by: INTERNAL MEDICINE

## 2022-01-15 PROCEDURE — 63600175 PHARM REV CODE 636 W HCPCS: Performed by: STUDENT IN AN ORGANIZED HEALTH CARE EDUCATION/TRAINING PROGRAM

## 2022-01-15 PROCEDURE — 63600175 PHARM REV CODE 636 W HCPCS: Performed by: PSYCHIATRY & NEUROLOGY

## 2022-01-15 PROCEDURE — 99024 PR POST-OP FOLLOW-UP VISIT: ICD-10-PCS | Mod: ,,, | Performed by: UROLOGY

## 2022-01-15 PROCEDURE — 25000003 PHARM REV CODE 250: Performed by: HOSPITALIST

## 2022-01-15 RX ORDER — HYDRALAZINE HYDROCHLORIDE 20 MG/ML
10 INJECTION INTRAMUSCULAR; INTRAVENOUS EVERY 6 HOURS PRN
Status: DISCONTINUED | OUTPATIENT
Start: 2022-01-15 | End: 2022-02-09 | Stop reason: HOSPADM

## 2022-01-15 RX ORDER — LABETALOL HYDROCHLORIDE 5 MG/ML
10 INJECTION, SOLUTION INTRAVENOUS EVERY 4 HOURS PRN
Status: DISCONTINUED | OUTPATIENT
Start: 2022-01-15 | End: 2022-02-09 | Stop reason: HOSPADM

## 2022-01-15 RX ADMIN — ENOXAPARIN SODIUM 40 MG: 40 INJECTION SUBCUTANEOUS at 05:01

## 2022-01-15 RX ADMIN — DEXTROSE 500 MG: 50 INJECTION, SOLUTION INTRAVENOUS at 10:01

## 2022-01-15 RX ADMIN — ACETYLCYSTEINE 4 ML: 100 SOLUTION ORAL; RESPIRATORY (INHALATION) at 08:01

## 2022-01-15 RX ADMIN — IPRATROPIUM BROMIDE AND ALBUTEROL SULFATE 3 ML: 2.5; .5 SOLUTION RESPIRATORY (INHALATION) at 08:01

## 2022-01-15 RX ADMIN — IPRATROPIUM BROMIDE AND ALBUTEROL SULFATE 3 ML: 2.5; .5 SOLUTION RESPIRATORY (INHALATION) at 02:01

## 2022-01-15 RX ADMIN — SODIUM CHLORIDE 200 MG: 9 INJECTION, SOLUTION INTRAVENOUS at 11:01

## 2022-01-15 RX ADMIN — PIPERACILLIN AND TAZOBACTAM 4.5 G: 4; .5 INJECTION, POWDER, LYOPHILIZED, FOR SOLUTION INTRAVENOUS; PARENTERAL at 08:01

## 2022-01-15 RX ADMIN — ACETYLCYSTEINE 4 ML: 100 SOLUTION ORAL; RESPIRATORY (INHALATION) at 02:01

## 2022-01-15 RX ADMIN — IPRATROPIUM BROMIDE AND ALBUTEROL SULFATE 3 ML: 2.5; .5 SOLUTION RESPIRATORY (INHALATION) at 05:01

## 2022-01-15 RX ADMIN — PIPERACILLIN AND TAZOBACTAM 4.5 G: 4; .5 INJECTION, POWDER, LYOPHILIZED, FOR SOLUTION INTRAVENOUS; PARENTERAL at 04:01

## 2022-01-15 RX ADMIN — PIPERACILLIN AND TAZOBACTAM 4.5 G: 4; .5 INJECTION, POWDER, LYOPHILIZED, FOR SOLUTION INTRAVENOUS; PARENTERAL at 01:01

## 2022-01-15 NOTE — PLAN OF CARE
Pt remains in ICU on HFNC, sats remain >90%, temperature now stable, cont. EEG remains in place, pt oriented to self but speech remains slurred/incomprehensible, intermittently follows basic commands but mostly non-purposeful movement, pt flails arms and legs frequently but it does not appear to be seizure-like, scrotal wound re-packed with vashe moistened gauze, good urine output per santiago, family stayed at bedside throughout shift, no acute distress at this time, will continue to monitor.

## 2022-01-15 NOTE — PLAN OF CARE
Improvement today noted with attention, though still fluctuates. Perseverating on simple tasks such as stating his name and counting during session when only asked for 1x. Consider small sips of water only during the day for now, suspect that pt will continue to improve.

## 2022-01-15 NOTE — SUBJECTIVE & OBJECTIVE
Interval History: He is more alert today, confused but able to be oriented.    Review of Systems   Unable to perform ROS: Mental status change     Objective:     Temp:  [97.8 °F (36.6 °C)-98.3 °F (36.8 °C)] 98 °F (36.7 °C)  Pulse:  [] 93  Resp:  [16-38] 22  SpO2:  [90 %-100 %] 95 %  BP: (119-171)/(59-89) 168/86     Body mass index is 19.94 kg/m².           Drains     Drain                 Urethral Catheter 01/05/22 1050 Double-lumen 20 Fr. 9 days                Physical Exam  Vitals and nursing note reviewed.   Constitutional:       Appearance: He is well-developed and well-nourished.   HENT:      Head: Normocephalic.   Eyes:      Conjunctiva/sclera: Conjunctivae normal.   Neck:      Thyroid: No thyromegaly.      Trachea: No tracheal deviation.   Cardiovascular:      Rate and Rhythm: Normal rate.      Heart sounds: Normal heart sounds.   Pulmonary:      Effort: Pulmonary effort is normal. No respiratory distress.      Breath sounds: Normal breath sounds. No wheezing.   Abdominal:      General: Bowel sounds are normal.      Palpations: Abdomen is soft. There is no hepatosplenomegaly.      Tenderness: There is no abdominal tenderness. There is no CVA tenderness or rebound.      Hernia: No hernia is present.   Genitourinary:     Comments: Sahu with yellow urine  Wound unpacked, palpated, no fluctuant areas.  Repacked.  Musculoskeletal:         General: No tenderness or edema. Normal range of motion.      Cervical back: Normal range of motion and neck supple.   Lymphadenopathy:      Cervical: No cervical adenopathy.   Skin:     General: Skin is warm and dry.      Findings: No erythema or rash.   Neurological:      Mental Status: He is alert and oriented to person, place, and time.   Psychiatric:         Mood and Affect: Mood and affect normal.         Behavior: Behavior normal.         Thought Content: Thought content normal.         Judgment: Judgment normal.         Significant Labs:    BMP:  Recent Labs   Lab  01/13/22  0356 01/14/22  0429 01/15/22  0340    141 141   K 2.9* 3.1* 3.5    106 105   CO2 23 25 27   BUN 5* 6 6   CREATININE 1.0 0.9 1.1   CALCIUM 8.5* 8.1* 8.3*       CBC:   Recent Labs   Lab 01/13/22  0356 01/14/22  0429 01/15/22  0340   WBC 26.00* 17.30* 17.94*   HGB 12.2* 11.9* 12.8*   HCT 35.5* 35.0* 36.8*    255 226       Blood Culture:   Recent Labs   Lab 01/10/22  1411   LABBLOO No Growth after 4 days.   No Growth after 4 days.      Urine Culture: No results for input(s): LABURIN in the last 168 hours.    Significant Imaging:

## 2022-01-15 NOTE — PT/OT/SLP PROGRESS
Physical Therapy Treatment    Patient Name:  Abhishek Zhao   MRN:  [id number]    Recommendations:     Discharge Recommendations:  nursing facility, skilled   Discharge Equipment Recommendations:  (TBD pending progress)   Barriers to discharge: not at PLOF; near total dependence for bed mobility; disoriented; total A for ADLs    Assessment:     Abhishek Zhao is a 61 y.o. male admitted with a medical diagnosis of Scrotal abscess.  He presents with the following impairments/functional limitations:  weakness,impaired endurance,impaired self care skills,impaired functional mobilty,impaired balance,gait instability,impaired cognition,decreased lower extremity function,decreased upper extremity function,decreased safety awareness,pain,impaired cardiopulmonary response to activity   Pt presents very pleasant and willing to attempt transfer training. Pt inconsistent with arousal and communication but was able to answer some yes/no questioning.  Pt rolls with mod Ax1 toward L. Pt max to near total dependence for supine to sit transfer. Once seated at EOB pt was able to briefly maintain balance independently at EOB but requires min assist 4 minutes spent at EOB total prior to pt verbalizing needing to lay back down. Pt's dropped slightly upon sitting. Pt was able to scoot up in bed with min Ax1, pt was able to pull himself up with bed rails once hands were placed there.         Rehab Prognosis: Fair; patient would benefit from acute skilled PT services to address these deficits and reach maximum level of function.    Recent Surgery: Procedure(s) (LRB):  CYSTOSCOPY, RETROGRADE URETHROGRAM, FISTULAGRAM, EXCISION OF NECROTIC SCROTAL TISSUE ABSCESS, BARKER PLACEMENT (N/A)  EXCISION, ABSCESS  CYSTOSCOPY  FISTULOGRAM (N/A) 10 Days Post-Op    Plan:     During this hospitalization, patient to be seen  (3-5x/wk) to address the identified rehab impairments via gait training,therapeutic activities,therapeutic exercises,neuromuscular  re-education,wheelchair management/training and progress toward the following goals:    · Plan of Care Expires:  01/27/22    Subjective     Chief Complaint: unable to verbalize  Patient/Family Comments/goals: Willing to participate in transfer training  Pain/Comfort:  · Pain Rating 1:  (unable to verbalize)  · Pain Rating Post-Intervention 1:  (unable to verbalize)      Objective:     Communicated with Eva carreno prior to session.  Patient found supine with bed alarm,blood pressure cuff,oxygen,pressure relief boots,peripheral IV,pulse ox (continuous),SCD,telemetry,santiago catheter upon PT entry to room.     General Precautions: Standard, fall   Orthopedic Precautions:N/A   Braces:    Respiratory Status: Nasal cannula, flow 6 L/min     Functional Mobility:  · Bed Mobility:     · Rolling Left:  maximal assistance  · Scooting: moderate assistance  · Supine to Sit: total assistance  · Sit to Supine: maximal assistance    AM-PAC 6 CLICK MOBILITY  Turning over in bed (including adjusting bedclothes, sheets and blankets)?: 2  Sitting down on and standing up from a chair with arms (e.g., wheelchair, bedside commode, etc.): 1  Moving from lying on back to sitting on the side of the bed?: 2  Moving to and from a bed to a chair (including a wheelchair)?: 1  Need to walk in hospital room?: 1  Climbing 3-5 steps with a railing?: 1  Basic Mobility Total Score: 8       Therapeutic Activities and Exercises:   Pt educated on importance of frequent bed mobility to preserve skin integrity, needs reinforcement    Patient left HOB elevated with all lines intact, bed alarm on, nsg notified and SLP present..    GOALS:   Multidisciplinary Problems     Physical Therapy Goals        Problem: Physical Therapy Goal    Goal Priority Disciplines Outcome Goal Variances Interventions   Physical Therapy Goal     PT, PT/OT Ongoing, Progressing     Description: Goals to be met by: 1/27/22     Patient will increase functional independence with  mobility by performin. Supine to sit with Stand-by Assistance  2. Rolling to Left and Right with Stand-by Assistance.  3. Sit to stand transfer to be assessed   4. Gait to be assessed    5. Sitting at edge of bed x15 minutes with Stand-by Assistance  6. Lower extremity exercise program x10 reps per handout, with assistance as needed                     Time Tracking:     PT Received On: 01/15/22  PT Start Time: 1035     PT Stop Time: 1052  PT Total Time (min): 17 min     Billable Minutes: Therapeutic Activity 17    Treatment Type: Evaluation  PT/PTA: PT     PTA Visit Number: 0     01/15/2022

## 2022-01-15 NOTE — ASSESSMENT & PLAN NOTE
Repeated episodes of AMS c/f seizures w/ post ictal period  - neurology consulted  - AEDs per neurology  - EEG pending

## 2022-01-15 NOTE — PLAN OF CARE
Problem: Physical Therapy Goal  Goal: Physical Therapy Goal  Description: Goals to be met by: 22     Patient will increase functional independence with mobility by performin. Supine to sit with Stand-by Assistance  2. Rolling to Left and Right with Stand-by Assistance.  3. Sit to stand transfer to be assessed   4. Gait to be assessed    5. Sitting at edge of bed x15 minutes with Stand-by Assistance  6. Lower extremity exercise program x10 reps per handout, with assistance as needed    Outcome: Ongoing, Progressing     Pt presents very pleasant and willing to attempt transfer training. Pt inconsistent with arousal and communication but was able to answer some yes/no questioning.  Pt rolls with mod Ax1 toward L. Pt max to near total dependence for supine to sit transfer. Once seated at EOB pt was able to briefly maintain balance independently at EOB but requires min assist 4 minutes spent at EOB total prior to pt verbalizing needing to lay back down. Pt's dropped slightly upon sitting. Pt was able to scoot up in bed with min Ax1, pt was able to pull himself up with bed rails once hands were placed there.

## 2022-01-15 NOTE — SUBJECTIVE & OBJECTIVE
Interval History: Events noted. Sister at bedside. Patient smiling now, answering some questions. EEG in progress.    Review of Systems   Unable to perform ROS: Mental status change     Objective:     Vital Signs (Most Recent):  Temp: 98 °F (36.7 °C) (01/15/22 0330)  Pulse: 93 (01/15/22 0845)  Resp: 18 (01/15/22 0845)  BP: (!) 168/86 (01/15/22 0700)  SpO2: 95 % (01/15/22 0845) Vital Signs (24h Range):  Temp:  [97.8 °F (36.6 °C)-98.3 °F (36.8 °C)] 98 °F (36.7 °C)  Pulse:  [] 93  Resp:  [16-38] 18  SpO2:  [90 %-100 %] 95 %  BP: (119-171)/(59-89) 168/86     Weight: 61.2 kg (135 lb)  Body mass index is 19.94 kg/m².    Estimated Creatinine Clearance: 62.6 mL/min (based on SCr of 1.1 mg/dL).    Physical Exam  Vitals and nursing note reviewed.   Constitutional:       General: He is not in acute distress.     Appearance: Normal appearance. He is not ill-appearing, toxic-appearing or diaphoretic.   HENT:      Head: Normocephalic and atraumatic.      Right Ear: External ear normal.      Left Ear: External ear normal.   Eyes:      Extraocular Movements: Extraocular movements intact.      Conjunctiva/sclera: Conjunctivae normal.      Pupils: Pupils are equal, round, and reactive to light.   Cardiovascular:      Rate and Rhythm: Normal rate.   Pulmonary:      Effort: Pulmonary effort is normal.      Breath sounds: Normal breath sounds.   Abdominal:      Tenderness: There is no guarding or rebound.   Skin:     Findings: No erythema.   Neurological:      Mental Status: He is alert.   Psychiatric:         Mood and Affect: Mood normal.         Behavior: Behavior normal.         Significant Labs:   Blood Culture:   Recent Labs   Lab 01/04/22  1744 01/10/22  1411   LABBLOO No Growth after 4 days.   No Growth after 4 days.  No Growth after 4 days.   No Growth after 4 days.      CBC:   Recent Labs   Lab 01/14/22  0429 01/15/22  0340   WBC 17.30* 17.94*   HGB 11.9* 12.8*   HCT 35.0* 36.8*    226     CMP:   Recent Labs    Lab 01/14/22  0429 01/15/22  0340    141   K 3.1* 3.5    105   CO2 25 27   * 107   BUN 6 6   CREATININE 0.9 1.1   CALCIUM 8.1* 8.3*   PROT 6.1 6.6   ALBUMIN 1.5* 1.4*   BILITOT 0.1 0.1   ALKPHOS 453* 430*   AST 23 24   ALT 17 16   ANIONGAP 10 9   EGFRNONAA >60 >60     Respiratory Culture:   Recent Labs   Lab 01/10/22  1653   GSRESP <10 epithelial cells per low power field.  No WBC's  No organisms seen   RESPIRATORYC PSEUDOMONAS AERUGINOSA  Moderate  *     Wound Culture:   Recent Labs   Lab 01/05/22  1055   LABAERO ESCHERICHIA COLI  Moderate  *  YAMILET ALBICANS  Few  *       Significant Imaging: I have reviewed all pertinent imaging results/findings within the past 24 hours.

## 2022-01-15 NOTE — PROCEDURES
EEG REPORT      Abhishek Zhao  [id number]  1960    DATE OF SERVICE: 1/14/2022 - 01/15/2022    OW -1, 2    METHODOLOGY      Extended electroencephalographic recording is made while the patient is ambulatory and continuing normal daily activities.  Electrodes are placed according to the International 10-20 placement system and included T1 and T2 electrode placement.  Twenty four (24) channels of digital signal (sampling rate of 512/sec) was simultaneously recorded from the scalp including EKG and eye monitors.  Recording band pass was 0.1 to 100 hz and all data was stored digitally on the recorder.  The patient is instructed to press an event button when clinical symptoms occur and write the symptoms into a diary. Activation procedures which include photic stimulation, hyperventilation and instructing patients to perform simple task are done in selected patients.        The EEG is displayed on a monitor screen and can be reformatted into different montages for evaluation.  The entire recoding is submitted for computer assisted analysis to detect spike and electrographic seizure activity.  The entire recording is visually reviewed and the times identified by computer analysis as being spikes or seizures are reviewed again.  Compresses spectral analysis (CSA) is also performed on the activity recorded from each individual channel.  This is displayed as a power display of frequencies from 0 to 30 Hz over time.   The CSA analysis is done and displayed continuously.  This is reviewed for asymmetries in power between homologous areas of the scalp and for presence of changes in power which canbe seen when seizures occur.  Sections of suspected abnormalities on the CSA is then compared with the original EEG recording.  .     [employer] software was also utilized in the review of this study.  This software suite analyzes the EEG recording in multiple domains.  Coherence and rhythmicity is computed to identify EEG  sections which may contain organized seizures.  Each channel undergoes analysis to detect presence of spike and sharp waves which have special and morphological characteristic of epileptic activity.  The routine EEG recording is converted from spacial into frequency domain.  This is then displayed comparing homologous areas to identify areas of significant asymmetry.  Algorithm to identify non-cortically generated artifact is used to separate eye movement, EMG and other artifact from the EEG     Recording Times    Start on 1/14/2022 at 1239 running for 18 hrs and 19 min  Start on 01/15/2022 at 0700 running for 3 hrs and 30 min    EEG FINDINGS:  Background activity:   The background rhythm was characterized by theta and alpha activity with poor organization and absent posterior dominant alpha rhythm.   Symmetry and continuity: the background was continuous and symmetric     Sleep:   There are periods of increased slowing consistent with state change, occasionally with poorly formed sleep structures.    Activation procedures:   Not performed    Abnormal activity:   No epileptiform discharges, periodic discharges, lateralized rhythmic delta activity or electrographic seizures were seen.    IMPRESSION/CLINICAL CORRELATION:     Abnormal EEG due to a mild generalized nonspecific cerebral dysfunction.  No electrographic seizures or indications of seizure tendency    In this 62 yo male with history of epilepsy (on 3 anti seizure medications) admitted with Berna's gangrene now with acute encephalopathy, indicative of moderate diffuse toxic/metabolic encephalopathy.     Sigifredo Chance MD  Neurocritical Care   Neurology-Epilepsy

## 2022-01-15 NOTE — PROGRESS NOTES
[facility]  Urology  Progress Note    Patient Name: Abhishek Zhao  MRN: [id number]  Admission Date: 1/4/2022  Hospital Length of Stay: 10 days  Code Status: Full Code   Attending Provider: Hugo Aviles MD   Primary Care Physician: To Obtain Unable    Subjective:     HPI:  Scrotal Swelling  Abhishek Zhao is a 61 y.o. male who was been experiencing scrotal pain and swelling since 12/31/2021.  He denies any fever.  He has had issues voiding since the swelling began.  Hemostat having issues in the past with urinary problems.  He denies having any previous issues with scrotal infection or swelling.  He recalls that he had procedures in the past but cannot recall specifically what to place.  He does not have urologist locally but moved back from [place] about 1 year ago.    Review of care everywhere shows that he had a cystoscopy with urethral dilation of a urethral stricture in the bulbar urethra in 2019 at [facility].  And there are reports that [facility] approximately 2015 [facility]      Interval History: He is more alert today, confused but able to be oriented.    Review of Systems   Unable to perform ROS: Mental status change     Objective:     Temp:  [97.8 °F (36.6 °C)-98.3 °F (36.8 °C)] 98 °F (36.7 °C)  Pulse:  [] 93  Resp:  [16-38] 22  SpO2:  [90 %-100 %] 95 %  BP: (119-171)/(59-89) 168/86     Body mass index is 19.94 kg/m².           Drains     Drain                 Urethral Catheter 01/05/22 1050 Double-lumen 20 Fr. 9 days                Physical Exam  Vitals and nursing note reviewed.   Constitutional:       Appearance: He is well-developed and well-nourished.   HENT:      Head: Normocephalic.   Eyes:      Conjunctiva/sclera: Conjunctivae normal.   Neck:      Thyroid: No thyromegaly.      Trachea: No tracheal deviation.   Cardiovascular:      Rate and Rhythm: Normal rate.      Heart sounds: Normal heart sounds.   Pulmonary:      Effort: Pulmonary effort  is normal. No respiratory distress.      Breath sounds: Normal breath sounds. No wheezing.   Abdominal:      General: Bowel sounds are normal.      Palpations: Abdomen is soft. There is no hepatosplenomegaly.      Tenderness: There is no abdominal tenderness. There is no CVA tenderness or rebound.      Hernia: No hernia is present.   Genitourinary:     Comments: Santiago with yellow urine  Wound unpacked, palpated, no fluctuant areas.  Repacked.  Musculoskeletal:         General: No tenderness or edema. Normal range of motion.      Cervical back: Normal range of motion and neck supple.   Lymphadenopathy:      Cervical: No cervical adenopathy.   Skin:     General: Skin is warm and dry.      Findings: No erythema or rash.   Neurological:      Mental Status: He is alert and oriented to person, place, and time.   Psychiatric:         Mood and Affect: Mood and affect normal.         Behavior: Behavior normal.         Thought Content: Thought content normal.         Judgment: Judgment normal.         Significant Labs:    BMP:  Recent Labs   Lab 01/13/22  0356 01/14/22  0429 01/15/22  0340    141 141   K 2.9* 3.1* 3.5    106 105   CO2 23 25 27   BUN 5* 6 6   CREATININE 1.0 0.9 1.1   CALCIUM 8.5* 8.1* 8.3*       CBC:   Recent Labs   Lab 01/13/22 0356 01/14/22  0429 01/15/22  0340   WBC 26.00* 17.30* 17.94*   HGB 12.2* 11.9* 12.8*   HCT 35.5* 35.0* 36.8*    255 226       Blood Culture:   Recent Labs   Lab 01/10/22  1411   LABBLOO No Growth after 4 days.   No Growth after 4 days.      Urine Culture: No results for input(s): LABURIN in the last 168 hours.    Significant Imaging:                    Assessment/Plan:     * Scrotal abscess  s/p cystoscopy with santiago placement and debridement of abscess/necrosis of scrotum 1/5/22 with Dr. Rangel.    Continue abx per primary  Cultures growing Candida and E coli  Appreciate wound care RN and floor RN dressing changes  Will continue to monitor condition      WBC  improving, fluid collection drained at bedside 1/14/2022  Consider repeat CT pelvis in a few days if there continues to be an infectious concern.    Will likely need plastics consultation in future for wound closure/grafting    Berna's gangrene   - s/p debridement 1/5/22        VTE Risk Mitigation (From admission, onward)         Ordered     enoxaparin injection 40 mg  Daily         01/13/22 1612     IP VTE LOW RISK PATIENT  Once         01/04/22 2241     Place sequential compression device  Until discontinued         01/04/22 2241                LATASHA Rangel MD  Urology  [facility] [place] [facility]

## 2022-01-15 NOTE — NURSING
[facility] Intensive Care  ICU Shift Summary  Date: 1/14/2022      Prehospitalization: Home  Admit Date / LOS : 1/4/2022/ 9 days    Diagnosis: Scrotal abscess    Consults:        Active: ID, Neuro, OT, PT, Urology and Wound Care       Needed: Palliative     Code Status: Full Code   Advanced Directive: <no information>    LDA: Sahu and PIV       Central Lines/Site/Justification:Patient Does Not Have Central Line       Urinary Cath/Order/Justification:Post Operative - Urologic, GYN, Perineal Procedures    Vasopressors/Infusions:        GOALS: Volume/ Hemodynamic: N/A                     RASS: N/A    Pain Management: none       Pain Controlled: yes     Rhythm: NSR and SB    Respiratory Device: Nasal Cannula    Oxygen Concentration (%):  [100] 100             Most Recent SBT/ SAT: N/A       MOVE Screen: PT Consult  ICU Liberation: not applicable    VTE Prophylaxis: Pharm and Mechanical  Mobility: Bedrest  Stress Ulcer Prophylaxis: Yes    Isolation: No active isolations  Dietary: NPO  Tolerance: not applicable  Advancement: no    I & O (24h):    Intake/Output Summary (Last 24 hours) at 1/14/2022 1827  Last data filed at 1/14/2022 1723  Gross per 24 hour   Intake 532.17 ml   Output 2925 ml   Net -2392.83 ml        Restraints: No    Significant Dates:  Post Op Date: 1/12/22  Rescue Date: 1/14/22  Imaging/ Diagnostics: MRI and EEG today 1/14/22    Noteworthy Labs:  K 3.1 replaced    COVID Test: (--)  CBC/Anemia Labs: Coags:    Recent Labs   Lab 01/13/22 0356 01/14/22  0429   WBC 26.00* 17.30*   HGB 12.2* 11.9*   HCT 35.5* 35.0*    255   MCV 86 84   RDW 12.9 12.9    No results for input(s): PT, INR, APTT in the last 168 hours.     Chemistries:   Recent Labs   Lab 01/10/22  0506 01/11/22  0403 01/13/22  0356 01/14/22  0429 01/14/22  0532      < > 139 141  --    K 3.4*   < > 2.9* 3.1*  --       < > 106 106  --    CO2 23   < > 23 25  --    BUN 11   < > 5* 6  --    CREATININE 1.0   < > 1.0 0.9  --     CALCIUM 8.1*   < > 8.5* 8.1*  --    PROT  --    < > 6.6 6.1  --    BILITOT  --    < > 0.1 0.1  --    ALKPHOS  --    < > 477* 453*  --    ALT  --    < > 23 17  --    AST  --    < > 25 23  --    MG 1.9  --   --   --  1.7    < > = values in this interval not displayed.        Cardiac Enzymes: Ejection Fractions:    Recent Labs     01/14/22  0532   TROPONINI <0.006    EF   Date Value Ref Range Status   01/14/2022 50 % Final        POCT Glucose: HbA1c:    Recent Labs   Lab 01/13/22 2010 01/14/22  0454 01/14/22  1239   POCTGLUCOSE 246* 149* 130*    Hemoglobin A1C   Date Value Ref Range Status   01/05/2022 9.3 (H) 4.0 - 5.6 % Final     Comment:     ADA Screening Guidelines:  5.7-6.4%  Consistent with prediabetes  >or=6.5%  Consistent with diabetes    High levels of fetal hemoglobin interfere with the HbA1C  assay. Heterozygous hemoglobin variants (HbS, HgC, etc)do  not significantly interfere with this assay.   However, presence of multiple variants may affect accuracy.             ICU LOS 13h  Level of Care: OK to Transfer    Chart Check: 12 HR Done  Shift Summary/Plan for the shift: pt remains in ICU,. Currently on EEG. MRI done today. Pt has intermittent moments of lucidity and clear speech. Good urine output, no new skin breakdown.

## 2022-01-15 NOTE — ASSESSMENT & PLAN NOTE
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 Cultures growing ampicillin-resistant Ecoli and C albicans. Repeat CT A/P 1/13 w/ 4cm fluid collection c/f persistent abscess which was drained bedside by urology  - urology following  - cont zosyn (re-started for HCAP)  - further ID recs pending  - re-image pelvis in a few days to r/o further collections

## 2022-01-15 NOTE — PLAN OF CARE
Problem: Adult Inpatient Plan of Care  Goal: Plan of Care Review  Outcome: Ongoing, Progressing  Goal: Patient-Specific Goal (Individualized)  Outcome: Ongoing, Progressing  Goal: Absence of Hospital-Acquired Illness or Injury  Outcome: Ongoing, Progressing  Goal: Optimal Comfort and Wellbeing  Outcome: Ongoing, Progressing  Goal: Readiness for Transition of Care  Outcome: Ongoing, Progressing     Problem: Diabetes Comorbidity  Goal: Blood Glucose Level Within Targeted Range  Outcome: Ongoing, Progressing     Problem: Infection  Goal: Absence of Infection Signs and Symptoms  Outcome: Ongoing, Progressing     Problem: Adjustment to Illness (Sepsis/Septic Shock)  Goal: Optimal Coping  Outcome: Ongoing, Progressing     Problem: Bleeding (Sepsis/Septic Shock)  Goal: Absence of Bleeding  Outcome: Ongoing, Progressing     Problem: Glycemic Control Impaired (Sepsis/Septic Shock)  Goal: Blood Glucose Level Within Desired Range  Outcome: Ongoing, Progressing     Problem: Infection Progression (Sepsis/Septic Shock)  Goal: Absence of Infection Signs and Symptoms  Outcome: Ongoing, Progressing     Problem: Nutrition Impaired (Sepsis/Septic Shock)  Goal: Optimal Nutrition Intake  Outcome: Ongoing, Progressing     Problem: Fall Injury Risk  Goal: Absence of Fall and Fall-Related Injury  Outcome: Ongoing, Progressing     Problem: Fluid and Electrolyte Imbalance (Acute Kidney Injury/Impairment)  Goal: Fluid and Electrolyte Balance  Outcome: Ongoing, Progressing     Problem: Oral Intake Inadequate (Acute Kidney Injury/Impairment)  Goal: Optimal Nutrition Intake  Outcome: Ongoing, Progressing     Problem: Renal Function Impairment (Acute Kidney Injury/Impairment)  Goal: Effective Renal Function  Outcome: Ongoing, Progressing     Problem: Fluid Imbalance (Pneumonia)  Goal: Fluid Balance  Outcome: Ongoing, Progressing     Problem: Infection (Pneumonia)  Goal: Resolution of Infection Signs and Symptoms  Outcome: Ongoing, Progressing      Problem: Respiratory Compromise (Pneumonia)  Goal: Effective Oxygenation and Ventilation  Outcome: Ongoing, Progressing     Problem: Skin Injury Risk Increased  Goal: Skin Health and Integrity  Outcome: Ongoing, Progressing     Problem: Seizure Disorder Comorbidity  Goal: Maintenance of Seizure Control  Outcome: Ongoing, Progressing     Problem: Confusion Chronic  Goal: Optimal Cognitive Function  Outcome: Ongoing, Progressing

## 2022-01-15 NOTE — PROGRESS NOTES
[facility] Medicine  Progress Note    Patient Name: Abhishek Zhao  MRN: [id number]  Patient Class: IP- Inpatient   Admission Date: 1/4/2022  Length of Stay: 10 days  Attending Physician: Hugo Aviles MD  Primary Care Provider: To Obtain Unable        Subjective:     Principal Problem:Scrotal abscess        HPI:  61 y.o. male with adjustment disorder with depressed mood, chronic ischemic heart disease, cocaine dependence in remission, cardiomegaly, HLD, HTN, swizure disorder, tobacco use, and DM 2 presents with a complaint of testicular pain.  Associated with swelling and redness to the scrotum and penis along with difficulty urinating, pain is rated as severe and radiates to the rectum.  Denies fever, chills, cough, SOB, chest pain, n/v/d.  In the ED, he was found to be tachypneic, with leukocytosis and elevated lactic.  CT and US shows evidence of multiple scrotal and perineal abscesses with some extension into the penile shaft.  UA with evidence of infection.  Sepsis treatment initiated, blood and urine cultures obtained, IV fluids and IV antibiotics initiated.  Urology consulted.      Overview/Hospital Course:  61 y.o. male with adjustment disorder with depressed mood, chronic ischemic heart disease, cocaine dependence in remission, cardiomegaly, HLD, HTN, swizure disorder, tobacco use, and DM 2 presents with a complaint of testicular pain, found to have multiple scrotal and perineal abscesses. Placed on broad spectrum antibiotics, underwent I&D with urology.    Pt w/ aspiration event 1/11 w/ subsequent desaturation. Respiratory culture grew pseudomonas. Kept on Zosyn. On 1/13 pt had abrupt reduction in responsiveness. Code stroke called, patient transferred to ICU. No stroke found on imaging. Started on extended EEG, which showed...          Interval History: More alert this AM. Oriented to self. Unable to answer detailed questions, denies basic complaints of pain, hunger,  anxiety.    Review of Systems   Unable to perform ROS: Mental status change     Objective:     Vital Signs (Most Recent):  Temp: 98 °F (36.7 °C) (01/15/22 0330)  Pulse: 93 (01/15/22 0845)  Resp: (!) 22 (01/15/22 0845)  BP: (!) 168/86 (01/15/22 0700)  SpO2: 95 % (01/15/22 0845) Vital Signs (24h Range):  Temp:  [97.8 °F (36.6 °C)-98.3 °F (36.8 °C)] 98 °F (36.7 °C)  Pulse:  [] 93  Resp:  [16-38] 22  SpO2:  [90 %-100 %] 95 %  BP: (119-171)/(59-89) 168/86     Weight: 61.2 kg (135 lb)  Body mass index is 19.94 kg/m².    Intake/Output Summary (Last 24 hours) at 1/15/2022 0925  Last data filed at 1/15/2022 0500  Gross per 24 hour   Intake 859.93 ml   Output 3175 ml   Net -2315.07 ml      Physical Exam  Constitutional:       General: He is not in acute distress.     Appearance: He is ill-appearing. He is not toxic-appearing or diaphoretic.   Cardiovascular:      Rate and Rhythm: Normal rate and regular rhythm.      Heart sounds: No murmur heard.  No gallop.    Pulmonary:      Effort: Pulmonary effort is normal. No respiratory distress.      Breath sounds: Normal breath sounds. No wheezing.   Abdominal:      General: Bowel sounds are normal. There is no distension.      Palpations: Abdomen is soft.      Tenderness: There is no abdominal tenderness.         Significant Labs: All pertinent labs within the past 24 hours have been reviewed.    Significant Imaging: I have reviewed all pertinent imaging results/findings within the past 24 hours.      Assessment/Plan:      * Scrotal abscess  Multiple abscesses c/w Berna's gangrene, s/p I&D by urology on 1/5. Cultures growing ampicillin-resistant Ecoli and C albicans. Repeat CT A/P 1/13 w/ 4cm fluid collection c/f persistent abscess which was drained bedside by urology  - urology following  - cont zosyn (re-started for HCAP)  - further ID recs pending  - re-image pelvis in a few days to r/o further collections          Pericardial effusion  Small pericardial effusion of  unclear etiology      Encephalopathy acute  See metabolic encephalopathy      Encephalopathy, metabolic  CT head and EEG without acute process; likely delirium secondary to infection and prolonged hospitalization. Code stroke called the night of 1/13, however no focal neuro findings, CT head and MRI without acute findings  - neurology consulted  - delirium precautions  - EEG pending  - tx of infection as noted above    HCAP (healthcare-associated pneumonia)  - see respiratory failure       Acute respiratory failure with hypoxia and hypercarbia  Cough + new LLL opacity on imaging c/f pneumonia, however CT imaging showing pleural effusion and atelectasis  - respiratory cultures w/ pseudomonas  - cont zosyn  - furosemide as tolerated; no significant evidence of cardiac dysfunction on TTE  - IS        Hypokalemia  Replete PRN    JOI (acute kidney injury)  Resolved      Sepsis due to Gram negative bacteria  Resolved      Berna's gangrene  As above    Diabetes mellitus type 2, controlled  Check a1c  Hold oral antihyperglycemics while inpatient  PRN sliding scale insulin  ACHS glucose monitoring   ADA diet     Tobacco user  5 minutes spent counseling the patient on smoking cessation and he is not currently ready to stop smoking. He will be offereded a nicotine transdermal patch while hospitalized and monitored for withdrawal.  Will provide additional smoking cessation counseling prior to discharge.     Seizure disorder  Repeated episodes of AMS c/f seizures w/ post ictal period  - neurology consulted  - AEDs per neurology  - EEG pending      Essential hypertension  Well controlled, continue home medications and monitor blood pressure, adjust as needed.     Hyperlipidemia  Continue statin    Cardiomegaly  Pulmonary edema seen on imaging, small pericardial effusion seen on TTE. LVEF low-normal (50%)  - TTE pending  - hold diuresis while NPO    Cocaine dependence in remission  Counseled     Chronic ischemic heart  disease  No acute issue    Adjustment disorder with depressed mood  Continue home citalopram, hold home seroquel due to somnolence    VTE Risk Mitigation (From admission, onward)         Ordered     enoxaparin injection 40 mg  Daily         01/13/22 1612     IP VTE LOW RISK PATIENT  Once         01/04/22 2241     Place sequential compression device  Until discontinued         01/04/22 2241                Discharge Planning   CHUCK:      Code Status: Full Code   Is the patient medically ready for discharge?:     Reason for patient still in hospital (select all that apply): Patient trending condition, Laboratory test, Treatment, Consult recommendations and Pending disposition  Discharge Plan A: Home Health   Discharge Delays: None known at this time        Critical care time spent on the evaluation and treatment of severe organ dysfunction, review of pertinent labs and imaging studies, discussions with consulting providers and discussions with patient/family: 45 minutes.      Hugo Aviles MD  Department of Hospital Medicine   [facility] - [place] - Intensive Care

## 2022-01-15 NOTE — PROGRESS NOTES
"[facility] [place] - Intensive Care  Neurology  Progress Note    Patient Name: Abhishek Zhao  MRN: [id number]  Admission Date: 1/4/2022  Hospital Length of Stay: 10 days  Code Status: Full Code   Attending Provider: Hugo Aviles MD  Primary Care Physician: To Obtain Unable   Principal Problem:Scrotal abscess    Subjective:     Interval History: 62 y/o male presented initially with a complaint of testicular pain. Associated with swelling and redness to the scrotum and penis. 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 Urology is following as well as ID. Today pt had an episode in which he became poorly responsive. He had a persistent cough with copious amounts of mucus. He has slowly recovered but is exhibiting "picking" behavior and is intermittently answering questions.     -1/12/22: Pt had an episode in which he was poorly responsive and arms flailing around.     -1/14/22: Pt poorly responsive overnight. Stroke CODE was called. Transferred to ICU.    -1/15/22: Pt is more responsive today. Restless.     Current Neurological Medications:     Current Facility-Administered Medications   Medication Dose Route Frequency Provider Last Rate Last Admin    acetaminophen tablet 650 mg  650 mg Oral Q6H PRN W. Manny Rangel MD   650 mg at 01/11/22 1525    acetylcysteine 100 mg/ml (10%) solution 4 mL  4 mL Nebulization TID WAKE Hugo Aviles MD   4 mL at 01/15/22 1414    albuterol-ipratropium 2.5 mg-0.5 mg/3 mL nebulizer solution 3 mL  3 mL Nebulization Q4H PRN Aaron Pineda MD   3 mL at 01/13/22 1310    albuterol-ipratropium 2.5 mg-0.5 mg/3 mL nebulizer solution 3 mL  3 mL Nebulization Q4H WAKE Hugo Aviles MD   3 mL at 01/15/22 1414    aluminum-magnesium hydroxide-simethicone 200-200-20 mg/5 mL suspension 30 mL  30 mL Oral QID PRN W. Manny Rangel MD        amLODIPine tablet 10 mg  10 mg Oral Daily W. Manny Rangel MD   10 mg at 01/13/22 1020    aspirin EC tablet 81 mg  81 mg Oral Daily W. " Manny Rangel MD   81 mg at 01/13/22 1021    atorvastatin tablet 80 mg  80 mg Oral Daily W. Manny Rangel MD   80 mg at 01/13/22 1022    dextrose 50% injection 12.5 g  12.5 g Intravenous PRN W. Manny Rangel MD        dextrose 50% injection 25 g  25 g Intravenous PRN W. Manny Rangel MD        enoxaparin injection 40 mg  40 mg Subcutaneous Daily Hugo Aviles MD   40 mg at 01/14/22 1734    fluconazole tablet 400 mg  400 mg Oral Daily Quinten Rosario MD   400 mg at 01/13/22 1021    glucagon (human recombinant) injection 1 mg  1 mg Intramuscular PRN W. Manny Rangel MD        glucose chewable tablet 16 g  16 g Oral PRN W. Manny Rangel MD        glucose chewable tablet 24 g  24 g Oral PRN W. Manny Rangel MD        hydrALAZINE injection 10 mg  10 mg Intravenous Q6H PRN Hugo Aviles MD        influenza (QUADRIVALENT PF) vaccine 0.5 mL  0.5 mL Intramuscular vaccine x 1 dose Elijah Ann MD        insulin aspart U-100 pen 0-5 Units  0-5 Units Subcutaneous Q6H PRN W. Manny Rangel MD   3 Units at 01/13/22 1817    labetaloL injection 10 mg  10 mg Intravenous Q4H PRN Hugo Aviles MD        lacosamide (VIMPAT) 200 mg in sodium chloride 0.9% 100 mL IVPB  200 mg Intravenous Q12H Ashvin Giraldo MD   Stopped at 01/15/22 1152    lamoTRIgine tablet 200 mg  200 mg Oral BID W. Manny Rangel MD   200 mg at 01/13/22 2307    lorazepam injection 3 mg  3 mg Intravenous Once PRN Anitha Deshpande PA-C        melatonin tablet 6 mg  6 mg Oral Nightly PRN W. Manny Rangel MD   6 mg at 01/12/22 2046    metoprolol succinate (TOPROL-XL) 24 hr tablet 50 mg  50 mg Oral Daily W. Manny Rangel MD   50 mg at 01/13/22 1022    naloxone 0.4 mg/mL injection 0.02 mg  0.02 mg Intravenous PRN W. Manny Rangel MD        ondansetron injection 4 mg  4 mg Intravenous Q8H PRN W. Manny Rangel MD   4 mg at 01/12/22 1219    piperacillin-tazobactam 4.5 g in dextrose 5 % 100 mL IVPB (ready to mix system)  4.5  g Intravenous Q8H Elijah Ann MD 25 mL/hr at 01/15/22 1327 4.5 g at 01/15/22 1327    polyethylene glycol packet 17 g  17 g Oral Daily ANIBAL Rangel MD   17 g at 01/13/22 1021    prochlorperazine injection Soln 5 mg  5 mg Intravenous Q6H PRN W. Manny Rangel MD        simethicone chewable tablet 80 mg  1 tablet Oral QID PRN W. Manny Rangel MD        sodium chloride 0.9% flush 10 mL  10 mL Intravenous Q8H PRN W. Manny Rangel MD        sodium chloride 0.9% flush 10 mL  10 mL Intravenous PRN LATASHA. Manny Rangel MD        tamsulosin 24 hr capsule 0.4 mg  0.4 mg Oral Daily WLidia Rangel MD   0.4 mg at 01/13/22 1021    valproate (DEPACON) 500 mg in dextrose 5 % 50 mL IVPB  500 mg Intravenous Q12H Ashvin Giraldo MD   Stopped at 01/15/22 1117       Review of Systems   Unable to perform ROS: Mental status change     Objective:     Vital Signs (Most Recent):  Temp: 97.6 °F (36.4 °C) (01/15/22 0730)  Pulse: 90 (01/15/22 1430)  Resp: 16 (01/15/22 1430)  BP: (!) 173/84 (01/15/22 1430)  SpO2: 98 % (01/15/22 1430) Vital Signs (24h Range):  Temp:  [97.6 °F (36.4 °C)-98.3 °F (36.8 °C)] 97.6 °F (36.4 °C)  Pulse:  [] 90  Resp:  [12-94] 16  SpO2:  [20 %-100 %] 98 %  BP: (129-181)/(59-90) 173/84     Weight: 61.2 kg (135 lb)  Body mass index is 19.94 kg/m².    Physical Exam  Constitutional:       General: He is not in acute distress.  HENT:      Head: Normocephalic.      Right Ear: External ear normal.      Left Ear: External ear normal.   Eyes:      General:         Right eye: No discharge.         Left eye: No discharge.   Cardiovascular:      Rate and Rhythm: Normal rate.   Pulmonary:      Effort: Pulmonary effort is normal.   Abdominal:      Palpations: Abdomen is soft.   Musculoskeletal:         General: No tenderness.      Cervical back: Neck supple.   Skin:     General: Skin is warm.   Psychiatric:         Speech: Speech is slurred.            NEUROLOGICAL EXAMINATION:      MENTAL  STATUS   Speech: slurred   Level of consciousness: drowsy       Oriented to self  Follow commands      CRANIAL NERVES      CN III, IV, VI   Right pupil: Size: 2 mm. Shape: regular.   Left pupil: Size: 2 mm. Shape: regular.   Nystagmus: none   Conjugate gaze: present     CN VII   Right facial weakness: none  Left facial weakness: none     CN XII   Tongue deviation: none     MOTOR EXAM        AROM of UE's and LE's against gravity             Significant Labs:   CBC:   Recent Labs   Lab 01/14/22  0429 01/15/22  0340   WBC 17.30* 17.94*   HGB 11.9* 12.8*   HCT 35.0* 36.8*    226     CMP:   Recent Labs   Lab 01/14/22 0429 01/14/22  0532 01/15/22  0340   *  --  107     --  141   K 3.1*  --  3.5     --  105   CO2 25  --  27   BUN 6  --  6   CREATININE 0.9  --  1.1   CALCIUM 8.1*  --  8.3*   MG  --  1.7  --    PROT 6.1  --  6.6   ALBUMIN 1.5*  --  1.4*   BILITOT 0.1  --  0.1   ALKPHOS 453*  --  430*   AST 23  --  24   ALT 17  --  16   ANIONGAP 10  --  9   EGFRNONAA >60  --  >60       Significant Imaging: I have reviewed all pertinent imaging results/findings within the past 24 hours.    Assessment and Plan:     60 y/o male consulted for AMS     1. Encephalopathy: encephalopathy of unknown etiology. Pt with no hypotension or major metabolic disturbances. He is more responsive today. Seems to be delirious.           -MRI brain showed no acute stroke. No signs of space-occupying lesion.           -LTM EEG done. Seems to have showing no ongoing seizures. Official report pending.           -For now pt is unable to take oral meds. Lacosamide 200 mg IV BID.  mg IV BID. Unable to have lamotrigine for now as it only comes in oral formulation.     2. Hx of seizures: uncertain of pt having breakthrough events.           Initial EEG showed no ongoing seizures.           -LTM EEG official report pending.           - mg BID.            -Lacosamide 200 mg BID.           -Holding lamotrigine as pt  is unable to take oral meds for now.    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] 01/06/2022 Yes    Adjustment disorder with depressed mood [F43.21] 01/04/2022 Yes     Chronic    Chronic ischemic heart disease [I25.9] 01/04/2022 Yes     Chronic    Cocaine dependence in remission [F14.21] 01/04/2022 Yes     Chronic    Cardiomegaly [I51.7] 01/04/2022 Yes     Chronic    Hyperlipidemia [E78.5] 01/04/2022 Yes     Chronic    Essential hypertension [I10] 01/04/2022 Yes     Chronic    Seizure disorder [G40.909] 01/04/2022 Yes     Chronic    Tobacco user [Z72.0] 01/04/2022 Yes     Chronic    Diabetes mellitus type 2, controlled [E11.9] 08/03/2020 Yes     Chronic      Problems Resolved During this Admission:       VTE Risk Mitigation (From admission, onward)         Ordered     enoxaparin injection 40 mg  Daily         01/13/22 1612     IP VTE LOW RISK PATIENT  Once         01/04/22 2241     Place sequential compression device  Until discontinued         01/04/22 2241                Ashvin Giraldo MD  Neurology  [facility] - [place] - Intensive Care

## 2022-01-15 NOTE — PT/OT/SLP PROGRESS
Speech Language Pathology Treatment    Patient Name:  Abhishek Zhao   MRN:  [id number]  Admitting Diagnosis: Scrotal abscess    Recommendations:                 General Recommendations:  Dysphagia therapy, Speech/language therapy and Cognitive-linguistic therapy  Diet recommendations:  NPO, Liquid Diet Level: NPO (single sips of water allowed during the day)   Aspiration Precautions: Single sips water only   General Precautions: Standard, aspiration  Communication strategies:  yes/no questions only and provide increased time to answer    Subjective     Pt just finishing therapy on entry to room, EUGENE Velasquez stating pt more awake/alert than yesterday but still fluctuating. Okay with SLP session. Pt is intermittently alert, remains awake. Difficulty sustaining attention to current task.      Pain/Comfort:  · Pain Rating 1:  (unable to report)    Respiratory Status: Nasal cannula, flow 5 L/min    Objective:     Has the patient been evaluated by SLP for swallowing?   Yes  Keep patient NPO? Yes   Current Respiratory Status:    5L NC    Pt was placed in upright position for po trials. Pt counted 1-5 with min verbal cueing. Stated full name aloud, but continues to perserverate on these days after only asked to perform x1 each. Pt also having difficulty with simple task of ID'ing hot vs. Cold in relation to ice chips provided.    Pt participated in the following trials: ice chips x3 single, thin liquid 5mL via tsp x3, and thin liquid via straw single sips ~5oz, puree x2 tsp. Oral phase is significant for oral holding of puree, suspected prolonged anterior-posterior transit timing. No oral residue was present following swallows. There was one moment of anterior spillage when presenting water via tsp, thought due to feeding/acceptance error. Pharyngeal phase of the swallow is remarkable for multiple swallows across consistencies, concerning for residue and/or penetration. There are no overt clinical s/s of aspiration present  during this encounter, though it should be noted silent aspiration cannot be detected at bedside.    Given inconsistency with alertness/attention to simple, structured tasks as well as oral holding with purees, would consider NPO with single sips of water only throughout the day with further reassessment, tomorrow time permitting. Would suspect that pt will continue to improve in regards to swallow function - of note, cognitive presentation does coincide with ability to swallow effectively. Also should consider need for MBSS in future if there persistent pharyngeal dysphagia.      Assessment:     Abhishek Zhao is a 61 y.o. male with an SLP diagnosis of Dysphagia and Cognitive-Linguistic Impairment.  He presents with varying levels of alertness and attention, leading to difficulties performing during ongoing swallow assessment. Pt with oral holding of purees, multiple swallows, suspicious for pharyngeal residue.     Goals:   Multidisciplinary Problems     SLP Goals        Problem: SLP Goal    Goal Priority Disciplines Outcome   SLP Goal    Medium SLP Ongoing, Progressing   Description: Goals:  1. Pt will tolerate mech soft diet (IDDSI-5) with thin liquids w/o overt s/s of aspiration.                    Plan:     · Patient to be seen:  3 x/week   · Plan of Care expires:     · Plan of Care reviewed with:  patient,sibling   · SLP Follow-Up:  Yes    · Ongoing assessment/intervention while inpatient. Repeat assessment daily, time permitting.  · Single sips thin liquid during the day following oral care.     Discharge recommendations:      Barriers to Discharge:  None    Time Tracking:     SLP Treatment Date:   01/15/22  Speech Start Time:  1051  Speech Stop Time:  1107     Speech Total Time (min):  16 min    Billable Minutes: Treatment Swallowing Dysfunction 16    01/15/2022

## 2022-01-15 NOTE — CARE UPDATE
[facility] Intensive Care  ICU Shift Summary  Date: 1/15/2022      Prehospitalization: Home  Admit Date / LOS : 1/4/2022/ 10 days    Diagnosis: Scrotal abscess    Consults:        Active: ID, Neuro, OT, PT, Urology and Wound Care       Needed: N/A     Code Status: Full Code   Advanced Directive: <no information>    LDA: Sahu and PIV       Central Lines/Site/Justification:Patient Does Not Have Central Line       Urinary Cath/Order/Justification:Critically Ill in the ICU and requiring intensive monitoring    Vasopressors/Infusions:        GOALS: Volume/ Hemodynamic: N/A                     RASS: N/A    Pain Management: none       Pain Controlled: not applicable     Rhythm: NSR    Respiratory Device: HFNC                  Most Recent SBT/ SAT: N/A       MOVE Screen: FAIL  ICU Liberation: not applicable    VTE Prophylaxis: Pharm and Mechanical  Mobility: Bedrest  Stress Ulcer Prophylaxis: Yes    Isolation: No active isolations  Dietary: NPO  Tolerance: not applicable  Advancement: no    I & O (24h):    Intake/Output Summary (Last 24 hours) at 1/15/2022 0636  Last data filed at 1/15/2022 0500  Gross per 24 hour   Intake 859.93 ml   Output 3175 ml   Net -2315.07 ml        Restraints: No    Significant Dates:  Post Op Date: N/A  Rescue Date: N/A  Imaging/ Diagnostics: N/A    Noteworthy Labs:  none    COVID Test: (--)  CBC/Anemia Labs: Coags:    Recent Labs   Lab 01/14/22  0429 01/15/22  0340   WBC 17.30* 17.94*   HGB 11.9* 12.8*   HCT 35.0* 36.8*    226   MCV 84 83   RDW 12.9 12.9    No results for input(s): PT, INR, APTT in the last 168 hours.     Chemistries:   Recent Labs   Lab 01/10/22  0506 01/11/22  0403 01/14/22  0429 01/14/22  0532 01/15/22  0340      < > 141  --  141   K 3.4*   < > 3.1*  --  3.5      < > 106  --  105   CO2 23   < > 25  --  27   BUN 11   < > 6  --  6   CREATININE 1.0   < > 0.9  --  1.1   CALCIUM 8.1*   < > 8.1*  --  8.3*   PROT  --    < > 6.1  --  6.6   BILITOT  --    < > 0.1   --  0.1   ALKPHOS  --    < > 453*  --  430*   ALT  --    < > 17  --  16   AST  --    < > 23  --  24   MG 1.9  --   --  1.7  --     < > = values in this interval not displayed.        Cardiac Enzymes: Ejection Fractions:    Recent Labs     01/14/22  0532   TROPONINI <0.006    EF   Date Value Ref Range Status   01/14/2022 50 % Final        POCT Glucose: HbA1c:    Recent Labs   Lab 01/14/22  1834 01/14/22  2156 01/15/22  0553   POCTGLUCOSE 119* 131* 113*    Hemoglobin A1C   Date Value Ref Range Status   01/05/2022 9.3 (H) 4.0 - 5.6 % Final     Comment:     ADA Screening Guidelines:  5.7-6.4%  Consistent with prediabetes  >or=6.5%  Consistent with diabetes    High levels of fetal hemoglobin interfere with the HbA1C  assay. Heterozygous hemoglobin variants (HbS, HgC, etc)do  not significantly interfere with this assay.   However, presence of multiple variants may affect accuracy.             ICU LOS 1d 1h  Level of Care: Critical Care    Chart Check: 24 HR Done  Shift Summary/Plan for the shift: see care plan note

## 2022-01-15 NOTE — ASSESSMENT & PLAN NOTE
CT head and EEG without acute process; likely delirium secondary to infection and prolonged hospitalization. Code stroke called the night of 1/13, however no focal neuro findings, CT head and MRI without acute findings  - neurology consulted  - delirium precautions  - EEG pending  - tx of infection as noted above

## 2022-01-15 NOTE — ASSESSMENT & PLAN NOTE
- Berna's gangrene, s/p debridement, followed by Urology  - E.coli and Candida albicans grew on culture  - urine culture also grew Candida albicans  - now with Pseudomonas in sputum and evidence of pneumonia  - PCT 1  - continue Zosyn and fluconazole

## 2022-01-15 NOTE — ASSESSMENT & PLAN NOTE
Pulmonary edema seen on imaging, small pericardial effusion seen on TTE. LVEF low-normal (50%)  - TTE pending  - hold diuresis while NPO

## 2022-01-15 NOTE — ASSESSMENT & PLAN NOTE
Cough + new LLL opacity on imaging c/f pneumonia, however CT imaging showing pleural effusion and atelectasis  - respiratory cultures w/ pseudomonas  - cont zosyn  - furosemide as tolerated; no significant evidence of cardiac dysfunction on TTE  - IS

## 2022-01-15 NOTE — ASSESSMENT & PLAN NOTE
s/p cystoscopy with santiago placement and debridement of abscess/necrosis of scrotum 1/5/22 with Dr. Rangel.    Continue abx per primary  Cultures growing Candida and E coli  Appreciate wound care RN and floor RN dressing changes  Will continue to monitor condition      WBC improving, fluid collection drained at bedside 1/14/2022  Consider repeat CT pelvis in a few days if there continues to be an infectious concern.    Will likely need plastics consultation in future for wound closure/grafting

## 2022-01-15 NOTE — PROGRESS NOTES
[facility] [place] - Intensive Care  Infectious Disease  Progress Note    Patient Name: Abhishek Zhao  MRN: [id number]  Admission Date: 1/4/2022  Length of Stay: 10 days  Attending Physician: Hugo Aviles MD  Primary Care Provider: To Obtain Unable    Isolation Status: No active isolations     Assessment/Plan:      * Scrotal abscess  - Berna's gangrene, s/p debridement, followed by Urology  - E.coli and Candida albicans grew on culture  - urine culture also grew Candida albicans  - now with Pseudomonas in sputum and evidence of pneumonia  - PCT 1  - continue Zosyn and fluconazole      Demarco Combs MD  Infectious Disease      Subjective:     Principal Problem:Scrotal abscess    HPI: Abhishek Zhao is a 61-year-old male with HTN and seizures who presented to the ED with complaints of testicular pain and swelling for four days. At that time, he endorsed radiation of pain to rectum and difficulty urinating. He was originally planning on going to the ED before th [place] game but decided to wait until afterwards. In the ED, imaging showed a complex fluid collection(s).  He has a history of urethral stricture last dilated in 2019 at an outside facility ([facility]). The patient was admitted and started on empiric abx. 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 Post-operatively, the patient has improved with diminishing leukocytosis. Surgical cultures have grown E.coli and Candida, thus far. ID is consulted for Berna's gangrene.    Interval History: Events noted. Sister at bedside. Patient smiling now, answering some questions. EEG in progress.    Review of Systems   Unable to perform ROS: Mental status change     Objective:     Vital Signs (Most Recent):  Temp: 98 °F (36.7 °C) (01/15/22 0330)  Pulse: 93 (01/15/22 0845)  Resp: 18  (01/15/22 0845)  BP: (!) 168/86 (01/15/22 0700)  SpO2: 95 % (01/15/22 0845) Vital Signs (24h Range):  Temp:  [97.8 °F (36.6 °C)-98.3 °F (36.8 °C)] 98 °F (36.7 °C)  Pulse:  [] 93  Resp:  [16-38] 18  SpO2:  [90 %-100 %] 95 %  BP: (119-171)/(59-89) 168/86     Weight: 61.2 kg (135 lb)  Body mass index is 19.94 kg/m².    Estimated Creatinine Clearance: 62.6 mL/min (based on SCr of 1.1 mg/dL).    Physical Exam  Vitals and nursing note reviewed.   Constitutional:       General: He is not in acute distress.     Appearance: Normal appearance. He is not ill-appearing, toxic-appearing or diaphoretic.   HENT:      Head: Normocephalic and atraumatic.      Right Ear: External ear normal.      Left Ear: External ear normal.   Eyes:      Extraocular Movements: Extraocular movements intact.      Conjunctiva/sclera: Conjunctivae normal.      Pupils: Pupils are equal, round, and reactive to light.   Cardiovascular:      Rate and Rhythm: Normal rate.   Pulmonary:      Effort: Pulmonary effort is normal.      Breath sounds: Normal breath sounds.   Abdominal:      Tenderness: There is no guarding or rebound.   Skin:     Findings: No erythema.   Neurological:      Mental Status: He is alert.   Psychiatric:         Mood and Affect: Mood normal.         Behavior: Behavior normal.         Significant Labs:   Blood Culture:   Recent Labs   Lab 01/04/22  1744 01/10/22  1411   LABBLOO No Growth after 4 days.   No Growth after 4 days.  No Growth after 4 days.   No Growth after 4 days.      CBC:   Recent Labs   Lab 01/14/22  0429 01/15/22  0340   WBC 17.30* 17.94*   HGB 11.9* 12.8*   HCT 35.0* 36.8*    226     CMP:   Recent Labs   Lab 01/14/22  0429 01/15/22  0340    141   K 3.1* 3.5    105   CO2 25 27   * 107   BUN 6 6   CREATININE 0.9 1.1   CALCIUM 8.1* 8.3*   PROT 6.1 6.6   ALBUMIN 1.5* 1.4*   BILITOT 0.1 0.1   ALKPHOS 453* 430*   AST 23 24   ALT 17 16   ANIONGAP 10 9   EGFRNONAA >60 >60     Respiratory  Culture:   Recent Labs   Lab 01/10/22  1653   GSRESP <10 epithelial cells per low power field.  No WBC's  No organisms seen   RESPIRATORYC PSEUDOMONAS AERUGINOSA  Moderate  *     Wound Culture:   Recent Labs   Lab 01/05/22  1055   LABAERO ESCHERICHIA COLI  Moderate  *  YAMILET ALBICANS  Few  *       Significant Imaging: I have reviewed all pertinent imaging results/findings within the past 24 hours.

## 2022-01-16 PROBLEM — R74.8 ALKALINE PHOSPHATASE ELEVATION: Status: ACTIVE | Noted: 2022-01-16

## 2022-01-16 LAB
ALBUMIN SERPL BCP-MCNC: 1.5 G/DL (ref 3.5–5.2)
ALP SERPL-CCNC: 429 U/L (ref 55–135)
ALT SERPL W/O P-5'-P-CCNC: 18 U/L (ref 10–44)
ANION GAP SERPL CALC-SCNC: 13 MMOL/L (ref 8–16)
AST SERPL-CCNC: 31 U/L (ref 10–40)
BASOPHILS # BLD AUTO: ABNORMAL K/UL (ref 0–0.2)
BASOPHILS NFR BLD: 0 % (ref 0–1.9)
BILIRUB SERPL-MCNC: 0.2 MG/DL (ref 0.1–1)
BUN SERPL-MCNC: 8 MG/DL (ref 6–20)
CALCIUM SERPL-MCNC: 8.6 MG/DL (ref 8.7–10.5)
CHLORIDE SERPL-SCNC: 108 MMOL/L (ref 95–110)
CO2 SERPL-SCNC: 23 MMOL/L (ref 23–29)
CREAT SERPL-MCNC: 1 MG/DL (ref 0.5–1.4)
DIFFERENTIAL METHOD: ABNORMAL
EOSINOPHIL # BLD AUTO: ABNORMAL K/UL (ref 0–0.5)
EOSINOPHIL NFR BLD: 0 % (ref 0–8)
ERYTHROCYTE [DISTWIDTH] IN BLOOD BY AUTOMATED COUNT: 13.3 % (ref 11.5–14.5)
EST. GFR  (AFRICAN AMERICAN): >60 ML/MIN/1.73 M^2
EST. GFR  (NON AFRICAN AMERICAN): >60 ML/MIN/1.73 M^2
GLUCOSE SERPL-MCNC: 89 MG/DL (ref 70–110)
HCT VFR BLD AUTO: 39.8 % (ref 40–54)
HGB BLD-MCNC: 13.3 G/DL (ref 14–18)
IMM GRANULOCYTES # BLD AUTO: ABNORMAL K/UL (ref 0–0.04)
IMM GRANULOCYTES NFR BLD AUTO: ABNORMAL % (ref 0–0.5)
LYMPHOCYTES # BLD AUTO: ABNORMAL K/UL (ref 1–4.8)
LYMPHOCYTES NFR BLD: 10 % (ref 18–48)
MCH RBC QN AUTO: 28.7 PG (ref 27–31)
MCHC RBC AUTO-ENTMCNC: 33.4 G/DL (ref 32–36)
MCV RBC AUTO: 86 FL (ref 82–98)
MONOCYTES # BLD AUTO: ABNORMAL K/UL (ref 0.3–1)
MONOCYTES NFR BLD: 1 % (ref 4–15)
MYELOCYTES NFR BLD MANUAL: 2 %
NEUTROPHILS NFR BLD: 80 % (ref 38–73)
NEUTS BAND NFR BLD MANUAL: 7 %
NRBC BLD-RTO: 0 /100 WBC
PLATELET # BLD AUTO: 173 K/UL (ref 150–450)
PMV BLD AUTO: 10.1 FL (ref 9.2–12.9)
POCT GLUCOSE: 103 MG/DL (ref 70–110)
POCT GLUCOSE: 113 MG/DL (ref 70–110)
POCT GLUCOSE: 126 MG/DL (ref 70–110)
POCT GLUCOSE: 130 MG/DL (ref 70–110)
POCT GLUCOSE: 176 MG/DL (ref 70–110)
POCT GLUCOSE: 58 MG/DL (ref 70–110)
POCT GLUCOSE: 63 MG/DL (ref 70–110)
POTASSIUM SERPL-SCNC: 3.3 MMOL/L (ref 3.5–5.1)
PROT SERPL-MCNC: 6.9 G/DL (ref 6–8.4)
RBC # BLD AUTO: 4.63 M/UL (ref 4.6–6.2)
SODIUM SERPL-SCNC: 144 MMOL/L (ref 136–145)
WBC # BLD AUTO: 17.32 K/UL (ref 3.9–12.7)

## 2022-01-16 PROCEDURE — 80053 COMPREHEN METABOLIC PANEL: CPT | Performed by: STUDENT IN AN ORGANIZED HEALTH CARE EDUCATION/TRAINING PROGRAM

## 2022-01-16 PROCEDURE — 92610 EVALUATE SWALLOWING FUNCTION: CPT

## 2022-01-16 PROCEDURE — 25000003 PHARM REV CODE 250: Performed by: HOSPITALIST

## 2022-01-16 PROCEDURE — 11000001 HC ACUTE MED/SURG PRIVATE ROOM

## 2022-01-16 PROCEDURE — 36415 COLL VENOUS BLD VENIPUNCTURE: CPT | Performed by: STUDENT IN AN ORGANIZED HEALTH CARE EDUCATION/TRAINING PROGRAM

## 2022-01-16 PROCEDURE — 25000003 PHARM REV CODE 250: Performed by: STUDENT IN AN ORGANIZED HEALTH CARE EDUCATION/TRAINING PROGRAM

## 2022-01-16 PROCEDURE — 63600175 PHARM REV CODE 636 W HCPCS: Performed by: STUDENT IN AN ORGANIZED HEALTH CARE EDUCATION/TRAINING PROGRAM

## 2022-01-16 PROCEDURE — 99233 PR SUBSEQUENT HOSPITAL CARE,LEVL III: ICD-10-PCS | Mod: ,,, | Performed by: INTERNAL MEDICINE

## 2022-01-16 PROCEDURE — 99232 SBSQ HOSP IP/OBS MODERATE 35: CPT | Mod: ,,, | Performed by: UROLOGY

## 2022-01-16 PROCEDURE — 85007 BL SMEAR W/DIFF WBC COUNT: CPT | Performed by: STUDENT IN AN ORGANIZED HEALTH CARE EDUCATION/TRAINING PROGRAM

## 2022-01-16 PROCEDURE — 25000003 PHARM REV CODE 250: Performed by: UROLOGY

## 2022-01-16 PROCEDURE — 94761 N-INVAS EAR/PLS OXIMETRY MLT: CPT

## 2022-01-16 PROCEDURE — C9254 INJECTION, LACOSAMIDE: HCPCS | Performed by: STUDENT IN AN ORGANIZED HEALTH CARE EDUCATION/TRAINING PROGRAM

## 2022-01-16 PROCEDURE — 99232 PR SUBSEQUENT HOSPITAL CARE,LEVL II: ICD-10-PCS | Mod: ,,, | Performed by: PSYCHIATRY & NEUROLOGY

## 2022-01-16 PROCEDURE — 85027 COMPLETE CBC AUTOMATED: CPT | Performed by: STUDENT IN AN ORGANIZED HEALTH CARE EDUCATION/TRAINING PROGRAM

## 2022-01-16 PROCEDURE — 99232 PR SUBSEQUENT HOSPITAL CARE,LEVL II: ICD-10-PCS | Mod: ,,, | Performed by: UROLOGY

## 2022-01-16 PROCEDURE — 25000242 PHARM REV CODE 250 ALT 637 W/ HCPCS: Performed by: STUDENT IN AN ORGANIZED HEALTH CARE EDUCATION/TRAINING PROGRAM

## 2022-01-16 PROCEDURE — 94640 AIRWAY INHALATION TREATMENT: CPT

## 2022-01-16 PROCEDURE — 63600175 PHARM REV CODE 636 W HCPCS: Performed by: HOSPITALIST

## 2022-01-16 PROCEDURE — 99233 SBSQ HOSP IP/OBS HIGH 50: CPT | Mod: ,,, | Performed by: INTERNAL MEDICINE

## 2022-01-16 PROCEDURE — 99232 SBSQ HOSP IP/OBS MODERATE 35: CPT | Mod: ,,, | Performed by: PSYCHIATRY & NEUROLOGY

## 2022-01-16 PROCEDURE — 97535 SELF CARE MNGMENT TRAINING: CPT

## 2022-01-16 PROCEDURE — 27000221 HC OXYGEN, UP TO 24 HOURS

## 2022-01-16 RX ORDER — FLUCONAZOLE 2 MG/ML
400 INJECTION, SOLUTION INTRAVENOUS
Status: DISCONTINUED | OUTPATIENT
Start: 2022-01-16 | End: 2022-02-09 | Stop reason: HOSPADM

## 2022-01-16 RX ORDER — DEXTROSE MONOHYDRATE 100 MG/ML
INJECTION, SOLUTION INTRAVENOUS CONTINUOUS
Status: DISCONTINUED | OUTPATIENT
Start: 2022-01-16 | End: 2022-01-18

## 2022-01-16 RX ADMIN — DEXTROSE 500 MG: 50 INJECTION, SOLUTION INTRAVENOUS at 10:01

## 2022-01-16 RX ADMIN — ACETYLCYSTEINE 4 ML: 100 SOLUTION ORAL; RESPIRATORY (INHALATION) at 08:01

## 2022-01-16 RX ADMIN — SODIUM CHLORIDE 200 MG: 9 INJECTION, SOLUTION INTRAVENOUS at 12:01

## 2022-01-16 RX ADMIN — IPRATROPIUM BROMIDE AND ALBUTEROL SULFATE 3 ML: 2.5; .5 SOLUTION RESPIRATORY (INHALATION) at 11:01

## 2022-01-16 RX ADMIN — DEXTROSE MONOHYDRATE 12.5 G: 25 INJECTION, SOLUTION INTRAVENOUS at 03:01

## 2022-01-16 RX ADMIN — FLUCONAZOLE 400 MG: 2 INJECTION, SOLUTION INTRAVENOUS at 12:01

## 2022-01-16 RX ADMIN — DEXTROSE: 10 SOLUTION INTRAVENOUS at 01:01

## 2022-01-16 RX ADMIN — PIPERACILLIN AND TAZOBACTAM 4.5 G: 4; .5 INJECTION, POWDER, LYOPHILIZED, FOR SOLUTION INTRAVENOUS; PARENTERAL at 03:01

## 2022-01-16 RX ADMIN — PIPERACILLIN AND TAZOBACTAM 4.5 G: 4; .5 INJECTION, POWDER, LYOPHILIZED, FOR SOLUTION INTRAVENOUS; PARENTERAL at 01:01

## 2022-01-16 RX ADMIN — IPRATROPIUM BROMIDE AND ALBUTEROL SULFATE 3 ML: 2.5; .5 SOLUTION RESPIRATORY (INHALATION) at 08:01

## 2022-01-16 RX ADMIN — DEXTROSE MONOHYDRATE 12.5 G: 25 INJECTION, SOLUTION INTRAVENOUS at 12:01

## 2022-01-16 RX ADMIN — DEXTROSE 500 MG: 50 INJECTION, SOLUTION INTRAVENOUS at 12:01

## 2022-01-16 RX ADMIN — LAMOTRIGINE 200 MG: 100 TABLET ORAL at 08:01

## 2022-01-16 RX ADMIN — ENOXAPARIN SODIUM 40 MG: 40 INJECTION SUBCUTANEOUS at 05:01

## 2022-01-16 RX ADMIN — SODIUM CHLORIDE 200 MG: 9 INJECTION, SOLUTION INTRAVENOUS at 11:01

## 2022-01-16 RX ADMIN — PIPERACILLIN AND TAZOBACTAM 4.5 G: 4; .5 INJECTION, POWDER, LYOPHILIZED, FOR SOLUTION INTRAVENOUS; PARENTERAL at 08:01

## 2022-01-16 NOTE — ASSESSMENT & PLAN NOTE
Chronic isolated alk phos, worsened this admission. Possibly due to AEDs vs occult process.   - GGT to differentiate hepatic vs skeletal origin

## 2022-01-16 NOTE — NURSING TRANSFER
Nursing Transfer Note      1/16/2022     Reason patient is being transferred: change in level of care    Transfer From: 280 to 430    Transfer via bed    Transfer with O2    Transported by transport personnel    Medicines sent: no    Any special needs or follow-up needed: N/A    Chart send with patient: Yes    Notified: will call niece ([place])    Patient reassessed at: 0200 01/16/22     Upon arrival to floor: pt will be oriented to room by med surg nurse

## 2022-01-16 NOTE — PROGRESS NOTES
[facility]  Urology  Progress Note    Patient Name: Abhishek Zhao  MRN: [id number]  Admission Date: 1/4/2022  Hospital Length of Stay: 11 days  Code Status: Full Code   Attending Provider: Hugo Aviles MD   Primary Care Physician: To Obtain Unable    Subjective:     HPI:  Scrotal Swelling  Abhishek Zhao is a 61 y.o. male who was been experiencing scrotal pain and swelling since 12/31/2021.  He denies any fever.  He has had issues voiding since the swelling began.  Hemostat having issues in the past with urinary problems.  He denies having any previous issues with scrotal infection or swelling.  He recalls that he had procedures in the past but cannot recall specifically what to place.  He does not have urologist locally but moved back from [place] about 1 year ago.    Review of care everywhere shows that he had a cystoscopy with urethral dilation of a urethral stricture in the bulbar urethra in 2019 at [facility].  And there are reports that in approximately 2015 [facility].      Interval History: Feeling okay today, less confused    Review of Systems   Constitutional: Negative.  Negative for fever.   HENT: Negative.    Eyes: Negative.    Respiratory: Negative for cough, chest tightness and shortness of breath.    Cardiovascular: Negative for chest pain.   Gastrointestinal: Negative.  Negative for constipation, diarrhea and nausea.   Genitourinary: Positive for difficulty urinating and genital sores.   Musculoskeletal: Negative.    Neurological: Negative.    Psychiatric/Behavioral: Negative.      Objective:     Temp:  [97.6 °F (36.4 °C)-98.8 °F (37.1 °C)] 98.8 °F (37.1 °C)  Pulse:  [] 83  Resp:  [12-94] 22  SpO2:  [20 %-100 %] 95 %  BP: (133-181)/() 171/87     Body mass index is 19.94 kg/m².           Drains     Drain                 Urethral Catheter 01/05/22 1050 Double-lumen 20 Fr. 10 days                Physical Exam  Vitals and nursing note  reviewed.   Constitutional:       Appearance: He is well-developed and well-nourished.   HENT:      Head: Normocephalic.   Eyes:      Conjunctiva/sclera: Conjunctivae normal.   Neck:      Thyroid: No thyromegaly.      Trachea: No tracheal deviation.   Cardiovascular:      Rate and Rhythm: Normal rate.      Heart sounds: Normal heart sounds.   Pulmonary:      Effort: Pulmonary effort is normal. No respiratory distress.      Breath sounds: Normal breath sounds. No wheezing.   Abdominal:      General: Bowel sounds are normal.      Palpations: Abdomen is soft. There is no hepatosplenomegaly.      Tenderness: There is no abdominal tenderness. There is no CVA tenderness or rebound.      Hernia: No hernia is present.   Genitourinary:     Comments: Sahu in place with yellow urine  Scrotal wound examined, no fluctuance or necrosis seen.  Dressing repositioned  Musculoskeletal:         General: No tenderness or edema. Normal range of motion.      Cervical back: Normal range of motion and neck supple.   Lymphadenopathy:      Cervical: No cervical adenopathy.   Skin:     General: Skin is warm and dry.      Findings: No erythema or rash.   Neurological:      Mental Status: He is alert and oriented to person, place, and time.   Psychiatric:         Mood and Affect: Mood and affect normal.         Behavior: Behavior normal.         Thought Content: Thought content normal.         Judgment: Judgment normal.         Significant Labs:    BMP:  Recent Labs   Lab 01/14/22  0429 01/15/22  0340 01/16/22 0457    141 144   K 3.1* 3.5 3.3*    105 108   CO2 25 27 23   BUN 6 6 8   CREATININE 0.9 1.1 1.0   CALCIUM 8.1* 8.3* 8.6*       CBC:   Recent Labs   Lab 01/14/22  0429 01/15/22  0340 01/16/22 0457   WBC 17.30* 17.94* 17.32*   HGB 11.9* 12.8* 13.3*   HCT 35.0* 36.8* 39.8*    226 173       Blood Culture:   Recent Labs   Lab 01/10/22  1411   LABBLOO No Growth after 4 days.   No Growth after 4 days.      Urine Culture:  No results for input(s): LABURIN in the last 168 hours.    Significant Imaging:                    Assessment/Plan:     * Scrotal abscess  s/p cystoscopy with santiago placement and debridement of abscess/necrosis of scrotum 1/5/22 with Dr. Rangel.    Continue abx per primary  Cultures growing Candida and E coli  Appreciate wound care RN and floor RN dressing changes  Will continue to monitor condition      WBC improving, fluid collection drained at bedside 1/14/2022  Consider repeat CT pelvis in a few days if there continues to be an infectious concern.    Will likely need plastics consultation in future for wound closure/grafting    Berna's gangrene   - s/p debridement 1/5/22        VTE Risk Mitigation (From admission, onward)         Ordered     enoxaparin injection 40 mg  Daily         01/13/22 1612     IP VTE LOW RISK PATIENT  Once         01/04/22 2241     Place sequential compression device  Until discontinued         01/04/22 2241                LATASHA Rangel MD  Urology  [facility]

## 2022-01-16 NOTE — PROGRESS NOTES
[facility] Medicine  Progress Note    Patient Name: Abhishek Zhao  MRN: [id number]  Patient Class: IP- Inpatient   Admission Date: 1/4/2022  Length of Stay: 11 days  Attending Physician: Hugo Aviles MD  Primary Care Provider: To Obtain Unable        Subjective:     Principal Problem:Scrotal abscess        HPI:  61 y.o. male with adjustment disorder with depressed mood, chronic ischemic heart disease, cocaine dependence in remission, cardiomegaly, HLD, HTN, swizure disorder, tobacco use, and DM 2 presents with a complaint of testicular pain.  Associated with swelling and redness to the scrotum and penis along with difficulty urinating, pain is rated as severe and radiates to the rectum.  Denies fever, chills, cough, SOB, chest pain, n/v/d.  In the ED, he was found to be tachypneic, with leukocytosis and elevated lactic.  CT and US shows evidence of multiple scrotal and perineal abscesses with some extension into the penile shaft.  UA with evidence of infection.  Sepsis treatment initiated, blood and urine cultures obtained, IV fluids and IV antibiotics initiated.  Urology consulted.      Overview/Hospital Course:  61 y.o. male with adjustment disorder with depressed mood, chronic ischemic heart disease, cocaine dependence in remission, cardiomegaly, HLD, HTN, swizure disorder, tobacco use, and DM 2 presents with a complaint of testicular pain, found to have multiple scrotal and perineal abscesses. Placed on broad spectrum antibiotics, underwent I&D with urology.    Pt w/ aspiration event 1/11 w/ subsequent desaturation. Respiratory culture grew pseudomonas. Kept on Zosyn. On 1/13 pt had abrupt reduction in responsiveness. Code stroke called, patient transferred to ICU. No stroke found on imaging. Started on extended EEG, which showed...          Interval History: Oriented to self. Unable to answer detailed questions, denies basic complaints.    Review of Systems   Unable to perform ROS:  Mental status change   Objective:     Vital Signs (Most Recent):  Temp: 96.8 °F (36 °C) (01/16/22 0807)  Pulse: 78 (01/16/22 0807)  Resp: 20 (01/16/22 0807)  BP: (!) 159/77 (01/16/22 0807)  SpO2: 96 % (01/16/22 0807) Vital Signs (24h Range):  Temp:  [96.8 °F (36 °C)-98.8 °F (37.1 °C)] 96.8 °F (36 °C)  Pulse:  [] 78  Resp:  [12-94] 20  SpO2:  [20 %-100 %] 96 %  BP: (133-179)/() 159/77     Weight: 61.2 kg (135 lb)  Body mass index is 19.94 kg/m².    Intake/Output Summary (Last 24 hours) at 1/16/2022 0925  Last data filed at 1/16/2022 0400  Gross per 24 hour   Intake 576.45 ml   Output 1500 ml   Net -923.55 ml      Physical Exam  Constitutional:       General: He is not in acute distress.     Appearance: He is ill-appearing. He is not toxic-appearing or diaphoretic.   Cardiovascular:      Rate and Rhythm: Normal rate and regular rhythm.      Heart sounds: No murmur heard.  No gallop.    Pulmonary:      Effort: Pulmonary effort is normal. No respiratory distress.      Breath sounds: Normal breath sounds. No wheezing.   Abdominal:      General: Bowel sounds are normal. There is no distension.      Palpations: Abdomen is soft.      Tenderness: There is no abdominal tenderness.         Significant Labs: All pertinent labs within the past 24 hours have been reviewed.    Significant Imaging: I have reviewed all pertinent imaging results/findings within the past 24 hours.      Assessment/Plan:      * Scrotal abscess  Multiple abscesses c/w Berna's gangrene, s/p I&D by urology on 1/5. Cultures growing ampicillin-resistant Ecoli and C albicans. Repeat CT A/P 1/13 w/ 4cm fluid collection c/f persistent abscess which was drained bedside by urology  - urology following  - cont zosyn (re-started for HCAP)  - further ID recs pending  - re-image pelvis in a few days to r/o further collections          Alkaline phosphatase elevation  Chronic isolated alk phos, worsened this admission. Possibly due to AEDs vs occult  process.   - GGT to differentiate hepatic vs skeletal origin      Pericardial effusion  Small pericardial effusion of unclear etiology      Acute encephalopathy  See metabolic encephalopathy      Encephalopathy, metabolic  CT head and EEG without acute process; likely delirium secondary to infection and prolonged hospitalization. Code stroke called the night of 1/13, however no focal neuro findings, CT head and MRI without acute findings  - neurology consulted  - delirium precautions  - EEG pending  - tx of infection as noted above    HCAP (healthcare-associated pneumonia)  - see respiratory failure       Acute respiratory failure with hypoxia and hypercarbia  Cough + new LLL opacity on imaging c/f pneumonia, however CT imaging showing pleural effusion and atelectasis  - respiratory cultures w/ pseudomonas  - cont zosyn  - furosemide as tolerated; no significant evidence of cardiac dysfunction on TTE  - IS        Hypokalemia  Replete PRN    JOI (acute kidney injury)  Resolved      Sepsis due to Gram negative bacteria  Resolved      Berna's gangrene  As above    Diabetes mellitus type 2, controlled  Check a1c  Hold oral antihyperglycemics while inpatient  PRN sliding scale insulin  ACHS glucose monitoring   ADA diet     Tobacco user  5 minutes spent counseling the patient on smoking cessation and he is not currently ready to stop smoking. He will be offereded a nicotine transdermal patch while hospitalized and monitored for withdrawal.  Will provide additional smoking cessation counseling prior to discharge.     Seizure disorder  Repeated episodes of AMS c/f seizures w/ post ictal period  - neurology consulted  - AEDs per neurology  - EEG pending      Essential hypertension  Well controlled, continue home medications and monitor blood pressure, adjust as needed.     Hyperlipidemia  Continue statin    Cardiomegaly  Pulmonary edema seen on imaging, small pericardial effusion seen on TTE. LVEF low-normal (50%)  -  hold diuresis while NPO    Cocaine dependence in remission  Counseled     Chronic ischemic heart disease  No acute issue    Adjustment disorder with depressed mood  Continue home citalopram, hold home seroquel due to somnolence      VTE Risk Mitigation (From admission, onward)         Ordered     enoxaparin injection 40 mg  Daily         01/13/22 1612     IP VTE LOW RISK PATIENT  Once         01/04/22 2241     Place sequential compression device  Until discontinued         01/04/22 2241                Discharge Planning   CHUCK:      Code Status: Full Code   Is the patient medically ready for discharge?:     Reason for patient still in hospital (select all that apply): Patient trending condition, Laboratory test, Treatment, Consult recommendations and Pending disposition  Discharge Plan A: Home Health   Discharge Delays: None known at this time              Hugo Aviles MD  Department of Hospital Medicine   [facility]

## 2022-01-16 NOTE — SUBJECTIVE & OBJECTIVE
Interval History: Out of ICU, on floor. No complaints. Answers some questions but still with cognitive deficit.    Review of Systems   Constitutional: Negative for chills and fever.   Skin: Positive for wound.   All other systems reviewed and are negative.    Objective:     Vital Signs (Most Recent):  Temp: 96.8 °F (36 °C) (01/16/22 0807)  Pulse: 78 (01/16/22 0807)  Resp: 20 (01/16/22 0807)  BP: (!) 159/77 (01/16/22 0807)  SpO2: 96 % (01/16/22 0807) Vital Signs (24h Range):  Temp:  [96.8 °F (36 °C)-98.8 °F (37.1 °C)] 96.8 °F (36 °C)  Pulse:  [] 78  Resp:  [12-94] 20  SpO2:  [20 %-100 %] 96 %  BP: (133-179)/() 159/77     Weight: 61.2 kg (135 lb)  Body mass index is 19.94 kg/m².    Estimated Creatinine Clearance: 68.9 mL/min (based on SCr of 1 mg/dL).    Physical Exam  Vitals and nursing note reviewed.   Constitutional:       Appearance: He is not ill-appearing, toxic-appearing or diaphoretic.   HENT:      Head: Normocephalic and atraumatic.      Right Ear: External ear normal.      Left Ear: External ear normal.      Nose: Nose normal.   Eyes:      Extraocular Movements: Extraocular movements intact.      Conjunctiva/sclera: Conjunctivae normal.      Pupils: Pupils are equal, round, and reactive to light.   Cardiovascular:      Rate and Rhythm: Normal rate and regular rhythm.   Pulmonary:      Effort: Pulmonary effort is normal.      Breath sounds: Normal breath sounds.   Abdominal:      Tenderness: There is no guarding or rebound.   Genitourinary:     Comments: dressed  Musculoskeletal:         General: No swelling or tenderness.   Neurological:      General: No focal deficit present.      Mental Status: He is alert.   Psychiatric:         Mood and Affect: Mood normal.         Behavior: Behavior normal.         Significant Labs:   Blood Culture:   Recent Labs   Lab 01/04/22  1744 01/10/22  1411   LABBLOO No Growth after 4 days.   No Growth after 4 days.  No Growth after 4 days.   No Growth after 4  days.      CBC:   Recent Labs   Lab 01/15/22  0340 01/16/22  0457   WBC 17.94* 17.32*   HGB 12.8* 13.3*   HCT 36.8* 39.8*    173     Respiratory Culture:   Recent Labs   Lab 01/10/22  1653   GSRESP <10 epithelial cells per low power field.  No WBC's  No organisms seen   RESPIRATORYC PSEUDOMONAS AERUGINOSA  Moderate  *     Urine Culture:   Recent Labs   Lab 01/04/22  1804   LABURIN CANDIDA ALBICANS  10,000 - 49,999 cfu/ml  Treatment of asymptomatic candiduria is not recommended (except for   specific populations). Candida isolated in the urine typically   represents colonization. If an indwelling urinary catheter is present  it should be removed or replaced.  *     Wound Culture:   Recent Labs   Lab 01/05/22  1055   LABAERO ESCHERICHIA COLI  Moderate  *  YAMILET ALBICANS  Few  *       Significant Imaging: I have reviewed all pertinent imaging results/findings within the past 24 hours.

## 2022-01-16 NOTE — PROGRESS NOTES
[facility]  Infectious Disease  Progress Note    Patient Name: Abhishek Zhao  MRN: [id number]  Admission Date: 1/4/2022  Length of Stay: 11 days  Attending Physician: Hugo Aviles MD  Primary Care Provider: To Obtain Unable    Isolation Status: No active isolations     Assessment/Plan:      * Scrotal abscess  - Berna's gangrene, s/p debridement, followed by Urology  - E.coli and Candida albicans grew on culture (urine culture also grew Candida albicans)  - now with Pseudomonas in sputum and evidence of pneumonia  - PCT 1 but leukocytosis persists  - AMS of unclear etiology -> improving?  - continue Zosyn and fluconazole  - re-CT this week?      Demarco Combs MD  Infectious Diseases      Subjective:     Principal Problem:Scrotal abscess    HPI: Abhishek Zhao is a 61-year-old male with HTN and seizures who presented to the ED with complaints of testicular pain and swelling for four days. At that time, he endorsed radiation of pain to rectum and difficulty urinating. He was originally planning on going to the ED before th [place] game but decided to wait until afterwards. In the ED, imaging showed a complex fluid collection(s).  He has a history of urethral stricture last dilated in 2019 at an outside facility ([facility]). The patient was admitted and started on empiric abx. 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 Post-operatively, the patient has improved with diminishing leukocytosis. Surgical cultures have grown E.coli and Candida, thus far. ID is consulted for Berna's gangrene.    Interval History: Out of ICU, on floor. No complaints. Answers some questions but still with cognitive deficit.    Review of Systems   Constitutional: Negative for chills and fever.   Skin: Positive for wound.   All other  systems reviewed and are negative.    Objective:     Vital Signs (Most Recent):  Temp: 96.8 °F (36 °C) (01/16/22 0807)  Pulse: 78 (01/16/22 0807)  Resp: 20 (01/16/22 0807)  BP: (!) 159/77 (01/16/22 0807)  SpO2: 96 % (01/16/22 0807) Vital Signs (24h Range):  Temp:  [96.8 °F (36 °C)-98.8 °F (37.1 °C)] 96.8 °F (36 °C)  Pulse:  [] 78  Resp:  [12-94] 20  SpO2:  [20 %-100 %] 96 %  BP: (133-179)/() 159/77     Weight: 61.2 kg (135 lb)  Body mass index is 19.94 kg/m².    Estimated Creatinine Clearance: 68.9 mL/min (based on SCr of 1 mg/dL).    Physical Exam  Vitals and nursing note reviewed.   Constitutional:       Appearance: He is not ill-appearing, toxic-appearing or diaphoretic.   HENT:      Head: Normocephalic and atraumatic.      Right Ear: External ear normal.      Left Ear: External ear normal.      Nose: Nose normal.   Eyes:      Extraocular Movements: Extraocular movements intact.      Conjunctiva/sclera: Conjunctivae normal.      Pupils: Pupils are equal, round, and reactive to light.   Cardiovascular:      Rate and Rhythm: Normal rate and regular rhythm.   Pulmonary:      Effort: Pulmonary effort is normal.      Breath sounds: Normal breath sounds.   Abdominal:      Tenderness: There is no guarding or rebound.   Genitourinary:     Comments: dressed  Musculoskeletal:         General: No swelling or tenderness.   Neurological:      General: No focal deficit present.      Mental Status: He is alert.   Psychiatric:         Mood and Affect: Mood normal.         Behavior: Behavior normal.         Significant Labs:   Blood Culture:   Recent Labs   Lab 01/04/22  1744 01/10/22  1411   LABBLOO No Growth after 4 days.   No Growth after 4 days.  No Growth after 4 days.   No Growth after 4 days.      CBC:   Recent Labs   Lab 01/15/22  0340 01/16/22  0457   WBC 17.94* 17.32*   HGB 12.8* 13.3*   HCT 36.8* 39.8*    173     Respiratory Culture:   Recent Labs   Lab 01/10/22  1653   GSRESP <10 epithelial cells  per low power field.  No WBC's  No organisms seen   RESPIRATORYC PSEUDOMONAS AERUGINOSA  Moderate  *     Urine Culture:   Recent Labs   Lab 01/04/22  1804   LABURIN CANDIDA ALBICANS  10,000 - 49,999 cfu/ml  Treatment of asymptomatic candiduria is not recommended (except for   specific populations). Candida isolated in the urine typically   represents colonization. If an indwelling urinary catheter is present  it should be removed or replaced.  *     Wound Culture:   Recent Labs   Lab 01/05/22  1055   LABAERO ESCHERICHIA COLI  Moderate  *  YAMILET ALBICANS  Few  *       Significant Imaging: I have reviewed all pertinent imaging results/findings within the past 24 hours.

## 2022-01-16 NOTE — NURSING
[facility] Intensive Care  ICU Shift Summary  Date: 1/15/2022      Prehospitalization: Home  Admit Date / LOS : 1/4/2022/ 10 days    Diagnosis: Scrotal abscess    Consults:        Active: OT, PT, ST, Urology and Wound Care       Needed: Palliative     Code Status: Full Code   Advanced Directive: <no information>    LDA: Sahu and PIV       Central Lines/Site/Justification:Patient Does Not Have Central Line       Urinary Cath/Order/Justification:Post Operative - Urologic, GYN, Perineal Procedures    Vasopressors/Infusions:        GOALS: Volume/ Hemodynamic: SBP <180                     RASS: N/A    Pain Management: none       Pain Controlled: yes     Rhythm: NSR and ST    Respiratory Device: Nasal Cannula                  Most Recent SBT/ SAT: N/A       MOVE Screen: PT Consult  ICU Liberation: not applicable    VTE Prophylaxis: Pharm and Mechanical  Mobility: A: Assist  Stress Ulcer Prophylaxis: Yes    Isolation: No active isolations  Dietary: NPO  Tolerance: not applicable  Advancement: no    I & O (24h):    Intake/Output Summary (Last 24 hours) at 1/15/2022 1903  Last data filed at 1/15/2022 1800  Gross per 24 hour   Intake 657.2 ml   Output 1750 ml   Net -1092.8 ml        Restraints: Yes    Significant Dates:  Post Op Date: 1/11/22  Rescue Date: 1/14/2022  Imaging/ Diagnostics: N/A    Noteworthy Labs:  none    COVID Test: (--)  CBC/Anemia Labs: Coags:    Recent Labs   Lab 01/14/22  0429 01/15/22  0340   WBC 17.30* 17.94*   HGB 11.9* 12.8*   HCT 35.0* 36.8*    226   MCV 84 83   RDW 12.9 12.9    No results for input(s): PT, INR, APTT in the last 168 hours.     Chemistries:   Recent Labs   Lab 01/10/22  0506 01/11/22  0403 01/14/22  0429 01/14/22  0532 01/15/22  0340      < > 141  --  141   K 3.4*   < > 3.1*  --  3.5      < > 106  --  105   CO2 23   < > 25  --  27   BUN 11   < > 6  --  6   CREATININE 1.0   < > 0.9  --  1.1   CALCIUM 8.1*   < > 8.1*  --  8.3*   PROT  --    < > 6.1  --  6.6    BILITOT  --    < > 0.1  --  0.1   ALKPHOS  --    < > 453*  --  430*   ALT  --    < > 17  --  16   AST  --    < > 23  --  24   MG 1.9  --   --  1.7  --     < > = values in this interval not displayed.        Cardiac Enzymes: Ejection Fractions:    Recent Labs     01/14/22  0532   TROPONINI <0.006    EF   Date Value Ref Range Status   01/14/2022 50 % Final        POCT Glucose: HbA1c:    Recent Labs   Lab 01/14/22  2156 01/15/22  0553 01/15/22  1202   POCTGLUCOSE 131* 113* 103    Hemoglobin A1C   Date Value Ref Range Status   01/05/2022 9.3 (H) 4.0 - 5.6 % Final     Comment:     ADA Screening Guidelines:  5.7-6.4%  Consistent with prediabetes  >or=6.5%  Consistent with diabetes    High levels of fetal hemoglobin interfere with the HbA1C  assay. Heterozygous hemoglobin variants (HbS, HgC, etc)do  not significantly interfere with this assay.   However, presence of multiple variants may affect accuracy.             ICU LOS 1d 13h  Level of Care: OK to Transfer    Chart Check: 12 HR Done  Shift Summary/Plan for the shift: pt placed in restraints today for safety, continuously flipping legs over bed rail, pulling at catheter and all tubing. Packing to scrotal wound changed 4 times d/t pt activity. Prn b/p med ordered if needed. No pressure or new skin breakdown, no falls

## 2022-01-16 NOTE — SUBJECTIVE & OBJECTIVE
Interval History: Oriented to self. Unable to answer detailed questions, denies basic complaints.    Review of Systems   Unable to perform ROS: Mental status change   Objective:     Vital Signs (Most Recent):  Temp: 96.8 °F (36 °C) (01/16/22 0807)  Pulse: 78 (01/16/22 0807)  Resp: 20 (01/16/22 0807)  BP: (!) 159/77 (01/16/22 0807)  SpO2: 96 % (01/16/22 0807) Vital Signs (24h Range):  Temp:  [96.8 °F (36 °C)-98.8 °F (37.1 °C)] 96.8 °F (36 °C)  Pulse:  [] 78  Resp:  [12-94] 20  SpO2:  [20 %-100 %] 96 %  BP: (133-179)/() 159/77     Weight: 61.2 kg (135 lb)  Body mass index is 19.94 kg/m².    Intake/Output Summary (Last 24 hours) at 1/16/2022 0925  Last data filed at 1/16/2022 0400  Gross per 24 hour   Intake 576.45 ml   Output 1500 ml   Net -923.55 ml      Physical Exam  Constitutional:       General: He is not in acute distress.     Appearance: He is ill-appearing. He is not toxic-appearing or diaphoretic.   Cardiovascular:      Rate and Rhythm: Normal rate and regular rhythm.      Heart sounds: No murmur heard.  No gallop.    Pulmonary:      Effort: Pulmonary effort is normal. No respiratory distress.      Breath sounds: Normal breath sounds. No wheezing.   Abdominal:      General: Bowel sounds are normal. There is no distension.      Palpations: Abdomen is soft.      Tenderness: There is no abdominal tenderness.         Significant Labs: All pertinent labs within the past 24 hours have been reviewed.    Significant Imaging: I have reviewed all pertinent imaging results/findings within the past 24 hours.

## 2022-01-16 NOTE — NURSING
Patient arrived to floor from ICU. Patient arouses to voice, able to state name. Patient in 4 point restraints. Sahu catheter draining yellow urine. New foam dressing applied to sacrum. DTI to right heel noted. mepilex applied. Heels palced back in EHOB boots.

## 2022-01-16 NOTE — SUBJECTIVE & OBJECTIVE
Interval History: Feeling okay today, less confused    Review of Systems   Constitutional: Negative.  Negative for fever.   HENT: Negative.    Eyes: Negative.    Respiratory: Negative for cough, chest tightness and shortness of breath.    Cardiovascular: Negative for chest pain.   Gastrointestinal: Negative.  Negative for constipation, diarrhea and nausea.   Genitourinary: Positive for difficulty urinating and genital sores.   Musculoskeletal: Negative.    Neurological: Negative.    Psychiatric/Behavioral: Negative.      Objective:     Temp:  [97.6 °F (36.4 °C)-98.8 °F (37.1 °C)] 98.8 °F (37.1 °C)  Pulse:  [] 83  Resp:  [12-94] 22  SpO2:  [20 %-100 %] 95 %  BP: (133-181)/() 171/87     Body mass index is 19.94 kg/m².           Drains     Drain                 Urethral Catheter 01/05/22 1050 Double-lumen 20 Fr. 10 days                Physical Exam  Vitals and nursing note reviewed.   Constitutional:       Appearance: He is well-developed and well-nourished.   HENT:      Head: Normocephalic.   Eyes:      Conjunctiva/sclera: Conjunctivae normal.   Neck:      Thyroid: No thyromegaly.      Trachea: No tracheal deviation.   Cardiovascular:      Rate and Rhythm: Normal rate.      Heart sounds: Normal heart sounds.   Pulmonary:      Effort: Pulmonary effort is normal. No respiratory distress.      Breath sounds: Normal breath sounds. No wheezing.   Abdominal:      General: Bowel sounds are normal.      Palpations: Abdomen is soft. There is no hepatosplenomegaly.      Tenderness: There is no abdominal tenderness. There is no CVA tenderness or rebound.      Hernia: No hernia is present.   Genitourinary:     Comments: Sahu in place with yellow urine  Scrotal wound examined, no fluctuance or necrosis seen.  Dressing repositioned  Musculoskeletal:         General: No tenderness or edema. Normal range of motion.      Cervical back: Normal range of motion and neck supple.   Lymphadenopathy:      Cervical: No cervical  adenopathy.   Skin:     General: Skin is warm and dry.      Findings: No erythema or rash.   Neurological:      Mental Status: He is alert and oriented to person, place, and time.   Psychiatric:         Mood and Affect: Mood and affect normal.         Behavior: Behavior normal.         Thought Content: Thought content normal.         Judgment: Judgment normal.         Significant Labs:    BMP:  Recent Labs   Lab 01/14/22  0429 01/15/22  0340 01/16/22  0457    141 144   K 3.1* 3.5 3.3*    105 108   CO2 25 27 23   BUN 6 6 8   CREATININE 0.9 1.1 1.0   CALCIUM 8.1* 8.3* 8.6*       CBC:   Recent Labs   Lab 01/14/22  0429 01/15/22  0340 01/16/22  0457   WBC 17.30* 17.94* 17.32*   HGB 11.9* 12.8* 13.3*   HCT 35.0* 36.8* 39.8*    226 173       Blood Culture:   Recent Labs   Lab 01/10/22  1411   LABBLOO No Growth after 4 days.   No Growth after 4 days.      Urine Culture: No results for input(s): LABURIN in the last 168 hours.    Significant Imaging:

## 2022-01-16 NOTE — PLAN OF CARE
Problem: Adult Inpatient Plan of Care  Goal: Plan of Care Review  Outcome: Ongoing, Progressing  Goal: Patient-Specific Goal (Individualized)  Outcome: Ongoing, Progressing  Goal: Absence of Hospital-Acquired Illness or Injury  Outcome: Ongoing, Progressing  Goal: Optimal Comfort and Wellbeing  Outcome: Ongoing, Progressing  Goal: Readiness for Transition of Care  Outcome: Ongoing, Progressing     Problem: Diabetes Comorbidity  Goal: Blood Glucose Level Within Targeted Range  Outcome: Ongoing, Progressing     Problem: Infection  Goal: Absence of Infection Signs and Symptoms  Outcome: Ongoing, Progressing     Problem: Adjustment to Illness (Sepsis/Septic Shock)  Goal: Optimal Coping  Outcome: Ongoing, Progressing     Problem: Bleeding (Sepsis/Septic Shock)  Goal: Absence of Bleeding  Outcome: Ongoing, Progressing     Problem: Glycemic Control Impaired (Sepsis/Septic Shock)  Goal: Blood Glucose Level Within Desired Range  Outcome: Ongoing, Progressing     Problem: Infection Progression (Sepsis/Septic Shock)  Goal: Absence of Infection Signs and Symptoms  Outcome: Ongoing, Progressing     Problem: Nutrition Impaired (Sepsis/Septic Shock)  Goal: Optimal Nutrition Intake  Outcome: Ongoing, Progressing     Problem: Fall Injury Risk  Goal: Absence of Fall and Fall-Related Injury  Outcome: Ongoing, Progressing     Problem: Fluid and Electrolyte Imbalance (Acute Kidney Injury/Impairment)  Goal: Fluid and Electrolyte Balance  Outcome: Ongoing, Progressing     Problem: Oral Intake Inadequate (Acute Kidney Injury/Impairment)  Goal: Optimal Nutrition Intake  Outcome: Ongoing, Progressing     Problem: Renal Function Impairment (Acute Kidney Injury/Impairment)  Goal: Effective Renal Function  Outcome: Ongoing, Progressing     Problem: Fluid Imbalance (Pneumonia)  Goal: Fluid Balance  Outcome: Ongoing, Progressing     Problem: Infection (Pneumonia)  Goal: Resolution of Infection Signs and Symptoms  Outcome: Ongoing, Progressing      Problem: Respiratory Compromise (Pneumonia)  Goal: Effective Oxygenation and Ventilation  Outcome: Ongoing, Progressing     Problem: Skin Injury Risk Increased  Goal: Skin Health and Integrity  Outcome: Ongoing, Progressing     Problem: Seizure Disorder Comorbidity  Goal: Maintenance of Seizure Control  Outcome: Ongoing, Progressing     Problem: Confusion Chronic  Goal: Optimal Cognitive Function  Outcome: Ongoing, Progressing     Problem: Restraint, Nonbehavioral (Nonviolent)  Goal: Absence of Harm or Injury  Outcome: Ongoing, Progressing

## 2022-01-16 NOTE — ASSESSMENT & PLAN NOTE
Pulmonary edema seen on imaging, small pericardial effusion seen on TTE. LVEF low-normal (50%)  - hold diuresis while NPO

## 2022-01-16 NOTE — PLAN OF CARE
Problem: SLP Goal  Goal: SLP Goal  Description: Goals:  1. Pt will tolerate puree diet/thin liquids w/o overt s/s of aspiration.   2. Pt will tolerate mech soft diet (IDDSI-5) with thin liquids w/o overt s/s of aspiration. (on hold 1/16/22, pending pt progress)  Outcome: Ongoing, Progressing     Pt seen for clinical swallow evaluation. ST will follow for diet tolerance.

## 2022-01-16 NOTE — PLAN OF CARE
Problem: Adult Inpatient Plan of Care  Goal: Plan of Care Review  Outcome: Ongoing, Progressing  Flowsheets (Taken 1/16/2022 1731)  Plan of Care Reviewed With: patient  Goal: Patient-Specific Goal (Individualized)  Outcome: Ongoing, Progressing  Goal: Absence of Hospital-Acquired Illness or Injury  Outcome: Ongoing, Progressing  Intervention: Identify and Manage Fall Risk  Flowsheets (Taken 1/16/2022 1731)  Safety Promotion/Fall Prevention:   assistive device/personal item within reach   bed alarm set   Fall Risk reviewed with patient/family   high risk medications identified   medications reviewed   nonskid shoes/socks when out of bed   side rails raised x 2   room near unit station   instructed to call staff for mobility  Intervention: Prevent Skin Injury  Flowsheets (Taken 1/16/2022 1731)  Body Position:   position changed independently   weight shifting  Skin Protection:   adhesive use limited   tubing/devices free from skin contact  Intervention: Prevent and Manage VTE (Venous Thromboembolism) Risk  Flowsheets (Taken 1/16/2022 1731)  Activity Management:   Ankle pumps - L1   Arm raise - L1   Rolling - L1  VTE Prevention/Management:   ambulation promoted   bleeding precations maintained   bleeding risk assessed  Goal: Optimal Comfort and Wellbeing  Outcome: Ongoing, Progressing  Goal: Readiness for Transition of Care  Outcome: Ongoing, Progressing     Problem: Diabetes Comorbidity  Goal: Blood Glucose Level Within Targeted Range  Outcome: Ongoing, Progressing  Intervention: Monitor and Manage Glycemia  Flowsheets (Taken 1/16/2022 1731)  Glycemic Management: blood glucose monitored     Problem: Infection  Goal: Absence of Infection Signs and Symptoms  Outcome: Ongoing, Progressing  Intervention: Prevent or Manage Infection  Flowsheets (Taken 1/16/2022 1731)  Infection Management: aseptic technique maintained     Problem: Adjustment to Illness (Sepsis/Septic Shock)  Goal: Optimal Coping  Outcome: Ongoing,  Progressing     Problem: Bleeding (Sepsis/Septic Shock)  Goal: Absence of Bleeding  Outcome: Ongoing, Progressing     Problem: Glycemic Control Impaired (Sepsis/Septic Shock)  Goal: Blood Glucose Level Within Desired Range  Outcome: Ongoing, Progressing     Problem: Infection Progression (Sepsis/Septic Shock)  Goal: Absence of Infection Signs and Symptoms  Outcome: Ongoing, Progressing     Problem: Nutrition Impaired (Sepsis/Septic Shock)  Goal: Optimal Nutrition Intake  Outcome: Ongoing, Progressing     Problem: Fall Injury Risk  Goal: Absence of Fall and Fall-Related Injury  Outcome: Ongoing, Progressing  Intervention: Promote Injury-Free Environment  Flowsheets (Taken 1/16/2022 1731)  Safety Promotion/Fall Prevention:   assistive device/personal item within reach   bed alarm set   Fall Risk reviewed with patient/family   high risk medications identified   medications reviewed   nonskid shoes/socks when out of bed   side rails raised x 2   room near unit station   instructed to call staff for mobility     Problem: Fluid and Electrolyte Imbalance (Acute Kidney Injury/Impairment)  Goal: Fluid and Electrolyte Balance  Outcome: Ongoing, Progressing     Problem: Oral Intake Inadequate (Acute Kidney Injury/Impairment)  Goal: Optimal Nutrition Intake  Outcome: Ongoing, Progressing     Problem: Renal Function Impairment (Acute Kidney Injury/Impairment)  Goal: Effective Renal Function  Outcome: Ongoing, Progressing     Problem: Fluid Imbalance (Pneumonia)  Goal: Fluid Balance  Outcome: Ongoing, Progressing     Problem: Infection (Pneumonia)  Goal: Resolution of Infection Signs and Symptoms  Outcome: Ongoing, Progressing     Problem: Respiratory Compromise (Pneumonia)  Goal: Effective Oxygenation and Ventilation  Outcome: Ongoing, Progressing     Problem: Skin Injury Risk Increased  Goal: Skin Health and Integrity  Outcome: Ongoing, Progressing  Intervention: Optimize Skin Protection  Flowsheets (Taken 1/16/2022  1731)  Pressure Reduction Techniques:   frequent weight shift encouraged   weight shift assistance provided  Skin Protection:   adhesive use limited   tubing/devices free from skin contact  Head of Bed (HOB) Positioning: HOB at 30-45 degrees     Problem: Seizure Disorder Comorbidity  Goal: Maintenance of Seizure Control  Outcome: Ongoing, Progressing     Problem: Confusion Chronic  Goal: Optimal Cognitive Function  Outcome: Ongoing, Progressing     Problem: Restraint, Nonbehavioral (Nonviolent)  Goal: Absence of Harm or Injury  Outcome: Ongoing, Progressing     Problem: Impaired Wound Healing  Goal: Optimal Wound Healing  Outcome: Ongoing, Progressing  Intervention: Promote Wound Healing  Flowsheets (Taken 1/16/2022 1731)  Sleep/Rest Enhancement: relaxation techniques promoted  Activity Management:   Ankle pumps - L1   Arm raise - L1   Rolling - L1

## 2022-01-16 NOTE — ASSESSMENT & PLAN NOTE
- Berna's gangrene, s/p debridement, followed by Urology  - E.coli and Candida albicans grew on culture (urine culture also grew Candida albicans)  - now with Pseudomonas in sputum and evidence of pneumonia  - PCT 1 but leukocytosis persists  - AMS of unclear etiology -> improving?  - continue Zosyn and fluconazole  - re-CT this week?

## 2022-01-16 NOTE — PROGRESS NOTES
"[facility]  Neurology  Progress Note    Patient Name: Abhishek Zhao  MRN: [id number]  Admission Date: 1/4/2022  Hospital Length of Stay: 11 days  Code Status: Full Code   Attending Provider: Hugo Aviles MD  Primary Care Physician: To Obtain Unable   Principal Problem:Scrotal abscess    Subjective:     Interval History: 62 y/o male presented initially with a complaint of testicular pain. Associated with swelling and redness to the scrotum and penis. 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 Urology is following as well as ID. Today pt had an episode in which he became poorly responsive. He had a persistent cough with copious amounts of mucus. He has slowly recovered but is exhibiting "picking" behavior and is intermittently answering questions.     -1/12/22: Pt had an episode in which he was poorly responsive and arms flailing around.     -1/14/22: Pt poorly responsive overnight. Stroke CODE was called. Transferred to ICU.     -1/15/22: Pt is more responsive today. Restless.     -1/16/22: Pt has been transferred out of ICU. Currently calm. Able to states his name and that he is in a hospital.        Current Neurological Medications:     Current Facility-Administered Medications   Medication Dose Route Frequency Provider Last Rate Last Admin    acetaminophen tablet 650 mg  650 mg Oral Q6H PRN Hugo Aviles MD   650 mg at 01/11/22 1525    acetylcysteine 100 mg/ml (10%) solution 4 mL  4 mL Nebulization TID FRANCISCO JAVIER Aviles MD   4 mL at 01/15/22 2054    albuterol-ipratropium 2.5 mg-0.5 mg/3 mL nebulizer solution 3 mL  3 mL Nebulization Q4H PRN Hugo Aviles MD   3 mL at 01/13/22 1310    albuterol-ipratropium 2.5 mg-0.5 mg/3 mL nebulizer solution 3 mL  3 mL Nebulization Q4H FRANCISCO JAVIER Aviles MD   3 mL at 01/16/22 1129    aluminum-magnesium hydroxide-simethicone 200-200-20 mg/5 mL suspension 30 mL  30 mL Oral QID PRN Hugo Aviles MD        amLODIPine tablet " 10 mg  10 mg Oral Daily Hugo Aviles MD   10 mg at 01/13/22 1020    aspirin EC tablet 81 mg  81 mg Oral Daily Hugo Aviles MD   81 mg at 01/13/22 1021    atorvastatin tablet 80 mg  80 mg Oral Daily Hugo Aviles MD   80 mg at 01/13/22 1022    dextrose 10 % infusion   Intravenous Continuous Hugo Aviles MD 25 mL/hr at 01/16/22 1317 New Bag at 01/16/22 1317    dextrose 50% injection 12.5 g  12.5 g Intravenous PRN Hugo Aviles MD   12.5 g at 01/16/22 1251    dextrose 50% injection 25 g  25 g Intravenous PRN Hugo Aviles MD        enoxaparin injection 40 mg  40 mg Subcutaneous Daily Hugo Aviles MD   40 mg at 01/15/22 1714    fluconazole (DIFLUCAN) IVPB 400 mg 200 mL  400 mg Intravenous Q24H Hugo Aviles MD        glucagon (human recombinant) injection 1 mg  1 mg Intramuscular PRN Hugo Aviles MD        glucose chewable tablet 16 g  16 g Oral PRN Hugo Aviles MD        glucose chewable tablet 24 g  24 g Oral PRN Hugo Aviles MD        hydrALAZINE injection 10 mg  10 mg Intravenous Q6H PRN Hugo Aviles MD        influenza (QUADRIVALENT PF) vaccine 0.5 mL  0.5 mL Intramuscular vaccine x 1 dose Hugo Aviles MD        insulin aspart U-100 pen 0-5 Units  0-5 Units Subcutaneous Q6H PRN Hugo Aviles MD   3 Units at 01/13/22 1817    labetaloL injection 10 mg  10 mg Intravenous Q4H PRN Hugo Aviles MD        lacosamide (VIMPAT) 200 mg in sodium chloride 0.9% 100 mL IVPB  200 mg Intravenous Q12H Hugo Aviles  mL/hr at 01/16/22 1237 200 mg at 01/16/22 1237    lamoTRIgine tablet 200 mg  200 mg Oral BID Hugo Aviles MD   200 mg at 01/13/22 2307    lorazepam injection 3 mg  3 mg Intravenous Once PRN Hugo Avlies MD        melatonin tablet 6 mg  6 mg Oral Nightly PRN Hugo Aviles MD   6 mg at 01/12/22 2046    metoprolol succinate (TOPROL-XL) 24 hr tablet 50 mg  50 mg Oral Daily Hugo Aviles MD   50 mg at 01/13/22 1022    naloxone  0.4 mg/mL injection 0.02 mg  0.02 mg Intravenous PRN Hugo Aviles MD        ondansetron injection 4 mg  4 mg Intravenous Q8H PRN Hugo Aviles MD   4 mg at 01/12/22 1219    piperacillin-tazobactam 4.5 g in dextrose 5 % 100 mL IVPB (ready to mix system)  4.5 g Intravenous Q8H Hugo Aviles MD   Stopped at 01/16/22 0726    polyethylene glycol packet 17 g  17 g Oral Daily Hugo Aviles MD   17 g at 01/13/22 1021    prochlorperazine injection Soln 5 mg  5 mg Intravenous Q6H PRN Hugo Aviles MD        simethicone chewable tablet 80 mg  1 tablet Oral QID PRN Hugo Aviles MD        sodium chloride 0.9% flush 10 mL  10 mL Intravenous Q8H PRN Hugo Aviles MD        sodium chloride 0.9% flush 10 mL  10 mL Intravenous PRN Hugo Aviles MD        tamsulosin 24 hr capsule 0.4 mg  0.4 mg Oral Daily Hugo Aviles MD   0.4 mg at 01/13/22 1021    valproate (DEPACON) 500 mg in dextrose 5 % 50 mL IVPB  500 mg Intravenous Q12H Hugo Aviles MD 50 mL/hr at 01/16/22 1202 500 mg at 01/16/22 1202       Review of Systems   Unable to perform ROS: Mental status change     Objective:     Vital Signs (Most Recent):  Temp: 97.2 °F (36.2 °C) (01/16/22 1205)  Pulse: 91 (01/16/22 1205)  Resp: 20 (01/16/22 1205)  BP: (!) 164/91 (01/16/22 1205)  SpO2: (!) 94 % (01/16/22 1205) Vital Signs (24h Range):  Temp:  [96.8 °F (36 °C)-98.8 °F (37.1 °C)] 97.2 °F (36.2 °C)  Pulse:  [] 91  Resp:  [12-94] 20  SpO2:  [20 %-100 %] 94 %  BP: (157-179)/() 164/91     Weight: 61.2 kg (135 lb)  Body mass index is 19.94 kg/m².    Physical Exam  Constitutional:       General: He is not in acute distress.  HENT:      Head: Normocephalic.      Right Ear: External ear normal.      Left Ear: External ear normal.   Eyes:      General:         Right eye: No discharge.         Left eye: No discharge.   Cardiovascular:      Rate and Rhythm: Normal rate.   Pulmonary:      Effort: Pulmonary effort is normal.   Abdominal:       Palpations: Abdomen is soft.   Musculoskeletal:         General: No tenderness.      Cervical back: Neck supple.   Skin:     General: Skin is warm.   Psychiatric:         Speech: Speech is slurred.            NEUROLOGICAL EXAMINATION:      MENTAL STATUS   Speech: slurred   Level of consciousness: drowsy       Oriented to self  Follow commands      CRANIAL NERVES      CN III, IV, VI   Right pupil: Size: 2 mm. Shape: regular.   Left pupil: Size: 2 mm. Shape: regular.   Nystagmus: none   Conjugate gaze: present     CN VII   Right facial weakness: none  Left facial weakness: none     CN XII   Tongue deviation: none     MOTOR EXAM        AROM of UE's and LE's against gravity              Significant Labs:   CBC:   Recent Labs   Lab 01/15/22  0340 01/16/22  0457   WBC 17.94* 17.32*   HGB 12.8* 13.3*   HCT 36.8* 39.8*    173     CMP:   Recent Labs   Lab 01/15/22  0340 01/16/22  0457    89    144   K 3.5 3.3*    108   CO2 27 23   BUN 6 8   CREATININE 1.1 1.0   CALCIUM 8.3* 8.6*   PROT 6.6 6.9   ALBUMIN 1.4* 1.5*   BILITOT 0.1 0.2   ALKPHOS 430* 429*   AST 24 31   ALT 16 18   ANIONGAP 9 13   EGFRNONAA >60 >60       Significant Imaging: I have reviewed all pertinent imaging results/findings within the past 24 hours.    Assessment and Plan:     62 y/o male consulted for AMS     1. Encephalopathy: encephalopathy of unknown etiology (Infectious? Delirium?. Pt with no hypotension or major metabolic disturbances. He is more responsive today.             -MRI brain showed no acute stroke. No signs of space-occupying lesion.           -LTM EEG done. Seems to have showing no ongoing seizures. Official report pending.           -For now pt is unable to take oral meds. Lacosamide 200 mg IV BID.  mg IV BID. Unable to have lamotrigine for now as it only comes in oral formulation.     2. Hx of seizures: uncertain of pt having breakthrough events.           Initial EEG showed no ongoing  seizures.           -LTM EEG official report pending.           - mg BID.            -Lacosamide 200 mg BID.           -Holding lamotrigine as pt is unable to take oral meds for now.    Active Diagnoses:    Diagnosis Date Noted POA    PRINCIPAL PROBLEM:  Scrotal abscess [N49.2] 01/04/2022 Yes    Alkaline phosphatase elevation [R74.8] 01/16/2022 Yes    Pericardial effusion [I31.3] 01/15/2022 Yes    Acute encephalopathy [G93.40] 01/14/2022 Unknown    Acute respiratory failure with hypoxia and hypercarbia [J96.01, J96.02] 01/10/2022 No    HCAP (healthcare-associated pneumonia) [J18.9] 01/10/2022 No    Encephalopathy, metabolic [G93.41] 01/10/2022 No    Hypokalemia [E87.6] 01/09/2022 No    Sepsis due to Gram negative bacteria [A41.50] 01/07/2022 Yes    JOI (acute kidney injury) [N17.9] 01/07/2022 Yes    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] 01/06/2022 Yes    Adjustment disorder with depressed mood [F43.21] 01/04/2022 Yes     Chronic    Chronic ischemic heart disease [I25.9] 01/04/2022 Yes     Chronic    Cocaine dependence in remission [F14.21] 01/04/2022 Yes     Chronic    Cardiomegaly [I51.7] 01/04/2022 Yes     Chronic    Hyperlipidemia [E78.5] 01/04/2022 Yes     Chronic    Essential hypertension [I10] 01/04/2022 Yes     Chronic    Seizure disorder [G40.909] 01/04/2022 Yes     Chronic    Tobacco user [Z72.0] 01/04/2022 Yes     Chronic    Diabetes mellitus type 2, controlled [E11.9] 08/03/2020 Yes     Chronic      Problems Resolved During this Admission:       VTE Risk Mitigation (From admission, onward)         Ordered     enoxaparin injection 40 mg  Daily         01/13/22 1612     IP VTE LOW RISK PATIENT  Once         01/04/22 2241     Place sequential compression device  Until discontinued         01/04/22 2241                Ashvin Giraldo MD  Neurology  [facility]

## 2022-01-17 LAB
ALBUMIN SERPL BCP-MCNC: 1.4 G/DL (ref 3.5–5.2)
ALP SERPL-CCNC: 382 U/L (ref 55–135)
ALT SERPL W/O P-5'-P-CCNC: 15 U/L (ref 10–44)
ANION GAP SERPL CALC-SCNC: 12 MMOL/L (ref 8–16)
AST SERPL-CCNC: 36 U/L (ref 10–40)
BASOPHILS # BLD AUTO: ABNORMAL K/UL (ref 0–0.2)
BASOPHILS NFR BLD: 0 % (ref 0–1.9)
BILIRUB SERPL-MCNC: 0.2 MG/DL (ref 0.1–1)
BUN SERPL-MCNC: 7 MG/DL (ref 6–20)
CALCIUM SERPL-MCNC: 8.4 MG/DL (ref 8.7–10.5)
CHLORIDE SERPL-SCNC: 108 MMOL/L (ref 95–110)
CO2 SERPL-SCNC: 23 MMOL/L (ref 23–29)
CREAT SERPL-MCNC: 0.9 MG/DL (ref 0.5–1.4)
DIFFERENTIAL METHOD: ABNORMAL
EOSINOPHIL # BLD AUTO: ABNORMAL K/UL (ref 0–0.5)
EOSINOPHIL NFR BLD: 0 % (ref 0–8)
ERYTHROCYTE [DISTWIDTH] IN BLOOD BY AUTOMATED COUNT: 13.2 % (ref 11.5–14.5)
EST. GFR  (AFRICAN AMERICAN): >60 ML/MIN/1.73 M^2
EST. GFR  (NON AFRICAN AMERICAN): >60 ML/MIN/1.73 M^2
GGT SERPL-CCNC: 374 U/L (ref 8–55)
GLUCOSE SERPL-MCNC: 97 MG/DL (ref 70–110)
HCT VFR BLD AUTO: 41.5 % (ref 40–54)
HGB BLD-MCNC: 13.7 G/DL (ref 14–18)
IMM GRANULOCYTES # BLD AUTO: ABNORMAL K/UL (ref 0–0.04)
IMM GRANULOCYTES NFR BLD AUTO: ABNORMAL % (ref 0–0.5)
LYMPHOCYTES # BLD AUTO: ABNORMAL K/UL (ref 1–4.8)
LYMPHOCYTES NFR BLD: 10 % (ref 18–48)
MCH RBC QN AUTO: 28.2 PG (ref 27–31)
MCHC RBC AUTO-ENTMCNC: 33 G/DL (ref 32–36)
MCV RBC AUTO: 86 FL (ref 82–98)
MONOCYTES # BLD AUTO: ABNORMAL K/UL (ref 0.3–1)
MONOCYTES NFR BLD: 3 % (ref 4–15)
MYELOCYTES NFR BLD MANUAL: 3 %
NEUTROPHILS NFR BLD: 72 % (ref 38–73)
NEUTS BAND NFR BLD MANUAL: 12 %
NRBC BLD-RTO: 0 /100 WBC
PLATELET # BLD AUTO: 126 K/UL (ref 150–450)
PMV BLD AUTO: 9.8 FL (ref 9.2–12.9)
POCT GLUCOSE: 123 MG/DL (ref 70–110)
POCT GLUCOSE: 171 MG/DL (ref 70–110)
POCT GLUCOSE: 178 MG/DL (ref 70–110)
POTASSIUM SERPL-SCNC: 3.7 MMOL/L (ref 3.5–5.1)
PROT SERPL-MCNC: 6.8 G/DL (ref 6–8.4)
RBC # BLD AUTO: 4.85 M/UL (ref 4.6–6.2)
SODIUM SERPL-SCNC: 143 MMOL/L (ref 136–145)
WBC # BLD AUTO: 16.48 K/UL (ref 3.9–12.7)

## 2022-01-17 PROCEDURE — 36415 COLL VENOUS BLD VENIPUNCTURE: CPT | Performed by: STUDENT IN AN ORGANIZED HEALTH CARE EDUCATION/TRAINING PROGRAM

## 2022-01-17 PROCEDURE — 63600175 PHARM REV CODE 636 W HCPCS: Performed by: STUDENT IN AN ORGANIZED HEALTH CARE EDUCATION/TRAINING PROGRAM

## 2022-01-17 PROCEDURE — C1751 CATH, INF, PER/CENT/MIDLINE: HCPCS

## 2022-01-17 PROCEDURE — 94760 N-INVAS EAR/PLS OXIMETRY 1: CPT

## 2022-01-17 PROCEDURE — 25000003 PHARM REV CODE 250: Performed by: STUDENT IN AN ORGANIZED HEALTH CARE EDUCATION/TRAINING PROGRAM

## 2022-01-17 PROCEDURE — 99232 SBSQ HOSP IP/OBS MODERATE 35: CPT | Mod: ,,, | Performed by: UROLOGY

## 2022-01-17 PROCEDURE — 25000242 PHARM REV CODE 250 ALT 637 W/ HCPCS: Performed by: STUDENT IN AN ORGANIZED HEALTH CARE EDUCATION/TRAINING PROGRAM

## 2022-01-17 PROCEDURE — 94640 AIRWAY INHALATION TREATMENT: CPT

## 2022-01-17 PROCEDURE — 99232 PR SUBSEQUENT HOSPITAL CARE,LEVL II: ICD-10-PCS | Mod: ,,, | Performed by: UROLOGY

## 2022-01-17 PROCEDURE — C9254 INJECTION, LACOSAMIDE: HCPCS | Performed by: STUDENT IN AN ORGANIZED HEALTH CARE EDUCATION/TRAINING PROGRAM

## 2022-01-17 PROCEDURE — 82977 ASSAY OF GGT: CPT | Performed by: STUDENT IN AN ORGANIZED HEALTH CARE EDUCATION/TRAINING PROGRAM

## 2022-01-17 PROCEDURE — 85027 COMPLETE CBC AUTOMATED: CPT | Performed by: STUDENT IN AN ORGANIZED HEALTH CARE EDUCATION/TRAINING PROGRAM

## 2022-01-17 PROCEDURE — 11000001 HC ACUTE MED/SURG PRIVATE ROOM

## 2022-01-17 PROCEDURE — 36569 INSJ PICC 5 YR+ W/O IMAGING: CPT

## 2022-01-17 PROCEDURE — A4216 STERILE WATER/SALINE, 10 ML: HCPCS | Performed by: STUDENT IN AN ORGANIZED HEALTH CARE EDUCATION/TRAINING PROGRAM

## 2022-01-17 PROCEDURE — 85007 BL SMEAR W/DIFF WBC COUNT: CPT | Performed by: STUDENT IN AN ORGANIZED HEALTH CARE EDUCATION/TRAINING PROGRAM

## 2022-01-17 PROCEDURE — 80053 COMPREHEN METABOLIC PANEL: CPT | Performed by: STUDENT IN AN ORGANIZED HEALTH CARE EDUCATION/TRAINING PROGRAM

## 2022-01-17 PROCEDURE — 97530 THERAPEUTIC ACTIVITIES: CPT

## 2022-01-17 PROCEDURE — 27000221 HC OXYGEN, UP TO 24 HOURS

## 2022-01-17 RX ORDER — SODIUM CHLORIDE 0.9 % (FLUSH) 0.9 %
10 SYRINGE (ML) INJECTION
Status: DISCONTINUED | OUTPATIENT
Start: 2022-01-17 | End: 2022-02-09 | Stop reason: HOSPADM

## 2022-01-17 RX ORDER — SODIUM CHLORIDE 0.9 % (FLUSH) 0.9 %
10 SYRINGE (ML) INJECTION EVERY 6 HOURS
Status: DISCONTINUED | OUTPATIENT
Start: 2022-01-17 | End: 2022-02-09 | Stop reason: HOSPADM

## 2022-01-17 RX ORDER — LIDOCAINE HYDROCHLORIDE 10 MG/ML
1 INJECTION INFILTRATION; PERINEURAL ONCE AS NEEDED
Status: DISCONTINUED | OUTPATIENT
Start: 2022-01-17 | End: 2022-02-09 | Stop reason: HOSPADM

## 2022-01-17 RX ADMIN — IPRATROPIUM BROMIDE AND ALBUTEROL SULFATE 3 ML: 2.5; .5 SOLUTION RESPIRATORY (INHALATION) at 03:01

## 2022-01-17 RX ADMIN — SODIUM CHLORIDE 200 MG: 9 INJECTION, SOLUTION INTRAVENOUS at 12:01

## 2022-01-17 RX ADMIN — PIPERACILLIN AND TAZOBACTAM 4.5 G: 4; .5 INJECTION, POWDER, LYOPHILIZED, FOR SOLUTION INTRAVENOUS; PARENTERAL at 04:01

## 2022-01-17 RX ADMIN — FLUCONAZOLE 400 MG: 2 INJECTION, SOLUTION INTRAVENOUS at 10:01

## 2022-01-17 RX ADMIN — LAMOTRIGINE 200 MG: 100 TABLET ORAL at 09:01

## 2022-01-17 RX ADMIN — SODIUM CHLORIDE 200 MG: 9 INJECTION, SOLUTION INTRAVENOUS at 11:01

## 2022-01-17 RX ADMIN — IPRATROPIUM BROMIDE AND ALBUTEROL SULFATE 3 ML: 2.5; .5 SOLUTION RESPIRATORY (INHALATION) at 08:01

## 2022-01-17 RX ADMIN — ACETYLCYSTEINE 4 ML: 100 SOLUTION ORAL; RESPIRATORY (INHALATION) at 09:01

## 2022-01-17 RX ADMIN — Medication 10 ML: at 05:01

## 2022-01-17 RX ADMIN — ATORVASTATIN CALCIUM 80 MG: 40 TABLET, FILM COATED ORAL at 08:01

## 2022-01-17 RX ADMIN — AMLODIPINE BESYLATE 10 MG: 5 TABLET ORAL at 08:01

## 2022-01-17 RX ADMIN — ASPIRIN 81 MG: 81 TABLET, COATED ORAL at 08:01

## 2022-01-17 RX ADMIN — Medication 10 ML: at 11:01

## 2022-01-17 RX ADMIN — Medication 10 ML: at 06:01

## 2022-01-17 RX ADMIN — ENOXAPARIN SODIUM 40 MG: 40 INJECTION SUBCUTANEOUS at 05:01

## 2022-01-17 RX ADMIN — ACETYLCYSTEINE 4 ML: 100 SOLUTION ORAL; RESPIRATORY (INHALATION) at 03:01

## 2022-01-17 RX ADMIN — TAMSULOSIN HYDROCHLORIDE 0.4 MG: 0.4 CAPSULE ORAL at 08:01

## 2022-01-17 RX ADMIN — IPRATROPIUM BROMIDE AND ALBUTEROL SULFATE 3 ML: 2.5; .5 SOLUTION RESPIRATORY (INHALATION) at 09:01

## 2022-01-17 RX ADMIN — METOPROLOL SUCCINATE 50 MG: 50 TABLET, EXTENDED RELEASE ORAL at 08:01

## 2022-01-17 RX ADMIN — LAMOTRIGINE 200 MG: 100 TABLET ORAL at 08:01

## 2022-01-17 RX ADMIN — DEXTROSE 500 MG: 50 INJECTION, SOLUTION INTRAVENOUS at 09:01

## 2022-01-17 RX ADMIN — ACETYLCYSTEINE 4 ML: 100 SOLUTION ORAL; RESPIRATORY (INHALATION) at 08:01

## 2022-01-17 RX ADMIN — DEXTROSE 500 MG: 50 INJECTION, SOLUTION INTRAVENOUS at 10:01

## 2022-01-17 RX ADMIN — POLYETHYLENE GLYCOL 3350 17 G: 17 POWDER, FOR SOLUTION ORAL at 08:01

## 2022-01-17 RX ADMIN — PIPERACILLIN AND TAZOBACTAM 4.5 G: 4; .5 INJECTION, POWDER, LYOPHILIZED, FOR SOLUTION INTRAVENOUS; PARENTERAL at 11:01

## 2022-01-17 NOTE — PLAN OF CARE
Problem: Adult Inpatient Plan of Care  Goal: Plan of Care Review  Outcome: Ongoing, Progressing  Flowsheets (Taken 1/17/2022 5822)  Plan of Care Reviewed With: patient    Patient remains free from injury and falls. Patient more alert this shift. Speech a little less slurred. Patient attempting to get out of bed multiple times throughout shift, but easily re-directed samantha-oriented. Blood glucose monitored every 6 hours. D10 infusing as ordered. Sahu catheter draining yellow urine. Took crushed meds with pudding with no problem. Avasys at bedside. Plan of care continued.

## 2022-01-17 NOTE — ASSESSMENT & PLAN NOTE
Chronic isolated alk phos (GGT elevated), worsened this admission. Possibly due to AEDs vs occult HPB process.   - further workup depending on clinical course

## 2022-01-17 NOTE — NURSING
Pt SPO2 noted at 97% on O2 @ 4L O2 weaned to 2L Per NC, will continue monitoring, no SOB noted, pt resting quietly, hilton AM med well, no seizure activity noted.

## 2022-01-17 NOTE — ASSESSMENT & PLAN NOTE
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 Cultures growing ampicillin-resistant Ecoli and C albicans. Repeat CT A/P 1/13 w/ 4cm fluid collection c/f persistent abscess which was drained bedside by urology  - urology following  - cont zosyn (re-started for HCAP)  - further ID recs pending  - CT Pelvis in AM

## 2022-01-17 NOTE — PROGRESS NOTES
[facility] Medicine  Progress Note    Patient Name: Abhishek Zhao  MRN: [id number]  Patient Class: IP- Inpatient   Admission Date: 1/4/2022  Length of Stay: 12 days  Attending Physician: Hugo Aviles MD  Primary Care Provider: To Obtain Unable        Subjective:     Principal Problem:Scrotal abscess        HPI:  61 y.o. male with adjustment disorder with depressed mood, chronic ischemic heart disease, cocaine dependence in remission, cardiomegaly, HLD, HTN, swizure disorder, tobacco use, and DM 2 presents with a complaint of testicular pain.  Associated with swelling and redness to the scrotum and penis along with difficulty urinating, pain is rated as severe and radiates to the rectum.  Denies fever, chills, cough, SOB, chest pain, n/v/d.  In the ED, he was found to be tachypneic, with leukocytosis and elevated lactic.  CT and US shows evidence of multiple scrotal and perineal abscesses with some extension into the penile shaft.  UA with evidence of infection.  Sepsis treatment initiated, blood and urine cultures obtained, IV fluids and IV antibiotics initiated.  Urology consulted.      Overview/Hospital Course:  61 y.o. male with adjustment disorder with depressed mood, chronic ischemic heart disease, cocaine dependence in remission, cardiomegaly, HLD, HTN, swizure disorder, tobacco use, and DM 2 presents with a complaint of testicular pain, found to have multiple scrotal and perineal abscesses. Placed on broad spectrum antibiotics, underwent I&D with urology.    Pt w/ aspiration event 1/11 w/ subsequent desaturation. Respiratory culture grew pseudomonas. Kept on Zosyn. On 1/13 pt had abrupt reduction in responsiveness. Code stroke called, patient transferred to ICU. No stroke found on imaging. Started on extended EEG. Mental status improved, patient stepped back down to the floor.     His mental status improved slowly for a few days, but worsened again 1/17.          Interval History:  Disoriented this AM, unable to answer questions coherently.    Review of Systems   Unable to perform ROS: Mental status change     Objective:     Vital Signs (Most Recent):  Temp: 96.2 °F (35.7 °C) (01/17/22 1131)  Pulse: 71 (01/17/22 1131)  Resp: 18 (01/17/22 1131)  BP: 138/70 (01/17/22 1131)  SpO2: (!) 92 % (01/17/22 1131) Vital Signs (24h Range):  Temp:  [96.2 °F (35.7 °C)-98.6 °F (37 °C)] 96.2 °F (35.7 °C)  Pulse:  [71-91] 71  Resp:  [18-20] 18  SpO2:  [92 %-97 %] 92 %  BP: (138-176)/(70-92) 138/70     Weight: 61.2 kg (135 lb)  Body mass index is 19.94 kg/m².    Intake/Output Summary (Last 24 hours) at 1/17/2022 1154  Last data filed at 1/17/2022 0626  Gross per 24 hour   Intake 1518.68 ml   Output 900 ml   Net 618.68 ml      Physical Exam  Constitutional:       General: He is not in acute distress.     Appearance: He is ill-appearing. He is not toxic-appearing or diaphoretic.   Cardiovascular:      Rate and Rhythm: Normal rate and regular rhythm.      Heart sounds: No murmur heard.  No gallop.    Pulmonary:      Effort: Pulmonary effort is normal. No respiratory distress.      Breath sounds: No wheezing.      Comments: Coarse breath sounds bilaterally  Abdominal:      General: Bowel sounds are normal. There is no distension.      Palpations: Abdomen is soft.      Tenderness: There is no abdominal tenderness.   Neurological:      Comments: Disorientation, mumbling, unable to answer questions appropriately. No focal neuro deficits seen.         Significant Labs: All pertinent labs within the past 24 hours have been reviewed.    Significant Imaging: I have reviewed all pertinent imaging results/findings within the past 24 hours.      Assessment/Plan:      * Scrotal abscess  Multiple abscesses c/w Berna's gangrene, s/p I&D by urology on 1/5. Cultures growing ampicillin-resistant Ecoli and C albicans. Repeat CT A/P 1/13 w/ 4cm fluid collection c/f persistent abscess which was drained bedside by urology  - urology  following  - cont zosyn (re-started for HCAP)  - further ID recs pending  - repeat CT pelvis pending          Alkaline phosphatase elevation  Chronic isolated alk phos (GGT elevated), worsened this admission. Possibly due to AEDs vs occult HPB process.   - further workup depending on clinical course      Pericardial effusion  Small pericardial effusion of unclear etiology      Acute encephalopathy  See metabolic encephalopathy      Encephalopathy, metabolic  CT head and EEG without acute process; likely delirium secondary to infection and prolonged hospitalization. Code stroke called the night of 1/13, however no focal neuro findings, CT head and MRI without acute findings  - neurology consulted  - delirium precautions  - EEG pending  - tx of infection as noted above    HCAP (healthcare-associated pneumonia)  - see respiratory failure       Acute respiratory failure with hypoxia and hypercarbia  Cough + new LLL opacity on imaging c/f pneumonia, however CT imaging showing pleural effusion and atelectasis  - respiratory cultures w/ pseudomonas  - cont zosyn  - furosemide as tolerated; no significant evidence of cardiac dysfunction on TTE  - IS        Hypokalemia  Replete PRN    JOI (acute kidney injury)  Resolved      Sepsis due to Gram negative bacteria  Resolved      Berna's gangrene  As above    Diabetes mellitus type 2, controlled  Check a1c  Hold oral antihyperglycemics while inpatient  PRN sliding scale insulin  ACHS glucose monitoring   ADA diet     Tobacco user  5 minutes spent counseling the patient on smoking cessation and he is not currently ready to stop smoking. He will be offereded a nicotine transdermal patch while hospitalized and monitored for withdrawal.  Will provide additional smoking cessation counseling prior to discharge.     Seizure disorder  Repeated episodes of AMS c/f seizures w/ post ictal period  - neurology consulted  - AEDs per neurology  - EEG pending      Essential  hypertension  Well controlled, continue home medications and monitor blood pressure, adjust as needed.     Hyperlipidemia  Continue statin    Cardiomegaly  Pulmonary edema seen on imaging, small pericardial effusion seen on TTE. LVEF low-normal (50%)  - hold diuresis while NPO    Cocaine dependence in remission  Counseled     Chronic ischemic heart disease  No acute issue    Adjustment disorder with depressed mood  Continue home citalopram, hold home seroquel due to somnolence    VTE Risk Mitigation (From admission, onward)         Ordered     enoxaparin injection 40 mg  Daily         01/13/22 1612     IP VTE LOW RISK PATIENT  Once         01/04/22 2241     Place sequential compression device  Until discontinued         01/04/22 2241                Discharge Planning   CHUCK:      Code Status: Full Code   Is the patient medically ready for discharge?:     Reason for patient still in hospital (select all that apply): Patient trending condition, Laboratory test, Treatment, Consult recommendations and Pending disposition  Discharge Plan A: Home Health   Discharge Delays: None known at this time              Hugo Aviles MD  Department of Hospital Medicine   [facility]

## 2022-01-17 NOTE — NURSING
Wound care done this shift per order pt hilton well, cna informed to notify nurse if dressing soiled or comes off.

## 2022-01-17 NOTE — PT/OT/SLP PROGRESS
"Physical Therapy Treatment    Patient Name:  Abhishek Zhao   MRN:  [id number]    Recommendations:     Discharge Recommendations:  nursing facility, skilled   Discharge Equipment Recommendations:  (Ongoing assessment pending pt progress)   Barriers to discharge: Inaccessible home, Decreased caregiver support and Pt not functioning at baseline and is at increased risk for falls and readmission if he returns home at present time      Assessment:     Abhishek Zhao is a 61 y.o. male admitted with a medical diagnosis of Scrotal abscess.  He presents with the following impairments/functional limitations:  weakness,impaired balance,decreased safety awareness,impaired skin,impaired endurance,visual deficits,pain,impaired cognition,impaired cardiopulmonary response to activity,impaired self care skills,decreased coordination,impaired functional mobilty,decreased upper extremity function,impaired coordination,gait instability,decreased lower extremity function,impaired fine motor Pt limited by confusion, resistant to all functional mobility to day.    Rehab Prognosis: Fair; patient would benefit from acute skilled PT services to address these deficits and reach maximum level of function.    Recent Surgery: Procedure(s) (LRB):  CYSTOSCOPY, RETROGRADE URETHROGRAM, FISTULAGRAM, EXCISION OF NECROTIC SCROTAL TISSUE ABSCESS, BARKER PLACEMENT (N/A)  EXCISION, ABSCESS  CYSTOSCOPY  FISTULOGRAM (N/A) 12 Days Post-Op    Plan:     During this hospitalization, patient to be seen  (3-5x/wk) to address the identified rehab impairments via therapeutic activities,therapeutic exercises,wheelchair management/training,neuromuscular re-education and progress toward the following goals:    · Plan of Care Expires:  01/27/22    Subjective     Chief Complaint: Resistant to mobility  Patient/Family Comments/goals: "hey, how ya doin" "alright"  Pt pushing backward resisting sitting at EOB and trying to lay down.  Able to follow approx 25% of simple " "commands with max VC/TC.  Pt with vacant stare, able to track to "juice" in loud voice, but unable to maintain focus.  Able to recognize family member and say her name   Pain/Comfort:  · Pain Rating 1:  (Unable to rate)  · Location - Orientation 1:  ("my ass")      Objective:     Communicated with nsg prior to session.  Patient found HOB elevated with bed alarm,oxygen upon PT entry to room.     General Precautions: Standard, aspiration,pureed diet,fall   Orthopedic Precautions:N/A   Braces: N/A  Respiratory Status: Nasal cannula, flow 2 L/min     Functional Mobility:  · Bed Mobility:     · Rolling Left:  maximal assistance and with Vc/Tc for reaching for BR  · Rolling Right: maximal assistance and Vc/Tc for reaching for BR  · Scooting: dependence and in supine to HOB with bed in reverse trendelenburg  · Bridging: unable  · Supine to Sit: dependence with Vc/TC for hand placement and body mechanics  · Sit to Supine: maximal assistance with   VC/Tc for hand placement and body mechanics   · Balance: Poor, pushing posteriorly       AM-PAC 6 CLICK MOBILITY  Turning over in bed (including adjusting bedclothes, sheets and blankets)?: 2  Sitting down on and standing up from a chair with arms (e.g., wheelchair, bedside commode, etc.): 1  Moving from lying on back to sitting on the side of the bed?: 2  Moving to and from a bed to a chair (including a wheelchair)?: 1  Need to walk in hospital room?: 1  Climbing 3-5 steps with a railing?: 1  Basic Mobility Total Score: 8       Therapeutic Activities and Exercises:   Pt able to lift LE's with max VC/TC to remove pressure relief boots.  Bed mobility training as above for cleaning/diapering after Bowel accident.  Pt sat at EOB approx 5 m with Total A 2/2 pushing posteriorly and inability to follow commands.  Total A for balance and for donning socks sitting at EOB.  Total A for forward weight shifting activities sitting at EOB.    Patient left right sidelying with all lines " intact, call button in reach, bed alarm on, nsg notified and family present..    GOALS:   Multidisciplinary Problems     Physical Therapy Goals        Problem: Physical Therapy Goal    Goal Priority Disciplines Outcome Goal Variances Interventions   Physical Therapy Goal     PT, PT/OT Ongoing, Not Progressing     Description: Goals to be met by: 22     Patient will increase functional independence with mobility by performin. Supine to sit with Stand-by Assistance  2. Rolling to Left and Right with Stand-by Assistance.  3. Sit to stand transfer to be assessed   4. Gait to be assessed    5. Sitting at edge of bed x15 minutes with Stand-by Assistance  6. Lower extremity exercise program x10 reps per handout, with assistance as needed                     Time Tracking:     PT Received On: 22  PT Start Time: 1049     PT Stop Time: 1112  PT Total Time (min): 23 min     Billable Minutes: Therapeutic Activity 23    Treatment Type: Treatment  PT/PTA: PT     PTA Visit Number: 0     2022

## 2022-01-17 NOTE — NURSING
Pt resting quietly NADN, pt not pulling at iv or interfering with care, 4 point restraints d/c will continue to monitor,no bruising or signs of injuries noted,continue monitoring.

## 2022-01-17 NOTE — PROGRESS NOTES
[facility]  Urology  Progress Note    Patient Name: Abhishek Zhao  MRN: [id number]  Admission Date: 1/4/2022  Hospital Length of Stay: 12 days  Code Status: Full Code   Attending Provider: Hugo Aviles MD   Primary Care Physician: To Obtain Unable    Subjective:     HPI:  Scrotal Swelling  Abhishek Zhao is a 61 y.o. male who was been experiencing scrotal pain and swelling since 12/31/2021.  He denies any fever.  He has had issues voiding since the swelling began.  Hemostat having issues in the past with urinary problems.  He denies having any previous issues with scrotal infection or swelling.  He recalls that he had procedures in the past but cannot recall specifically what to place.  He does not have urologist locally but moved back from [place] about 1 year ago.    Review of care everywhere shows that he had a cystoscopy with urethral dilation of a urethral stricture in the bulbar urethra in 2019 at [facility].  And there are reports that [facility] approximately 2015 [facility].      Interval History: He feels okay, still confused    Review of Systems   Constitutional: Negative.  Negative for fever.   HENT: Negative.    Eyes: Negative.    Respiratory: Negative for cough, chest tightness and shortness of breath.    Cardiovascular: Negative for chest pain.   Gastrointestinal: Negative.  Negative for constipation, diarrhea and nausea.   Genitourinary: Positive for genital sores. Negative for hematuria.   Musculoskeletal: Negative.    Neurological: Negative.    Psychiatric/Behavioral: Negative.      Objective:     Temp:  [97.2 °F (36.2 °C)-98.6 °F (37 °C)] 97.6 °F (36.4 °C)  Pulse:  [73-91] 75  Resp:  [18-20] 18  SpO2:  [92 %-97 %] 93 %  BP: (164-176)/(76-92) 172/79     Body mass index is 19.94 kg/m².           Drains     Drain                 Urethral Catheter 01/05/22 1050 Double-lumen 20 Fr. 12 days                Physical Exam  Vitals and nursing note reviewed.    Constitutional:       Appearance: He is well-developed and well-nourished.   HENT:      Head: Normocephalic.   Eyes:      Conjunctiva/sclera: Conjunctivae normal.   Neck:      Thyroid: No thyromegaly.      Trachea: No tracheal deviation.   Cardiovascular:      Rate and Rhythm: Normal rate.      Heart sounds: Normal heart sounds.   Pulmonary:      Effort: Pulmonary effort is normal. No respiratory distress.      Breath sounds: Normal breath sounds. No wheezing.   Abdominal:      General: Bowel sounds are normal.      Palpations: Abdomen is soft. There is no hepatosplenomegaly.      Tenderness: There is no abdominal tenderness. There is no CVA tenderness or rebound.      Hernia: No hernia is present.   Genitourinary:     Comments: Wound open, no packed.  Packing replaced  No palpable areas of fluctuance    Sahu in place with yellow urine  Musculoskeletal:         General: No tenderness or edema. Normal range of motion.      Cervical back: Normal range of motion and neck supple.   Lymphadenopathy:      Cervical: No cervical adenopathy.   Skin:     General: Skin is warm and dry.      Findings: No erythema or rash.   Neurological:      Mental Status: He is alert and oriented to person, place, and time.   Psychiatric:         Mood and Affect: Mood and affect normal.         Behavior: Behavior normal.         Thought Content: Thought content normal.         Judgment: Judgment normal.         Significant Labs:    BMP:  Recent Labs   Lab 01/15/22  0340 01/16/22 0457 01/17/22  0345    144 143   K 3.5 3.3* 3.7    108 108   CO2 27 23 23   BUN 6 8 7   CREATININE 1.1 1.0 0.9   CALCIUM 8.3* 8.6* 8.4*       CBC:   Recent Labs   Lab 01/15/22  0340 01/16/22 0457 01/17/22  0345   WBC 17.94* 17.32* 16.48*   HGB 12.8* 13.3* 13.7*   HCT 36.8* 39.8* 41.5    173 126*       Blood Culture:   Recent Labs   Lab 01/10/22  1411   LABBLOO No Growth after 4 days.   No Growth after 4 days.      Urine Culture: No results for  input(s): LABURIN in the last 168 hours.    Significant Imaging:                    Assessment/Plan:     * Scrotal abscess  s/p cystoscopy with santiago placement and debridement of abscess/necrosis of scrotum 1/5/22 with Dr. Rangel.    Continue abx per primary  Cultures growing Candida and E coli  Appreciate wound care RN and floor RN dressing changes  Will continue to monitor condition      Fluid collection drained at bedside 1/14/2022    Get CT pelvis in AM    Will likely need plastics consultation in future for wound closure/grafting    Berna's gangrene   - s/p debridement 1/5/22        VTE Risk Mitigation (From admission, onward)         Ordered     enoxaparin injection 40 mg  Daily         01/13/22 1612     IP VTE LOW RISK PATIENT  Once         01/04/22 2241     Place sequential compression device  Until discontinued         01/04/22 2241                LATASHA Rangel MD  Urology  [facility]

## 2022-01-17 NOTE — ASSESSMENT & PLAN NOTE
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 Cultures growing ampicillin-resistant Ecoli and C albicans. Repeat CT A/P 1/13 w/ 4cm fluid collection c/f persistent abscess which was drained bedside by urology  - urology following  - cont zosyn (re-started for HCAP)  - further ID recs pending  - repeat CT pelvis pending

## 2022-01-17 NOTE — ASSESSMENT & PLAN NOTE
s/p cystoscopy with santiago placement and debridement of abscess/necrosis of scrotum 1/5/22 with Dr. Rangel.    Continue abx per primary  Cultures growing Candida and E coli  Appreciate wound care RN and floor RN dressing changes  Will continue to monitor condition      Fluid collection drained at bedside 1/14/2022    Get CT pelvis in AM    Will likely need plastics consultation in future for wound closure/grafting

## 2022-01-17 NOTE — SUBJECTIVE & OBJECTIVE
Interval History: Disoriented this AM, unable to answer questions coherently.    Review of Systems   Unable to perform ROS: Mental status change     Objective:     Vital Signs (Most Recent):  Temp: 96.2 °F (35.7 °C) (01/17/22 1131)  Pulse: 71 (01/17/22 1131)  Resp: 18 (01/17/22 1131)  BP: 138/70 (01/17/22 1131)  SpO2: (!) 92 % (01/17/22 1131) Vital Signs (24h Range):  Temp:  [96.2 °F (35.7 °C)-98.6 °F (37 °C)] 96.2 °F (35.7 °C)  Pulse:  [71-91] 71  Resp:  [18-20] 18  SpO2:  [92 %-97 %] 92 %  BP: (138-176)/(70-92) 138/70     Weight: 61.2 kg (135 lb)  Body mass index is 19.94 kg/m².    Intake/Output Summary (Last 24 hours) at 1/17/2022 1154  Last data filed at 1/17/2022 0626  Gross per 24 hour   Intake 1518.68 ml   Output 900 ml   Net 618.68 ml      Physical Exam  Constitutional:       General: He is not in acute distress.     Appearance: He is ill-appearing. He is not toxic-appearing or diaphoretic.   Cardiovascular:      Rate and Rhythm: Normal rate and regular rhythm.      Heart sounds: No murmur heard.  No gallop.    Pulmonary:      Effort: Pulmonary effort is normal. No respiratory distress.      Breath sounds: No wheezing.      Comments: Coarse breath sounds bilaterally  Abdominal:      General: Bowel sounds are normal. There is no distension.      Palpations: Abdomen is soft.      Tenderness: There is no abdominal tenderness.   Neurological:      Comments: Disorientation, mumbling, unable to answer questions appropriately. No focal neuro deficits seen.         Significant Labs: All pertinent labs within the past 24 hours have been reviewed.    Significant Imaging: I have reviewed all pertinent imaging results/findings within the past 24 hours.

## 2022-01-17 NOTE — SUBJECTIVE & OBJECTIVE
Interval History: He feels okay, still confused    Review of Systems   Constitutional: Negative.  Negative for fever.   HENT: Negative.    Eyes: Negative.    Respiratory: Negative for cough, chest tightness and shortness of breath.    Cardiovascular: Negative for chest pain.   Gastrointestinal: Negative.  Negative for constipation, diarrhea and nausea.   Genitourinary: Positive for genital sores. Negative for hematuria.   Musculoskeletal: Negative.    Neurological: Negative.    Psychiatric/Behavioral: Negative.      Objective:     Temp:  [97.2 °F (36.2 °C)-98.6 °F (37 °C)] 97.6 °F (36.4 °C)  Pulse:  [73-91] 75  Resp:  [18-20] 18  SpO2:  [92 %-97 %] 93 %  BP: (164-176)/(76-92) 172/79     Body mass index is 19.94 kg/m².           Drains     Drain                 Urethral Catheter 01/05/22 1050 Double-lumen 20 Fr. 12 days                Physical Exam  Vitals and nursing note reviewed.   Constitutional:       Appearance: He is well-developed and well-nourished.   HENT:      Head: Normocephalic.   Eyes:      Conjunctiva/sclera: Conjunctivae normal.   Neck:      Thyroid: No thyromegaly.      Trachea: No tracheal deviation.   Cardiovascular:      Rate and Rhythm: Normal rate.      Heart sounds: Normal heart sounds.   Pulmonary:      Effort: Pulmonary effort is normal. No respiratory distress.      Breath sounds: Normal breath sounds. No wheezing.   Abdominal:      General: Bowel sounds are normal.      Palpations: Abdomen is soft. There is no hepatosplenomegaly.      Tenderness: There is no abdominal tenderness. There is no CVA tenderness or rebound.      Hernia: No hernia is present.   Genitourinary:     Comments: Wound open, no packed.  Packing replaced  No palpable areas of fluctuance    Sahu in place with yellow urine  Musculoskeletal:         General: No tenderness or edema. Normal range of motion.      Cervical back: Normal range of motion and neck supple.   Lymphadenopathy:      Cervical: No cervical adenopathy.    Skin:     General: Skin is warm and dry.      Findings: No erythema or rash.   Neurological:      Mental Status: He is alert and oriented to person, place, and time.   Psychiatric:         Mood and Affect: Mood and affect normal.         Behavior: Behavior normal.         Thought Content: Thought content normal.         Judgment: Judgment normal.         Significant Labs:    BMP:  Recent Labs   Lab 01/15/22  0340 01/16/22  0457 01/17/22  0345    144 143   K 3.5 3.3* 3.7    108 108   CO2 27 23 23   BUN 6 8 7   CREATININE 1.1 1.0 0.9   CALCIUM 8.3* 8.6* 8.4*       CBC:   Recent Labs   Lab 01/15/22  0340 01/16/22  0457 01/17/22  0345   WBC 17.94* 17.32* 16.48*   HGB 12.8* 13.3* 13.7*   HCT 36.8* 39.8* 41.5    173 126*       Blood Culture:   Recent Labs   Lab 01/10/22  1411   LABBLOO No Growth after 4 days.   No Growth after 4 days.      Urine Culture: No results for input(s): LABURIN in the last 168 hours.    Significant Imaging:

## 2022-01-17 NOTE — PROCEDURES
"Abhishek Zhao is a 61 y.o. male patient.    Temp: 96.2 °F (35.7 °C) (01/17/22 1131)  Pulse: 71 (01/17/22 1131)  Resp: 18 (01/17/22 1131)  BP: 138/70 (01/17/22 1131)  SpO2: (!) 92 % (01/17/22 1131)  Weight: 61.2 kg (135 lb) (01/14/22 0745)  Height: 5' 9" (175.3 cm) (01/14/22 [facility])    PICC  Date/Time: 1/17/2022 1:25 PM  Performed by: Kishan Sarmiento RN  Consent Done: Yes  Time out: Immediately prior to procedure a time out was called to verify the correct patient, procedure, equipment, support staff and site/side marked as required  Indications: vascular access and med administration  Anesthesia: local infiltration  Local anesthetic: lidocaine 1% without epinephrine  Anesthetic Total (mL): 2  Preparation: skin prepped with ChloraPrep  Skin prep agent dried: skin prep agent completely dried prior to procedure  Sterile barriers: all five maximum sterile barriers used - cap, mask, sterile gown, sterile gloves, and large sterile sheet  Hand hygiene: hand hygiene performed prior to central venous catheter insertion  Location details: right basilic  Catheter type: double lumen  Catheter size: 5 Fr  Catheter Length: 34cm    Ultrasound guidance: yes  Vessel Caliber: large, compressibility normal  Needle advanced into vessel with real time Ultrasound guidance.  Guidewire confirmed in vessel.  Number of attempts: 1  Post-procedure: blood return through all ports, chlorhexidine patch and sterile dressing applied    Assessment: placement verified by x-ray, successful placement and tip termination          Name Kishan Sarmiento RN   1/17/2022  "

## 2022-01-17 NOTE — PLAN OF CARE
Problem: Physical Therapy Goal  Goal: Physical Therapy Goal  Description: Goals to be met by: 22     Patient will increase functional independence with mobility by performin. Supine to sit with Stand-by Assistance  2. Rolling to Left and Right with Stand-by Assistance.  3. Sit to stand transfer to be assessed   4. Gait to be assessed    5. Sitting at edge of bed x15 minutes with Stand-by Assistance  6. Lower extremity exercise program x10 reps per handout, with assistance as needed    Outcome: Ongoing, Not Progressing   Pt limited 2/2 confusion.  Was able to recognize family member.  Resistive to all functional mobility and sitting up at EOB.  Continue with POC as able.

## 2022-01-18 LAB
ALBUMIN SERPL BCP-MCNC: 1.3 G/DL (ref 3.5–5.2)
ALP SERPL-CCNC: 419 U/L (ref 55–135)
ALT SERPL W/O P-5'-P-CCNC: 17 U/L (ref 10–44)
ANION GAP SERPL CALC-SCNC: 12 MMOL/L (ref 8–16)
ANISOCYTOSIS BLD QL SMEAR: SLIGHT
AST SERPL-CCNC: 39 U/L (ref 10–40)
BASOPHILS NFR BLD: 0 % (ref 0–1.9)
BILIRUB SERPL-MCNC: 0.1 MG/DL (ref 0.1–1)
BUN SERPL-MCNC: 7 MG/DL (ref 6–20)
CALCIUM SERPL-MCNC: 8.4 MG/DL (ref 8.7–10.5)
CHLORIDE SERPL-SCNC: 108 MMOL/L (ref 95–110)
CO2 SERPL-SCNC: 26 MMOL/L (ref 23–29)
CREAT SERPL-MCNC: 0.9 MG/DL (ref 0.5–1.4)
DIFFERENTIAL METHOD: ABNORMAL
EOSINOPHIL NFR BLD: 0 % (ref 0–8)
ERYTHROCYTE [DISTWIDTH] IN BLOOD BY AUTOMATED COUNT: 13.3 % (ref 11.5–14.5)
EST. GFR  (AFRICAN AMERICAN): >60 ML/MIN/1.73 M^2
EST. GFR  (NON AFRICAN AMERICAN): >60 ML/MIN/1.73 M^2
GLUCOSE SERPL-MCNC: 99 MG/DL (ref 70–110)
HCT VFR BLD AUTO: 40 % (ref 40–54)
HGB BLD-MCNC: 12.9 G/DL (ref 14–18)
IMM GRANULOCYTES # BLD AUTO: ABNORMAL K/UL (ref 0–0.04)
IMM GRANULOCYTES NFR BLD AUTO: ABNORMAL % (ref 0–0.5)
LYMPHOCYTES NFR BLD: 15 % (ref 18–48)
MCH RBC QN AUTO: 28.6 PG (ref 27–31)
MCHC RBC AUTO-ENTMCNC: 32.3 G/DL (ref 32–36)
MCV RBC AUTO: 89 FL (ref 82–98)
METAMYELOCYTES NFR BLD MANUAL: 2 %
MONOCYTES NFR BLD: 3 % (ref 4–15)
NEUTROPHILS NFR BLD: 80 % (ref 38–73)
NRBC BLD-RTO: 0 /100 WBC
PLATELET # BLD AUTO: 89 K/UL (ref 150–450)
PLATELET BLD QL SMEAR: ABNORMAL
PMV BLD AUTO: 10.7 FL (ref 9.2–12.9)
POCT GLUCOSE: 117 MG/DL (ref 70–110)
POTASSIUM SERPL-SCNC: 3.7 MMOL/L (ref 3.5–5.1)
PROT SERPL-MCNC: 6.9 G/DL (ref 6–8.4)
RBC # BLD AUTO: 4.51 M/UL (ref 4.6–6.2)
SODIUM SERPL-SCNC: 146 MMOL/L (ref 136–145)
WBC # BLD AUTO: 14.5 K/UL (ref 3.9–12.7)

## 2022-01-18 PROCEDURE — 25500020 PHARM REV CODE 255: Performed by: HOSPITALIST

## 2022-01-18 PROCEDURE — 25000003 PHARM REV CODE 250: Performed by: STUDENT IN AN ORGANIZED HEALTH CARE EDUCATION/TRAINING PROGRAM

## 2022-01-18 PROCEDURE — 97112 NEUROMUSCULAR REEDUCATION: CPT

## 2022-01-18 PROCEDURE — 86580 TB INTRADERMAL TEST: CPT | Performed by: HOSPITALIST

## 2022-01-18 PROCEDURE — 63600175 PHARM REV CODE 636 W HCPCS: Performed by: STUDENT IN AN ORGANIZED HEALTH CARE EDUCATION/TRAINING PROGRAM

## 2022-01-18 PROCEDURE — A4216 STERILE WATER/SALINE, 10 ML: HCPCS | Performed by: STUDENT IN AN ORGANIZED HEALTH CARE EDUCATION/TRAINING PROGRAM

## 2022-01-18 PROCEDURE — 85007 BL SMEAR W/DIFF WBC COUNT: CPT | Performed by: STUDENT IN AN ORGANIZED HEALTH CARE EDUCATION/TRAINING PROGRAM

## 2022-01-18 PROCEDURE — 99232 PR SUBSEQUENT HOSPITAL CARE,LEVL II: ICD-10-PCS | Mod: ,,, | Performed by: UROLOGY

## 2022-01-18 PROCEDURE — 97110 THERAPEUTIC EXERCISES: CPT

## 2022-01-18 PROCEDURE — 99232 PR SUBSEQUENT HOSPITAL CARE,LEVL II: ICD-10-PCS | Mod: ,,, | Performed by: PSYCHIATRY & NEUROLOGY

## 2022-01-18 PROCEDURE — 94761 N-INVAS EAR/PLS OXIMETRY MLT: CPT

## 2022-01-18 PROCEDURE — 85027 COMPLETE CBC AUTOMATED: CPT | Performed by: STUDENT IN AN ORGANIZED HEALTH CARE EDUCATION/TRAINING PROGRAM

## 2022-01-18 PROCEDURE — 11000001 HC ACUTE MED/SURG PRIVATE ROOM

## 2022-01-18 PROCEDURE — 63600175 PHARM REV CODE 636 W HCPCS: Performed by: HOSPITALIST

## 2022-01-18 PROCEDURE — C9254 INJECTION, LACOSAMIDE: HCPCS | Performed by: STUDENT IN AN ORGANIZED HEALTH CARE EDUCATION/TRAINING PROGRAM

## 2022-01-18 PROCEDURE — 36415 COLL VENOUS BLD VENIPUNCTURE: CPT | Performed by: STUDENT IN AN ORGANIZED HEALTH CARE EDUCATION/TRAINING PROGRAM

## 2022-01-18 PROCEDURE — 99232 SBSQ HOSP IP/OBS MODERATE 35: CPT | Mod: ,,, | Performed by: PSYCHIATRY & NEUROLOGY

## 2022-01-18 PROCEDURE — 27000221 HC OXYGEN, UP TO 24 HOURS

## 2022-01-18 PROCEDURE — 25000242 PHARM REV CODE 250 ALT 637 W/ HCPCS: Performed by: STUDENT IN AN ORGANIZED HEALTH CARE EDUCATION/TRAINING PROGRAM

## 2022-01-18 PROCEDURE — 97535 SELF CARE MNGMENT TRAINING: CPT

## 2022-01-18 PROCEDURE — 94640 AIRWAY INHALATION TREATMENT: CPT

## 2022-01-18 PROCEDURE — 97530 THERAPEUTIC ACTIVITIES: CPT

## 2022-01-18 PROCEDURE — 99232 SBSQ HOSP IP/OBS MODERATE 35: CPT | Mod: ,,, | Performed by: UROLOGY

## 2022-01-18 PROCEDURE — 80053 COMPREHEN METABOLIC PANEL: CPT | Performed by: STUDENT IN AN ORGANIZED HEALTH CARE EDUCATION/TRAINING PROGRAM

## 2022-01-18 PROCEDURE — 30200315 PPD INTRADERMAL TEST REV CODE 302: Performed by: HOSPITALIST

## 2022-01-18 RX ORDER — DEXTROSE MONOHYDRATE AND SODIUM CHLORIDE 5; .9 G/100ML; G/100ML
INJECTION, SOLUTION INTRAVENOUS CONTINUOUS
Status: DISCONTINUED | OUTPATIENT
Start: 2022-01-18 | End: 2022-01-19

## 2022-01-18 RX ADMIN — PIPERACILLIN AND TAZOBACTAM 4.5 G: 4; .5 INJECTION, POWDER, LYOPHILIZED, FOR SOLUTION INTRAVENOUS; PARENTERAL at 06:01

## 2022-01-18 RX ADMIN — ATORVASTATIN CALCIUM 80 MG: 40 TABLET, FILM COATED ORAL at 08:01

## 2022-01-18 RX ADMIN — ACETYLCYSTEINE 4 ML: 100 SOLUTION ORAL; RESPIRATORY (INHALATION) at 08:01

## 2022-01-18 RX ADMIN — ASPIRIN 81 MG: 81 TABLET, COATED ORAL at 08:01

## 2022-01-18 RX ADMIN — DEXTROSE 500 MG: 50 INJECTION, SOLUTION INTRAVENOUS at 11:01

## 2022-01-18 RX ADMIN — LAMOTRIGINE 200 MG: 100 TABLET ORAL at 08:01

## 2022-01-18 RX ADMIN — IPRATROPIUM BROMIDE AND ALBUTEROL SULFATE 3 ML: 2.5; .5 SOLUTION RESPIRATORY (INHALATION) at 08:01

## 2022-01-18 RX ADMIN — IOHEXOL 75 ML: 350 INJECTION, SOLUTION INTRAVENOUS at 01:01

## 2022-01-18 RX ADMIN — DEXTROSE 500 MG: 50 INJECTION, SOLUTION INTRAVENOUS at 10:01

## 2022-01-18 RX ADMIN — IPRATROPIUM BROMIDE AND ALBUTEROL SULFATE 3 ML: 2.5; .5 SOLUTION RESPIRATORY (INHALATION) at 01:01

## 2022-01-18 RX ADMIN — DEXTROSE: 10 SOLUTION INTRAVENOUS at 12:01

## 2022-01-18 RX ADMIN — DEXTROSE AND SODIUM CHLORIDE: 5; .9 INJECTION, SOLUTION INTRAVENOUS at 12:01

## 2022-01-18 RX ADMIN — POLYETHYLENE GLYCOL 3350 17 G: 17 POWDER, FOR SOLUTION ORAL at 08:01

## 2022-01-18 RX ADMIN — PIPERACILLIN AND TAZOBACTAM 4.5 G: 4; .5 INJECTION, POWDER, LYOPHILIZED, FOR SOLUTION INTRAVENOUS; PARENTERAL at 11:01

## 2022-01-18 RX ADMIN — TAMSULOSIN HYDROCHLORIDE 0.4 MG: 0.4 CAPSULE ORAL at 08:01

## 2022-01-18 RX ADMIN — ENOXAPARIN SODIUM 40 MG: 40 INJECTION SUBCUTANEOUS at 04:01

## 2022-01-18 RX ADMIN — METOPROLOL SUCCINATE 50 MG: 50 TABLET, EXTENDED RELEASE ORAL at 08:01

## 2022-01-18 RX ADMIN — PIPERACILLIN AND TAZOBACTAM 4.5 G: 4; .5 INJECTION, POWDER, LYOPHILIZED, FOR SOLUTION INTRAVENOUS; PARENTERAL at 02:01

## 2022-01-18 RX ADMIN — SODIUM CHLORIDE 200 MG: 9 INJECTION, SOLUTION INTRAVENOUS at 11:01

## 2022-01-18 RX ADMIN — ACETYLCYSTEINE 4 ML: 100 SOLUTION ORAL; RESPIRATORY (INHALATION) at 01:01

## 2022-01-18 RX ADMIN — Medication 10 ML: at 11:01

## 2022-01-18 RX ADMIN — TUBERCULIN PURIFIED PROTEIN DERIVATIVE 5 UNITS: 5 INJECTION, SOLUTION INTRADERMAL at 10:01

## 2022-01-18 RX ADMIN — Medication 10 ML: at 05:01

## 2022-01-18 RX ADMIN — DEXTROSE AND SODIUM CHLORIDE: 5; .9 INJECTION, SOLUTION INTRAVENOUS at 11:01

## 2022-01-18 RX ADMIN — FLUCONAZOLE 400 MG: 2 INJECTION, SOLUTION INTRAVENOUS at 12:01

## 2022-01-18 RX ADMIN — Medication 10 ML: at 12:01

## 2022-01-18 RX ADMIN — SODIUM CHLORIDE 200 MG: 9 INJECTION, SOLUTION INTRAVENOUS at 10:01

## 2022-01-18 RX ADMIN — AMLODIPINE BESYLATE 10 MG: 5 TABLET ORAL at 08:01

## 2022-01-18 RX ADMIN — Medication 10 ML: at 06:01

## 2022-01-18 NOTE — PLAN OF CARE
Problem: Physical Therapy Goal  Goal: Physical Therapy Goal  Description: Goals to be met by: 22     Patient will increase functional independence with mobility by performin. Supine to sit with Stand-by Assistance  2. Rolling to Left and Right with Stand-by Assistance.  3. Sit to stand transfer to be assessed   4. Gait to be assessed    5. Sitting at edge of bed x15 minutes with Stand-by Assistance  6. Lower extremity exercise program x10 reps per handout, with assistance as needed    Outcome: Ongoing, Progressing   Pt more alert and cooperative today.  Continue with POC as able.

## 2022-01-18 NOTE — PLAN OF CARE
Problem: Adult Inpatient Plan of Care  Goal: Plan of Care Review  1/18/2022 0546 by Nupur Messina RN  Outcome: Ongoing, Progressing  1/18/2022 0454 by Nupur Messina RN  Outcome: Ongoing, Progressing  Goal: Patient-Specific Goal (Individualized)  1/18/2022 0546 by Nupur Messina RN  Outcome: Ongoing, Progressing  1/18/2022 0454 by Nupur Messina RN  Outcome: Ongoing, Progressing  Goal: Absence of Hospital-Acquired Illness or Injury  1/18/2022 0546 by Nupur Messina RN  Outcome: Ongoing, Progressing  1/18/2022 0454 by Nupur Messina RN  Outcome: Ongoing, Progressing  Goal: Optimal Comfort and Wellbeing  1/18/2022 0546 by Nupur Messina RN  Outcome: Ongoing, Progressing  1/18/2022 0454 by Nupur Messina RN  Outcome: Ongoing, Progressing  Goal: Readiness for Transition of Care  1/18/2022 0546 by Nupur Messina RN  Outcome: Ongoing, Progressing  1/18/2022 0454 by Nupur Messina RN  Outcome: Ongoing, Progressing     Problem: Diabetes Comorbidity  Goal: Blood Glucose Level Within Targeted Range  1/18/2022 0546 by Nupur Messina RN  Outcome: Ongoing, Progressing  1/18/2022 0454 by Nupur Messina RN  Outcome: Ongoing, Progressing     Problem: Infection  Goal: Absence of Infection Signs and Symptoms  1/18/2022 0546 by Nupur Messina RN  Outcome: Ongoing, Progressing  1/18/2022 0454 by Nupur Messina RN  Outcome: Ongoing, Progressing     Problem: Adjustment to Illness (Sepsis/Septic Shock)  Goal: Optimal Coping  1/18/2022 0546 by Nupur Messina RN  Outcome: Ongoing, Progressing  1/18/2022 0454 by Nupur Messina RN  Outcome: Ongoing, Progressing     Problem: Bleeding (Sepsis/Septic Shock)  Goal: Absence of Bleeding  1/18/2022 0546 by Nupur Messina RN  Outcome: Ongoing, Progressing  1/18/2022 0454 by Nupur Messina RN  Outcome: Ongoing, Progressing     Problem: Glycemic Control Impaired (Sepsis/Septic Shock)  Goal: Blood Glucose Level Within Desired Range  1/18/2022 0546 by Nupur Messina RN  Outcome:  Ongoing, Progressing  1/18/2022 0454 by Nupur Messina RN  Outcome: Ongoing, Progressing     Problem: Infection Progression (Sepsis/Septic Shock)  Goal: Absence of Infection Signs and Symptoms  1/18/2022 0546 by Nupur Messina RN  Outcome: Ongoing, Progressing  1/18/2022 0454 by Nupur Messina RN  Outcome: Ongoing, Progressing     Problem: Nutrition Impaired (Sepsis/Septic Shock)  Goal: Optimal Nutrition Intake  1/18/2022 0546 by Nupur Messina RN  Outcome: Ongoing, Progressing  1/18/2022 0454 by Nupur Messina RN  Outcome: Ongoing, Progressing     Problem: Fall Injury Risk  Goal: Absence of Fall and Fall-Related Injury  Outcome: Ongoing, Progressing     Problem: Fluid and Electrolyte Imbalance (Acute Kidney Injury/Impairment)  Goal: Fluid and Electrolyte Balance  Outcome: Ongoing, Progressing     Problem: Oral Intake Inadequate (Acute Kidney Injury/Impairment)  Goal: Optimal Nutrition Intake  Outcome: Ongoing, Progressing     Problem: Renal Function Impairment (Acute Kidney Injury/Impairment)  Goal: Effective Renal Function  Outcome: Ongoing, Progressing     Problem: Fluid Imbalance (Pneumonia)  Goal: Fluid Balance  Outcome: Ongoing, Progressing     Problem: Infection (Pneumonia)  Goal: Resolution of Infection Signs and Symptoms  Outcome: Ongoing, Progressing

## 2022-01-18 NOTE — ASSESSMENT & PLAN NOTE
s/p cystoscopy with santiago placement and debridement of abscess/necrosis of scrotum 1/5/22 with Dr. Rangel.    Continue abx per primary  Cultures growing Candida and E coli  Appreciate wound care RN and floor RN dressing changes  Will continue to monitor condition      Fluid collection drained at bedside 1/14/2022    Get CT pelvis today    Will likely need plastics consultation in future for wound closure/grafting

## 2022-01-18 NOTE — PLAN OF CARE
TN placed call for patient contact, left voice message with call back number. TN sent SNF referral packet via care port to multiple facilities pending review. TN emailed referral packet to [facility]. TN to follow up.     [facility] call and passar faxed to [facility]. Awaiting 142       01/18/22 1527   Post-Acute Status   Post-Acute Authorization Placement   Post-Acute Placement Status Referrals Sent   Discharge Delays (!) Post-Acute Set-up   Discharge Plan   Discharge Plan A Skilled Nursing Facility   Discharge Plan B Home Health;Home with family

## 2022-01-18 NOTE — PROGRESS NOTES
"[facility]  Neurology  Progress Note    Patient Name: Abhishek Zhao  MRN: [id number]  Admission Date: 1/4/2022  Hospital Length of Stay: 13 days  Code Status: Full Code   Attending Provider: Quinten Rosario MD  Primary Care Physician: To Obtain Unable   Principal Problem:Scrotal abscess    Subjective:     Interval History: 62 y/o male presented initially with a complaint of testicular pain. Associated with swelling and redness to the scrotum and penis. 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 Urology is following as well as ID. Today pt had an episode in which he became poorly responsive. He had a persistent cough with copious amounts of mucus. He has slowly recovered but is exhibiting "picking" behavior and is intermittently answering questions.     -1/12/22: Pt had an episode in which he was poorly responsive and arms flailing around.     -1/14/22: Pt poorly responsive overnight. Stroke CODE was called. Transferred to ICU.     -1/15/22: Pt is more responsive today. Restless.      -1/16/22: Pt has been transferred out of ICU. Currently calm. Able to states his name and that he is in a hospital.      -1/18/22: Mr. Zhao is poorly responsive today. Staring.    Current Neurological Medications:     Current Facility-Administered Medications   Medication Dose Route Frequency Provider Last Rate Last Admin    acetaminophen tablet 650 mg  650 mg Oral Q6H PRN Hugo Aviles MD   650 mg at 01/11/22 1525    acetylcysteine 100 mg/ml (10%) solution 4 mL  4 mL Nebulization TID FRANCISCO JAVIER Aviles MD   4 mL at 01/18/22 0850    albuterol-ipratropium 2.5 mg-0.5 mg/3 mL nebulizer solution 3 mL  3 mL Nebulization Q4H PRN Hugo Aviles MD   3 mL at 01/18/22 0119    albuterol-ipratropium 2.5 mg-0.5 mg/3 mL nebulizer solution 3 mL  3 mL Nebulization Q4H FRANCISCO JAVIER Aviles MD   3 mL at 01/18/22 0850    aluminum-magnesium hydroxide-simethicone 200-200-20 mg/5 mL suspension 30 mL  " 30 mL Oral QID PRN Hugo Aviles MD        amLODIPine tablet 10 mg  10 mg Oral Daily Hugo Aviles MD   10 mg at 01/18/22 0847    aspirin EC tablet 81 mg  81 mg Oral Daily Hugo Aviles MD   81 mg at 01/18/22 0847    atorvastatin tablet 80 mg  80 mg Oral Daily Hugo Aviles MD   80 mg at 01/18/22 0847    dextrose 5 % and 0.9 % NaCl infusion   Intravenous Continuous Quinten Rosario MD        dextrose 50% injection 12.5 g  12.5 g Intravenous PRN Hugo Aviles MD   12.5 g at 01/16/22 1251    dextrose 50% injection 25 g  25 g Intravenous PRN Hugo Aviles MD        enoxaparin injection 40 mg  40 mg Subcutaneous Daily Hugo Aviles MD   40 mg at 01/17/22 1718    fluconazole (DIFLUCAN) IVPB 400 mg 200 mL  400 mg Intravenous Q24H Hugo Aviles  mL/hr at 01/18/22 1205 400 mg at 01/18/22 1205    glucagon (human recombinant) injection 1 mg  1 mg Intramuscular PRN Hugo Aviles MD        glucose chewable tablet 16 g  16 g Oral PRN Hugo Aviles MD        glucose chewable tablet 24 g  24 g Oral PRN Hugo Aviles MD        hydrALAZINE injection 10 mg  10 mg Intravenous Q6H PRN Hugo Aviles MD        influenza (QUADRIVALENT PF) vaccine 0.5 mL  0.5 mL Intramuscular vaccine x 1 dose Hugo Aviles MD        insulin aspart U-100 pen 0-5 Units  0-5 Units Subcutaneous Q6H PRN Hugo Aviles MD   3 Units at 01/13/22 1817    labetaloL injection 10 mg  10 mg Intravenous Q4H PRN Hugo Aviles MD        lacosamide (VIMPAT) 200 mg in sodium chloride 0.9% 100 mL IVPB  200 mg Intravenous Q12H Hugo Aviles MD   Stopped at 01/18/22 1048    LIDOcaine HCL 10 mg/ml (1%) injection 1 mL  1 mL Intradermal Once PRN Hugo Aviles MD        lorazepam injection 3 mg  3 mg Intravenous Once PRN Hugo Aviles MD        melatonin tablet 6 mg  6 mg Oral Nightly PRN Hugo Aviles MD   6 mg at 01/12/22 2046    metoprolol succinate (TOPROL-XL) 24 hr tablet 50 mg  50 mg Oral  Daily Hugo Aviles MD   50 mg at 01/18/22 0847    naloxone 0.4 mg/mL injection 0.02 mg  0.02 mg Intravenous PRN Huog Aviles MD        ondansetron injection 4 mg  4 mg Intravenous Q8H PRN Hugo Aviles MD   4 mg at 01/12/22 1219    piperacillin-tazobactam 4.5 g in dextrose 5 % 100 mL IVPB (ready to mix system)  4.5 g Intravenous Q8H Hugo Aviles MD   Stopped at 01/18/22 1053    polyethylene glycol packet 17 g  17 g Oral Daily Hugo Aviles MD   17 g at 01/18/22 0847    prochlorperazine injection Soln 5 mg  5 mg Intravenous Q6H PRN Hugo Aviles MD        simethicone chewable tablet 80 mg  1 tablet Oral QID PRN Hugo Aviles MD        sodium chloride 0.9% flush 10 mL  10 mL Intravenous Q8H PRN Hugo Aviles MD   10 mL at 01/17/22 1719    sodium chloride 0.9% flush 10 mL  10 mL Intravenous PRN Hugo Aviles MD        sodium chloride 0.9% flush 10 mL  10 mL Intravenous Q6H Hugo Aviles MD   10 mL at 01/18/22 1208    And    sodium chloride 0.9% flush 10 mL  10 mL Intravenous PRN Hugo Aviles MD        tamsulosin 24 hr capsule 0.4 mg  0.4 mg Oral Daily Hugo Aviles MD   0.4 mg at 01/18/22 0847    valproate (DEPACON) 500 mg in dextrose 5 % 50 mL IVPB  500 mg Intravenous Q12H Hugo Aviles MD   Stopped at 01/18/22 1123       Review of Systems   Unable to perform ROS: Mental status change     Objective:     Vital Signs (Most Recent):  Temp: 96.6 °F (35.9 °C) (01/18/22 1105)  Pulse: 66 (01/18/22 1105)  Resp: 18 (01/18/22 1105)  BP: (!) 141/73 (01/18/22 1105)  SpO2: 95 % (01/18/22 1105) Vital Signs (24h Range):  Temp:  [96.6 °F (35.9 °C)-98.5 °F (36.9 °C)] 96.6 °F (35.9 °C)  Pulse:  [66-84] 66  Resp:  [17-24] 18  SpO2:  [93 %-100 %] 95 %  BP: (122-171)/(65-89) 141/73     Weight: 61.2 kg (135 lb)  Body mass index is 19.94 kg/m².    Physical Exam  Constitutional:       General: He is not in acute distress.  HENT:      Head: Normocephalic.      Right Ear: External  ear normal.      Left Ear: External ear normal.   Eyes:      General:         Right eye: No discharge.         Left eye: No discharge.   Cardiovascular:      Rate and Rhythm: Normal rate.   Pulmonary:      Effort: Pulmonary effort is normal.   Abdominal:      Palpations: Abdomen is soft.   Musculoskeletal:         General: No tenderness.      Cervical back: Neck supple.   Skin:     General: Skin is warm.   Psychiatric:         Speech: Speech is slurred.            NEUROLOGICAL EXAMINATION:      MENTAL STATUS   Speech: slurred  Level of consciousness: drowsy       Oriented to self     CRANIAL NERVES      CN III, IV, VI   Right pupil: Size: 2 mm. Shape: regular.   Left pupil: Size: 2 mm. Shape: regular.   Nystagmus: none   Conjugate gaze: present     CN VII   Right facial weakness: none  Left facial weakness: none     CN XII   Tongue deviation: none     MOTOR EXAM        AROM of UE's and LE's against gravity           Significant Labs:   CBC:   Recent Labs   Lab 01/17/22 0345 01/18/22  0419   WBC 16.48* 14.50*   HGB 13.7* 12.9*   HCT 41.5 40.0   * 89*     CMP:   Recent Labs   Lab 01/17/22 0345 01/18/22  0419   GLU 97 99    146*   K 3.7 3.7    108   CO2 23 26   BUN 7 7   CREATININE 0.9 0.9   CALCIUM 8.4* 8.4*   PROT 6.8 6.9   ALBUMIN 1.4* 1.3*   BILITOT 0.2 0.1   ALKPHOS 382* 419*   AST 36 39   ALT 15 17   ANIONGAP 12 12   EGFRNONAA >60 >60       Significant Imaging: I have reviewed all pertinent imaging results/findings within the past 24 hours.    Assessment and Plan:     62 y/o male consulted for AMS     1. Encephalopathy: encephalopathy of unknown etiology (Infectious? Delirium?. Pt with no hypotension or major metabolic disturbances. Today again he is poorly reposnive           -MRI brain showed no acute stroke. No signs of space-occupying lesion.           -LTM EEG done. Seems to have showing no ongoing seizures. Official report pending.           -EEG and head CT.     2. Hx of seizures:  uncertain of pt having breakthrough events.           Initial EEG showed no ongoing seizures.           -LTM EEG official report pending.           -VPA 500 mg BID.            -Lacosamide 200 mg BID.           -D/C lamotrigine.    Active Diagnoses:    Diagnosis Date Noted POA    PRINCIPAL PROBLEM:  Scrotal abscess [N49.2] 01/04/2022 Yes    Alkaline phosphatase elevation [R74.8] 01/16/2022 Yes    Pericardial effusion [I31.3] 01/15/2022 Yes    Acute encephalopathy [G93.40] 01/14/2022 Yes    Acute respiratory failure with hypoxia and hypercarbia [J96.01, J96.02] 01/10/2022 No    HCAP (healthcare-associated pneumonia) [J18.9] 01/10/2022 No    Encephalopathy, metabolic [G93.41] 01/10/2022 No    Hypokalemia [E87.6] 01/09/2022 No    Sepsis due to Gram negative bacteria [A41.50] 01/07/2022 Yes    JOI (acute kidney injury) [N17.9] 01/07/2022 Yes    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] 01/06/2022 Yes    Adjustment disorder with depressed mood [F43.21] 01/04/2022 Yes     Chronic    Chronic ischemic heart disease [I25.9] 01/04/2022 Yes     Chronic    Cocaine dependence in remission [F14.21] 01/04/2022 Yes     Chronic    Cardiomegaly [I51.7] 01/04/2022 Yes     Chronic    Hyperlipidemia [E78.5] 01/04/2022 Yes     Chronic    Essential hypertension [I10] 01/04/2022 Yes     Chronic    Seizure disorder [G40.909] 01/04/2022 Yes     Chronic    Tobacco user [Z72.0] 01/04/2022 Yes     Chronic    Diabetes mellitus type 2, controlled [E11.9] 08/03/2020 Yes     Chronic      Problems Resolved During this Admission:       VTE Risk Mitigation (From admission, onward)         Ordered     enoxaparin injection 40 mg  Daily         01/13/22 1612     IP VTE LOW RISK PATIENT  Once         01/04/22 2241     Place sequential compression device  Until discontinued         01/04/22 2241                Ashvin Giraldo MD  Neurology  [facility]

## 2022-01-18 NOTE — PLAN OF CARE
Problem: Adult Inpatient Plan of Care  Goal: Plan of Care Review  Outcome: Ongoing, Progressing  Flowsheets (Taken 1/18/2022 1543)  Plan of Care Reviewed With: (sister at bedside) sibling  Goal: Patient-Specific Goal (Individualized)  Outcome: Ongoing, Progressing  Goal: Absence of Hospital-Acquired Illness or Injury  Outcome: Ongoing, Progressing  Intervention: Identify and Manage Fall Risk  Flowsheets (Taken 1/18/2022 1543)  Safety Promotion/Fall Prevention:   assistive device/personal item within reach   bed alarm set   side rails raised x 3   room near unit station   medications reviewed   nonskid shoes/socks when out of bed   /camera at bedside  Intervention: Prevent Skin Injury  Flowsheets (Taken 1/18/2022 1543)  Body Position: weight shifting  Skin Protection: incontinence pads utilized  Intervention: Prevent and Manage VTE (Venous Thromboembolism) Risk  Flowsheets (Taken 1/18/2022 1543)  Activity Management: Rolling - L1  VTE Prevention/Management: bleeding precations maintained  Intervention: Prevent Infection  Flowsheets (Taken 1/18/2022 1543)  Infection Prevention: single patient room provided  Goal: Optimal Comfort and Wellbeing  Outcome: Ongoing, Progressing  Intervention: Monitor Pain and Promote Comfort  Flowsheets (Taken 1/18/2022 1543)  Pain Management Interventions: quiet environment facilitated  Intervention: Provide Person-Centered Care  Flowsheets (Taken 1/18/2022 1543)  Trust Relationship/Rapport:   choices provided   care explained  Goal: Readiness for Transition of Care  Outcome: Ongoing, Progressing     Problem: Diabetes Comorbidity  Goal: Blood Glucose Level Within Targeted Range  Outcome: Ongoing, Progressing  Intervention: Monitor and Manage Glycemia  Flowsheets (Taken 1/18/2022 1543)  Glycemic Management: (Q6H) blood glucose monitored     Problem: Infection  Goal: Absence of Infection Signs and Symptoms  Outcome: Ongoing, Progressing  Intervention: Prevent or  Manage Infection  Flowsheets (Taken 1/18/2022 1543)  Fever Reduction/Comfort Measures: lightweight clothing  Infection Management: aseptic technique maintained     Problem: Infection (Pneumonia)  Goal: Resolution of Infection Signs and Symptoms  Outcome: Ongoing, Progressing  Intervention: Prevent Infection Progression  Flowsheets (Taken 1/18/2022 1543)  Fever Reduction/Comfort Measures: lightweight clothing  Infection Management: aseptic technique maintained     Problem: Confusion Chronic  Goal: Optimal Cognitive Function  Outcome: Ongoing, Progressing  Intervention: Minimize Injury Risk and Provide Safety  Flowsheets (Taken 1/18/2022 1543)  Enhanced Safety Measures: (avasys at bedside)   bed alarm set   room near unit station    at bedside  Intervention: Minimize and Manage Confusion  Flowsheets (Taken 1/18/2022 1543)  Self-Care Promotion: BADL personal routines maintained  Sensory Stimulation Regulation: quiet environment promoted  Family/Support System Care: support provided  Environmental Support: calm environment promoted

## 2022-01-18 NOTE — PLAN OF CARE
[facility] [place] [facility] [place]  Discharge Reassessment    TN attempted re-assessment, nursing care in process, TN to follow up.     Primary Care Provider: To Obtain Unable    Expected Discharge Date:     Reassessment (most recent)     Discharge Reassessment - 01/18/22 1130        Discharge Reassessment    Assessment Type Discharge Planning Reassessment

## 2022-01-18 NOTE — PROGRESS NOTES
[facility] Medicine  Progress Note    Patient Name: Abhishek Zhao  MRN: [id number]  Patient Class: IP- Inpatient   Admission Date: 1/4/2022  Length of Stay: 13 days  Attending Physician: Quinten Rosario MD  Primary Care Provider: To Obtain Unable        Subjective:     Principal Problem:Scrotal abscess        HPI:  61 y.o. male with adjustment disorder with depressed mood, chronic ischemic heart disease, cocaine dependence in remission, cardiomegaly, HLD, HTN, swizure disorder, tobacco use, and DM 2 presents with a complaint of testicular pain.  Associated with swelling and redness to the scrotum and penis along with difficulty urinating, pain is rated as severe and radiates to the rectum.  Denies fever, chills, cough, SOB, chest pain, n/v/d.  In the ED, he was found to be tachypneic, with leukocytosis and elevated lactic.  CT and US shows evidence of multiple scrotal and perineal abscesses with some extension into the penile shaft.  UA with evidence of infection.  Sepsis treatment initiated, blood and urine cultures obtained, IV fluids and IV antibiotics initiated.  Urology consulted.      Overview/Hospital Course:  61 y.o. male with adjustment disorder with depressed mood, chronic ischemic heart disease, cocaine dependence in remission, cardiomegaly, HLD, HTN, swizure disorder, tobacco use, and DM 2 presents with a complaint of testicular pain, found to have multiple scrotal and perineal abscesses. Placed on broad spectrum antibiotics, underwent I&D with urology.    Pt w/ aspiration event 1/11 w/ subsequent desaturation. Respiratory culture grew pseudomonas. Kept on Zosyn. On 1/13 pt had abrupt reduction in responsiveness. Code stroke called, patient transferred to ICU. No stroke found on imaging. Started on extended EEG. Mental status improved, patient stepped back down to the floor.     His mental status improved slowly for a few days, but worsened again 1/17.   He is altered,not  speaking,will do stat head CT,check EEG,re consulted neurology.      Interval History:  He is altered,not speaking,will do stat head CT,check EEG,re consulted neurology.  CC today is confusion.    Review of Systems,not able be done,confused  Objective:     Vital Signs (Most Recent):  Temp: 96.6 °F (35.9 °C) (01/18/22 1105)  Pulse: 66 (01/18/22 1105)  Resp: 18 (01/18/22 1105)  BP: (!) 141/73 (01/18/22 1105)  SpO2: 95 % (01/18/22 1105) Vital Signs (24h Range):  Temp:  [96.6 °F (35.9 °C)-98.5 °F (36.9 °C)] 96.6 °F (35.9 °C)  Pulse:  [66-84] 66  Resp:  [17-24] 18  SpO2:  [93 %-100 %] 95 %  BP: (122-171)/(65-89) 141/73     Weight: 61.2 kg (135 lb)  Body mass index is 19.94 kg/m².    Intake/Output Summary (Last 24 hours) at 1/18/2022 1235  Last data filed at 1/18/2022 1113  Gross per 24 hour   Intake 1721.86 ml   Output 1650 ml   Net 71.86 ml      Physical Exam  Constitutional:       Appearance: Normal appearance.   HENT:      Mouth/Throat:      Mouth: Mucous membranes are dry.   Eyes:      Pupils: Pupils are equal, round, and reactive to light.   Cardiovascular:      Rate and Rhythm: Normal rate.   Pulmonary:      Effort: Pulmonary effort is normal.   Genitourinary:     Comments: Dressing on scrotal area.  Musculoskeletal:         General: No swelling or deformity.   Skin:     Coloration: Skin is not jaundiced.         Significant Labs:   All pertinent labs within the past 24 hours have been reviewed.  BMP:   Recent Labs   Lab 01/18/22 0419   GLU 99   *   K 3.7      CO2 26   BUN 7   CREATININE 0.9   CALCIUM 8.4*     CBC:   Recent Labs   Lab 01/17/22 0345 01/18/22 0419   WBC 16.48* 14.50*   HGB 13.7* 12.9*   HCT 41.5 40.0   * 89*     CMP:   Recent Labs   Lab 01/17/22 0345 01/18/22 0419    146*   K 3.7 3.7    108   CO2 23 26   GLU 97 99   BUN 7 7   CREATININE 0.9 0.9   CALCIUM 8.4* 8.4*   PROT 6.8 6.9   ALBUMIN 1.4* 1.3*   BILITOT 0.2 0.1   ALKPHOS 382* 419*   AST 36 39   ALT 15 17    ANIONGAP 12 12   EGFRNONAA >60 >60       Significant Imaging: I have reviewed all pertinent imaging results/findings within the past 24 hours.      Assessment/Plan:      * Scrotal abscess  Multiple abscesses c/w Berna's gangrene, s/p I&D by urology on 1/5. Cultures growing ampicillin-resistant Ecoli and C albicans. Repeat CT A/P 1/13 w/ 4cm fluid collection c/f persistent abscess which was drained bedside by urology  - urology following  - cont zosyn (re-started for HCAP)  - further ID recs pending  - repeat CT pelvis pending          Alkaline phosphatase elevation  Chronic isolated alk phos (GGT elevated), worsened this admission. Possibly due to AEDs vs occult HPB process.   - further workup depending on clinical course      Pericardial effusion  Small pericardial effusion of unclear etiology      Acute encephalopathy  See metabolic encephalopathy      Encephalopathy, metabolic  CT head and EEG without acute process; likely delirium secondary to infection and prolonged hospitalization. Code stroke called the night of 1/13, however no focal neuro findings, CT head and MRI without acute findings  - neurology consulted  - delirium precautions  - EEG pending  - tx of infection as noted above.   He is altered today ,not speaking,will do stat head CT,check EEG,re consulted neurology.    HCAP (healthcare-associated pneumonia)  - see respiratory failure       Acute respiratory failure with hypoxia and hypercarbia  Cough + new LLL opacity on imaging c/f pneumonia, however CT imaging showing pleural effusion and atelectasis  - respiratory cultures w/ pseudomonas  - cont zosyn  - furosemide as tolerated; no significant evidence of cardiac dysfunction on TTE  - IS        Hypokalemia  Replete PRN    JOI (acute kidney injury)  Resolved      Sepsis due to Gram negative bacteria  Resolved      Berna's gangrene  As above    Diabetes mellitus type 2, controlled  Check a1c  Hold oral antihyperglycemics while inpatient  PRN  sliding scale insulin  ACHS glucose monitoring   ADA diet     Tobacco user  5 minutes spent counseling the patient on smoking cessation and he is not currently ready to stop smoking. He will be offereded a nicotine transdermal patch while hospitalized and monitored for withdrawal.  Will provide additional smoking cessation counseling prior to discharge.     Seizure disorder  Repeated episodes of AMS c/f seizures w/ post ictal period  - neurology consulted  - AEDs per neurology  - EEG pending      Essential hypertension  Well controlled, continue home medications and monitor blood pressure, adjust as needed.     Hyperlipidemia  Continue statin    Cardiomegaly  Pulmonary edema seen on imaging, small pericardial effusion seen on TTE. LVEF low-normal (50%)  - hold diuresis while NPO    Cocaine dependence in remission  Counseled     Chronic ischemic heart disease  No acute issue    Adjustment disorder with depressed mood  Continue home citalopram, hold home seroquel due to somnolence      VTE Risk Mitigation (From admission, onward)         Ordered     enoxaparin injection 40 mg  Daily         01/13/22 1612     IP VTE LOW RISK PATIENT  Once         01/04/22 2241     Place sequential compression device  Until discontinued         01/04/22 2241                Discharge Planning   CHUCK:      Code Status: Full Code   Is the patient medically ready for discharge?:     Reason for patient still in hospital (select all that apply): Patient trending condition  Discharge Plan A: Home Health   Discharge Delays: None known at this time              Quinten Rosario MD  Department of Hospital Medicine   [facility] [place]

## 2022-01-18 NOTE — PLAN OF CARE
Problem: Occupational Therapy Goal  Goal: Occupational Therapy Goal  Description: Goals to be met by: 01/27/21     Patient will increase functional independence with ADLs by performing:    Feeding with Modified [place].  UE Dressing with Supervision.  LE Dressing with Minimal Assistance.  Grooming while seated with Stand-by Assistance.  Sitting at edge of bed x15 minutes with Supervision.  Sit to stand with Minimal Assistance and use of AD.    Supine to sit with Contact Guard Assistance.  Upper extremity exercise program x10 reps per handout, with supervision.    Step transfer- to be assessed pending further pt participation   Toilet transfer to bedside commode - to be assessed pending further pt participation    1/18/2022 1207 by Silke Nicole OT  Outcome: Ongoing, Progressing     Pt alert and able to follow simple commands for participation this date; pt required total A x 2 for performing bed mobility to sit EOB ~20 min for self-feeding; pt required hand-over-hand/max A for performing task. Pt w/ initial complaints of pain in scrotum while seated but was provided w/ relief via green air cushion. Pt was able to perform sit>stand x 2 attempts w/ mod A x 1 person. Pt will continue to benefit from skilled acute OT services to maximize functional capacity for safe performance w/ ADLs and functional mobility.

## 2022-01-18 NOTE — PLAN OF CARE
Problem: Adult Inpatient Plan of Care  Goal: Plan of Care Review  Outcome: Ongoing, Progressing  Goal: Patient-Specific Goal (Individualized)  Outcome: Ongoing, Progressing  Goal: Absence of Hospital-Acquired Illness or Injury  Outcome: Ongoing, Progressing  Goal: Optimal Comfort and Wellbeing  Outcome: Ongoing, Progressing  Goal: Readiness for Transition of Care  Outcome: Ongoing, Progressing     Problem: Diabetes Comorbidity  Goal: Blood Glucose Level Within Targeted Range  Outcome: Ongoing, Progressing     Problem: Infection  Goal: Absence of Infection Signs and Symptoms  Outcome: Ongoing, Progressing     Problem: Adjustment to Illness (Sepsis/Septic Shock)  Goal: Optimal Coping  Outcome: Ongoing, Progressing     Problem: Bleeding (Sepsis/Septic Shock)  Goal: Absence of Bleeding  Outcome: Ongoing, Progressing     Problem: Glycemic Control Impaired (Sepsis/Septic Shock)  Goal: Blood Glucose Level Within Desired Range  Outcome: Ongoing, Progressing     Problem: Infection Progression (Sepsis/Septic Shock)  Goal: Absence of Infection Signs and Symptoms  Outcome: Ongoing, Progressing     Problem: Nutrition Impaired (Sepsis/Septic Shock)  Goal: Optimal Nutrition Intake  Outcome: Ongoing, Progressing

## 2022-01-18 NOTE — PROGRESS NOTES
[facility]  Urology  Progress Note    Patient Name: Abhishek Zhao  MRN: [id number]  Admission Date: 1/4/2022  Hospital Length of Stay: 13 days  Code Status: Full Code   Attending Provider: Quinten Rosario MD   Primary Care Physician: To Obtain Unable    Subjective:     HPI:  Scrotal Swelling  Abhishek Zhao is a 61 y.o. male who was been experiencing scrotal pain and swelling since 12/31/2021.  He denies any fever.  He has had issues voiding since the swelling began.  Hemostat having issues in the past with urinary problems.  He denies having any previous issues with scrotal infection or swelling.  He recalls that he had procedures in the past but cannot recall specifically what to place.  He does not have urologist locally but moved back from [place] about 1 year ago.    Review of care everywhere shows that he had a cystoscopy with urethral dilation of a urethral stricture in the bulbar urethra in 2019 at [facility].  And there are [facility] approximately [facility]      Interval History: No significant changes, no pain, confused    Review of Systems   Constitutional: Negative.    HENT: Negative.    Eyes: Negative.    Respiratory: Negative for cough, chest tightness and shortness of breath.    Cardiovascular: Negative for chest pain.   Gastrointestinal: Negative.  Negative for constipation, diarrhea and nausea.   Genitourinary: Positive for genital sores.   Musculoskeletal: Negative.    Neurological: Negative.    Psychiatric/Behavioral: Negative.      Objective:     Temp:  [96.2 °F (35.7 °C)-98.5 °F (36.9 °C)] 98.5 °F (36.9 °C)  Pulse:  [70-84] 70  Resp:  [18-24] 18  SpO2:  [92 %-100 %] 96 %  BP: (122-171)/(65-89) 171/87     Body mass index is 19.94 kg/m².           Drains     Drain                 Urethral Catheter 01/05/22 1050 Double-lumen 20 Fr. 12 days                Physical Exam  Vitals and nursing note reviewed.   Constitutional:        Appearance: He is well-developed and well-nourished.   HENT:      Head: Normocephalic.   Eyes:      Conjunctiva/sclera: Conjunctivae normal.   Neck:      Thyroid: No thyromegaly.      Trachea: No tracheal deviation.   Cardiovascular:      Rate and Rhythm: Normal rate.      Heart sounds: Normal heart sounds.   Pulmonary:      Effort: Pulmonary effort is normal. No respiratory distress.      Breath sounds: Normal breath sounds. No wheezing.   Abdominal:      General: Bowel sounds are normal.      Palpations: Abdomen is soft. There is no hepatosplenomegaly.      Tenderness: There is no abdominal tenderness. There is no CVA tenderness or rebound.      Hernia: No hernia is present.   Genitourinary:     Comments: Sahu in place, yellow urine  Wound packed, clean, no fluctance  Musculoskeletal:         General: No tenderness or edema. Normal range of motion.      Cervical back: Normal range of motion and neck supple.   Lymphadenopathy:      Cervical: No cervical adenopathy.   Skin:     General: Skin is warm and dry.      Findings: No erythema or rash.   Neurological:      Mental Status: He is alert and oriented to person, place, and time.   Psychiatric:         Mood and Affect: Mood and affect normal.         Behavior: Behavior normal.         Thought Content: Thought content normal.         Judgment: Judgment normal.         Significant Labs:    BMP:  Recent Labs   Lab 01/16/22 0457 01/17/22 0345 01/18/22 0419    143 146*   K 3.3* 3.7 3.7    108 108   CO2 23 23 26   BUN 8 7 7   CREATININE 1.0 0.9 0.9   CALCIUM 8.6* 8.4* 8.4*       CBC:   Recent Labs   Lab 01/16/22 0457 01/17/22 0345 01/18/22  0419   WBC 17.32* 16.48* 14.50*   HGB 13.3* 13.7* 12.9*   HCT 39.8* 41.5 40.0    126* 89*       Blood Culture: No results for input(s): LABBLOO in the last 168 hours.  Urine Culture: No results for input(s): LABURIN in the last 168 hours.    Significant Imaging:                    Assessment/Plan:     *  Scrotal abscess  s/p cystoscopy with santiago placement and debridement of abscess/necrosis of scrotum 1/5/22 with Dr. Rangel.    Continue abx per primary  Cultures growing Candida and E coli  Appreciate wound care RN and floor RN dressing changes  Will continue to monitor condition      Fluid collection drained at bedside 1/14/2022    Get CT pelvis today    Will likely need plastics consultation in future for wound closure/grafting    Berna's gangrene   - s/p debridement 1/5/22        VTE Risk Mitigation (From admission, onward)         Ordered     enoxaparin injection 40 mg  Daily         01/13/22 1612     IP VTE LOW RISK PATIENT  Once         01/04/22 2241     Place sequential compression device  Until discontinued         01/04/22 2241                LATASHA Rangel MD  Urology  [facility]

## 2022-01-18 NOTE — SUBJECTIVE & OBJECTIVE
Interval History:  He is altered,not speaking,will do stat head CT,check EEG,re consulted neurology.  CC today is confusion.    Review of Systems,not able be done,confused  Objective:     Vital Signs (Most Recent):  Temp: 96.6 °F (35.9 °C) (01/18/22 1105)  Pulse: 66 (01/18/22 1105)  Resp: 18 (01/18/22 1105)  BP: (!) 141/73 (01/18/22 1105)  SpO2: 95 % (01/18/22 1105) Vital Signs (24h Range):  Temp:  [96.6 °F (35.9 °C)-98.5 °F (36.9 °C)] 96.6 °F (35.9 °C)  Pulse:  [66-84] 66  Resp:  [17-24] 18  SpO2:  [93 %-100 %] 95 %  BP: (122-171)/(65-89) 141/73     Weight: 61.2 kg (135 lb)  Body mass index is 19.94 kg/m².    Intake/Output Summary (Last 24 hours) at 1/18/2022 1235  Last data filed at 1/18/2022 1113  Gross per 24 hour   Intake 1721.86 ml   Output 1650 ml   Net 71.86 ml      Physical Exam  Constitutional:       Appearance: Normal appearance.   HENT:      Mouth/Throat:      Mouth: Mucous membranes are dry.   Eyes:      Pupils: Pupils are equal, round, and reactive to light.   Cardiovascular:      Rate and Rhythm: Normal rate.   Pulmonary:      Effort: Pulmonary effort is normal.   Genitourinary:     Comments: Dressing on scrotal area.  Musculoskeletal:         General: No swelling or deformity.   Skin:     Coloration: Skin is not jaundiced.         Significant Labs:   All pertinent labs within the past 24 hours have been reviewed.  BMP:   Recent Labs   Lab 01/18/22 0419   GLU 99   *   K 3.7      CO2 26   BUN 7   CREATININE 0.9   CALCIUM 8.4*     CBC:   Recent Labs   Lab 01/17/22 0345 01/18/22 0419   WBC 16.48* 14.50*   HGB 13.7* 12.9*   HCT 41.5 40.0   * 89*     CMP:   Recent Labs   Lab 01/17/22 0345 01/18/22 0419    146*   K 3.7 3.7    108   CO2 23 26   GLU 97 99   BUN 7 7   CREATININE 0.9 0.9   CALCIUM 8.4* 8.4*   PROT 6.8 6.9   ALBUMIN 1.4* 1.3*   BILITOT 0.2 0.1   ALKPHOS 382* 419*   AST 36 39   ALT 15 17   ANIONGAP 12 12   EGFRNONAA >60 >60       Significant Imaging: I have  reviewed all pertinent imaging results/findings within the past 24 hours.

## 2022-01-18 NOTE — PT/OT/SLP PROGRESS
"Physical Therapy Treatment    Patient Name:  Abhishek Zhao   MRN:  [id number]    Recommendations:     Discharge Recommendations:   (SNF vs HHPT andHome with family if they can accomodate him)   Discharge Equipment Recommendations: hospital bed   Barriers to discharge: Pt is confused, not functioning at baseline and requires Increased assistance for all mobility    Assessment:     Abhishek Zhao is a 61 y.o. male admitted with a medical diagnosis of Scrotal abscess.  He presents with the following impairments/functional limitations:  weakness,impaired balance,decreased safety awareness,impaired skin,impaired endurance,pain,visual deficits,impaired cognition,impaired cardiopulmonary response to activity,impaired self care skills,decreased coordination,impaired functional mobilty,decreased upper extremity function,impaired coordination,decreased lower extremity function Pt more alert and cooperative today.    Rehab Prognosis: Fair; patient would benefit from acute skilled PT services to address these deficits and reach maximum level of function.    Recent Surgery: Procedure(s) (LRB):  CYSTOSCOPY, RETROGRADE URETHROGRAM, FISTULAGRAM, EXCISION OF NECROTIC SCROTAL TISSUE ABSCESS, BARKER PLACEMENT (N/A)  EXCISION, ABSCESS  CYSTOSCOPY  FISTULOGRAM (N/A) 13 Days Post-Op    Plan:     During this hospitalization, patient to be seen  (3-5x/wk) to address the identified rehab impairments via therapeutic activities,therapeutic exercises,wheelchair management/training,neuromuscular re-education and progress toward the following goals:    · Plan of Care Expires:  01/27/22    Subjective     Chief Complaint: Pain with sitting at EOB  Patient/Family Comments/goals: Pt agreeable to sit up and eat breakfast  Pain/Comfort:  · Pain Rating 1:  (Unable to rate)  · Location - Orientation 1:  (" my balls")  · Pain Addressed 1: Reposition,Distraction,Cessation of Activity  · Pain Rating Post-Intervention 1:  (Pt sleeping with no " c/o)      Objective:     Communicated with nsg prior to session.  Patient found HOB elevated with bed alarm,oxygen upon PT entry to room.     General Precautions: Standard, aspiration,pureed diet,fall   Orthopedic Precautions:N/A   Braces: N/A  Respiratory Status: Nasal cannula, flow 2 L/min     Functional Mobility:  · Bed Mobility:     · Rolling Left:  total assistance x 2 and with VC/TC for reaching for BR  · Rolling Right: total assistance x 2 with Vc/TC for reaching for BR  · Scooting: Dependent to HOB in supine and Total A x 2 sitting at EOB along EOB with Vc/Tc for forward weight shift over SHELBY  ·  Bridging: Unable  · Supine to Sit: total assistance x 2 and with VC/TC for hand placement and body mechanics  · Sit to Supine: total assistance x 2 and with Vc/Tc for hand placement and body mechanics  · Transfers:     · Sit to Stand:  moderate assistance with no AD and x 2 trials with VC/Tc for forward weight shift over SHELBY  · Gait: Unable  · Balance: Fair sit and FAir-/Poor+ stand      AM-PAC 6 CLICK MOBILITY  Turning over in bed (including adjusting bedclothes, sheets and blankets)?: 2  Sitting down on and standing up from a chair with arms (e.g., wheelchair, bedside commode, etc.): 2  Moving from lying on back to sitting on the side of the bed?: 2  Moving to and from a bed to a chair (including a wheelchair)?: 2  Need to walk in hospital room?: 1  Climbing 3-5 steps with a railing?: 1  Basic Mobility Total Score: 10       Therapeutic Activities and Exercises:  Pt received B LE PROM in available planes x 10 reps and B HCS 3x30 sec during toileting with OT.  Bed mobility training as above.  Pt unable to sit at EOB 2/2 pain.  Pressure relief cushion placed underneath pt in order to sit at EOB.  Pt sat at EOB approx 20m total for feeding activity with OT.  Pt required Total A for balance initially 2/2 pushing posteriorly then gradually progressed to Min  A with VC/TC for forward weight shift over SHELBY  Patient left  Turned to R with wedge behind back abd pillow between knees with pressure releif boots applied with all lines intact, call button in reach and bed alarm on..    GOALS:   Multidisciplinary Problems     Physical Therapy Goals        Problem: Physical Therapy Goal    Goal Priority Disciplines Outcome Goal Variances Interventions   Physical Therapy Goal     PT, PT/OT Ongoing, Progressing     Description: Goals to be met by: 22     Patient will increase functional independence with mobility by performin. Supine to sit with Stand-by Assistance  2. Rolling to Left and Right with Stand-by Assistance.  3. Sit to stand transfer to be assessed   4. Gait to be assessed    5. Sitting at edge of bed x15 minutes with Stand-by Assistance  6. Lower extremity exercise program x10 reps per handout, with assistance as needed                     Time Tracking:     PT Received On: 22  PT Start Time: 913     PT Stop Time: 956  PT Total Time (min): 43 min     Billable Minutes: Therapeutic Activity 8, Therapeutic Exercise 15 and Neuromuscular Re-education 20 Co-treat with OT    Treatment Type: Treatment  PT/PTA: PT     PTA Visit Number: 0     2022

## 2022-01-18 NOTE — PLAN OF CARE
Problem: Adult Inpatient Plan of Care  Goal: Plan of Care Review  Outcome: Ongoing, Progressing  Flowsheets (Taken 1/17/2022 1800)  Plan of Care Reviewed With:   patient   family  Goal: Patient-Specific Goal (Individualized)  Outcome: Ongoing, Progressing  Goal: Absence of Hospital-Acquired Illness or Injury  Outcome: Ongoing, Progressing  Intervention: Identify and Manage Fall Risk  Flowsheets (Taken 1/17/2022 1946)  Safety Promotion/Fall Prevention:   assistive device/personal item within reach   bed alarm set   Fall Risk reviewed with patient/family   medications reviewed   high risk medications identified   nonskid shoes/socks when out of bed   room near unit station   /camera at bedside   instructed to call staff for mobility   side rails raised x 2  Intervention: Prevent and Manage VTE (Venous Thromboembolism) Risk  Flowsheets (Taken 1/17/2022 1800)  VTE Prevention/Management:   bleeding risk assessed   bleeding precations maintained   ambulation promoted  Range of Motion:   ROM (range of motion) performed   active ROM (range of motion) encouraged  Goal: Optimal Comfort and Wellbeing  Outcome: Ongoing, Progressing  Goal: Readiness for Transition of Care  Outcome: Ongoing, Progressing     Problem: Diabetes Comorbidity  Goal: Blood Glucose Level Within Targeted Range  Outcome: Ongoing, Progressing  Intervention: Monitor and Manage Glycemia  Flowsheets (Taken 1/17/2022 1946)  Glycemic Management: blood glucose monitored     Problem: Infection  Goal: Absence of Infection Signs and Symptoms  Outcome: Ongoing, Progressing  Intervention: Prevent or Manage Infection  Flowsheets (Taken 1/17/2022 1800)  Infection Management: aseptic technique maintained     Problem: Adjustment to Illness (Sepsis/Septic Shock)  Goal: Optimal Coping  Outcome: Ongoing, Progressing     Problem: Bleeding (Sepsis/Septic Shock)  Goal: Absence of Bleeding  Outcome: Ongoing, Progressing     Problem: Glycemic Control Impaired  (Sepsis/Septic Shock)  Goal: Blood Glucose Level Within Desired Range  Outcome: Ongoing, Progressing     Problem: Infection Progression (Sepsis/Septic Shock)  Goal: Absence of Infection Signs and Symptoms  Outcome: Ongoing, Progressing     Problem: Nutrition Impaired (Sepsis/Septic Shock)  Goal: Optimal Nutrition Intake  Outcome: Ongoing, Progressing     Problem: Fall Injury Risk  Goal: Absence of Fall and Fall-Related Injury  Outcome: Ongoing, Progressing     Problem: Fluid and Electrolyte Imbalance (Acute Kidney Injury/Impairment)  Goal: Fluid and Electrolyte Balance  Outcome: Ongoing, Progressing     Problem: Oral Intake Inadequate (Acute Kidney Injury/Impairment)  Goal: Optimal Nutrition Intake  Outcome: Ongoing, Progressing     Problem: Renal Function Impairment (Acute Kidney Injury/Impairment)  Goal: Effective Renal Function  Outcome: Ongoing, Progressing     Problem: Fluid Imbalance (Pneumonia)  Goal: Fluid Balance  Outcome: Ongoing, Progressing     Problem: Infection (Pneumonia)  Goal: Resolution of Infection Signs and Symptoms  Outcome: Ongoing, Progressing     Problem: Respiratory Compromise (Pneumonia)  Goal: Effective Oxygenation and Ventilation  Outcome: Ongoing, Progressing     Problem: Skin Injury Risk Increased  Goal: Skin Health and Integrity  Outcome: Ongoing, Progressing     Problem: Seizure Disorder Comorbidity  Goal: Maintenance of Seizure Control  Outcome: Ongoing, Progressing     Problem: Confusion Chronic  Goal: Optimal Cognitive Function  Outcome: Ongoing, Progressing     Problem: Restraint, Nonbehavioral (Nonviolent)  Goal: Absence of Harm or Injury  Outcome: Ongoing, Progressing     Problem: Impaired Wound Healing  Goal: Optimal Wound Healing  Outcome: Ongoing, Progressing

## 2022-01-18 NOTE — PT/OT/SLP PROGRESS
Occupational Therapy   Treatment    Name: Abhishek Zhao  MRN: [id number]  Admitting Diagnosis:  Scrotal abscess  13 Days Post-Op    Recommendations:     Discharge Recommendations: nursing facility, skilled (vs. HHOT w/ 24 hr care)  Discharge Equipment Recommendations:  hospital bed  Barriers to discharge:   (Pt not at PLOF, requiring total A for bed mobility, max A for ADLs and mod A for standing.)    Assessment:     Abhishek Zhao is a 61 y.o. male with a medical diagnosis of Scrotal abscess.  Performance deficits affecting function are weakness,impaired endurance,impaired self care skills,impaired functional mobilty,gait instability,impaired balance,decreased upper extremity function,decreased lower extremity function,decreased safety awareness,decreased coordination,pain,impaired skin.     Pt alert and able to follow simple commands for participation this date; pt required total A x 2 for performing bed mobility to sit EOB ~20 min for self-feeding; pt required hand-over-hand/max A for performing task. Pt w/ initial complaints of pain in scrotum while seated but was provided w/ relief via green air cushion. Pt was able to perform sit>stand x 2 attempts w/ mod A x 1 person. Pt will continue to benefit from skilled acute OT services to maximize functional capacity for safe performance w/ ADLs and functional mobility.     Rehab Prognosis:  Fair; patient would benefit from acute skilled OT services to address these deficits and reach maximum level of function.       Plan:     Patient to be seen 3 x/week,5 x/week to address the above listed problems via self-care/home management,therapeutic activities,therapeutic exercises  · Plan of Care Expires: 01/27/22  · Plan of Care Reviewed with: patient,family    Subjective     Pain/Comfort:  · Pain Rating 1:  (Pt did not rate.)  · Location - Orientation 1: posterior  · Location 1: scrotum  · Pain Addressed 1: Reposition,Distraction,Cessation of Activity  · Pain Rating  Post-Intervention 1:  (Pt w/ some relief w/ use of air cushion.)    Objective:     Communicated with: nurse  prior to session.  Patient found HOB elevated with bed alarm,oxygen upon OT entry to room.    General Precautions: Standard, aspiration,pureed diet,fall   Orthopedic Precautions:N/A   Braces: N/A  Respiratory Status: Nasal cannula, flow 2 L/min; pt's SPO2 >90% throughout session      Occupational Performance:     Bed Mobility:    · Patient completed Rolling/Turning to Left with  total assistance and 2 persons and use of bed rail w/ cueing   · Patient completed Rolling/Turning to Right with total assistance and 2 persons and use of bed rail w/ cueing   · Patient completed Scooting to HOB in supine with dependent x 2 persons; total A x 2 for scooting laterally to HOB while seated   · Patient completed Supine to Sit with total assistance and 2 persons (HOB elevated)   · Pt initially required total A for maintaining sitting balance 2* to posteriorly leaning but with time, was occasionally able to maintain static balance w/ min A   · Patient completed Sit to supine with total assistance and 2 persons     Functional Mobility/Transfers:  · Patient completed Sit <> Stand Transfer with moderate assistance and of 1 person  with  no assistive device   · Pt performed sit>stand x 2 trials for placing green air cushion under sacral spine to offer relief 2* pain in scrotum      Activities of Daily Living:  · Upper Body Dressing: maximum assistance for donning gown over back  · Lower Body Dressing: dependence for donning socks for BLEs   · Toileting: total assistance for performing rear angela-care and donning new brief   · Feeding: pt initially required dep A for initiating feeding activity but w/ encouragement and cueing, pt was able to perform self-feeding w/ max A and hand-over-hand assistance; pt used RUE throughout for scooping as well as bringing cup of liquid to mouth; pt required cueing and increased time for ensuring  safe swallowing; OT implemented feeding techniques such as alternating bites/sips per SLP's recommendations      [facility] 6 Click ADL: 9    Treatment & Education:  -Pt performed ADLs and functional mobility as noted above.   -Time spent on self-feeding task while seated EOB; PT present providing trunk support for ensuring safe sitting balance.   -Pt repositioned in bed w/ BLE pressure relief boots and pillows to prevent pressure injury.  -Questions and concerns addressed within OT scope.     Patient left right sidelying with all lines intact, call button in reach, bed alarm on and BLE pressure relief boots on w/ blanket rolled in between knees; wedge placed on L sideEducation:      GOALS:   Multidisciplinary Problems     Occupational Therapy Goals        Problem: Occupational Therapy Goal    Goal Priority Disciplines Outcome Interventions   Occupational Therapy Goal     OT, PT/OT Ongoing, Progressing    Description: Goals to be met by: 01/27/21     Patient will increase functional independence with ADLs by performing:    Feeding with Modified [place].  UE Dressing with Supervision.  LE Dressing with Minimal Assistance.  Grooming while seated with Stand-by Assistance.  Sitting at edge of bed x15 minutes with Supervision.  Sit to stand with Minimal Assistance and use of AD.    Supine to sit with Contact Guard Assistance.  Upper extremity exercise program x10 reps per handout, with supervision.    Step transfer- to be assessed pending further pt participation   Toilet transfer to bedside commode - to be assessed pending further pt participation                     Time Tracking:     OT Date of Treatment: 01/18/22  OT Start Time: 0915  OT Stop Time: 0955  OT Total Time (min): 40 min    Billable Minutes:Self Care/Home Management 40  Total Time 40 (co-tx w/ PT)     OT/ALOK: OT          1/18/2022

## 2022-01-18 NOTE — PT/OT/SLP PROGRESS
Speech Language Pathology      Abhishek Zhao  MRN: [id number]    Patient not seen today secondary to pt somnolent, and unable to sustain alertness for po trials. Pt's sister present at bedside, and states that pt's dcr alertness may be due to seizure medication, and MD came in to discuss potentially changing meds. ST will f/u 1/18. ST recs pt remain on pureed diet a this time, and only consume meals when fully alert and awake.     Deloris Newby, CCC-SLP

## 2022-01-18 NOTE — PLAN OF CARE
Attempted to see patient twice- was getting a bath, then taken to CT scan. Will f/u with him tomorrow

## 2022-01-18 NOTE — SUBJECTIVE & OBJECTIVE
Interval History: No significant changes, no pain, confused    Review of Systems   Constitutional: Negative.    HENT: Negative.    Eyes: Negative.    Respiratory: Negative for cough, chest tightness and shortness of breath.    Cardiovascular: Negative for chest pain.   Gastrointestinal: Negative.  Negative for constipation, diarrhea and nausea.   Genitourinary: Positive for genital sores.   Musculoskeletal: Negative.    Neurological: Negative.    Psychiatric/Behavioral: Negative.      Objective:     Temp:  [96.2 °F (35.7 °C)-98.5 °F (36.9 °C)] 98.5 °F (36.9 °C)  Pulse:  [70-84] 70  Resp:  [18-24] 18  SpO2:  [92 %-100 %] 96 %  BP: (122-171)/(65-89) 171/87     Body mass index is 19.94 kg/m².           Drains     Drain                 Urethral Catheter 01/05/22 1050 Double-lumen 20 Fr. 12 days                Physical Exam  Vitals and nursing note reviewed.   Constitutional:       Appearance: He is well-developed and well-nourished.   HENT:      Head: Normocephalic.   Eyes:      Conjunctiva/sclera: Conjunctivae normal.   Neck:      Thyroid: No thyromegaly.      Trachea: No tracheal deviation.   Cardiovascular:      Rate and Rhythm: Normal rate.      Heart sounds: Normal heart sounds.   Pulmonary:      Effort: Pulmonary effort is normal. No respiratory distress.      Breath sounds: Normal breath sounds. No wheezing.   Abdominal:      General: Bowel sounds are normal.      Palpations: Abdomen is soft. There is no hepatosplenomegaly.      Tenderness: There is no abdominal tenderness. There is no CVA tenderness or rebound.      Hernia: No hernia is present.   Genitourinary:     Comments: Sahu in place, yellow urine  Wound packed, clean, no fluctance  Musculoskeletal:         General: No tenderness or edema. Normal range of motion.      Cervical back: Normal range of motion and neck supple.   Lymphadenopathy:      Cervical: No cervical adenopathy.   Skin:     General: Skin is warm and dry.      Findings: No erythema or  rash.   Neurological:      Mental Status: He is alert and oriented to person, place, and time.   Psychiatric:         Mood and Affect: Mood and affect normal.         Behavior: Behavior normal.         Thought Content: Thought content normal.         Judgment: Judgment normal.         Significant Labs:    BMP:  Recent Labs   Lab 01/16/22 0457 01/17/22 0345 01/18/22 0419    143 146*   K 3.3* 3.7 3.7    108 108   CO2 23 23 26   BUN 8 7 7   CREATININE 1.0 0.9 0.9   CALCIUM 8.6* 8.4* 8.4*       CBC:   Recent Labs   Lab 01/16/22 0457 01/17/22 0345 01/18/22 0419   WBC 17.32* 16.48* 14.50*   HGB 13.3* 13.7* 12.9*   HCT 39.8* 41.5 40.0    126* 89*       Blood Culture: No results for input(s): LABBLOO in the last 168 hours.  Urine Culture: No results for input(s): LABURIN in the last 168 hours.    Significant Imaging:

## 2022-01-19 LAB
ALBUMIN SERPL BCP-MCNC: 1.2 G/DL (ref 3.5–5.2)
ALP SERPL-CCNC: 352 U/L (ref 55–135)
ALT SERPL W/O P-5'-P-CCNC: 12 U/L (ref 10–44)
ANION GAP SERPL CALC-SCNC: 10 MMOL/L (ref 8–16)
AST SERPL-CCNC: 30 U/L (ref 10–40)
BASOPHILS # BLD AUTO: ABNORMAL K/UL (ref 0–0.2)
BASOPHILS NFR BLD: 0 % (ref 0–1.9)
BILIRUB SERPL-MCNC: 0.2 MG/DL (ref 0.1–1)
BUN SERPL-MCNC: 6 MG/DL (ref 6–20)
CALCIUM SERPL-MCNC: 8.2 MG/DL (ref 8.7–10.5)
CHLORIDE SERPL-SCNC: 111 MMOL/L (ref 95–110)
CO2 SERPL-SCNC: 26 MMOL/L (ref 23–29)
CREAT SERPL-MCNC: 0.9 MG/DL (ref 0.5–1.4)
DIFFERENTIAL METHOD: ABNORMAL
EOSINOPHIL # BLD AUTO: ABNORMAL K/UL (ref 0–0.5)
EOSINOPHIL NFR BLD: 1 % (ref 0–8)
ERYTHROCYTE [DISTWIDTH] IN BLOOD BY AUTOMATED COUNT: 13.5 % (ref 11.5–14.5)
EST. GFR  (AFRICAN AMERICAN): >60 ML/MIN/1.73 M^2
EST. GFR  (NON AFRICAN AMERICAN): >60 ML/MIN/1.73 M^2
GLUCOSE SERPL-MCNC: 147 MG/DL (ref 70–110)
HCT VFR BLD AUTO: 36.8 % (ref 40–54)
HGB BLD-MCNC: 12 G/DL (ref 14–18)
IMM GRANULOCYTES # BLD AUTO: ABNORMAL K/UL (ref 0–0.04)
IMM GRANULOCYTES NFR BLD AUTO: ABNORMAL % (ref 0–0.5)
LYMPHOCYTES # BLD AUTO: ABNORMAL K/UL (ref 1–4.8)
LYMPHOCYTES NFR BLD: 21 % (ref 18–48)
MCH RBC QN AUTO: 28 PG (ref 27–31)
MCHC RBC AUTO-ENTMCNC: 32.6 G/DL (ref 32–36)
MCV RBC AUTO: 86 FL (ref 82–98)
METAMYELOCYTES NFR BLD MANUAL: 2 %
MONOCYTES # BLD AUTO: ABNORMAL K/UL (ref 0.3–1)
MONOCYTES NFR BLD: 5 % (ref 4–15)
MYELOCYTES NFR BLD MANUAL: 3 %
NEUTROPHILS NFR BLD: 57 % (ref 38–73)
NEUTS BAND NFR BLD MANUAL: 11 %
NRBC BLD-RTO: 0 /100 WBC
PLATELET # BLD AUTO: 62 K/UL (ref 150–450)
PMV BLD AUTO: 10.9 FL (ref 9.2–12.9)
POCT GLUCOSE: 120 MG/DL (ref 70–110)
POCT GLUCOSE: 136 MG/DL (ref 70–110)
POCT GLUCOSE: 340 MG/DL (ref 70–110)
POCT GLUCOSE: 45 MG/DL (ref 70–110)
POCT GLUCOSE: 498 MG/DL (ref 70–110)
POCT GLUCOSE: 51 MG/DL (ref 70–110)
POTASSIUM SERPL-SCNC: 3.2 MMOL/L (ref 3.5–5.1)
PROT SERPL-MCNC: 6.4 G/DL (ref 6–8.4)
RBC # BLD AUTO: 4.28 M/UL (ref 4.6–6.2)
SODIUM SERPL-SCNC: 147 MMOL/L (ref 136–145)
WBC # BLD AUTO: 8.86 K/UL (ref 3.9–12.7)

## 2022-01-19 PROCEDURE — 25000003 PHARM REV CODE 250: Performed by: STUDENT IN AN ORGANIZED HEALTH CARE EDUCATION/TRAINING PROGRAM

## 2022-01-19 PROCEDURE — 99233 PR SUBSEQUENT HOSPITAL CARE,LEVL III: ICD-10-PCS | Mod: ,,, | Performed by: STUDENT IN AN ORGANIZED HEALTH CARE EDUCATION/TRAINING PROGRAM

## 2022-01-19 PROCEDURE — 97112 NEUROMUSCULAR REEDUCATION: CPT

## 2022-01-19 PROCEDURE — 92526 ORAL FUNCTION THERAPY: CPT

## 2022-01-19 PROCEDURE — A4216 STERILE WATER/SALINE, 10 ML: HCPCS | Performed by: STUDENT IN AN ORGANIZED HEALTH CARE EDUCATION/TRAINING PROGRAM

## 2022-01-19 PROCEDURE — 97530 THERAPEUTIC ACTIVITIES: CPT

## 2022-01-19 PROCEDURE — 25000242 PHARM REV CODE 250 ALT 637 W/ HCPCS: Performed by: STUDENT IN AN ORGANIZED HEALTH CARE EDUCATION/TRAINING PROGRAM

## 2022-01-19 PROCEDURE — C9254 INJECTION, LACOSAMIDE: HCPCS | Performed by: STUDENT IN AN ORGANIZED HEALTH CARE EDUCATION/TRAINING PROGRAM

## 2022-01-19 PROCEDURE — 80053 COMPREHEN METABOLIC PANEL: CPT | Performed by: STUDENT IN AN ORGANIZED HEALTH CARE EDUCATION/TRAINING PROGRAM

## 2022-01-19 PROCEDURE — 25000003 PHARM REV CODE 250: Performed by: HOSPITALIST

## 2022-01-19 PROCEDURE — 99233 SBSQ HOSP IP/OBS HIGH 50: CPT | Mod: ,,, | Performed by: STUDENT IN AN ORGANIZED HEALTH CARE EDUCATION/TRAINING PROGRAM

## 2022-01-19 PROCEDURE — 85027 COMPLETE CBC AUTOMATED: CPT | Performed by: STUDENT IN AN ORGANIZED HEALTH CARE EDUCATION/TRAINING PROGRAM

## 2022-01-19 PROCEDURE — 94799 UNLISTED PULMONARY SVC/PX: CPT

## 2022-01-19 PROCEDURE — 97110 THERAPEUTIC EXERCISES: CPT

## 2022-01-19 PROCEDURE — 94761 N-INVAS EAR/PLS OXIMETRY MLT: CPT

## 2022-01-19 PROCEDURE — 94640 AIRWAY INHALATION TREATMENT: CPT

## 2022-01-19 PROCEDURE — 63600175 PHARM REV CODE 636 W HCPCS: Performed by: STUDENT IN AN ORGANIZED HEALTH CARE EDUCATION/TRAINING PROGRAM

## 2022-01-19 PROCEDURE — 85007 BL SMEAR W/DIFF WBC COUNT: CPT | Performed by: STUDENT IN AN ORGANIZED HEALTH CARE EDUCATION/TRAINING PROGRAM

## 2022-01-19 PROCEDURE — 11000001 HC ACUTE MED/SURG PRIVATE ROOM

## 2022-01-19 PROCEDURE — 36415 COLL VENOUS BLD VENIPUNCTURE: CPT | Performed by: STUDENT IN AN ORGANIZED HEALTH CARE EDUCATION/TRAINING PROGRAM

## 2022-01-19 PROCEDURE — 63600175 PHARM REV CODE 636 W HCPCS: Performed by: HOSPITALIST

## 2022-01-19 RX ORDER — DEXTROSE MONOHYDRATE, SODIUM CHLORIDE, AND POTASSIUM CHLORIDE 50; 2.98; 4.5 G/1000ML; G/1000ML; G/1000ML
INJECTION, SOLUTION INTRAVENOUS CONTINUOUS
Status: DISCONTINUED | OUTPATIENT
Start: 2022-01-19 | End: 2022-01-21

## 2022-01-19 RX ORDER — HYDRALAZINE HYDROCHLORIDE 25 MG/1
50 TABLET, FILM COATED ORAL EVERY 8 HOURS
Status: DISCONTINUED | OUTPATIENT
Start: 2022-01-19 | End: 2022-01-25

## 2022-01-19 RX ADMIN — ATORVASTATIN CALCIUM 80 MG: 40 TABLET, FILM COATED ORAL at 09:01

## 2022-01-19 RX ADMIN — IPRATROPIUM BROMIDE AND ALBUTEROL SULFATE 3 ML: 2.5; .5 SOLUTION RESPIRATORY (INHALATION) at 12:01

## 2022-01-19 RX ADMIN — DEXTROSE MONOHYDRATE 25 G: 25 INJECTION, SOLUTION INTRAVENOUS at 04:01

## 2022-01-19 RX ADMIN — IPRATROPIUM BROMIDE AND ALBUTEROL SULFATE 3 ML: 2.5; .5 SOLUTION RESPIRATORY (INHALATION) at 04:01

## 2022-01-19 RX ADMIN — SODIUM CHLORIDE 200 MG: 9 INJECTION, SOLUTION INTRAVENOUS at 10:01

## 2022-01-19 RX ADMIN — Medication 10 ML: at 06:01

## 2022-01-19 RX ADMIN — DEXTROSE MONOHYDRATE, SODIUM CHLORIDE, AND POTASSIUM CHLORIDE: 50; 4.5; 2.98 INJECTION, SOLUTION INTRAVENOUS at 09:01

## 2022-01-19 RX ADMIN — METOPROLOL SUCCINATE 50 MG: 50 TABLET, EXTENDED RELEASE ORAL at 09:01

## 2022-01-19 RX ADMIN — AMLODIPINE BESYLATE 10 MG: 5 TABLET ORAL at 09:01

## 2022-01-19 RX ADMIN — PIPERACILLIN AND TAZOBACTAM 4.5 G: 4; .5 INJECTION, POWDER, LYOPHILIZED, FOR SOLUTION INTRAVENOUS; PARENTERAL at 03:01

## 2022-01-19 RX ADMIN — ASPIRIN 81 MG: 81 TABLET, COATED ORAL at 09:01

## 2022-01-19 RX ADMIN — Medication 10 ML: at 11:01

## 2022-01-19 RX ADMIN — HYDRALAZINE HYDROCHLORIDE 50 MG: 25 TABLET, FILM COATED ORAL at 01:01

## 2022-01-19 RX ADMIN — DEXTROSE 500 MG: 50 INJECTION, SOLUTION INTRAVENOUS at 09:01

## 2022-01-19 RX ADMIN — INSULIN ASPART 4 UNITS: 100 INJECTION, SOLUTION INTRAVENOUS; SUBCUTANEOUS at 12:01

## 2022-01-19 RX ADMIN — DEXTROSE 500 MG: 50 INJECTION, SOLUTION INTRAVENOUS at 12:01

## 2022-01-19 RX ADMIN — PIPERACILLIN AND TAZOBACTAM 4.5 G: 4; .5 INJECTION, POWDER, LYOPHILIZED, FOR SOLUTION INTRAVENOUS; PARENTERAL at 06:01

## 2022-01-19 RX ADMIN — ACETYLCYSTEINE 4 ML: 100 SOLUTION ORAL; RESPIRATORY (INHALATION) at 12:01

## 2022-01-19 RX ADMIN — HYDRALAZINE HYDROCHLORIDE 50 MG: 25 TABLET, FILM COATED ORAL at 09:01

## 2022-01-19 RX ADMIN — IPRATROPIUM BROMIDE AND ALBUTEROL SULFATE 3 ML: 2.5; .5 SOLUTION RESPIRATORY (INHALATION) at 08:01

## 2022-01-19 RX ADMIN — ENOXAPARIN SODIUM 40 MG: 40 INJECTION SUBCUTANEOUS at 04:01

## 2022-01-19 RX ADMIN — ACETYLCYSTEINE 4 ML: 100 SOLUTION ORAL; RESPIRATORY (INHALATION) at 08:01

## 2022-01-19 RX ADMIN — FLUCONAZOLE 400 MG: 2 INJECTION, SOLUTION INTRAVENOUS at 10:01

## 2022-01-19 RX ADMIN — TAMSULOSIN HYDROCHLORIDE 0.4 MG: 0.4 CAPSULE ORAL at 09:01

## 2022-01-19 NOTE — PT/OT/SLP PROGRESS
Speech Language Pathology Treatment    Patient Name:  Abhishek Zhao   MRN:  [id number]  Admitting Diagnosis: Scrotal abscess    Recommendations:                 General Recommendations:  Dysphagia therapy  Diet recommendations:  Puree, Liquid Diet Level: Thin   Aspiration Precautions: 1 bite/sip at a time, Assistance with meals, Frequent oral care, Meds crushed in puree and Small bites/sips   General Precautions: Standard, pureed diet,aspiration  Communication strategies:  provide increased time to answer    Subjective     Pt with notable increase in alertness, and ability to sustain alertness throughout tx session. Pt was able to consume oral meds crushed in puree upon ST entrance.    Pain/Comfort:  ·  No pain    Respiratory Status: Nasal cannula, flow 2 L/min    Objective:     Has the patient been evaluated by SLP for swallowing?   Yes  Keep patient NPO? No   Current Respiratory Status:        The pt consumed half a cup of applesauce, and x4 single-straw sips of water w/o overt s/s of aspiration. ST noted pt's mild prolonged mastication of pureed consistencies, and oral holding, however, with increased time the pt was able to clear all boluses from the oral cavity.     Assessment:     The pt is safe to resume puree diet with thin liquids at this time 2/2 increased alertness. Meds cont to be crushed in puree. ST will cont to follow.     Goals:   Multidisciplinary Problems     SLP Goals        Problem: SLP Goal    Goal Priority Disciplines Outcome   SLP Goal    Medium SLP Ongoing, Progressing   Description: Goals:  1. Pt will tolerate puree diet/thin liquids w/o overt s/s of aspiration.     2. Pt will tolerate mech soft diet (IDDSI-5) with thin liquids w/o overt s/s of aspiration. (on hold 1/16/22, pending pt progress)                   Plan:     · Patient to be seen:  3 x/week   · Plan of Care expires:     · Plan of Care reviewed with:  patient,significant other   · SLP Follow-Up:  Yes       Discharge  recommendations:  other (see comments) (TBD)   Barriers to Discharge:  None    Time Tracking:     SLP Treatment Date:   01/19/22  Speech Start Time:  0910  Speech Stop Time:  0920     Speech Total Time (min):  10 min    Billable Minutes: Treatment Swallowing Dysfunction 10    01/19/2022

## 2022-01-19 NOTE — ASSESSMENT & PLAN NOTE
CT head and EEG without acute process; likely delirium secondary to infection and prolonged hospitalization. Code stroke called the night of 1/13, however no focal neuro findings, CT head and MRI without acute findings  - neurology consulted  - delirium precautions  - EEG pending  - tx of infection as noted above.   He was altered,was not speaking,head CT show no acute process,EEG,per neurology show nos eizure activity,todayb is much alert,appear to be delirious,will monitor.

## 2022-01-19 NOTE — ASSESSMENT & PLAN NOTE
s/p cystoscopy with santiago placement and debridement of abscess/necrosis of scrotum 1/5/22 with Dr. Rangel.  Fluid collection drained at bedside 1/14/2022  CT from 1/18/22 w/ R corporal body fluid collection concerning for residual abscess vs fluid collection      Continue abx per primary  Cultures growing Candida and E coli  Appreciate wound care RN and floor RN dressing changes  Will continue to monitor condition  WBC continues to improve  Afebrile      Recommend IR consultation to evaluate feasibility of aspiration of the R corporal body as patient not a candidate for another general anesthetic procedure given altered mental status.   Santiago catheter repositioned during rounds, bladder scan to ensure bladder is empty. 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 Can also consider suprapubic tube placement with interventional radiology to complete divert urine away from the affected areas.      Will likely need plastics consultation in future for wound closure/grafting

## 2022-01-19 NOTE — PROGRESS NOTES
"[facility] - [place], [place] - [facility]  Adult Nutrition   Progress Note (Follow-Up)    SUMMARY     Recommendations/Interventions:  1) Continue IDDSI 4, thin liquid diet as tolerated by pt, encourage PO intake - Add diabetic restriction if blood glucose consistently >200  2) Add Bubba BID to assist in meeting needs  3) hypokalemia, hyperglycemia, hypocalcemia, hypernatremia    Goals:   1) Pt to consistently meet >75% EEN/EPN by PO/ONS intake  2) Nutrition related labs to trend WNL    Nutrition Goal Status: new  Communication of RD Recs:  (POC)    Assessment and Plan  Nutrition Problem  Inadequate oral intake    Related to (etiology):   Dysphagia, altered mental status     Signs and Symptoms (as evidenced by):   PO intake < 50%    Interventions/Recommendations (treatment strategy):  Texture modified diet (IDDSI 4, thin liquids)  Commercial beverages (Bubba)  Collaboration of care with other providers    Nutrition Diagnosis Status:   Ongoing    Reason for Assessment    Reason For Assessment: RD follow-up  Diagnosis:  (scrotal abscess)  Relevant Medical History: HTN, seizure disorder, adjustment disorder, chronic ischemic heart disease, cocaine dependence in remission, cardiomegaly, HLD, HTN, DM2, tobacco use    Nutrition Risk Screen    Nutrition Risk Screen: no indicators present    Nutrition/Diet History    Spiritual, Cultural Beliefs, Mormonism Practices, Values that Affect Care: no    Anthropometrics    Temp: 97.6 °F (36.4 °C)  Height: 5' 9.02" (175.3 cm)  Height (inches): 69.02 in  Weight Method: Bed Scale  Weight: 61.2 kg (134 lb 14.7 oz)  Weight (lb): 134.92 lb  Ideal Body Weight (IBW), Male: 160.12 lb  % Ideal Body Weight, Male (lb): 84.26 %  BMI (Calculated): 19.9  Usual Body Weight (UBW), k.09 kg  % Usual Body Weight: 95.69  % Weight Change From Usual Weight: -4.51 %       Weight History:  Wt Readings from Last 10 Encounters:   22 61.2 kg (134 lb 14.7 oz)   21 64 kg (141 lb)   21 64 kg (141 lb) "   08/29/16 81.6 kg (180 lb)       Lab/Procedures/Meds: Pertinent Labs Reviewed  CBC:  Recent Labs   Lab 01/19/22  0355   WBC 8.86   HGB 12.0*   HCT 36.8*   PLT 62*     CMP:  Recent Labs   Lab 01/19/22  0355   CALCIUM 8.2*   ALBUMIN 1.2*   PROT 6.4   *   K 3.2*   CO2 26   *   BUN 6   CREATININE 0.9   ALKPHOS 352*   ALT 12   AST 30   BILITOT 0.2     Glucose   Date Value Ref Range Status   01/19/2022 147 (H) 70 - 110 mg/dL Final       Medications:Pertinent Medications Reviewed  Scheduled Meds:   acetylcysteine 100 mg/ml (10%)  4 mL Nebulization TID WAKE    albuterol-ipratropium  3 mL Nebulization Q4H WAKE    amLODIPine  10 mg Oral Daily    aspirin  81 mg Oral Daily    atorvastatin  80 mg Oral Daily    enoxaparin  40 mg Subcutaneous Daily    fluconazole (DIFLUCAN) IVPB  400 mg Intravenous Q24H    hydrALAZINE  50 mg Oral Q8H    lacosamide (VIMPAT) IVPB  200 mg Intravenous Q12H    metoprolol succinate  50 mg Oral Daily    piperacillin-tazobactam (ZOSYN) IVPB  4.5 g Intravenous Q8H    polyethylene glycol  17 g Oral Daily    sodium chloride 0.9%  10 mL Intravenous Q6H    tamsulosin  0.4 mg Oral Daily    valproate sodium (DEPACON) IVPB  500 mg Intravenous Q12H     Continuous Infusions:   dextrose 5 % and 0.45 % NaCl with KCl 40 mEq 75 mL/hr at 01/19/22 0906     PRN Meds:.acetaminophen, albuterol-ipratropium, aluminum-magnesium hydroxide-simethicone, dextrose 50%, dextrose 50%, glucagon (human recombinant), glucose, glucose, hydrALAZINE, influenza, insulin aspart U-100, labetalol, LIDOcaine HCL 10 mg/ml (1%), lorazepam, melatonin, naloxone, ondansetron, prochlorperazine, simethicone, sodium chloride 0.9%, sodium chloride 0.9%, Flushing PICC Protocol **AND** sodium chloride 0.9% **AND** sodium chloride 0.9%    Estimated/Assessed Needs    Weight Used For Calorie Calculations: 61.2 kg (134 lb 14.7 oz)  Energy Calorie Requirements (kcal): 2549-8871  Energy Need Method: Kcal/kg (35-40 kcal/kg per  pressure injury and wt loss)  Protein Requirements: 74-92 g (1.2-1.5 g/kg per pressure injury)  Weight Used For Protein Calculations: 61.2 kg (134 lb 14.7 oz)  Fluid Requirements (mL): 2142  Estimated Fluid Requirement Method: RDA Method (or per MD orders)  CHO Requirement: 268 g    Nutrition Prescription Ordered    Current Diet Order: IDDSI 4; thin liquids    Evaluation of Received Nutrient/Fluid Intake    Energy Calories Required: not meeting needs  Protein Required: not meeting needs  Fluid Required: not meeting needs  % Intake of Estimated Energy Needs: 0 - 25 %  % Meal Intake: 0 - 25 %    Intake/Output Summary (Last 24 hours) at 1/19/2022 1516  Last data filed at 1/19/2022 1221  Gross per 24 hour   Intake 120 ml   Output 1850 ml   Net -1730 ml      Nutrition Risk    Level of Risk/Frequency of Follow-up:  (1-2x/week)     Monitor and Evaluation    Food and Nutrient Intake: energy intake,food and beverage intake  Food and Nutrient Adminstration: diet order  Knowledge/Beliefs/Attitudes: food and nutrition knowledge/skill  Physical Activity and Function: nutrition-related ADLs and IADLs  Anthropometric Measurements: weight,weight change,body mass index  Biochemical Data, Medical Tests and Procedures: electrolyte and renal panel,gastrointestinal profile,glucose/endocrine profile,inflammatory profile,lipid profile  Nutrition-Focused Physical Findings: overall appearance,extremities, muscles and bones,head and eyes,skin     Nutrition Follow-Up    RD Follow-up?: Yes

## 2022-01-19 NOTE — PLAN OF CARE
Problem: Adult Inpatient Plan of Care  Goal: Plan of Care Review  Outcome: Ongoing, Progressing  Flowsheets (Taken 1/19/2022 1450)  Plan of Care Reviewed With:   patient   spouse   daughter  Goal: Patient-Specific Goal (Individualized)  Outcome: Ongoing, Progressing  Goal: Absence of Hospital-Acquired Illness or Injury  Outcome: Ongoing, Progressing  Intervention: Identify and Manage Fall Risk  Flowsheets (Taken 1/19/2022 1450)  Safety Promotion/Fall Prevention:   bed alarm set   side rails raised x 3   room near unit station   nonskid shoes/socks when out of bed   /camera at bedside   Fall Risk reviewed with patient/family  Intervention: Prevent Skin Injury  Flowsheets (Taken 1/19/2022 1450)  Body Position: weight shifting  Skin Protection: incontinence pads utilized  Intervention: Prevent and Manage VTE (Venous Thromboembolism) Risk  Flowsheets (Taken 1/19/2022 1450)  Activity Management: Rolling - L1  VTE Prevention/Management: intravenous hydration  Intervention: Prevent Infection  Flowsheets (Taken 1/19/2022 1450)  Infection Prevention:   single patient room provided   rest/sleep promoted  Goal: Optimal Comfort and Wellbeing  Outcome: Ongoing, Progressing  Goal: Readiness for Transition of Care  Outcome: Ongoing, Progressing     Problem: Diabetes Comorbidity  Goal: Blood Glucose Level Within Targeted Range  Outcome: Ongoing, Progressing  Intervention: Monitor and Manage Glycemia  Flowsheets (Taken 1/19/2022 1450)  Glycemic Management: blood glucose monitored     Problem: Infection  Goal: Absence of Infection Signs and Symptoms  Outcome: Ongoing, Progressing  Intervention: Prevent or Manage Infection  Flowsheets (Taken 1/19/2022 1450)  Fever Reduction/Comfort Measures: lightweight clothing  Infection Management: aseptic technique maintained     Problem: Infection Progression (Sepsis/Septic Shock)  Goal: Absence of Infection Signs and Symptoms  Outcome: Ongoing,  Progressing  Intervention: Initiate Sepsis Management  Flowsheets (Taken 1/19/2022 1450)  Infection Prevention:   single patient room provided   rest/sleep promoted  Infection Management: aseptic technique maintained  Intervention: Promote Stabilization  Flowsheets (Taken 1/19/2022 1450)  Fever Reduction/Comfort Measures: lightweight clothing  Intervention: Promote Recovery  Flowsheets (Taken 1/19/2022 1450)  Sleep/Rest Enhancement: regular sleep/rest pattern promoted  Activity Management: Rolling - L1     Problem: Restraint, Nonbehavioral (Nonviolent)  Goal: Absence of Harm or Injury  Outcome: Met

## 2022-01-19 NOTE — PROGRESS NOTES
[facility]  Progress Note    Patient Name: Abhishek Zhao  MRN: [id number]  Patient Class: IP- Inpatient   Admission Date: 1/4/2022  Length of Stay: 14 days  Attending Physician: Quinten Rosario MD  Primary Care Provider: To Obtain Unable        Subjective:     Principal Problem:Scrotal abscess        HPI:  61 y.o. male with adjustment disorder with depressed mood, chronic ischemic heart disease, cocaine dependence in remission, cardiomegaly, HLD, HTN, swizure disorder, tobacco use, and DM 2 presents with a complaint of testicular pain.  Associated with swelling and redness to the scrotum and penis along with difficulty urinating, pain is rated as severe and radiates to the rectum.  Denies fever, chills, cough, SOB, chest pain, n/v/d.  In the ED, he was found to be tachypneic, with leukocytosis and elevated lactic.  CT and US shows evidence of multiple scrotal and perineal abscesses with some extension into the penile shaft.  UA with evidence of infection.  Sepsis treatment initiated, blood and urine cultures obtained, IV fluids and IV antibiotics initiated.  Urology consulted.      Overview/Hospital Course:  61 y.o. male with adjustment disorder with depressed mood, chronic ischemic heart disease, cocaine dependence in remission, cardiomegaly, HLD, HTN, swizure disorder, tobacco use, and DM 2 presents with a complaint of testicular pain, found to have multiple scrotal and perineal abscesses. Placed on broad spectrum antibiotics, underwent I&D with urology.    Pt w/ aspiration event 1/11 w/ subsequent desaturation. Respiratory culture grew pseudomonas. Kept on Zosyn. On 1/13 pt had abrupt reduction in responsiveness. Code stroke called, patient transferred to ICU. No stroke found on imaging. Started on extended EEG. Mental status improved, patient stepped back down to the floor.     His mental status improved slowly for a few days, but worsened again 1/17.   He was altered,was  not speaking,head CT show no acute process,EEG,per neurology show nos eizure activity,todayb is much alert,appear to be delirious,will monitor.      Interval History:  He is altered,not speaking,will do stat head CT,check EEG,re consulted neurology.  CC today is confusion.    Review of Systems  ,not able be done,confused  Objective:     Vital Signs (Most Recent):  Temp: 98.2 °F (36.8 °C) (01/19/22 0747)  Pulse: 64 (01/19/22 0830)  Resp: 20 (01/19/22 0830)  BP: (!) 175/81 (01/19/22 0747)  SpO2: 97 % (01/19/22 0830) Vital Signs (24h Range):  Temp:  [96.8 °F (36 °C)-98.2 °F (36.8 °C)] 98.2 °F (36.8 °C)  Pulse:  [63-88] 64  Resp:  [15-26] 20  SpO2:  [94 %-97 %] 97 %  BP: (139-175)/(77-94) 175/81     Weight: 61.2 kg (135 lb)  Body mass index is 19.94 kg/m².    Intake/Output Summary (Last 24 hours) at 1/19/2022 1218  Last data filed at 1/19/2022 0822  Gross per 24 hour   Intake 0 ml   Output 1850 ml   Net -1850 ml      Physical Exam  Constitutional:       Appearance: Normal appearance.   HENT:      Mouth/Throat:      Mouth: Mucous membranes are dry.   Eyes:      Pupils: Pupils are equal, round, and reactive to light.   Cardiovascular:      Rate and Rhythm: Normal rate.   Pulmonary:      Effort: Pulmonary effort is normal.   Genitourinary:     Comments: Dressing on scrotal area.  Musculoskeletal:         General: No swelling or deformity.   Skin:     Coloration: Skin is not jaundiced.         Significant Labs:   All pertinent labs within the past 24 hours have been reviewed.  BMP:   Recent Labs   Lab 01/19/22  0355   *   *   K 3.2*   *   CO2 26   BUN 6   CREATININE 0.9   CALCIUM 8.2*     CBC:   Recent Labs   Lab 01/18/22  0419 01/19/22  0355   WBC 14.50* 8.86   HGB 12.9* 12.0*   HCT 40.0 36.8*   PLT 89* 62*     CMP:   Recent Labs   Lab 01/18/22  0419 01/19/22  0355   * 147*   K 3.7 3.2*    111*   CO2 26 26   GLU 99 147*   BUN 7 6   CREATININE 0.9 0.9   CALCIUM 8.4* 8.2*   PROT 6.9 6.4    ALBUMIN 1.3* 1.2*   BILITOT 0.1 0.2   ALKPHOS 419* 352*   AST 39 30   ALT 17 12   ANIONGAP 12 10   EGFRNONAA >60 >60       Significant Imaging: I have reviewed all pertinent imaging results/findings within the past 24 hours.      Assessment/Plan:      * Scrotal abscess  Multiple abscesses c/w Berna's gangrene, s/p I&D by urology on 1/5. Cultures growing ampicillin-resistant Ecoli and C albicans. Repeat CT A/P 1/13 w/ 4cm fluid collection c/f persistent abscess which was drained bedside by urology  - urology following  - cont zosyn (re-started for HCAP)  - further ID recs pending  - repeat CT pelvis pending          Alkaline phosphatase elevation  Chronic isolated alk phos (GGT elevated), worsened this admission. Possibly due to AEDs vs occult HPB process.   - further workup depending on clinical course      Pericardial effusion  Small pericardial effusion of unclear etiology      Acute encephalopathy  See metabolic encephalopathy      Encephalopathy, metabolic  CT head and EEG without acute process; likely delirium secondary to infection and prolonged hospitalization. Code stroke called the night of 1/13, however no focal neuro findings, CT head and MRI without acute findings  - neurology consulted  - delirium precautions  - EEG pending  - tx of infection as noted above.   He was altered,was not speaking,head CT show no acute process,EEG,per neurology show nos eizure activity,todayb is much alert,appear to be delirious,will monitor.    HCAP (healthcare-associated pneumonia)  - see respiratory failure       Acute respiratory failure with hypoxia and hypercarbia  Cough + new LLL opacity on imaging c/f pneumonia, however CT imaging showing pleural effusion and atelectasis  - respiratory cultures w/ pseudomonas  - cont zosyn  - furosemide as tolerated; no significant evidence of cardiac dysfunction on TTE  - IS        Hypokalemia  Replete PRN    JOI (acute kidney injury)  Resolved      Sepsis due to Gram negative  bacteria  Resolved      Berna's gangrene  As above    Diabetes mellitus type 2, controlled  Check a1c  Hold oral antihyperglycemics while inpatient  PRN sliding scale insulin  ACHS glucose monitoring   ADA diet     Tobacco user  5 minutes spent counseling the patient on smoking cessation and he is not currently ready to stop smoking. He will be offereded a nicotine transdermal patch while hospitalized and monitored for withdrawal.  Will provide additional smoking cessation counseling prior to discharge.     Seizure disorder  Repeated episodes of AMS c/f seizures w/ post ictal period  - neurology consulted  - AEDs per neurology  - EEG pending      Essential hypertension  Well controlled, continue home medications and monitor blood pressure, adjust as needed.     Hyperlipidemia  Continue statin    Cardiomegaly  Pulmonary edema seen on imaging, small pericardial effusion seen on TTE. LVEF low-normal (50%)  - hold diuresis while NPO    Cocaine dependence in remission  Counseled     Chronic ischemic heart disease  No acute issue    Adjustment disorder with depressed mood  Continue home citalopram, hold home seroquel due to somnolence      VTE Risk Mitigation (From admission, onward)         Ordered     enoxaparin injection 40 mg  Daily         01/13/22 1612     IP VTE LOW RISK PATIENT  Once         01/04/22 2241     Place sequential compression device  Until discontinued         01/04/22 2241                Discharge Planning   CHUCK:      Code Status: Full Code   Is the patient medically ready for discharge?:     Reason for patient still in hospital (select all that apply): Patient trending condition  Discharge Plan A: Skilled Nursing Facility   Discharge Delays: (!) Payor Issues              Quinten Rosario MD  Department of Hospital Medicine   [facility] [place] [facility]

## 2022-01-19 NOTE — PLAN OF CARE
Problem: SLP Goal  Goal: SLP Goal  Description: Goals:  1. Pt will tolerate puree diet/thin liquids w/o overt s/s of aspiration.     2. Pt will tolerate mech soft diet (IDDSI-5) with thin liquids w/o overt s/s of aspiration. (on hold 1/16/22, pending pt progress)  Outcome: Ongoing, Progressing    Pt with increased alertness today. Pt is safe to resume pureed diet with thin liquids at this time.

## 2022-01-19 NOTE — PLAN OF CARE
Recommendations:  1) Continue IDDSI 4, thin liquid diet as tolerated by pt, encourage PO intake - Add diabetic restriction if blood glucose consistently >200  2) Add Bubba BID to assist in meeting needs  3) hypokalemia, hyperglycemia, hypocalcemia, hypernatremia    Goals:   1) Pt to consistently meet >75% EEN/EPN by PO/ONS intake  2) Nutrition related labs to trend WNL    Nutrition Goal Status: new  Communication of RD Recs:  (POC)

## 2022-01-19 NOTE — SUBJECTIVE & OBJECTIVE
Interval History:  He is altered,not speaking,will do stat head CT,check EEG,re consulted neurology.  CC today is confusion.    Review of Systems  ,not able be done,confused  Objective:     Vital Signs (Most Recent):  Temp: 98.2 °F (36.8 °C) (01/19/22 0747)  Pulse: 64 (01/19/22 0830)  Resp: 20 (01/19/22 0830)  BP: (!) 175/81 (01/19/22 0747)  SpO2: 97 % (01/19/22 0830) Vital Signs (24h Range):  Temp:  [96.8 °F (36 °C)-98.2 °F (36.8 °C)] 98.2 °F (36.8 °C)  Pulse:  [63-88] 64  Resp:  [15-26] 20  SpO2:  [94 %-97 %] 97 %  BP: (139-175)/(77-94) 175/81     Weight: 61.2 kg (135 lb)  Body mass index is 19.94 kg/m².    Intake/Output Summary (Last 24 hours) at 1/19/2022 1218  Last data filed at 1/19/2022 0822  Gross per 24 hour   Intake 0 ml   Output 1850 ml   Net -1850 ml      Physical Exam  Constitutional:       Appearance: Normal appearance.   HENT:      Mouth/Throat:      Mouth: Mucous membranes are dry.   Eyes:      Pupils: Pupils are equal, round, and reactive to light.   Cardiovascular:      Rate and Rhythm: Normal rate.   Pulmonary:      Effort: Pulmonary effort is normal.   Genitourinary:     Comments: Dressing on scrotal area.  Musculoskeletal:         General: No swelling or deformity.   Skin:     Coloration: Skin is not jaundiced.         Significant Labs:   All pertinent labs within the past 24 hours have been reviewed.  BMP:   Recent Labs   Lab 01/19/22  0355   *   *   K 3.2*   *   CO2 26   BUN 6   CREATININE 0.9   CALCIUM 8.2*     CBC:   Recent Labs   Lab 01/18/22 0419 01/19/22 0355   WBC 14.50* 8.86   HGB 12.9* 12.0*   HCT 40.0 36.8*   PLT 89* 62*     CMP:   Recent Labs   Lab 01/18/22 0419 01/19/22  0355   * 147*   K 3.7 3.2*    111*   CO2 26 26   GLU 99 147*   BUN 7 6   CREATININE 0.9 0.9   CALCIUM 8.4* 8.2*   PROT 6.9 6.4   ALBUMIN 1.3* 1.2*   BILITOT 0.1 0.2   ALKPHOS 419* 352*   AST 39 30   ALT 17 12   ANIONGAP 12 10   EGFRNONAA >60 >60       Significant Imaging: I have  reviewed all pertinent imaging results/findings within the past 24 hours.

## 2022-01-19 NOTE — PLAN OF CARE
Problem: Adult Inpatient Plan of Care  Goal: Plan of Care Review  Outcome: Ongoing, Progressing  Goal: Patient-Specific Goal (Individualized)  Outcome: Ongoing, Progressing  Goal: Absence of Hospital-Acquired Illness or Injury  Outcome: Ongoing, Progressing  Goal: Optimal Comfort and Wellbeing  Outcome: Ongoing, Progressing     Problem: Adult Inpatient Plan of Care  Goal: Plan of Care Review  Outcome: Ongoing, Progressing  Goal: Patient-Specific Goal (Individualized)  Outcome: Ongoing, Progressing  Goal: Absence of Hospital-Acquired Illness or Injury  Outcome: Ongoing, Progressing  Goal: Optimal Comfort and Wellbeing  Outcome: Ongoing, Progressing     Problem: Adult Inpatient Plan of Care  Goal: Plan of Care Review  Outcome: Ongoing, Progressing  Goal: Patient-Specific Goal (Individualized)  Outcome: Ongoing, Progressing  Goal: Absence of Hospital-Acquired Illness or Injury  Outcome: Ongoing, Progressing  Goal: Optimal Comfort and Wellbeing  Outcome: Ongoing, Progressing     Problem: Adult Inpatient Plan of Care  Goal: Plan of Care Review  Outcome: Ongoing, Progressing  Goal: Patient-Specific Goal (Individualized)  Outcome: Ongoing, Progressing  Goal: Absence of Hospital-Acquired Illness or Injury  Outcome: Ongoing, Progressing  Goal: Optimal Comfort and Wellbeing  Outcome: Ongoing, Progressing     Problem: Adult Inpatient Plan of Care  Goal: Plan of Care Review  Outcome: Ongoing, Progressing  Goal: Patient-Specific Goal (Individualized)  Outcome: Ongoing, Progressing  Goal: Absence of Hospital-Acquired Illness or Injury  Outcome: Ongoing, Progressing  Goal: Optimal Comfort and Wellbeing  Outcome: Ongoing, Progressing     Problem: Adult Inpatient Plan of Care  Goal: Plan of Care Review  1/19/2022 0555 by Nupur Messina RN  Outcome: Ongoing, Progressing  1/19/2022 0555 by Nupur Messina RN  Outcome: Ongoing, Progressing  Goal: Patient-Specific Goal (Individualized)  1/19/2022 0555 by Nupur Messina RN  Outcome:  Ongoing, Progressing  1/19/2022 0555 by Nupur Messina RN  Outcome: Ongoing, Progressing  Goal: Absence of Hospital-Acquired Illness or Injury  1/19/2022 0555 by Nupur Messina RN  Outcome: Ongoing, Progressing  1/19/2022 0555 by Nupur Messina RN  Outcome: Ongoing, Progressing  Goal: Optimal Comfort and Wellbeing  1/19/2022 0555 by Nupur Messina RN  Outcome: Ongoing, Progressing  1/19/2022 0555 by Nupur Messina RN  Outcome: Ongoing, Progressing  Goal: Readiness for Transition of Care  1/19/2022 0555 by Nupur Messina RN  Outcome: Ongoing, Progressing  1/19/2022 0555 by Nupur Messina RN  Outcome: Adequate for Care Transition     Problem: Diabetes Comorbidity  Goal: Blood Glucose Level Within Targeted Range  Outcome: Ongoing, Progressing     Problem: Infection  Goal: Absence of Infection Signs and Symptoms  Outcome: Ongoing, Progressing     Problem: Adjustment to Illness (Sepsis/Septic Shock)  Goal: Optimal Coping  Outcome: Ongoing, Progressing     Problem: Bleeding (Sepsis/Septic Shock)  Goal: Absence of Bleeding  Outcome: Ongoing, Progressing     Problem: Glycemic Control Impaired (Sepsis/Septic Shock)  Goal: Blood Glucose Level Within Desired Range  Outcome: Ongoing, Progressing     Problem: Infection Progression (Sepsis/Septic Shock)  Goal: Absence of Infection Signs and Symptoms  Outcome: Ongoing, Progressing     Problem: Fall Injury Risk  Goal: Absence of Fall and Fall-Related Injury  Outcome: Ongoing, Progressing     Problem: Fluid and Electrolyte Imbalance (Acute Kidney Injury/Impairment)  Goal: Fluid and Electrolyte Balance  Outcome: Ongoing, Progressing

## 2022-01-19 NOTE — PROGRESS NOTES
TN spoke with patient's family at bedside regarding discharge planning for SNF. Per pt's daughter, Paola, [phone], [facility] would be preferred facility. Ms. Guevara, was also accepting to facilities located near or around [place], since he would be close to her.     Per patient's wife Ms. Richey, patient will not reside at the [place] address in patient's chart. Ms. Richey and patient's daughter would not be accepting to [facility] nor [facility].     TN emailed [facility] accepting facility list to patient's daughter for review ([email or web address]).    TN sent additional snf referral packets to [facility] and [facility] for review.

## 2022-01-19 NOTE — PLAN OF CARE
Problem: Occupational Therapy Goal  Goal: Occupational Therapy Goal  Description: Goals to be met by: 01/27/21     Patient will increase functional independence with ADLs by performing:    Feeding with Modified [place].  UE Dressing with Supervision.  LE Dressing with Minimal Assistance.  Grooming while seated with Stand-by Assistance.  Sitting at edge of bed x15 minutes with Supervision.  Sit to stand with Minimal Assistance and use of AD.    Supine to sit with Contact Guard Assistance.  Upper extremity exercise program x10 reps per handout, with supervision.  Step transfer with Moderate Assistance  Stand pivot transfer with Moderate Assistance   Toilet transfer to bedside commode with Moderate Assistance     Outcome: Ongoing, Progressing     Dependent x2 bed mobility. Inconsistently followed few one step commands. Dependent sit<>stand and 1 step at EOB with dependent x2 with BUE supported.

## 2022-01-19 NOTE — SUBJECTIVE & OBJECTIVE
Interval History: Does not respond appropriately to questions  Sahu coiled within stat lock at bedside during 8am rounding      Review of Systems   Unable to perform ROS: Mental status change     Objective:     Temp:  [96.8 °F (36 °C)-98.2 °F (36.8 °C)] 97.6 °F (36.4 °C)  Pulse:  [63-88] 64  Resp:  [15-26] 16  SpO2:  [94 %-97 %] 97 %  BP: (139-175)/(76-94) 163/76     Body mass index is 19.94 kg/m².    Date 01/19/22 0700 - 01/20/22 0659   Shift 3808-7269 8590-8410 1399-5998 24 Hour Total   INTAKE   P.O. 0   0   Shift Total(mL/kg) 0(0)   0(0)   OUTPUT   Shift Total(mL/kg)       Weight (kg) 61.2 61.2 61.2 61.2          Drains     Drain                 Urethral Catheter 01/05/22 1050 Double-lumen 20 Fr. 14 days                Physical Exam  Constitutional:       General: He is not in acute distress.     Appearance: He is well-developed. He is not ill-appearing, toxic-appearing or diaphoretic.   HENT:      Head: Normocephalic and atraumatic.      Mouth/Throat:      Mouth: Mucous membranes are moist.   Eyes:      Conjunctiva/sclera: Conjunctivae normal.   Cardiovascular:      Rate and Rhythm: Normal rate.   Pulmonary:      Effort: No respiratory distress.   Abdominal:      General: Abdomen is flat. There is no distension.      Palpations: Abdomen is soft.   Genitourinary:     Comments: No dressing on perineal wound in place  No induration noted  Sahu catheter kinked at the bedside    Musculoskeletal:         General: No swelling or deformity.      Cervical back: Neck supple.   Skin:     Findings: No rash.   Neurological:      Mental Status: He is oriented to person, place, and time.      Gait: Gait normal.   Psychiatric:         Mood and Affect: Mood normal.         Thought Content: Thought content normal.         Judgment: Judgment normal.         Significant Labs:    BMP:  Recent Labs   Lab 01/17/22  0345 01/18/22  0419 01/19/22  0355    146* 147*   K 3.7 3.7 3.2*    108 111*   CO2 23 26 26   BUN 7 7 6    CREATININE 0.9 0.9 0.9   CALCIUM 8.4* 8.4* 8.2*       CBC:   Recent Labs   Lab 01/17/22  0345 01/18/22  0419 01/19/22  0355   WBC 16.48* 14.50* 8.86   HGB 13.7* 12.9* 12.0*   HCT 41.5 40.0 36.8*   * 89* 62*       Significant Imaging:  Imaging from admission and 1/18/2022 reviewed      Santiago catheter tip tenting dome of urinary bladder  Urine in bladder despite santiago catheter placement  R corporal body fluid collection seen on the image below, adjacent to the urethra

## 2022-01-19 NOTE — PLAN OF CARE
Problem: Physical Therapy Goal  Goal: Physical Therapy Goal  Description: Goals to be met by: 22     Patient will increase functional independence with mobility by performin. Supine to sit with Stand-by Assistance  2. Rolling to Left and Right with Stand-by Assistance.  3. Sit to stand transfer to be assessed   4. Gait to be assessed    5. Sitting at edge of bed x15 minutes with Stand-by Assistance  6. Lower extremity exercise program x10 reps per handout, with assistance as needed    Outcome: Ongoing, Not Progressing   Pt limited 2/2 confusion and Vision deficits.

## 2022-01-19 NOTE — PROGRESS NOTES
[facility]  Urology  Progress Note    Patient Name: Abhishek Zhao  MRN: [id number]  Admission Date: 1/4/2022  Hospital Length of Stay: 14 days  Code Status: Full Code   Attending Provider: Quinten Rosario MD   Primary Care Physician: To Obtain Unable    Subjective:     HPI:  Scrotal Swelling  Abhishek Zhao is a 61 y.o. male who was been experiencing scrotal pain and swelling since 12/31/2021.  He denies any fever.  He has had issues voiding since the swelling began.  Hemostat having issues in the past with urinary problems.  He denies having any previous issues with scrotal infection or swelling.  He recalls that he had procedures in the past but cannot recall specifically what to place.  He does not have urologist locally but moved back from [place] about 1 year ago.    Review of care everywhere shows that he had a cystoscopy with urethral dilation of a urethral stricture in the bulbar urethra in 2019 at [facility].  And there are reports that in approximately 2015 [facility]      Interval History: Does not respond appropriately to questions  Sahu coiled within stat lock at bedside during 8am rounding      Review of Systems   Unable to perform ROS: Mental status change     Objective:     Temp:  [96.8 °F (36 °C)-98.2 °F (36.8 °C)] 97.6 °F (36.4 °C)  Pulse:  [63-88] 64  Resp:  [15-26] 16  SpO2:  [94 %-97 %] 97 %  BP: (139-175)/(76-94) 163/76     Body mass index is 19.94 kg/m².    Date 01/19/22 0700 - 01/20/22 0659   Shift 6518-1252 9135-6351 0726-4202 24 Hour Total   INTAKE   P.O. 0   0   Shift Total(mL/kg) 0(0)   0(0)   OUTPUT   Shift Total(mL/kg)       Weight (kg) 61.2 61.2 61.2 61.2          Drains     Drain                 Urethral Catheter 01/05/22 1050 Double-lumen 20 Fr. 14 days                Physical Exam  Constitutional:       General: He is not in acute distress.     Appearance: He is well-developed. He is not ill-appearing, toxic-appearing or  diaphoretic.   HENT:      Head: Normocephalic and atraumatic.      Mouth/Throat:      Mouth: Mucous membranes are moist.   Eyes:      Conjunctiva/sclera: Conjunctivae normal.   Cardiovascular:      Rate and Rhythm: Normal rate.   Pulmonary:      Effort: No respiratory distress.   Abdominal:      General: Abdomen is flat. There is no distension.      Palpations: Abdomen is soft.   Genitourinary:     Comments: No dressing on perineal wound in place  No induration noted  Santiago catheter kinked at the bedside    Musculoskeletal:         General: No swelling or deformity.      Cervical back: Neck supple.   Skin:     Findings: No rash.   Neurological:      Mental Status: He is oriented to person, place, and time.      Gait: Gait normal.   Psychiatric:         Mood and Affect: Mood normal.         Thought Content: Thought content normal.         Judgment: Judgment normal.         Significant Labs:    BMP:  Recent Labs   Lab 01/17/22  0345 01/18/22  0419 01/19/22  0355    146* 147*   K 3.7 3.7 3.2*    108 111*   CO2 23 26 26   BUN 7 7 6   CREATININE 0.9 0.9 0.9   CALCIUM 8.4* 8.4* 8.2*       CBC:   Recent Labs   Lab 01/17/22  0345 01/18/22  0419 01/19/22  0355   WBC 16.48* 14.50* 8.86   HGB 13.7* 12.9* 12.0*   HCT 41.5 40.0 36.8*   * 89* 62*       Significant Imaging:  Imaging from admission and 1/18/2022 reviewed      Santiago catheter tip tenting dome of urinary bladder  Urine in bladder despite santiago catheter placement  R corporal body fluid collection seen on the image below, adjacent to the urethra              Assessment/Plan:     * Scrotal abscess  s/p cystoscopy with santiago placement and debridement of abscess/necrosis of scrotum 1/5/22 with Dr. Rangel.  Fluid collection drained at bedside 1/14/2022  CT from 1/18/22 w/ R corporal body fluid collection concerning for residual abscess vs fluid collection      Continue abx per primary  Cultures growing Candida and E coli  Appreciate wound care RN and  floor RN dressing changes  Will continue to monitor condition  WBC continues to improve  Afebrile      Recommend IR consultation to evaluate feasibility of aspiration of the R corporal body as patient not a candidate for another general anesthetic procedure given altered mental status.   Sahu catheter repositioned during rounds, bladder scan to ensure bladder is empty. 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 Can also consider suprapubic tube placement with interventional radiology to complete divert urine away from the affected areas.      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

## 2022-01-19 NOTE — PT/OT/SLP PROGRESS
Occupational Therapy   Treatment    Name: Abhishek Zhao  MRN: [id number]  Admitting Diagnosis:  Scrotal abscess  14 Days Post-Op    Recommendations:     Discharge Recommendations: nursing facility, skilled  Discharge Equipment Recommendations:  hospital bed  Barriers to discharge:   (not at PLOF; increased assist required with all ADLs and all aspects of functional mobility)    Assessment:     Abhishek Zhao is a 61 y.o. male with a medical diagnosis of Scrotal abscess. Performance deficits affecting function are weakness,impaired cognition,impaired endurance,decreased ROM,decreased coordination,impaired self care skills,decreased upper extremity function,impaired fine motor,impaired skin,decreased lower extremity function,impaired functional mobilty,gait instability,decreased safety awareness,pain,impaired balance,impaired cardiopulmonary response to activity,visual deficits.     Dependent x2 bed mobility. Inconsistently followed few one step commands. Dependent sit<>stand and 1 step at EOB with dependent x2 with BUE supported    Rehab Prognosis:  Fair; patient would benefit from acute skilled OT services to address these deficits and reach maximum level of function.       Plan:     Patient to be seen  (3-5x/week) to address the above listed problems via self-care/home management,therapeutic activities,therapeutic exercises  · Plan of Care Expires: 01/27/22  · Plan of Care Reviewed with: patient,spouse,daughter,family    Subjective     Chief complaint: trying to lay back down while at EOB d/t pain at buttocks (re-directed with standing and sitting on green air cushion)   Patient/family comments/ goals: upon arrival, pt telling OT that he needs to get out of his car      Pain/Comfort:  · Pain Rating 1:  (pain to buttocks seated EOB)  · Pain Addressed 1: Reposition,Nurse notified    Objective:     Communicated with: nurseVerena, prior to session.  Patient found HOB elevated with bed alarm,oxygen,telemetry,santiago  catheter,pressure relief boots,peripheral IV upon OT entry to room.    General Precautions: Standard, fall,pureed diet,aspiration   Orthopedic Precautions:N/A   Braces: N/A  Respiratory Status: Nasal cannula, flow 2 L/min     Occupational Performance:     At the beginning of the session with OT completing bed level BUE AROM, pt had ~25% error with counting #1-20. Pt had error with distractions in environment (4 family members present). Pt perseverated on counting after activity stopped with PT asking pt a different question. Pt able to identify 2 of 4 family members correctly in the room once seated EOB. Pt did not scan environment without max assist. Pt inconsistently made eye contact with therapy/family talking directly to patient. Pt completed functional reach and grasped fork 1x. After, pt reported that he was unable to see object at midline/other things in the room but unable to answer further questions d/t confusion. Nurse, Verena, notified.     Bed Mobility:    · Patient completed Rolling/Turning to Left with  dependent  · Patient completed Rolling/Turning to Right with dependent  · Patient completed Scooting to HOB in supine with dependent and 2 persons  · Patient completed Supine to Sit with dependent and 2 persons  · Patient completed Sit to Supine with dependent and 2 persons     Functional Mobility/Transfers:  · Patient completed Sit <> Stand Transfer with dependence  with  BUE supported: 3 trials   · Functional Mobility: pt took one step at EOB with dependent x2 assist with BUE supported.    Activities of Daily Living:  · Feeding: placed bedside table in front of table with pureed food in front of him. attempted to place BUE to rest on table to incorporate anterior trunk lean d/t pushing posteriorly, but pt unable to follow commands. not safe to attempt feeding d/t poor attention and balance     · Upper Body Dressing: total assist to manage upper body gown       [facility] 6 Click ADL: 6    Treatment &  "Education:  · Pt re-educated on OT role/POC.   · Edu family on benefits of wedge (in bed, not in place) and pressure relief boots for pressure relief with staff assist   · Safety during functional t/f and mobility   · White board updated: level 1   · HOB elevated, OT first demo'd movement (elbow flex/ext) and pt unable to demo back (pulling at the sheet); with short cues and tactile cueing, pt still unable to complete movement. Pt participatory with AAROM x10 reps elbow flex/ext RUE; OT then prompted if he can do the same motion on LUE, but pt unable to without AAROM x10 reps. X10 BUE shoulder flex/ext AAROM.   · Seated EOB, pt demo'd pushing posteriorly. OT prompted pt to hold onto side rail on his L which pt did momentarily. Pt able to briefly hold his balance at EOB with SBA before posteriorly leaning with MAX to dependent assist required. Bedside table introduced so pt can rest BUE on table to promote anterior lean, which pt was unable to do. BUE supported at elbows with assist for anterior lean with dependent assist. Pt rested in cervical flexion to the R. Dtr then moved to sit on his L with MAX cueing by daughter to visually attend to the voice on the L. Pt eventually able to look at her, say her first name, and "I love you". OT prompted pt to hold her hand to promote anterior lean and functional reach- hand-over-hand assist for grasp. Dtr reports faint squeeze on command x3 trials.   · With black fork positioned within his midline vision, pt grasped the fork one time with each BUE with MAX verbal cueing. Afterwards, pt then reported inability to see anything. Nurse notified.    · Multiple self-care tasks/functional mobility completed- assistance level noted above   · All questions/concerns answered within OT scope of practice       Patient left HOB elevated L sidelying with pillow in-between knees and B/L pressure relief boots in place with all lines intact, call button in reach, bed alarm on, nurseVerena, " notified, 4 family members present and all needs met/within reach Education:      GOALS:   Multidisciplinary Problems     Occupational Therapy Goals        Problem: Occupational Therapy Goal    Goal Priority Disciplines Outcome Interventions   Occupational Therapy Goal     OT, PT/OT Ongoing, Progressing    Description: Goals to be met by: 01/27/21     Patient will increase functional independence with ADLs by performing:    Feeding with Modified [place].  UE Dressing with Supervision.  LE Dressing with Minimal Assistance.  Grooming while seated with Stand-by Assistance.  Sitting at edge of bed x15 minutes with Supervision.  Sit to stand with Minimal Assistance and use of AD.    Supine to sit with Contact Guard Assistance.  Upper extremity exercise program x10 reps per handout, with supervision.  Step transfer with Moderate Assistance  Stand pivot transfer with Moderate Assistance   Toilet transfer to bedside commode with Moderate Assistance                      Time Tracking:     OT Date of Treatment: 01/19/22  OT Start Time: 1139  OT Stop Time: 1209  OT Total Time (min): 30 min    Billable Minutes:Therapeutic Activity 15 min  Therapeutic Exercise 15 min  Total Time 30 min (PT present)     OT/ALOK: OT          1/19/2022

## 2022-01-19 NOTE — NURSING
CBG 51, opens eyes to voice - IV fluids infusing at 75ml/hr, Prn 25g D50 IV push administered.    , post dinner & post 25g D50   Chief Complaint   Patient presents with   • Follow-up     follow up-ingrown hair       S:  Jim 58 year old female presents here for follow-up. Patient was complaining of pain and swelling and discharge from the suprapubic area . Patient denies any fever or chills. No history of diabetes. Symptoms been going on for one week. Denies any nausea or vomiting. Denies any constipation.   it was incised and drained. Culture was sent. Patient was started on clindamycin and patient's tolerating that well. Packing was removed on previous visit. Patient states that symptoms is much better. Patient is not having anymore pain.      PROBLEM LIST:    Patient Active Problem List   Diagnosis   • Chronic migraine       MEDICATIONS:    Current Outpatient Prescriptions   Medication Sig Dispense Refill   • clindamycin (CLEOCIN) 300 MG capsule Take 1 capsule by mouth 3 times daily for 10 days. 30 capsule 0   • Probiotic Product (PROBIOTIC DAILY) Cap 1 tab daily 30 capsule 0   • Specialty Vitamins Products (ECHINACEA C COMPLETE PO)      • butalbital-APAP-caffeine (FIORICET) -40 MG per tablet Take 1 tablet by mouth every 4 hours as needed for Pain.     • CALCIUM CARBONATE PO Take 600 mg by mouth.     • cyclobenzaprine (FLEXERIL) 5 MG tablet Take 5 mg by mouth 3 times daily as needed for Muscle spasms.     • Diclofenac Potassium 50 MG Pack      • frovatriptan (FROVA) 2.5 MG tablet Take 2.5 mg by mouth as needed for Migraine. Take 1 tablet by mouth at onset of migraine. May repeat after 2 hours if needed.     • Magnesium 100 MG Cap      • ondansetron (ZOFRAN ODT) 8 MG disintegrating tablet Take 8 mg by mouth every 8 hours as needed for Nausea.     • venlafaxine XR (EFFEXOR XR) 150 MG 24 hr capsule Take 150 mg by mouth daily.       No current facility-administered medications for this visit.        ALLERGIES:    ALLERGIES:  No Known Allergies    PAST MEDICAL HISTORY:    Past Medical History:   Diagnosis Date   • Allergy    • Clotting  disorder (CMS/[facility]) 05/1993       SURGICAL HISTORY:    Past Surgical History:   Procedure Laterality Date   • COSMETIC SURGERY  2016 &2017    vericois vein surgery   • HYSTERECTOMY      early 2000s   • TONSILLECTOMY  2010       FAMILY HISTORY:    Family History   Problem Relation Age of Onset   • High cholesterol Mother    • Heart disease Mother    • Cancer Father    • High cholesterol Brother    • High blood pressure Brother        SOCIAL HISTORY:       Social History     Social History   • Marital status:      Spouse name: N/A   • Number of children: N/A   • Years of education: N/A     Social History Main Topics   • Smoking status: Never Smoker   • Smokeless tobacco: Never Used   • Alcohol use 0.6 oz/week     1 Cans of beer per week   • Drug use: No   • Sexual activity: Not Asked     Other Topics Concern   • None     Social History Narrative   • None       REVIEW OF SYSTEMS:  Constitutional:  Denies fever, chills,   Respiratory:  Denies cough or shortness of breath   Cardiovascular:  Denies chest pain   GI: Denies abdominal pain, nausea, vomiting, or diarrhea   O:  Vitals:    07/05/17 1154   BP: 122/74   Pulse: 72   Resp: 16   Temp: 98.1 °F (36.7 °C)     PHYSICAL EXAM:      Visit Vitals  /74   Pulse 72   Temp 98.1 °F (36.7 °C) (Oral)   Resp 16   Ht 5' 9\" (1.753 m)   Wt 86.8 kg Comment: 191lb 7oz   SpO2 99%   BMI 28.27 kg/m²      General:   Alert, cooperative, conversive in no acute distress.  Skin:   Examination of the suprapubic area showing mild surrounding induration, no erythema, no warmth, nontender, No fluctuant mass at this time.  Head:  Normocephalic-atraumatic.   Neck:  Trachea is midline.    Eyes:  Normal conjunctiva and sclera.    EOMI.  ENT:  Mucous membranes are moist.    Cardiovascular:  Symmetrical pulses.   Respiratory:  Normal respiratory effort.    Gastrointestinal:  Soft and non tender.     Musculoskeletal:  No deformity or edema.   Psychiatric:   Cooperative.  Appropriate mood &  affect.         Plan:  Jim was seen today for follow-up.    Diagnoses and all orders for this visit:    Cellulitis, unspecified cellulitis site           Doing well. Discussed the culture results with the patient which showed staph aureus which is sensitive to clindamycin. Finish the course of clindamycin. Continue with probiotic. Follow-up with me if symptoms still persist.    Return to clinic prn for pain, increased swelling or fever.  Wound care instructions were reviewed       Don Jose MD    Treatment options discussed with patient and explained in detail. The risks,benefits and potential side effects of possible medications were reviewed. Alternatives were discussed. Medication instructions and consequences of not taking the medications were discussed. Patient's understanding was assessed and patient agreed with the plan. Monitoring parameters and expected course outlined. Patient to call or come in if symptoms fail to respond as outlined, or worsen in any way.

## 2022-01-19 NOTE — CONSULTS
Nursing reports development of Stage 2 pressure injury on right heel. Area of skin breakdown identified 1/16/22. Intact 2 cm circular blister with light erythema surrounding. Base of blister pink in color. Dressed with foam dressing and wearing EHOB boots bilaterally.   Recommendation:  Continue suspending heels off bed with EHOB boots.

## 2022-01-19 NOTE — PT/OT/SLP PROGRESS
"Physical Therapy Treatment    Patient Name:  Abhishek Zhao   MRN:  [id number]    Recommendations:     Discharge Recommendations:   (SNF vs Home with family and 24hr care)   Discharge Equipment Recommendations: hospital bed   Barriers to discharge: Pt is not functioning at baseline and requires 24hr care    Assessment:     Abhishek Zhao is a 61 y.o. male admitted with a medical diagnosis of Scrotal abscess.  He presents with the following impairments/functional limitations:  weakness,impaired balance,decreased safety awareness,impaired skin,pain,impaired endurance,impaired cognition,impaired self care skills,decreased coordination,impaired functional mobilty,decreased upper extremity function,impaired coordination,gait instability,decreased lower extremity function Pt limited 2/2 confusion and decreased vision.    Rehab Prognosis: Fair; patient would benefit from acute skilled PT services to address these deficits and reach maximum level of function.    Recent Surgery: Procedure(s) (LRB):  CYSTOSCOPY, RETROGRADE URETHROGRAM, FISTULAGRAM, EXCISION OF NECROTIC SCROTAL TISSUE ABSCESS, BARKER PLACEMENT (N/A)  EXCISION, ABSCESS  CYSTOSCOPY  FISTULOGRAM (N/A) 14 Days Post-Op    Plan:     During this hospitalization, patient to be seen  (3-5x/wk) to address the identified rehab impairments via therapeutic activities,therapeutic exercises,wheelchair management/training and progress toward the following goals:    · Plan of Care Expires:  01/27/22    Subjective     Chief Complaint: "I can't", Can't see  Patient/Family Comments/goals: [facility]  Pt agreeable to sti at EOB to eat, then states that he can't  Pain/Comfort:  Pain Rating 1:  (Unable to rate)  Location - Orientation 1:  (stomach and booty)  Pain Addressed 1: Reposition,Distraction,Cessation of Activity (Pressure relief cushion placed under patient)      Objective:     Communicated with nsg prior to session.  Patient found HOB elevated with bed " alarm,oxygen,santiago catheter,pressure relief boots,peripheral IV upon PT entry to room.     General Precautions: Standard, fall,pureed diet,aspiration   Orthopedic Precautions:N/A   Braces: N/A  Respiratory Status: Nasal cannula, flow 2 L/min     Functional Mobility:  · Bed Mobility:     · Rolling Left:  total assistance x 2 persons and with VC/TC for reaching for BR  · Rolling Right: total assistance and of 2 persons with VC/TC for reaching for R  · Scooting: dependence to HOB in supine, Max A in sitting to  EOB with VC/TC for forward weight shift over SHELBY   ·  Supine to Sit: total assistance and of 2 persons with Vc/TC for hand placement and bocy mechanics  · Sit to Supine: total assistance and of 2 persons with VC/TC for hand placement and body mechanics  · Transfers:     · Sit to Stand:  maximal assistance and of 2 persons with no AD and VC/TC for forward weight shift over SHELBY x 2 trials  · Gait: Total A x 2 persons x 2 sidesteps at EOB  · Balance: Poor sit static and dynamic and poor stand static and dynamic      AM-PAC 6 CLICK MOBILITY  Turning over in bed (including adjusting bedclothes, sheets and blankets)?: 2  Sitting down on and standing up from a chair with arms (e.g., wheelchair, bedside commode, etc.): 2  Moving from lying on back to sitting on the side of the bed?: 2  Moving to and from a bed to a chair (including a wheelchair)?: 2  Need to walk in hospital room?: 1  Climbing 3-5 steps with a railing?: 1  Basic Mobility Total Score: 10       Therapeutic Activities and Exercises:  Bed mobility and transfer training as above  Pt sat at EOB with Total A and VC/TC for forward weight shift over SHELBY 2/2 posterior pushing with B LE support for reaching and feeding activites with OT  Pt stood with Total A x 2 persons approx 10 sec.Pt received B HCS and B LE PROM within available planes x 10 reps    Patient left in Left sidelying with wedge behind back and pillow between knees with Pressure releif boots donned  with all lines intact, bed alarm on, nsg notified and family present..    GOALS:   Multidisciplinary Problems     Physical Therapy Goals        Problem: Physical Therapy Goal    Goal Priority Disciplines Outcome Goal Variances Interventions   Physical Therapy Goal     PT, PT/OT Ongoing, Not Progressing     Description: Goals to be met by: 22     Patient will increase functional independence with mobility by performin. Supine to sit with Stand-by Assistance  2. Rolling to Left and Right with Stand-by Assistance.  3. Sit to stand transfer to be assessed   4. Gait to be assessed    5. Sitting at edge of bed x15 minutes with Stand-by Assistance  6. Lower extremity exercise program x10 reps per handout, with assistance as needed                     Time Tracking:     PT Received On: 22  PT Start Time: 1143     PT Stop Time: 1209  PT Total Time (min): 26 min     Billable Minutes: Therapeutic Activity 18 and Therapeutic Exercise 8Co-treat with OT    Treatment Type: Treatment  PT/PTA: PT     PTA Visit Number: 0     2022

## 2022-01-19 NOTE — PLAN OF CARE
Pt working with PT.     Noted CT with residual abscess. Urology recommending IR for aspiration. Agree with drainage of abscess for source control of infection. Continue antibiotics.

## 2022-01-20 LAB
ALBUMIN SERPL BCP-MCNC: 1.3 G/DL (ref 3.5–5.2)
ALP SERPL-CCNC: 387 U/L (ref 55–135)
ALT SERPL W/O P-5'-P-CCNC: 13 U/L (ref 10–44)
ANION GAP SERPL CALC-SCNC: 9 MMOL/L (ref 8–16)
AST SERPL-CCNC: 34 U/L (ref 10–40)
BASOPHILS # BLD AUTO: ABNORMAL K/UL (ref 0–0.2)
BASOPHILS NFR BLD: 0 % (ref 0–1.9)
BILIRUB SERPL-MCNC: 0.2 MG/DL (ref 0.1–1)
BUN SERPL-MCNC: 5 MG/DL (ref 6–20)
CALCIUM SERPL-MCNC: 8.2 MG/DL (ref 8.7–10.5)
CHLORIDE SERPL-SCNC: 110 MMOL/L (ref 95–110)
CO2 SERPL-SCNC: 26 MMOL/L (ref 23–29)
CREAT SERPL-MCNC: 0.8 MG/DL (ref 0.5–1.4)
DIFFERENTIAL METHOD: ABNORMAL
EOSINOPHIL # BLD AUTO: ABNORMAL K/UL (ref 0–0.5)
EOSINOPHIL NFR BLD: 0 % (ref 0–8)
ERYTHROCYTE [DISTWIDTH] IN BLOOD BY AUTOMATED COUNT: 13.5 % (ref 11.5–14.5)
EST. GFR  (AFRICAN AMERICAN): >60 ML/MIN/1.73 M^2
EST. GFR  (NON AFRICAN AMERICAN): >60 ML/MIN/1.73 M^2
GLUCOSE SERPL-MCNC: 86 MG/DL (ref 70–110)
HCT VFR BLD AUTO: 36.9 % (ref 40–54)
HGB BLD-MCNC: 12 G/DL (ref 14–18)
IMM GRANULOCYTES # BLD AUTO: ABNORMAL K/UL (ref 0–0.04)
IMM GRANULOCYTES NFR BLD AUTO: ABNORMAL % (ref 0–0.5)
LYMPHOCYTES # BLD AUTO: ABNORMAL K/UL (ref 1–4.8)
LYMPHOCYTES NFR BLD: 17 % (ref 18–48)
MCH RBC QN AUTO: 28.1 PG (ref 27–31)
MCHC RBC AUTO-ENTMCNC: 32.5 G/DL (ref 32–36)
MCV RBC AUTO: 86 FL (ref 82–98)
MONOCYTES # BLD AUTO: ABNORMAL K/UL (ref 0.3–1)
MONOCYTES NFR BLD: 10 % (ref 4–15)
MYELOCYTES NFR BLD MANUAL: 4 %
NEUTROPHILS NFR BLD: 59 % (ref 38–73)
NEUTS BAND NFR BLD MANUAL: 10 %
NRBC BLD-RTO: 0 /100 WBC
PLATELET # BLD AUTO: 52 K/UL (ref 150–450)
PMV BLD AUTO: 11.6 FL (ref 9.2–12.9)
POCT GLUCOSE: 143 MG/DL (ref 70–110)
POCT GLUCOSE: 190 MG/DL (ref 70–110)
POCT GLUCOSE: 220 MG/DL (ref 70–110)
POCT GLUCOSE: 68 MG/DL (ref 70–110)
POTASSIUM SERPL-SCNC: 3.8 MMOL/L (ref 3.5–5.1)
PROT SERPL-MCNC: 6.7 G/DL (ref 6–8.4)
RBC # BLD AUTO: 4.27 M/UL (ref 4.6–6.2)
SODIUM SERPL-SCNC: 145 MMOL/L (ref 136–145)
WBC # BLD AUTO: 9.05 K/UL (ref 3.9–12.7)

## 2022-01-20 PROCEDURE — 99233 SBSQ HOSP IP/OBS HIGH 50: CPT | Mod: ,,, | Performed by: INTERNAL MEDICINE

## 2022-01-20 PROCEDURE — 63600175 PHARM REV CODE 636 W HCPCS: Performed by: STUDENT IN AN ORGANIZED HEALTH CARE EDUCATION/TRAINING PROGRAM

## 2022-01-20 PROCEDURE — 97110 THERAPEUTIC EXERCISES: CPT

## 2022-01-20 PROCEDURE — 27000221 HC OXYGEN, UP TO 24 HOURS

## 2022-01-20 PROCEDURE — A4216 STERILE WATER/SALINE, 10 ML: HCPCS | Performed by: STUDENT IN AN ORGANIZED HEALTH CARE EDUCATION/TRAINING PROGRAM

## 2022-01-20 PROCEDURE — 99233 PR SUBSEQUENT HOSPITAL CARE,LEVL III: ICD-10-PCS | Mod: ,,, | Performed by: INTERNAL MEDICINE

## 2022-01-20 PROCEDURE — 85027 COMPLETE CBC AUTOMATED: CPT | Performed by: STUDENT IN AN ORGANIZED HEALTH CARE EDUCATION/TRAINING PROGRAM

## 2022-01-20 PROCEDURE — 80053 COMPREHEN METABOLIC PANEL: CPT | Performed by: STUDENT IN AN ORGANIZED HEALTH CARE EDUCATION/TRAINING PROGRAM

## 2022-01-20 PROCEDURE — 97530 THERAPEUTIC ACTIVITIES: CPT

## 2022-01-20 PROCEDURE — 11000001 HC ACUTE MED/SURG PRIVATE ROOM

## 2022-01-20 PROCEDURE — 94760 N-INVAS EAR/PLS OXIMETRY 1: CPT

## 2022-01-20 PROCEDURE — 63600175 PHARM REV CODE 636 W HCPCS: Performed by: HOSPITALIST

## 2022-01-20 PROCEDURE — 25000003 PHARM REV CODE 250: Performed by: STUDENT IN AN ORGANIZED HEALTH CARE EDUCATION/TRAINING PROGRAM

## 2022-01-20 PROCEDURE — C9254 INJECTION, LACOSAMIDE: HCPCS | Performed by: STUDENT IN AN ORGANIZED HEALTH CARE EDUCATION/TRAINING PROGRAM

## 2022-01-20 PROCEDURE — 99233 PR SUBSEQUENT HOSPITAL CARE,LEVL III: ICD-10-PCS | Mod: ,,, | Performed by: UROLOGY

## 2022-01-20 PROCEDURE — 94640 AIRWAY INHALATION TREATMENT: CPT

## 2022-01-20 PROCEDURE — 25000003 PHARM REV CODE 250: Performed by: HOSPITALIST

## 2022-01-20 PROCEDURE — 25000242 PHARM REV CODE 250 ALT 637 W/ HCPCS: Performed by: STUDENT IN AN ORGANIZED HEALTH CARE EDUCATION/TRAINING PROGRAM

## 2022-01-20 PROCEDURE — 36415 COLL VENOUS BLD VENIPUNCTURE: CPT | Performed by: STUDENT IN AN ORGANIZED HEALTH CARE EDUCATION/TRAINING PROGRAM

## 2022-01-20 PROCEDURE — 99233 SBSQ HOSP IP/OBS HIGH 50: CPT | Mod: ,,, | Performed by: UROLOGY

## 2022-01-20 PROCEDURE — 99232 SBSQ HOSP IP/OBS MODERATE 35: CPT | Mod: ,,, | Performed by: PSYCHIATRY & NEUROLOGY

## 2022-01-20 PROCEDURE — 99232 PR SUBSEQUENT HOSPITAL CARE,LEVL II: ICD-10-PCS | Mod: ,,, | Performed by: PSYCHIATRY & NEUROLOGY

## 2022-01-20 PROCEDURE — 85007 BL SMEAR W/DIFF WBC COUNT: CPT | Performed by: STUDENT IN AN ORGANIZED HEALTH CARE EDUCATION/TRAINING PROGRAM

## 2022-01-20 RX ORDER — TALC
6 POWDER (GRAM) TOPICAL NIGHTLY
Status: DISCONTINUED | OUTPATIENT
Start: 2022-01-20 | End: 2022-01-25

## 2022-01-20 RX ORDER — TALC
6 POWDER (GRAM) TOPICAL NIGHTLY PRN
Status: DISCONTINUED | OUTPATIENT
Start: 2022-01-20 | End: 2022-02-09 | Stop reason: HOSPADM

## 2022-01-20 RX ADMIN — METOPROLOL SUCCINATE 50 MG: 50 TABLET, EXTENDED RELEASE ORAL at 08:01

## 2022-01-20 RX ADMIN — Medication 10 ML: at 12:01

## 2022-01-20 RX ADMIN — IPRATROPIUM BROMIDE AND ALBUTEROL SULFATE 3 ML: 2.5; .5 SOLUTION RESPIRATORY (INHALATION) at 01:01

## 2022-01-20 RX ADMIN — SODIUM CHLORIDE 200 MG: 9 INJECTION, SOLUTION INTRAVENOUS at 12:01

## 2022-01-20 RX ADMIN — IPRATROPIUM BROMIDE AND ALBUTEROL SULFATE 3 ML: 2.5; .5 SOLUTION RESPIRATORY (INHALATION) at 04:01

## 2022-01-20 RX ADMIN — Medication 6 MG: at 10:01

## 2022-01-20 RX ADMIN — PIPERACILLIN AND TAZOBACTAM 4.5 G: 4; .5 INJECTION, POWDER, LYOPHILIZED, FOR SOLUTION INTRAVENOUS; PARENTERAL at 07:01

## 2022-01-20 RX ADMIN — ACETYLCYSTEINE 4 ML: 100 SOLUTION ORAL; RESPIRATORY (INHALATION) at 08:01

## 2022-01-20 RX ADMIN — ASPIRIN 81 MG: 81 TABLET, COATED ORAL at 08:01

## 2022-01-20 RX ADMIN — ENOXAPARIN SODIUM 40 MG: 40 INJECTION SUBCUTANEOUS at 05:01

## 2022-01-20 RX ADMIN — PIPERACILLIN AND TAZOBACTAM 4.5 G: 4; .5 INJECTION, POWDER, LYOPHILIZED, FOR SOLUTION INTRAVENOUS; PARENTERAL at 03:01

## 2022-01-20 RX ADMIN — Medication 10 ML: at 11:01

## 2022-01-20 RX ADMIN — TAMSULOSIN HYDROCHLORIDE 0.4 MG: 0.4 CAPSULE ORAL at 08:01

## 2022-01-20 RX ADMIN — ATORVASTATIN CALCIUM 80 MG: 40 TABLET, FILM COATED ORAL at 08:01

## 2022-01-20 RX ADMIN — DEXTROSE 500 MG: 50 INJECTION, SOLUTION INTRAVENOUS at 11:01

## 2022-01-20 RX ADMIN — AMLODIPINE BESYLATE 10 MG: 5 TABLET ORAL at 08:01

## 2022-01-20 RX ADMIN — PIPERACILLIN AND TAZOBACTAM 4.5 G: 4; .5 INJECTION, POWDER, LYOPHILIZED, FOR SOLUTION INTRAVENOUS; PARENTERAL at 12:01

## 2022-01-20 RX ADMIN — POLYETHYLENE GLYCOL 3350 17 G: 17 POWDER, FOR SOLUTION ORAL at 08:01

## 2022-01-20 RX ADMIN — HYDRALAZINE HYDROCHLORIDE 50 MG: 25 TABLET, FILM COATED ORAL at 10:01

## 2022-01-20 RX ADMIN — FLUCONAZOLE 400 MG: 2 INJECTION, SOLUTION INTRAVENOUS at 10:01

## 2022-01-20 RX ADMIN — SODIUM CHLORIDE 200 MG: 9 INJECTION, SOLUTION INTRAVENOUS at 11:01

## 2022-01-20 RX ADMIN — Medication 10 ML: at 07:01

## 2022-01-20 RX ADMIN — DEXTROSE MONOHYDRATE, SODIUM CHLORIDE, AND POTASSIUM CHLORIDE: 50; 4.5; 2.98 INJECTION, SOLUTION INTRAVENOUS at 12:01

## 2022-01-20 RX ADMIN — IPRATROPIUM BROMIDE AND ALBUTEROL SULFATE 3 ML: 2.5; .5 SOLUTION RESPIRATORY (INHALATION) at 08:01

## 2022-01-20 RX ADMIN — HYDRALAZINE HYDROCHLORIDE 50 MG: 25 TABLET, FILM COATED ORAL at 07:01

## 2022-01-20 RX ADMIN — HYDRALAZINE HYDROCHLORIDE 50 MG: 25 TABLET, FILM COATED ORAL at 01:01

## 2022-01-20 RX ADMIN — DEXTROSE 500 MG: 50 INJECTION, SOLUTION INTRAVENOUS at 10:01

## 2022-01-20 RX ADMIN — ACETYLCYSTEINE 4 ML: 100 SOLUTION ORAL; RESPIRATORY (INHALATION) at 01:01

## 2022-01-20 RX ADMIN — PIPERACILLIN AND TAZOBACTAM 4.5 G: 4; .5 INJECTION, POWDER, LYOPHILIZED, FOR SOLUTION INTRAVENOUS; PARENTERAL at 11:01

## 2022-01-20 NOTE — ASSESSMENT & PLAN NOTE
CT head and EEG without acute process; likely delirium secondary to infection and prolonged hospitalization. Code stroke called the night of 1/13, however no focal neuro findings, CT head and MRI without acute findings  - neurology consulted  - delirium precautions  - EEG pending  - tx of infection as noted above.   He was altered,was not speaking,head CT show no acute process,EEG,per neurology show nos eizure activity,todayb is much alert,appear to be delirious,will monitor.  Much improved.

## 2022-01-20 NOTE — PROGRESS NOTES
[facility] Medicine  Progress Note    Patient Name: Abhishek Zhao  MRN: [id number]  Patient Class: IP- Inpatient   Admission Date: 1/4/2022  Length of Stay: 15 days  Attending Physician: Quinten Rosario MD  Primary Care Provider: To Obtain Unable        Subjective:     Principal Problem:Scrotal abscess        HPI:  61 y.o. male with adjustment disorder with depressed mood, chronic ischemic heart disease, cocaine dependence in remission, cardiomegaly, HLD, HTN, swizure disorder, tobacco use, and DM 2 presents with a complaint of testicular pain.  Associated with swelling and redness to the scrotum and penis along with difficulty urinating, pain is rated as severe and radiates to the rectum.  Denies fever, chills, cough, SOB, chest pain, n/v/d.  In the ED, he was found to be tachypneic, with leukocytosis and elevated lactic.  CT and US shows evidence of multiple scrotal and perineal abscesses with some extension into the penile shaft.  UA with evidence of infection.  Sepsis treatment initiated, blood and urine cultures obtained, IV fluids and IV antibiotics initiated.  Urology consulted.      Overview/Hospital Course:  61 y.o. male with adjustment disorder with depressed mood, chronic ischemic heart disease, cocaine dependence in remission, cardiomegaly, HLD, HTN, swizure disorder, tobacco use, and DM 2 presents with a complaint of testicular pain, found to have multiple scrotal and perineal abscesses. Placed on broad spectrum antibiotics, underwent I&D with urology.    Pt w/ aspiration event 1/11 w/ subsequent desaturation. Respiratory culture grew pseudomonas. Kept on Zosyn. On 1/13 pt had abrupt reduction in responsiveness. Code stroke called, patient transferred to ICU. No stroke found on imaging. Started on extended EEG. Mental status improved, patient stepped back down to the floor.     His mental status improved slowly for a few days, but worsened again 1/17.   He was altered,was  not speaking,head CT show no acute process,EEG,per neurology show no seizure activity,todayb is much alert,appear to be delirious,will monitor.  Consulted IR for drainage of residual fluid  collection on CT pelvic.  CC today is weakness.      Interval History:  He is altered,not speaking,will do stat head CT,check EEG,re consulted neurology.  CC today is weakness.    Review of Systems  ,not able be done,confused  Objective:     Vital Signs (Most Recent):  Temp: 97.5 °F (36.4 °C) (01/20/22 1128)  Pulse: 67 (01/20/22 1303)  Resp: 18 (01/20/22 1303)  BP: (!) 148/85 (01/20/22 1128)  SpO2: 97 % (01/20/22 1303) Vital Signs (24h Range):  Temp:  [96.9 °F (36.1 °C)-97.8 °F (36.6 °C)] 97.5 °F (36.4 °C)  Pulse:  [57-79] 67  Resp:  [16-24] 18  SpO2:  [93 %-98 %] 97 %  BP: (118-174)/(67-92) 148/85     Weight: 61.2 kg (134 lb 14.7 oz)  Body mass index is 19.92 kg/m².    Intake/Output Summary (Last 24 hours) at 1/20/2022 1321  Last data filed at 1/20/2022 0824  Gross per 24 hour   Intake 120 ml   Output 2850 ml   Net -2730 ml      Physical Exam  Constitutional:       Appearance: Normal appearance.   HENT:      Mouth/Throat:      Mouth: Mucous membranes are dry.   Eyes:      Pupils: Pupils are equal, round, and reactive to light.   Cardiovascular:      Rate and Rhythm: Normal rate.   Pulmonary:      Effort: Pulmonary effort is normal.   Genitourinary:     Comments: Dressing on scrotal area.  Musculoskeletal:         General: No swelling or deformity.   Skin:     Coloration: Skin is not jaundiced.         Significant Labs:   All pertinent labs within the past 24 hours have been reviewed.  BMP:   Recent Labs   Lab 01/20/22  0358   GLU 86      K 3.8      CO2 26   BUN 5*   CREATININE 0.8   CALCIUM 8.2*     CBC:   Recent Labs   Lab 01/19/22  0355 01/20/22  0359   WBC 8.86 9.05   HGB 12.0* 12.0*   HCT 36.8* 36.9*   PLT 62* 52*     CMP:   Recent Labs   Lab 01/19/22  0355 01/20/22  0358   * 145   K 3.2* 3.8   * 110    CO2 26 26   * 86   BUN 6 5*   CREATININE 0.9 0.8   CALCIUM 8.2* 8.2*   PROT 6.4 6.7   ALBUMIN 1.2* 1.3*   BILITOT 0.2 0.2   ALKPHOS 352* 387*   AST 30 34   ALT 12 13   ANIONGAP 10 9   EGFRNONAA >60 >60       Significant Imaging: I have reviewed all pertinent imaging results/findings within the past 24 hours.      Assessment/Plan:      * Scrotal abscess  Multiple abscesses c/w Berna's gangrene, s/p I&D by urology on 1/5. Cultures growing ampicillin-resistant Ecoli and C albicans. Repeat CT A/P 1/13 w/ 4cm fluid collection c/f persistent abscess which was drained bedside by urology  - urology following  - cont zosyn (re-started for HCAP)  - further ID recs pending  - repeat CT pelvis.  Consulted IR for drainage of residual fluid  collection on CT pelvic.          Alkaline phosphatase elevation  Chronic isolated alk phos (GGT elevated), worsened this admission. Possibly due to AEDs vs occult HPB process.   - further workup depending on clinical course      Pericardial effusion  Small pericardial effusion of unclear etiology      Encephalopathy, metabolic  CT head and EEG without acute process; likely delirium secondary to infection and prolonged hospitalization. Code stroke called the night of 1/13, however no focal neuro findings, CT head and MRI without acute findings  - neurology consulted  - delirium precautions  - EEG pending  - tx of infection as noted above.   He was altered,was not speaking,head CT show no acute process,EEG,per neurology show nos eizure activity,todayb is much alert,appear to be delirious,will monitor.  Much improved.    HCAP (healthcare-associated pneumonia)  - see respiratory failure       Acute respiratory failure with hypoxia and hypercarbia  Cough + new LLL opacity on imaging c/f pneumonia, however CT imaging showing pleural effusion and atelectasis  - respiratory cultures w/ pseudomonas  - cont zosyn  - furosemide as tolerated; no significant evidence of cardiac dysfunction on  TTE  - IS        Hypokalemia  Replete PRN    JOI (acute kidney injury)  Resolved      Sepsis due to Gram negative bacteria  Resolved      Berna's gangrene  As above    Diabetes mellitus type 2, controlled  Check a1c  Hold oral antihyperglycemics while inpatient  PRN sliding scale insulin  ACHS glucose monitoring   ADA diet     Tobacco user  5 minutes spent counseling the patient on smoking cessation and he is not currently ready to stop smoking. He will be offereded a nicotine transdermal patch while hospitalized and monitored for withdrawal.  Will provide additional smoking cessation counseling prior to discharge.     Seizure disorder  Repeated episodes of AMS c/f seizures w/ post ictal period  - neurology consulted  - AEDs per neurology  - EEG pending      Essential hypertension  Well controlled, continue home medications and monitor blood pressure, adjust as needed.     Hyperlipidemia  Continue statin    Cardiomegaly  Pulmonary edema seen on imaging, small pericardial effusion seen on TTE. LVEF low-normal (50%)  - hold diuresis while NPO    Cocaine dependence in remission  Counseled     Chronic ischemic heart disease  No acute issue    Adjustment disorder with depressed mood  Continue home citalopram, hold home seroquel due to somnolence      VTE Risk Mitigation (From admission, onward)         Ordered     enoxaparin injection 40 mg  Daily         01/13/22 1612     IP VTE LOW RISK PATIENT  Once         01/04/22 2241     Place sequential compression device  Until discontinued         01/04/22 2241                Discharge Planning   CHUCK:      Code Status: Full Code   Is the patient medically ready for discharge?:     Reason for patient still in hospital (select all that apply): Patient trending condition  Discharge Plan A: Skilled Nursing Facility   Discharge Delays: (!) Payor Issues              Quinten Rosario MD  Department of Hospital Medicine   [facility] [place] [facility]

## 2022-01-20 NOTE — PROGRESS NOTES
[facility]  Urology  Progress Note    Patient Name: Abhishek Zhao  MRN: [id number]  Admission Date: 1/4/2022  Hospital Length of Stay: 15 days  Code Status: Full Code   Attending Provider: Quinten Rosario MD   Primary Care Physician: To Obtain Unable    Subjective:     HPI:  Scrotal Swelling  Abhishek Zhao is a 61 y.o. male who was been experiencing scrotal pain and swelling since 12/31/2021.  He denies any fever.  He has had issues voiding since the swelling began.  Hemostat having issues in the past with urinary problems.  He denies having any previous issues with scrotal infection or swelling.  He recalls that he had procedures in the past but cannot recall specifically what to place.  He does not have urologist locally but moved back from [place] about 1 year ago.    Review of care everywhere shows that he had a cystoscopy with urethral dilation of a urethral stricture in the bulbar urethra in 2019 at [facility].  And there are reports that in approximately 2015 [facility].      Interval History: Purulent drainage noted around catheter. Wound in scrotum clean.    Review of Systems   Unable to perform ROS: Mental status change     Objective:     Temp:  [96.9 °F (36.1 °C)-97.8 °F (36.6 °C)] 97.7 °F (36.5 °C)  Pulse:  [57-79] 79  Resp:  [16-24] 20  SpO2:  [93 %-98 %] 93 %  BP: (118-174)/(67-92) 174/91     Body mass index is 19.92 kg/m².           Drains     Drain                 Urethral Catheter 01/05/22 1050 Double-lumen 20 Fr. 14 days                Physical Exam  Constitutional:       General: He is not in acute distress.     Appearance: He is well-developed. He is not ill-appearing, toxic-appearing or diaphoretic.   HENT:      Head: Normocephalic and atraumatic.      Mouth/Throat:      Mouth: Mucous membranes are moist.   Eyes:      Conjunctiva/sclera: Conjunctivae normal.   Cardiovascular:      Rate and Rhythm: Normal rate.   Pulmonary:      Effort:  No respiratory distress.   Abdominal:      General: Abdomen is flat. There is no distension.      Palpations: Abdomen is soft.   Genitourinary:     Comments: No dressing on perineal wound in place  No induration noted  Santiago catheter draining  Purulent drainage around catheter     Musculoskeletal:         General: No swelling or deformity.      Cervical back: Neck supple.   Skin:     Findings: No rash.   Neurological:      Mental Status: He is oriented to person, place, and time.      Gait: Gait normal.   Psychiatric:         Mood and Affect: Mood normal.         Thought Content: Thought content normal.         Judgment: Judgment normal.         Significant Labs:    BMP:  Recent Labs   Lab 01/18/22 0419 01/19/22  0355 01/20/22  0358   * 147* 145   K 3.7 3.2* 3.8    111* 110   CO2 26 26 26   BUN 7 6 5*   CREATININE 0.9 0.9 0.8   CALCIUM 8.4* 8.2* 8.2*       CBC:   Recent Labs   Lab 01/18/22 0419 01/19/22 0355 01/20/22  0359   WBC 14.50* 8.86 9.05   HGB 12.9* 12.0* 12.0*   HCT 40.0 36.8* 36.9*   PLT 89* 62* 52*       Blood Culture: No results for input(s): LABBLOO in the last 168 hours.  Urine Culture: No results for input(s): LABURIN in the last 168 hours.  Urine Studies: No results for input(s): COLORU, APPEARANCEUA, PHUR, SPECGRAV, PROTEINUA, GLUCUA, KETONESU, BILIRUBINUA, OCCULTUA, NITRITE, UROBILINOGEN, LEUKOCYTESUR, RBCUA, WBCUA, BACTERIA, SQUAMEPITHEL, HYALINECASTS in the last 168 hours.    Invalid input(s): WRIGHTSUR    Significant Imaging:  All pertinent imaging results/findings from the past 24 hours have been reviewed.                  Assessment/Plan:     * Scrotal abscess  s/p cystoscopy with santiago placement and debridement of abscess/necrosis of scrotum 1/5/22 with Dr. Rangel.  Fluid collection drained at bedside 1/14/2022  CT from 1/18/22 w/ R corporal body fluid collection concerning for residual abscess vs fluid collection      Continue abx per primary  Cultures growing Candida and E  coli  Appreciate wound care RN and floor RN dressing changes  Will continue to monitor condition  WBC continues to improve  Afebrile      Recommend IR consultation to evaluate feasibility of aspiration of the R corporal body as patient not a candidate for another general anesthetic procedure given altered mental status.   Now with more purulent drainage through urethra.    Will likely need plastics consultation in future for wound closure/grafting    Berna's gangrene   - s/p debridement 1/5/22        VTE Risk Mitigation (From admission, onward)         Ordered     enoxaparin injection 40 mg  Daily         01/13/22 1612     IP VTE LOW RISK PATIENT  Once         01/04/22 2241     Place sequential compression device  Until discontinued         01/04/22 2241                Mary Booth MD  Urology  [facility] [place]

## 2022-01-20 NOTE — ASSESSMENT & PLAN NOTE
s/p cystoscopy with santiago placement and debridement of abscess/necrosis of scrotum 1/5/22 with Dr. Rangel.  Fluid collection drained at bedside 1/14/2022  CT from 1/18/22 w/ R corporal body fluid collection concerning for residual abscess vs fluid collection      Continue abx per primary  Cultures growing Candida and E coli  Appreciate wound care RN and floor RN dressing changes  Will continue to monitor condition  WBC continues to improve  Afebrile      Recommend IR consultation to evaluate feasibility of aspiration of the R corporal body as patient not a candidate for another general anesthetic procedure given altered mental status.   Now with more purulent drainage through urethra.    Will likely need plastics consultation in future for wound closure/grafting

## 2022-01-20 NOTE — SUBJECTIVE & OBJECTIVE
Interval History: Purulent drainage noted around catheter. Wound in scrotum clean.    Review of Systems   Unable to perform ROS: Mental status change     Objective:     Temp:  [96.9 °F (36.1 °C)-97.8 °F (36.6 °C)] 97.7 °F (36.5 °C)  Pulse:  [57-79] 79  Resp:  [16-24] 20  SpO2:  [93 %-98 %] 93 %  BP: (118-174)/(67-92) 174/91     Body mass index is 19.92 kg/m².           Drains     Drain                 Urethral Catheter 01/05/22 1050 Double-lumen 20 Fr. 14 days                Physical Exam  Constitutional:       General: He is not in acute distress.     Appearance: He is well-developed. He is not ill-appearing, toxic-appearing or diaphoretic.   HENT:      Head: Normocephalic and atraumatic.      Mouth/Throat:      Mouth: Mucous membranes are moist.   Eyes:      Conjunctiva/sclera: Conjunctivae normal.   Cardiovascular:      Rate and Rhythm: Normal rate.   Pulmonary:      Effort: No respiratory distress.   Abdominal:      General: Abdomen is flat. There is no distension.      Palpations: Abdomen is soft.   Genitourinary:     Comments: No dressing on perineal wound in place  No induration noted  Sahu catheter draining  Purulent drainage around catheter     Musculoskeletal:         General: No swelling or deformity.      Cervical back: Neck supple.   Skin:     Findings: No rash.   Neurological:      Mental Status: He is oriented to person, place, and time.      Gait: Gait normal.   Psychiatric:         Mood and Affect: Mood normal.         Thought Content: Thought content normal.         Judgment: Judgment normal.         Significant Labs:    BMP:  Recent Labs   Lab 01/18/22 0419 01/19/22 0355 01/20/22 0358   * 147* 145   K 3.7 3.2* 3.8    111* 110   CO2 26 26 26   BUN 7 6 5*   CREATININE 0.9 0.9 0.8   CALCIUM 8.4* 8.2* 8.2*       CBC:   Recent Labs   Lab 01/18/22 0419 01/19/22 0355 01/20/22 0359   WBC 14.50* 8.86 9.05   HGB 12.9* 12.0* 12.0*   HCT 40.0 36.8* 36.9*   PLT 89* 62* 52*       Blood  Culture: No results for input(s): LABBLOO in the last 168 hours.  Urine Culture: No results for input(s): LABURIN in the last 168 hours.  Urine Studies: No results for input(s): COLORU, APPEARANCEUA, PHUR, SPECGRAV, PROTEINUA, GLUCUA, KETONESU, BILIRUBINUA, OCCULTUA, NITRITE, UROBILINOGEN, LEUKOCYTESUR, RBCUA, WBCUA, BACTERIA, SQUAMEPITHEL, HYALINECASTS in the last 168 hours.    Invalid input(s): WRIGHTNILAM    Significant Imaging:  All pertinent imaging results/findings from the past 24 hours have been reviewed.

## 2022-01-20 NOTE — SUBJECTIVE & OBJECTIVE
Interval History:  He is altered,not speaking,will do stat head CT,check EEG,re consulted neurology.  CC today is weakness.    Review of Systems  ,not able be done,confused  Objective:     Vital Signs (Most Recent):  Temp: 97.5 °F (36.4 °C) (01/20/22 1128)  Pulse: 67 (01/20/22 1303)  Resp: 18 (01/20/22 1303)  BP: (!) 148/85 (01/20/22 1128)  SpO2: 97 % (01/20/22 1303) Vital Signs (24h Range):  Temp:  [96.9 °F (36.1 °C)-97.8 °F (36.6 °C)] 97.5 °F (36.4 °C)  Pulse:  [57-79] 67  Resp:  [16-24] 18  SpO2:  [93 %-98 %] 97 %  BP: (118-174)/(67-92) 148/85     Weight: 61.2 kg (134 lb 14.7 oz)  Body mass index is 19.92 kg/m².    Intake/Output Summary (Last 24 hours) at 1/20/2022 1321  Last data filed at 1/20/2022 0824  Gross per 24 hour   Intake 120 ml   Output 2850 ml   Net -2730 ml      Physical Exam  Constitutional:       Appearance: Normal appearance.   HENT:      Mouth/Throat:      Mouth: Mucous membranes are dry.   Eyes:      Pupils: Pupils are equal, round, and reactive to light.   Cardiovascular:      Rate and Rhythm: Normal rate.   Pulmonary:      Effort: Pulmonary effort is normal.   Genitourinary:     Comments: Dressing on scrotal area.  Musculoskeletal:         General: No swelling or deformity.   Skin:     Coloration: Skin is not jaundiced.         Significant Labs:   All pertinent labs within the past 24 hours have been reviewed.  BMP:   Recent Labs   Lab 01/20/22  0358   GLU 86      K 3.8      CO2 26   BUN 5*   CREATININE 0.8   CALCIUM 8.2*     CBC:   Recent Labs   Lab 01/19/22 0355 01/20/22  0359   WBC 8.86 9.05   HGB 12.0* 12.0*   HCT 36.8* 36.9*   PLT 62* 52*     CMP:   Recent Labs   Lab 01/19/22 0355 01/20/22 0358   * 145   K 3.2* 3.8   * 110   CO2 26 26   * 86   BUN 6 5*   CREATININE 0.9 0.8   CALCIUM 8.2* 8.2*   PROT 6.4 6.7   ALBUMIN 1.2* 1.3*   BILITOT 0.2 0.2   ALKPHOS 352* 387*   AST 30 34   ALT 12 13   ANIONGAP 10 9   EGFRNONAA >60 >60       Significant Imaging: I  have reviewed all pertinent imaging results/findings within the past 24 hours.

## 2022-01-20 NOTE — CONSULTS
IR consult received to assess for potential image-guided aspiration of abscesses noted on multiple CT's along left shaft and right base of the penis. Unfortunately, IR has little experience with intervention in these regions, we do not have the equipment to safely and appropriately position pt and technically unsure if able to position pt for image-guided access. Anesthesia support may also be required for potential procedure as well. Recommend consult Urology for repeat surgical intervention.     Thank you for considering IR for the care of your patient.     Sabas Ingram MD  Interventional Radiology

## 2022-01-20 NOTE — PLAN OF CARE
Problem: Physical Therapy Goal  Goal: Physical Therapy Goal  Description: Goals to be met by: 22     Patient will increase functional independence with mobility by performin. Supine to sit with Stand-by Assistance  2. Rolling to Left and Right with Stand-by Assistance.  3. Sit to stand transfer to be assessed   4. Gait to be assessed    5. Sitting at edge of bed x15 minutes with Stand-by Assistance  6. Lower extremity exercise program x10 reps per handout, with assistance as needed    Outcome: Ongoing, Progressing   Pt more alert today and able to follow commands.  Able to sit at EOB today and take 4 sidesteps at EOB with RW max A x 2 persons.  SNF recommended

## 2022-01-20 NOTE — ASSESSMENT & PLAN NOTE
61M with Berna's gangrene, s/p debridement, followed by Urology. E.coli and Candida albicans grew on culture (urine culture also grew Candida albicans). now with Pseudomonas in sputum. On Zosyn and fluconazole. Repeat CT with residual abscess. Urology recommending IR for aspiration.     Recommendations:  - Agree with drainage of abscess for source control of infection  - please send updated cultures at time of procedure.   - continue antibiotics; duration 14 days from source control

## 2022-01-20 NOTE — PT/OT/SLP PROGRESS
Physical Therapy      Patient Name:  Abhishek Zhao   MRN:  [id number]    Patient not seen in AM secondary to  (Per nursing, too lethargic to participate). Will follow-up

## 2022-01-20 NOTE — CONSULTS
[facility]  Infectious Disease  Consult Note    Patient Name: Abhishek Zhao  MRN: [id number]  Admission Date: 1/4/2022  Hospital Length of Stay: 15 days  Attending Physician: Quinten Rosario MD  Primary Care Provider: To Obtain Unable     Isolation Status: No active isolations    Patient information was obtained from patient and ER records.      Consults  Assessment/Plan:     * Scrotal abscess  61M with Berna's gangrene, s/p debridement, followed by Urology. E.coli and Candida albicans grew on culture (urine culture also grew Candida albicans). now with Pseudomonas in sputum. On Zosyn and fluconazole. Repeat CT with residual abscess. Urology recommending IR for aspiration.     Recommendations:  - Agree with drainage of abscess for source control of infection  - please send updated cultures at time of procedure.   - continue antibiotics; duration 14 days from source control          Thank you for your consult. I will follow-up with patient. Please contact us if you have any additional questions.    Letty Kelley MD  Infectious Disease  [facility]    Subjective:     Principal Problem: Scrotal abscess    HPI: Abhishek Zhao is a 61-year-old male with HTN and seizures who presented to the ED with complaints of testicular pain and swelling for four days. At that time, he endorsed radiation of pain to rectum and difficulty urinating. He was originally planning on going to the ED before th [place] game but decided to wait until afterwards. In the ED, imaging showed a complex fluid collection(s).  He has a history of urethral stricture last dilated in 2019 at an outside facility ([facility]). The patient was admitted and started on empiric abx. 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  Post-operatively, the patient has improved with diminishing leukocytosis. Surgical cultures have grown E.coli and Candida, thus far. ID is consulted for Berna's gangrene.      Interval History: NAEO. Daughter at bedside. Noted to have some purulent drainage from urethera today. + santigao. Denies new complaints.  Review of Systems   Constitutional: Negative for chills and fever.   Skin: Positive for wound.   All other systems reviewed and are negative.    Objective:     Vital Signs (Most Recent):  Temp: 97.5 °F (36.4 °C) (01/20/22 1128)  Pulse: 67 (01/20/22 1321)  Resp: 19 (01/20/22 1321)  BP: (!) 148/85 (01/20/22 1128)  SpO2: 97 % (01/20/22 1303) Vital Signs (24h Range):  Temp:  [96.9 °F (36.1 °C)-97.8 °F (36.6 °C)] 97.5 °F (36.4 °C)  Pulse:  [57-79] 67  Resp:  [16-24] 19  SpO2:  [93 %-98 %] 97 %  BP: (118-174)/(67-92) 148/85     Weight: 61.2 kg (134 lb 14.7 oz)  Body mass index is 19.92 kg/m².    Estimated Creatinine Clearance: 86.1 mL/min (based on SCr of 0.8 mg/dL).    Physical Exam  Vitals and nursing note reviewed.   Constitutional:       Appearance: He is not ill-appearing, toxic-appearing or diaphoretic.   HENT:      Head: Normocephalic and atraumatic.      Right Ear: External ear normal.      Left Ear: External ear normal.      Nose: Nose normal.   Eyes:      Extraocular Movements: Extraocular movements intact.      Conjunctiva/sclera: Conjunctivae normal.      Pupils: Pupils are equal, round, and reactive to light.   Cardiovascular:      Rate and Rhythm: Normal rate and regular rhythm.   Pulmonary:      Effort: Pulmonary effort is normal.      Breath sounds: Normal breath sounds.   Abdominal:      Tenderness: There is no guarding or rebound.   Genitourinary:     Comments: dressed  Musculoskeletal:         General: No swelling or tenderness.   Neurological:      General: No focal deficit present.      Mental Status: He is alert.   Psychiatric:         Mood and Affect: Mood normal.         Behavior: Behavior  normal.             Significant Labs:   Blood Culture:   Recent Labs   Lab 01/04/22  1744 01/10/22  1411   LABBLOO No Growth after 4 days.   No Growth after 4 days.  No Growth after 4 days.   No Growth after 4 days.      CBC:   Recent Labs   Lab 01/19/22  0355 01/20/22  0359   WBC 8.86 9.05   HGB 12.0* 12.0*   HCT 36.8* 36.9*   PLT 62* 52*     Respiratory Culture:   Recent Labs   Lab 01/10/22  1653   GSRESP <10 epithelial cells per low power field.  No WBC's  No organisms seen   RESPIRATORYC PSEUDOMONAS AERUGINOSA  Moderate  *     Urine Culture:   Recent Labs   Lab 01/04/22  1804   LABURIN CANDIDA ALBICANS  10,000 - 49,999 cfu/ml  Treatment of asymptomatic candiduria is not recommended (except for   specific populations). Candida isolated in the urine typically   represents colonization. If an indwelling urinary catheter is present  it should be removed or replaced.  *     Wound Culture:   Recent Labs   Lab 01/05/22  1055   LABAERO ESCHERICHIA COLI  Moderate  *  YAMILET ALBICANS  Few  *       Significant Imaging: I have reviewed all pertinent imaging results/findings within the past 24 hours.

## 2022-01-20 NOTE — PLAN OF CARE
Problem: Occupational Therapy Goal  Goal: Occupational Therapy Goal  Description: Goals to be met by: 01/27/21     Patient will increase functional independence with ADLs by performing:    Feeding with Modified [place].  UE Dressing with Supervision.  LE Dressing with Minimal Assistance.  Grooming while seated with Stand-by Assistance.  Sitting at edge of bed x15 minutes with Supervision.  Sit to stand with Minimal Assistance and use of AD.    Supine to sit with Contact Guard Assistance.  Upper extremity exercise program x10 reps per handout, with supervision.  Step transfer with Moderate Assistance  Stand pivot transfer with Moderate Assistance   Toilet transfer to bedside commode with Moderate Assistance     Outcome: Ongoing, Progressing     Dtr engaged during session with EOB activities. Pt more interactive today compared to 01/19/22 session. Pt combed his hair with RUE with hand-over-hand and MAX to MOD A. Pt took 4 sidesteps at EOB using RW with MAX A x2.

## 2022-01-20 NOTE — PROGRESS NOTES
Replaced bilateral EHOB boots to protect heels. Foam dressing remains in place over right heel blister.  Nursing to continue wound care to scrotum every shift with Vashe moistened gauze.

## 2022-01-20 NOTE — NURSING
Pt AAOx1 (oriented to self), VSS on 2L NC, voids per Sahu cath, Cardiac monitoring (telebox # [id number]), Blood glucose monitoring, Diet pureed, Scheduled meds administered (crushed & mixed with applesauce), cont. IV fluids infusing, Telesitter at bedside, dauhghter at bedside, bed locked on lowest position, call light within reach, bed alarm on, Remains free of fall. Wound care to scrotum done per order.

## 2022-01-20 NOTE — PROGRESS NOTES
"[facility]  Neurology  Progress Note    Patient Name: Abhishek Zhao  MRN: [id number]  Admission Date: 1/4/2022  Hospital Length of Stay: 15 days  Code Status: Full Code   Attending Provider: Quinten Rosario MD  Primary Care Physician: To Obtain Unable   Principal Problem:Scrotal abscess    Subjective:     Interval History: 60 y/o male presented initially with a complaint of testicular pain. Associated with swelling and redness to the scrotum and penis. 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 Urology is following as well as ID. Today pt had an episode in which he became poorly responsive. He had a persistent cough with copious amounts of mucus. He has slowly recovered but is exhibiting "picking" behavior and is intermittently answering questions.     -1/12/22: Pt had an episode in which he was poorly responsive and arms flailing around.     -1/14/22: Pt poorly responsive overnight. Stroke CODE was called. Transferred to ICU.     -1/15/22: Pt is more responsive today. Restless.      -1/16/22: Pt has been transferred out of ICU. Currently calm. Able to states his name and that he is in a hospital.      -1/18/22: Mr. Zhao is poorly responsive today. Staring.    -1/20/22: Alert, talking to family although only oriented to person, place. Telling me he is hungry.    Current Neurological Medications:     Current Facility-Administered Medications   Medication Dose Route Frequency Provider Last Rate Last Admin    acetaminophen tablet 650 mg  650 mg Oral Q6H PRN Hugo Aviles MD   650 mg at 01/11/22 1525    acetylcysteine 100 mg/ml (10%) solution 4 mL  4 mL Nebulization TID FRANCISCO JAVIER Aviles MD   4 mL at 01/20/22 1321    albuterol-ipratropium 2.5 mg-0.5 mg/3 mL nebulizer solution 3 mL  3 mL Nebulization Q4H PRN Hugo Aviles MD   3 mL at 01/18/22 0119    albuterol-ipratropium 2.5 mg-0.5 mg/3 mL nebulizer solution 3 mL  3 mL Nebulization Q4H WAKE Hugo Aviles MD   " 3 mL at 01/20/22 1303    aluminum-magnesium hydroxide-simethicone 200-200-20 mg/5 mL suspension 30 mL  30 mL Oral QID PRN Hugo Aviles MD        amLODIPine tablet 10 mg  10 mg Oral Daily Hugo Aviles MD   10 mg at 01/20/22 0857    aspirin EC tablet 81 mg  81 mg Oral Daily Hugo Aviles MD   81 mg at 01/20/22 0857    atorvastatin tablet 80 mg  80 mg Oral Daily Hugo Aviles MD   80 mg at 01/20/22 0857    dextrose 5 % and 0.45 % NaCl with KCl 40 mEq infusion   Intravenous Continuous Quinten Rosario MD 75 mL/hr at 01/20/22 1243 New Bag at 01/20/22 1243    dextrose 50% injection 12.5 g  12.5 g Intravenous PRN Hugo Aviles MD   12.5 g at 01/16/22 1251    dextrose 50% injection 25 g  25 g Intravenous PRN Hugo Aviles MD   25 g at 01/19/22 1644    enoxaparin injection 40 mg  40 mg Subcutaneous Daily Hugo Aviles MD   40 mg at 01/19/22 1643    fluconazole (DIFLUCAN) IVPB 400 mg 200 mL  400 mg Intravenous Q24H Hugo Aviles MD   Stopped at 01/20/22 1224    glucagon (human recombinant) injection 1 mg  1 mg Intramuscular PRN Hugo Aviles MD        glucose chewable tablet 16 g  16 g Oral PRN Hugo Aviles MD        glucose chewable tablet 24 g  24 g Oral PRN Hugo Aviles MD        hydrALAZINE injection 10 mg  10 mg Intravenous Q6H PRN Hugo Aviles MD        hydrALAZINE tablet 50 mg  50 mg Oral Q8H Quinten Rosario MD   50 mg at 01/20/22 0700    influenza (QUADRIVALENT PF) vaccine 0.5 mL  0.5 mL Intramuscular vaccine x 1 dose Hugo Aviles MD        insulin aspart U-100 pen 0-5 Units  0-5 Units Subcutaneous Q6H PRN Hugo Aviles MD   4 Units at 01/19/22 1246    labetaloL injection 10 mg  10 mg Intravenous Q4H PRN Hugo Aviles MD        lacosamide (VIMPAT) 200 mg in sodium chloride 0.9% 100 mL IVPB  200 mg Intravenous Q12H Hugo Aviles  mL/hr at 01/20/22 1225 200 mg at 01/20/22 1225    LIDOcaine HCL 10 mg/ml (1%) injection 1 mL  1 mL  Intradermal Once PRN Hugo Aviles MD        lorazepam injection 3 mg  3 mg Intravenous Once PRN Hugo Aviles MD        melatonin tablet 6 mg  6 mg Oral Nightly PRN Quinten Rosario MD        melatonin tablet 6 mg  6 mg Oral Nightly Quinten Rosario MD        metoprolol succinate (TOPROL-XL) 24 hr tablet 50 mg  50 mg Oral Daily Hugo Aviles MD   50 mg at 01/20/22 0857    naloxone 0.4 mg/mL injection 0.02 mg  0.02 mg Intravenous PRN Hugo Aviles MD        ondansetron injection 4 mg  4 mg Intravenous Q8H PRN Hugo Aviles MD   4 mg at 01/12/22 1219    piperacillin-tazobactam 4.5 g in dextrose 5 % 100 mL IVPB (ready to mix system)  4.5 g Intravenous Q8H Hugo Aviles MD   Stopped at 01/20/22 1100    polyethylene glycol packet 17 g  17 g Oral Daily Hugo Aviles MD   17 g at 01/20/22 0857    prochlorperazine injection Soln 5 mg  5 mg Intravenous Q6H PRN Hugo Aviles MD        simethicone chewable tablet 80 mg  1 tablet Oral QID PRN Hugo Aviles MD        sodium chloride 0.9% flush 10 mL  10 mL Intravenous Q8H PRN Hugo Aviles MD   10 mL at 01/17/22 1719    sodium chloride 0.9% flush 10 mL  10 mL Intravenous PRN Hugo Aviles MD        sodium chloride 0.9% flush 10 mL  10 mL Intravenous Q6H Hugo Aviles MD   10 mL at 01/20/22 1227    And    sodium chloride 0.9% flush 10 mL  10 mL Intravenous PRN Hugo Aviles MD        tamsulosin 24 hr capsule 0.4 mg  0.4 mg Oral Daily Hugo Aviles MD   0.4 mg at 01/20/22 0857    valproate (DEPACON) 500 mg in dextrose 5 % 50 mL IVPB  500 mg Intravenous Q12H Hugo Aviles MD   Stopped at 01/20/22 1233       Review of Systems   Unable to perform ROS: Mental status change     Objective:     Vital Signs (Most Recent):  Temp: 97.5 °F (36.4 °C) (01/20/22 1128)  Pulse: 67 (01/20/22 1321)  Resp: 19 (01/20/22 1321)  BP: (!) 148/85 (01/20/22 1128)  SpO2: 97 % (01/20/22 1303) Vital Signs (24h Range):  Temp:  [96.9 °F  (36.1 °C)-97.8 °F (36.6 °C)] 97.5 °F (36.4 °C)  Pulse:  [57-79] 67  Resp:  [16-24] 19  SpO2:  [93 %-98 %] 97 %  BP: (118-174)/(67-92) 148/85     Weight: 61.2 kg (134 lb 14.7 oz)  Body mass index is 19.92 kg/m².    Physical Exam  Constitutional:       General: He is not in acute distress.  HENT:      Head: Normocephalic.      Right Ear: External ear normal.      Left Ear: External ear normal.   Eyes:      General:         Right eye: No discharge.         Left eye: No discharge.   Cardiovascular:      Rate and Rhythm: Normal rate.   Pulmonary:      Effort: Pulmonary effort is normal.   Abdominal:      Palpations: Abdomen is soft.   Musculoskeletal:         General: No tenderness.      Cervical back: Neck supple.   Skin:     General: Skin is warm.   Psychiatric:         Speech: Speech is slurred.            NEUROLOGICAL EXAMINATION:      MENTAL STATUS   Speech: slurred  Level of consciousness: drowsy       Oriented to self     CRANIAL NERVES      CN III, IV, VI   Right pupil: Size: 2 mm. Shape: regular.   Left pupil: Size: 2 mm. Shape: regular.   Nystagmus: none   Conjugate gaze: present     CN VII   Right facial weakness: none  Left facial weakness: none     CN XII   Tongue deviation: none     MOTOR EXAM        AROM of UE's and LE's against gravity        Significant Labs:   CBC:   Recent Labs   Lab 01/19/22  0355 01/20/22  0359   WBC 8.86 9.05   HGB 12.0* 12.0*   HCT 36.8* 36.9*   PLT 62* 52*     CMP:   Recent Labs   Lab 01/19/22  0355 01/20/22  0358   * 86   * 145   K 3.2* 3.8   * 110   CO2 26 26   BUN 6 5*   CREATININE 0.9 0.8   CALCIUM 8.2* 8.2*   PROT 6.4 6.7   ALBUMIN 1.2* 1.3*   BILITOT 0.2 0.2   ALKPHOS 352* 387*   AST 30 34   ALT 12 13   ANIONGAP 10 9   EGFRNONAA >60 >60       Significant Imaging: I have reviewed all pertinent imaging results/findings within the past 24 hours.         Assessment and Plan:     62 y/o male consulted for AMS     1. Encephalopathy: encephalopathy of unknown  etiology (Infectious? Delirium?. Pt with no hypotension or major metabolic disturbances. Alert and responsive today.           -MRI brain showed no acute stroke. No signs of space-occupying lesion.           -Will stop lamotrigine in case is causing AMS.     2. Hx of seizures: uncertain if pt having breakthrough events. More alert today. No reported seizures.           Initial EEG showed no ongoing seizures.           -VPA 500 mg BID.            -Lacosamide 200 mg BID.           -D/C'd lamotrigine.    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] 01/06/2022 Yes    Adjustment disorder with depressed mood [F43.21] 01/04/2022 Yes     Chronic    Chronic ischemic heart disease [I25.9] 01/04/2022 Yes     Chronic    Cocaine dependence in remission [F14.21] 01/04/2022 Yes     Chronic    Cardiomegaly [I51.7] 01/04/2022 Yes     Chronic    Hyperlipidemia [E78.5] 01/04/2022 Yes     Chronic    Essential hypertension [I10] 01/04/2022 Yes     Chronic    Seizure disorder [G40.909] 01/04/2022 Yes     Chronic    Tobacco user [Z72.0] 01/04/2022 Yes     Chronic    Diabetes mellitus type 2, controlled [E11.9] 08/03/2020 Yes     Chronic      Problems Resolved During this Admission:       VTE Risk Mitigation (From admission, onward)         Ordered     enoxaparin injection 40 mg  Daily         01/13/22 1612     IP VTE LOW RISK PATIENT  Once         01/04/22 2241     Place  sequential compression device  Until discontinued         01/04/22 6115                Ashvin Giraldo MD  Neurology  [facility] [place]

## 2022-01-20 NOTE — PT/OT/SLP PROGRESS
"Physical Therapy Treatment    Patient Name:  Abhishek Zhao   MRN:  [id number]    Recommendations:     Discharge Recommendations:   (SNF vs Home with HHPT and 24hr care)   Discharge Equipment Recommendations: hospital bed   Barriers to discharge: Pt is ot functioig at baseline and currently requires Max/Total A for all functional mobility    Assessment:     Abhishek Zhao is a 61 y.o. male admitted with a medical diagnosis of Scrotal abscess.  He presents with the following impairments/functional limitations:  weakness,impaired balance,decreased safety awareness,impaired skin,impaired endurance,pain,visual deficits,impaired cognition,impaired cardiopulmonary response to activity,impaired self care skills,decreased coordination,impaired functional mobilty,impaired coordination,decreased upper extremity function,gait instability,decreased lower extremity function,impaired fine motor Pt more alert today and able to follow commands.    Rehab Prognosis: Fair; patient would benefit from acute skilled PT services to address these deficits and reach maximum level of function.    Recent Surgery: Procedure(s) (LRB):  CYSTOSCOPY, RETROGRADE URETHROGRAM, FISTULAGRAM, EXCISION OF NECROTIC SCROTAL TISSUE ABSCESS, BARKER PLACEMENT (N/A)  EXCISION, ABSCESS  CYSTOSCOPY  FISTULOGRAM (N/A) 15 Days Post-Op    Plan:     During this hospitalization, patient to be seen  (3-5x/wk) to address the identified rehab impairments via gait training,therapeutic activities,therapeutic exercises,wheelchair management/training,neuromuscular re-education and progress toward the following goals:    · Plan of Care Expires:  01/27/22    Subjective     Chief Complaint: pain, unable to pinpoint location  Patient/Family Comments/goals: Pt wanting to lay back down, but able to remain sitting at EOB with distraction from family  Pain/Comfort:  · Pain Rating 1:  (Unable to rate, "my leg' pt unable to locate pain.  States "yes to any body part named)  · Pain " "Addressed 1: Reposition,Distraction,Cessation of Activity,Nurse notified      Objective:     Communicated with nsg prior to session.  Patient found HOB elevated with bed alarm,oxygen,santiago catheter,pressure relief boots,peripheral IV upon PT entry to room.     General Precautions: Standard, fall,pureed diet   Orthopedic Precautions:N/A   Braces: N/A  Respiratory Status: Nasal cannula, flow 2 L/min     Functional Mobility:  · Bed Mobility:     · Rolling Left:  maximal assistance x 2 persons and with VC/TC for reaching for BR  · Rolling Right: maximal assistance x 2 persons and with Vc/TC for reaching for BR  · Scooting: minimum assistance x 2 persons and with VC/TC for hand placement on BR and bridging to HOB in supine  · Bridging: contact guard assistance and with VC/TC to lift buttocks  · Supine to Sit: total assistance, of 2 persons and with Vc/TC for hand placement and body mechanics  · Sit to Supine: mod assistance, of 2 persons and with VC/TC for hand placement  · Transfers:     · Sit to Stand:  maximal assistance, of 2 persons and with Vc/TC for hand placement and forward weight shift over SHELBY with rolling walker   · Stand to Sit: Total A x 2 persons with VC/TC for hand placement and controlled descent  · Gait: Total A x 2 persons with RW x 4 sidesteps at EOB  · Balance: Poor>Fair sit, Poor stand.      AM-PAC 6 CLICK MOBILITY  Turning over in bed (including adjusting bedclothes, sheets and blankets)?: 2  Sitting down on and standing up from a chair with arms (e.g., wheelchair, bedside commode, etc.): 2  Moving from lying on back to sitting on the side of the bed?: 2  Moving to and from a bed to a chair (including a wheelchair)?: 2  Need to walk in hospital room?: 2  Climbing 3-5 steps with a railing?: 1  Basic Mobility Total Score: 11       Therapeutic Activities and Exercises:   Pt able to identify objects and faces today.  Pt able to follow commands to "lift legs" Able to raise both LE's against gravity from " bed.   Pt sat at EOB approx 20m total with Total A initially progressing to Min A with B LE support and VC/TC for forward weight shift over SHELBY.  Pt unable to perform AP's or FAQ's sitting at EOB.  Pt received B LE PROM x 10 reps and HCS sitting at EOB with Max Assist for balance from OT.  Pt stood with RW and Mod A x 2 persons approx 10 sec with VC/TC for increased trunk ext and distribution of weight through UE's to walker.  Pt repositioned in bed as below    Patient left in L SL with cushion behind back and pillow between knees with Pressure relief boots applied with all lines intact, call button in reach, nsg notified and daughter present..    GOALS:   Multidisciplinary Problems     Physical Therapy Goals        Problem: Physical Therapy Goal    Goal Priority Disciplines Outcome Goal Variances Interventions   Physical Therapy Goal     PT, PT/OT Ongoing, Progressing     Description: Goals to be met by: 22     Patient will increase functional independence with mobility by performin. Supine to sit with Stand-by Assistance  2. Rolling to Left and Right with Stand-by Assistance.  3. Sit to stand transfer to be assessed   4. Gait to be assessed    5. Sitting at edge of bed x15 minutes with Stand-by Assistance  6. Lower extremity exercise program x10 reps per handout, with assistance as needed                     Time Tracking:     PT Received On: 22  PT Start Time: 1348     PT Stop Time: 1422  PT Total Time (min): 34 min     Billable Minutes: Therapeutic Activity 20 and Therapeutic Exercise 8    Treatment Type: Treatment  PT/PTA: PT     PTA Visit Number: 0     2022

## 2022-01-20 NOTE — ASSESSMENT & PLAN NOTE
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 Cultures growing ampicillin-resistant Ecoli and C albicans. Repeat CT A/P 1/13 w/ 4cm fluid collection c/f persistent abscess which was drained bedside by urology  - urology following  - cont zosyn (re-started for HCAP)  - further ID recs pending  - repeat CT pelvis.  Consulted IR for drainage of residual fluid  collection on CT pelvic.

## 2022-01-20 NOTE — PT/OT/SLP PROGRESS
Occupational Therapy      Patient Name:  Abhishek Zhao   MRN:  [id number]    Patient not seen today secondary to Other (discussed with nurse, Verena, who reports pt with increased lethargic state and to follow-up this afternoon.). Will follow-up later as able.    1/20/2022

## 2022-01-20 NOTE — PT/OT/SLP PROGRESS
Occupational Therapy   Treatment    Name: Abhishek Zhao  MRN: [id number]  Admitting Diagnosis:  Scrotal abscess  15 Days Post-Op    Recommendations:     Discharge Recommendations: nursing facility, skilled  Discharge Equipment Recommendations:  hospital bed  Barriers to discharge:   (not at PLOF; increased assist required with all ADLs and all aspects of functional mobility)    Assessment:     Abhishek Zaho is a 61 y.o. male with a medical diagnosis of Scrotal abscess. Performance deficits affecting function are weakness,impaired endurance,impaired cognition,decreased ROM,decreased coordination,decreased upper extremity function,impaired self care skills,impaired fine motor,decreased lower extremity function,impaired skin,gait instability,decreased safety awareness,impaired functional mobilty,pain,impaired balance,impaired cardiopulmonary response to activity.     Dtr engaged during session with EOB activities. Pt more interactive today compared to 01/19/22 session. Pt combed his hair with RUE with hand-over-hand and MAX to MOD A. Pt took 4 sidesteps at EOB using RW with MAX A x2    Rehab Prognosis:  Fair; patient would benefit from acute skilled OT services to address these deficits and reach maximum level of function.       Plan:     Patient to be seen  (3-5x/week) to address the above listed problems via self-care/home management,therapeutic activities,therapeutic exercises  · Plan of Care Expires: 01/27/22  · Plan of Care Reviewed with: daughter    Subjective     Chief complaint: pain, wanted to lay down   Patient/family comments/ goals: daughter present and helping pt to engage in session; she brought pictures of his grandkids to identify     Pain/Comfort:  · Pain Rating 1:  (pt reporting pt but unable to report/point to location)  · Pain Addressed 1: Reposition,Pre-medicate for activity,Distraction,Cessation of Activity    Objective:     Communicated with: Verena aj, prior to session.  Patient found HOB  elevated with bed alarm,oxygen,santiago catheter,pressure relief boots,peripheral IV upon OT entry to room.    General Precautions: Standard, fall,respiratory,pureed diet   Orthopedic Precautions:N/A   Braces: N/A  Respiratory Status: Nasal cannula, flow 2 L/min     Occupational Performance:     Bed Mobility:    · Patient completed Rolling/Turning to Left with  maximal assistance and with side rail  · Patient completed Rolling/Turning to Right with maximal assistance and with side rail  · Patient completed Scooting in supine to HOB with minimum assistance and 2 persons with max verbal/tactile cueing for BUE placement on above-the-head railings   · Patient completed Bridging with contact guard assistance x2 trials   · Patient completed Supine to Sit with total assistance and 2 persons  · Patient completed Sit to Supine with moderate assistance and 2 persons     Functional Mobility/Transfers:  · Patient completed Sit <> Stand Transfer with dependence  with  BUE supported on first trial in order to place green air cushion for pt to sit on at EOB  · Patient completed Sit <> Stand Transfer with maximum assistance and 2 persons with rolling walker   · Functional Mobility: pt took ~4 sidesteps at EOB using RW on 2L O2 NC with MAX A x2.     Activities of Daily Living:  · Grooming: maximal assist progressing to moderate assist with hand-over-hand assist to hold comb in R hand. pt initially unable to maintain sustained  onto comb, but then able to do so with increased time. pt combed ~50% of hair     · Upper Body Dressing: total assistance to don back gown while sitting EOB      [facility] 6 Click ADL: 12    Treatment & Education:  · Pt re-educated on OT role/POC.   · Importance of OOB activity with therapy assistance.  · Safety during functional t/f and mobility   · Pt sat EOB for ~20 min. First half, pt required total assist while completing BLE PROM dynamic activities. Initially, hand-over-hand assist with RUE for functional  reach to side rail on the R to promote anterior trunk lean and to find upright midline position d/t L lateral lean. Pt progressed to MIN A for sit balance but did demo L lateral lean with MAX A to correct without initiation to correct himself. Dtr standing in front of pt with cueing and assist to hold her daughter's hands- pt required tactile cueing and MIN A to initiate movement then pt grasped dtr's hand 2x. Pt then functional reach with BUE to give pt a hug with cueing assist for anterior trunk lean.   · AAROM and MIN A for sit balance with x10 shoulder horizontal ab/dduction and elbow flex/ext   · Verbal/tactile cueing for cervical rotation to identify dtr on his L and engage in conversation   · Multiple self-care tasks/functional mobility completed- assistance level noted above   · All questions/concerns answered within OT scope of practice       Patient left HOB elevated L sidelying with B/L pressure relief boots on and pillow in-between knees with all lines intact, call button in reach, bed alarm on, nurseVerena, notified, daughter present and all needs met/within reachEducation:      GOALS:   Multidisciplinary Problems     Occupational Therapy Goals        Problem: Occupational Therapy Goal    Goal Priority Disciplines Outcome Interventions   Occupational Therapy Goal     OT, PT/OT Ongoing, Progressing    Description: Goals to be met by: 01/27/21     Patient will increase functional independence with ADLs by performing:    Feeding with Modified [place].  UE Dressing with Supervision.  LE Dressing with Minimal Assistance.  Grooming while seated with Stand-by Assistance.  Sitting at edge of bed x15 minutes with Supervision.  Sit to stand with Minimal Assistance and use of AD.    Supine to sit with Contact Guard Assistance.  Upper extremity exercise program x10 reps per handout, with supervision.  Step transfer with Moderate Assistance  Stand pivot transfer with Moderate Assistance   Toilet transfer to  bedside commode with Moderate Assistance                      Time Tracking:     OT Date of Treatment: 01/20/22  OT Start Time: 1348  OT Stop Time: 1421  OT Total Time (min): 33 min    Billable Minutes:Self Care/Home Management 10 min  Therapeutic Activity 13 min  Total Time 33 min (PT present)    OT/ALOK: OT          1/20/2022

## 2022-01-20 NOTE — SUBJECTIVE & OBJECTIVE
Interval History: NAEO. Daughter at bedside. Noted to have some purulent drainage from urethera today. + santiago. Denies new complaints.  Review of Systems   Constitutional: Negative for chills and fever.   Skin: Positive for wound.   All other systems reviewed and are negative.    Objective:     Vital Signs (Most Recent):  Temp: 97.5 °F (36.4 °C) (01/20/22 1128)  Pulse: 67 (01/20/22 1321)  Resp: 19 (01/20/22 1321)  BP: (!) 148/85 (01/20/22 1128)  SpO2: 97 % (01/20/22 1303) Vital Signs (24h Range):  Temp:  [96.9 °F (36.1 °C)-97.8 °F (36.6 °C)] 97.5 °F (36.4 °C)  Pulse:  [57-79] 67  Resp:  [16-24] 19  SpO2:  [93 %-98 %] 97 %  BP: (118-174)/(67-92) 148/85     Weight: 61.2 kg (134 lb 14.7 oz)  Body mass index is 19.92 kg/m².    Estimated Creatinine Clearance: 86.1 mL/min (based on SCr of 0.8 mg/dL).    Physical Exam  Vitals and nursing note reviewed.   Constitutional:       Appearance: He is not ill-appearing, toxic-appearing or diaphoretic.   HENT:      Head: Normocephalic and atraumatic.      Right Ear: External ear normal.      Left Ear: External ear normal.      Nose: Nose normal.   Eyes:      Extraocular Movements: Extraocular movements intact.      Conjunctiva/sclera: Conjunctivae normal.      Pupils: Pupils are equal, round, and reactive to light.   Cardiovascular:      Rate and Rhythm: Normal rate and regular rhythm.   Pulmonary:      Effort: Pulmonary effort is normal.      Breath sounds: Normal breath sounds.   Abdominal:      Tenderness: There is no guarding or rebound.   Genitourinary:     Comments: dressed  Musculoskeletal:         General: No swelling or tenderness.   Neurological:      General: No focal deficit present.      Mental Status: He is alert.   Psychiatric:         Mood and Affect: Mood normal.         Behavior: Behavior normal.             Significant Labs:   Blood Culture:   Recent Labs   Lab 01/04/22  3254 01/10/22  1411   LABBLOO No Growth after 4 days.   No Growth after 4 days.  No Growth  after 4 days.   No Growth after 4 days.      CBC:   Recent Labs   Lab 01/19/22  0355 01/20/22  0359   WBC 8.86 9.05   HGB 12.0* 12.0*   HCT 36.8* 36.9*   PLT 62* 52*     Respiratory Culture:   Recent Labs   Lab 01/10/22  1653   GSRESP <10 epithelial cells per low power field.  No WBC's  No organisms seen   RESPIRATORYC PSEUDOMONAS AERUGINOSA  Moderate  *     Urine Culture:   Recent Labs   Lab 01/04/22  1804   LABURIN CANDIDA ALBICANS  10,000 - 49,999 cfu/ml  Treatment of asymptomatic candiduria is not recommended (except for   specific populations). Candida isolated in the urine typically   represents colonization. If an indwelling urinary catheter is present  it should be removed or replaced.  *     Wound Culture:   Recent Labs   Lab 01/05/22  1055   LABAERO ESCHERICHIA COLI  Moderate  *  YAMILET ALBICANS  Few  *       Significant Imaging: I have reviewed all pertinent imaging results/findings within the past 24 hours.

## 2022-01-21 LAB
ALBUMIN SERPL BCP-MCNC: 1.3 G/DL (ref 3.5–5.2)
ALP SERPL-CCNC: 347 U/L (ref 55–135)
ALT SERPL W/O P-5'-P-CCNC: 16 U/L (ref 10–44)
ANION GAP SERPL CALC-SCNC: 6 MMOL/L (ref 8–16)
AST SERPL-CCNC: 23 U/L (ref 10–40)
BASOPHILS # BLD AUTO: 0.07 K/UL (ref 0–0.2)
BASOPHILS NFR BLD: 0.8 % (ref 0–1.9)
BILIRUB SERPL-MCNC: 0.2 MG/DL (ref 0.1–1)
BUN SERPL-MCNC: 5 MG/DL (ref 6–20)
CALCIUM SERPL-MCNC: 8.3 MG/DL (ref 8.7–10.5)
CHLORIDE SERPL-SCNC: 108 MMOL/L (ref 95–110)
CO2 SERPL-SCNC: 27 MMOL/L (ref 23–29)
CREAT SERPL-MCNC: 0.9 MG/DL (ref 0.5–1.4)
DIFFERENTIAL METHOD: ABNORMAL
EOSINOPHIL # BLD AUTO: 0 K/UL (ref 0–0.5)
EOSINOPHIL NFR BLD: 0.3 % (ref 0–8)
ERYTHROCYTE [DISTWIDTH] IN BLOOD BY AUTOMATED COUNT: 13.7 % (ref 11.5–14.5)
EST. GFR  (AFRICAN AMERICAN): >60 ML/MIN/1.73 M^2
EST. GFR  (NON AFRICAN AMERICAN): >60 ML/MIN/1.73 M^2
GLUCOSE SERPL-MCNC: 77 MG/DL (ref 70–110)
HCT VFR BLD AUTO: 32.7 % (ref 40–54)
HGB BLD-MCNC: 10.7 G/DL (ref 14–18)
IMM GRANULOCYTES # BLD AUTO: 0.33 K/UL (ref 0–0.04)
IMM GRANULOCYTES NFR BLD AUTO: 3.6 % (ref 0–0.5)
LYMPHOCYTES # BLD AUTO: 1.7 K/UL (ref 1–4.8)
LYMPHOCYTES NFR BLD: 18.6 % (ref 18–48)
MCH RBC QN AUTO: 27.9 PG (ref 27–31)
MCHC RBC AUTO-ENTMCNC: 32.7 G/DL (ref 32–36)
MCV RBC AUTO: 85 FL (ref 82–98)
MONOCYTES # BLD AUTO: 0.5 K/UL (ref 0.3–1)
MONOCYTES NFR BLD: 5.8 % (ref 4–15)
NEUTROPHILS # BLD AUTO: 6.5 K/UL (ref 1.8–7.7)
NEUTROPHILS NFR BLD: 70.9 % (ref 38–73)
NRBC BLD-RTO: 0 /100 WBC
PLATELET # BLD AUTO: 50 K/UL (ref 150–450)
PMV BLD AUTO: 11.1 FL (ref 9.2–12.9)
POCT GLUCOSE: 126 MG/DL (ref 70–110)
POCT GLUCOSE: 169 MG/DL (ref 70–110)
POCT GLUCOSE: 216 MG/DL (ref 70–110)
POCT GLUCOSE: 64 MG/DL (ref 70–110)
POCT GLUCOSE: 64 MG/DL (ref 70–110)
POTASSIUM SERPL-SCNC: 4.3 MMOL/L (ref 3.5–5.1)
PROT SERPL-MCNC: 6.8 G/DL (ref 6–8.4)
RBC # BLD AUTO: 3.83 M/UL (ref 4.6–6.2)
SODIUM SERPL-SCNC: 141 MMOL/L (ref 136–145)
WBC # BLD AUTO: 9.18 K/UL (ref 3.9–12.7)

## 2022-01-21 PROCEDURE — A4216 STERILE WATER/SALINE, 10 ML: HCPCS | Performed by: STUDENT IN AN ORGANIZED HEALTH CARE EDUCATION/TRAINING PROGRAM

## 2022-01-21 PROCEDURE — 63600175 PHARM REV CODE 636 W HCPCS: Performed by: STUDENT IN AN ORGANIZED HEALTH CARE EDUCATION/TRAINING PROGRAM

## 2022-01-21 PROCEDURE — 87106 FUNGI IDENTIFICATION YEAST: CPT | Performed by: HOSPITALIST

## 2022-01-21 PROCEDURE — 99232 PR SUBSEQUENT HOSPITAL CARE,LEVL II: ICD-10-PCS | Mod: ,,, | Performed by: PSYCHIATRY & NEUROLOGY

## 2022-01-21 PROCEDURE — 25000003 PHARM REV CODE 250: Performed by: STUDENT IN AN ORGANIZED HEALTH CARE EDUCATION/TRAINING PROGRAM

## 2022-01-21 PROCEDURE — 25000003 PHARM REV CODE 250: Performed by: HOSPITALIST

## 2022-01-21 PROCEDURE — 27000221 HC OXYGEN, UP TO 24 HOURS

## 2022-01-21 PROCEDURE — 63600175 PHARM REV CODE 636 W HCPCS: Performed by: HOSPITALIST

## 2022-01-21 PROCEDURE — 94760 N-INVAS EAR/PLS OXIMETRY 1: CPT

## 2022-01-21 PROCEDURE — 94640 AIRWAY INHALATION TREATMENT: CPT

## 2022-01-21 PROCEDURE — 25000242 PHARM REV CODE 250 ALT 637 W/ HCPCS: Performed by: STUDENT IN AN ORGANIZED HEALTH CARE EDUCATION/TRAINING PROGRAM

## 2022-01-21 PROCEDURE — C9254 INJECTION, LACOSAMIDE: HCPCS | Performed by: STUDENT IN AN ORGANIZED HEALTH CARE EDUCATION/TRAINING PROGRAM

## 2022-01-21 PROCEDURE — 99233 SBSQ HOSP IP/OBS HIGH 50: CPT | Mod: ,,, | Performed by: STUDENT IN AN ORGANIZED HEALTH CARE EDUCATION/TRAINING PROGRAM

## 2022-01-21 PROCEDURE — 99233 PR SUBSEQUENT HOSPITAL CARE,LEVL III: ICD-10-PCS | Mod: ,,, | Performed by: STUDENT IN AN ORGANIZED HEALTH CARE EDUCATION/TRAINING PROGRAM

## 2022-01-21 PROCEDURE — 99232 SBSQ HOSP IP/OBS MODERATE 35: CPT | Mod: ,,, | Performed by: PSYCHIATRY & NEUROLOGY

## 2022-01-21 PROCEDURE — 85025 COMPLETE CBC W/AUTO DIFF WBC: CPT | Performed by: STUDENT IN AN ORGANIZED HEALTH CARE EDUCATION/TRAINING PROGRAM

## 2022-01-21 PROCEDURE — 11000001 HC ACUTE MED/SURG PRIVATE ROOM

## 2022-01-21 PROCEDURE — 36415 COLL VENOUS BLD VENIPUNCTURE: CPT | Performed by: HOSPITALIST

## 2022-01-21 PROCEDURE — 80053 COMPREHEN METABOLIC PANEL: CPT | Performed by: HOSPITALIST

## 2022-01-21 PROCEDURE — 87070 CULTURE OTHR SPECIMN AEROBIC: CPT | Performed by: HOSPITALIST

## 2022-01-21 PROCEDURE — 87075 CULTR BACTERIA EXCEPT BLOOD: CPT | Performed by: HOSPITALIST

## 2022-01-21 RX ORDER — DEXTROSE MONOHYDRATE AND SODIUM CHLORIDE 5; .9 G/100ML; G/100ML
INJECTION, SOLUTION INTRAVENOUS CONTINUOUS
Status: DISCONTINUED | OUTPATIENT
Start: 2022-01-21 | End: 2022-01-23

## 2022-01-21 RX ADMIN — Medication 16 G: at 11:01

## 2022-01-21 RX ADMIN — Medication 10 ML: at 12:01

## 2022-01-21 RX ADMIN — DEXTROSE MONOHYDRATE 12.5 G: 25 INJECTION, SOLUTION INTRAVENOUS at 09:01

## 2022-01-21 RX ADMIN — IPRATROPIUM BROMIDE AND ALBUTEROL SULFATE 3 ML: 2.5; .5 SOLUTION RESPIRATORY (INHALATION) at 08:01

## 2022-01-21 RX ADMIN — METOPROLOL SUCCINATE 50 MG: 50 TABLET, EXTENDED RELEASE ORAL at 10:01

## 2022-01-21 RX ADMIN — DEXTROSE AND SODIUM CHLORIDE: 5; .9 INJECTION, SOLUTION INTRAVENOUS at 06:01

## 2022-01-21 RX ADMIN — ENOXAPARIN SODIUM 40 MG: 40 INJECTION SUBCUTANEOUS at 06:01

## 2022-01-21 RX ADMIN — DEXTROSE MONOHYDRATE, SODIUM CHLORIDE, AND POTASSIUM CHLORIDE: 50; 4.5; 2.98 INJECTION, SOLUTION INTRAVENOUS at 01:01

## 2022-01-21 RX ADMIN — DEXTROSE 500 MG: 50 INJECTION, SOLUTION INTRAVENOUS at 11:01

## 2022-01-21 RX ADMIN — Medication 10 ML: at 06:01

## 2022-01-21 RX ADMIN — PIPERACILLIN AND TAZOBACTAM 4.5 G: 4; .5 INJECTION, POWDER, LYOPHILIZED, FOR SOLUTION INTRAVENOUS; PARENTERAL at 06:01

## 2022-01-21 RX ADMIN — ACETYLCYSTEINE 4 ML: 100 SOLUTION ORAL; RESPIRATORY (INHALATION) at 02:01

## 2022-01-21 RX ADMIN — HYDRALAZINE HYDROCHLORIDE 50 MG: 25 TABLET, FILM COATED ORAL at 05:01

## 2022-01-21 RX ADMIN — ACETYLCYSTEINE 4 ML: 100 SOLUTION ORAL; RESPIRATORY (INHALATION) at 08:01

## 2022-01-21 RX ADMIN — ATORVASTATIN CALCIUM 80 MG: 40 TABLET, FILM COATED ORAL at 10:01

## 2022-01-21 RX ADMIN — TAMSULOSIN HYDROCHLORIDE 0.4 MG: 0.4 CAPSULE ORAL at 10:01

## 2022-01-21 RX ADMIN — POLYETHYLENE GLYCOL 3350 17 G: 17 POWDER, FOR SOLUTION ORAL at 10:01

## 2022-01-21 RX ADMIN — IPRATROPIUM BROMIDE AND ALBUTEROL SULFATE 3 ML: 2.5; .5 SOLUTION RESPIRATORY (INHALATION) at 02:01

## 2022-01-21 RX ADMIN — DEXTROSE 500 MG: 50 INJECTION, SOLUTION INTRAVENOUS at 10:01

## 2022-01-21 RX ADMIN — HYDRALAZINE HYDROCHLORIDE 50 MG: 25 TABLET, FILM COATED ORAL at 03:01

## 2022-01-21 RX ADMIN — ASPIRIN 81 MG: 81 TABLET, COATED ORAL at 10:01

## 2022-01-21 RX ADMIN — FLUCONAZOLE 400 MG: 2 INJECTION, SOLUTION INTRAVENOUS at 10:01

## 2022-01-21 RX ADMIN — ACETAMINOPHEN 650 MG: 325 TABLET ORAL at 10:01

## 2022-01-21 RX ADMIN — AMLODIPINE BESYLATE 10 MG: 5 TABLET ORAL at 10:01

## 2022-01-21 RX ADMIN — SODIUM CHLORIDE 200 MG: 9 INJECTION, SOLUTION INTRAVENOUS at 03:01

## 2022-01-21 RX ADMIN — HYDRALAZINE HYDROCHLORIDE 50 MG: 25 TABLET, FILM COATED ORAL at 11:01

## 2022-01-21 RX ADMIN — Medication 6 MG: at 10:01

## 2022-01-21 RX ADMIN — ACETAMINOPHEN 650 MG: 325 TABLET ORAL at 04:01

## 2022-01-21 NOTE — PLAN OF CARE
Pt free from falls, injury or any further trauma throughout shift. Pt AAO to self. Wound care provide as order Qshift; packed with 2 vashed saturated gauze covered with ABD pad. Crushed medication by mouth. Double lumen PICC line to right upper arm; flushed, blood return present and saline locked. D5% and 0.45% NS with 40mEq infusing. Bladder and bowel incontinence. Indwelling santiago catheter placed by urology; drainage bag to gravity. Sitter at bedside. Continued medications as ordered. Complaints of pain, controlled with medications. Pt in no distress. Bed at lowest position, bed alarm on, call light in reach, instruct pt to call for assistance.  Will cont to monitor.      Problem: Adult Inpatient Plan of Care  Goal: Plan of Care Review  Outcome: Ongoing, Progressing     Problem: Diabetes Comorbidity  Goal: Blood Glucose Level Within Targeted Range  Outcome: Ongoing, Progressing     Problem: Fall Injury Risk  Goal: Absence of Fall and Fall-Related Injury  Outcome: Ongoing, Progressing     Problem: Skin Injury Risk Increased  Goal: Skin Health and Integrity  Outcome: Ongoing, Progressing     Problem: Impaired Wound Healing  Goal: Optimal Wound Healing  Outcome: Ongoing, Progressing     Problem: Skin or Soft Tissue Infection  Goal: Absence of Infection Signs and Symptoms  Outcome: Ongoing, Progressing

## 2022-01-21 NOTE — NURSING
Notified Dr. Calero that patient bs below 55 and administered as needed D50%. Informed that patient has not been seen by IR.

## 2022-01-21 NOTE — SUBJECTIVE & OBJECTIVE
Interval History:  He is altered,not speaking,will do stat head CT,check EEG,re consulted neurology.  much more oriented today.  CC today is weakness.    Review of Systems  ,not able be done,confused  Objective:     Vital Signs (Most Recent):  Temp: 98 °F (36.7 °C) (01/21/22 0739)  Pulse: 61 (01/21/22 1143)  Resp: 18 (01/21/22 0842)  BP: (!) 143/81 (01/21/22 1143)  SpO2: (!) 92 % (01/21/22 1143) Vital Signs (24h Range):  Temp:  [97.4 °F (36.3 °C)-98 °F (36.7 °C)] 98 °F (36.7 °C)  Pulse:  [61-79] 61  Resp:  [17-24] 18  SpO2:  [92 %-97 %] 92 %  BP: (135-164)/(79-87) 143/81     Weight: 61.2 kg (134 lb 14.7 oz)  Body mass index is 19.92 kg/m².    Intake/Output Summary (Last 24 hours) at 1/21/2022 1206  Last data filed at 1/21/2022 0643  Gross per 24 hour   Intake 6023.9 ml   Output 2350 ml   Net 3673.9 ml      Physical Exam  Constitutional:       Appearance: Normal appearance.   HENT:      Mouth/Throat:      Mouth: Mucous membranes are dry.   Eyes:      Pupils: Pupils are equal, round, and reactive to light.   Cardiovascular:      Rate and Rhythm: Normal rate.   Pulmonary:      Effort: Pulmonary effort is normal.   Genitourinary:     Comments: Dressing on scrotal area.  Musculoskeletal:         General: No swelling or deformity.   Skin:     Coloration: Skin is not jaundiced.         Significant Labs:   All pertinent labs within the past 24 hours have been reviewed.  BMP:   Recent Labs   Lab 01/21/22  0818   GLU 77      K 4.3      CO2 27   BUN 5*   CREATININE 0.9   CALCIUM 8.3*     CBC:   Recent Labs   Lab 01/20/22  0359 01/21/22  0441   WBC 9.05 9.18   HGB 12.0* 10.7*   HCT 36.9* 32.7*   PLT 52* 50*     CMP:   Recent Labs   Lab 01/20/22  0358 01/21/22  0818    141   K 3.8 4.3    108   CO2 26 27   GLU 86 77   BUN 5* 5*   CREATININE 0.8 0.9   CALCIUM 8.2* 8.3*   PROT 6.7 6.8   ALBUMIN 1.3* 1.3*   BILITOT 0.2 0.2   ALKPHOS 387* 347*   AST 34 23   ALT 13 16   ANIONGAP 9 6*   EGFRNONAA >60 >60        Significant Imaging: I have reviewed all pertinent imaging results/findings within the past 24 hours.

## 2022-01-21 NOTE — PT/OT/SLP PROGRESS
Speech Language Pathology      Abhishek Zhao  MRN: [id number]    Patient not seen today secondary to being NPO for possible IR intervention. ST will attempt to follow-up 1/22.    Deloris Newby CCC-SLP

## 2022-01-21 NOTE — PROGRESS NOTES
[facility]  Urology  Progress Note    Patient Name: Abhishek Zhao  MRN: [id number]  Admission Date: 1/4/2022  Hospital Length of Stay: 16 days  Code Status: Full Code   Attending Provider: Quinten Rosario MD   Primary Care Physician: To Obtain Unable    Subjective:     HPI:  Scrotal Swelling  Abhishek Zhao is a 61 y.o. male who was been experiencing scrotal pain and swelling since 12/31/2021.  He denies any fever.  He has had issues voiding since the swelling began.  Hemostat having issues in the past with urinary problems.  He denies having any previous issues with scrotal infection or swelling.  He recalls that he had procedures in the past but cannot recall specifically what to place.  He does not have urologist locally but moved back from [place] about 1 year ago.    Review of care everywhere shows that he had a cystoscopy with urethral dilation of a urethral stricture in the bulbar urethra in 2019 at [facility].  And there are reports that [facility] approximately 2015 [facility].      Interval History: slept without issue overnight, santiago draining well  Afebrile, wound dressing changes performed by staff  Sitter at bedside      Review of Systems   Unable to perform ROS: Mental status change     Objective:     Temp:  [97.4 °F (36.3 °C)-98 °F (36.7 °C)] 98 °F (36.7 °C)  Pulse:  [61-79] 61  Resp:  [16-24] 18  SpO2:  [93 %-97 %] 97 %  BP: (135-164)/(79-87) 164/87     Body mass index is 19.92 kg/m².           Drains     Drain                 Urethral Catheter 01/05/22 1050 Double-lumen 20 Fr. 15 days                Physical Exam  Constitutional:       General: He is not in acute distress.     Appearance: He is well-developed. He is not ill-appearing, toxic-appearing or diaphoretic.   HENT:      Head: Normocephalic and atraumatic.      Mouth/Throat:      Mouth: Mucous membranes are moist.   Eyes:      Conjunctiva/sclera: Conjunctivae normal.   Pulmonary:       Effort: Pulmonary effort is normal. No respiratory distress.   Abdominal:      General: There is no distension.      Palpations: Abdomen is soft. There is no mass.      Tenderness: There is no abdominal tenderness. There is no guarding.   Genitourinary:     Comments: Wound edges viable  Dressing in place  No palpable crepitus  No induration  No additional purulence expressed from penis at this time  Santiago with CYU, secured with stat lock    Musculoskeletal:         General: No swelling or deformity.      Cervical back: Neck supple.   Skin:     General: Skin is warm.      Capillary Refill: Capillary refill takes less than 2 seconds.      Findings: No rash.   Neurological:   Psychiatric:      Comments: Answers some questions, easily disoriented         Significant Labs:    BMP:  Recent Labs   Lab 01/18/22  0419 01/19/22  0355 01/20/22  0358   * 147* 145   K 3.7 3.2* 3.8    111* 110   CO2 26 26 26   BUN 7 6 5*   CREATININE 0.9 0.9 0.8   CALCIUM 8.4* 8.2* 8.2*       CBC:   Recent Labs   Lab 01/19/22  0355 01/20/22  0359 01/21/22  0441   WBC 8.86 9.05 9.18   HGB 12.0* 12.0* 10.7*   HCT 36.8* 36.9* 32.7*   PLT 62* 52* 50*                   Assessment/Plan:     * Scrotal abscess  s/p cystoscopy with santiago placement and debridement of abscess/necrosis of scrotum 1/5/22 with Dr. Rangel.  Fluid collection drained at bedside 1/14/2022  CT from 1/18/22 w/ R corporal body fluid collection concerning for residual abscess vs fluid collection  Additional purulence expressed from penis on 1/20/21    Continue abx per primary  Cultures growing Candida and E coli  Appreciate wound care RN and floor RN dressing changes  Will continue to monitor condition  Leukocytosis remains resolved  Afebrile    Interventional Radiology, via note from Dr. Ingram,  unable to aspirate fluid collection contained in corporal body/ base of penis. Given amount of purulence expressed from penile urethra, small size of fluid collection,  resolution of leukocytosis, pneumonia, altered mental status, etc. At this time serial recommend imaging and observation. As the location of this area is not easily accessed and risk of complication- further injury to urethra, vascular injury, enlarged perineal wound, etc. Image guided percutaneous access would be favorable and limit risk compared to surgical exploration given contained nature of residual fluid collections, if this is available at main campus, this should be explored especially as patient will need plastic surgery evaluation.             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

## 2022-01-21 NOTE — PROGRESS NOTES
"[facility]  Neurology  Progress Note    Patient Name: Abhishek Zhao  MRN: [id number]  Admission Date: 1/4/2022  Hospital Length of Stay: 16 days  Code Status: Full Code   Attending Provider: Quinten Rosario MD  Primary Care Physician: To Obtain Unable   Principal Problem:Scrotal abscess    Subjective:     Interval History: 60 y/o male presented initially with a complaint of testicular pain. Associated with swelling and redness to the scrotum and penis. 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 Urology is following as well as ID. Today pt had an episode in which he became poorly responsive. He had a persistent cough with copious amounts of mucus. He has slowly recovered but is exhibiting "picking" behavior and is intermittently answering questions.     -1/12/22: Pt had an episode in which he was poorly responsive and arms flailing around.     -1/14/22: Pt poorly responsive overnight. Stroke CODE was called. Transferred to ICU.     -1/15/22: Pt is more responsive today. Restless.      -1/16/22: Pt has been transferred out of ICU. Currently calm. Able to states his name and that he is in a hospital.      -1/18/22: Mr. Zhao is poorly responsive today. Staring.     -1/20/22: Alert, talking to family although only oriented to person, place. Telling me he is hungry.    -1/21/22: Fluctuating level of participation today. Planned drainage of scrotal abscess today.    Current Neurological Medications:     Current Facility-Administered Medications   Medication Dose Route Frequency Provider Last Rate Last Admin    acetaminophen tablet 650 mg  650 mg Oral Q6H PRN Hugo Aviles MD   650 mg at 01/21/22 0444    acetylcysteine 100 mg/ml (10%) solution 4 mL  4 mL Nebulization TID WAKE Hugo Aviles MD   4 mL at 01/21/22 1411    albuterol-ipratropium 2.5 mg-0.5 mg/3 mL nebulizer solution 3 mL  3 mL Nebulization Q4H PRN Hugo Aviles MD   3 mL at 01/18/22 0119    " albuterol-ipratropium 2.5 mg-0.5 mg/3 mL nebulizer solution 3 mL  3 mL Nebulization Q4H WAKE Hugo Aviles MD   3 mL at 01/21/22 1410    aluminum-magnesium hydroxide-simethicone 200-200-20 mg/5 mL suspension 30 mL  30 mL Oral QID PRN Hugo Aviles MD        amLODIPine tablet 10 mg  10 mg Oral Daily Hugo Aviles MD   10 mg at 01/21/22 1000    aspirin EC tablet 81 mg  81 mg Oral Daily Hugo Aviles MD   81 mg at 01/21/22 1000    atorvastatin tablet 80 mg  80 mg Oral Daily Hugo Aviles MD   80 mg at 01/21/22 1000    dextrose 50% injection 12.5 g  12.5 g Intravenous PRN Hugo Aviles MD   12.5 g at 01/21/22 0900    dextrose 50% injection 25 g  25 g Intravenous PRN Hugo Aviles MD   25 g at 01/19/22 1644    enoxaparin injection 40 mg  40 mg Subcutaneous Daily Hugo Aviles MD   40 mg at 01/20/22 1735    fluconazole (DIFLUCAN) IVPB 400 mg 200 mL  400 mg Intravenous Q24H Hugo Aviles  mL/hr at 01/21/22 1040 400 mg at 01/21/22 1040    glucagon (human recombinant) injection 1 mg  1 mg Intramuscular PRN Hugo Aviles MD        glucose chewable tablet 16 g  16 g Oral PRN Hugo Aviles MD        glucose chewable tablet 24 g  24 g Oral PRN Hugo Aviles MD        hydrALAZINE injection 10 mg  10 mg Intravenous Q6H PRN Hugo Aviles MD        hydrALAZINE tablet 50 mg  50 mg Oral Q8H Quinten oRsario MD   50 mg at 01/21/22 0555    influenza (QUADRIVALENT PF) vaccine 0.5 mL  0.5 mL Intramuscular vaccine x 1 dose Hugo Aviles MD        insulin aspart U-100 pen 0-5 Units  0-5 Units Subcutaneous Q6H PRN Hugo Aviles MD   4 Units at 01/19/22 1246    labetaloL injection 10 mg  10 mg Intravenous Q4H PRN Hugo Aviles MD        lacosamide (VIMPAT) 200 mg in sodium chloride 0.9% 100 mL IVPB  200 mg Intravenous Q12H Hugo Aviles MD   Stopped at 01/20/22 1216    LIDOcaine HCL 10 mg/ml (1%) injection 1 mL  1 mL Intradermal Once PRN Hugo Aviles MD         lorazepam injection 3 mg  3 mg Intravenous Once PRN Hugo Aviles MD        melatonin tablet 6 mg  6 mg Oral Nightly PRN Quinten Rosario MD        melatonin tablet 6 mg  6 mg Oral Nightly Quinten Rosario MD   6 mg at 01/20/22 2203    metoprolol succinate (TOPROL-XL) 24 hr tablet 50 mg  50 mg Oral Daily Hugo Aviles MD   50 mg at 01/21/22 1000    naloxone 0.4 mg/mL injection 0.02 mg  0.02 mg Intravenous PRN Hugo Aviles MD        ondansetron injection 4 mg  4 mg Intravenous Q8H PRN Hugo Aviles MD   4 mg at 01/12/22 1219    piperacillin-tazobactam 4.5 g in dextrose 5 % 100 mL IVPB (ready to mix system)  4.5 g Intravenous Q8H Hugo Aviles MD   Stopped at 01/21/22 0316    polyethylene glycol packet 17 g  17 g Oral Daily Hugo Aviles MD   17 g at 01/21/22 1000    prochlorperazine injection Soln 5 mg  5 mg Intravenous Q6H PRN Hugo Aviles MD        simethicone chewable tablet 80 mg  1 tablet Oral QID PRN Hugo Aviles MD        sodium chloride 0.9% flush 10 mL  10 mL Intravenous Q6H Hugo Avlies MD   10 mL at 01/21/22 0602    And    sodium chloride 0.9% flush 10 mL  10 mL Intravenous PRN Hugo Aviles MD        tamsulosin 24 hr capsule 0.4 mg  0.4 mg Oral Daily Hugo Aviles MD   0.4 mg at 01/21/22 1000    valproate (DEPACON) 500 mg in dextrose 5 % 50 mL IVPB  500 mg Intravenous Q12H Hugo Aviles MD 50 mL/hr at 01/21/22 1145 500 mg at 01/21/22 1145       Review of Systems   Unable to perform ROS: Mental status change    Objective:     Vital Signs (Most Recent):  Temp: 98 °F (36.7 °C) (01/21/22 0739)  Pulse: 63 (01/21/22 1410)  Resp: 18 (01/21/22 1410)  BP: (!) 143/81 (01/21/22 1143)  SpO2: 95 % (01/21/22 1410) Vital Signs (24h Range):  Temp:  [97.4 °F (36.3 °C)-98 °F (36.7 °C)] 98 °F (36.7 °C)  Pulse:  [61-79] 63  Resp:  [17-24] 18  SpO2:  [92 %-97 %] 95 %  BP: (135-164)/(79-87) 143/81     Weight: 61.2 kg (134 lb 14.7 oz)  Body mass index is 19.92  kg/m².    Physical Exam  Constitutional:       General: He is not in acute distress.  HENT:      Head: Normocephalic.      Right Ear: External ear normal.      Left Ear: External ear normal.   Eyes:      General:         Right eye: No discharge.         Left eye: No discharge.   Cardiovascular:      Rate and Rhythm: Normal rate.   Pulmonary:      Effort: Pulmonary effort is normal.   Abdominal:      Palpations: Abdomen is soft.   Musculoskeletal:         General: No tenderness.      Cervical back: Neck supple.   Skin:     General: Skin is warm.   Psychiatric:         Speech: Speech is slurred.            NEUROLOGICAL EXAMINATION:      MENTAL STATUS   Speech: slurred  Level of consciousness: drowsy       Oriented to self     CRANIAL NERVES      CN III, IV, VI   Right pupil: Size: 2 mm. Shape: regular.   Left pupil: Size: 2 mm. Shape: regular.   Nystagmus: none   Conjugate gaze: present     CN VII   Right facial weakness: none  Left facial weakness: none     CN XII   Tongue deviation: none     MOTOR EXAM        AROM of UE's and LE's against gravity        Significant Labs:   CBC:   Recent Labs   Lab 01/20/22  0359 01/21/22  0441   WBC 9.05 9.18   HGB 12.0* 10.7*   HCT 36.9* 32.7*   PLT 52* 50*     CMP:   Recent Labs   Lab 01/20/22  0358 01/21/22  0818   GLU 86 77    141   K 3.8 4.3    108   CO2 26 27   BUN 5* 5*   CREATININE 0.8 0.9   CALCIUM 8.2* 8.3*   PROT 6.7 6.8   ALBUMIN 1.3* 1.3*   BILITOT 0.2 0.2   ALKPHOS 387* 347*   AST 34 23   ALT 13 16   ANIONGAP 9 6*   EGFRNONAA >60 >60       Significant Imaging: I have reviewed all pertinent imaging results/findings within the past 24 hours.    Assessment and Plan:     60 y/o male consulted for AMS     1. Encephalopathy: encephalopathy of unknown etiology (Infectious? Medication-induced? Hospital delirium?. Pt with no hypotension or major metabolic disturbances. Alert and responsive today.           -MRI brain showed no acute stroke. No signs of  space-occupying lesion.           -Will stop lamotrigine in case is causing AMS.     2. Hx of seizures: uncertain if pt having breakthrough events but so far none caught on EEG recording. More alert today. No reported seizures.           -VPA 500 mg BID.            -Lacosamide 200 mg BID.           -D/C'd lamotrigine. If needed to resume then will start at low dose of lamotrigine 25 mg BID.    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] 01/06/2022 Yes    Adjustment disorder with depressed mood [F43.21] 01/04/2022 Yes     Chronic    Chronic ischemic heart disease [I25.9] 01/04/2022 Yes     Chronic    Cocaine dependence in remission [F14.21] 01/04/2022 Yes     Chronic    Cardiomegaly [I51.7] 01/04/2022 Yes     Chronic    Hyperlipidemia [E78.5] 01/04/2022 Yes     Chronic    Essential hypertension [I10] 01/04/2022 Yes     Chronic    Seizure disorder [G40.909] 01/04/2022 Yes     Chronic    Tobacco user [Z72.0] 01/04/2022 Yes     Chronic    Diabetes mellitus type 2, controlled [E11.9] 08/03/2020 Yes     Chronic      Problems Resolved During this Admission:       VTE Risk Mitigation (From admission, onward)         Ordered     enoxaparin injection 40 mg  Daily         01/13/22 1612     IP VTE LOW RISK PATIENT  Once         01/04/22 2241     Place sequential compression device  Until discontinued         01/04/22 2241                Ashvin Giraldo  MD  Neurology  [facility] - Med Surg [place]

## 2022-01-21 NOTE — PLAN OF CARE
TN placed call to [facility], left message with call back number. TN to follow up.     [facility]. TN to follow up.        01/21/22 0850   Post-Acute Status   Post-Acute Authorization Placement   Post-Acute Placement Status Pending post-acute provider review/more information requested   Discharge Delays (!) Post-Acute Set-up   Discharge Plan   Discharge Plan A Skilled Nursing Facility   Discharge Plan B Home with family;Home Health

## 2022-01-21 NOTE — ASSESSMENT & PLAN NOTE
s/p cystoscopy with santiago placement and debridement of abscess/necrosis of scrotum 1/5/22 with Dr. Rangel.  Fluid collection drained at bedside 1/14/2022  CT from 1/18/22 w/ R corporal body fluid collection concerning for residual abscess vs fluid collection  Additional purulence expressed from penis on 1/20/21    Continue abx per primary  Cultures growing Candida and E coli  Appreciate wound care RN and floor RN dressing changes  Will continue to monitor condition  Leukocytosis remains resolved  Afebrile    Interventional Radiology, via note from Dr. Ingram,  unable to aspirate fluid collection contained in corporal body/ base of penis. Given amount of purulence expressed from penile urethra, small size of fluid collection, resolution of leukocytosis, pneumonia, altered mental status, etc. At this time serial recommend imaging and observation. As the location of this area is not easily accessed and risk of complication- further injury to urethra, vascular injury, enlarged perineal wound, etc. Image guided percutaneous access would be favorable and limit risk compared to surgical exploration given contained nature of residual fluid collections, if this is available at main campus, this should be explored especially as patient will need plastic surgery evaluation.

## 2022-01-21 NOTE — NURSING
Repositioned and turned pt with pillow. Dressing to scrotum clean, dry and intact. Pt verbalized no pain. Will continue to monitor pt.

## 2022-01-21 NOTE — CONSULTS
Please refer to consult note dated 1/20/22.     Thank you for considering IR for the care of your patient.     Sabas Ingram MD  Interventional Radiology

## 2022-01-21 NOTE — PLAN OF CARE
Per kierra, patient declined by [facility], cannot meet patient needs. TN sent additional SNF referral packets via care port with increased radius search [place] [facility] [place] [facility] facilities. TN to follow up.     3:32pm TN spoke with patient's daughter, Js regardign discharge planning. TN inform Ms. Weinstein of denial for [facility] and that additional referrals have been sent. TN explained placement is based upon medical acceptance and bed availability. Ms. Weinstein verbalized understanding TN to follow up.    01/21/22 1427   Post-Acute Status   Post-Acute Authorization Placement   Post-Acute Placement Status Referrals Sent   Discharge Delays (!) Post-Acute Set-up   Discharge Plan   Discharge Plan A Skilled Nursing Facility   Discharge Plan B Home with family;Home Health

## 2022-01-21 NOTE — SUBJECTIVE & OBJECTIVE
Interval History: slept without issue overnight, santiago draining well  Afebrile, wound dressing changes performed by staff  Sitter at bedside      Review of Systems   Unable to perform ROS: Mental status change     Objective:     Temp:  [97.4 °F (36.3 °C)-98 °F (36.7 °C)] 98 °F (36.7 °C)  Pulse:  [61-79] 61  Resp:  [16-24] 18  SpO2:  [93 %-97 %] 97 %  BP: (135-164)/(79-87) 164/87     Body mass index is 19.92 kg/m².           Drains     Drain                 Urethral Catheter 01/05/22 1050 Double-lumen 20 Fr. 15 days                Physical Exam  Constitutional:       General: He is not in acute distress.     Appearance: He is well-developed. He is not ill-appearing, toxic-appearing or diaphoretic.   HENT:      Head: Normocephalic and atraumatic.      Mouth/Throat:      Mouth: Mucous membranes are moist.   Eyes:      Conjunctiva/sclera: Conjunctivae normal.   Pulmonary:      Effort: Pulmonary effort is normal. No respiratory distress.   Abdominal:      General: There is no distension.      Palpations: Abdomen is soft. There is no mass.      Tenderness: There is no abdominal tenderness. There is no guarding.   Genitourinary:     Comments: Wound edges viable  Dressing in place  No palpable crepitus  No induration  No additional purulence expressed from penis at this time  Santiago with CYU, secured with stat lock    Musculoskeletal:         General: No swelling or deformity.      Cervical back: Neck supple.   Skin:     General: Skin is warm.      Capillary Refill: Capillary refill takes less than 2 seconds.      Findings: No rash.   Neurological:      Mental Status: He is oriented to person, place, and time.      Gait: Gait normal.   Psychiatric:      Comments: Answers some questions, easily disoriented         Significant Labs:    BMP:  Recent Labs   Lab 01/18/22  0419 01/19/22  0355 01/20/22  0358   * 147* 145   K 3.7 3.2* 3.8    111* 110   CO2 26 26 26   BUN 7 6 5*   CREATININE 0.9 0.9 0.8   CALCIUM 8.4*  8.2* 8.2*       CBC:   Recent Labs   Lab 01/19/22  0355 01/20/22  0359 01/21/22  0441   WBC 8.86 9.05 9.18   HGB 12.0* 12.0* 10.7*   HCT 36.8* 36.9* 32.7*   PLT 62* 52* 50*

## 2022-01-21 NOTE — NURSING
Spoke with Damian from pharmacy to verified Zosyn compatibility with D5 and 0.45% NS with 40mEq KCl.

## 2022-01-21 NOTE — PT/OT/SLP PROGRESS
Occupational Therapy      Patient Name:  Abhishek Zhao   MRN:  [id number]    Patient not seen today secondary to hold 2* glucose in the 60s per nurse- nurse, Debibe, present. Will follow-up later as able.     1/21/2022

## 2022-01-21 NOTE — PT/OT/SLP PROGRESS
Physical Therapy      Patient Name:  Abhishek Zhao   MRN:  [id number]    Patient not seen today secondary to  (Low BG 55). Will follow-up .

## 2022-01-21 NOTE — PROGRESS NOTES
[facility] Medicine  Progress Note    Patient Name: Abhishek Zhao  MRN: [id number]  Patient Class: IP- Inpatient   Admission Date: 1/4/2022  Length of Stay: 16 days  Attending Physician: Quinten Rosario MD  Primary Care Provider: To Obtain Unable        Subjective:     Principal Problem:Scrotal abscess        HPI:  61 y.o. male with adjustment disorder with depressed mood, chronic ischemic heart disease, cocaine dependence in remission, cardiomegaly, HLD, HTN, swizure disorder, tobacco use, and DM 2 presents with a complaint of testicular pain.  Associated with swelling and redness to the scrotum and penis along with difficulty urinating, pain is rated as severe and radiates to the rectum.  Denies fever, chills, cough, SOB, chest pain, n/v/d.  In the ED, he was found to be tachypneic, with leukocytosis and elevated lactic.  CT and US shows evidence of multiple scrotal and perineal abscesses with some extension into the penile shaft.  UA with evidence of infection.  Sepsis treatment initiated, blood and urine cultures obtained, IV fluids and IV antibiotics initiated.  Urology consulted.      Overview/Hospital Course:  61 y.o. male with adjustment disorder with depressed mood, chronic ischemic heart disease, cocaine dependence in remission, cardiomegaly, HLD, HTN, swizure disorder, tobacco use, and DM 2 presents with a complaint of testicular pain, found to have multiple scrotal and perineal abscesses. Placed on broad spectrum antibiotics, underwent I&D with urology.    Pt w/ aspiration event 1/11 w/ subsequent desaturation. Respiratory culture grew pseudomonas. Kept on Zosyn. On 1/13 pt had abrupt reduction in responsiveness. Code stroke called, patient transferred to ICU. No stroke found on imaging. Started on extended EEG. Mental status improved, patient stepped back down to the floor.     His mental status improved slowly for a few days, but worsened again 1/17.   He was altered,was  not speaking,head CT show no acute process,EEG,per neurology show no seizure activity,todayb is much alert,appear to be delirious,will monitor.much more oriented today.  Consulted IR for drainage of residual fluid  collection on scrotum,CT pelvic.  CC today is weakness.      Interval History:  He is altered,not speaking,will do stat head CT,check EEG,re consulted neurology.  much more oriented today.  CC today is weakness.    Review of Systems  ,not able be done,confused  Objective:     Vital Signs (Most Recent):  Temp: 98 °F (36.7 °C) (01/21/22 0739)  Pulse: 61 (01/21/22 1143)  Resp: 18 (01/21/22 0842)  BP: (!) 143/81 (01/21/22 1143)  SpO2: (!) 92 % (01/21/22 1143) Vital Signs (24h Range):  Temp:  [97.4 °F (36.3 °C)-98 °F (36.7 °C)] 98 °F (36.7 °C)  Pulse:  [61-79] 61  Resp:  [17-24] 18  SpO2:  [92 %-97 %] 92 %  BP: (135-164)/(79-87) 143/81     Weight: 61.2 kg (134 lb 14.7 oz)  Body mass index is 19.92 kg/m².    Intake/Output Summary (Last 24 hours) at 1/21/2022 1206  Last data filed at 1/21/2022 0643  Gross per 24 hour   Intake 6023.9 ml   Output 2350 ml   Net 3673.9 ml      Physical Exam  Constitutional:       Appearance: Normal appearance.   HENT:      Mouth/Throat:      Mouth: Mucous membranes are dry.   Eyes:      Pupils: Pupils are equal, round, and reactive to light.   Cardiovascular:      Rate and Rhythm: Normal rate.   Pulmonary:      Effort: Pulmonary effort is normal.   Genitourinary:     Comments: Dressing on scrotal area.  Musculoskeletal:         General: No swelling or deformity.   Skin:     Coloration: Skin is not jaundiced.         Significant Labs:   All pertinent labs within the past 24 hours have been reviewed.  BMP:   Recent Labs   Lab 01/21/22  0818   GLU 77      K 4.3      CO2 27   BUN 5*   CREATININE 0.9   CALCIUM 8.3*     CBC:   Recent Labs   Lab 01/20/22  0359 01/21/22  0441   WBC 9.05 9.18   HGB 12.0* 10.7*   HCT 36.9* 32.7*   PLT 52* 50*     CMP:   Recent Labs   Lab  01/20/22  0358 01/21/22  0818    141   K 3.8 4.3    108   CO2 26 27   GLU 86 77   BUN 5* 5*   CREATININE 0.8 0.9   CALCIUM 8.2* 8.3*   PROT 6.7 6.8   ALBUMIN 1.3* 1.3*   BILITOT 0.2 0.2   ALKPHOS 387* 347*   AST 34 23   ALT 13 16   ANIONGAP 9 6*   EGFRNONAA >60 >60       Significant Imaging: I have reviewed all pertinent imaging results/findings within the past 24 hours.      Assessment/Plan:      * Scrotal abscess  Multiple abscesses c/w Berna's gangrene, s/p I&D by urology on 1/5. Cultures growing ampicillin-resistant Ecoli and C albicans. Repeat CT A/P 1/13 w/ 4cm fluid collection c/f persistent abscess which was drained bedside by urology  - urology following  - cont zosyn (re-started for HCAP)  - further ID recs pending  - repeat CT pelvis.  Consulted IR for drainage of residual fluid  collection on CT pelvic.          Alkaline phosphatase elevation  Chronic isolated alk phos (GGT elevated), worsened this admission. Possibly due to AEDs vs occult HPB process.   - further workup depending on clinical course      Pericardial effusion  Small pericardial effusion of unclear etiology      Encephalopathy, metabolic  CT head and EEG without acute process; likely delirium secondary to infection and prolonged hospitalization. Code stroke called the night of 1/13, however no focal neuro findings, CT head and MRI without acute findings  - neurology consulted  - delirium precautions  - EEG pending  - tx of infection as noted above.   He was altered,was not speaking,head CT show no acute process,EEG,per neurology show nos eizure activity,todayb is much alert,appear to be delirious,will monitor.  Much improved.    HCAP (healthcare-associated pneumonia)  - see respiratory failure       Acute respiratory failure with hypoxia and hypercarbia  Cough + new LLL opacity on imaging c/f pneumonia, however CT imaging showing pleural effusion and atelectasis  - respiratory cultures w/ pseudomonas  - cont zosyn  -  furosemide as tolerated; no significant evidence of cardiac dysfunction on TTE  - IS        Hypokalemia  Replete PRN    JOI (acute kidney injury)  Resolved      Sepsis due to Gram negative bacteria  Resolved      Berna's gangrene  As above    Diabetes mellitus type 2, controlled  Check a1c  Hold oral antihyperglycemics while inpatient  PRN sliding scale insulin  ACHS glucose monitoring   ADA diet     Tobacco user  5 minutes spent counseling the patient on smoking cessation and he is not currently ready to stop smoking. He will be offereded a nicotine transdermal patch while hospitalized and monitored for withdrawal.  Will provide additional smoking cessation counseling prior to discharge.     Seizure disorder  Repeated episodes of AMS c/f seizures w/ post ictal period  - neurology consulted  - AEDs per neurology  - EEG pending      Essential hypertension  Well controlled, continue home medications and monitor blood pressure, adjust as needed.     Hyperlipidemia  Continue statin    Cardiomegaly  Pulmonary edema seen on imaging, small pericardial effusion seen on TTE. LVEF low-normal (50%)  - hold diuresis while NPO    Cocaine dependence in remission  Counseled     Chronic ischemic heart disease  No acute issue    Adjustment disorder with depressed mood  Continue home citalopram, hold home seroquel due to somnolence      VTE Risk Mitigation (From admission, onward)         Ordered     enoxaparin injection 40 mg  Daily         01/13/22 1612     IP VTE LOW RISK PATIENT  Once         01/04/22 2241     Place sequential compression device  Until discontinued         01/04/22 2241                Discharge Planning   CHUCK:      Code Status: Full Code   Is the patient medically ready for discharge?:     Reason for patient still in hospital (select all that apply): Patient trending condition  Discharge Plan A: Skilled Nursing Facility   Discharge Delays: (!) Post-Acute Set-up              Quinten Rosario  MD  Department of Hospital Medicine   [facility] - [place], [place] - Med Surg [place]

## 2022-01-21 NOTE — NURSING
Critical K+7.3 called in by lab. Notified Dr. Calero of critical K+ 7.3 and informed patient had IV continues with K+ additives. Pharmacy will recheck and verify results.

## 2022-01-22 PROBLEM — G93.40 ACUTE ENCEPHALOPATHY: Status: ACTIVE | Noted: 2022-01-04

## 2022-01-22 LAB
ALBUMIN SERPL BCP-MCNC: 1.2 G/DL (ref 3.5–5.2)
ALP SERPL-CCNC: 306 U/L (ref 55–135)
ALT SERPL W/O P-5'-P-CCNC: 13 U/L (ref 10–44)
ANION GAP SERPL CALC-SCNC: 7 MMOL/L (ref 8–16)
AST SERPL-CCNC: 19 U/L (ref 10–40)
BASOPHILS # BLD AUTO: 0.07 K/UL (ref 0–0.2)
BASOPHILS NFR BLD: 0.8 % (ref 0–1.9)
BILIRUB SERPL-MCNC: 0.2 MG/DL (ref 0.1–1)
BUN SERPL-MCNC: 5 MG/DL (ref 6–20)
CALCIUM SERPL-MCNC: 8.3 MG/DL (ref 8.7–10.5)
CHLORIDE SERPL-SCNC: 106 MMOL/L (ref 95–110)
CO2 SERPL-SCNC: 29 MMOL/L (ref 23–29)
CREAT SERPL-MCNC: 0.9 MG/DL (ref 0.5–1.4)
DIFFERENTIAL METHOD: ABNORMAL
EOSINOPHIL # BLD AUTO: 0 K/UL (ref 0–0.5)
EOSINOPHIL NFR BLD: 0.3 % (ref 0–8)
ERYTHROCYTE [DISTWIDTH] IN BLOOD BY AUTOMATED COUNT: 13.4 % (ref 11.5–14.5)
EST. GFR  (AFRICAN AMERICAN): >60 ML/MIN/1.73 M^2
EST. GFR  (NON AFRICAN AMERICAN): >60 ML/MIN/1.73 M^2
GLUCOSE SERPL-MCNC: 97 MG/DL (ref 70–110)
HCT VFR BLD AUTO: 34.6 % (ref 40–54)
HGB BLD-MCNC: 11.4 G/DL (ref 14–18)
IMM GRANULOCYTES # BLD AUTO: 0.24 K/UL (ref 0–0.04)
IMM GRANULOCYTES NFR BLD AUTO: 2.6 % (ref 0–0.5)
LYMPHOCYTES # BLD AUTO: 1.8 K/UL (ref 1–4.8)
LYMPHOCYTES NFR BLD: 19.5 % (ref 18–48)
MCH RBC QN AUTO: 27.6 PG (ref 27–31)
MCHC RBC AUTO-ENTMCNC: 32.9 G/DL (ref 32–36)
MCV RBC AUTO: 84 FL (ref 82–98)
MONOCYTES # BLD AUTO: 0.4 K/UL (ref 0.3–1)
MONOCYTES NFR BLD: 4.1 % (ref 4–15)
NEUTROPHILS # BLD AUTO: 6.6 K/UL (ref 1.8–7.7)
NEUTROPHILS NFR BLD: 72.7 % (ref 38–73)
NRBC BLD-RTO: 0 /100 WBC
PLATELET # BLD AUTO: 50 K/UL (ref 150–450)
PMV BLD AUTO: 12.2 FL (ref 9.2–12.9)
POCT GLUCOSE: 96 MG/DL (ref 70–110)
POTASSIUM SERPL-SCNC: 4.3 MMOL/L (ref 3.5–5.1)
PROT SERPL-MCNC: 6.7 G/DL (ref 6–8.4)
RBC # BLD AUTO: 4.13 M/UL (ref 4.6–6.2)
SODIUM SERPL-SCNC: 142 MMOL/L (ref 136–145)
WBC # BLD AUTO: 9.11 K/UL (ref 3.9–12.7)

## 2022-01-22 PROCEDURE — 63600175 PHARM REV CODE 636 W HCPCS: Performed by: STUDENT IN AN ORGANIZED HEALTH CARE EDUCATION/TRAINING PROGRAM

## 2022-01-22 PROCEDURE — 25000003 PHARM REV CODE 250: Performed by: STUDENT IN AN ORGANIZED HEALTH CARE EDUCATION/TRAINING PROGRAM

## 2022-01-22 PROCEDURE — 99233 SBSQ HOSP IP/OBS HIGH 50: CPT | Mod: ,,, | Performed by: STUDENT IN AN ORGANIZED HEALTH CARE EDUCATION/TRAINING PROGRAM

## 2022-01-22 PROCEDURE — 99232 SBSQ HOSP IP/OBS MODERATE 35: CPT | Mod: ,,, | Performed by: PSYCHIATRY & NEUROLOGY

## 2022-01-22 PROCEDURE — C9254 INJECTION, LACOSAMIDE: HCPCS | Performed by: STUDENT IN AN ORGANIZED HEALTH CARE EDUCATION/TRAINING PROGRAM

## 2022-01-22 PROCEDURE — 94761 N-INVAS EAR/PLS OXIMETRY MLT: CPT

## 2022-01-22 PROCEDURE — 25000242 PHARM REV CODE 250 ALT 637 W/ HCPCS: Performed by: STUDENT IN AN ORGANIZED HEALTH CARE EDUCATION/TRAINING PROGRAM

## 2022-01-22 PROCEDURE — 85025 COMPLETE CBC W/AUTO DIFF WBC: CPT | Performed by: STUDENT IN AN ORGANIZED HEALTH CARE EDUCATION/TRAINING PROGRAM

## 2022-01-22 PROCEDURE — 11000001 HC ACUTE MED/SURG PRIVATE ROOM

## 2022-01-22 PROCEDURE — 94640 AIRWAY INHALATION TREATMENT: CPT

## 2022-01-22 PROCEDURE — 94760 N-INVAS EAR/PLS OXIMETRY 1: CPT

## 2022-01-22 PROCEDURE — 99232 PR SUBSEQUENT HOSPITAL CARE,LEVL II: ICD-10-PCS | Mod: ,,, | Performed by: PSYCHIATRY & NEUROLOGY

## 2022-01-22 PROCEDURE — 25000003 PHARM REV CODE 250: Performed by: HOSPITALIST

## 2022-01-22 PROCEDURE — 99233 SBSQ HOSP IP/OBS HIGH 50: CPT | Mod: ,,, | Performed by: INTERNAL MEDICINE

## 2022-01-22 PROCEDURE — 80053 COMPREHEN METABOLIC PANEL: CPT | Performed by: STUDENT IN AN ORGANIZED HEALTH CARE EDUCATION/TRAINING PROGRAM

## 2022-01-22 PROCEDURE — A4216 STERILE WATER/SALINE, 10 ML: HCPCS | Performed by: STUDENT IN AN ORGANIZED HEALTH CARE EDUCATION/TRAINING PROGRAM

## 2022-01-22 PROCEDURE — 99233 PR SUBSEQUENT HOSPITAL CARE,LEVL III: ICD-10-PCS | Mod: ,,, | Performed by: STUDENT IN AN ORGANIZED HEALTH CARE EDUCATION/TRAINING PROGRAM

## 2022-01-22 PROCEDURE — 92526 ORAL FUNCTION THERAPY: CPT

## 2022-01-22 PROCEDURE — 63600175 PHARM REV CODE 636 W HCPCS: Performed by: HOSPITALIST

## 2022-01-22 PROCEDURE — 99233 PR SUBSEQUENT HOSPITAL CARE,LEVL III: ICD-10-PCS | Mod: ,,, | Performed by: INTERNAL MEDICINE

## 2022-01-22 RX ORDER — IPRATROPIUM BROMIDE AND ALBUTEROL SULFATE 2.5; .5 MG/3ML; MG/3ML
3 SOLUTION RESPIRATORY (INHALATION) EVERY 4 HOURS PRN
Status: DISCONTINUED | OUTPATIENT
Start: 2022-01-22 | End: 2022-02-09 | Stop reason: HOSPADM

## 2022-01-22 RX ADMIN — DEXTROSE 500 MG: 50 INJECTION, SOLUTION INTRAVENOUS at 09:01

## 2022-01-22 RX ADMIN — PIPERACILLIN AND TAZOBACTAM 4.5 G: 4; .5 INJECTION, POWDER, LYOPHILIZED, FOR SOLUTION INTRAVENOUS; PARENTERAL at 02:01

## 2022-01-22 RX ADMIN — IPRATROPIUM BROMIDE AND ALBUTEROL SULFATE 3 ML: 2.5; .5 SOLUTION RESPIRATORY (INHALATION) at 02:01

## 2022-01-22 RX ADMIN — Medication 6 MG: at 08:01

## 2022-01-22 RX ADMIN — HYDRALAZINE HYDROCHLORIDE 50 MG: 25 TABLET, FILM COATED ORAL at 09:01

## 2022-01-22 RX ADMIN — DEXTROSE 500 MG: 50 INJECTION, SOLUTION INTRAVENOUS at 01:01

## 2022-01-22 RX ADMIN — ACETYLCYSTEINE 4 ML: 100 SOLUTION ORAL; RESPIRATORY (INHALATION) at 08:01

## 2022-01-22 RX ADMIN — Medication 10 ML: at 11:01

## 2022-01-22 RX ADMIN — FLUCONAZOLE 400 MG: 2 INJECTION, SOLUTION INTRAVENOUS at 11:01

## 2022-01-22 RX ADMIN — SODIUM CHLORIDE 200 MG: 9 INJECTION, SOLUTION INTRAVENOUS at 11:01

## 2022-01-22 RX ADMIN — SODIUM CHLORIDE 200 MG: 9 INJECTION, SOLUTION INTRAVENOUS at 12:01

## 2022-01-22 RX ADMIN — Medication 10 ML: at 12:01

## 2022-01-22 RX ADMIN — IPRATROPIUM BROMIDE AND ALBUTEROL SULFATE 3 ML: 2.5; .5 SOLUTION RESPIRATORY (INHALATION) at 08:01

## 2022-01-22 RX ADMIN — POLYETHYLENE GLYCOL 3350 17 G: 17 POWDER, FOR SOLUTION ORAL at 09:01

## 2022-01-22 RX ADMIN — SODIUM CHLORIDE 200 MG: 9 INJECTION, SOLUTION INTRAVENOUS at 01:01

## 2022-01-22 RX ADMIN — HYDRALAZINE HYDROCHLORIDE 50 MG: 25 TABLET, FILM COATED ORAL at 06:01

## 2022-01-22 RX ADMIN — ACETYLCYSTEINE 4 ML: 100 SOLUTION ORAL; RESPIRATORY (INHALATION) at 07:01

## 2022-01-22 RX ADMIN — TAMSULOSIN HYDROCHLORIDE 0.4 MG: 0.4 CAPSULE ORAL at 09:01

## 2022-01-22 RX ADMIN — DEXTROSE AND SODIUM CHLORIDE: 5; .9 INJECTION, SOLUTION INTRAVENOUS at 11:01

## 2022-01-22 RX ADMIN — Medication 10 ML: at 06:01

## 2022-01-22 RX ADMIN — SODIUM CHLORIDE, PRESERVATIVE FREE 10 ML: 5 INJECTION INTRAVENOUS at 01:01

## 2022-01-22 RX ADMIN — PIPERACILLIN AND TAZOBACTAM 4.5 G: 4; .5 INJECTION, POWDER, LYOPHILIZED, FOR SOLUTION INTRAVENOUS; PARENTERAL at 09:01

## 2022-01-22 RX ADMIN — ASPIRIN 81 MG: 81 TABLET, COATED ORAL at 09:01

## 2022-01-22 RX ADMIN — ACETYLCYSTEINE 4 ML: 100 SOLUTION ORAL; RESPIRATORY (INHALATION) at 02:01

## 2022-01-22 RX ADMIN — METOPROLOL SUCCINATE 50 MG: 50 TABLET, EXTENDED RELEASE ORAL at 09:01

## 2022-01-22 RX ADMIN — PIPERACILLIN AND TAZOBACTAM 4.5 G: 4; .5 INJECTION, POWDER, LYOPHILIZED, FOR SOLUTION INTRAVENOUS; PARENTERAL at 05:01

## 2022-01-22 RX ADMIN — AMLODIPINE BESYLATE 10 MG: 5 TABLET ORAL at 09:01

## 2022-01-22 RX ADMIN — ENOXAPARIN SODIUM 40 MG: 40 INJECTION SUBCUTANEOUS at 04:01

## 2022-01-22 RX ADMIN — ATORVASTATIN CALCIUM 80 MG: 40 TABLET, FILM COATED ORAL at 09:01

## 2022-01-22 RX ADMIN — HYDRALAZINE HYDROCHLORIDE 50 MG: 25 TABLET, FILM COATED ORAL at 01:01

## 2022-01-22 RX ADMIN — IPRATROPIUM BROMIDE AND ALBUTEROL SULFATE 3 ML: 2.5; .5 SOLUTION RESPIRATORY (INHALATION) at 07:01

## 2022-01-22 NOTE — ASSESSMENT & PLAN NOTE
s/p cystoscopy with santiago placement and debridement of abscess/necrosis of scrotum 1/5/22 with Dr. Rangel.  Fluid collection drained at bedside 1/14/2022  CT from 1/18/22 w/ R corporal body fluid collection concerning for residual abscess vs fluid collection  Additional purulence expressed from penis on 1/20/21    Continue abx per primary  Cultures growing Candida and E coli  Appreciate wound care RN and floor RN dressing changes  Leukocytosis remains resolved  Afebrile  Will continue to monitor condition    Interventional Radiology, via note from Dr. Ingram,  unable to aspirate fluid collection contained in corporal body/ base of penis. Given amount of purulence expressed from penile urethra, small size of fluid collection, resolution of leukocytosis, pneumonia, altered mental status, etc. At this time serial recommend imaging and observation. As the location of this area is not easily accessed and risk of complication- further injury to urethra, vascular injury, enlarged perineal wound, etc. Image guided percutaneous access would be favorable and limit risk compared to surgical exploration given contained nature of residual fluid collections, if this is available at main campus, this should be explored especially as patient will need plastic surgery evaluation.

## 2022-01-22 NOTE — SUBJECTIVE & OBJECTIVE
Interval History: sitter at bedside  Patient altered  Afebrile      Review of Systems   Unable to perform ROS: Mental status change     Objective:     Temp:  [97.1 °F (36.2 °C)-98.4 °F (36.9 °C)] 97.4 °F (36.3 °C)  Pulse:  [60-66] 61  Resp:  [17-20] 18  SpO2:  [90 %-100 %] 100 %  BP: (125-153)/(74-89) 125/89     Body mass index is 19.92 kg/m².           Drains     Drain                 Urethral Catheter 01/05/22 1050 Double-lumen 20 Fr. 17 days                Physical Exam  Constitutional:       General: He is not in acute distress.     Appearance: He is well-developed. He is not ill-appearing, toxic-appearing or diaphoretic.   HENT:      Head: Normocephalic and atraumatic.      Mouth/Throat:      Mouth: Mucous membranes are moist.   Eyes:      Conjunctiva/sclera: Conjunctivae normal.   Pulmonary:      Effort: Pulmonary effort is normal. No respiratory distress.   Abdominal:      General: Abdomen is flat. There is no distension.      Palpations: Abdomen is soft.      Tenderness: There is no abdominal tenderness.   Genitourinary:     Comments: Wound edges viable  Dressing in place  No palpable crepitus  No additional purulence expressed from penis at this time  Sahu with CYU, secured with stat lock    Musculoskeletal:         General: No swelling or deformity.      Cervical back: Neck supple.   Skin:     General: Skin is warm.      Findings: No rash.   Neurological:      Mental Status: He is disoriented.   Psychiatric:      Comments: Answers some questions, easily disoriented         Significant Labs:    BMP:  Recent Labs   Lab 01/20/22  0358 01/21/22  0818 01/22/22  0446    141 142   K 3.8 4.3 4.3    108 106   CO2 26 27 29   BUN 5* 5* 5*   CREATININE 0.8 0.9 0.9   CALCIUM 8.2* 8.3* 8.3*       CBC:   Recent Labs   Lab 01/20/22  0359 01/21/22  0441 01/22/22  0446   WBC 9.05 9.18 9.11   HGB 12.0* 10.7* 11.4*   HCT 36.9* 32.7* 34.6*   PLT 52* 50* 50*

## 2022-01-22 NOTE — PROGRESS NOTES
"[facility]  Neurology  Progress Note    Patient Name: Abhishek Zhao  MRN: [id number]  Admission Date: 1/4/2022  Hospital Length of Stay: 17 days  Code Status: Full Code   Attending Provider: Quinten Rosario MD  Primary Care Physician: To Obtain Unable   Principal Problem:Scrotal abscess    Subjective:     Interval History: 62 y/o male presented initially with a complaint of testicular pain. Associated with swelling and redness to the scrotum and penis. 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 Urology is following as well as ID. Today pt had an episode in which he became poorly responsive. He had a persistent cough with copious amounts of mucus. He has slowly recovered but is exhibiting "picking" behavior and is intermittently answering questions.     -1/12/22: Pt had an episode in which he was poorly responsive and arms flailing around.     -1/14/22: Pt poorly responsive overnight. Stroke CODE was called. Transferred to ICU.     -1/15/22: Pt is more responsive today. Restless.      -1/16/22: Pt has been transferred out of ICU. Currently calm. Able to states his name and that he is in a hospital.      -1/18/22: Mr. Zhao is poorly responsive today. Staring.     -1/20/22: Alert, talking to family although only oriented to person, place. Telling me he is hungry.     -1/21/22: Fluctuating level of participation today. Planned drainage of scrotal abscess today.    -1/22/22: Pt is more alert and interactive today.    Current Neurological Medications:     Current Facility-Administered Medications   Medication Dose Route Frequency Provider Last Rate Last Admin    acetaminophen tablet 650 mg  650 mg Oral Q6H PRN Hugo Aviles MD   650 mg at 01/21/22 1000    acetylcysteine 100 mg/ml (10%) solution 4 mL  4 mL Nebulization TID WAKE Hugo Aviles MD   4 mL at 01/22/22 0858    albuterol-ipratropium 2.5 mg-0.5 mg/3 mL nebulizer solution 3 mL  3 mL Nebulization Q4H PRN Hugo CURIEL" MD Stefan   3 mL at 01/18/22 0119    albuterol-ipratropium 2.5 mg-0.5 mg/3 mL nebulizer solution 3 mL  3 mL Nebulization Q4H WAKE Hugo Aviles MD   3 mL at 01/22/22 0858    aluminum-magnesium hydroxide-simethicone 200-200-20 mg/5 mL suspension 30 mL  30 mL Oral QID PRN Hugo Aviles MD        amLODIPine tablet 10 mg  10 mg Oral Daily Hugo Aviles MD   10 mg at 01/22/22 0917    aspirin EC tablet 81 mg  81 mg Oral Daily Hugo Aviles MD   81 mg at 01/22/22 0917    atorvastatin tablet 80 mg  80 mg Oral Daily Hugo Aviles MD   80 mg at 01/22/22 0917    dextrose 5 % and 0.9 % NaCl infusion   Intravenous Continuous Quinten Rosario MD 75 mL/hr at 01/21/22 1820 New Bag at 01/21/22 1820    dextrose 50% injection 12.5 g  12.5 g Intravenous PRN Hugo Aviles MD   12.5 g at 01/21/22 0900    dextrose 50% injection 25 g  25 g Intravenous PRN Hugo Aviles MD   25 g at 01/19/22 1644    enoxaparin injection 40 mg  40 mg Subcutaneous Daily Hugo Aviles MD   40 mg at 01/21/22 1800    fluconazole (DIFLUCAN) IVPB 400 mg 200 mL  400 mg Intravenous Q24H Hugo Aviles  mL/hr at 01/22/22 1120 400 mg at 01/22/22 1120    glucagon (human recombinant) injection 1 mg  1 mg Intramuscular PRN Hugo Aviles MD        glucose chewable tablet 16 g  16 g Oral PRN Hugo Aviles MD   16 g at 01/21/22 1145    glucose chewable tablet 24 g  24 g Oral PRN Hugo Aviles MD        hydrALAZINE injection 10 mg  10 mg Intravenous Q6H PRN Hugo Aviles MD        hydrALAZINE tablet 50 mg  50 mg Oral Q8H Quinten Rosario MD   50 mg at 01/22/22 0631    influenza (QUADRIVALENT PF) vaccine 0.5 mL  0.5 mL Intramuscular vaccine x 1 dose Hugo Aviles MD        insulin aspart U-100 pen 0-5 Units  0-5 Units Subcutaneous Q6H PRN Hugo Aviles MD   4 Units at 01/19/22 1246    labetaloL injection 10 mg  10 mg Intravenous Q4H PRN Hugo Aviles MD        lacosamide (VIMPAT) 200 mg in  sodium chloride 0.9% 100 mL IVPB  200 mg Intravenous Q12H Hugo Aviles MD   Stopped at 01/22/22 0032    LIDOcaine HCL 10 mg/ml (1%) injection 1 mL  1 mL Intradermal Once PRN Hugo Aviles MD        lorazepam injection 3 mg  3 mg Intravenous Once PRN Hugo Aviles MD        melatonin tablet 6 mg  6 mg Oral Nightly PRN Quinten Rosario MD        melatonin tablet 6 mg  6 mg Oral Nightly Quinten Rosario MD   6 mg at 01/21/22 2252    metoprolol succinate (TOPROL-XL) 24 hr tablet 50 mg  50 mg Oral Daily Hugo Aviles MD   50 mg at 01/22/22 0917    naloxone 0.4 mg/mL injection 0.02 mg  0.02 mg Intravenous PRN Hugo Aviles MD        ondansetron injection 4 mg  4 mg Intravenous Q8H PRN Hugo Aviles MD   4 mg at 01/12/22 1219    piperacillin-tazobactam 4.5 g in dextrose 5 % 100 mL IVPB (ready to mix system)  4.5 g Intravenous Q8H Hugo Aviles MD 25 mL/hr at 01/22/22 0924 4.5 g at 01/22/22 0924    polyethylene glycol packet 17 g  17 g Oral Daily Hugo Aviles MD   17 g at 01/22/22 0916    prochlorperazine injection Soln 5 mg  5 mg Intravenous Q6H PRN Hugo Aviles MD        simethicone chewable tablet 80 mg  1 tablet Oral QID PRN Hugo Aviles MD        sodium chloride 0.9% flush 10 mL  10 mL Intravenous Q6H Hugo Aviles MD   10 mL at 01/22/22 1125    And    sodium chloride 0.9% flush 10 mL  10 mL Intravenous PRN Hugo Aviles MD        tamsulosin 24 hr capsule 0.4 mg  0.4 mg Oral Daily Hugo Aviles MD   0.4 mg at 01/22/22 0917    valproate (DEPACON) 500 mg in dextrose 5 % 50 mL IVPB  500 mg Intravenous Q12H Hugo Aviles MD   Stopped at 01/21/22 6170       Review of Systems   Unable to perform ROS: Mental status change    Objective:     Vital Signs (Most Recent):  Temp: 97.3 °F (36.3 °C) (01/22/22 1148)  Pulse: 61 (01/22/22 1148)  Resp: 18 (01/22/22 1148)  BP: 125/89 (01/22/22 1148)  SpO2: 100 % (01/22/22 1148) Vital Signs (24h Range):  Temp:  [97.1  °F (36.2 °C)-98.4 °F (36.9 °C)] 97.3 °F (36.3 °C)  Pulse:  [60-66] 61  Resp:  [17-20] 18  SpO2:  [90 %-100 %] 100 %  BP: (125-153)/(74-89) 125/89     Weight: 61.2 kg (134 lb 14.7 oz)  Body mass index is 19.92 kg/m².    Physical Exam  Constitutional:       General: He is not in acute distress.  HENT:      Head: Normocephalic.      Right Ear: External ear normal.      Left Ear: External ear normal.   Eyes:      General:         Right eye: No discharge.         Left eye: No discharge.   Cardiovascular:      Rate and Rhythm: Normal rate.   Pulmonary:      Effort: Pulmonary effort is normal.   Abdominal:      Palpations: Abdomen is soft.   Musculoskeletal:         General: No tenderness.      Cervical back: Neck supple.   Skin:     General: Skin is warm.   Psychiatric:         Speech: Speech is slurred.            NEUROLOGICAL EXAMINATION:      MENTAL STATUS   Speech: slurred  Level of consciousness: alert       Oriented to self, place     CRANIAL NERVES      CN III, IV, VI   Right pupil: Size: 2 mm. Shape: regular.   Left pupil: Size: 2 mm. Shape: regular.   Nystagmus: none   Conjugate gaze: present     CN VII   Right facial weakness: none  Left facial weakness: none     CN XII   Tongue deviation: none     MOTOR EXAM        AROM of UE's and LE's against gravity        Significant Labs:   CBC:   Recent Labs   Lab 01/22/22  0446 01/23/22  0413   WBC 9.11 8.89   HGB 11.4* 10.9*   HCT 34.6* 33.6*   PLT 50* 56*     CMP:   Recent Labs   Lab 01/22/22  0446 01/23/22  0413   GLU 97 163*    140   K 4.3 3.9    105   CO2 29 27   BUN 5* 6   CREATININE 0.9 0.9   CALCIUM 8.3* 7.9*   PROT 6.7 6.7   ALBUMIN 1.2* 1.2*   BILITOT 0.2 0.2   ALKPHOS 306* 273*   AST 19 19   ALT 13 13   ANIONGAP 7* 8   EGFRNONAA >60 >60       Significant Imaging: I have reviewed all pertinent imaging results/findings within the past 24 hours.    Assessment and Plan:     60 y/o male consulted for AMS     1. Encephalopathy: encephalopathy of unknown  etiology (Infectious? Medication-induced? Hospital delirium?. Pt with no hypotension or major metabolic disturbances. Alert and responsive today.           -MRI brain showed no acute stroke. No signs of space-occupying lesion.           -Will stop lamotrigine in case is causing AMS.     2. Hx of seizures: uncertain if pt having breakthrough events but so far none seen on EEG recording. More interactive today. No reported seizures.           -VPA 500 mg BID.            -Lacosamide 200 mg BID.           -D/C'd lamotrigine. If needed to resume then will start at low dose of lamotrigine 25 mg BID.    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] 01/06/2022 Yes    Adjustment disorder with depressed mood [F43.21] 01/04/2022 Yes     Chronic    Chronic ischemic heart disease [I25.9] 01/04/2022 Yes     Chronic    Cocaine dependence in remission [F14.21] 01/04/2022 Yes     Chronic    Cardiomegaly [I51.7] 01/04/2022 Yes     Chronic    Hyperlipidemia [E78.5] 01/04/2022 Yes     Chronic    Essential hypertension [I10] 01/04/2022 Yes     Chronic    Acute encephalopathy [G93.40] 01/04/2022 Yes    Tobacco user [Z72.0] 01/04/2022 Yes     Chronic    Diabetes mellitus type 2, controlled [E11.9] 08/03/2020 Yes     Chronic      Problems Resolved During this Admission:       VTE Risk Mitigation (From admission, onward)         Ordered     enoxaparin injection 40 mg  Daily          01/13/22 1612     IP VTE LOW RISK PATIENT  Once         01/04/22 2241     Place sequential compression device  Until discontinued         01/04/22 2241                Ashvin Giraldo MD  Neurology  [facility] [place]

## 2022-01-22 NOTE — PROGRESS NOTES
[facility] Medicine  Progress Note    Patient Name: Abhishek Zhao  MRN: [id number]  Patient Class: IP- Inpatient   Admission Date: 1/4/2022  Length of Stay: 17 days  Attending Physician: Quinten Rosario MD  Primary Care Provider: To Obtain Unable        Subjective:     Principal Problem:Scrotal abscess        HPI:  61 y.o. male with adjustment disorder with depressed mood, chronic ischemic heart disease, cocaine dependence in remission, cardiomegaly, HLD, HTN, swizure disorder, tobacco use, and DM 2 presents with a complaint of testicular pain.  Associated with swelling and redness to the scrotum and penis along with difficulty urinating, pain is rated as severe and radiates to the rectum.  Denies fever, chills, cough, SOB, chest pain, n/v/d.  In the ED, he was found to be tachypneic, with leukocytosis and elevated lactic.  CT and US shows evidence of multiple scrotal and perineal abscesses with some extension into the penile shaft.  UA with evidence of infection.  Sepsis treatment initiated, blood and urine cultures obtained, IV fluids and IV antibiotics initiated.  Urology consulted.      Overview/Hospital Course:  61 y.o. male with adjustment disorder with depressed mood, chronic ischemic heart disease, cocaine dependence in remission, cardiomegaly, HLD, HTN, swizure disorder, tobacco use, and DM 2 presents with a complaint of testicular pain, found to have multiple scrotal and perineal abscesses. Placed on broad spectrum antibiotics, underwent I&D with urology.    Pt w/ aspiration event 1/11 w/ subsequent desaturation. Respiratory culture grew pseudomonas. Kept on Zosyn. On 1/13 pt had abrupt reduction in responsiveness. Code stroke called, patient transferred to ICU. No stroke found on imaging. Started on extended EEG. Mental status improved, patient stepped back down to the floor.     His mental status improved slowly for a few days, but worsened again 1/17.   He was altered,was  not speaking,head CT show no acute process,EEG,per neurology show no seizure activity,todayb is much alert,appear to be delirious,will monitor.much more oriented today.  Consulted IR for drainage of residual fluid  collection on scrotum,CT pelvic.IR intervention cancelled because of mild hypoglycemia.stable at this time.  CC today is weakness.      Interval History: CC today is weakness.    Review of Systems   Constitutional: Positive for activity change.   Respiratory: Negative for apnea and choking.    Gastrointestinal: Negative for abdominal distention and abdominal pain.   Musculoskeletal: Negative for back pain.   Neurological: Positive for weakness.     Objective:     Vital Signs (Most Recent):  Temp: 97.4 °F (36.3 °C) (01/22/22 0452)  Pulse: 66 (01/22/22 0858)  Resp: 20 (01/22/22 0858)  BP: 133/81 (01/22/22 0452)  SpO2: 100 % (01/22/22 0858) Vital Signs (24h Range):  Temp:  [97.1 °F (36.2 °C)-98.4 °F (36.9 °C)] 97.4 °F (36.3 °C)  Pulse:  [60-66] 66  Resp:  [17-20] 20  SpO2:  [90 %-100 %] 100 %  BP: (133-153)/(74-87) 133/81     Weight: 61.2 kg (134 lb 14.7 oz)  Body mass index is 19.92 kg/m².    Intake/Output Summary (Last 24 hours) at 1/22/2022 1141  Last data filed at 1/22/2022 0642  Gross per 24 hour   Intake 1633.48 ml   Output 3850 ml   Net -2216.52 ml      Physical Exam  Cardiovascular:      Rate and Rhythm: Normal rate and regular rhythm.   Pulmonary:      Effort: No respiratory distress.      Breath sounds: No wheezing.   Abdominal:      General: There is no distension.      Tenderness: There is no abdominal tenderness.   Genitourinary:     Comments: Dressing on scrotal area,  Musculoskeletal:         General: No swelling or deformity.   Neurological:      General: No focal deficit present.      Mental Status: He is alert.         Significant Labs:   All pertinent labs within the past 24 hours have been reviewed.  BMP:   Recent Labs   Lab 01/22/22  0446   GLU 97      K 4.3      CO2 29   BUN  5*   CREATININE 0.9   CALCIUM 8.3*     CBC:   Recent Labs   Lab 01/21/22  0441 01/22/22  0446   WBC 9.18 9.11   HGB 10.7* 11.4*   HCT 32.7* 34.6*   PLT 50* 50*     CMP:   Recent Labs   Lab 01/21/22  0818 01/22/22  0446    142   K 4.3 4.3    106   CO2 27 29   GLU 77 97   BUN 5* 5*   CREATININE 0.9 0.9   CALCIUM 8.3* 8.3*   PROT 6.8 6.7   ALBUMIN 1.3* 1.2*   BILITOT 0.2 0.2   ALKPHOS 347* 306*   AST 23 19   ALT 16 13   ANIONGAP 6* 7*   EGFRNONAA >60 >60       Significant Imaging: I have reviewed all pertinent imaging results/findings within the past 24 hours.      Assessment/Plan:      * Scrotal abscess  Multiple abscesses c/w Berna's gangrene, s/p I&D by urology on 1/5. Cultures growing ampicillin-resistant Ecoli and C albicans. Repeat CT A/P 1/13 w/ 4cm fluid collection c/f persistent abscess which was drained bedside by urology  - urology following  - cont zosyn (re-started for HCAP)  - further ID recs pending  - repeat CT pelvis.  Consulted IR for drainage of residual fluid  collection on CT pelvic. Could not be done duo to mild hypoglycemia,stable at this time.          Alkaline phosphatase elevation  Chronic isolated alk phos (GGT elevated), worsened this admission. Possibly due to AEDs vs occult HPB process.   - further workup depending on clinical course      Pericardial effusion  Small pericardial effusion of unclear etiology      Encephalopathy, metabolic  CT head and EEG without acute process; likely delirium secondary to infection and prolonged hospitalization. Code stroke called the night of 1/13, however no focal neuro findings, CT head and MRI without acute findings  - neurology consulted  - delirium precautions  - EEG pending  - tx of infection as noted above.   He was altered,was not speaking,head CT show no acute process,EEG,per neurology show nos eizure activity,todayb is much alert,appear to be delirious,will monitor.  Much improved.    HCAP (healthcare-associated pneumonia)  - see  respiratory failure       Acute respiratory failure with hypoxia and hypercarbia  Cough + new LLL opacity on imaging c/f pneumonia, however CT imaging showing pleural effusion and atelectasis  - respiratory cultures w/ pseudomonas  - cont zosyn  - furosemide as tolerated; no significant evidence of cardiac dysfunction on TTE  - IS        Hypokalemia  Replete PRN    JOI (acute kidney injury)  Resolved      Sepsis due to Gram negative bacteria  Resolved      Berna's gangrene  As above    Diabetes mellitus type 2, controlled  Check a1c  Hold oral antihyperglycemics while inpatient  PRN sliding scale insulin  ACHS glucose monitoring   ADA diet     Tobacco user  5 minutes spent counseling the patient on smoking cessation and he is not currently ready to stop smoking. He will be offereded a nicotine transdermal patch while hospitalized and monitored for withdrawal.  Will provide additional smoking cessation counseling prior to discharge.     Acute encephalopathy  Repeated episodes of AMS c/f seizures w/ post ictal period  - neurology consulted  - AEDs per neurology  - EEG no seizure.  Much improved.      Essential hypertension  Well controlled, continue home medications and monitor blood pressure, adjust as needed.     Hyperlipidemia  Continue statin    Cardiomegaly  Pulmonary edema seen on imaging, small pericardial effusion seen on TTE. LVEF low-normal (50%)  - hold diuresis while NPO    Cocaine dependence in remission  Counseled     Chronic ischemic heart disease  No acute issue    Adjustment disorder with depressed mood  Continue home citalopram, hold home seroquel due to somnolence      VTE Risk Mitigation (From admission, onward)         Ordered     enoxaparin injection 40 mg  Daily         01/13/22 1612     IP VTE LOW RISK PATIENT  Once         01/04/22 2241     Place sequential compression device  Until discontinued         01/04/22 2241                Discharge Planning   CHUCK:      Code Status: Full Code   Is  the patient medically ready for discharge?:     Reason for patient still in hospital (select all that apply): Patient trending condition  Discharge Plan A: Skilled Nursing Facility   Discharge Delays: (!) Post-Acute Set-up              Quinten Rosario MD  Department of Hospital Medicine   [facility] [place]

## 2022-01-22 NOTE — NURSING
Sitter at bedside. Pt is alert and talkative. 2L NC. Double lumen PICC line to right upper arm. Indwelling santiago catheter placed by Urology. Dressing to scrotum dry and clean. Will continue to monitor pt.

## 2022-01-22 NOTE — PT/OT/SLP PROGRESS
Speech Language Pathology Treatment    Patient Name:  Abhishek Zhao   MRN:  [id number]  Admitting Diagnosis: Scrotal abscess    Recommendations:                 General Recommendations:  Dysphagia therapy  Diet recommendations:  Puree, Liquid Diet Level: Thin   Aspiration Precautions: 1 bite/sip at a time and Alternating bites/sips   General Precautions: Standard, pureed diet,aspiration  Communication strategies:  none    Subjective     Patient seen upright in bed. Alert and awake. Cooperative.   Patient goals: to eat food    Pain/Comfort:  · Pain Rating 1: 0/10  · Pain Rating Post-Intervention 1: 0/10  · Pain Rating 2: 0/10    Respiratory Status: Nasal cannula, flow 2 L/min    Objective:     Has the patient been evaluated by SLP for swallowing?   Yes  Keep patient NPO? No   Current Respiratory Status:        Patient tolerated puree/thin at bedside no overt s/s of aspiration. ST noted tongue pumping during puree trial with notable delayed swallow initiation. Continue with Puree/thin recommendations at this time. Dysphagia exercises to continue also.     Assessment:     Abhishek Zhao is a 61 y.o. male with an SLP diagnosis of Dysphagia.  He presents with moderate oropharyngeal dysphagia c/b reduced lingual lateralization, oral motor weakness, slow a/p transit, tongue pumping for solids, delayed swallow initiation, and reduced laryngeal elevation upon palpation. Patient would benefit from dysphagia exercises. F/u for tolerance and possible diet upgrade. RECS: Puree/thin.    Goals:   Multidisciplinary Problems     SLP Goals        Problem: SLP Goal    Goal Priority Disciplines Outcome   SLP Goal    Medium SLP Ongoing, Progressing   Description: Goals:  1. Pt will tolerate puree diet/thin liquids w/o overt s/s of aspiration.     2. Pt will tolerate [facility] soft diet (IDDSI-5) with thin liquids w/o overt s/s of aspiration. (on hold 1/16/22, pending pt progress)                   Plan:     · Patient to be seen:  3 x/week    · Plan of Care expires:  01/29/22  · Plan of Care reviewed with:  patient   · SLP Follow-Up:  Yes       Discharge recommendations:  other (see comments) (TBD)   Barriers to Discharge:  Safety Awareness mod    Time Tracking:     SLP Treatment Date:   01/22/22  Speech Start Time:  1400  Speech Stop Time:  1420     Speech Total Time (min):  20 min    Billable Minutes: Treatment Swallowing Dysfunction 20    01/22/2022

## 2022-01-22 NOTE — ASSESSMENT & PLAN NOTE
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 Cultures growing ampicillin-resistant Ecoli and C albicans. Repeat CT A/P 1/13 w/ 4cm fluid collection c/f persistent abscess which was drained bedside by urology  - urology following  - cont zosyn (re-started for HCAP)  - further ID recs pending  - repeat CT pelvis.  Consulted IR for drainage of residual fluid  collection on CT pelvic. Could not be done duo to mild hypoglycemia,stable at this time.

## 2022-01-22 NOTE — SUBJECTIVE & OBJECTIVE
Interval History: CC today is weakness.    Review of Systems   Constitutional: Positive for activity change.   Respiratory: Negative for apnea and choking.    Gastrointestinal: Negative for abdominal distention and abdominal pain.   Musculoskeletal: Negative for back pain.   Neurological: Positive for weakness.     Objective:     Vital Signs (Most Recent):  Temp: 97.4 °F (36.3 °C) (01/22/22 0452)  Pulse: 66 (01/22/22 0858)  Resp: 20 (01/22/22 0858)  BP: 133/81 (01/22/22 0452)  SpO2: 100 % (01/22/22 0858) Vital Signs (24h Range):  Temp:  [97.1 °F (36.2 °C)-98.4 °F (36.9 °C)] 97.4 °F (36.3 °C)  Pulse:  [60-66] 66  Resp:  [17-20] 20  SpO2:  [90 %-100 %] 100 %  BP: (133-153)/(74-87) 133/81     Weight: 61.2 kg (134 lb 14.7 oz)  Body mass index is 19.92 kg/m².    Intake/Output Summary (Last 24 hours) at 1/22/2022 1141  Last data filed at 1/22/2022 0642  Gross per 24 hour   Intake 1633.48 ml   Output 3850 ml   Net -2216.52 ml      Physical Exam  Cardiovascular:      Rate and Rhythm: Normal rate and regular rhythm.   Pulmonary:      Effort: No respiratory distress.      Breath sounds: No wheezing.   Abdominal:      General: There is no distension.      Tenderness: There is no abdominal tenderness.   Genitourinary:     Comments: Dressing on scrotal area,  Musculoskeletal:         General: No swelling or deformity.   Neurological:      General: No focal deficit present.      Mental Status: He is alert.         Significant Labs:   All pertinent labs within the past 24 hours have been reviewed.  BMP:   Recent Labs   Lab 01/22/22 0446   GLU 97      K 4.3      CO2 29   BUN 5*   CREATININE 0.9   CALCIUM 8.3*     CBC:   Recent Labs   Lab 01/21/22 0441 01/22/22 0446   WBC 9.18 9.11   HGB 10.7* 11.4*   HCT 32.7* 34.6*   PLT 50* 50*     CMP:   Recent Labs   Lab 01/21/22 0818 01/22/22  0446    142   K 4.3 4.3    106   CO2 27 29   GLU 77 97   BUN 5* 5*   CREATININE 0.9 0.9   CALCIUM 8.3* 8.3*   PROT 6.8 6.7    ALBUMIN 1.3* 1.2*   BILITOT 0.2 0.2   ALKPHOS 347* 306*   AST 23 19   ALT 16 13   ANIONGAP 6* 7*   EGFRNONAA >60 >60       Significant Imaging: I have reviewed all pertinent imaging results/findings within the past 24 hours.

## 2022-01-22 NOTE — PROGRESS NOTES
[facility]  Urology  Progress Note    Patient Name: Abhishek Zhao  MRN: [id number]  Admission Date: 1/4/2022  Hospital Length of Stay: 17 days  Code Status: Full Code   Attending Provider: Quinten Rosario MD   Primary Care Physician: To Obtain Unable    Subjective:     HPI:  Scrotal Swelling  Abhishek Zhao is a 61 y.o. male who was been experiencing scrotal pain and swelling since 12/31/2021.  He denies any fever.  He has had issues voiding since the swelling began.  Hemostat having issues in the past with urinary problems.  He denies having any previous issues with scrotal infection or swelling.  He recalls that he had procedures in the past but cannot recall specifically what to place.  He does not have urologist locally but moved back from [place] about 1 year ago.    Review of care everywhere shows that he had a cystoscopy with urethral dilation of a urethral stricture in the bulbar urethra in 2019 at [facility].  And there are reports that in approximately 2015 [facility].      Interval History: sitter at bedside  Patient altered  Afebrile      Review of Systems   Unable to perform ROS: Mental status change     Objective:     Temp:  [97.1 °F (36.2 °C)-98.4 °F (36.9 °C)] 97.4 °F (36.3 °C)  Pulse:  [60-66] 61  Resp:  [17-20] 18  SpO2:  [90 %-100 %] 100 %  BP: (125-153)/(74-89) 125/89     Body mass index is 19.92 kg/m².           Drains     Drain                 Urethral Catheter 01/05/22 1050 Double-lumen 20 Fr. 17 days                Physical Exam  Constitutional:       General: He is not in acute distress.     Appearance: He is well-developed. He is not ill-appearing, toxic-appearing or diaphoretic.   HENT:      Head: Normocephalic and atraumatic.      Mouth/Throat:      Mouth: Mucous membranes are moist.   Eyes:      Conjunctiva/sclera: Conjunctivae normal.   Pulmonary:      Effort: Pulmonary effort is normal. No respiratory distress.   Abdominal:       General: Abdomen is flat. There is no distension.      Palpations: Abdomen is soft.      Tenderness: There is no abdominal tenderness.   Genitourinary:     Comments: Wound edges viable  Dressing in place  No palpable crepitus  No additional purulence expressed from penis at this time  Santiago with CYU, secured with stat lock    Musculoskeletal:         General: No swelling or deformity.      Cervical back: Neck supple.   Skin:     General: Skin is warm.      Findings: No rash.   Neurological:      Mental Status: He is disoriented.   Psychiatric:      Comments: Answers some questions, easily disoriented         Significant Labs:    BMP:  Recent Labs   Lab 01/20/22  0358 01/21/22  0818 01/22/22  0446    141 142   K 3.8 4.3 4.3    108 106   CO2 26 27 29   BUN 5* 5* 5*   CREATININE 0.8 0.9 0.9   CALCIUM 8.2* 8.3* 8.3*       CBC:   Recent Labs   Lab 01/20/22  0359 01/21/22  0441 01/22/22  0446   WBC 9.05 9.18 9.11   HGB 12.0* 10.7* 11.4*   HCT 36.9* 32.7* 34.6*   PLT 52* 50* 50*           Scrotal US reviewed personally     COMPARISON:  CT pelvis with contrast 01/18/2022 and scrotal ultrasound 01/09/2022     FINDINGS:  Complex collection which the technologist describes as located inferior to the right testicle measures 4.7 x 2.3 x 2.5 cm (previously 4 x 2.7 x 4 cm on prior ultrasound).  Mild increased vascularity in the surrounding soft tissues with no internal vascularity documented.     Diffuse scrotal wall edema.     Impression:     Persistent complex collection in the soft tissues inferior to the right testicle mildly decreased in size compared to prior ultrasound.  Considerations include phlegmon versus abscess.    Assessment/Plan:     * Scrotal abscess  s/p cystoscopy with santiago placement and debridement of abscess/necrosis of scrotum 1/5/22 with Dr. Rangel.  Fluid collection drained at bedside 1/14/2022  CT from 1/18/22 w/ R corporal body fluid collection concerning for residual abscess vs fluid  collection  Additional purulence expressed from penis on 1/20/21    Continue abx per primary  Cultures growing Candida and E coli  Appreciate wound care RN and floor RN dressing changes  Leukocytosis remains resolved  Afebrile  Will continue to monitor condition    Interventional Radiology, via note from Dr. Ingram,  unable to aspirate fluid collection contained in corporal body/ base of penis. Given amount of purulence expressed from penile urethra, small size of fluid collection, resolution of leukocytosis, pneumonia, altered mental status, etc. At this time serial recommend imaging and observation. As the location of this area is not easily accessed and risk of complication- further injury to urethra, vascular injury, enlarged perineal wound, etc. Image guided percutaneous access would be favorable and limit risk compared to surgical exploration given contained nature of residual fluid collections, if this is available at main campus, this should be explored especially as patient will need plastic surgery evaluation.             Berna's gangrene   - s/p debridement 1/5/22        VTE Risk Mitigation (From admission, onward)         Ordered     enoxaparin injection 40 mg  Daily         01/13/22 1612     IP VTE LOW RISK PATIENT  Once         01/04/22 2241     Place sequential compression device  Until discontinued         01/04/22 2241                Melinda Mitchell MD  Urology  [facility]

## 2022-01-22 NOTE — ASSESSMENT & PLAN NOTE
Repeated episodes of AMS c/f seizures w/ post ictal period  - neurology consulted  - AEDs per neurology  - EEG no seizure.  Much improved.

## 2022-01-23 LAB
ALBUMIN SERPL BCP-MCNC: 1.2 G/DL (ref 3.5–5.2)
ALP SERPL-CCNC: 273 U/L (ref 55–135)
ALT SERPL W/O P-5'-P-CCNC: 13 U/L (ref 10–44)
ANION GAP SERPL CALC-SCNC: 8 MMOL/L (ref 8–16)
AST SERPL-CCNC: 19 U/L (ref 10–40)
BASOPHILS # BLD AUTO: 0.06 K/UL (ref 0–0.2)
BASOPHILS NFR BLD: 0.7 % (ref 0–1.9)
BILIRUB SERPL-MCNC: 0.2 MG/DL (ref 0.1–1)
BUN SERPL-MCNC: 6 MG/DL (ref 6–20)
CALCIUM SERPL-MCNC: 7.9 MG/DL (ref 8.7–10.5)
CHLORIDE SERPL-SCNC: 105 MMOL/L (ref 95–110)
CO2 SERPL-SCNC: 27 MMOL/L (ref 23–29)
CREAT SERPL-MCNC: 0.9 MG/DL (ref 0.5–1.4)
DIFFERENTIAL METHOD: ABNORMAL
EOSINOPHIL # BLD AUTO: 0 K/UL (ref 0–0.5)
EOSINOPHIL NFR BLD: 0.3 % (ref 0–8)
ERYTHROCYTE [DISTWIDTH] IN BLOOD BY AUTOMATED COUNT: 13.7 % (ref 11.5–14.5)
EST. GFR  (AFRICAN AMERICAN): >60 ML/MIN/1.73 M^2
EST. GFR  (NON AFRICAN AMERICAN): >60 ML/MIN/1.73 M^2
GLUCOSE SERPL-MCNC: 163 MG/DL (ref 70–110)
HCT VFR BLD AUTO: 33.6 % (ref 40–54)
HGB BLD-MCNC: 10.9 G/DL (ref 14–18)
IMM GRANULOCYTES # BLD AUTO: 0.19 K/UL (ref 0–0.04)
IMM GRANULOCYTES NFR BLD AUTO: 2.1 % (ref 0–0.5)
LYMPHOCYTES # BLD AUTO: 1.7 K/UL (ref 1–4.8)
LYMPHOCYTES NFR BLD: 18.9 % (ref 18–48)
MCH RBC QN AUTO: 28.1 PG (ref 27–31)
MCHC RBC AUTO-ENTMCNC: 32.4 G/DL (ref 32–36)
MCV RBC AUTO: 87 FL (ref 82–98)
MONOCYTES # BLD AUTO: 0.4 K/UL (ref 0.3–1)
MONOCYTES NFR BLD: 4.4 % (ref 4–15)
NEUTROPHILS # BLD AUTO: 6.5 K/UL (ref 1.8–7.7)
NEUTROPHILS NFR BLD: 73.6 % (ref 38–73)
NRBC BLD-RTO: 0 /100 WBC
PLATELET # BLD AUTO: 56 K/UL (ref 150–450)
PMV BLD AUTO: 11.8 FL (ref 9.2–12.9)
POCT GLUCOSE: 107 MG/DL (ref 70–110)
POCT GLUCOSE: 107 MG/DL (ref 70–110)
POCT GLUCOSE: 109 MG/DL (ref 70–110)
POCT GLUCOSE: 331 MG/DL (ref 70–110)
POTASSIUM SERPL-SCNC: 3.9 MMOL/L (ref 3.5–5.1)
PROT SERPL-MCNC: 6.7 G/DL (ref 6–8.4)
RBC # BLD AUTO: 3.88 M/UL (ref 4.6–6.2)
SODIUM SERPL-SCNC: 140 MMOL/L (ref 136–145)
WBC # BLD AUTO: 8.89 K/UL (ref 3.9–12.7)

## 2022-01-23 PROCEDURE — 25000003 PHARM REV CODE 250: Performed by: STUDENT IN AN ORGANIZED HEALTH CARE EDUCATION/TRAINING PROGRAM

## 2022-01-23 PROCEDURE — 11000001 HC ACUTE MED/SURG PRIVATE ROOM

## 2022-01-23 PROCEDURE — 99233 SBSQ HOSP IP/OBS HIGH 50: CPT | Mod: ,,, | Performed by: STUDENT IN AN ORGANIZED HEALTH CARE EDUCATION/TRAINING PROGRAM

## 2022-01-23 PROCEDURE — 25000003 PHARM REV CODE 250: Performed by: HOSPITALIST

## 2022-01-23 PROCEDURE — 99233 PR SUBSEQUENT HOSPITAL CARE,LEVL III: ICD-10-PCS | Mod: ,,, | Performed by: STUDENT IN AN ORGANIZED HEALTH CARE EDUCATION/TRAINING PROGRAM

## 2022-01-23 PROCEDURE — C9254 INJECTION, LACOSAMIDE: HCPCS | Performed by: STUDENT IN AN ORGANIZED HEALTH CARE EDUCATION/TRAINING PROGRAM

## 2022-01-23 PROCEDURE — 63600175 PHARM REV CODE 636 W HCPCS: Performed by: HOSPITALIST

## 2022-01-23 PROCEDURE — 85025 COMPLETE CBC W/AUTO DIFF WBC: CPT | Performed by: STUDENT IN AN ORGANIZED HEALTH CARE EDUCATION/TRAINING PROGRAM

## 2022-01-23 PROCEDURE — 80053 COMPREHEN METABOLIC PANEL: CPT | Performed by: STUDENT IN AN ORGANIZED HEALTH CARE EDUCATION/TRAINING PROGRAM

## 2022-01-23 PROCEDURE — 63600175 PHARM REV CODE 636 W HCPCS: Performed by: STUDENT IN AN ORGANIZED HEALTH CARE EDUCATION/TRAINING PROGRAM

## 2022-01-23 PROCEDURE — A4216 STERILE WATER/SALINE, 10 ML: HCPCS | Performed by: STUDENT IN AN ORGANIZED HEALTH CARE EDUCATION/TRAINING PROGRAM

## 2022-01-23 RX ADMIN — AMLODIPINE BESYLATE 10 MG: 5 TABLET ORAL at 08:01

## 2022-01-23 RX ADMIN — Medication 10 ML: at 05:01

## 2022-01-23 RX ADMIN — HYDRALAZINE HYDROCHLORIDE 50 MG: 25 TABLET, FILM COATED ORAL at 05:01

## 2022-01-23 RX ADMIN — METOPROLOL SUCCINATE 50 MG: 50 TABLET, EXTENDED RELEASE ORAL at 08:01

## 2022-01-23 RX ADMIN — ENOXAPARIN SODIUM 40 MG: 40 INJECTION SUBCUTANEOUS at 04:01

## 2022-01-23 RX ADMIN — SODIUM CHLORIDE 200 MG: 9 INJECTION, SOLUTION INTRAVENOUS at 10:01

## 2022-01-23 RX ADMIN — Medication 6 MG: at 08:01

## 2022-01-23 RX ADMIN — PIPERACILLIN AND TAZOBACTAM 4.5 G: 4; .5 INJECTION, POWDER, LYOPHILIZED, FOR SOLUTION INTRAVENOUS; PARENTERAL at 05:01

## 2022-01-23 RX ADMIN — PIPERACILLIN AND TAZOBACTAM 4.5 G: 4; .5 INJECTION, POWDER, LYOPHILIZED, FOR SOLUTION INTRAVENOUS; PARENTERAL at 09:01

## 2022-01-23 RX ADMIN — HYDRALAZINE HYDROCHLORIDE 50 MG: 25 TABLET, FILM COATED ORAL at 09:01

## 2022-01-23 RX ADMIN — ATORVASTATIN CALCIUM 80 MG: 40 TABLET, FILM COATED ORAL at 08:01

## 2022-01-23 RX ADMIN — DEXTROSE 500 MG: 50 INJECTION, SOLUTION INTRAVENOUS at 09:01

## 2022-01-23 RX ADMIN — Medication 10 ML: at 11:01

## 2022-01-23 RX ADMIN — DEXTROSE AND SODIUM CHLORIDE: 5; .9 INJECTION, SOLUTION INTRAVENOUS at 10:01

## 2022-01-23 RX ADMIN — TAMSULOSIN HYDROCHLORIDE 0.4 MG: 0.4 CAPSULE ORAL at 08:01

## 2022-01-23 RX ADMIN — PIPERACILLIN AND TAZOBACTAM 4.5 G: 4; .5 INJECTION, POWDER, LYOPHILIZED, FOR SOLUTION INTRAVENOUS; PARENTERAL at 02:01

## 2022-01-23 RX ADMIN — DEXTROSE 500 MG: 50 INJECTION, SOLUTION INTRAVENOUS at 11:01

## 2022-01-23 RX ADMIN — FLUCONAZOLE 400 MG: 2 INJECTION, SOLUTION INTRAVENOUS at 11:01

## 2022-01-23 RX ADMIN — ASPIRIN 81 MG: 81 TABLET, COATED ORAL at 08:01

## 2022-01-23 NOTE — SUBJECTIVE & OBJECTIVE
Interval History: CC today is weakness.    Review of Systems   Constitutional: Positive for activity change.   Respiratory: Negative for apnea and choking.    Gastrointestinal: Negative for abdominal distention and abdominal pain.   Musculoskeletal: Negative for back pain.   Neurological: Positive for weakness.     Objective:     Vital Signs (Most Recent):  Temp: 96.4 °F (35.8 °C) (01/23/22 0746)  Pulse: 67 (01/23/22 0746)  Resp: 20 (01/23/22 0405)  BP: (!) 140/83 (01/23/22 0746)  SpO2: (!) 91 % (01/23/22 0746) Vital Signs (24h Range):  Temp:  [96.2 °F (35.7 °C)-98.7 °F (37.1 °C)] 96.4 °F (35.8 °C)  Pulse:  [56-67] 67  Resp:  [16-22] 20  SpO2:  [91 %-100 %] 91 %  BP: (120-140)/(70-89) 140/83     Weight: 61.2 kg (134 lb 14.7 oz)  Body mass index is 19.92 kg/m².    Intake/Output Summary (Last 24 hours) at 1/23/2022 0949  Last data filed at 1/23/2022 0600  Gross per 24 hour   Intake 220 ml   Output 2850 ml   Net -2630 ml      Physical Exam  Cardiovascular:      Rate and Rhythm: Normal rate and regular rhythm.   Pulmonary:      Effort: No respiratory distress.      Breath sounds: No wheezing.   Abdominal:      General: There is no distension.      Tenderness: There is no abdominal tenderness.   Genitourinary:     Comments: Dressing on scrotal area,  Musculoskeletal:         General: No swelling or deformity.   Neurological:      General: No focal deficit present.      Mental Status: He is alert.         Significant Labs:   All pertinent labs within the past 24 hours have been reviewed.  BMP:   Recent Labs   Lab 01/23/22 0413   *      K 3.9      CO2 27   BUN 6   CREATININE 0.9   CALCIUM 7.9*     CBC:   Recent Labs   Lab 01/22/22 0446 01/23/22 0413   WBC 9.11 8.89   HGB 11.4* 10.9*   HCT 34.6* 33.6*   PLT 50* 56*     CMP:   Recent Labs   Lab 01/22/22 0446 01/23/22 0413    140   K 4.3 3.9    105   CO2 29 27   GLU 97 163*   BUN 5* 6   CREATININE 0.9 0.9   CALCIUM 8.3* 7.9*   PROT 6.7 6.7    ALBUMIN 1.2* 1.2*   BILITOT 0.2 0.2   ALKPHOS 306* 273*   AST 19 19   ALT 13 13   ANIONGAP 7* 8   EGFRNONAA >60 >60       Significant Imaging: I have reviewed all pertinent imaging results/findings within the past 24 hours.

## 2022-01-23 NOTE — PLAN OF CARE
Problem: Adult Inpatient Plan of Care  Goal: Plan of Care Review  Outcome: Ongoing, Progressing     Pt A&Ox1 (disoriented to time, place, and situation), free from falls/injury, and unable to make needs known during shift. VSS on RA. Medications and fluids continued per orders. Pt had no c/o pain during shift. Sahu catheter in place draining clear yellow urine. PICC line flushed per orders and labs drawn from PICC line. Telemetry monitoring continued on box #[id number], visible and audible on monitor at nurse's station. Wound care to be completed per orders. Blood glucose monitoring continued per orders. Telesitter at bedside. No acute distress noted. Bed locked and in lowest position. Bedside table and call light in reach. Will cont to monitor.

## 2022-01-23 NOTE — SUBJECTIVE & OBJECTIVE
Interval History: no acute distress      Review of Systems   Unable to perform ROS: Mental status change     Objective:     Temp:  [96.2 °F (35.7 °C)-98.7 °F (37.1 °C)] 96.5 °F (35.8 °C)  Pulse:  [61-68] 68  Resp:  [18-22] 20  SpO2:  [91 %-97 %] 97 %  BP: (119-140)/(70-83) 119/83     Body mass index is 19.92 kg/m².    Date 01/23/22 0700 - 01/24/22 0659   Shift 4836-8297 5785-1294 1370-6487 24 Hour Total   INTAKE   P.O. 120   120   Shift Total(mL/kg) 120(2)   120(2)   OUTPUT   Shift Total(mL/kg)       Weight (kg) 61.2 61.2 61.2 61.2          Drains     Drain                 Urethral Catheter 01/05/22 1050 Double-lumen 20 Fr. 18 days                Physical Exam  Constitutional:       General: He is not in acute distress.     Appearance: He is well-developed. He is not ill-appearing, toxic-appearing or diaphoretic.   HENT:      Head: Normocephalic and atraumatic.      Mouth/Throat:      Mouth: Mucous membranes are moist.   Eyes:      Conjunctiva/sclera: Conjunctivae normal.   Pulmonary:      Effort: Pulmonary effort is normal. No respiratory distress.   Abdominal:      General: Abdomen is flat. There is no distension.      Palpations: Abdomen is soft.      Tenderness: There is no abdominal tenderness.   Genitourinary:     Comments: Wound edges viable  Health appearing granulation tissue  No Dressing in place  No palpable crepitus  R groin induration vs contracted muscle    No additional purulence expressed from penis at this time  Sahu with CYU,  Sahu not secured with stat lock- RN notified    Musculoskeletal:         General: No swelling or deformity.      Cervical back: Neck supple.   Skin:     General: Skin is warm.      Findings: No rash.   Neurological:      Mental Status: He is disoriented.   Psychiatric:      Comments: Answers some questions, easily disoriented         Significant Labs:    BMP:  Recent Labs   Lab 01/21/22  0818 01/22/22  0446 01/23/22  0413    142 140   K 4.3 4.3 3.9    106 105    CO2 27 29 27   BUN 5* 5* 6   CREATININE 0.9 0.9 0.9   CALCIUM 8.3* 8.3* 7.9*       CBC:   Recent Labs   Lab 01/21/22  0441 01/22/22  0446 01/23/22  0413   WBC 9.18 9.11 8.89   HGB 10.7* 11.4* 10.9*   HCT 32.7* 34.6* 33.6*   PLT 50* 50* 56*

## 2022-01-23 NOTE — PROGRESS NOTES
Bed alarm went off, found pt with top half of his body in the bed and his knees on the floor between the side rails. [employer] did not alarm. Bed alarm was on zone 2 and did not go off until pt was already in this position. Pt assisted back to bed, assessment completed and no injuries noted, VSS on RA, pt states he is not in any pain. Reiterated to pt that he needs to stay in bed and call for assistance when he wants to get up. [employer] in use, bed alarm reset, and proper documentation completed. Charge RN, house supervisor, and MD notified. Pt in no distress. Will cont to monitor.

## 2022-01-23 NOTE — PROGRESS NOTES
[facility]  Urology  Progress Note    Patient Name: Abhishek Zhao  MRN: [id number]  Admission Date: 1/4/2022  Hospital Length of Stay: 18 days  Code Status: Full Code   Attending Provider: Quinten Rosario MD   Primary Care Physician: To Obtain Unable    Subjective:     HPI:  Scrotal Swelling  Abhishek Zhao is a 61 y.o. male who was been experiencing scrotal pain and swelling since 12/31/2021.  He denies any fever.  He has had issues voiding since the swelling began.  Hemostat having issues in the past with urinary problems.  He denies having any previous issues with scrotal infection or swelling.  He recalls that he had procedures in the past but cannot recall specifically what to place.  He does not have urologist locally but moved back from [place] about 1 year ago.    Review of care everywhere shows that he had a cystoscopy with urethral dilation of a urethral stricture in the bulbar urethra in 2019 at [facility].  And there are reports that in approximately 2015 [facility].      Interval History: no acute distress      Review of Systems   Unable to perform ROS: Mental status change     Objective:     Temp:  [96.2 °F (35.7 °C)-98.7 °F (37.1 °C)] 96.5 °F (35.8 °C)  Pulse:  [61-68] 68  Resp:  [18-22] 20  SpO2:  [91 %-97 %] 97 %  BP: (119-140)/(70-83) 119/83     Body mass index is 19.92 kg/m².    Date 01/23/22 0700 - 01/24/22 0659   Shift 7668-8020 4364-3337 6652-1097 24 Hour Total   INTAKE   P.O. 120   120   Shift Total(mL/kg) 120(2)   120(2)   OUTPUT   Shift Total(mL/kg)       Weight (kg) 61.2 61.2 61.2 61.2          Drains     Drain                 Urethral Catheter 01/05/22 1050 Double-lumen 20 Fr. 18 days                Physical Exam  Constitutional:       General: He is not in acute distress.     Appearance: He is well-developed. He is not ill-appearing, toxic-appearing or diaphoretic.   HENT:      Head: Normocephalic and atraumatic.      Mouth/Throat:       Mouth: Mucous membranes are moist.   Eyes:      Conjunctiva/sclera: Conjunctivae normal.   Pulmonary:      Effort: Pulmonary effort is normal. No respiratory distress.   Abdominal:      General: Abdomen is flat. There is no distension.      Palpations: Abdomen is soft.      Tenderness: There is no abdominal tenderness.   Genitourinary:     Comments: Wound edges viable  Health appearing granulation tissue  No Dressing in place  No palpable crepitus  R groin induration vs contracted muscle    No additional purulence expressed from penis at this time  Santiago with CYU,  Santiago not secured with stat lock- RN notified    Musculoskeletal:         General: No swelling or deformity.      Cervical back: Neck supple.   Skin:     General: Skin is warm.      Findings: No rash.   Neurological:      Mental Status: He is disoriented.   Psychiatric:      Comments: Answers some questions, easily disoriented         Significant Labs:    BMP:  Recent Labs   Lab 01/21/22  0818 01/22/22  0446 01/23/22  0413    142 140   K 4.3 4.3 3.9    106 105   CO2 27 29 27   BUN 5* 5* 6   CREATININE 0.9 0.9 0.9   CALCIUM 8.3* 8.3* 7.9*       CBC:   Recent Labs   Lab 01/21/22  0441 01/22/22  0446 01/23/22  0413   WBC 9.18 9.11 8.89   HGB 10.7* 11.4* 10.9*   HCT 32.7* 34.6* 33.6*   PLT 50* 50* 56*                   Assessment/Plan:     * Scrotal abscess  s/p cystoscopy with santiago placement and debridement of abscess/necrosis of scrotum 1/5/22 with Dr. Rangel.  Fluid collection drained at bedside 1/14/2022  CT from 1/18/22 w/ R corporal body fluid collection concerning for residual abscess vs fluid collection  Additional purulence expressed from penis on 1/20/21    Continue abx per primary  Cultures growing Candida and E coli  Appreciate wound care RN and floor RN dressing changes  Leukocytosis remains resolved  Afebrile  Will continue to monitor condition    Interventional Radiology, via note from Dr. Ingram,  unable to aspirate fluid  collection contained in corporal body/ base of penis. Given amount of purulence expressed from penile urethra, small size of fluid collection, resolution of leukocytosis, pneumonia, altered mental status, etc. At this time serial recommend imaging and observation.     Image guided percutaneous access would be favorable and limit risk compared to surgical exploration given contained nature of residual fluid collections, if this is available at main campus, this should be explored especially as patient will need plastic surgery evaluation.             Berna's gangrene   - s/p debridement 1/5/22        VTE Risk Mitigation (From admission, onward)         Ordered     enoxaparin injection 40 mg  Daily         01/13/22 1612     IP VTE LOW RISK PATIENT  Once         01/04/22 2241     Place sequential compression device  Until discontinued         01/04/22 2241                Melinda Mitchell MD  Urology  [facility]

## 2022-01-23 NOTE — PLAN OF CARE
Problem: Adult Inpatient Plan of Care  Goal: Plan of Care Review  Outcome: Ongoing, Progressing  Flowsheets (Taken 1/23/2022 0815)  Plan of Care Reviewed With: patient  Goal: Patient-Specific Goal (Individualized)  Outcome: Ongoing, Progressing     Problem: Diabetes Comorbidity  Goal: Blood Glucose Level Within Targeted Range  Outcome: Ongoing, Progressing  Intervention: Monitor and Manage Glycemia  Flowsheets (Taken 1/23/2022 1505)  Glycemic Management:   blood glucose monitored   carbohydrate replacement provided   oral hydration promoted     Problem: Adult Inpatient Plan of Care  Goal: Plan of Care Review  Outcome: Ongoing, Progressing  Flowsheets (Taken 1/23/2022 0815)  Plan of Care Reviewed With: patient     Problem: Adult Inpatient Plan of Care  Goal: Plan of Care Review  Outcome: Ongoing, Progressing  Flowsheets (Taken 1/23/2022 0815)  Plan of Care Reviewed With: patient     Problem: Adult Inpatient Plan of Care  Goal: Patient-Specific Goal (Individualized)  Outcome: Ongoing, Progressing     Problem: Adult Inpatient Plan of Care  Goal: Patient-Specific Goal (Individualized)  Outcome: Ongoing, Progressing     Problem: Diabetes Comorbidity  Goal: Blood Glucose Level Within Targeted Range  Outcome: Ongoing, Progressing  Intervention: Monitor and Manage Glycemia  Flowsheets (Taken 1/23/2022 1505)  Glycemic Management:   blood glucose monitored   carbohydrate replacement provided   oral hydration promoted     Problem: Diabetes Comorbidity  Goal: Blood Glucose Level Within Targeted Range  Outcome: Ongoing, Progressing     Problem: Diabetes Comorbidity  Goal: Blood Glucose Level Within Targeted Range  Intervention: Monitor and Manage Glycemia  Flowsheets (Taken 1/23/2022 1505)  Glycemic Management:   blood glucose monitored   carbohydrate replacement provided   oral hydration promoted     Problem: Diabetes Comorbidity  Goal: Blood Glucose Level Within Targeted Range  Intervention: Monitor and Manage  Glycemia  Flowsheets (Taken 1/23/2022 1505)  Glycemic Management:   blood glucose monitored   carbohydrate replacement provided   oral hydration promoted

## 2022-01-23 NOTE — PROGRESS NOTES
[facility] Medicine  Progress Note    Patient Name: Abhishek Zhao  MRN: [id number]  Patient Class: IP- Inpatient   Admission Date: 1/4/2022  Length of Stay: 18 days  Attending Physician: Quinten Rosario MD  Primary Care Provider: To Obtain Unable        Subjective:     Principal Problem:Scrotal abscess        HPI:  61 y.o. male with adjustment disorder with depressed mood, chronic ischemic heart disease, cocaine dependence in remission, cardiomegaly, HLD, HTN, swizure disorder, tobacco use, and DM 2 presents with a complaint of testicular pain.  Associated with swelling and redness to the scrotum and penis along with difficulty urinating, pain is rated as severe and radiates to the rectum.  Denies fever, chills, cough, SOB, chest pain, n/v/d.  In the ED, he was found to be tachypneic, with leukocytosis and elevated lactic.  CT and US shows evidence of multiple scrotal and perineal abscesses with some extension into the penile shaft.  UA with evidence of infection.  Sepsis treatment initiated, blood and urine cultures obtained, IV fluids and IV antibiotics initiated.  Urology consulted.      Overview/Hospital Course:  61 y.o. male with adjustment disorder with depressed mood, chronic ischemic heart disease, cocaine dependence in remission, cardiomegaly, HLD, HTN, swizure disorder, tobacco use, and DM 2 presents with a complaint of testicular pain, found to have multiple scrotal and perineal abscesses. Placed on broad spectrum antibiotics, underwent I&D with urology.    Pt w/ aspiration event 1/11 w/ subsequent desaturation. Respiratory culture grew pseudomonas. Kept on Zosyn. On 1/13 pt had abrupt reduction in responsiveness. Code stroke called, patient transferred to ICU. No stroke found on imaging. Started on extended EEG. Mental status improved, patient stepped back down to the floor.     His mental status improved slowly for a few days, but worsened again 1/17.   He was altered,was  not speaking,head CT show no acute process,EEG,per neurology show no seizure activity,todayb is much alert,appear to be delirious,will monitor.much more oriented today.  Consulted IR for drainage of residual fluid  collection on scrotum,CT pelvic.IR procedure is not done,duo to high risk for complications,will continue with IV Abx and repeat CT in AM.  CC today is weakness.      Interval History: CC today is weakness.    Review of Systems   Constitutional: Positive for activity change.   Respiratory: Negative for apnea and choking.    Gastrointestinal: Negative for abdominal distention and abdominal pain.   Musculoskeletal: Negative for back pain.   Neurological: Positive for weakness.     Objective:     Vital Signs (Most Recent):  Temp: 96.4 °F (35.8 °C) (01/23/22 0746)  Pulse: 67 (01/23/22 0746)  Resp: 20 (01/23/22 0405)  BP: (!) 140/83 (01/23/22 0746)  SpO2: (!) 91 % (01/23/22 0746) Vital Signs (24h Range):  Temp:  [96.2 °F (35.7 °C)-98.7 °F (37.1 °C)] 96.4 °F (35.8 °C)  Pulse:  [56-67] 67  Resp:  [16-22] 20  SpO2:  [91 %-100 %] 91 %  BP: (120-140)/(70-89) 140/83     Weight: 61.2 kg (134 lb 14.7 oz)  Body mass index is 19.92 kg/m².    Intake/Output Summary (Last 24 hours) at 1/23/2022 0949  Last data filed at 1/23/2022 0600  Gross per 24 hour   Intake 220 ml   Output 2850 ml   Net -2630 ml      Physical Exam  Cardiovascular:      Rate and Rhythm: Normal rate and regular rhythm.   Pulmonary:      Effort: No respiratory distress.      Breath sounds: No wheezing.   Abdominal:      General: There is no distension.      Tenderness: There is no abdominal tenderness.   Genitourinary:     Comments: Dressing on scrotal area,  Musculoskeletal:         General: No swelling or deformity.   Neurological:      General: No focal deficit present.      Mental Status: He is alert.         Significant Labs:   All pertinent labs within the past 24 hours have been reviewed.  BMP:   Recent Labs   Lab 01/23/22  0413   *       K 3.9      CO2 27   BUN 6   CREATININE 0.9   CALCIUM 7.9*     CBC:   Recent Labs   Lab 01/22/22  0446 01/23/22  0413   WBC 9.11 8.89   HGB 11.4* 10.9*   HCT 34.6* 33.6*   PLT 50* 56*     CMP:   Recent Labs   Lab 01/22/22  0446 01/23/22  0413    140   K 4.3 3.9    105   CO2 29 27   GLU 97 163*   BUN 5* 6   CREATININE 0.9 0.9   CALCIUM 8.3* 7.9*   PROT 6.7 6.7   ALBUMIN 1.2* 1.2*   BILITOT 0.2 0.2   ALKPHOS 306* 273*   AST 19 19   ALT 13 13   ANIONGAP 7* 8   EGFRNONAA >60 >60       Significant Imaging: I have reviewed all pertinent imaging results/findings within the past 24 hours.      Assessment/Plan:      * Scrotal abscess  Multiple abscesses c/w Berna's gangrene, s/p I&D by urology on 1/5. Cultures growing ampicillin-resistant Ecoli and C albicans. Repeat CT A/P 1/13 w/ 4cm fluid collection c/f persistent abscess which was drained bedside by urology  - urology following  - cont zosyn (re-started for HCAP)  - further ID recs pending  - repeat CT pelvis.  Consulted IR for drainage of residual fluid  collection on CT pelvic. IR procedure is not done,duo to high risk for complications,will continue with IV Abx and repeat CT in AM.          Alkaline phosphatase elevation  Chronic isolated alk phos (GGT elevated), worsened this admission. Possibly due to AEDs vs occult HPB process.   - further workup depending on clinical course      Pericardial effusion  Small pericardial effusion of unclear etiology      Encephalopathy, metabolic  CT head and EEG without acute process; likely delirium secondary to infection and prolonged hospitalization. Code stroke called the night of 1/13, however no focal neuro findings, CT head and MRI without acute findings  - neurology consulted  - delirium precautions  - EEG pending  - tx of infection as noted above.   He was altered,was not speaking,head CT show no acute process,EEG,per neurology show nos eizure activity,todayb is much alert,appear to be delirious,will  monitor.  Much improved.    HCAP (healthcare-associated pneumonia)  - see respiratory failure       Acute respiratory failure with hypoxia and hypercarbia  Cough + new LLL opacity on imaging c/f pneumonia, however CT imaging showing pleural effusion and atelectasis  - respiratory cultures w/ pseudomonas  - cont zosyn  - furosemide as tolerated; no significant evidence of cardiac dysfunction on TTE  - IS        Hypokalemia  Replete PRN    JOI (acute kidney injury)  Resolved      Sepsis due to Gram negative bacteria  Resolved      Berna's gangrene  As above    Diabetes mellitus type 2, controlled  Check a1c  Hold oral antihyperglycemics while inpatient  PRN sliding scale insulin  ACHS glucose monitoring   ADA diet     Tobacco user  5 minutes spent counseling the patient on smoking cessation and he is not currently ready to stop smoking. He will be offereded a nicotine transdermal patch while hospitalized and monitored for withdrawal.  Will provide additional smoking cessation counseling prior to discharge.     Acute encephalopathy  Repeated episodes of AMS c/f seizures w/ post ictal period  - neurology consulted  - AEDs per neurology  - EEG no seizure.  Much improved.      Essential hypertension  Well controlled, continue home medications and monitor blood pressure, adjust as needed.     Hyperlipidemia  Continue statin    Cardiomegaly  Pulmonary edema seen on imaging, small pericardial effusion seen on TTE. LVEF low-normal (50%)  - hold diuresis while NPO    Cocaine dependence in remission  Counseled     Chronic ischemic heart disease  No acute issue    Adjustment disorder with depressed mood  Continue home citalopram, hold home seroquel due to somnolence      VTE Risk Mitigation (From admission, onward)         Ordered     enoxaparin injection 40 mg  Daily         01/13/22 1612     IP VTE LOW RISK PATIENT  Once         01/04/22 2241     Place sequential compression device  Until discontinued         01/04/22 2241                 Discharge Planning   CHUCK:      Code Status: Full Code   Is the patient medically ready for discharge?:     Reason for patient still in hospital (select all that apply): Patient trending condition  Discharge Plan A: Skilled Nursing Facility   Discharge Delays: (!) Post-Acute Set-up              Quinten Rosario MD  Department of Hospital Medicine   [facility]

## 2022-01-23 NOTE — ASSESSMENT & PLAN NOTE
s/p cystoscopy with santiago placement and debridement of abscess/necrosis of scrotum 1/5/22 with Dr. Rangel.  Fluid collection drained at bedside 1/14/2022  CT from 1/18/22 w/ R corporal body fluid collection concerning for residual abscess vs fluid collection  Additional purulence expressed from penis on 1/20/21    Continue abx per primary  Cultures growing Candida and E coli  Appreciate wound care RN and floor RN dressing changes  Leukocytosis remains resolved  Afebrile  Will continue to monitor condition    Interventional Radiology, via note from Dr. Ingram,  unable to aspirate fluid collection contained in corporal body/ base of penis. Given amount of purulence expressed from penile urethra, small size of fluid collection, resolution of leukocytosis, pneumonia, altered mental status, etc. At this time serial recommend imaging and observation.     Image guided percutaneous access would be favorable and limit risk compared to surgical exploration given contained nature of residual fluid collections, if this is available at main campus, this should be explored especially as patient will need plastic surgery evaluation.

## 2022-01-24 LAB
BASOPHILS # BLD AUTO: 0.05 K/UL (ref 0–0.2)
BASOPHILS NFR BLD: 0.6 % (ref 0–1.9)
DIFFERENTIAL METHOD: ABNORMAL
EOSINOPHIL # BLD AUTO: 0 K/UL (ref 0–0.5)
EOSINOPHIL NFR BLD: 0.5 % (ref 0–8)
ERYTHROCYTE [DISTWIDTH] IN BLOOD BY AUTOMATED COUNT: 13.4 % (ref 11.5–14.5)
HCT VFR BLD AUTO: 33.1 % (ref 40–54)
HGB BLD-MCNC: 10.8 G/DL (ref 14–18)
IMM GRANULOCYTES # BLD AUTO: 0.14 K/UL (ref 0–0.04)
IMM GRANULOCYTES NFR BLD AUTO: 1.6 % (ref 0–0.5)
LACTATE SERPL-SCNC: 0.8 MMOL/L (ref 0.5–2.2)
LYMPHOCYTES # BLD AUTO: 1.7 K/UL (ref 1–4.8)
LYMPHOCYTES NFR BLD: 19.5 % (ref 18–48)
MCH RBC QN AUTO: 28.3 PG (ref 27–31)
MCHC RBC AUTO-ENTMCNC: 32.6 G/DL (ref 32–36)
MCV RBC AUTO: 87 FL (ref 82–98)
MONOCYTES # BLD AUTO: 0.4 K/UL (ref 0.3–1)
MONOCYTES NFR BLD: 4.6 % (ref 4–15)
NEUTROPHILS # BLD AUTO: 6.5 K/UL (ref 1.8–7.7)
NEUTROPHILS NFR BLD: 73.2 % (ref 38–73)
NRBC BLD-RTO: 0 /100 WBC
PLATELET # BLD AUTO: 63 K/UL (ref 150–450)
PMV BLD AUTO: 11.3 FL (ref 9.2–12.9)
POCT GLUCOSE: 129 MG/DL (ref 70–110)
POCT GLUCOSE: 130 MG/DL (ref 70–110)
POCT GLUCOSE: 134 MG/DL (ref 70–110)
POCT GLUCOSE: 55 MG/DL (ref 70–110)
POCT GLUCOSE: 62 MG/DL (ref 70–110)
POCT GLUCOSE: 65 MG/DL (ref 70–110)
POCT GLUCOSE: 73 MG/DL (ref 70–110)
POCT GLUCOSE: 74 MG/DL (ref 70–110)
POCT GLUCOSE: 85 MG/DL (ref 70–110)
POCT GLUCOSE: 94 MG/DL (ref 70–110)
RBC # BLD AUTO: 3.81 M/UL (ref 4.6–6.2)
T4 FREE SERPL-MCNC: 0.87 NG/DL (ref 0.71–1.51)
TSH SERPL DL<=0.005 MIU/L-ACNC: 7.94 UIU/ML (ref 0.4–4)
WBC # BLD AUTO: 8.83 K/UL (ref 3.9–12.7)

## 2022-01-24 PROCEDURE — 83605 ASSAY OF LACTIC ACID: CPT | Performed by: STUDENT IN AN ORGANIZED HEALTH CARE EDUCATION/TRAINING PROGRAM

## 2022-01-24 PROCEDURE — C9254 INJECTION, LACOSAMIDE: HCPCS | Performed by: STUDENT IN AN ORGANIZED HEALTH CARE EDUCATION/TRAINING PROGRAM

## 2022-01-24 PROCEDURE — 63600175 PHARM REV CODE 636 W HCPCS: Performed by: STUDENT IN AN ORGANIZED HEALTH CARE EDUCATION/TRAINING PROGRAM

## 2022-01-24 PROCEDURE — A4216 STERILE WATER/SALINE, 10 ML: HCPCS | Performed by: STUDENT IN AN ORGANIZED HEALTH CARE EDUCATION/TRAINING PROGRAM

## 2022-01-24 PROCEDURE — 25000003 PHARM REV CODE 250: Performed by: STUDENT IN AN ORGANIZED HEALTH CARE EDUCATION/TRAINING PROGRAM

## 2022-01-24 PROCEDURE — 25500020 PHARM REV CODE 255: Performed by: HOSPITALIST

## 2022-01-24 PROCEDURE — 84439 ASSAY OF FREE THYROXINE: CPT | Performed by: STUDENT IN AN ORGANIZED HEALTH CARE EDUCATION/TRAINING PROGRAM

## 2022-01-24 PROCEDURE — 85025 COMPLETE CBC W/AUTO DIFF WBC: CPT | Performed by: STUDENT IN AN ORGANIZED HEALTH CARE EDUCATION/TRAINING PROGRAM

## 2022-01-24 PROCEDURE — 99233 SBSQ HOSP IP/OBS HIGH 50: CPT | Mod: ,,, | Performed by: UROLOGY

## 2022-01-24 PROCEDURE — 63600175 PHARM REV CODE 636 W HCPCS: Performed by: HOSPITALIST

## 2022-01-24 PROCEDURE — 99232 SBSQ HOSP IP/OBS MODERATE 35: CPT | Mod: ,,, | Performed by: PSYCHIATRY & NEUROLOGY

## 2022-01-24 PROCEDURE — 25000003 PHARM REV CODE 250: Performed by: HOSPITALIST

## 2022-01-24 PROCEDURE — 11000001 HC ACUTE MED/SURG PRIVATE ROOM

## 2022-01-24 PROCEDURE — 99232 PR SUBSEQUENT HOSPITAL CARE,LEVL II: ICD-10-PCS | Mod: ,,, | Performed by: PSYCHIATRY & NEUROLOGY

## 2022-01-24 PROCEDURE — 84443 ASSAY THYROID STIM HORMONE: CPT | Performed by: STUDENT IN AN ORGANIZED HEALTH CARE EDUCATION/TRAINING PROGRAM

## 2022-01-24 PROCEDURE — 87040 BLOOD CULTURE FOR BACTERIA: CPT | Performed by: STUDENT IN AN ORGANIZED HEALTH CARE EDUCATION/TRAINING PROGRAM

## 2022-01-24 PROCEDURE — 99233 PR SUBSEQUENT HOSPITAL CARE,LEVL III: ICD-10-PCS | Mod: ,,, | Performed by: UROLOGY

## 2022-01-24 PROCEDURE — 92526 ORAL FUNCTION THERAPY: CPT

## 2022-01-24 RX ORDER — DEXTROSE MONOHYDRATE AND SODIUM CHLORIDE 5; .9 G/100ML; G/100ML
INJECTION, SOLUTION INTRAVENOUS CONTINUOUS
Status: DISCONTINUED | OUTPATIENT
Start: 2022-01-24 | End: 2022-01-25

## 2022-01-24 RX ORDER — DRONABINOL 2.5 MG/1
5 CAPSULE ORAL 2 TIMES DAILY
Status: DISCONTINUED | OUTPATIENT
Start: 2022-01-24 | End: 2022-01-25

## 2022-01-24 RX ADMIN — AMLODIPINE BESYLATE 10 MG: 5 TABLET ORAL at 08:01

## 2022-01-24 RX ADMIN — HYDRALAZINE HYDROCHLORIDE 50 MG: 25 TABLET, FILM COATED ORAL at 09:01

## 2022-01-24 RX ADMIN — DEXTROSE MONOHYDRATE 25 G: 25 INJECTION, SOLUTION INTRAVENOUS at 12:01

## 2022-01-24 RX ADMIN — Medication 10 ML: at 11:01

## 2022-01-24 RX ADMIN — Medication 10 ML: at 05:01

## 2022-01-24 RX ADMIN — DRONABINOL 5 MG: 2.5 CAPSULE ORAL at 11:01

## 2022-01-24 RX ADMIN — FLUCONAZOLE 400 MG: 2 INJECTION, SOLUTION INTRAVENOUS at 12:01

## 2022-01-24 RX ADMIN — Medication 6 MG: at 08:01

## 2022-01-24 RX ADMIN — HYDRALAZINE HYDROCHLORIDE 50 MG: 25 TABLET, FILM COATED ORAL at 06:01

## 2022-01-24 RX ADMIN — ENOXAPARIN SODIUM 40 MG: 40 INJECTION SUBCUTANEOUS at 05:01

## 2022-01-24 RX ADMIN — METOPROLOL SUCCINATE 50 MG: 50 TABLET, EXTENDED RELEASE ORAL at 08:01

## 2022-01-24 RX ADMIN — DRONABINOL 5 MG: 2.5 CAPSULE ORAL at 08:01

## 2022-01-24 RX ADMIN — PIPERACILLIN AND TAZOBACTAM 4.5 G: 4; .5 INJECTION, POWDER, LYOPHILIZED, FOR SOLUTION INTRAVENOUS; PARENTERAL at 05:01

## 2022-01-24 RX ADMIN — PIPERACILLIN AND TAZOBACTAM 4.5 G: 4; .5 INJECTION, POWDER, LYOPHILIZED, FOR SOLUTION INTRAVENOUS; PARENTERAL at 09:01

## 2022-01-24 RX ADMIN — Medication 10 ML: at 06:01

## 2022-01-24 RX ADMIN — TAMSULOSIN HYDROCHLORIDE 0.4 MG: 0.4 CAPSULE ORAL at 08:01

## 2022-01-24 RX ADMIN — DEXTROSE 500 MG: 50 INJECTION, SOLUTION INTRAVENOUS at 10:01

## 2022-01-24 RX ADMIN — DEXTROSE AND SODIUM CHLORIDE: 5; .9 INJECTION, SOLUTION INTRAVENOUS at 08:01

## 2022-01-24 RX ADMIN — DEXTROSE AND SODIUM CHLORIDE: 5; .9 INJECTION, SOLUTION INTRAVENOUS at 10:01

## 2022-01-24 RX ADMIN — POLYETHYLENE GLYCOL 3350 17 G: 17 POWDER, FOR SOLUTION ORAL at 08:01

## 2022-01-24 RX ADMIN — ATORVASTATIN CALCIUM 80 MG: 40 TABLET, FILM COATED ORAL at 08:01

## 2022-01-24 RX ADMIN — ASPIRIN 81 MG: 81 TABLET, COATED ORAL at 08:01

## 2022-01-24 RX ADMIN — PIPERACILLIN AND TAZOBACTAM 4.5 G: 4; .5 INJECTION, POWDER, LYOPHILIZED, FOR SOLUTION INTRAVENOUS; PARENTERAL at 01:01

## 2022-01-24 RX ADMIN — DEXTROSE 500 MG: 50 INJECTION, SOLUTION INTRAVENOUS at 09:01

## 2022-01-24 RX ADMIN — IOHEXOL 75 ML: 350 INJECTION, SOLUTION INTRAVENOUS at 06:01

## 2022-01-24 RX ADMIN — SODIUM CHLORIDE 200 MG: 9 INJECTION, SOLUTION INTRAVENOUS at 11:01

## 2022-01-24 NOTE — PLAN OF CARE
Problem: Adult Inpatient Plan of Care  Goal: Plan of Care Review  Outcome: Ongoing, Progressing     Pt A&Ox2 (disoriented to time and situation), free from falls/injury, and unable to make needs known during shift. VSS on 2L per NC. Medications  continued per orders. Pt had no c/o pain during shift. Sahu catheter in place draining clear yellow urine. PICC line flushed per orders and labs drawn from PICC line. Telemetry monitoring continued on box #[id number], visible and audible on monitor at nurse's station. Wound care to be completed per orders. Blood glucose monitoring continued per orders. PRN IV dextrose given once during shift for low blood sugar. Warming blanket in place for pt's low temp earlier in shift. Telesitter at bedside. No acute distress noted. Bed locked and in lowest position. Bedside table and call light in reach. Will cont to monitor.

## 2022-01-24 NOTE — PT/OT/SLP PROGRESS
Occupational Therapy      Patient Name:  Abhishek Zhao   MRN:  [id number]    Patient not seen today secondary to Other: low blood glucose in the ~70s. D/w nurse. pt had a significant event at 0200 this morning with glucose in the 60s and temp ~94F. Pt now off bear hugger and receiving dextrose. Will follow-up later as able.    1/24/2022

## 2022-01-24 NOTE — PROGRESS NOTES
[facility] called Katty Paula, Denied. Cannot accommodate.    Called Meir Hassan, No Answer and mailbox full. Unable to leave message.     Called Aamir Cotton, Not in contract with [facility].    Amadou, No answer.

## 2022-01-24 NOTE — SUBJECTIVE & OBJECTIVE
Interval History: NAEO.  Denies new complaints. Updated cultures were went 1/21 (by urology?) and NGTD.     Only positive culture from groin- e.coli and candida albicans.       1/22 ultrasound-   Complex collection which the technologist describes as located inferior to the right testicle measures 4.7 x 2.3 x 2.5 cm (previously 4 x 2.7 x 4 cm on prior ultrasound).  Mild increased vascularity in the surrounding soft tissues with no internal vascularity documented.     Diffuse scrotal wall edema.     Impression:     Persistent complex collection in the soft tissues inferior to the right testicle mildly decreased in size compared to prior ultrasound.  Considerations include phlegmon versus abscess.        Interventional Radiology unable to aspirate fluid collection.            Review of Systems   Constitutional: Negative for chills and fever.   Skin: Positive for wound.   All other systems reviewed and are negative.    Objective:     Vital Signs (Most Recent):  Temp: 97.5 °F (36.4 °C) (01/24/22 0956)  Pulse: 75 (01/24/22 0727)  Resp: (!) 26 (01/24/22 0956)  BP: 129/77 (01/24/22 0727)  SpO2: 96 % (01/24/22 0727) Vital Signs (24h Range):  Temp:  [94.8 °F (34.9 °C)-97.5 °F (36.4 °C)] 97.5 °F (36.4 °C)  Pulse:  [59-75] 75  Resp:  [17-32] 26  SpO2:  [89 %-100 %] 96 %  BP: (116-167)/(74-93) 129/77     Weight: 61.2 kg (134 lb 14.7 oz)  Body mass index is 19.92 kg/m².    Estimated Creatinine Clearance: 76.5 mL/min (based on SCr of 0.9 mg/dL).    Physical Exam  Vitals and nursing note reviewed.   Constitutional:       Appearance: He is not ill-appearing, toxic-appearing or diaphoretic.   HENT:      Head: Normocephalic and atraumatic.      Right Ear: External ear normal.      Left Ear: External ear normal.      Nose: Nose normal.   Eyes:      Extraocular Movements: Extraocular movements intact.      Conjunctiva/sclera: Conjunctivae normal.      Pupils: Pupils are equal, round, and reactive to light.   Cardiovascular:      Rate  and Rhythm: Normal rate and regular rhythm.   Pulmonary:      Effort: Pulmonary effort is normal.      Breath sounds: Normal breath sounds.   Abdominal:      Tenderness: There is no guarding or rebound.   Genitourinary:     Comments: dressed  Musculoskeletal:         General: No swelling or tenderness.   Neurological:      General: No focal deficit present.      Mental Status: He is alert.   Psychiatric:         Mood and Affect: Mood normal.         Behavior: Behavior normal.             Significant Labs:   Blood Culture:   Recent Labs   Lab 01/04/22  1744 01/10/22  1411   LABBLOO No Growth after 4 days.   No Growth after 4 days.  No Growth after 4 days.   No Growth after 4 days.      CBC:   Recent Labs   Lab 01/23/22  0413 01/24/22  0145   WBC 8.89 8.83   HGB 10.9* 10.8*   HCT 33.6* 33.1*   PLT 56* 63*     Respiratory Culture:   Recent Labs   Lab 01/10/22  1653   GSRESP <10 epithelial cells per low power field.  No WBC's  No organisms seen   RESPIRATORYC PSEUDOMONAS AERUGINOSA  Moderate  *     Urine Culture:   Recent Labs   Lab 01/04/22  1804   LABURIN CANDIDA ALBICANS  10,000 - 49,999 cfu/ml  Treatment of asymptomatic candiduria is not recommended (except for   specific populations). Candida isolated in the urine typically   represents colonization. If an indwelling urinary catheter is present  it should be removed or replaced.  *     Wound Culture:   Recent Labs   Lab 01/05/22  1055 01/21/22  1942   LABAERO ESCHERICHIA COLI  Moderate  *  YAMILET ALBICANS  Few  * No growth       Significant Imaging: I have reviewed all pertinent imaging results/findings within the past 24 hours.

## 2022-01-24 NOTE — PLAN OF CARE
Problem: SLP Goal  Goal: SLP Goal  Description: Goals:  1. Pt will tolerate puree diet/thin liquids w/o overt s/s of aspiration.   2. Pt will tolerate mech soft diet (IDDSI-5) with thin liquids w/o overt s/s of aspiration. (on hold 1/16/22, pending pt progress)  Outcome: Ongoing, Progressing    Pt with cont fluctuating alertness, which greatly impacts his ability to consume po intake safely. ST recs the pt ONLY consume meal trays if FULLY alert and awake, and able to follow commands. IF the pt is not fully alert and awake, please hold all meal trays. Meds given crushed in puree IF pt is fully alert and tolerating OR non-oral meds.

## 2022-01-24 NOTE — PROGRESS NOTES
"[facility]  Neurology  Progress Note    Patient Name: Abhishek Zhao  MRN: [id number]  Admission Date: 1/4/2022  Hospital Length of Stay: 19 days  Code Status: Full Code   Attending Provider: Quinten Rosario MD  Primary Care Physician: To Obtain Unable   Principal Problem:Scrotal abscess    Subjective:     Interval History:62 y/o male presented initially with a complaint of testicular pain. Associated with swelling and redness to the scrotum and penis. 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 Urology is following as well as ID. Today pt had an episode in which he became poorly responsive. He had a persistent cough with copious amounts of mucus. He has slowly recovered but is exhibiting "picking" behavior and is intermittently answering questions.       -1/24/22: Continues to have fluctuating level of awareness.    Current Neurological Medications:     Current Facility-Administered Medications   Medication Dose Route Frequency Provider Last Rate Last Admin    acetaminophen tablet 650 mg  650 mg Oral Q6H PRN Hugo Aviles MD   650 mg at 01/21/22 1000    albuterol-ipratropium 2.5 mg-0.5 mg/3 mL nebulizer solution 3 mL  3 mL Nebulization Q4H PRN Hugo Aviles MD   3 mL at 01/18/22 0119    albuterol-ipratropium 2.5 mg-0.5 mg/3 mL nebulizer solution 3 mL  3 mL Nebulization Q4H PRN Jolie Olivares MD        aluminum-magnesium hydroxide-simethicone 200-200-20 mg/5 mL suspension 30 mL  30 mL Oral QID PRN Hugo Aviles MD        Amino acid 4.25% - dextrose 5% (CLINIMIX-E) solution with additives (1L provides 42.5 gm AA, 50 gm CHO (170 kcal/L dextrose), Na 35, K 30, Mg 5, Ca 4.5, Acetate 70, Cl 39, Phos 15)   Intravenous Continuous Quinten Rosario MD        amLODIPine tablet 10 mg  10 mg Oral Daily Hugo Aviles MD   10 mg at 01/24/22 0808    aspirin EC tablet 81 mg  81 mg Oral Daily Hugo Aviles MD   81 mg at 01/24/22 0807    atorvastatin tablet 80 " mg  80 mg Oral Daily Hugo Aviles MD   80 mg at 01/24/22 0807    dextrose 5 % and 0.9 % NaCl infusion   Intravenous Continuous Quinten Rosario MD        dextrose 50% injection 12.5 g  12.5 g Intravenous PRN Hugo Aviles MD   12.5 g at 01/21/22 0900    dextrose 50% injection 25 g  25 g Intravenous PRN Hugo Aviles MD   25 g at 01/24/22 0058    enoxaparin injection 40 mg  40 mg Subcutaneous Daily Hugo Aviles MD   40 mg at 01/23/22 1635    fat emulsion 20% infusion 250 mL  250 mL Intravenous Daily Quinten Rosario MD        fluconazole (DIFLUCAN) IVPB 400 mg 200 mL  400 mg Intravenous Q24H Hugo Aviles MD   Stopped at 01/23/22 1345    glucagon (human recombinant) injection 1 mg  1 mg Intramuscular PRN Hugo Aviles MD        glucose chewable tablet 16 g  16 g Oral PRN Hugo Aviles MD   16 g at 01/21/22 1145    glucose chewable tablet 24 g  24 g Oral PRN Hugo Aviles MD        hydrALAZINE injection 10 mg  10 mg Intravenous Q6H PRN Hugo Aviles MD        hydrALAZINE tablet 50 mg  50 mg Oral Q8H Quinten Rosario MD   50 mg at 01/24/22 0606    influenza (QUADRIVALENT PF) vaccine 0.5 mL  0.5 mL Intramuscular vaccine x 1 dose Hugo Aviles MD        insulin aspart U-100 pen 0-5 Units  0-5 Units Subcutaneous Q6H PRN Hugo Aviles MD   4 Units at 01/19/22 1246    labetaloL injection 10 mg  10 mg Intravenous Q4H PRN Hugo Aviles MD        lacosamide (VIMPAT) 200 mg in sodium chloride 0.9% 100 mL IVPB  200 mg Intravenous Q12H Hugo Aviles MD   Stopped at 01/23/22 2320    LIDOcaine HCL 10 mg/ml (1%) injection 1 mL  1 mL Intradermal Once PRN Hugo Aviles MD        lorazepam injection 3 mg  3 mg Intravenous Once PRN Hugo Aviles MD        melatonin tablet 6 mg  6 mg Oral Nightly PRN Quinten Rosario MD        melatonin tablet 6 mg  6 mg Oral Nightly Quinten Rosario MD   6 mg at 01/23/22 2053    metoprolol succinate  (TOPROL-XL) 24 hr tablet 50 mg  50 mg Oral Daily Hugo Aviles MD   50 mg at 01/24/22 0808    naloxone 0.4 mg/mL injection 0.02 mg  0.02 mg Intravenous PRN Hugo Aviles MD        ondansetron injection 4 mg  4 mg Intravenous Q8H PRN Hugo Aviles MD   4 mg at 01/12/22 1219    piperacillin-tazobactam 4.5 g in dextrose 5 % 100 mL IVPB (ready to mix system)  4.5 g Intravenous Q8H Hugo Aviles MD   Stopped at 01/24/22 0554    polyethylene glycol packet 17 g  17 g Oral Daily Hugo Aviles MD   17 g at 01/24/22 0808    prochlorperazine injection Soln 5 mg  5 mg Intravenous Q6H PRN Hugo Aviles MD        simethicone chewable tablet 80 mg  1 tablet Oral QID PRN Hugo Aviles MD        sodium chloride 0.9% flush 10 mL  10 mL Intravenous Q6H Hugo Aviles MD   10 mL at 01/24/22 0606    And    sodium chloride 0.9% flush 10 mL  10 mL Intravenous PRN Hugo Aviles MD   10 mL at 01/22/22 1322    tamsulosin 24 hr capsule 0.4 mg  0.4 mg Oral Daily Hugo Aviles MD   0.4 mg at 01/23/22 0844    valproate (DEPACON) 500 mg in dextrose 5 % 50 mL IVPB  500 mg Intravenous Q12H Hugo Aviles MD   Stopped at 01/23/22 2252       Review of Systems   Unable to perform ROS: Mental status change    Objective:     Vital Signs (Most Recent):  Temp: 96.7 °F (35.9 °C) (01/24/22 0727)  Pulse: 75 (01/24/22 0727)  Resp: (!) 32 (01/24/22 0727)  BP: 129/77 (01/24/22 0727)  SpO2: 96 % (01/24/22 0727) Vital Signs (24h Range):  Temp:  [94.8 °F (34.9 °C)-97.1 °F (36.2 °C)] 96.7 °F (35.9 °C)  Pulse:  [59-75] 75  Resp:  [17-32] 32  SpO2:  [89 %-100 %] 96 %  BP: (116-167)/(74-93) 129/77     Weight: 61.2 kg (134 lb 14.7 oz)  Body mass index is 19.92 kg/m².    Physical Exam  Constitutional:       General: He is not in acute distress.  HENT:      Head: Normocephalic.      Right Ear: External ear normal.      Left Ear: External ear normal.   Eyes:      General:         Right eye: No discharge.         Left eye: No  discharge.   Cardiovascular:      Rate and Rhythm: Normal rate.   Pulmonary:      Effort: Pulmonary effort is normal.   Abdominal:      Palpations: Abdomen is soft.   Musculoskeletal:         General: No tenderness.      Cervical back: Neck supple.   Skin:     General: Skin is warm.   Psychiatric:         Speech: Speech is slurred.            NEUROLOGICAL EXAMINATION:      MENTAL STATUS   Speech: slurred  Level of consciousness: alert       Oriented to self, place     CRANIAL NERVES      CN III, IV, VI   Right pupil: Size: 2 mm. Shape: regular.   Left pupil: Size: 2 mm. Shape: regular.   Nystagmus: none   Conjugate gaze: present     CN VII   Right facial weakness: none  Left facial weakness: none     CN XII   Tongue deviation: none     MOTOR EXAM        AROM of UE's and LE's against gravity             Significant Labs:   CBC:   Recent Labs   Lab 01/23/22  0413 01/24/22  0145   WBC 8.89 8.83   HGB 10.9* 10.8*   HCT 33.6* 33.1*   PLT 56* 63*     CMP:   Recent Labs   Lab 01/23/22  0413   *      K 3.9      CO2 27   BUN 6   CREATININE 0.9   CALCIUM 7.9*   PROT 6.7   ALBUMIN 1.2*   BILITOT 0.2   ALKPHOS 273*   AST 19   ALT 13   ANIONGAP 8   EGFRNONAA >60       Significant Imaging: I have reviewed all pertinent imaging results/findings within the past 24 hours.    Assessment and Plan:     60 y/o male consulted for AMS     1. Encephalopathy: encephalopathy of unknown etiology (Infectious? Medication-induced? Hospital delirium?. Pt with no hypotension or major metabolic disturbances. Fluctuating level of awareness nad particiaption           -MRI brain showed no acute stroke. No signs of space-occupying lesion.           -Lamotrigine D/C'd in case is causing AMS.        -Palliative Care?     2. Hx of seizures: uncertain if pt having breakthrough events but so far none seen on EEG recording. No reported seizures overnight.           -VPA 500 mg BID.            -Lacosamide 200 mg  BID.           -D/C'd lamotrigine. If needed to resume then will start at low dose of lamotrigine 25 mg BID.    Palliative CAre       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] 01/06/2022 Yes    Adjustment disorder with depressed mood [F43.21] 01/04/2022 Yes     Chronic    Chronic ischemic heart disease [I25.9] 01/04/2022 Yes     Chronic    Cocaine dependence in remission [F14.21] 01/04/2022 Yes     Chronic    Cardiomegaly [I51.7] 01/04/2022 Yes     Chronic    Hyperlipidemia [E78.5] 01/04/2022 Yes     Chronic    Essential hypertension [I10] 01/04/2022 Yes     Chronic    Acute encephalopathy [G93.40] 01/04/2022 Yes    Tobacco user [Z72.0] 01/04/2022 Yes     Chronic    Diabetes mellitus type 2, controlled [E11.9] 08/03/2020 Yes     Chronic      Problems Resolved During this Admission:       VTE Risk Mitigation (From admission, onward)         Ordered     enoxaparin injection 40 mg  Daily         01/13/22 1612     IP VTE LOW RISK PATIENT  Once         01/04/22 2241     Place sequential compression device  Until discontinued         01/04/22 2241                Ashvin Giraldo MD  Neurology  [facility]

## 2022-01-24 NOTE — PROGRESS NOTES
[facility] called [facility], No Answer    Called [facility], No Answer    Called [facility], Spoke with Mi, left call back information for her to review and call back with answer.

## 2022-01-24 NOTE — PROGRESS NOTES
[facility] Medicine  Progress Note    Patient Name: Abhishek Zhao  MRN: [id number]  Patient Class: IP- Inpatient   Admission Date: 1/4/2022  Length of Stay: 19 days  Attending Physician: Quinten Rosario MD  Primary Care Provider: To Obtain Unable        Subjective:     Principal Problem:Scrotal abscess        HPI:  61 y.o. male with adjustment disorder with depressed mood, chronic ischemic heart disease, cocaine dependence in remission, cardiomegaly, HLD, HTN, swizure disorder, tobacco use, and DM 2 presents with a complaint of testicular pain.  Associated with swelling and redness to the scrotum and penis along with difficulty urinating, pain is rated as severe and radiates to the rectum.  Denies fever, chills, cough, SOB, chest pain, n/v/d.  In the ED, he was found to be tachypneic, with leukocytosis and elevated lactic.  CT and US shows evidence of multiple scrotal and perineal abscesses with some extension into the penile shaft.  UA with evidence of infection.  Sepsis treatment initiated, blood and urine cultures obtained, IV fluids and IV antibiotics initiated.  Urology consulted.      Overview/Hospital Course:  61 y.o. male with adjustment disorder with depressed mood, chronic ischemic heart disease, cocaine dependence in remission, cardiomegaly, HLD, HTN, swizure disorder, tobacco use, and DM 2 presents with a complaint of testicular pain, found to have multiple scrotal and perineal abscesses. Placed on broad spectrum antibiotics, underwent I&D with urology.    Pt w/ aspiration event 1/11 w/ subsequent desaturation. Respiratory culture grew pseudomonas. Kept on Zosyn. On 1/13 pt had abrupt reduction in responsiveness. Code stroke called, patient transferred to ICU. No stroke found on imaging. Started on extended EEG. Mental status improved, patient stepped back down to the floor.     His mental status improved slowly for a few days, but worsened again 1/17.   He was altered,was  not speaking,head CT show no acute process,EEG,per neurology show no seizure activity,todayb is much alert,appear to be delirious,will monitor.much more oriented today.  Consulted IR for drainage of residual fluid  collection on scrotum,CT pelvic.IR procedure is not done,duo to high risk for complications,will continue with IV Abx and repeat CT .spoke with Urology ,US show improvement in Abscess seize,no need for intervention at this time,continue with IV Abx.  Was hypoglycemic over night,back on D5 IVF,added appetizer and supplement.  CC today is weakness.      Interval History: CC today is weakness.    Review of Systems   Constitutional: Positive for activity change.   Respiratory: Negative for apnea and choking.    Gastrointestinal: Negative for abdominal distention and abdominal pain.   Musculoskeletal: Negative for back pain.   Neurological: Positive for weakness.     Objective:     Vital Signs (Most Recent):  Temp: 97.5 °F (36.4 °C) (01/24/22 0956)  Pulse: 75 (01/24/22 0727)  Resp: (!) 26 (01/24/22 0956)  BP: 129/77 (01/24/22 0727)  SpO2: 96 % (01/24/22 0727) Vital Signs (24h Range):  Temp:  [94.8 °F (34.9 °C)-97.5 °F (36.4 °C)] 97.5 °F (36.4 °C)  Pulse:  [59-75] 75  Resp:  [17-32] 26  SpO2:  [89 %-100 %] 96 %  BP: (116-167)/(74-93) 129/77     Weight: 61.2 kg (134 lb 14.7 oz)  Body mass index is 19.92 kg/m².    Intake/Output Summary (Last 24 hours) at 1/24/2022 1119  Last data filed at 1/24/2022 0500  Gross per 24 hour   Intake 710 ml   Output 1100 ml   Net -390 ml      Physical Exam  Cardiovascular:      Rate and Rhythm: Normal rate and regular rhythm.   Pulmonary:      Effort: No respiratory distress.      Breath sounds: No wheezing.   Abdominal:      General: There is no distension.      Tenderness: There is no abdominal tenderness.   Genitourinary:     Comments: Dressing on scrotal area,  Musculoskeletal:         General: No swelling or deformity.   Neurological:      General: No focal deficit present.       Mental Status: He is alert.         Significant Labs:   All pertinent labs within the past 24 hours have been reviewed.  BMP:   Recent Labs   Lab 01/23/22  0413   *      K 3.9      CO2 27   BUN 6   CREATININE 0.9   CALCIUM 7.9*     CBC:   Recent Labs   Lab 01/23/22  0413 01/24/22  0145   WBC 8.89 8.83   HGB 10.9* 10.8*   HCT 33.6* 33.1*   PLT 56* 63*     CMP:   Recent Labs   Lab 01/23/22  0413      K 3.9      CO2 27   *   BUN 6   CREATININE 0.9   CALCIUM 7.9*   PROT 6.7   ALBUMIN 1.2*   BILITOT 0.2   ALKPHOS 273*   AST 19   ALT 13   ANIONGAP 8   EGFRNONAA >60       Significant Imaging: I have reviewed all pertinent imaging results/findings within the past 24 hours.      Assessment/Plan:      * Scrotal abscess  Multiple abscesses c/w Berna's gangrene, s/p I&D by urology on 1/5. Cultures growing ampicillin-resistant Ecoli and C albicans. Repeat CT A/P 1/13 w/ 4cm fluid collection c/f persistent abscess which was drained bedside by urology  - urology following  - cont zosyn (re-started for HCAP)  - further ID recs pending  - repeat CT pelvis.  Consulted IR for drainage of residual fluid  collection on CT pelvic. IR procedure is not done,duo to high risk for complications,will continue with IV Abx and repeat CT,spoke with Urology ,US show improvement in Abscess seize,no need for intervention at this time,continue with IV Abx.          Alkaline phosphatase elevation  Chronic isolated alk phos (GGT elevated), worsened this admission. Possibly due to AEDs vs occult HPB process.   - further workup depending on clinical course      Pericardial effusion  Small pericardial effusion of unclear etiology      Encephalopathy, metabolic  CT head and EEG without acute process; likely delirium secondary to infection and prolonged hospitalization. Code stroke called the night of 1/13, however no focal neuro findings, CT head and MRI without acute findings  - neurology consulted  - delirium  precautions  - EEG pending  - tx of infection as noted above.   He was altered,was not speaking,head CT show no acute process,EEG,per neurology show nos eizure activity,todayb is much alert,appear to be delirious,will monitor.  Much improved.    HCAP (healthcare-associated pneumonia)  - see respiratory failure       Acute respiratory failure with hypoxia and hypercarbia  Cough + new LLL opacity on imaging c/f pneumonia, however CT imaging showing pleural effusion and atelectasis  - respiratory cultures w/ pseudomonas  - cont zosyn  - furosemide as tolerated; no significant evidence of cardiac dysfunction on TTE  - IS        Hypokalemia  Replete PRN    JOI (acute kidney injury)  Resolved      Sepsis due to Gram negative bacteria  Resolved      Berna's gangrene  As above    Diabetes mellitus type 2, controlled  Check a1c  Hold oral antihyperglycemics while inpatient  PRN sliding scale insulin  ACHS glucose monitoring   ADA diet     Tobacco user  5 minutes spent counseling the patient on smoking cessation and he is not currently ready to stop smoking. He will be offereded a nicotine transdermal patch while hospitalized and monitored for withdrawal.  Will provide additional smoking cessation counseling prior to discharge.     Acute encephalopathy  Repeated episodes of AMS c/f seizures w/ post ictal period  - neurology consulted  - AEDs per neurology  - EEG no seizure.  Much improved.  Was hypoglycemic over night,started on D % IVF,added appetizer and supplement,will monitor.      Essential hypertension  Well controlled, continue home medications and monitor blood pressure, adjust as needed.     Hyperlipidemia  Continue statin    Cardiomegaly  Pulmonary edema seen on imaging, small pericardial effusion seen on TTE. LVEF low-normal (50%)  - hold diuresis while NPO    Cocaine dependence in remission  Counseled     Chronic ischemic heart disease  No acute issue    Adjustment disorder with depressed mood  Continue home  citalopram, hold home seroquel due to somnolence      VTE Risk Mitigation (From admission, onward)         Ordered     enoxaparin injection 40 mg  Daily         01/13/22 1612     IP VTE LOW RISK PATIENT  Once         01/04/22 2241     Place sequential compression device  Until discontinued         01/04/22 2241                Discharge Planning   CHUCK:      Code Status: Full Code   Is the patient medically ready for discharge?:     Reason for patient still in hospital (select all that apply): Patient trending condition  Discharge Plan A: Skilled Nursing Facility   Discharge Delays: (!) Post-Acute Set-up              Quinten Rosario MD  Department of Hospital Medicine   [facility] [place]

## 2022-01-24 NOTE — CONSULTS
[facility] [place]  Infectious Disease  Consult Note    Patient Name: Abhishek Zhao  MRN: [id number]  Admission Date: 1/4/2022  Hospital Length of Stay: 19 days  Attending Physician: Quinten Rosario MD  Primary Care Provider: To Obtain Unable     Isolation Status: No active isolations    Patient information was obtained from patient and ER records.      Consults  Assessment/Plan:     * Scrotal abscess  61M with Berna's gangrene, s/p debridement, followed by Urology. E.coli and Candida albicans grew on culture (urine culture also grew Candida albicans). On Zosyn and fluconazole. Repeat US 1/22 with  4.7 x 2.3 x 2.5 cm residual abscess; draining spontaneously? IR can not aspirate.    Recommendations:  - continue antibiotics; fluconazole and zosyn reasonable while inpatient. On d/c, can de-escalate to PO fluconazole and PO cipro.   - duration of abx until radiographic resolution of abscess. Consider more definitive drainage of abscess if possible; would significantly shorten the duration of antibiotics  - repeat imaging ~2 weeks to reassess size of fluid collection  - if procedure done, please send updated surgical cultures  - please notify ID with any new growth on culture    Discussed with patient side effects of quinolones (including but not limited to qtc prolongation, hypoglycemia, achilles tendon rupture, worsening of aneurysms, c.diff) and these are acceptable risks and preferable to IV medication and risk of picc line complications. Qtc = 452. Avoid milk products within 2 hours     Follow-up appointment will be arranged by the ID clinic and will be found in the patient's appointments tab.     Prior to discharge, please ensure the patient's follow-up has been scheduled.     If there is still no follow-up scheduled prior to discharge, please send an EPIC message to Dulce Augustine in Infectious Diseases.                        Letty Kelley MD  Infectious Disease  [facility]  [place]    Subjective:     Principal Problem: Scrotal abscess    HPI: Abhishek Zhao is a 61-year-old male with HTN and seizures who presented to the ED with complaints of testicular pain and swelling for four days. At that time, he endorsed radiation of pain to rectum and difficulty urinating. He was originally planning on going to the ED before th [place] game but decided to wait until afterwards. In the ED, imaging showed a complex fluid collection(s).  He has a history of urethral stricture last dilated in 2019 at an outside facility ([facility]). The patient was admitted and started on empiric abx. 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 Post-operatively, the patient has improved with diminishing leukocytosis. Surgical cultures have grown E.coli and Candida, thus far. ID is consulted for Berna's gangrene.      Interval History: NAEO.  Denies new complaints. Updated cultures were went 1/21 (by urology?) and NGTD.     Only positive culture from groin- e.coli and candida albicans.       1/22 ultrasound-   Complex collection which the technologist describes as located inferior to the right testicle measures 4.7 x 2.3 x 2.5 cm (previously 4 x 2.7 x 4 cm on prior ultrasound).  Mild increased vascularity in the surrounding soft tissues with no internal vascularity documented.     Diffuse scrotal wall edema.     Impression:     Persistent complex collection in the soft tissues inferior to the right testicle mildly decreased in size compared to prior ultrasound.  Considerations include phlegmon versus abscess.        Interventional Radiology unable to aspirate fluid collection.            Review of Systems   Constitutional: Negative for chills and fever.   Skin: Positive for wound.   All other systems reviewed and are  negative.    Objective:     Vital Signs (Most Recent):  Temp: 97.5 °F (36.4 °C) (01/24/22 0956)  Pulse: 75 (01/24/22 0727)  Resp: (!) 26 (01/24/22 0956)  BP: 129/77 (01/24/22 0727)  SpO2: 96 % (01/24/22 0727) Vital Signs (24h Range):  Temp:  [94.8 °F (34.9 °C)-97.5 °F (36.4 °C)] 97.5 °F (36.4 °C)  Pulse:  [59-75] 75  Resp:  [17-32] 26  SpO2:  [89 %-100 %] 96 %  BP: (116-167)/(74-93) 129/77     Weight: 61.2 kg (134 lb 14.7 oz)  Body mass index is 19.92 kg/m².    Estimated Creatinine Clearance: 76.5 mL/min (based on SCr of 0.9 mg/dL).    Physical Exam  Vitals and nursing note reviewed.   Constitutional:       Appearance: He is not ill-appearing, toxic-appearing or diaphoretic.   HENT:      Head: Normocephalic and atraumatic.      Right Ear: External ear normal.      Left Ear: External ear normal.      Nose: Nose normal.   Eyes:      Extraocular Movements: Extraocular movements intact.      Conjunctiva/sclera: Conjunctivae normal.      Pupils: Pupils are equal, round, and reactive to light.   Cardiovascular:      Rate and Rhythm: Normal rate and regular rhythm.   Pulmonary:      Effort: Pulmonary effort is normal.      Breath sounds: Normal breath sounds.   Abdominal:      Tenderness: There is no guarding or rebound.   Genitourinary:     Comments: dressed  Musculoskeletal:         General: No swelling or tenderness.   Neurological:      General: No focal deficit present.      Mental Status: He is alert.   Psychiatric:         Mood and Affect: Mood normal.         Behavior: Behavior normal.             Significant Labs:   Blood Culture:   Recent Labs   Lab 01/04/22  1744 01/10/22  1411   LABBLOO No Growth after 4 days.   No Growth after 4 days.  No Growth after 4 days.   No Growth after 4 days.      CBC:   Recent Labs   Lab 01/23/22  0413 01/24/22  0145   WBC 8.89 8.83   HGB 10.9* 10.8*   HCT 33.6* 33.1*   PLT 56* 63*     Respiratory Culture:   Recent Labs   Lab 01/10/22  1653   GSRESP <10 epithelial cells per low  power field.  No WBC's  No organisms seen   RESPIRATORYC PSEUDOMONAS AERUGINOSA  Moderate  *     Urine Culture:   Recent Labs   Lab 01/04/22  1804   LABURIN CANDIDA ALBICANS  10,000 - 49,999 cfu/ml  Treatment of asymptomatic candiduria is not recommended (except for   specific populations). Candida isolated in the urine typically   represents colonization. If an indwelling urinary catheter is present  it should be removed or replaced.  *     Wound Culture:   Recent Labs   Lab 01/05/22  1055 01/21/22  1942   LABAERO ESCHERICHIA COLI  Moderate  *  YAMILET ALBICANS  Few  * No growth       Significant Imaging: I have reviewed all pertinent imaging results/findings within the past 24 hours.

## 2022-01-24 NOTE — SIGNIFICANT EVENT
Patient sluggish and slurred speech, called to bedside.  Glucose in 60's and temp ~94F.    On exam, once stimulated aggressively to wake up,      patient did not have CN deficits and able to move all limbs equally.   Appears tired. Cold. Heater not working appropriately.  Like combination of chronic illness, infection, heat not working, hypoglycemia and exhaustion/tired.       -Bear hugger  -Glucose IV, now wnl  -Repeat CT abscess per notes, ordered. ?repeat IR drainage per ID note.   -PPN to catch up nutrition   -Repeat blood cx, LA, WBC#, TSH  -Palliative care consult for C          Juan Rodriguez MD  Internal Medicine Staff

## 2022-01-24 NOTE — PROGRESS NOTES
[facility]  Urology  Progress Note    Patient Name: Abhishek Zhao  MRN: [id number]  Admission Date: 1/4/2022  Hospital Length of Stay: 19 days  Code Status: Full Code   Attending Provider: Quinten Rosario MD   Primary Care Physician: To Obtain Unable    Subjective:     HPI:  Scrotal Swelling  Abhishek Zhao is a 61 y.o. male who was been experiencing scrotal pain and swelling since 12/31/2021.  He denies any fever.  He has had issues voiding since the swelling began.  Hemostat having issues in the past with urinary problems.  He denies having any previous issues with scrotal infection or swelling.  He recalls that he had procedures in the past but cannot recall specifically what to place.  He does not have urologist locally but moved back from [place] about 1 year ago.    Review of care everywhere shows that he had a cystoscopy with urethral dilation of a urethral stricture in the bulbar urethra in 2019 at [facility].  And there are reports that in approximately 2015 [facility].      Interval History: Stool in diaper and in wound. Sahu draining.     Review of Systems   Unable to perform ROS: Mental status change     Objective:     Temp:  [94.8 °F (34.9 °C)-97.1 °F (36.2 °C)] 96.7 °F (35.9 °C)  Pulse:  [59-75] 75  Resp:  [17-32] 32  SpO2:  [89 %-100 %] 96 %  BP: (116-167)/(74-93) 129/77     Body mass index is 19.92 kg/m².           Drains     Drain                 Urethral Catheter 01/05/22 1050 Double-lumen 20 Fr. 18 days                Physical Exam  Constitutional:       General: He is not in acute distress.     Appearance: He is well-developed. He is not ill-appearing, toxic-appearing or diaphoretic.   HENT:      Head: Normocephalic and atraumatic.      Mouth/Throat:      Mouth: Mucous membranes are moist.   Eyes:      Conjunctiva/sclera: Conjunctivae normal.   Cardiovascular:      Rate and Rhythm: Normal rate.   Pulmonary:      Effort: No respiratory  distress.   Abdominal:      General: Abdomen is flat. There is no distension.      Palpations: Abdomen is soft.   Genitourinary:     Comments: No dressing on perineal wound in place. ++stool in wound.   Santiago catheter draining  No purulent drainage around catheter     Musculoskeletal:         General: No swelling or deformity.      Cervical back: Neck supple.   Skin:     Findings: No rash.   Neurological:      Mental Status: He is oriented to person, place, and time.      Gait: Gait normal.   Psychiatric:         Mood and Affect: Mood normal.         Thought Content: Thought content normal.         Judgment: Judgment normal.         Significant Labs:    BMP:  Recent Labs   Lab 01/21/22  0818 01/22/22  0446 01/23/22  0413    142 140   K 4.3 4.3 3.9    106 105   CO2 27 29 27   BUN 5* 5* 6   CREATININE 0.9 0.9 0.9   CALCIUM 8.3* 8.3* 7.9*       CBC:   Recent Labs   Lab 01/22/22  0446 01/23/22  0413 01/24/22  0145   WBC 9.11 8.89 8.83   HGB 11.4* 10.9* 10.8*   HCT 34.6* 33.6* 33.1*   PLT 50* 56* 63*       Blood Culture: No results for input(s): LABBLOO in the last 168 hours.  Urine Culture: No results for input(s): LABURIN in the last 168 hours.  Urine Studies: No results for input(s): COLORU, APPEARANCEUA, PHUR, SPECGRAV, PROTEINUA, GLUCUA, KETONESU, BILIRUBINUA, OCCULTUA, NITRITE, UROBILINOGEN, LEUKOCYTESUR, RBCUA, WBCUA, BACTERIA, SQUAMEPITHEL, HYALINECASTS in the last 168 hours.    Invalid input(s): WRIGHTSUR    Significant Imaging:  All pertinent imaging results/findings from the past 24 hours have been reviewed.            Assessment/Plan:     * Scrotal abscess  s/p cystoscopy with santiago placement and debridement of abscess/necrosis of scrotum 1/5/22 with Dr. Rangel.  Fluid collection drained at bedside 1/14/2022  CT from 1/18/22 w/ R corporal body fluid collection concerning for residual abscess vs fluid collection  Additional purulence expressed from penis on 1/20/21    Continue abx per  primary  Cultures growing Candida and E coli  Appreciate wound care RN and floor RN dressing changes  Leukocytosis remains resolved  Afebrile  Will continue to monitor condition    Interventional Radiology unable to aspirate fluid collection. May be able to be done at different campus if needed in future. At this time serial recommend imaging and observation. No need for intervention currently.    Will need plastic surgery evaluation in future once wound stable for possible grafting. Can be done as outpatient.             Berna's gangrene   - s/p debridement 1/5/22        VTE Risk Mitigation (From admission, onward)         Ordered     enoxaparin injection 40 mg  Daily         01/13/22 1612     IP VTE LOW RISK PATIENT  Once         01/04/22 2241     Place sequential compression device  Until discontinued         01/04/22 2241                Mary Booth MD  Urology  [facility] [place]

## 2022-01-24 NOTE — PROGRESS NOTES
[facility] called [facility], Can not get through    Called [facility], Don't accept [employer] [facility]    Called [facility], No answer and unable to leave a message.

## 2022-01-24 NOTE — ASSESSMENT & PLAN NOTE
Repeated episodes of AMS c/f seizures w/ post ictal period  - neurology consulted  - AEDs per neurology  - EEG no seizure.  Much improved.  Was hypoglycemic over night,started on D % IVF,added appetizer and supplement,will monitor.

## 2022-01-24 NOTE — ASSESSMENT & PLAN NOTE
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 Cultures growing ampicillin-resistant Ecoli and C albicans. Repeat CT A/P 1/13 w/ 4cm fluid collection c/f persistent abscess which was drained bedside by urology  - urology following  - cont zosyn (re-started for HCAP)  - further ID recs pending  - repeat CT pelvis.  Consulted IR for drainage of residual fluid  collection on CT pelvic. IR procedure is not done,duo to high risk for complications,will continue with IV Abx and repeat CT,spoke with Urology ,US show improvement in Abscess seize,no need for intervention at this time,continue with IV Abx.

## 2022-01-24 NOTE — ASSESSMENT & PLAN NOTE
s/p cystoscopy with santiago placement and debridement of abscess/necrosis of scrotum 1/5/22 with Dr. Rangel.  Fluid collection drained at bedside 1/14/2022  CT from 1/18/22 w/ R corporal body fluid collection concerning for residual abscess vs fluid collection  Additional purulence expressed from penis on 1/20/21    Continue abx per primary  Cultures growing Candida and E coli  Appreciate wound care RN and floor RN dressing changes  Leukocytosis remains resolved  Afebrile  Will continue to monitor condition    Interventional Radiology unable to aspirate fluid collection. May be able to be done at different campus if needed in future. At this time serial recommend imaging and observation. No need for intervention currently.    Will need plastic surgery evaluation in future once wound stable for possible grafting. Can be done as outpatient.

## 2022-01-24 NOTE — PT/OT/SLP PROGRESS
Speech Language Pathology Treatment    Patient Name:  Abhishek Zhao   MRN:  [id number]  Admitting Diagnosis: Scrotal abscess    Recommendations:                 General Recommendations:  Dysphagia therapy  Diet recommendations:  Puree, Liquid Diet Level: Thin   Aspiration Precautions: Assistance with meals, Feed only when awake/alert, HOB to 90 degrees, Meds crushed in puree, Remain upright 30 minutes post meal and Small bites/sips   General Precautions: Standard, pureed diet,aspiration  Communication strategies:  Provide increased time to answer     Subjective     Pt with cont fluctuating alertness throughout tx session. Pt with recent low glucose this AM (in the 60's), with temp at 94F. Pt is able to follow commands, however, he cont to require consistent cues to sustain alertness. Pt with sister and sitter present at b/s. Sitter notified ST that the pt consumed less than 25% of pureed tray, however, the pt consumed x1 cup of jello, and x1 cup of applesauce, as well as multiple sips of lemonade via straw. She states the pt had mild holding throughout meal, with inconsistent coughing noted.     Pain/Comfort:  · Pain Rating 1: 0/10    Respiratory Status: Nasal cannula, flow 2 L/min    Objective:     Has the patient been evaluated by SLP for swallowing?   Yes  Keep patient NPO? No   Current Respiratory Status:        The pt consumed the following pureed trials with thin liquids to cont to assess the pt's diet tolerance: x3 tsp bites of applesauce and x3 single-straw sips of water. No overt s/s of aspiration noted across trials, however, ST noted pt's holding of boluses in the oral cavity for increased periods of time. The pt required mod verbal cues to initiate swallows and clear all pureed residue from the oral cavity. ST deferred remaining po trials, 2/2 pt's cont dcr alertness.     Pt's sitter, and sister educated in regards to ST's cont rec for pureed diet and thin liquids at this time, as well as importance of pt  consuming diet ONLY if FULLY alert and awake, with trays being held if pt is not fully alert and awake. Both verbalized an understanding of all information presented.     Assessment:     ST recs the pt cont to consume current diet of puree and thin liquids ONLY if the pt if FULLY alert and awake. IF the pt is not fully alert and awake, ST recs pt's meal tray be held until the pt is at optimal alertness for po intake. Pt with cont oral motor weakness which impacts the pt's ability to safely consume po intake. ST will implement oral-motor exercises when pt is able to demo increased alertness. ST will cont to follow.     Goals:   Multidisciplinary Problems     SLP Goals        Problem: SLP Goal    Goal Priority Disciplines Outcome   SLP Goal    Medium SLP Ongoing, Progressing   Description: Goals:  1. Pt will tolerate puree diet/thin liquids w/o overt s/s of aspiration.   2. Pt will tolerate mech soft diet (IDDSI-5) with thin liquids w/o overt s/s of aspiration. (on hold 1/16/22, pending pt progress)                   Plan:     · Patient to be seen:  3 x/week   · Plan of Care expires:  01/29/22  · Plan of Care reviewed with:  patient   · SLP Follow-Up:  Yes       Discharge recommendations:  other (see comments) (TBD)   Barriers to Discharge:  None    Time Tracking:     SLP Treatment Date:   01/24/22  Speech Start Time:  1315  Speech Stop Time:  1327     Speech Total Time (min):  12 min    Billable Minutes: Treatment Swallowing Dysfunction 12    01/24/2022

## 2022-01-24 NOTE — PLAN OF CARE
Problem: Adult Inpatient Plan of Care  Goal: Plan of Care Review  1/24/2022 1731 by Addis Floyd RN  Outcome: Ongoing, Progressing  1/24/2022 1643 by Addis Floyd RN  Outcome: Ongoing, Progressing  Goal: Patient-Specific Goal (Individualized)  1/24/2022 1731 by Addis Floyd RN  Outcome: Ongoing, Progressing  1/24/2022 1643 by Addis Floyd RN  Outcome: Ongoing, Progressing  Goal: Absence of Hospital-Acquired Illness or Injury  1/24/2022 1731 by Addis Floyd RN  Outcome: Ongoing, Progressing  1/24/2022 1643 by Addis Floyd RN  Outcome: Ongoing, Progressing  Goal: Optimal Comfort and Wellbeing  1/24/2022 1731 by Addis Floyd RN  Outcome: Ongoing, Progressing  1/24/2022 1643 by Addis Floyd RN  Outcome: Ongoing, Progressing  Goal: Readiness for Transition of Care  1/24/2022 1731 by Addis Floyd RN  Outcome: Ongoing, Progressing  1/24/2022 1643 by Addis Floyd RN  Outcome: Ongoing, Progressing     Problem: Diabetes Comorbidity  Goal: Blood Glucose Level Within Targeted Range  1/24/2022 1731 by Addis Floyd RN  Outcome: Ongoing, Progressing  1/24/2022 1643 by Addis Floyd RN  Outcome: Ongoing, Progressing     Problem: Infection  Goal: Absence of Infection Signs and Symptoms  1/24/2022 1731 by Addis Floyd RN  Outcome: Ongoing, Progressing  1/24/2022 1643 by Addis Floyd RN  Outcome: Ongoing, Progressing     Problem: Adjustment to Illness (Sepsis/Septic Shock)  Goal: Optimal Coping  1/24/2022 1731 by Addis Floyd RN  Outcome: Ongoing, Progressing  1/24/2022 1643 by Addis Floyd RN  Outcome: Ongoing, Progressing     Problem: Bleeding (Sepsis/Septic Shock)  Goal: Absence of Bleeding  1/24/2022 1731 by Addis Floyd RN  Outcome: Ongoing, Progressing  1/24/2022 1643 by Tifanny A Everett, RN  Outcome: Ongoing, Progressing     Problem: Glycemic Control Impaired (Sepsis/Septic Shock)  Goal:  Blood Glucose Level Within Desired Range  1/24/2022 1731 by Addis Floyd RN  Outcome: Ongoing, Progressing  1/24/2022 1643 by Addis Floyd RN  Outcome: Ongoing, Progressing     Problem: Infection Progression (Sepsis/Septic Shock)  Goal: Absence of Infection Signs and Symptoms  1/24/2022 1731 by Addis Floyd RN  Outcome: Ongoing, Progressing  1/24/2022 1643 by Addis Floyd RN  Outcome: Ongoing, Progressing     Problem: Fall Injury Risk  Goal: Absence of Fall and Fall-Related Injury  1/24/2022 1731 by Addis Floyd RN  Outcome: Ongoing, Progressing  1/24/2022 1643 by Addis Floyd RN  Outcome: Ongoing, Progressing     Problem: Fluid and Electrolyte Imbalance (Acute Kidney Injury/Impairment)  Goal: Fluid and Electrolyte Balance  1/24/2022 1731 by Addis Floyd RN  Outcome: Ongoing, Progressing  1/24/2022 1643 by Addis Floyd RN  Outcome: Ongoing, Progressing     Problem: Renal Function Impairment (Acute Kidney Injury/Impairment)  Goal: Effective Renal Function  1/24/2022 1731 by Addis Floyd RN  Outcome: Ongoing, Progressing  1/24/2022 1643 by Addis Floyd RN  Outcome: Ongoing, Progressing     Problem: Fluid Imbalance (Pneumonia)  Goal: Fluid Balance  1/24/2022 1731 by Addis Floyd RN  Outcome: Ongoing, Progressing  1/24/2022 1643 by Addis Floyd RN  Outcome: Ongoing, Progressing     Problem: Infection (Pneumonia)  Goal: Resolution of Infection Signs and Symptoms  1/24/2022 1731 by Addis Floyd RN  Outcome: Ongoing, Progressing  1/24/2022 1643 by Addis Floyd RN  Outcome: Ongoing, Progressing     Problem: Respiratory Compromise (Pneumonia)  Goal: Effective Oxygenation and Ventilation  1/24/2022 1731 by Addis Floyd RN  Outcome: Ongoing, Progressing  1/24/2022 1643 by Addis Floyd RN  Outcome: Ongoing, Progressing     Problem: Skin Injury Risk Increased  Goal: Skin Health and Integrity  1/24/2022  1731 by Addis Floyd RN  Outcome: Ongoing, Progressing  1/24/2022 1643 by Addis Floyd RN  Outcome: Ongoing, Progressing     Problem: Seizure Disorder Comorbidity  Goal: Maintenance of Seizure Control  1/24/2022 1731 by Addis Floyd RN  Outcome: Ongoing, Progressing  1/24/2022 1643 by Addis Floyd RN  Outcome: Ongoing, Progressing     Problem: Impaired Wound Healing  Goal: Optimal Wound Healing  1/24/2022 1731 by Addis Floyd RN  Outcome: Ongoing, Progressing  1/24/2022 1643 by Addis Floyd RN  Outcome: Ongoing, Progressing     Problem: Skin or Soft Tissue Infection  Goal: Absence of Infection Signs and Symptoms  1/24/2022 1731 by Addis Floyd RN  Outcome: Ongoing, Progressing  1/24/2022 1643 by Addis Floyd RN  Outcome: Ongoing, Progressing     Problem: Coping Ineffective  Goal: Effective Coping  1/24/2022 1731 by Addis Floyd RN  Outcome: Ongoing, Progressing  1/24/2022 1643 by Addis Floyd RN  Outcome: Ongoing, Progressing     Problem: Nutrition Impaired (Sepsis/Septic Shock)  Goal: Optimal Nutrition Intake  1/24/2022 1731 by Addis Floyd RN  Outcome: Ongoing, Not Progressing  1/24/2022 1643 by Addis Floyd RN  Outcome: Ongoing, Progressing     Problem: Oral Intake Inadequate (Acute Kidney Injury/Impairment)  Goal: Optimal Nutrition Intake  1/24/2022 1731 by Addis Floyd RN  Outcome: Ongoing, Not Progressing  1/24/2022 1643 by Addis Floyd RN  Outcome: Ongoing, Progressing     Problem: Confusion Chronic  Goal: Optimal Cognitive Function  1/24/2022 1731 by Addis Floyd RN  Outcome: Ongoing, Not Progressing  1/24/2022 1643 by Addis Floyd RN  Outcome: Ongoing, Progressing

## 2022-01-24 NOTE — SUBJECTIVE & OBJECTIVE
Interval History: CC today is weakness.    Review of Systems   Constitutional: Positive for activity change.   Respiratory: Negative for apnea and choking.    Gastrointestinal: Negative for abdominal distention and abdominal pain.   Musculoskeletal: Negative for back pain.   Neurological: Positive for weakness.     Objective:     Vital Signs (Most Recent):  Temp: 97.5 °F (36.4 °C) (01/24/22 0956)  Pulse: 75 (01/24/22 0727)  Resp: (!) 26 (01/24/22 0956)  BP: 129/77 (01/24/22 0727)  SpO2: 96 % (01/24/22 0727) Vital Signs (24h Range):  Temp:  [94.8 °F (34.9 °C)-97.5 °F (36.4 °C)] 97.5 °F (36.4 °C)  Pulse:  [59-75] 75  Resp:  [17-32] 26  SpO2:  [89 %-100 %] 96 %  BP: (116-167)/(74-93) 129/77     Weight: 61.2 kg (134 lb 14.7 oz)  Body mass index is 19.92 kg/m².    Intake/Output Summary (Last 24 hours) at 1/24/2022 1119  Last data filed at 1/24/2022 0500  Gross per 24 hour   Intake 710 ml   Output 1100 ml   Net -390 ml      Physical Exam  Cardiovascular:      Rate and Rhythm: Normal rate and regular rhythm.   Pulmonary:      Effort: No respiratory distress.      Breath sounds: No wheezing.   Abdominal:      General: There is no distension.      Tenderness: There is no abdominal tenderness.   Genitourinary:     Comments: Dressing on scrotal area,  Musculoskeletal:         General: No swelling or deformity.   Neurological:      General: No focal deficit present.      Mental Status: He is alert.         Significant Labs:   All pertinent labs within the past 24 hours have been reviewed.  BMP:   Recent Labs   Lab 01/23/22 0413   *      K 3.9      CO2 27   BUN 6   CREATININE 0.9   CALCIUM 7.9*     CBC:   Recent Labs   Lab 01/23/22 0413 01/24/22  0145   WBC 8.89 8.83   HGB 10.9* 10.8*   HCT 33.6* 33.1*   PLT 56* 63*     CMP:   Recent Labs   Lab 01/23/22 0413      K 3.9      CO2 27   *   BUN 6   CREATININE 0.9   CALCIUM 7.9*   PROT 6.7   ALBUMIN 1.2*   BILITOT 0.2   ALKPHOS 273*   AST 19    ALT 13   ANIONGAP 8   EGFRNONAA >60       Significant Imaging: I have reviewed all pertinent imaging results/findings within the past 24 hours.

## 2022-01-24 NOTE — PT/OT/SLP PROGRESS
Physical Therapy      Patient Name:  Abhishek Zhao   MRN:  [id number]    Patient not seen today secondary to      Patient Name:  Abhishek Zhao   MRN:  [id number]     Patient not seen today secondary to Other: low blood glucose in the ~70s. D/w nurse. pt had a significant event at 0200 this morning with glucose in the 60s and temp ~94F. Pt now off bear hugger and receiving dextrose. Will follow-up later as able.     Will follow-up .

## 2022-01-24 NOTE — SUBJECTIVE & OBJECTIVE
Interval History: Stool in diaper and in wound. Sahu draining.     Review of Systems   Unable to perform ROS: Mental status change     Objective:     Temp:  [94.8 °F (34.9 °C)-97.1 °F (36.2 °C)] 96.7 °F (35.9 °C)  Pulse:  [59-75] 75  Resp:  [17-32] 32  SpO2:  [89 %-100 %] 96 %  BP: (116-167)/(74-93) 129/77     Body mass index is 19.92 kg/m².           Drains     Drain                 Urethral Catheter 01/05/22 1050 Double-lumen 20 Fr. 18 days                Physical Exam  Constitutional:       General: He is not in acute distress.     Appearance: He is well-developed. He is not ill-appearing, toxic-appearing or diaphoretic.   HENT:      Head: Normocephalic and atraumatic.      Mouth/Throat:      Mouth: Mucous membranes are moist.   Eyes:      Conjunctiva/sclera: Conjunctivae normal.   Cardiovascular:      Rate and Rhythm: Normal rate.   Pulmonary:      Effort: No respiratory distress.   Abdominal:      General: Abdomen is flat. There is no distension.      Palpations: Abdomen is soft.   Genitourinary:     Comments: No dressing on perineal wound in place. ++stool in wound.   Sahu catheter draining  No purulent drainage around catheter     Musculoskeletal:         General: No swelling or deformity.      Cervical back: Neck supple.   Skin:     Findings: No rash.   Neurological:      Mental Status: He is oriented to person, place, and time.      Gait: Gait normal.   Psychiatric:         Mood and Affect: Mood normal.         Thought Content: Thought content normal.         Judgment: Judgment normal.         Significant Labs:    BMP:  Recent Labs   Lab 01/21/22  0818 01/22/22  0446 01/23/22  0413    142 140   K 4.3 4.3 3.9    106 105   CO2 27 29 27   BUN 5* 5* 6   CREATININE 0.9 0.9 0.9   CALCIUM 8.3* 8.3* 7.9*       CBC:   Recent Labs   Lab 01/22/22  0446 01/23/22  0413 01/24/22  0145   WBC 9.11 8.89 8.83   HGB 11.4* 10.9* 10.8*   HCT 34.6* 33.6* 33.1*   PLT 50* 56* 63*       Blood Culture: No results for  input(s): LABBLOO in the last 168 hours.  Urine Culture: No results for input(s): LABURIN in the last 168 hours.  Urine Studies: No results for input(s): COLORU, APPEARANCEUA, PHUR, SPECGRAV, PROTEINUA, GLUCUA, KETONESU, BILIRUBINUA, OCCULTUA, NITRITE, UROBILINOGEN, LEUKOCYTESUR, RBCUA, WBCUA, BACTERIA, SQUAMEPITHEL, HYALINECASTS in the last 168 hours.    Invalid input(s): WRIGHTSUR    Significant Imaging:  All pertinent imaging results/findings from the past 24 hours have been reviewed.

## 2022-01-24 NOTE — PLAN OF CARE
[facility] [place] [facility]  Discharge Reassessment    TN spoke with patient's daughter, Js regarding discharge planning. TN informed Ms. Weinstein of no accepting snf at this time, and home health would be the second option once patient is ready for discharge. Ms. Weinstein verbalized understanding. TN to continue follow up for snf placement.     Primary Care Provider: To Obtain Unable    Expected Discharge Date:     Reassessment (most recent)     Discharge Reassessment - 01/24/22 1421        Discharge Reassessment    Assessment Type Discharge Planning Reassessment     Did the patient's condition or plan change since previous assessment? No     Discharge Plan discussed with: Adult children     Name(s) and Number(s) Js Barroso [phone] (daughter)     Communicated CHUCK with patient/caregiver Date not available/Unable to determine     Discharge Plan A Skilled Nursing Facility     Discharge Plan B Home Health;Home with family     DME Needed Upon Discharge  --   TBD    Discharge Barriers Identified Substance Abuse     Why the patient remains in the hospital Requires continued medical care        Post-Acute Status    Post-Acute Authorization Placement     Post-Acute Placement Status Pending payor medical review/second level review     Patient choice form signed by patient/caregiver List from System Post-Acute Care     Discharge Delays Post-Acute Set-up

## 2022-01-25 LAB
BACTERIA SPEC ANAEROBE CULT: NORMAL
POCT GLUCOSE: 108 MG/DL (ref 70–110)
POCT GLUCOSE: 125 MG/DL (ref 70–110)
POCT GLUCOSE: 164 MG/DL (ref 70–110)
POCT GLUCOSE: 51 MG/DL (ref 70–110)
POCT GLUCOSE: 59 MG/DL (ref 70–110)
POCT GLUCOSE: 62 MG/DL (ref 70–110)
POCT GLUCOSE: 77 MG/DL (ref 70–110)
POCT GLUCOSE: 83 MG/DL (ref 70–110)

## 2022-01-25 PROCEDURE — C9254 INJECTION, LACOSAMIDE: HCPCS | Performed by: STUDENT IN AN ORGANIZED HEALTH CARE EDUCATION/TRAINING PROGRAM

## 2022-01-25 PROCEDURE — 63600175 PHARM REV CODE 636 W HCPCS: Performed by: STUDENT IN AN ORGANIZED HEALTH CARE EDUCATION/TRAINING PROGRAM

## 2022-01-25 PROCEDURE — 25000003 PHARM REV CODE 250: Performed by: STUDENT IN AN ORGANIZED HEALTH CARE EDUCATION/TRAINING PROGRAM

## 2022-01-25 PROCEDURE — 25000003 PHARM REV CODE 250: Performed by: HOSPITALIST

## 2022-01-25 PROCEDURE — B4185 PARENTERAL SOL 10 GM LIPIDS: HCPCS | Performed by: HOSPITALIST

## 2022-01-25 PROCEDURE — 11000001 HC ACUTE MED/SURG PRIVATE ROOM

## 2022-01-25 PROCEDURE — A4216 STERILE WATER/SALINE, 10 ML: HCPCS | Performed by: STUDENT IN AN ORGANIZED HEALTH CARE EDUCATION/TRAINING PROGRAM

## 2022-01-25 PROCEDURE — 92526 ORAL FUNCTION THERAPY: CPT

## 2022-01-25 PROCEDURE — 63600175 PHARM REV CODE 636 W HCPCS: Performed by: HOSPITALIST

## 2022-01-25 PROCEDURE — 99223 PR INITIAL HOSPITAL CARE,LEVL III: ICD-10-PCS | Mod: ,,, | Performed by: NURSE PRACTITIONER

## 2022-01-25 PROCEDURE — 99232 SBSQ HOSP IP/OBS MODERATE 35: CPT | Mod: ,,, | Performed by: UROLOGY

## 2022-01-25 PROCEDURE — 99223 1ST HOSP IP/OBS HIGH 75: CPT | Mod: ,,, | Performed by: NURSE PRACTITIONER

## 2022-01-25 PROCEDURE — 99232 PR SUBSEQUENT HOSPITAL CARE,LEVL II: ICD-10-PCS | Mod: ,,, | Performed by: UROLOGY

## 2022-01-25 PROCEDURE — 97535 SELF CARE MNGMENT TRAINING: CPT

## 2022-01-25 RX ADMIN — RETINOL, ERGOCALCIFEROL, .ALPHA.-TOCOPHEROL ACETATE, DL-, PHYTONADIONE, ASCORBIC ACID, NIACINAMIDE, RIBOFLAVIN 5-PHOSPHATE SODIUM, THIAMINE HYDROCHLORIDE, PYRIDOXINE HYDROCHLORIDE, DEXPANTHENOL, BIOTIN, FOLIC ACID, AND CYANOCOBALAMIN: KIT at 09:01

## 2022-01-25 RX ADMIN — FLUCONAZOLE 400 MG: 2 INJECTION, SOLUTION INTRAVENOUS at 12:01

## 2022-01-25 RX ADMIN — Medication 10 ML: at 06:01

## 2022-01-25 RX ADMIN — PIPERACILLIN AND TAZOBACTAM 4.5 G: 4; .5 INJECTION, POWDER, LYOPHILIZED, FOR SOLUTION INTRAVENOUS; PARENTERAL at 10:01

## 2022-01-25 RX ADMIN — Medication 16 G: at 07:01

## 2022-01-25 RX ADMIN — DEXTROSE 500 MG: 50 INJECTION, SOLUTION INTRAVENOUS at 10:01

## 2022-01-25 RX ADMIN — DEXTROSE 125 ML: 10 SOLUTION INTRAVENOUS at 02:01

## 2022-01-25 RX ADMIN — Medication 16 G: at 06:01

## 2022-01-25 RX ADMIN — Medication 10 ML: at 04:01

## 2022-01-25 RX ADMIN — PIPERACILLIN AND TAZOBACTAM 4.5 G: 4; .5 INJECTION, POWDER, LYOPHILIZED, FOR SOLUTION INTRAVENOUS; PARENTERAL at 02:01

## 2022-01-25 RX ADMIN — DEXTROSE 500 MG: 50 INJECTION, SOLUTION INTRAVENOUS at 05:01

## 2022-01-25 RX ADMIN — I.V. FAT EMULSION 250 ML: 20 EMULSION INTRAVENOUS at 09:01

## 2022-01-25 RX ADMIN — PIPERACILLIN AND TAZOBACTAM 4.5 G: 4; .5 INJECTION, POWDER, LYOPHILIZED, FOR SOLUTION INTRAVENOUS; PARENTERAL at 01:01

## 2022-01-25 RX ADMIN — SODIUM CHLORIDE 200 MG: 9 INJECTION, SOLUTION INTRAVENOUS at 04:01

## 2022-01-25 RX ADMIN — HYDRALAZINE HYDROCHLORIDE 50 MG: 25 TABLET, FILM COATED ORAL at 06:01

## 2022-01-25 RX ADMIN — THIAMINE HYDROCHLORIDE 500 MG: 100 INJECTION, SOLUTION INTRAMUSCULAR; INTRAVENOUS at 06:01

## 2022-01-25 RX ADMIN — ENOXAPARIN SODIUM 40 MG: 40 INJECTION SUBCUTANEOUS at 04:01

## 2022-01-25 NOTE — PROGRESS NOTES
TN received update from Kimberly with palliative care, stating patient's status has changed to NPO per speech eval and would need peg tube for facility placement.     Per, Kimberly patient meets criteria for hospice and spoken with the patient's wife. TN  will continue to follow should family be agreeable to hospice vs snf after ongoing discussion with team.

## 2022-01-25 NOTE — PROGRESS NOTES
[facility]  Progress Note    Patient Name: Abhishek hZao  MRN: [id number]  Patient Class: IP- Inpatient   Admission Date: 1/4/2022  Length of Stay: 20 days  Attending Physician: Quinten Rosario MD  Primary Care Provider: To Obtain Unable        Subjective:     Principal Problem:Scrotal abscess        HPI:  61 y.o. male with adjustment disorder with depressed mood, chronic ischemic heart disease, cocaine dependence in remission, cardiomegaly, HLD, HTN, swizure disorder, tobacco use, and DM 2 presents with a complaint of testicular pain.  Associated with swelling and redness to the scrotum and penis along with difficulty urinating, pain is rated as severe and radiates to the rectum.  Denies fever, chills, cough, SOB, chest pain, n/v/d.  In the ED, he was found to be tachypneic, with leukocytosis and elevated lactic.  CT and US shows evidence of multiple scrotal and perineal abscesses with some extension into the penile shaft.  UA with evidence of infection.  Sepsis treatment initiated, blood and urine cultures obtained, IV fluids and IV antibiotics initiated.  Urology consulted.      Overview/Hospital Course:  61 y.o. male with adjustment disorder with depressed mood, chronic ischemic heart disease, cocaine dependence in remission, cardiomegaly, HLD, HTN, swizure disorder, tobacco use, and DM 2 presents with a complaint of testicular pain, found to have multiple scrotal and perineal abscesses. Placed on broad spectrum antibiotics, underwent I&D with urology.    Pt w/ aspiration event 1/11 w/ subsequent desaturation. Respiratory culture grew pseudomonas. Kept on Zosyn. On 1/13 pt had abrupt reduction in responsiveness. Code stroke called, patient transferred to ICU. No stroke found on imaging. Started on extended EEG. Mental status improved, patient stepped back down to the floor.     His mental status improved slowly for a few days, but worsened again 1/17.   He was altered,was  not speaking,head CT show no acute process,EEG,per neurology show no seizure activity,todayb is much alert,appear to be delirious,will monitor.much more oriented today.  Consulted IR for drainage of residual fluid  collection on scrotum,CT pelvic.IR procedure is not done,duo to high risk for complications,will continue with IV Abx and repeat CT .spoke with Urology ,US show improvement in Abscess seize,no need for intervention at this time,continue with IV Abx.  Was hypoglycemic over night,back on D5 IVF,added appetizer and supplement.  Again in altered,could not pass ST,back NPO,started on TPN,consulted palliative care.  CC today is weakness.      Interval History: CC today is confusion.    Review of Systems,not able be done,confused.  Objective:     Vital Signs (Most Recent):  Temp: (!) 94.2 °F (34.6 °C) (01/25/22 1140)  Pulse: 76 (01/25/22 1140)  Resp: 20 (01/25/22 1140)  BP: (!) 156/89 (01/25/22 1140)  SpO2: (!) 94 % (01/25/22 1140) Vital Signs (24h Range):  Temp:  [94.2 °F (34.6 °C)-98 °F (36.7 °C)] 94.2 °F (34.6 °C)  Pulse:  [66-76] 76  Resp:  [17-25] 20  SpO2:  [94 %-97 %] 94 %  BP: (118-156)/(66-89) 156/89     Weight: 61.2 kg (134 lb 14.7 oz)  Body mass index is 19.92 kg/m².    Intake/Output Summary (Last 24 hours) at 1/25/2022 1207  Last data filed at 1/25/2022 0900  Gross per 24 hour   Intake 2182.88 ml   Output 1600 ml   Net 582.88 ml      Physical Exam  Constitutional:       Appearance: He is ill-appearing.   HENT:      Mouth/Throat:      Mouth: Mucous membranes are dry.   Cardiovascular:      Rate and Rhythm: Normal rate and regular rhythm.   Pulmonary:      Effort: No respiratory distress.      Breath sounds: No wheezing.   Abdominal:      General: There is no distension.      Tenderness: There is no abdominal tenderness.   Genitourinary:     Comments: Dressing on scrotal area  Musculoskeletal:         General: No swelling or deformity.   Skin:     Coloration: Skin is not jaundiced.      Findings: No  bruising.         Significant Labs:   All pertinent labs within the past 24 hours have been reviewed.  BMP: No results for input(s): GLU, NA, K, CL, CO2, BUN, CREATININE, CALCIUM, MG in the last 48 hours.  CBC:   Recent Labs   Lab 01/24/22  0145   WBC 8.83   HGB 10.8*   HCT 33.1*   PLT 63*     CMP: No results for input(s): NA, K, CL, CO2, GLU, BUN, CREATININE, CALCIUM, PROT, ALBUMIN, BILITOT, ALKPHOS, AST, ALT, ANIONGAP, EGFRNONAA in the last 48 hours.    Invalid input(s): ESTGFAFRICA    Significant Imaging: I have reviewed all pertinent imaging results/findings within the past 24 hours.      Assessment/Plan:      * Scrotal abscess  Multiple abscesses c/w Berna's gangrene, s/p I&D by urology on 1/5. Cultures growing ampicillin-resistant Ecoli and C albicans. Repeat CT A/P 1/13 w/ 4cm fluid collection c/f persistent abscess which was drained bedside by urology  - urology following  - cont zosyn (re-started for HCAP)  - further ID recs pending  - repeat CT pelvis.  Consulted IR for drainage of residual fluid  collection on CT pelvic. IR procedure is not done,duo to high risk for complications,will continue with IV Abx and repeat CT,spoke with Urology ,US show improvement in Abscess seize,no need for intervention at this time,continue with IV Abx.          Alkaline phosphatase elevation  Chronic isolated alk phos (GGT elevated), worsened this admission. Possibly due to AEDs vs occult HPB process.   - further workup depending on clinical course      Pericardial effusion  Small pericardial effusion of unclear etiology      Encephalopathy, metabolic  CT head and EEG without acute process; likely delirium secondary to infection and prolonged hospitalization. Code stroke called the night of 1/13, however no focal neuro findings, CT head and MRI without acute findings  - neurology consulted  - delirium precautions  - EEG pending  - tx of infection as noted above.   He was altered,was not speaking,head CT show no acute  process,EEG,per neurology show nos eizure activity,todayb is much alert,appear to be delirious,will monitor.  Much improved.    HCAP (healthcare-associated pneumonia)  - see respiratory failure       Acute respiratory failure with hypoxia and hypercarbia  Cough + new LLL opacity on imaging c/f pneumonia, however CT imaging showing pleural effusion and atelectasis  - respiratory cultures w/ pseudomonas  - cont zosyn  - furosemide as tolerated; no significant evidence of cardiac dysfunction on TTE  - IS        Hypokalemia  Replete PRN    JOI (acute kidney injury)  Resolved      Sepsis due to Gram negative bacteria  Resolved      Berna's gangrene  As above    Diabetes mellitus type 2, controlled  Check a1c  Hold oral antihyperglycemics while inpatient  PRN sliding scale insulin  ACHS glucose monitoring   ADA diet     Tobacco user  5 minutes spent counseling the patient on smoking cessation and he is not currently ready to stop smoking. He will be offereded a nicotine transdermal patch while hospitalized and monitored for withdrawal.  Will provide additional smoking cessation counseling prior to discharge.     Acute encephalopathy  Repeated episodes of AMS c/f seizures w/ post ictal period  - neurology consulted  - AEDs per neurology  - EEG no seizure.  Much improved.  Was hypoglycemic over night,started on D % IVF,added appetizer and supplement,will monitor.  Again in altered,could not pass ST,back NPO,started on TPN,consulted palliative care.      Essential hypertension  Well controlled, continue home medications and monitor blood pressure, adjust as needed.     Hyperlipidemia  Continue statin    Cardiomegaly  Pulmonary edema seen on imaging, small pericardial effusion seen on TTE. LVEF low-normal (50%)  - hold diuresis while NPO    Cocaine dependence in remission  Counseled     Chronic ischemic heart disease  No acute issue    Adjustment disorder with depressed mood  Continue home citalopram, hold home seroquel  due to somnolence      VTE Risk Mitigation (From admission, onward)         Ordered     enoxaparin injection 40 mg  Daily         01/13/22 1612     IP VTE LOW RISK PATIENT  Once         01/04/22 2241     Place sequential compression device  Until discontinued         01/04/22 2241                Discharge Planning   CHUCK:      Code Status: Full Code   Is the patient medically ready for discharge?:     Reason for patient still in hospital (select all that apply): Patient trending condition  Discharge Plan A: Skilled Nursing Facility   Discharge Delays: (!) Post-Acute Set-up              Quinten Rosario MD  Department of Hospital Medicine   [facility]

## 2022-01-25 NOTE — PROGRESS NOTES
"[facility] [place] - Med Surg [place]  Adult Nutrition   Progress Note (Follow-Up)    SUMMARY     Recommendations/Interventions:  1)Per current TPN only providing 52% of EEN, consider transitioning to central line - Clinimix E 5/15 @ 75 mL/hr + daily IV lipid emulsions to provide 1778 kcal, 90 g AA, 270 g dextrose. 83% of EEN and 100% of EPN. GIR = 3.06.  Monitor triglycerides, if > 300 d/c daily lipids.   Monitor blood glucose, potassium, magnesium, and phosphorous.    2) ADA medically able and tolerated to SLP texture approved diabetic diet, encouraging PO intake.  3) Add Bubba BID to assist in wound healing once diet medically able to advance  4) Monitor nutrition related labs     Goals:   1) Pt to tolerate transition and initiation of TPN and consistently meet >75% of EEN/EPN by RD f/u  2) Nutrition related labs to trend WNL     Nutrition Goal Status: new  Communication of RD Recs:  (POC)    Assessment and Plan  Nutrition Problem  Inadequate oral intake     Related to (etiology):   Dysphagia, altered mental status      Signs and Symptoms (as evidenced by):   PO intake < 50%     Interventions/Recommendations (treatment strategy):  Parenteral nutrition  Consistent carbohydrate diet  Commercial beverages (Bubba)  Collaboration of care with other providers     Nutrition Diagnosis Status:   Ongoing    Reason for Assessment    Reason For Assessment: RD follow-up  Diagnosis:  (scrotal abscess)  Relevant Medical History: HTN, seizure disorder, adjustment disorder, chronic ischemic heart disease, cocaine dependence in remission, cardiomegaly, HLD, HTN, DM2, tobacco use    Nutrition Risk Screen    Nutrition Risk Screen: no indicators present    Nutrition/Diet History    Spiritual, Cultural Beliefs, Nondenominational Practices, Values that Affect Care: no  Food Allergies: NKFA  Factors Affecting Nutritional Intake: NPO,difficulty/impaired swallowing    Anthropometrics    Temp: (!) 94.2 °F (34.6 °C)  Height: 5' 9.02" (175.3 cm)  Height " (inches): 69.02 in  Weight Method: Bed Scale  Weight: 61.2 kg (134 lb 14.7 oz)  Weight (lb): 134.92 lb  Ideal Body Weight (IBW), Male: 160.12 lb  % Ideal Body Weight, Male (lb): 84.26 %  BMI (Calculated): 19.9  Usual Body Weight (UBW), k.09 kg  % Usual Body Weight: 95.69  % Weight Change From Usual Weight: -4.51 %       Weight History:  Wt Readings from Last 10 Encounters:   22 61.2 kg (134 lb 14.7 oz)   21 64 kg (141 lb)   21 64 kg (141 lb)   16 81.6 kg (180 lb)       Lab/Procedures/Meds: Pertinent Labs Reviewed    Medications:Pertinent Medications Reviewed  Scheduled Meds:   enoxaparin  40 mg Subcutaneous Daily    fat emulsion  250 mL Intravenous Once    fluconazole (DIFLUCAN) IVPB  400 mg Intravenous Q24H    lacosamide (VIMPAT) IVPB  200 mg Intravenous Q12H    piperacillin-tazobactam (ZOSYN) IVPB  4.5 g Intravenous Q8H    sodium chloride 0.9%  10 mL Intravenous Q6H    valproate sodium (DEPACON) IVPB  500 mg Intravenous Q12H     Continuous Infusions:   Amino acid 4.25% - dextrose 5% (CLINIMIX-E) solution with additives (1L provides 42.5 gm AA, 50 gm CHO (170 kcal/L dextrose), Na 35, K 30, Mg 5, Ca 4.5, Acetate 70, Cl 39, Phos 15)      dextrose 5 % and 0.9 % NaCl 50 mL/hr at 22 2226     PRN Meds:.acetaminophen, albuterol-ipratropium, albuterol-ipratropium, aluminum-magnesium hydroxide-simethicone, dextrose 10%, dextrose 10%, dextrose 50%, glucagon (human recombinant), glucose, glucose, hydrALAZINE, influenza, insulin aspart U-100, labetalol, LIDOcaine HCL 10 mg/ml (1%), lorazepam, melatonin, naloxone, ondansetron, prochlorperazine, simethicone, Flushing PICC Protocol **AND** sodium chloride 0.9% **AND** sodium chloride 0.9%    Estimated/Assessed Needs    Weight Used For Calorie Calculations: 61.2 kg (134 lb 14.7 oz)  Energy Calorie Requirements (kcal): 7535-9772  Energy Need Method: Kcal/kg (35-40 kcal/kg per pressure injury and wt loss)  Protein Requirements: 74-92 g  (1.2-1.5 g/kg per pressure injury)  Weight Used For Protein Calculations: 61.2 kg (134 lb 14.7 oz)  Fluid Requirements (mL): 2142  Estimated Fluid Requirement Method: RDA Method (or per MD orders)   CHO Requirement: 268 g    Nutrition Prescription Ordered    Current Diet Order: NPO    Evaluation of Received Nutrient/Fluid Intake    Parenteral Calories (kcal): 1112  Parenteral Protein (gm): 76.5  Lipid Calories (kcals): 500 kcals  GIR (Glucose Infusion Rate) (mg/kg/min): 1.02 mg/kg/min  Energy Calories Required: not meeting needs  Protein Required: meeting needs  Fluid Required: not meeting needs  % Intake of Estimated Energy Needs: 50-75 %  % Meal Intake: NPO    Intake/Output Summary (Last 24 hours) at 1/25/2022 1545  Last data filed at 1/25/2022 0900  Gross per 24 hour   Intake 1822.88 ml   Output 1600 ml   Net 222.88 ml      Nutrition Risk    Level of Risk/Frequency of Follow-up:  (1-2x/week)     Monitor and Evaluation    Food and Nutrient Intake: energy intake,food and beverage intake  Food and Nutrient Adminstration: diet order  Knowledge/Beliefs/Attitudes: food and nutrition knowledge/skill  Physical Activity and Function: nutrition-related ADLs and IADLs  Anthropometric Measurements: weight,weight change,body mass index  Biochemical Data, Medical Tests and Procedures: electrolyte and renal panel,gastrointestinal profile,glucose/endocrine profile,inflammatory profile,lipid profile  Nutrition-Focused Physical Findings: overall appearance,extremities, muscles and bones,head and eyes,skin     Nutrition Follow-Up    RD Follow-up?: Yes

## 2022-01-25 NOTE — PT/OT/SLP PROGRESS
Physical Therapy      Patient Name:  Abhishek Zhao   MRN:  [id number]    Patient not seen today secondary to Other (pt somnolent and lethargic, temperature 94.2F , on the bear hugger . Pt is inappropriate for therapy today)  . Will follow-up tomorrow .

## 2022-01-25 NOTE — PLAN OF CARE
Chart check complete.  Pt resting comfortably, santiago in place to gravity, draining well, tele box monitored.  Fluids and IV medications infusing as ordered to MAGO PICC.  Pt on 2L oxygen, tolerating well.  Pt turned Q2H for comfort.  Heel boots in place to prevent skin breakdown, warming blanket applied and in use throughout the night.  Medications crushed and given with applesauce, pt tolerated well, Boost given.  No distress noted, VSS, pt free from falls or injury this shift, safety sitter at the bedside.  Bed in low position, wheels locked, bed alarm on, call light in reach for assistance, will continue to monitor.        Problem: Adult Inpatient Plan of Care  Goal: Plan of Care Review  Outcome: Ongoing, Progressing     Problem: Adult Inpatient Plan of Care  Goal: Absence of Hospital-Acquired Illness or Injury  Outcome: Ongoing, Progressing     Problem: Adult Inpatient Plan of Care  Goal: Optimal Comfort and Wellbeing  Outcome: Ongoing, Progressing     Problem: Infection  Goal: Absence of Infection Signs and Symptoms  Outcome: Ongoing, Progressing     Problem: Glycemic Control Impaired (Sepsis/Septic Shock)  Goal: Blood Glucose Level Within Desired Range  Outcome: Ongoing, Progressing     Problem: Nutrition Impaired (Sepsis/Septic Shock)  Goal: Optimal Nutrition Intake  Outcome: Ongoing, Progressing     Problem: Seizure Disorder Comorbidity  Goal: Maintenance of Seizure Control  Outcome: Ongoing, Progressing     Problem: Skin Injury Risk Increased  Goal: Skin Health and Integrity  Outcome: Ongoing, Progressing     Problem: Impaired Wound Healing  Goal: Optimal Wound Healing  Outcome: Ongoing, Progressing     Problem: Coping Ineffective  Goal: Effective Coping  Outcome: Ongoing, Progressing

## 2022-01-25 NOTE — CONSULTS
[facility]  Palliative Medicine  Consult Note    Patient Name: Abhishek Zhao  MRN: [id number]  Admission Date: 1/4/2022  Hospital Length of Stay: 20 days  Code Status: Full Code   Attending Provider: Quinten Rosario MD  Consulting Provider: Kimberly Honeycutt NP  Primary Care Physician: To Obtain Unable  Principal Problem:Scrotal abscess    Patient information was obtained from spouse/SO, past medical records, ER records and primary team.      Consults  Assessment/Plan:     Palliative encounter  -Palliative consult received for GOC.  -Chart reviewed   -At time of consult patient sleeping, he is not easily aroused and falls back to sleep. He does not have capacity to have a conversation.   -Noted that spouse has requested SNF but no accepting facilities. Patient is unable to take PO and is now NPO per ST.   He has an albumin 1.2, he is very thin.   -Called spouse to update and discuss patient 's current clinical condition. She had very poor knowledge of patient;s condition and acted as though it is a surprise that he is not doing well.   -I explained that he is no longer able to take PO. We discussed PEG and she verbalized understanding of why he would need one if he went to a nursing facility. I also let her know that hospice is an option and focus on comfort which really seemed to surprise her. She quickly wanted to get off phone and said she needs to call his sister. I let her know that I will f/u with her.  -Discussed with JUSTO Syed and no accepting facilities at this time.  -Updated Dr Calero and asked if he could touch base with family.  -Patient meets criteria for hospice for severe protein calorie malnutrition.   -Will f/u with family tomorrow      Protein calorie malnutrition  Albumin 1.2  BMI 19.92  NPO diet; dysphagia; cannot safely take PO  Meets hospice criteria       Encephalopathy, metabolic  CT head and EEG without acute process; likely delirium secondary to infection and prolonged  hospitalization. Code stroke called the night of 1/13, however no focal neuro findings, CT head and MRI without acute findings  - neurology consulted  - delirium precautions  - EEG pending  - tx of infection as noted above.   He was altered,was not speaking,head CT show no acute process,EEG,per neurology show nos eizure activity,todayb is much alert,appear to be delirious,will monitor.  Much improved.     HCAP (healthcare-associated pneumonia)  - see respiratory failure         Acute respiratory failure with hypoxia and hypercarbia      -Mgmt per primary  Cough + new LLL opacity on imaging c/f pneumonia, however -CT imaging showing pleural effusion and atelectasis  - respiratory cultures w/ pseudomonas  - cont zosyn  - furosemide as tolerated; no significant evidence of cardiac dysfunction on TTE       Scrotal abscess      Mgmt per primary  Multiple abscesses c/w Berna's gangrene, s/p I&D by urology on 1/5.   - urology following  - cont zosyn (re-started for HCAP)   -ID consulted  - further ID recs pending  - repeat CT pelvis  -continuing abx        Berna's gangrene  As above     Thank you for your consult. I will follow-up with patient. Please contact us if you have any additional questions.    Subjective:     HPI:   61 y.o. male with adjustment disorder with depressed mood, chronic ischemic heart disease, cocaine dependence in remission, cardiomegaly, HLD, HTN, swizure disorder, tobacco use, and DM 2 presents with a complaint of testicular pain.  Associated with swelling and redness to the scrotum and penis along with difficulty urinating, pain is rated as severe and radiates to the rectum.  Denies fever, chills, cough, SOB, chest pain, n/v/d.  In the ED, he was found to be tachypneic, with leukocytosis and elevated lactic.  CT and US shows evidence of multiple scrotal and perineal abscesses with some extension into the penile shaft.  UA with evidence of infection.  Sepsis treatment initiated, blood and  urine cultures obtained, IV fluids and IV antibiotics initiated.  Urology consulted.        Overview/Hospital Course:  61 y.o. male with adjustment disorder with depressed mood, chronic ischemic heart disease, cocaine dependence in remission, cardiomegaly, HLD, HTN, swizure disorder, tobacco use, and DM 2 presents with a complaint of testicular pain, found to have multiple scrotal and perineal abscesses. Placed on broad spectrum antibiotics, underwent I&D with urology.     Pt w/ aspiration event 1/11 w/ subsequent desaturation. Respiratory culture grew pseudomonas. Kept on Zosyn. On 1/13 pt had abrupt reduction in responsiveness. Code stroke called, patient transferred to ICU. No stroke found on imaging. Started on extended EEG. Mental status improved, patient stepped back down to the floor.      His mental status improved slowly for a few days, but worsened again 1/17.  He was altered,was not speaking,head CT show no acute process, EEG,per neurology show no seizure activity,todayb is much alert,appear to be delirious,will monitor.much more oriented today.  Consulted IR for drainage of residual fluid  collection on scrotum,CT pelvic.IR procedure is not done,duo to high risk for complications,will continue with IV Abx and repeat CT .spoke with Urology ,US show improvement in Abscess seize,no need for intervention at this time,continue with IV Abx.  Was hypoglycemic over night,back on D5 IVF,added appetizer and supplement.  Again in altered,could not pass ST,back NPO,started on TPN,consulted palliative care.  CC today is weakness.      Hospital Course:  No notes on file    Interval History: patient sleepy, mental status waxng and waning. Warming blanket on    Past Medical History:   Diagnosis Date    High cholesterol     Hypertension     Seizures        Past Surgical History:   Procedure Laterality Date    CYSTOSCOPY  1/5/2022    Procedure: CYSTOSCOPY;  Surgeon: ANIBAL Rangel MD;  Location: [facility] OR;  Service:  Urology;;    FISTULOGRAM N/A 1/5/2022    Procedure: FISTULOGRAM;  Surgeon: ANIBAL Rangel MD;  Location: [facility] OR;  Service: Urology;  Laterality: N/A;    SURGICAL REMOVAL OF ABSCESS  1/5/2022    Procedure: EXCISION, ABSCESS;  Surgeon: ANIBAL Rangel MD;  Location: [facility] OR;  Service: Urology;;       Review of patient's allergies indicates:  No Known Allergies    Medications:  Continuous Infusions:   Amino acid 4.25% - dextrose 5% (CLINIMIX-E) solution with additives (1L provides 42.5 gm AA, 50 gm CHO (170 kcal/L dextrose), Na 35, K 30, Mg 5, Ca 4.5, Acetate 70, Cl 39, Phos 15)      dextrose 5 % and 0.9 % NaCl 50 mL/hr at 01/24/22 2226     Scheduled Meds:   enoxaparin  40 mg Subcutaneous Daily    fat emulsion  250 mL Intravenous Once    fluconazole (DIFLUCAN) IVPB  400 mg Intravenous Q24H    lacosamide (VIMPAT) IVPB  200 mg Intravenous Q12H    piperacillin-tazobactam (ZOSYN) IVPB  4.5 g Intravenous Q8H    sodium chloride 0.9%  10 mL Intravenous Q6H    valproate sodium (DEPACON) IVPB  500 mg Intravenous Q12H     PRN Meds:acetaminophen, albuterol-ipratropium, albuterol-ipratropium, aluminum-magnesium hydroxide-simethicone, dextrose 10%, dextrose 10%, dextrose 50%, glucagon (human recombinant), glucose, glucose, hydrALAZINE, influenza, insulin aspart U-100, labetalol, LIDOcaine HCL 10 mg/ml (1%), lorazepam, melatonin, naloxone, ondansetron, prochlorperazine, simethicone, Flushing PICC Protocol **AND** sodium chloride 0.9% **AND** sodium chloride 0.9%    Family History    None       Tobacco Use    Smoking status: Current Every Day Smoker     Packs/day: 1.50     Types: Cigarettes    Smokeless tobacco: Not on file   Substance and Sexual Activity    Alcohol use: Yes    Drug use: Not on file    Sexual activity: Not on file       Review of Systems   Unable to perform ROS: Mental status change     Objective:     Vital Signs (Most Recent):  Temp: (!) 94.2 °F (34.6 °C) (01/25/22 1140)  Pulse: 76 (01/25/22  1140)  Resp: 20 (01/25/22 1140)  BP: (!) 156/89 (01/25/22 1140)  SpO2: (!) 94 % (01/25/22 1140) Vital Signs (24h Range):  Temp:  [94.2 °F (34.6 °C)-98 °F (36.7 °C)] 94.2 °F (34.6 °C)  Pulse:  [66-76] 76  Resp:  [17-25] 20  SpO2:  [94 %-97 %] 94 %  BP: (118-156)/(66-89) 156/89     Weight: 61.2 kg (134 lb 14.7 oz)  Body mass index is 19.92 kg/m².    Physical Exam  Vitals and nursing note reviewed.   Constitutional:       General: He is not in acute distress.     Appearance: He is ill-appearing.   HENT:      Head: Normocephalic and atraumatic.      Mouth/Throat:      Mouth: Mucous membranes are dry.   Cardiovascular:      Rate and Rhythm: Normal rate and regular rhythm.   Pulmonary:      Effort: Pulmonary effort is normal.   Genitourinary:     Comments: dsg to scrotal area  Musculoskeletal:      Right lower leg: No edema.      Left lower leg: No edema.   Skin:     General: Skin is dry.      Comments: Unable to keep body temperature up, warming blanket   Neurological:      Mental Status: He is alert.      Comments: Alert, hard to arouse, falls asleep easily, confused when awake         Review of Symptoms    Symptom Assessment (ESAS 0-10 Scale)  Unable to complete assessment due to Mental status change             Advance Care Planning   Advance Care Planning        Significant Labs: All pertinent labs within the past 24 hours have been reviewed.  CBC:   Recent Labs   Lab 01/24/22  0145   WBC 8.83   HGB 10.8*   HCT 33.1*   MCV 87   PLT 63*     BMP:  No results for input(s): GLU, NA, K, CL, CO2, BUN, CREATININE, CALCIUM, MG in the last 24 hours.  LFT:  Lab Results   Component Value Date    AST 19 01/23/2022     (H) 01/17/2022    ALKPHOS 273 (H) 01/23/2022    BILITOT 0.2 01/23/2022     Albumin:   Albumin   Date Value Ref Range Status   01/23/2022 1.2 (L) 3.5 - 5.2 g/dL Final     Protein:   Total Protein   Date Value Ref Range Status   01/23/2022 6.7 6.0 - 8.4 g/dL Final     Lactic acid:   Lab Results   Component  Value Date    LACTATE 0.8 01/24/2022    LACTATE 1.3 01/14/2022       Significant Imaging: I have reviewed all pertinent imaging results/findings within the past 24 hours. CT abdomen      > 50% of 70 min visit spent in chart review, face to face discussion of goals of care,  symptom assessment, coordination of care and emotional support.    Kimberly Honeycutt NP  Palliative Medicine  [facility] - [place], [place] - [facility] [place]

## 2022-01-25 NOTE — PROGRESS NOTES
[facility]  Urology  Progress Note    Patient Name: Abhishek Zhao  MRN: [id number]  Admission Date: 1/4/2022  Hospital Length of Stay: 20 days  Code Status: Full Code   Attending Provider: Quinten Rosario MD   Primary Care Physician: To Obtain Unable    Subjective:     HPI:  Scrotal Swelling  Abhishek Zhao is a 61 y.o. male who was been experiencing scrotal pain and swelling since 12/31/2021.  He denies any fever.  He has had issues voiding since the swelling began.  Hemostat having issues in the past with urinary problems.  He denies having any previous issues with scrotal infection or swelling.  He recalls that he had procedures in the past but cannot recall specifically what to place.  He does not have urologist locally but moved back from [place] about 1 year ago.    Review of care everywhere shows that he had a cystoscopy with urethral dilation of a urethral stricture in the bulbar urethra in 2019 at [facility].  And there are reports that [facility] approximately 2015 [facility]      Interval History: confusion slightly improved today    Review of Systems   Constitutional: Negative.  Negative for fever.   HENT: Negative.    Eyes: Negative.    Respiratory: Negative for cough, chest tightness and shortness of breath.    Cardiovascular: Negative for chest pain.   Gastrointestinal: Negative.  Negative for constipation, diarrhea and nausea.   Genitourinary: Positive for genital sores.   Musculoskeletal: Negative.    Neurological: Negative.    Psychiatric/Behavioral: Negative.      Objective:     Temp:  [94.2 °F (34.6 °C)-98 °F (36.7 °C)] 94.2 °F (34.6 °C)  Pulse:  [66-76] 76  Resp:  [17-25] 20  SpO2:  [94 %-97 %] 94 %  BP: (118-156)/(66-89) 156/89     Body mass index is 19.92 kg/m².    Date 01/25/22 0700 - 01/26/22 0659   Shift 2587-2689 1147-9157 8044-2137 24 Hour Total   INTAKE   Shift Total(mL/kg)       OUTPUT   Urine(mL/kg/hr) 600   600   Shift Total(mL/kg)  600(9.8)   600(9.8)   Weight (kg) 61.2 61.2 61.2 61.2          Drains     Drain                 Urethral Catheter 01/05/22 1050 Double-lumen 20 Fr. 20 days                Physical Exam  Vitals and nursing note reviewed.   Constitutional:       Appearance: He is well-developed and well-nourished.   HENT:      Head: Normocephalic.   Eyes:      Conjunctiva/sclera: Conjunctivae normal.   Neck:      Thyroid: No thyromegaly.      Trachea: No tracheal deviation.   Cardiovascular:      Rate and Rhythm: Normal rate.      Heart sounds: Normal heart sounds.   Pulmonary:      Effort: Pulmonary effort is normal. No respiratory distress.      Breath sounds: Normal breath sounds. No wheezing.   Abdominal:      General: Bowel sounds are normal.      Palpations: Abdomen is soft. There is no hepatosplenomegaly.      Tenderness: There is no abdominal tenderness. There is no CVA tenderness or rebound.      Hernia: No hernia is present.   Genitourinary:     Comments: Sahu in place, yellow urine  Scrotal wound with granulation tissue  Musculoskeletal:         General: No tenderness or edema. Normal range of motion.      Cervical back: Normal range of motion and neck supple.   Lymphadenopathy:      Cervical: No cervical adenopathy.   Skin:     General: Skin is warm and dry.      Findings: No erythema or rash.   Neurological:      Mental Status: He is alert and oriented to person, place, and time.   Psychiatric:         Mood and Affect: Mood and affect normal.         Behavior: Behavior normal.         Thought Content: Thought content normal.         Judgment: Judgment normal.         Significant Labs:    BMP:  Recent Labs   Lab 01/21/22  0818 01/22/22  0446 01/23/22  0413    142 140   K 4.3 4.3 3.9    106 105   CO2 27 29 27   BUN 5* 5* 6   CREATININE 0.9 0.9 0.9   CALCIUM 8.3* 8.3* 7.9*       CBC:   Recent Labs   Lab 01/22/22  0446 01/23/22  0413 01/24/22  0145   WBC 9.11 8.89 8.83   HGB 11.4* 10.9* 10.8*   HCT 34.6* 33.6*  33.1*   PLT 50* 56* 63*       Blood Culture:   Recent Labs   Lab 01/24/22  0137   LABBLOO No Growth to date  No Growth to date     Urine Culture: No results for input(s): LABURIN in the last 168 hours.    Significant Imaging:  CT: I have reviewed all results within the past 24 hours and my personal findings are:  Right corpora cavernosum with fluid, similar                  Assessment/Plan:     * Scrotal abscess  s/p cystoscopy with santiago placement and debridement of abscess/necrosis of scrotum 1/5/22 with Dr. Rangel.  Fluid collection drained at bedside 1/14/2022  CT from 1/18/22 w/ R corporal body fluid collection concerning for residual abscess vs fluid collection  Additional purulence expressed from penis on 1/20/21    Continue abx per primary  Cultures growing Candida and E coli  Appreciate wound care RN and floor RN dressing changes  Leukocytosis remains resolved  Afebrile  Will continue to monitor condition    Monitor fluid collection in corpora.   No need for intervention currently.    Will need plastic surgery evaluation in future once wound stable for possible grafting. Can be done as outpatient.             Berna's gangrene   - s/p debridement 1/5/22        VTE Risk Mitigation (From admission, onward)         Ordered     enoxaparin injection 40 mg  Daily         01/13/22 1612     IP VTE LOW RISK PATIENT  Once         01/04/22 2241     Place sequential compression device  Until discontinued         01/04/22 2241                LATASHA Rangel MD  Urology  [facility] [place]

## 2022-01-25 NOTE — PROGRESS NOTES
[facility] called [facility] this morning to follow up on referral. [place] to call back and no answer from [place]. Reached out to [place] this afternoon again, spoke to Lilli who will review patient. States she will call back with an answer within the hour.

## 2022-01-25 NOTE — HPI
61 y.o. male with adjustment disorder with depressed mood, chronic ischemic heart disease, cocaine dependence in remission, cardiomegaly, HLD, HTN, swizure disorder, tobacco use, and DM 2 presents with a complaint of testicular pain.  Associated with swelling and redness to the scrotum and penis along with difficulty urinating, pain is rated as severe and radiates to the rectum.  Denies fever, chills, cough, SOB, chest pain, n/v/d.  In the ED, he was found to be tachypneic, with leukocytosis and elevated lactic.  CT and US shows evidence of multiple scrotal and perineal abscesses with some extension into the penile shaft.  UA with evidence of infection.  Sepsis treatment initiated, blood and urine cultures obtained, IV fluids and IV antibiotics initiated.  Urology consulted.        Overview/Hospital Course:  61 y.o. male with adjustment disorder with depressed mood, chronic ischemic heart disease, cocaine dependence in remission, cardiomegaly, HLD, HTN, swizure disorder, tobacco use, and DM 2 presents with a complaint of testicular pain, found to have multiple scrotal and perineal abscesses. Placed on broad spectrum antibiotics, underwent I&D with urology.     Pt w/ aspiration event 1/11 w/ subsequent desaturation. Respiratory culture grew pseudomonas. Kept on Zosyn. On 1/13 pt had abrupt reduction in responsiveness. Code stroke called, patient transferred to ICU. No stroke found on imaging. Started on extended EEG. Mental status improved, patient stepped back down to the floor.      His mental status improved slowly for a few days, but worsened again 1/17.  He was altered,was not speaking,head CT show no acute process, EEG,per neurology show no seizure activity,todayb is much alert,appear to be delirious,will monitor.much more oriented today.  Consulted IR for drainage of residual fluid  collection on scrotum,CT pelvic.IR procedure is not done,duo to high risk for complications,will continue with IV Abx and repeat  CT .spoke with Urology ,US show improvement in Abscess seize,no need for intervention at this time,continue with IV Abx.  Was hypoglycemic over night,back on D5 IVF,added appetizer and supplement.  Again in altered,could not pass ST,back NPO,started on TPN,consulted palliative care.  CC today is weakness.

## 2022-01-25 NOTE — PT/OT/SLP PROGRESS
Occupational Therapy      Patient Name:  Abhishek Zhao   MRN:  [id number]    Patient not seen today secondary to  (pt not medically appropriate for therapy at this time d/t 94.2F temperature and on the bear hugger.). Will follow-up as able.    1/25/2022

## 2022-01-25 NOTE — ASSESSMENT & PLAN NOTE
Repeated episodes of AMS c/f seizures w/ post ictal period  - neurology consulted  - AEDs per neurology  - EEG no seizure.  Much improved.  Was hypoglycemic over night,started on D % IVF,added appetizer and supplement,will monitor.  Again in altered,could not pass ST,back NPO,started on TPN,consulted palliative care.

## 2022-01-25 NOTE — SUBJECTIVE & OBJECTIVE
Interval History: patient sleepy, mental status waxng and waning. Warming blanket on    Past Medical History:   Diagnosis Date    High cholesterol     Hypertension     Seizures        Past Surgical History:   Procedure Laterality Date    CYSTOSCOPY  1/5/2022    Procedure: CYSTOSCOPY;  Surgeon: ANIBAL Rangel MD;  Location: [facility] OR;  Service: Urology;;    FISTULOGRAM N/A 1/5/2022    Procedure: FISTULOGRAM;  Surgeon: ANIBAL Rangel MD;  Location: [facility] OR;  Service: Urology;  Laterality: N/A;    SURGICAL REMOVAL OF ABSCESS  1/5/2022    Procedure: EXCISION, ABSCESS;  Surgeon: ANIBAL Rangel MD;  Location: [facility] OR;  Service: Urology;;       Review of patient's allergies indicates:  No Known Allergies    Medications:  Continuous Infusions:   Amino acid 4.25% - dextrose 5% (CLINIMIX-E) solution with additives (1L provides 42.5 gm AA, 50 gm CHO (170 kcal/L dextrose), Na 35, K 30, Mg 5, Ca 4.5, Acetate 70, Cl 39, Phos 15)      dextrose 5 % and 0.9 % NaCl 50 mL/hr at 01/24/22 2226     Scheduled Meds:   enoxaparin  40 mg Subcutaneous Daily    fat emulsion  250 mL Intravenous Once    fluconazole (DIFLUCAN) IVPB  400 mg Intravenous Q24H    lacosamide (VIMPAT) IVPB  200 mg Intravenous Q12H    piperacillin-tazobactam (ZOSYN) IVPB  4.5 g Intravenous Q8H    sodium chloride 0.9%  10 mL Intravenous Q6H    valproate sodium (DEPACON) IVPB  500 mg Intravenous Q12H     PRN Meds:acetaminophen, albuterol-ipratropium, albuterol-ipratropium, aluminum-magnesium hydroxide-simethicone, dextrose 10%, dextrose 10%, dextrose 50%, glucagon (human recombinant), glucose, glucose, hydrALAZINE, influenza, insulin aspart U-100, labetalol, LIDOcaine HCL 10 mg/ml (1%), lorazepam, melatonin, naloxone, ondansetron, prochlorperazine, simethicone, Flushing PICC Protocol **AND** sodium chloride 0.9% **AND** sodium chloride 0.9%    Family History    None       Tobacco Use    Smoking status: Current Every Day Smoker     Packs/day: 1.50      Types: Cigarettes    Smokeless tobacco: Not on file   Substance and Sexual Activity    Alcohol use: Yes    Drug use: Not on file    Sexual activity: Not on file       Review of Systems   Unable to perform ROS: Mental status change     Objective:     Vital Signs (Most Recent):  Temp: (!) 94.2 °F (34.6 °C) (01/25/22 1140)  Pulse: 76 (01/25/22 1140)  Resp: 20 (01/25/22 1140)  BP: (!) 156/89 (01/25/22 1140)  SpO2: (!) 94 % (01/25/22 1140) Vital Signs (24h Range):  Temp:  [94.2 °F (34.6 °C)-98 °F (36.7 °C)] 94.2 °F (34.6 °C)  Pulse:  [66-76] 76  Resp:  [17-25] 20  SpO2:  [94 %-97 %] 94 %  BP: (118-156)/(66-89) 156/89     Weight: 61.2 kg (134 lb 14.7 oz)  Body mass index is 19.92 kg/m².    Physical Exam  Vitals and nursing note reviewed.   Constitutional:       General: He is not in acute distress.     Appearance: He is ill-appearing.   HENT:      Head: Normocephalic and atraumatic.      Mouth/Throat:      Mouth: Mucous membranes are dry.   Cardiovascular:      Rate and Rhythm: Normal rate and regular rhythm.   Pulmonary:      Effort: Pulmonary effort is normal.   Genitourinary:     Comments: dsg to scrotal area  Musculoskeletal:      Right lower leg: No edema.      Left lower leg: No edema.   Skin:     General: Skin is dry.      Comments: Unable to keep body temperature up, warming blanket   Neurological:      Mental Status: He is alert.      Comments: Alert, hard to arouse, falls asleep easily, confused when awake         Review of Symptoms    Symptom Assessment (ESAS 0-10 Scale)  Unable to complete assessment due to Mental status change             Advance Care Planning   Advance Care Planning        Significant Labs: All pertinent labs within the past 24 hours have been reviewed.  CBC:   Recent Labs   Lab 01/24/22  0145   WBC 8.83   HGB 10.8*   HCT 33.1*   MCV 87   PLT 63*     BMP:  No results for input(s): GLU, NA, K, CL, CO2, BUN, CREATININE, CALCIUM, MG in the last 24 hours.  LFT:  Lab Results   Component  Value Date    AST 19 01/23/2022     (H) 01/17/2022    ALKPHOS 273 (H) 01/23/2022    BILITOT 0.2 01/23/2022     Albumin:   Albumin   Date Value Ref Range Status   01/23/2022 1.2 (L) 3.5 - 5.2 g/dL Final     Protein:   Total Protein   Date Value Ref Range Status   01/23/2022 6.7 6.0 - 8.4 g/dL Final     Lactic acid:   Lab Results   Component Value Date    LACTATE 0.8 01/24/2022    LACTATE 1.3 01/14/2022       Significant Imaging: I have reviewed all pertinent imaging results/findings within the past 24 hours. CT abdomen

## 2022-01-25 NOTE — SUBJECTIVE & OBJECTIVE
Interval History: confusion slightly improved today    Review of Systems   Constitutional: Negative.  Negative for fever.   HENT: Negative.    Eyes: Negative.    Respiratory: Negative for cough, chest tightness and shortness of breath.    Cardiovascular: Negative for chest pain.   Gastrointestinal: Negative.  Negative for constipation, diarrhea and nausea.   Genitourinary: Positive for genital sores.   Musculoskeletal: Negative.    Neurological: Negative.    Psychiatric/Behavioral: Negative.      Objective:     Temp:  [94.2 °F (34.6 °C)-98 °F (36.7 °C)] 94.2 °F (34.6 °C)  Pulse:  [66-76] 76  Resp:  [17-25] 20  SpO2:  [94 %-97 %] 94 %  BP: (118-156)/(66-89) 156/89     Body mass index is 19.92 kg/m².    Date 01/25/22 0700 - 01/26/22 0659   Shift 1616-3039 2816-0776 3100-8694 24 Hour Total   INTAKE   Shift Total(mL/kg)       OUTPUT   Urine(mL/kg/hr) 600   600   Shift Total(mL/kg) 600(9.8)   600(9.8)   Weight (kg) 61.2 61.2 61.2 61.2          Drains     Drain                 Urethral Catheter 01/05/22 1050 Double-lumen 20 Fr. 20 days                Physical Exam  Vitals and nursing note reviewed.   Constitutional:       Appearance: He is well-developed and well-nourished.   HENT:      Head: Normocephalic.   Eyes:      Conjunctiva/sclera: Conjunctivae normal.   Neck:      Thyroid: No thyromegaly.      Trachea: No tracheal deviation.   Cardiovascular:      Rate and Rhythm: Normal rate.      Heart sounds: Normal heart sounds.   Pulmonary:      Effort: Pulmonary effort is normal. No respiratory distress.      Breath sounds: Normal breath sounds. No wheezing.   Abdominal:      General: Bowel sounds are normal.      Palpations: Abdomen is soft. There is no hepatosplenomegaly.      Tenderness: There is no abdominal tenderness. There is no CVA tenderness or rebound.      Hernia: No hernia is present.   Genitourinary:     Comments: Sahu in place, yellow urine  Scrotal wound with granulation tissue  Musculoskeletal:          General: No tenderness or edema. Normal range of motion.      Cervical back: Normal range of motion and neck supple.   Lymphadenopathy:      Cervical: No cervical adenopathy.   Skin:     General: Skin is warm and dry.      Findings: No erythema or rash.   Neurological:      Mental Status: He is alert and oriented to person, place, and time.   Psychiatric:         Mood and Affect: Mood and affect normal.         Behavior: Behavior normal.         Thought Content: Thought content normal.         Judgment: Judgment normal.         Significant Labs:    BMP:  Recent Labs   Lab 01/21/22  0818 01/22/22 0446 01/23/22  0413    142 140   K 4.3 4.3 3.9    106 105   CO2 27 29 27   BUN 5* 5* 6   CREATININE 0.9 0.9 0.9   CALCIUM 8.3* 8.3* 7.9*       CBC:   Recent Labs   Lab 01/22/22  0446 01/23/22  0413 01/24/22  0145   WBC 9.11 8.89 8.83   HGB 11.4* 10.9* 10.8*   HCT 34.6* 33.6* 33.1*   PLT 50* 56* 63*       Blood Culture:   Recent Labs   Lab 01/24/22  0137   LABBLOO No Growth to date  No Growth to date     Urine Culture: No results for input(s): LABURIN in the last 168 hours.    Significant Imaging:  CT: I have reviewed all results within the past 24 hours and my personal findings are:  Right corpora cavernosum with fluid, similar

## 2022-01-25 NOTE — SUBJECTIVE & OBJECTIVE
Interval History: CC today is confusion.    Review of Systems,not able be done,confused.  Objective:     Vital Signs (Most Recent):  Temp: (!) 94.2 °F (34.6 °C) (01/25/22 1140)  Pulse: 76 (01/25/22 1140)  Resp: 20 (01/25/22 1140)  BP: (!) 156/89 (01/25/22 1140)  SpO2: (!) 94 % (01/25/22 1140) Vital Signs (24h Range):  Temp:  [94.2 °F (34.6 °C)-98 °F (36.7 °C)] 94.2 °F (34.6 °C)  Pulse:  [66-76] 76  Resp:  [17-25] 20  SpO2:  [94 %-97 %] 94 %  BP: (118-156)/(66-89) 156/89     Weight: 61.2 kg (134 lb 14.7 oz)  Body mass index is 19.92 kg/m².    Intake/Output Summary (Last 24 hours) at 1/25/2022 1207  Last data filed at 1/25/2022 0900  Gross per 24 hour   Intake 2182.88 ml   Output 1600 ml   Net 582.88 ml      Physical Exam  Constitutional:       Appearance: He is ill-appearing.   HENT:      Mouth/Throat:      Mouth: Mucous membranes are dry.   Cardiovascular:      Rate and Rhythm: Normal rate and regular rhythm.   Pulmonary:      Effort: No respiratory distress.      Breath sounds: No wheezing.   Abdominal:      General: There is no distension.      Tenderness: There is no abdominal tenderness.   Genitourinary:     Comments: Dressing on scrotal area  Musculoskeletal:         General: No swelling or deformity.   Skin:     Coloration: Skin is not jaundiced.      Findings: No bruising.         Significant Labs:   All pertinent labs within the past 24 hours have been reviewed.  BMP: No results for input(s): GLU, NA, K, CL, CO2, BUN, CREATININE, CALCIUM, MG in the last 48 hours.  CBC:   Recent Labs   Lab 01/24/22  0145   WBC 8.83   HGB 10.8*   HCT 33.1*   PLT 63*     CMP: No results for input(s): NA, K, CL, CO2, GLU, BUN, CREATININE, CALCIUM, PROT, ALBUMIN, BILITOT, ALKPHOS, AST, ALT, ANIONGAP, EGFRNONAA in the last 48 hours.    Invalid input(s): ESTWILFREDOAFGUANAKITO    Significant Imaging: I have reviewed all pertinent imaging results/findings within the past 24 hours.

## 2022-01-25 NOTE — PLAN OF CARE
Problem: SLP Goal  Goal: SLP Goal  Description: Goals:  1. Pt will tolerate puree diet/thin liquids w/o overt s/s of aspiration (discontinued 1/25/22).  2. Pt will tolerate mech soft diet (IDDSI-5) with thin liquids w/o overt s/s of aspiration. (on hold 1/16/22, pending pt progress)  Outcome: Ongoing, Not Progressing    ST recs NPO at this time due to pt's cont oral holding of all boluses in the oral cavity, and inconsistency in following oral commands. Pt with wet vocal quality and consistent cough prior to and during po trials. Pt with weak throat clear and cough, placing pt at a high risk of aspiration during po intake. ST recs non-oral meds. MD notified of diet recs, as well as RD and RT consult at this time.

## 2022-01-25 NOTE — PLAN OF CARE
Recommendations:  1)Per current TPN only providing 52% of EEN, consider transitioning to central line - Clinimix E 5/15 @ 75 mL/hr + daily IV lipid emulsions to provide 1778 kcal, 90 g AA, 270 g dextrose. 83% of EEN and 100% of EPN. GIR = 3.06.  Monitor triglycerides, if > 300 d/c daily lipids.   Monitor blood glucose, potassium, magnesium, and phosphorous.    2) ADA medically able and tolerated to SLP texture approved diabetic diet, encouraging PO intake.  3) Add Bubba BID to assist in wound healing once diet medically able to advance  4) Monitor nutrition related labs     Goals:   1) Pt to tolerate transition and initiation of TPN and consistently meet >75% of EEN/EPN by RD f/u  2) Nutrition related labs to trend WNL     Nutrition Goal Status: new  Communication of DIRK Recs:  (POC)

## 2022-01-25 NOTE — PT/OT/SLP PROGRESS
Speech Language Pathology Treatment    Patient Name:  Abhishek Zhao   MRN:  [id number]  Admitting Diagnosis: Scrotal abscess    Recommendations:                 General Recommendations:  Dysphagia therapy and RD consult, and RT consult  Diet recommendations:  NPO, Liquid Diet Level: NPO   Aspiration Precautions: Non-oral meds and Frequent oral care , Establish alternate means of nutrition   General Precautions: Standard, pureed diet,aspiration  Communication strategies:  yes/no questions only and provide increased time to answer    Subjective     The pt was asleep, requiring max verbal cues to arouse and sustain alertness throughout tx session. Pt with sitter present at b/s who reports consistent coughing this AM, therefore she held breakfast tray due to concern for aspiration. Pt's wife present for less than half of tx session.    Pain/Comfort:  ·  No pain    Respiratory Status: Nasal cannula, flow 2 L/min    Objective:     Has the patient been evaluated by SLP for swallowing?   Yes  Keep patient NPO? Yes   Current Respiratory Status:        ST repositioned HOB to 90 degrees and performed oral care prior to all po trials. Pt with cont fluctuating alertness, and dcr ability to follow oral commands throughout oral care.     Pt consumed x2 single-straw sips of apple juice, x3 cup-edge sips of boost, and x2 tsp bites of grits. Inconsistent and delayed coughing noted with straw sips of liquid. Pt with mod oral holding and dcr oral transit time with pureed consistencies. Pt required verbal cues to clear oral cavity of residue and initiate swallows. ST deferred remaining breakfast 2/2 pt's fluctuating alertness and dcr oral awareness of all boluses.     ST attempted to educate the pt's wife regarding pt's current cognitive status, and that he is demo dcr comprehension which significantly affects his ability to consume po intake. Pt's wife not accepting of information presented and kept stating that he understands everything  "he is just being "stubborn". ST will cont to provide education to the wife in regards to current cognitive status and diet recs.     Assessment:     ST recs NPO with non-oral means of meds at this time due to pt's cont dcr alertness and inability to consistently follow commands. RD and RT consult warranted at this time. MD notified of diet recs and consults via secure chat. Suction with yanker placed in pt's room, however, pt will require deeper suction from RT to clear mucous. ST will cont to follow.     Goals:   Multidisciplinary Problems     SLP Goals        Problem: SLP Goal    Goal Priority Disciplines Outcome   SLP Goal    Medium SLP Ongoing, Not Progressing   Description: Goals:  1. Pt will tolerate puree diet/thin liquids w/o overt s/s of aspiration (discontinued 1/25/22).  2. Pt will tolerate mech soft diet (IDDSI-5) with thin liquids w/o overt s/s of aspiration. (on hold 1/16/22, pending pt progress)                   Plan:     · Patient to be seen:  3 x/week   · Plan of Care expires:  01/29/22  · Plan of Care reviewed with:  patient   · SLP Follow-Up:  Yes       Discharge recommendations:  other (see comments) (TBD)   Barriers to Discharge:  None    Time Tracking:     SLP Treatment Date:   01/25/22  Speech Start Time:  0835  Speech Stop Time:  0900     Speech Total Time (min):  25 min    Billable Minutes: Treatment Swallowing Dysfunction 15 and Self Care/Home Management Training 10    01/25/2022  "

## 2022-01-25 NOTE — ASSESSMENT & PLAN NOTE
s/p cystoscopy with santiago placement and debridement of abscess/necrosis of scrotum 1/5/22 with Dr. Rangel.  Fluid collection drained at bedside 1/14/2022  CT from 1/18/22 w/ R corporal body fluid collection concerning for residual abscess vs fluid collection  Additional purulence expressed from penis on 1/20/21    Continue abx per primary  Cultures growing Candida and E coli  Appreciate wound care RN and floor RN dressing changes  Leukocytosis remains resolved  Afebrile  Will continue to monitor condition    Monitor fluid collection in corpora.   No need for intervention currently.    Will need plastic surgery evaluation in future once wound stable for possible grafting. Can be done as outpatient.

## 2022-01-26 LAB
ALBUMIN SERPL BCP-MCNC: 1.2 G/DL (ref 3.5–5.2)
ALP SERPL-CCNC: 246 U/L (ref 55–135)
ALT SERPL W/O P-5'-P-CCNC: 9 U/L (ref 10–44)
ANION GAP SERPL CALC-SCNC: 6 MMOL/L (ref 8–16)
AST SERPL-CCNC: 17 U/L (ref 10–40)
BACTERIA SPEC AEROBE CULT: ABNORMAL
BASOPHILS # BLD AUTO: 0.06 K/UL (ref 0–0.2)
BASOPHILS NFR BLD: 0.7 % (ref 0–1.9)
BILIRUB SERPL-MCNC: 0.2 MG/DL (ref 0.1–1)
BUN SERPL-MCNC: 9 MG/DL (ref 6–20)
CALCIUM SERPL-MCNC: 7.8 MG/DL (ref 8.7–10.5)
CHLORIDE SERPL-SCNC: 109 MMOL/L (ref 95–110)
CO2 SERPL-SCNC: 26 MMOL/L (ref 23–29)
CREAT SERPL-MCNC: 1.4 MG/DL (ref 0.5–1.4)
DIFFERENTIAL METHOD: ABNORMAL
EOSINOPHIL # BLD AUTO: 0 K/UL (ref 0–0.5)
EOSINOPHIL NFR BLD: 0.3 % (ref 0–8)
ERYTHROCYTE [DISTWIDTH] IN BLOOD BY AUTOMATED COUNT: 13.7 % (ref 11.5–14.5)
EST. GFR  (AFRICAN AMERICAN): >60 ML/MIN/1.73 M^2
EST. GFR  (NON AFRICAN AMERICAN): 55 ML/MIN/1.73 M^2
GLUCOSE SERPL-MCNC: 128 MG/DL (ref 70–110)
HCT VFR BLD AUTO: 30 % (ref 40–54)
HGB BLD-MCNC: 9.9 G/DL (ref 14–18)
HIV1+2 IGG SERPL QL IA.RAPID: NORMAL
IMM GRANULOCYTES # BLD AUTO: 0.07 K/UL (ref 0–0.04)
IMM GRANULOCYTES NFR BLD AUTO: 0.8 % (ref 0–0.5)
LYMPHOCYTES # BLD AUTO: 2.2 K/UL (ref 1–4.8)
LYMPHOCYTES NFR BLD: 23.8 % (ref 18–48)
MCH RBC QN AUTO: 28.6 PG (ref 27–31)
MCHC RBC AUTO-ENTMCNC: 33 G/DL (ref 32–36)
MCV RBC AUTO: 87 FL (ref 82–98)
MONOCYTES # BLD AUTO: 0.5 K/UL (ref 0.3–1)
MONOCYTES NFR BLD: 5.2 % (ref 4–15)
NEUTROPHILS # BLD AUTO: 6.2 K/UL (ref 1.8–7.7)
NEUTROPHILS NFR BLD: 69.2 % (ref 38–73)
NRBC BLD-RTO: 0 /100 WBC
PLATELET # BLD AUTO: 112 K/UL (ref 150–450)
PLATELET BLD QL SMEAR: ABNORMAL
PMV BLD AUTO: 11.6 FL (ref 9.2–12.9)
POCT GLUCOSE: 122 MG/DL (ref 70–110)
POCT GLUCOSE: 141 MG/DL (ref 70–110)
POTASSIUM SERPL-SCNC: 4 MMOL/L (ref 3.5–5.1)
PROT SERPL-MCNC: 6.9 G/DL (ref 6–8.4)
RBC # BLD AUTO: 3.46 M/UL (ref 4.6–6.2)
SODIUM SERPL-SCNC: 141 MMOL/L (ref 136–145)
WBC # BLD AUTO: 9.02 K/UL (ref 3.9–12.7)

## 2022-01-26 PROCEDURE — 25000003 PHARM REV CODE 250: Performed by: STUDENT IN AN ORGANIZED HEALTH CARE EDUCATION/TRAINING PROGRAM

## 2022-01-26 PROCEDURE — A4216 STERILE WATER/SALINE, 10 ML: HCPCS | Performed by: STUDENT IN AN ORGANIZED HEALTH CARE EDUCATION/TRAINING PROGRAM

## 2022-01-26 PROCEDURE — 97530 THERAPEUTIC ACTIVITIES: CPT | Mod: CQ

## 2022-01-26 PROCEDURE — 99233 SBSQ HOSP IP/OBS HIGH 50: CPT | Mod: ,,, | Performed by: INTERNAL MEDICINE

## 2022-01-26 PROCEDURE — 86703 HIV-1/HIV-2 1 RESULT ANTBDY: CPT | Performed by: HOSPITALIST

## 2022-01-26 PROCEDURE — C9254 INJECTION, LACOSAMIDE: HCPCS | Performed by: STUDENT IN AN ORGANIZED HEALTH CARE EDUCATION/TRAINING PROGRAM

## 2022-01-26 PROCEDURE — 99233 SBSQ HOSP IP/OBS HIGH 50: CPT | Mod: ,,, | Performed by: STUDENT IN AN ORGANIZED HEALTH CARE EDUCATION/TRAINING PROGRAM

## 2022-01-26 PROCEDURE — 80053 COMPREHEN METABOLIC PANEL: CPT | Performed by: HOSPITALIST

## 2022-01-26 PROCEDURE — 87040 BLOOD CULTURE FOR BACTERIA: CPT | Performed by: STUDENT IN AN ORGANIZED HEALTH CARE EDUCATION/TRAINING PROGRAM

## 2022-01-26 PROCEDURE — 85025 COMPLETE CBC W/AUTO DIFF WBC: CPT | Performed by: HOSPITALIST

## 2022-01-26 PROCEDURE — 63600175 PHARM REV CODE 636 W HCPCS: Performed by: STUDENT IN AN ORGANIZED HEALTH CARE EDUCATION/TRAINING PROGRAM

## 2022-01-26 PROCEDURE — 99233 PR SUBSEQUENT HOSPITAL CARE,LEVL III: ICD-10-PCS | Mod: ,,, | Performed by: INTERNAL MEDICINE

## 2022-01-26 PROCEDURE — 99233 PR SUBSEQUENT HOSPITAL CARE,LEVL III: ICD-10-PCS | Mod: ,,, | Performed by: STUDENT IN AN ORGANIZED HEALTH CARE EDUCATION/TRAINING PROGRAM

## 2022-01-26 PROCEDURE — 63600175 PHARM REV CODE 636 W HCPCS: Performed by: HOSPITALIST

## 2022-01-26 PROCEDURE — 11000001 HC ACUTE MED/SURG PRIVATE ROOM

## 2022-01-26 PROCEDURE — 82746 ASSAY OF FOLIC ACID SERUM: CPT | Performed by: HOSPITALIST

## 2022-01-26 PROCEDURE — 97530 THERAPEUTIC ACTIVITIES: CPT

## 2022-01-26 PROCEDURE — 82607 VITAMIN B-12: CPT | Performed by: HOSPITALIST

## 2022-01-26 PROCEDURE — 86592 SYPHILIS TEST NON-TREP QUAL: CPT | Performed by: HOSPITALIST

## 2022-01-26 PROCEDURE — B4185 PARENTERAL SOL 10 GM LIPIDS: HCPCS | Performed by: HOSPITALIST

## 2022-01-26 PROCEDURE — 25000003 PHARM REV CODE 250: Performed by: HOSPITALIST

## 2022-01-26 RX ADMIN — PIPERACILLIN AND TAZOBACTAM 4.5 G: 4; .5 INJECTION, POWDER, LYOPHILIZED, FOR SOLUTION INTRAVENOUS; PARENTERAL at 05:01

## 2022-01-26 RX ADMIN — DEXTROSE 500 MG: 50 INJECTION, SOLUTION INTRAVENOUS at 02:01

## 2022-01-26 RX ADMIN — THIAMINE HYDROCHLORIDE 500 MG: 100 INJECTION, SOLUTION INTRAMUSCULAR; INTRAVENOUS at 10:01

## 2022-01-26 RX ADMIN — Medication 10 ML: at 06:01

## 2022-01-26 RX ADMIN — Medication 10 ML: at 12:01

## 2022-01-26 RX ADMIN — SODIUM CHLORIDE 200 MG: 9 INJECTION, SOLUTION INTRAVENOUS at 12:01

## 2022-01-26 RX ADMIN — Medication 10 ML: at 05:01

## 2022-01-26 RX ADMIN — SOYBEAN OIL 250 ML: 20 INJECTION, SOLUTION INTRAVENOUS at 10:01

## 2022-01-26 RX ADMIN — DEXTROSE 500 MG: 50 INJECTION, SOLUTION INTRAVENOUS at 11:01

## 2022-01-26 RX ADMIN — PIPERACILLIN AND TAZOBACTAM 4.5 G: 4; .5 INJECTION, POWDER, LYOPHILIZED, FOR SOLUTION INTRAVENOUS; PARENTERAL at 06:01

## 2022-01-26 RX ADMIN — RETINOL, ERGOCALCIFEROL, .ALPHA.-TOCOPHEROL ACETATE, DL-, PHYTONADIONE, ASCORBIC ACID, NIACINAMIDE, RIBOFLAVIN 5-PHOSPHATE SODIUM, THIAMINE HYDROCHLORIDE, PYRIDOXINE HYDROCHLORIDE, DEXPANTHENOL, BIOTIN, FOLIC ACID, AND CYANOCOBALAMIN: KIT at 10:01

## 2022-01-26 RX ADMIN — FLUCONAZOLE 400 MG: 2 INJECTION, SOLUTION INTRAVENOUS at 02:01

## 2022-01-26 RX ADMIN — THIAMINE HYDROCHLORIDE 500 MG: 100 INJECTION, SOLUTION INTRAMUSCULAR; INTRAVENOUS at 01:01

## 2022-01-26 RX ADMIN — ENOXAPARIN SODIUM 40 MG: 40 INJECTION SUBCUTANEOUS at 06:01

## 2022-01-26 RX ADMIN — THIAMINE HYDROCHLORIDE 500 MG: 100 INJECTION, SOLUTION INTRAMUSCULAR; INTRAVENOUS at 03:01

## 2022-01-26 NOTE — PLAN OF CARE
Problem: Physical Therapy Goal  Goal: Physical Therapy Goal  Description: Goals to be met by: 22     Patient will increase functional independence with mobility by performin. Supine to sit with Stand-by Assistance  2. Rolling to Left and Right with Stand-by Assistance.  3. Sit to stand transfer to be assessed   4. Gait to be assessed    5. Sitting at edge of bed x15 minutes with Stand-by Assistance  6. Lower extremity exercise program x10 reps per handout, with assistance as needed    Outcome: Ongoing, Not Progressing

## 2022-01-26 NOTE — SUBJECTIVE & OBJECTIVE
Interval History: NAEO.  Denies new complaints. A*O to person, place and time, but appears somnolent.     Updated cultures were went 1/21 (by urology?) and now growing candida galbrata    prior culture from groin- e.coli and candida albicans.       1/22 ultrasound-   Complex collection which the technologist describes as located inferior to the right testicle measures 4.7 x 2.3 x 2.5 cm (previously 4 x 2.7 x 4 cm on prior ultrasound).  Mild increased vascularity in the surrounding soft tissues with no internal vascularity documented.     Diffuse scrotal wall edema.     Impression:     Persistent complex collection in the soft tissues inferior to the right testicle mildly decreased in size compared to prior ultrasound.  Considerations include phlegmon versus abscess.        Interventional Radiology unable to aspirate fluid collection.            Review of Systems   Constitutional: Negative for chills and fever.   Skin: Positive for wound.   All other systems reviewed and are negative.    Objective:     Vital Signs (Most Recent):  Temp: 97.9 °F (36.6 °C) (01/26/22 1139)  Pulse: 78 (01/26/22 1139)  Resp: 18 (01/26/22 1139)  BP: (!) 159/87 (01/26/22 1139)  SpO2: 95 % (01/26/22 1139) Vital Signs (24h Range):  Temp:  [97.6 °F (36.4 °C)-98.5 °F (36.9 °C)] 97.9 °F (36.6 °C)  Pulse:  [72-83] 78  Resp:  [16-20] 18  SpO2:  [91 %-98 %] 95 %  BP: (137-159)/(80-87) 159/87     Weight: 61.2 kg (134 lb 14.7 oz)  Body mass index is 19.92 kg/m².    Estimated Creatinine Clearance: 49.2 mL/min (based on SCr of 1.4 mg/dL).    Physical Exam  Vitals and nursing note reviewed.   Constitutional:       Appearance: He is not ill-appearing, toxic-appearing or diaphoretic.   HENT:      Head: Normocephalic and atraumatic.      Right Ear: External ear normal.      Left Ear: External ear normal.      Nose: Nose normal.   Eyes:      Extraocular Movements: Extraocular movements intact.      Conjunctiva/sclera: Conjunctivae normal.      Pupils: Pupils  are equal, round, and reactive to light.   Cardiovascular:      Rate and Rhythm: Normal rate and regular rhythm.   Pulmonary:      Effort: Pulmonary effort is normal.      Breath sounds: Normal breath sounds.   Abdominal:      Tenderness: There is no guarding or rebound.   Genitourinary:     Comments: dressed  Musculoskeletal:         General: No swelling or tenderness.   Neurological:      General: No focal deficit present.      Mental Status: He is alert.   Psychiatric:         Mood and Affect: Mood normal.         Behavior: Behavior normal.             Significant Labs:   Blood Culture:   Recent Labs   Lab 01/04/22  1744 01/10/22  1411 01/24/22  0137   LABBLOO No Growth after 4 days.   No Growth after 4 days.  No Growth after 4 days.   No Growth after 4 days.  No Growth to date  No Growth to date  No Growth to date     CBC:   Recent Labs   Lab 01/26/22  0034   WBC 9.02   HGB 9.9*   HCT 30.0*   *     Respiratory Culture:   Recent Labs   Lab 01/10/22  1653   GSRESP <10 epithelial cells per low power field.  No WBC's  No organisms seen   RESPIRATORYC PSEUDOMONAS AERUGINOSA  Moderate  *     Urine Culture:   Recent Labs   Lab 01/04/22  1804   LABURIN CANDIDA ALBICANS  10,000 - 49,999 cfu/ml  Treatment of asymptomatic candiduria is not recommended (except for   specific populations). Candida isolated in the urine typically   represents colonization. If an indwelling urinary catheter is present  it should be removed or replaced.  *     Wound Culture:   Recent Labs   Lab 01/05/22  1055 01/21/22  1942   LABAERO ESCHERICHIA COLI  Moderate  *  YAMILET ALBICANS  Few  * YAMILET GLABRATA  From broth only  *       Significant Imaging: I have reviewed all pertinent imaging results/findings within the past 24 hours.

## 2022-01-26 NOTE — ASSESSMENT & PLAN NOTE
Check a1c  Hold oral antihyperglycemics while inpatient  PRN sliding scale insulin  ACHS glucose monitoring

## 2022-01-26 NOTE — PLAN OF CARE
Problem: Adult Inpatient Plan of Care  Goal: Plan of Care Review  Outcome: Ongoing, Progressing     Problem: Diabetes Comorbidity  Goal: Blood Glucose Level Within Targeted Range  Outcome: Ongoing, Progressing  Intervention: Monitor and Manage Glycemia  Flowsheets (Taken 1/26/2022 4545)  Glycemic Management: blood glucose monitored

## 2022-01-26 NOTE — CONSULTS
2022: Visited with the patient and his medical provider. The patient did not respond to my voice when I called his name. His Nurse reports that when the patient is feeling well, he communicates with her and is even jovial on occasions, The patient's Nurse further stated that the patient's wife is not supportive when the patient responds irrationally.  The nurse also noted that the patient's sister is more compassionate and empathetic towards the patient. His medical provider also observed that he has been less communicative lately. Prayers were offered on behalf of this patient. The Spiritual Care Team will continue to offer spiritual support for this patient.         Diandra Aguiar. MIRAN   [phone]

## 2022-01-26 NOTE — ASSESSMENT & PLAN NOTE
Repeated episodes of AMS c/f seizures w/ post ictal period  - neurology consulted  - AEDs per neurology  - EEG no seizure.    Was hypoglycemic over night,started on D % IVF,added appetizer and supplement,will monitor.  Again in altered,could not pass ST,back NPO,started on TPN,consulted palliative care.    Had family meeting,they have been updated,wife want patient remains full code,check HIV,RPR,vitamin levels,alreday had TSH.

## 2022-01-26 NOTE — SUBJECTIVE & OBJECTIVE
Interval History: more alert today    Review of Systems   Unable to perform ROS: Mental status change     Objective:     Temp:  [97.6 °F (36.4 °C)-98.5 °F (36.9 °C)] 97.9 °F (36.6 °C)  Pulse:  [72-83] 78  Resp:  [16-20] 18  SpO2:  [91 %-98 %] 95 %  BP: (137-159)/(80-87) 159/87     Body mass index is 19.92 kg/m².           Drains     Drain                 Urethral Catheter 01/05/22 1050 Double-lumen 20 Fr. 21 days                Physical Exam  Constitutional:       General: He is not in acute distress.     Appearance: He is well-developed. He is not ill-appearing, toxic-appearing or diaphoretic.   HENT:      Head: Normocephalic and atraumatic.      Mouth/Throat:      Mouth: Mucous membranes are moist.   Eyes:      Conjunctiva/sclera: Conjunctivae normal.   Pulmonary:      Effort: Pulmonary effort is normal. No respiratory distress.   Abdominal:      General: Abdomen is flat. There is no distension.      Palpations: Abdomen is soft.      Tenderness: There is no abdominal tenderness.   Genitourinary:     Comments: Wound edges viable  Healthy appearing granulation tissue in wound bed  Dressing in place with drainage noted  Sahu in place draining well    Musculoskeletal:         General: No swelling or deformity.      Cervical back: Neck supple.   Skin:     General: Skin is warm.      Findings: No rash.   Neurological:      Mental Status: He is disoriented.   Psychiatric:      Comments: Answers some questions, easily disoriented         Significant Labs:    BMP:  Recent Labs   Lab 01/22/22  0446 01/23/22  0413 01/26/22  0034    140 141   K 4.3 3.9 4.0    105 109   CO2 29 27 26   BUN 5* 6 9   CREATININE 0.9 0.9 1.4   CALCIUM 8.3* 7.9* 7.8*       CBC:   Recent Labs   Lab 01/23/22  0413 01/24/22  0145 01/26/22  0034   WBC 8.89 8.83 9.02   HGB 10.9* 10.8* 9.9*   HCT 33.6* 33.1* 30.0*   PLT 56* 63* 112*

## 2022-01-26 NOTE — PROGRESS NOTES
[facility] Medicine  Progress Note    Patient Name: Abhishek Zhao  MRN: [id number]  Patient Class: IP- Inpatient   Admission Date: 1/4/2022  Length of Stay: 21 days  Attending Physician: Quinten Rosario MD  Primary Care Provider: To Obtain Unable        Subjective:     Principal Problem:Scrotal abscess        HPI:  61 y.o. male with adjustment disorder with depressed mood, chronic ischemic heart disease, cocaine dependence in remission, cardiomegaly, HLD, HTN, swizure disorder, tobacco use, and DM 2 presents with a complaint of testicular pain.  Associated with swelling and redness to the scrotum and penis along with difficulty urinating, pain is rated as severe and radiates to the rectum.  Denies fever, chills, cough, SOB, chest pain, n/v/d.  In the ED, he was found to be tachypneic, with leukocytosis and elevated lactic.  CT and US shows evidence of multiple scrotal and perineal abscesses with some extension into the penile shaft.  UA with evidence of infection.  Sepsis treatment initiated, blood and urine cultures obtained, IV fluids and IV antibiotics initiated.  Urology consulted.      Overview/Hospital Course:  61 y.o. male with adjustment disorder with depressed mood, chronic ischemic heart disease, cocaine dependence in remission, cardiomegaly, HLD, HTN, swizure disorder, tobacco use, and DM 2 presents with a complaint of testicular pain, found to have multiple scrotal and perineal abscesses. Placed on broad spectrum antibiotics, underwent I&D with urology.    Pt w/ aspiration event 1/11 w/ subsequent desaturation. Respiratory culture grew pseudomonas. Kept on Zosyn. On 1/13 pt had abrupt reduction in responsiveness. Code stroke called, patient transferred to ICU. No stroke found on imaging. Started on extended EEG. Mental status improved, patient stepped back down to the floor.     His mental status improved slowly for a few days, but worsened again 1/17.   He was altered,was  not speaking,head CT show no acute process,EEG,per neurology show no seizure activity,todayb is much alert,appear to be delirious,will monitor.  Consulted IR for drainage of residual fluid  collection on scrotum,CT pelvic.IR procedure is not done,duo to high risk for complications,will continue with IV Abx and repeat CT .spoke with Urology ,US show improvement in Abscess size,no need for intervention at this time,continue with IV Abx.  Was hypoglycemic over night,back on D5 IVF,added appetizer and supplement.  Again in altered,could not pass ST,back NPO,started on TPN,consulted palliative care.Had family meeting,they have been updated,wife want patient remains full code.  CC today is weakness.      Interval History: CC today is confusion.    Review of Systems  ,not able be done,confused.  Objective:     Vital Signs (Most Recent):  Temp: 97.8 °F (36.6 °C) (01/26/22 0721)  Pulse: 83 (01/26/22 0721)  Resp: 20 (01/26/22 0721)  BP: (!) 149/83 (01/26/22 0721)  SpO2: (!) 93 % (01/26/22 0721) Vital Signs (24h Range):  Temp:  [94.2 °F (34.6 °C)-98.5 °F (36.9 °C)] 97.8 °F (36.6 °C)  Pulse:  [72-83] 83  Resp:  [16-20] 20  SpO2:  [91 %-98 %] 93 %  BP: (137-156)/(80-89) 149/83     Weight: 61.2 kg (134 lb 14.7 oz)  Body mass index is 19.92 kg/m².    Intake/Output Summary (Last 24 hours) at 1/26/2022 1131  Last data filed at 1/26/2022 0600  Gross per 24 hour   Intake 10 ml   Output 1650 ml   Net -1640 ml      Physical Exam  Constitutional:       Appearance: He is ill-appearing.   HENT:      Mouth/Throat:      Mouth: Mucous membranes are dry.   Cardiovascular:      Rate and Rhythm: Normal rate and regular rhythm.   Pulmonary:      Effort: No respiratory distress.      Breath sounds: No wheezing.   Abdominal:      General: There is no distension.      Tenderness: There is no abdominal tenderness.   Genitourinary:     Comments: Dressing on scrotal area  Musculoskeletal:         General: No swelling or deformity.   Skin:      Coloration: Skin is not jaundiced.      Findings: No bruising.         Significant Labs:   All pertinent labs within the past 24 hours have been reviewed.  BMP:   Recent Labs   Lab 01/26/22  0034   *      K 4.0      CO2 26   BUN 9   CREATININE 1.4   CALCIUM 7.8*     CBC:   Recent Labs   Lab 01/26/22  0034   WBC 9.02   HGB 9.9*   HCT 30.0*   *     CMP:   Recent Labs   Lab 01/26/22  0034      K 4.0      CO2 26   *   BUN 9   CREATININE 1.4   CALCIUM 7.8*   PROT 6.9   ALBUMIN 1.2*   BILITOT 0.2   ALKPHOS 246*   AST 17   ALT 9*   ANIONGAP 6*   EGFRNONAA 55*       Significant Imaging: I have reviewed all pertinent imaging results/findings within the past 24 hours.      Assessment/Plan:      * Scrotal abscess  Multiple abscesses c/w Berna's gangrene, s/p I&D by urology on 1/5. Cultures growing ampicillin-resistant Ecoli and C albicans. Repeat CT A/P 1/13 w/ 4cm fluid collection c/f persistent abscess which was drained bedside by urology  - urology following  - cont zosyn (re-started for HCAP)  - further ID recs pending  - repeat CT pelvis.  Consulted IR for drainage of residual fluid  collection on CT pelvic. IR procedure is not done,duo to high risk for complications,will continue with IV Abx and repeat CT,spoke with Urology ,US show improvement in Abscess seize,no need for intervention at this time,continue with IV Abx.          Alkaline phosphatase elevation  Chronic isolated alk phos (GGT elevated), worsened this admission. Possibly due to AEDs vs occult HPB process.   - further workup depending on clinical course      Pericardial effusion  Small pericardial effusion of unclear etiology      HCAP (healthcare-associated pneumonia)  - see respiratory failure       Acute respiratory failure with hypoxia and hypercarbia  Cough + new LLL opacity on imaging c/f pneumonia, however CT imaging showing pleural effusion and atelectasis  - respiratory cultures w/ pseudomonas  - cont  zosyn  - furosemide as tolerated; no significant evidence of cardiac dysfunction on TTE  - IS        Hypokalemia  Replete PRN    JOI (acute kidney injury)  Resolved      Sepsis due to Gram negative bacteria  Resolved      Berna's gangrene  As above    Diabetes mellitus type 2, controlled  Check a1c  Hold oral antihyperglycemics while inpatient  PRN sliding scale insulin  ACHS glucose monitoring       Tobacco user  5 minutes spent counseling the patient on smoking cessation and he is not currently ready to stop smoking. He will be offereded a nicotine transdermal patch while hospitalized and monitored for withdrawal.  Will provide additional smoking cessation counseling prior to discharge.     Acute encephalopathy  Repeated episodes of AMS c/f seizures w/ post ictal period  - neurology consulted  - AEDs per neurology  - EEG no seizure.    Was hypoglycemic over night,started on D % IVF,added appetizer and supplement,will monitor.  Again in altered,could not pass ST,back NPO,started on TPN,consulted palliative care.    Had family meeting,they have been updated,wife want patient remains full code,check HIV,RPR,vitamin levels,alreday had TSH.    Essential hypertension  Well controlled, continue home medications and monitor blood pressure, adjust as needed.     Hyperlipidemia  Continue statin    Cardiomegaly  Pulmonary edema seen on imaging, small pericardial effusion seen on TTE. LVEF low-normal (50%)  - hold diuresis while NPO    Cocaine dependence in remission  Counseled     Chronic ischemic heart disease  No acute issue    Adjustment disorder with depressed mood  Continue home citalopram, hold home seroquel due to somnolence      VTE Risk Mitigation (From admission, onward)         Ordered     enoxaparin injection 40 mg  Daily         01/13/22 1612     IP VTE LOW RISK PATIENT  Once         01/04/22 2241     Place sequential compression device  Until discontinued         01/04/22 2241                Discharge  Planning   CHUCK:      Code Status: Full Code   Is the patient medically ready for discharge?:     Reason for patient still in hospital (select all that apply): Patient trending condition  Discharge Plan A: Skilled Nursing Facility   Discharge Delays: (!) Post-Acute Set-up              Quinten Rosario MD  Department of Hospital Medicine   [facility]

## 2022-01-26 NOTE — ASSESSMENT & PLAN NOTE
61M with Berna's gangrene, s/p debridement, followed by Urology. E.coli and Candida albicans grew on culture (urine culture also grew Candida albicans). On Zosyn and fluconazole. Repeat US 1/22 with  4.7 x 2.3 x 2.5 cm residual abscess; draining spontaneously? IR can not aspirate. Suspect the c.galbrata sent on bedside culture represents colonization and wound not treat. Note there are ongoing hospice discussions and pt on tpn and is not taking oral nutrition. Also with intermittent confusion    Updated CT with enlarging Rim enhancing fluid collection in the right perineal region at the base of the penis measuring 4.7 x 1.5 x 2.8 cm , previously 4.0 x 1.6 x 2.7 cm.    Recommendations:  - continue antibiotics; fluconazole and zosyn reasonable as long as c/w goals of care. (Note that there is less neurotoxicity associated with zosyn as compared to cefepime and quinolones)  - duration of abx until radiographic resolution of abscess. Consider more definitive drainage of abscess if possible; would significantly shorten the duration of antibiotics   - if procedure done, please send updated surgical cultures  - please notify ID with any new growth on culture

## 2022-01-26 NOTE — PROGRESS NOTES
[facility]  Urology  Progress Note    Patient Name: Abhishek Zhao  MRN: [id number]  Admission Date: 1/4/2022  Hospital Length of Stay: 21 days  Code Status: Full Code   Attending Provider: Quinten Rosario MD   Primary Care Physician: To Obtain Unable    Subjective:     HPI:  Scrotal Swelling  Abhishek Zhao is a 61 y.o. male who was been experiencing scrotal pain and swelling since 12/31/2021.  He denies any fever.  He has had issues voiding since the swelling began.  Hemostat having issues in the past with urinary problems.  He denies having any previous issues with scrotal infection or swelling.  He recalls that he had procedures in the past but cannot recall specifically what to place.  He does not have urologist locally but moved back from [place] about 1 year ago.    Review of care everywhere shows that he had a cystoscopy with urethral dilation of a urethral stricture in the bulbar urethra in 2019 at [facility].  And there are reports that in approximately 2015 [facility].      Interval History: more alert today    Review of Systems   Unable to perform ROS: Mental status change     Objective:     Temp:  [97.6 °F (36.4 °C)-98.5 °F (36.9 °C)] 97.9 °F (36.6 °C)  Pulse:  [72-83] 78  Resp:  [16-20] 18  SpO2:  [91 %-98 %] 95 %  BP: (137-159)/(80-87) 159/87     Body mass index is 19.92 kg/m².           Drains     Drain                 Urethral Catheter 01/05/22 1050 Double-lumen 20 Fr. 21 days                Physical Exam  Constitutional:       General: He is not in acute distress.     Appearance: He is well-developed. He is not ill-appearing, toxic-appearing or diaphoretic.   HENT:      Head: Normocephalic and atraumatic.      Mouth/Throat:      Mouth: Mucous membranes are moist.   Eyes:      Conjunctiva/sclera: Conjunctivae normal.   Pulmonary:      Effort: Pulmonary effort is normal. No respiratory distress.   Abdominal:      General: Abdomen is flat. There  is no distension.      Palpations: Abdomen is soft.      Tenderness: There is no abdominal tenderness.   Genitourinary:     Comments: Wound edges viable  Healthy appearing granulation tissue in wound bed  Dressing in place with drainage noted  Santiago in place draining well    Musculoskeletal:         General: No swelling or deformity.      Cervical back: Neck supple.   Skin:     General: Skin is warm.      Findings: No rash.   Neurological:      Mental Status: He is disoriented.   Psychiatric:      Comments: Answers some questions, easily disoriented         Significant Labs:    BMP:  Recent Labs   Lab 01/22/22  0446 01/23/22  0413 01/26/22  0034    140 141   K 4.3 3.9 4.0    105 109   CO2 29 27 26   BUN 5* 6 9   CREATININE 0.9 0.9 1.4   CALCIUM 8.3* 7.9* 7.8*       CBC:   Recent Labs   Lab 01/23/22  0413 01/24/22  0145 01/26/22  0034   WBC 8.89 8.83 9.02   HGB 10.9* 10.8* 9.9*   HCT 33.6* 33.1* 30.0*   PLT 56* 63* 112*           Assessment/Plan:     * Scrotal abscess  s/p cystoscopy with santiago placement and debridement of abscess/necrosis of scrotum 1/5/22 with Dr. Rangel.  Fluid collection drained at bedside 1/14/2022  CT from 1/18/22 w/ R corporal body fluid collection concerning for residual abscess vs fluid collection  Additional purulence expressed from penis on 1/20/21    Continue abx per primary  Cultures growing Candida and E coli  Appreciate wound care RN and floor RN dressing changes  Leukocytosis remains resolved  Afebrile  Will continue to monitor condition    Monitor fluid collection in corpora.   No need for intervention currently.    Will need plastic surgery evaluation in future once wound stable for possible grafting. Can be done as outpatient.             Berna's gangrene   - s/p debridement 1/5/22        VTE Risk Mitigation (From admission, onward)         Ordered     enoxaparin injection 40 mg  Daily         01/13/22 1612     IP VTE LOW RISK PATIENT  Once         01/04/22 0848      Place sequential compression device  Until discontinued         01/04/22 6234                Melinda Mitchell MD  Urology  [facility] [place]

## 2022-01-26 NOTE — PT/OT/SLP PROGRESS
Speech Language Pathology      Abhishek Zhao  MRN: [id number]    Patient not seen today secondary to pt's dcr alertness, and dcr ability to sustain alertness to consume po trials. ST attempted to see pt with PT/OT, however, to became extremely fatigued EOB, and was unable to initiate straw sips of liquids (liquids falling from oral cavity). ST recs pt remain NPO at this time, with cont TPN. Non-oral meds at this time. ST recs RD consult to address alternate means of nutrition, as due to the pt's fluctuating alertness, the pt will not be able to meet nutritional needs solely orally. ST will cont to follow.    Deloris Newby, CCC-SLP

## 2022-01-26 NOTE — PLAN OF CARE
[facility]  Discharge Reassessment    TN spoke with patient's niece (Gricelda) at bedside regarding discharge planning. TN informed family no accepting snf facility at this time. TN provided sitter list to family via email. TN explained sitters are private paid service, verbalized understanding.    Primary Care Provider: To Obtain Unable    Expected Discharge Date:     Reassessment (most recent)     Discharge Reassessment - 01/26/22 1705        Discharge Reassessment    Assessment Type Discharge Planning Reassessment     Did the patient's condition or plan change since previous assessment? No     Name(s) and Number(s) Gricelda (niece)     Communicated CHUCK with patient/caregiver Date not available/Unable to determine     Discharge Plan A Skilled Nursing Facility     Discharge Plan B Home with family;Home Health     DME Needed Upon Discharge  --   tbd    Discharge Barriers Identified Substance Abuse;Nursing Home rejection     Why the patient remains in the hospital Requires continued medical care        Post-Acute Status    Post-Acute Authorization Placement     Discharge Delays Post-Acute Set-up

## 2022-01-26 NOTE — SUBJECTIVE & OBJECTIVE
Interval History: CC today is confusion.    Review of Systems  ,not able be done,confused.  Objective:     Vital Signs (Most Recent):  Temp: 97.8 °F (36.6 °C) (01/26/22 0721)  Pulse: 83 (01/26/22 0721)  Resp: 20 (01/26/22 0721)  BP: (!) 149/83 (01/26/22 0721)  SpO2: (!) 93 % (01/26/22 0721) Vital Signs (24h Range):  Temp:  [94.2 °F (34.6 °C)-98.5 °F (36.9 °C)] 97.8 °F (36.6 °C)  Pulse:  [72-83] 83  Resp:  [16-20] 20  SpO2:  [91 %-98 %] 93 %  BP: (137-156)/(80-89) 149/83     Weight: 61.2 kg (134 lb 14.7 oz)  Body mass index is 19.92 kg/m².    Intake/Output Summary (Last 24 hours) at 1/26/2022 1131  Last data filed at 1/26/2022 0600  Gross per 24 hour   Intake 10 ml   Output 1650 ml   Net -1640 ml      Physical Exam  Constitutional:       Appearance: He is ill-appearing.   HENT:      Mouth/Throat:      Mouth: Mucous membranes are dry.   Cardiovascular:      Rate and Rhythm: Normal rate and regular rhythm.   Pulmonary:      Effort: No respiratory distress.      Breath sounds: No wheezing.   Abdominal:      General: There is no distension.      Tenderness: There is no abdominal tenderness.   Genitourinary:     Comments: Dressing on scrotal area  Musculoskeletal:         General: No swelling or deformity.   Skin:     Coloration: Skin is not jaundiced.      Findings: No bruising.         Significant Labs:   All pertinent labs within the past 24 hours have been reviewed.  BMP:   Recent Labs   Lab 01/26/22 0034   *      K 4.0      CO2 26   BUN 9   CREATININE 1.4   CALCIUM 7.8*     CBC:   Recent Labs   Lab 01/26/22 0034   WBC 9.02   HGB 9.9*   HCT 30.0*   *     CMP:   Recent Labs   Lab 01/26/22 0034      K 4.0      CO2 26   *   BUN 9   CREATININE 1.4   CALCIUM 7.8*   PROT 6.9   ALBUMIN 1.2*   BILITOT 0.2   ALKPHOS 246*   AST 17   ALT 9*   ANIONGAP 6*   EGFRNONAA 55*       Significant Imaging: I have reviewed all pertinent imaging results/findings within the past 24 hours.

## 2022-01-26 NOTE — PT/OT/SLP PROGRESS
Physical Therapy Treatment    Patient Name:  Abhishek Zhao   MRN:  [id number]    Recommendations:     Discharge Recommendations:   (SNF vs Home with HHPT and 24hr care)   Discharge Equipment Recommendations: hospital bed   Barriers to discharge: none    Assessment:     Abhishek Zhao is a 61 y.o. male admitted with a medical diagnosis of Scrotal abscess.  He presents with the following impairments/functional limitations:  weakness,impaired endurance,impaired self care skills,impaired functional mobilty,gait instability,decreased upper extremity function,decreased lower extremity function,impaired balance,pain,decreased safety awareness,decreased ROM,impaired cardiopulmonary response to activity,impaired cognition,decreased coordination,impaired skin,impaired coordination .    Rehab Prognosis: Fair; patient would benefit from acute skilled PT services to address these deficits and reach maximum level of function.    Recent Surgery: Procedure(s) (LRB):  CYSTOSCOPY, RETROGRADE URETHROGRAM, FISTULAGRAM, EXCISION OF NECROTIC SCROTAL TISSUE ABSCESS, SANTIAGO PLACEMENT (N/A)  EXCISION, ABSCESS  CYSTOSCOPY  FISTULOGRAM (N/A) 21 Days Post-Op    Plan:     During this hospitalization, patient to be seen  (3-5x/wk) to address the identified rehab impairments via gait training,therapeutic activities,therapeutic exercises,wheelchair management/training,neuromuscular re-education and progress toward the following goals:    · Plan of Care Expires:  01/27/22    Subjective     Chief Complaint: tired   Patient/Family Comments/goals: pt is agreeable to therapy   Pain/Comfort:  · Location - Orientation 1: lower  · Location 1: back  · Pain Addressed 1: Pre-medicate for activity,Reposition,Cessation of Activity,Nurse notified      Objective:     Communicated with nurse  prior to session.  Patient found HOB elevated with telemetry,bed alarm,oxygen,santiago catheter,pressure relief boots,peripheral IV upon PT entry to room.     General Precautions:  Standard, fall,respiratory,pureed diet   Orthopedic Precautions:N/A   Braces: N/A  Respiratory Status: Nasal cannula, flow 2 L/min     Functional Mobility:  · Bed Mobility:     · Rolling Right: dependence  · Scooting: dependence and of 2 persons  · Supine to Sit: dependence and of 2 persons  · Sit to Supine: dependence and of 2 persons  · Balance:  Poor , pt required dependence x 2 persons assistance to maintain static sitting balance , pt with posterior lean, resisting and pushing back , pt required encouragement to sit up EOB .        AM-PAC 6 CLICK MOBILITY  Turning over in bed (including adjusting bedclothes, sheets and blankets)?: 2  Sitting down on and standing up from a chair with arms (e.g., wheelchair, bedside commode, etc.): 1  Moving from lying on back to sitting on the side of the bed?: 2  Moving to and from a bed to a chair (including a wheelchair)?: 1  Need to walk in hospital room?: 1  Climbing 3-5 steps with a railing?: 1  Basic Mobility Total Score: 8       Therapeutic Activities and Exercises:   Pt performed bed mobility as above.     Patient left HOB elevated with pressure relief boots placed to BLE  all lines intact, call button in reach, bed alarm on, nurse notified and safety sitter , daughter  present..    GOALS:   Multidisciplinary Problems     Physical Therapy Goals        Problem: Physical Therapy Goal    Goal Priority Disciplines Outcome Goal Variances Interventions   Physical Therapy Goal     PT, PT/OT Ongoing, Not Progressing     Description: Goals to be met by: 22     Patient will increase functional independence with mobility by performin. Supine to sit with Stand-by Assistance  2. Rolling to Left and Right with Stand-by Assistance.  3. Sit to stand transfer to be assessed   4. Gait to be assessed    5. Sitting at edge of bed x15 minutes with Stand-by Assistance  6. Lower extremity exercise program x10 reps per handout, with assistance as needed                     Time  Tracking:     PT Received On: 01/26/22  PT Start Time: 1402     PT Stop Time: 1426  PT Total Time (min): 24 min     Billable Minutes: Therapeutic Activity 12 and Total Time 24 min co-treat with OT     Treatment Type: Treatment  PT/PTA: PTA     PTA Visit Number: 1     01/26/2022

## 2022-01-26 NOTE — PLAN OF CARE
Problem: Adult Inpatient Plan of Care  Goal: Plan of Care Review  Outcome: Ongoing, Progressing  Goal: Patient-Specific Goal (Individualized)  Outcome: Ongoing, Progressing  Goal: Absence of Hospital-Acquired Illness or Injury  Outcome: Ongoing, Progressing  Goal: Optimal Comfort and Wellbeing  Outcome: Ongoing, Progressing  Goal: Readiness for Transition of Care  Outcome: Ongoing, Progressing     Problem: Diabetes Comorbidity  Goal: Blood Glucose Level Within Targeted Range  Outcome: Ongoing, Progressing     Problem: Infection  Goal: Absence of Infection Signs and Symptoms  Outcome: Ongoing, Progressing     Problem: Adjustment to Illness (Sepsis/Septic Shock)  Goal: Optimal Coping  Outcome: Ongoing, Progressing     Problem: Glycemic Control Impaired (Sepsis/Septic Shock)  Goal: Blood Glucose Level Within Desired Range  Outcome: Ongoing, Progressing     Problem: Infection Progression (Sepsis/Septic Shock)  Goal: Absence of Infection Signs and Symptoms  Outcome: Ongoing, Progressing     Problem: Nutrition Impaired (Sepsis/Septic Shock)  Goal: Optimal Nutrition Intake  Outcome: Ongoing, Progressing     Problem: Fall Injury Risk  Goal: Absence of Fall and Fall-Related Injury  Outcome: Ongoing, Progressing     Problem: Fluid and Electrolyte Imbalance (Acute Kidney Injury/Impairment)  Goal: Fluid and Electrolyte Balance  Outcome: Ongoing, Progressing     Problem: Fluid Imbalance (Pneumonia)  Goal: Fluid Balance  Outcome: Ongoing, Progressing     Problem: Infection (Pneumonia)  Goal: Resolution of Infection Signs and Symptoms  Outcome: Ongoing, Progressing     Problem: Respiratory Compromise (Pneumonia)  Goal: Effective Oxygenation and Ventilation  Outcome: Ongoing, Progressing     Problem: Skin Injury Risk Increased  Goal: Skin Health and Integrity  Outcome: Ongoing, Progressing     Problem: Confusion Chronic  Goal: Optimal Cognitive Function  Outcome: Ongoing, Progressing     Problem: Impaired Wound Healing  Goal:  Optimal Wound Healing  Outcome: Ongoing, Progressing     Problem: Skin or Soft Tissue Infection  Goal: Absence of Infection Signs and Symptoms  Outcome: Ongoing, Progressing     Problem: Coping Ineffective  Goal: Effective Coping  Outcome: Ongoing, Progressing

## 2022-01-26 NOTE — CONSULTS
[facility]  Infectious Disease  Consult Note    Patient Name: Abhishek Zhao  MRN: [id number]  Admission Date: 1/4/2022  Hospital Length of Stay: 21 days  Attending Physician: Quinten Rosario MD  Primary Care Provider: To Obtain Unable     Isolation Status: No active isolations    Patient information was obtained from patient and ER records.      Consults  Assessment/Plan:     * Scrotal abscess  61M with Berna's gangrene, s/p debridement, followed by Urology. E.coli and Candida albicans grew on culture (urine culture also grew Candida albicans). On Zosyn and fluconazole. Repeat US 1/22 with  4.7 x 2.3 x 2.5 cm residual abscess; draining spontaneously? IR can not aspirate. Suspect the c.galbrata sent on bedside culture represents colonization and wound not treat. Note there are ongoing hospice discussions and pt on tpn and is not taking oral nutrition. Also with intermittent confusion    Updated CT with enlarging Rim enhancing fluid collection in the right perineal region at the base of the penis measuring 4.7 x 1.5 x 2.8 cm , previously 4.0 x 1.6 x 2.7 cm.    Recommendations:  - continue antibiotics; fluconazole and zosyn reasonable as long as c/w goals of care. (Note that there is less neurotoxicity associated with zosyn as compared to cefepime and quinolones)  - duration of abx until radiographic resolution of abscess. Consider more definitive drainage of abscess if possible; would significantly shorten the duration of antibiotics   - if procedure done, please send updated surgical cultures  - please notify ID with any new growth on culture          Thank you for your consult. I will follow-up with patient. Please contact us if you have any additional questions.    Letty Kelley MD  Infectious Disease  [facility]    Subjective:     Principal Problem: Scrotal abscess    HPI: Abhishek Zhao is a 61-year-old male with HTN and seizures who presented to the ED with complaints of  testicular pain and swelling for four days. At that time, he endorsed radiation of pain to rectum and difficulty urinating. He was originally planning on going to the ED before th [place] game but decided to wait until afterwards. In the ED, imaging showed a complex fluid collection(s).  He has a history of urethral stricture last dilated in 2019 at an outside facility ([facility]). The patient was admitted and started on empiric abx. 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 Post-operatively, the patient has improved with diminishing leukocytosis. Surgical cultures have grown E.coli and Candida, thus far. ID is consulted for Berna's gangrene.      Interval History: NAEO.  Denies new complaints. A*O to person, place and time, but appears somnolent.     Updated cultures were went 1/21 (by urology?) and now growing candida galbrata    prior culture from groin- e.coli and candida albicans.       1/22 ultrasound-   Complex collection which the technologist describes as located inferior to the right testicle measures 4.7 x 2.3 x 2.5 cm (previously 4 x 2.7 x 4 cm on prior ultrasound).  Mild increased vascularity in the surrounding soft tissues with no internal vascularity documented.     Diffuse scrotal wall edema.     Impression:     Persistent complex collection in the soft tissues inferior to the right testicle mildly decreased in size compared to prior ultrasound.  Considerations include phlegmon versus abscess.        Interventional Radiology unable to aspirate fluid collection.            Review of Systems   Constitutional: Negative for chills and fever.   Skin: Positive for wound.   All other systems reviewed and are negative.    Objective:     Vital Signs (Most Recent):  Temp: 97.9 °F (36.6 °C) (01/26/22 1139)  Pulse: 78  (01/26/22 1139)  Resp: 18 (01/26/22 1139)  BP: (!) 159/87 (01/26/22 1139)  SpO2: 95 % (01/26/22 1139) Vital Signs (24h Range):  Temp:  [97.6 °F (36.4 °C)-98.5 °F (36.9 °C)] 97.9 °F (36.6 °C)  Pulse:  [72-83] 78  Resp:  [16-20] 18  SpO2:  [91 %-98 %] 95 %  BP: (137-159)/(80-87) 159/87     Weight: 61.2 kg (134 lb 14.7 oz)  Body mass index is 19.92 kg/m².    Estimated Creatinine Clearance: 49.2 mL/min (based on SCr of 1.4 mg/dL).    Physical Exam  Vitals and nursing note reviewed.   Constitutional:       Appearance: He is not ill-appearing, toxic-appearing or diaphoretic.   HENT:      Head: Normocephalic and atraumatic.      Right Ear: External ear normal.      Left Ear: External ear normal.      Nose: Nose normal.   Eyes:      Extraocular Movements: Extraocular movements intact.      Conjunctiva/sclera: Conjunctivae normal.      Pupils: Pupils are equal, round, and reactive to light.   Cardiovascular:      Rate and Rhythm: Normal rate and regular rhythm.   Pulmonary:      Effort: Pulmonary effort is normal.      Breath sounds: Normal breath sounds.   Abdominal:      Tenderness: There is no guarding or rebound.   Genitourinary:     Comments: dressed  Musculoskeletal:         General: No swelling or tenderness.   Neurological:      General: No focal deficit present.      Mental Status: He is alert.   Psychiatric:         Mood and Affect: Mood normal.         Behavior: Behavior normal.             Significant Labs:   Blood Culture:   Recent Labs   Lab 01/04/22  1744 01/10/22  1411 01/24/22  0137   LABBLOO No Growth after 4 days.   No Growth after 4 days.  No Growth after 4 days.   No Growth after 4 days.  No Growth to date  No Growth to date  No Growth to date     CBC:   Recent Labs   Lab 01/26/22  0034   WBC 9.02   HGB 9.9*   HCT 30.0*   *     Respiratory Culture:   Recent Labs   Lab 01/10/22  1653   GSRESP <10 epithelial cells per low power field.  No WBC's  No organisms seen   RESPIRATORYC PSEUDOMONAS  AERUGINOSA  Moderate  *     Urine Culture:   Recent Labs   Lab 01/04/22  1804   LABURIN CANDIDA ALBICANS  10,000 - 49,999 cfu/ml  Treatment of asymptomatic candiduria is not recommended (except for   specific populations). Candida isolated in the urine typically   represents colonization. If an indwelling urinary catheter is present  it should be removed or replaced.  *     Wound Culture:   Recent Labs   Lab 01/05/22  1055 01/21/22  1942   LABAERO ESCHERICHIA COLI  Moderate  *  YAMILET ALBICANS  Few  * YAMILET GLABRATA  From broth only  *       Significant Imaging: I have reviewed all pertinent imaging results/findings within the past 24 hours.

## 2022-01-27 LAB
FOLATE SERPL-MCNC: 5.8 NG/ML (ref 4–24)
POCT GLUCOSE: 116 MG/DL (ref 70–110)
POCT GLUCOSE: 128 MG/DL (ref 70–110)
POCT GLUCOSE: 132 MG/DL (ref 70–110)
POCT GLUCOSE: 147 MG/DL (ref 70–110)
POCT GLUCOSE: 153 MG/DL (ref 70–110)
POCT GLUCOSE: 158 MG/DL (ref 70–110)
POCT GLUCOSE: 172 MG/DL (ref 70–110)
VIT B12 SERPL-MCNC: >2000 PG/ML (ref 210–950)

## 2022-01-27 PROCEDURE — 11000001 HC ACUTE MED/SURG PRIVATE ROOM

## 2022-01-27 PROCEDURE — 97110 THERAPEUTIC EXERCISES: CPT

## 2022-01-27 PROCEDURE — 63600175 PHARM REV CODE 636 W HCPCS: Performed by: STUDENT IN AN ORGANIZED HEALTH CARE EDUCATION/TRAINING PROGRAM

## 2022-01-27 PROCEDURE — 25000003 PHARM REV CODE 250: Performed by: STUDENT IN AN ORGANIZED HEALTH CARE EDUCATION/TRAINING PROGRAM

## 2022-01-27 PROCEDURE — 97530 THERAPEUTIC ACTIVITIES: CPT | Mod: CQ

## 2022-01-27 PROCEDURE — 63600175 PHARM REV CODE 636 W HCPCS: Performed by: HOSPITALIST

## 2022-01-27 PROCEDURE — 99233 SBSQ HOSP IP/OBS HIGH 50: CPT | Mod: ,,, | Performed by: STUDENT IN AN ORGANIZED HEALTH CARE EDUCATION/TRAINING PROGRAM

## 2022-01-27 PROCEDURE — B4185 PARENTERAL SOL 10 GM LIPIDS: HCPCS | Performed by: HOSPITALIST

## 2022-01-27 PROCEDURE — 25000003 PHARM REV CODE 250: Performed by: HOSPITALIST

## 2022-01-27 PROCEDURE — A4216 STERILE WATER/SALINE, 10 ML: HCPCS | Performed by: STUDENT IN AN ORGANIZED HEALTH CARE EDUCATION/TRAINING PROGRAM

## 2022-01-27 PROCEDURE — C9254 INJECTION, LACOSAMIDE: HCPCS | Performed by: STUDENT IN AN ORGANIZED HEALTH CARE EDUCATION/TRAINING PROGRAM

## 2022-01-27 PROCEDURE — 99233 PR SUBSEQUENT HOSPITAL CARE,LEVL III: ICD-10-PCS | Mod: ,,, | Performed by: STUDENT IN AN ORGANIZED HEALTH CARE EDUCATION/TRAINING PROGRAM

## 2022-01-27 RX ADMIN — FLUCONAZOLE 400 MG: 2 INJECTION, SOLUTION INTRAVENOUS at 12:01

## 2022-01-27 RX ADMIN — SODIUM CHLORIDE 200 MG: 9 INJECTION, SOLUTION INTRAVENOUS at 10:01

## 2022-01-27 RX ADMIN — Medication 10 ML: at 05:01

## 2022-01-27 RX ADMIN — RETINOL, ERGOCALCIFEROL, .ALPHA.-TOCOPHEROL ACETATE, DL-, PHYTONADIONE, ASCORBIC ACID, NIACINAMIDE, RIBOFLAVIN 5-PHOSPHATE SODIUM, THIAMINE HYDROCHLORIDE, PYRIDOXINE HYDROCHLORIDE, DEXPANTHENOL, BIOTIN, FOLIC ACID, AND CYANOCOBALAMIN: KIT at 10:01

## 2022-01-27 RX ADMIN — DEXTROSE 500 MG: 50 INJECTION, SOLUTION INTRAVENOUS at 12:01

## 2022-01-27 RX ADMIN — SODIUM CHLORIDE 200 MG: 9 INJECTION, SOLUTION INTRAVENOUS at 11:01

## 2022-01-27 RX ADMIN — SODIUM CHLORIDE 200 MG: 9 INJECTION, SOLUTION INTRAVENOUS at 01:01

## 2022-01-27 RX ADMIN — Medication 10 ML: at 08:01

## 2022-01-27 RX ADMIN — PIPERACILLIN AND TAZOBACTAM 4.5 G: 4; .5 INJECTION, POWDER, LYOPHILIZED, FOR SOLUTION INTRAVENOUS; PARENTERAL at 05:01

## 2022-01-27 RX ADMIN — PIPERACILLIN AND TAZOBACTAM 4.5 G: 4; .5 INJECTION, POWDER, LYOPHILIZED, FOR SOLUTION INTRAVENOUS; PARENTERAL at 02:01

## 2022-01-27 RX ADMIN — DEXTROSE 500 MG: 50 INJECTION, SOLUTION INTRAVENOUS at 10:01

## 2022-01-27 RX ADMIN — ENOXAPARIN SODIUM 40 MG: 40 INJECTION SUBCUTANEOUS at 05:01

## 2022-01-27 RX ADMIN — THIAMINE HYDROCHLORIDE 500 MG: 100 INJECTION, SOLUTION INTRAMUSCULAR; INTRAVENOUS at 08:01

## 2022-01-27 RX ADMIN — SOYBEAN OIL 250 ML: 20 INJECTION, SOLUTION INTRAVENOUS at 10:01

## 2022-01-27 RX ADMIN — Medication 10 ML: at 01:01

## 2022-01-27 RX ADMIN — Medication 10 ML: at 11:01

## 2022-01-27 RX ADMIN — PIPERACILLIN AND TAZOBACTAM 4.5 G: 4; .5 INJECTION, POWDER, LYOPHILIZED, FOR SOLUTION INTRAVENOUS; PARENTERAL at 09:01

## 2022-01-27 NOTE — PT/OT/SLP PROGRESS
Speech Language Pathology      Abhishek Zhao  MRN: [id number]    Patient not seen today secondary to pt's cont dcr alertness, and inability to sustain alertness for prolonged amounts of time to attempt trials of po intake. ST recs pt remain NPO, with cont TPN/IV fluids to meet hydration/nutritonal needs at this time. Alternate means may need to be considered at this time 2/2 pt's cont dcr safety consuming po intake to meet nutritional needs. ST will follow-up 1/28.    Deloris Newby, KRISTEN-SLP

## 2022-01-27 NOTE — PROGRESS NOTES
[facility]  Urology  Progress Note    Patient Name: Abhishek Zhao  MRN: [id number]  Admission Date: 1/4/2022  Hospital Length of Stay: 22 days  Code Status: Full Code   Attending Provider: Quinten Rosario MD   Primary Care Physician: To Obtain Unable    Subjective:     HPI:  Scrotal Swelling  Abhishek Zhao is a 61 y.o. male who was been experiencing scrotal pain and swelling since 12/31/2021.  He denies any fever.  He has had issues voiding since the swelling began.  Hemostat having issues in the past with urinary problems.  He denies having any previous issues with scrotal infection or swelling.  He recalls that he had procedures in the past but cannot recall specifically what to place.  He does not have urologist locally but moved back from [place] about 1 year ago.    Review of care everywhere shows that he had a cystoscopy with urethral dilation of a urethral stricture in the bulbar urethra in 2019 at [facility].  And there are reports that in approximately 2015 [facility].      Interval History: resting comfortably    Review of Systems   Unable to perform ROS: Mental status change     Objective:     Temp:  [97.6 °F (36.4 °C)-98.2 °F (36.8 °C)] 97.7 °F (36.5 °C)  Pulse:  [78-82] 79  Resp:  [16-20] 18  SpO2:  [92 %-97 %] 97 %  BP: (114-146)/(61-86) 121/72     Body mass index is 19.92 kg/m².           Drains     Drain                 Urethral Catheter 01/05/22 1050 Double-lumen 20 Fr. 22 days                Physical Exam  Vitals reviewed.   Constitutional:       General: He is not in acute distress.     Appearance: He is well-developed. He is not ill-appearing, toxic-appearing or diaphoretic.   HENT:      Head: Normocephalic and atraumatic.      Mouth/Throat:      Mouth: Mucous membranes are moist.   Eyes:      Conjunctiva/sclera: Conjunctivae normal.   Pulmonary:      Effort: Pulmonary effort is normal. No respiratory distress.   Abdominal:      General:  Abdomen is flat. There is no distension.      Palpations: Abdomen is soft.      Tenderness: There is no abdominal tenderness.   Genitourinary:     Comments: Wound edges viable  Healthy appearing granulation tissue in wound bed  No dressing in place  Santiago in place draining well    Musculoskeletal:         General: No swelling or deformity.      Cervical back: Neck supple.   Skin:     General: Skin is warm.      Findings: No rash.   Neurological:      Mental Status: He is disoriented.         Significant Labs:    BMP:  Recent Labs   Lab 01/22/22  0446 01/23/22  0413 01/26/22  0034    140 141   K 4.3 3.9 4.0    105 109   CO2 29 27 26   BUN 5* 6 9   CREATININE 0.9 0.9 1.4   CALCIUM 8.3* 7.9* 7.8*       CBC:   Recent Labs   Lab 01/23/22  0413 01/24/22  0145 01/26/22  0034   WBC 8.89 8.83 9.02   HGB 10.9* 10.8* 9.9*   HCT 33.6* 33.1* 30.0*   PLT 56* 63* 112*                 Assessment/Plan:     * Scrotal abscess  s/p cystoscopy with santiago placement and debridement of abscess/necrosis of scrotum 1/5/22 with Dr. Rangel.  Fluid collection drained at bedside 1/14/2022  CT from 1/18/22 w/ R corporal body fluid collection concerning for residual abscess vs fluid collection  Additional purulence expressed from penis on 1/20/21    Continue abx per primary  Cultures growing Candida and E coli  Appreciate wound care RN and floor RN dressing changes  Leukocytosis remains resolved  Afebrile  Will continue to monitor condition    Monitor fluid collection in corpora.   No need for intervention currently.    Will need plastic surgery evaluation in future once wound stable for possible grafting. Can be done as outpatient.             Berna's gangrene   - s/p debridement 1/5/22        VTE Risk Mitigation (From admission, onward)         Ordered     enoxaparin injection 40 mg  Daily         01/13/22 1612     IP VTE LOW RISK PATIENT  Once         01/04/22 2241     Place sequential compression device  Until discontinued          01/04/22 2241                Melinda Mitchell MD  Urology  [facility] [place]

## 2022-01-27 NOTE — ASSESSMENT & PLAN NOTE
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 Cultures growing ampicillin-resistant Ecoli and C albicans. Repeat CT A/P 1/13 w/ 4cm fluid collection c/f persistent abscess which was drained bedside by urology  - urology following  - cont zosyn and diflucan  (re-started for HCAP)  ID is on case,    Consulted IR for drainage of residual fluid  collection on CT pelvic. IR procedure is not done,duo to high risk for complications,will continue with IV Abx and repeat CT,spoke with Urology ,US show improvement in Abscess size,no need for intervention at this time,continue with IV Abx.

## 2022-01-27 NOTE — PLAN OF CARE
Problem: Occupational Therapy Goal  Goal: Occupational Therapy Goal  Description: Goals to be met by: 02/10/22    Patient will increase functional independence with ADLs by performing:    Feeding with Modified [place].  UE Dressing with Supervision.  LE Dressing with Minimal Assistance.  Grooming while seated with Stand-by Assistance.  Sitting at edge of bed x15 minutes with Supervision.  Sit to stand with Minimal Assistance and use of AD.    Supine to sit with Contact Guard Assistance.  Upper extremity exercise program x10 reps per handout, with supervision.  Step transfer with Moderate Assistance  Stand pivot transfer with Moderate Assistance   Toilet transfer to bedside commode with Moderate Assistance     Outcome: Ongoing, Progressing    Pt initially awake and alert at start of session but after transitioning to EOB w/ dep A x 2 persons, pt complained of pain and was noted w/ increased fatigue with upright position. Pt w/ poor sitting balance and was unable to be redirected for participating in planned EOB activities. Pt will continue to benefit from skilled acute OT services to maximize functional capacity for safe performance w/ ADLs and functional mobility.

## 2022-01-27 NOTE — PT/OT/SLP PROGRESS
Occupational Therapy   Treatment    Name: Abhishek Zhao  MRN: [id number]  Admitting Diagnosis:  Scrotal abscess  21 Days Post-Op    Recommendations:     Discharge Recommendations: nursing facility, skilled  Discharge Equipment Recommendations:  hospital bed  Barriers to discharge:   (Pt not at PLOF, requiring total A for bed mobility, max A for ADLs and mod A for standing.)    Assessment:     Abhishek Zhao is a 61 y.o. male with a medical diagnosis of Scrotal abscess.  Performance deficits affecting function are weakness,impaired endurance,impaired self care skills,impaired functional mobilty,gait instability,impaired balance,impaired cognition,decreased coordination,decreased upper extremity function,decreased lower extremity function,decreased safety awareness,pain,decreased ROM,impaired skin,edema,impaired fine motor,impaired cardiopulmonary response to activity.     Pt initially awake and alert at start of session but after transitioning to EOB w/ dep A x 2 persons, pt complained of pain and was noted w/ increased fatigue with upright position. Pt w/ poor sitting balance and was unable to be redirected for participating in planned EOB activities. Pt will continue to benefit from skilled acute OT services to maximize functional capacity for safe performance w/ ADLs and functional mobility.     Rehab Prognosis:  Fair; patient would benefit from acute skilled OT services to address these deficits and reach maximum level of function.       Plan:     Patient to be seen  (3-5x/wk) to address the above listed problems via self-care/home management,therapeutic activities,therapeutic exercises  · Plan of Care Expires: 02/10/22  · Plan of Care Reviewed with: patient    Subjective     Pain/Comfort:  · Pain Rating 1: 0/10  · Pain Rating Post-Intervention 1: 0/10    Objective:     Communicated with: Nurse (Mary Carmen) prior to session.  Patient found HOB elevated with telemetry,bed alarm,oxygen,santiago catheter,pressure relief  boots,peripheral IV, bear hugger upon OT entry to room.    General Precautions: Standard, fall,respiratory,NPO   Orthopedic Precautions:N/A   Braces: N/A  Respiratory Status: Nasal cannula, flow 2 L/min     Occupational Performance:     Bed Mobility:  Pt noted w/ maintaining tight  on bed rail using RUE and attempted to return to supine. Pt unable to be redirected and followed <25% of commands. SLP at side attempting to give po trials.   · Patient completed Rolling/Turning to Right with dependent  · Patient completed Scooting/Bridging with dependent and 2 persons  · Patient completed Supine to Sit with dependent and 2 persons  · Patient completed Sit to Supine with dependent and 2 persons     Functional Mobility/Transfers:  · Unable to assess due to pt w/ increased fatigue and attempted to return to supine despite redirection    Activities of Daily Living:  · Feeding:  dependence for taking sip of water from straw w/ SLP    [facility] 6 Click ADL: 12    Treatment & Education:  -Pt was able to follow commands, though inconsistent, for performing composite digit flexion. Pt tolerated 1 set x 4 reps but ceased activity despite redirection.   -Pt educated on importance of OOB activity w/ assistance; family not at side at time of session; pt unable to verbalize understanding.   -Pt educated on safe functional/bed mobility.   -Dep A x 2 required for maintaining upright sitting posture at EOB; pt resisting throughout and noted w/ increased fatigue and decreased alertness with time.   -Dtr entered room as PT/OT were exiting; dtr was informed of pt's performance .     Patient left HOB elevated with all lines intact, call button in reach, sitter present and BLE pressure relief boots on. Education:      GOALS:   Multidisciplinary Problems     Occupational Therapy Goals        Problem: Occupational Therapy Goal    Goal Priority Disciplines Outcome Interventions   Occupational Therapy Goal     OT, PT/OT Ongoing, Progressing     Description: Goals to be met by: 02/10/22    Patient will increase functional independence with ADLs by performing:    Feeding with Modified [place].  UE Dressing with Supervision.  LE Dressing with Minimal Assistance.  Grooming while seated with Stand-by Assistance.  Sitting at edge of bed x15 minutes with Supervision.  Sit to stand with Minimal Assistance and use of AD.    Supine to sit with Contact Guard Assistance.  Upper extremity exercise program x10 reps per handout, with supervision.  Step transfer with Moderate Assistance  Stand pivot transfer with Moderate Assistance   Toilet transfer to bedside commode with Moderate Assistance                      Time Tracking:     OT Date of Treatment: 01/26/22  OT Start Time: 1402  OT Stop Time: 1426  OT Total Time (min): 24 min    Billable Minutes:Therapeutic Activity 12  Total Time 12 (co-tx w/ PT)     OT/ALOK: OT          1/26/2022

## 2022-01-27 NOTE — PROGRESS NOTES
[facility] [place] [facility]  Adult Nutrition   Progress Note (Follow-Up)    SUMMARY     2/1 Addendum: Pt still NPO and receiving TPN as form of nutrition. Per last SLP note (1/31) pt has made slight progress. GI waiting on PEG placement to see if pt will make any more improvement towards becoming PO again. D/t to pt being on TPN for a week now and only receiving 50% of his EEN I recommend advancing to central line TPN recommendation below as previously recommended.        Recommendations/Interventions:    1) Per current TPN only providing 52% of EEN, consider transitioning to central line - Clinimix E 5/15 @ 75 mL/hr + daily IV lipid emulsions to provide 1778 kcal, 90 g AA, 270 g dextrose. 83% of EEN and 100% of EPN. GIR = 3.06.  Monitor triglycerides, if > 300 d/c daily lipids.   Monitor blood glucose, potassium, magnesium, and phosphorous.    2) Provide an updated weight - last wt was on 1/19  2) If PEG placed please consult for TF recs  3) ADA medically able and tolerated to SLP texture approved diabetic diet, encouraging PO intake.  4) Add Bubba BID to assist in wound healing once diet medically able to advance  5) Monitor nutrition related labs      Goals:   1) Pt to tolerate transition and initiation of TPN and consistently meet >75% of EEN/EPN by RD f/u  2) Nutrition related labs to trend WNL     Nutrition Goal Status: new  Communication of RD Recs:  (POC)    Dietitian Rounds Brief  Pt is still NPO per SLP evaluation and still receiving TPN. Per palliative care note, SNF vs. hospice is being discussed. Pt will need PEG if transferred to SNF. LBM 1/30.    Assessment and Plan  Nutrition Problem  Inadequate parenteral infusion     Related to (etiology):   TPN formula/rate not consistent with needs      Signs and Symptoms (as evidenced by):   TPN providing only 52% of EEN     Interventions/Recommendations (treatment strategy):  New parenteral formula  Consistent carbohydrate diet  Commercial beverages  "(Bubba)  Collaboration of care with other providers     Nutrition Diagnosis Status:   Ongoing      Reason for Assessment    Reason For Assessment: RD follow-up  Diagnosis:  (scrotal abscess)  Relevant Medical History: HTN, seizure disorder, adjustment disorder, chronic ischemic heart disease, cocaine dependence in remission, cardiomegaly, HLD, HTN, DM2, tobacco use    Nutrition Risk Screen    Nutrition Risk Screen: no indicators present    Nutrition/Diet History    Spiritual, Cultural Beliefs, Mosque Practices, Values that Affect Care: no  Food Allergies: NKFA  Factors Affecting Nutritional Intake: NPO,difficulty/impaired swallowing    Anthropometrics    Temp: 96.1 °F (35.6 °C)  Height: 5' 9.02" (175.3 cm)  Height (inches): 69.02 in  Weight Method: Bed Scale  Weight: 61.2 kg (134 lb 14.7 oz)  Weight (lb): 134.92 lb  Ideal Body Weight (IBW), Male: 160.12 lb  % Ideal Body Weight, Male (lb): 84.26 %  BMI (Calculated): 19.9  Usual Body Weight (UBW), k.09 kg  % Usual Body Weight: 95.69  % Weight Change From Usual Weight: -4.51 %       Weight History:  Wt Readings from Last 10 Encounters:   22 61.2 kg (134 lb 14.7 oz)   21 64 kg (141 lb)   21 64 kg (141 lb)   16 81.6 kg (180 lb)     Lab/Procedures/Meds: Pertinent Labs Reviewed  Glucose   Date Value Ref Range Status   2022 125 (H) 70 - 110 mg/dL Final     Medications:Pertinent Medications Reviewed  Scheduled Meds:   enoxaparin  40 mg Subcutaneous Daily    fat emulsion  250 mL Intravenous Once    fat emulsion 20%  250 mL Intravenous Daily    fluconazole (DIFLUCAN) IVPB  400 mg Intravenous Q24H    lacosamide (VIMPAT) IVPB  200 mg Intravenous Q12H    piperacillin-tazobactam (ZOSYN) IVPB  4.5 g Intravenous Q8H    sodium chloride 0.9%  10 mL Intravenous Q6H    valproate sodium (DEPACON) IVPB  500 mg Intravenous Q12H     Continuous Infusions:   Amino acid 4.25% - dextrose 5% (CLINIMIX-E) solution with additives (1L provides 42.5 gm " AA, 50 gm CHO (170 kcal/L dextrose), Na 35, K 30, Mg 5, Ca 4.5, Acetate 70, Cl 39, Phos 15) 75 mL/hr at 01/31/22 2220    Amino acid 4.25% - dextrose 5% (CLINIMIX-E) solution with additives (1L provides 42.5 gm AA, 50 gm CHO (170 kcal/L dextrose), Na 35, K 30, Mg 5, Ca 4.5, Acetate 70, Cl 39, Phos 15)       PRN Meds:.acetaminophen, albuterol-ipratropium, albuterol-ipratropium, aluminum-magnesium hydroxide-simethicone, dextrose 10%, dextrose 10%, dextrose 50%, glucagon (human recombinant), glucose, glucose, hydrALAZINE, influenza, insulin aspart U-100, labetalol, LIDOcaine HCL 10 mg/ml (1%), lorazepam, melatonin, naloxone, ondansetron, prochlorperazine, simethicone, Flushing PICC Protocol **AND** sodium chloride 0.9% **AND** sodium chloride 0.9%    Estimated/Assessed Needs    Weight Used For Calorie Calculations: 61.2 kg (134 lb 14.7 oz)  Energy Calorie Requirements (kcal): 5543-2582  Energy Need Method: Kcal/kg (35-40 kcal/kg per pressure injury and wt loss)  Protein Requirements: 74-92 g (1.2-1.5 g/kg per pressure injury)  Weight Used For Protein Calculations: 61.2 kg (134 lb 14.7 oz)  Fluid Requirements (mL): 2142  Estimated Fluid Requirement Method: RDA Method (or per MD orders)   CHO Requirement: 268 g    Nutrition Prescription Ordered    Current Diet Order: NPO    Evaluation of Received Nutrient/Fluid Intake    Parenteral Calories (kcal): 1112  Parenteral Protein (gm): 76.5  Lipid Calories (kcals): 500 kcals  GIR (Glucose Infusion Rate) (mg/kg/min): 1.02 mg/kg/min  Energy Calories Required: not meeting needs  Protein Required: meeting needs  Fluid Required: not meeting needs  Comments: LBM 1/30  % Intake of Estimated Energy Needs: 50 - 75 %  % Meal Intake: NPO    Intake/Output Summary (Last 24 hours) at 2/1/2022 1030  Last data filed at 2/1/2022 0500  Gross per 24 hour   Intake --   Output 2000 ml   Net -2000 ml      Nutrition Risk    Level of Risk/Frequency of Follow-up:  (1-2x/week)     Monitor and  Evaluation    Food and Nutrient Intake: energy intake,food and beverage intake  Food and Nutrient Adminstration: diet order  Knowledge/Beliefs/Attitudes: food and nutrition knowledge/skill  Physical Activity and Function: nutrition-related ADLs and IADLs  Anthropometric Measurements: weight,weight change,body mass index  Biochemical Data, Medical Tests and Procedures: electrolyte and renal panel,gastrointestinal profile,glucose/endocrine profile,inflammatory profile,lipid profile  Nutrition-Focused Physical Findings: overall appearance,extremities, muscles and bones,head and eyes,skin     Nutrition Follow-Up    RD Follow-up?: Yes

## 2022-01-27 NOTE — PT/OT/SLP PROGRESS
Physical Therapy Treatment    Patient Name:  Abhishek Zhao   MRN:  [id number]    Recommendations:     Discharge Recommendations:   (SNF vs Home with HHPT and 24hr care)   Discharge Equipment Recommendations: hospital bed   Barriers to discharge: pt required total care     Assessment:     Abhishek Zhao is a 61 y.o. male admitted with a medical diagnosis of Scrotal abscess.  He presents with the following impairments/functional limitations:  weakness,impaired endurance,impaired functional mobilty,gait instability,impaired balance,decreased upper extremity function,decreased lower extremity function,decreased coordination,impaired cognition,decreased safety awareness,pain,decreased ROM,impaired skin,impaired cardiopulmonary response to activity,impaired coordination,impaired self care skills .    Rehab Prognosis: Poor; patient would benefit from acute skilled PT services to address these deficits and reach maximum level of function.    Recent Surgery: Procedure(s) (LRB):  CYSTOSCOPY, RETROGRADE URETHROGRAM, FISTULAGRAM, EXCISION OF NECROTIC SCROTAL TISSUE ABSCESS, SANTIAGO PLACEMENT (N/A)  EXCISION, ABSCESS  CYSTOSCOPY  FISTULOGRAM (N/A) 22 Days Post-Op    Plan:     During this hospitalization, patient to be seen  (3-5x/wk) to address the identified rehab impairments via gait training,therapeutic activities,therapeutic exercises,wheelchair management/training,neuromuscular re-education and progress toward the following goals:    · Plan of Care Expires:  01/27/22    Subjective     Chief Complaint: pt is confused   Patient/Family Comments/goals:   Pain/Comfort:  · Pain Rating 1: 0/10  · Pain Rating Post-Intervention 1: 0/10      Objective:     Communicated with nurse prior to session.  Patient found HOB elevated with bed alarm,telemetry,santiago catheter,PICC line,oxygen,pressure relief boots upon PT entry to room.     General Precautions: Standard, fall,respiratory,NPO   Orthopedic Precautions:N/A   Braces: N/A  Respiratory  Status: Nasal cannula, flow 2 L/min     Functional Mobility:  · Bed Mobility:     · Rolling Left:  dependence  · Rolling Right: dependence  · Scooting: dependence and of 2 persons      AM-PAC 6 CLICK MOBILITY  Turning over in bed (including adjusting bedclothes, sheets and blankets)?: 2  Sitting down on and standing up from a chair with arms (e.g., wheelchair, bedside commode, etc.): 1  Moving from lying on back to sitting on the side of the bed?: 2  Moving to and from a bed to a chair (including a wheelchair)?: 1  Need to walk in hospital room?: 1  Climbing 3-5 steps with a railing?: 1  Basic Mobility Total Score: 8       Therapeutic Activities and Exercises:   performed PROM BLE x 10 reps each in all available planes. Pt unable to actively perform BLE ex's 2* to somnolent and lethargic.     Patient left right sidelying HOB elevated with foam wedge to L side, pressure relief boots placed to BLE  all lines intact, call button in reach, bed alarm on, nurse notified and safety sitter present..    GOALS:   Multidisciplinary Problems     Physical Therapy Goals        Problem: Physical Therapy Goal    Goal Priority Disciplines Outcome Goal Variances Interventions   Physical Therapy Goal     PT, PT/OT Ongoing, Not Progressing     Description: Goals to be met by: 22     Patient will increase functional independence with mobility by performin. Supine to sit with Stand-by Assistance  2. Rolling to Left and Right with Stand-by Assistance.  3. Sit to stand transfer to be assessed   4. Gait to be assessed    5. Sitting at edge of bed x15 minutes with Stand-by Assistance  6. Lower extremity exercise program x10 reps per handout, with assistance as needed                     Time Tracking:     PT Received On: 22  PT Start Time: 1552     PT Stop Time: 1609  PT Total Time (min): 17 min     Billable Minutes: Therapeutic Activity 8 and OT presents    Treatment Type: Treatment  PT/PTA: PTA     PTA Visit Number: 2      01/27/2022

## 2022-01-27 NOTE — PROGRESS NOTES
[facility]  Progress Note    Patient Name: Abhishek Zhao  MRN: [id number]  Patient Class: IP- Inpatient   Admission Date: 1/4/2022  Length of Stay: 22 days  Attending Physician: Quinten Rosario MD  Primary Care Provider: To Obtain Unable        Subjective:     Principal Problem:Scrotal abscess        HPI:  61 y.o. male with adjustment disorder with depressed mood, chronic ischemic heart disease, cocaine dependence in remission, cardiomegaly, HLD, HTN, swizure disorder, tobacco use, and DM 2 presents with a complaint of testicular pain.  Associated with swelling and redness to the scrotum and penis along with difficulty urinating, pain is rated as severe and radiates to the rectum.  Denies fever, chills, cough, SOB, chest pain, n/v/d.  In the ED, he was found to be tachypneic, with leukocytosis and elevated lactic.  CT and US shows evidence of multiple scrotal and perineal abscesses with some extension into the penile shaft.  UA with evidence of infection.  Sepsis treatment initiated, blood and urine cultures obtained, IV fluids and IV antibiotics initiated.  Urology consulted.      Overview/Hospital Course:  61 y.o. male with adjustment disorder with depressed mood, chronic ischemic heart disease, cocaine dependence in remission, cardiomegaly, HLD, HTN, swizure disorder, tobacco use, and DM 2 presents with a complaint of testicular pain, found to have multiple scrotal and perineal abscesses. Placed on broad spectrum antibiotics, underwent I&D with urology.    Pt w/ aspiration event 1/11 w/ subsequent desaturation. Respiratory culture grew pseudomonas. Kept on Zosyn. On 1/13 pt had abrupt reduction in responsiveness. Code stroke called, patient transferred to ICU. No stroke found on imaging. Started on extended EEG. Mental status improved, no sign of seizure.patient stepped back down to the floor.     His mental status improved slowly for a few days, but worsened again 1/17.    He was altered,was not speaking,head CT show no acute process,EEG,per neurology show no seizure activity,then was again alert,,appear to be delirious,will monitor.  Consulted IR for drainage of residual fluid  collection on scrotum,CT pelvic.IR procedure is not done,duo to high risk for complications,will continue with IV Abx ,spoke with Urology ,US show improvement in Abscess size,no need for intervention at this time,continue with IV Abx.  Was hypoglycemic over night,back on D5 IVF,added appetizer and supplement.  Again in altered,could not pass ST,back NPO,started on TPN,consulted palliative care.Had family meeting,they have been updated,wife want patient remains full code.  Palliative care on case.  CC today is weakness.      Interval History: CC today is confusion.    Review of Systems  ,not able be done,confused.  Objective:     Vital Signs (Most Recent):  Temp: 97.7 °F (36.5 °C) (01/27/22 1105)  Pulse: 79 (01/27/22 1105)  Resp: 18 (01/27/22 1105)  BP: 121/72 (01/27/22 1105)  SpO2: 97 % (01/27/22 1105) Vital Signs (24h Range):  Temp:  [97.6 °F (36.4 °C)-98.2 °F (36.8 °C)] 97.7 °F (36.5 °C)  Pulse:  [78-82] 79  Resp:  [16-20] 18  SpO2:  [92 %-97 %] 97 %  BP: (114-146)/(61-86) 121/72     Weight: 61.2 kg (134 lb 14.7 oz)  Body mass index is 19.92 kg/m².    Intake/Output Summary (Last 24 hours) at 1/27/2022 1203  Last data filed at 1/27/2022 0600  Gross per 24 hour   Intake --   Output 3275 ml   Net -3275 ml      Physical Exam  Constitutional:       Appearance: He is ill-appearing.   HENT:      Mouth/Throat:      Mouth: Mucous membranes are dry.   Cardiovascular:      Rate and Rhythm: Normal rate and regular rhythm.   Pulmonary:      Effort: No respiratory distress.      Breath sounds: No wheezing.   Abdominal:      General: There is no distension.      Tenderness: There is no abdominal tenderness.   Genitourinary:     Comments: Dressing on scrotal area  Musculoskeletal:         General: No swelling or deformity.    Skin:     Coloration: Skin is not jaundiced.      Findings: No bruising.         Significant Labs:   All pertinent labs within the past 24 hours have been reviewed.  BMP:   Recent Labs   Lab 01/26/22  0034   *      K 4.0      CO2 26   BUN 9   CREATININE 1.4   CALCIUM 7.8*     CBC:   Recent Labs   Lab 01/26/22 0034   WBC 9.02   HGB 9.9*   HCT 30.0*   *     CMP:   Recent Labs   Lab 01/26/22  0034      K 4.0      CO2 26   *   BUN 9   CREATININE 1.4   CALCIUM 7.8*   PROT 6.9   ALBUMIN 1.2*   BILITOT 0.2   ALKPHOS 246*   AST 17   ALT 9*   ANIONGAP 6*   EGFRNONAA 55*       Significant Imaging: I have reviewed all pertinent imaging results/findings within the past 24 hours.      Assessment/Plan:      * Scrotal abscess  Multiple abscesses c/w Berna's gangrene, s/p I&D by urology on 1/5. Cultures growing ampicillin-resistant Ecoli and C albicans. Repeat CT A/P 1/13 w/ 4cm fluid collection c/f persistent abscess which was drained bedside by urology  - urology following  - cont zosyn and diflucan  (re-started for HCAP)  ID is on case,    Consulted IR for drainage of residual fluid  collection on CT pelvic. IR procedure is not done,duo to high risk for complications,will continue with IV Abx and repeat CT,spoke with Urology ,US show improvement in Abscess size,no need for intervention at this time,continue with IV Abx.          Alkaline phosphatase elevation  Chronic isolated alk phos (GGT elevated), worsened this admission. Possibly due to AEDs vs occult HPB process.   - further workup depending on clinical course      Pericardial effusion  Small pericardial effusion of unclear etiology      HCAP (healthcare-associated pneumonia)  - see respiratory failure       Acute respiratory failure with hypoxia and hypercarbia  Cough + new LLL opacity on imaging c/f pneumonia, however CT imaging showing pleural effusion and atelectasis  - respiratory cultures w/ pseudomonas  - cont  zosyn  - furosemide as tolerated; no significant evidence of cardiac dysfunction on TTE  - IS        Hypokalemia  Replete PRN    JOI (acute kidney injury)  Resolved      Sepsis due to Gram negative bacteria  Resolved      Berna's gangrene  As above    Diabetes mellitus type 2, controlled  Check a1c  Hold oral antihyperglycemics while inpatient  PRN sliding scale insulin  ACHS glucose monitoring       Tobacco user  5 minutes spent counseling the patient on smoking cessation and he is not currently ready to stop smoking. He will be offereded a nicotine transdermal patch while hospitalized and monitored for withdrawal.  Will provide additional smoking cessation counseling prior to discharge.     Acute encephalopathy  Repeated episodes of AMS   - neurology consulted  - AEDs per neurology   - EEG no seizure.    Was hypoglycemic over night,started on D % IVF,added appetizer and supplement,will monitor.  Again in altered,could not pass ST,back NPO,started on TPN,consulted palliative care.    Had family meeting,they have been updated,wife want patient remains full code, HIV is negative,,RPR,is pending,has normal B 11 2 and thuyroid function.    Essential hypertension  Well controlled, continue home medications and monitor blood pressure, adjust as needed.   On prn IV hydralazine at this time.    Hyperlipidemia  Continue statin    Cardiomegaly  Pulmonary edema seen on imaging, small pericardial effusion seen on TTE. LVEF low-normal (50%)  - hold diuresis while NPO    Cocaine dependence in remission  Counseled     Chronic ischemic heart disease  No acute issue    Adjustment disorder with depressed mood  Continue home citalopram, hold home seroquel due to somnolence      VTE Risk Mitigation (From admission, onward)         Ordered     enoxaparin injection 40 mg  Daily         01/13/22 1612     IP VTE LOW RISK PATIENT  Once         01/04/22 2241     Place sequential compression device  Until discontinued         01/04/22  2241                Discharge Planning   CHUCK:      Code Status: Full Code   Is the patient medically ready for discharge?:     Reason for patient still in hospital (select all that apply): Patient trending condition  Discharge Plan A: Skilled Nursing Facility   Discharge Delays: (!) Post-Acute Set-up              Quinten Rosario MD  Department of Hospital Medicine   [facility]

## 2022-01-27 NOTE — PLAN OF CARE
Recommendations:  1) Per current TPN only providing 52% of EEN, consider transitioning to central line - Clinimix E 5/15 @ 75 mL/hr + daily IV lipid emulsions to provide 1778 kcal, 90 g AA, 270 g dextrose. 83% of EEN and 100% of EPN. GIR = 3.06.  Monitor triglycerides, if > 300 d/c daily lipids.   Monitor blood glucose, potassium, magnesium, and phosphorous.  2) If PEG placed please consult for TF recs  3) ADA medically able and tolerated to SLP texture approved diabetic diet, encouraging PO intake.  4) Add Bubba BID to assist in wound healing once diet medically able to advance  5) Monitor nutrition related labs      Goals:   1) Pt to tolerate transition and initiation of TPN and consistently meet >75% of EEN/EPN by RD f/u  2) Nutrition related labs to trend WNL      Nutrition Goal Status: new  Communication of RD Recs:  (POC)

## 2022-01-27 NOTE — PLAN OF CARE
Problem: Occupational Therapy Goal  Goal: Occupational Therapy Goal  Description: Goals to be met by: 02/10/22    Patient will increase functional independence with ADLs by performing:    Feeding with Modified [place].  UE Dressing with Supervision.  LE Dressing with Minimal Assistance.  Grooming while seated with Stand-by Assistance.  Sitting at edge of bed x15 minutes with Supervision.  Sit to stand with Minimal Assistance and use of AD.    Supine to sit with Contact Guard Assistance.  Upper extremity exercise program x10 reps per handout, with supervision.  Step transfer with Moderate Assistance  Stand pivot transfer with Moderate Assistance   Toilet transfer to bedside commode with Moderate Assistance     Outcome: Ongoing, Not Progressing     PROM BUE and dependent scooting/rolling/pericare at bed level. Followed 2 one-step commands.

## 2022-01-27 NOTE — ASSESSMENT & PLAN NOTE
Repeated episodes of AMS   - neurology consulted  - AEDs per neurology   - EEG no seizure.    Was hypoglycemic over night,started on D % IVF,added appetizer and supplement,will monitor.  Again in altered,could not pass ST,back NPO,started on TPN,consulted palliative care.    Had family meeting,they have been updated,wife want patient remains full code, HIV is negative,,RPR,is pending,has normal B 11 2 and thuyroid function.

## 2022-01-27 NOTE — ASSESSMENT & PLAN NOTE
Well controlled, continue home medications and monitor blood pressure, adjust as needed.   On prn IV hydralazine at this time.

## 2022-01-27 NOTE — PT/OT/SLP PROGRESS
Occupational Therapy   Treatment    Name: Abhishek Zhao  MRN: [id number]  Admitting Diagnosis:  Scrotal abscess  22 Days Post-Op    Recommendations:     Discharge Recommendations: nursing facility, skilled  Discharge Equipment Recommendations:  hospital bed  Barriers to discharge:   (not at PLOF; total assist with all care)    Assessment:     Abhishek Zhao is a 61 y.o. male with a medical diagnosis of Scrotal abscess. Performance deficits affecting function are weakness,gait instability,decreased upper extremity function,decreased ROM,impaired cardiopulmonary response to activity,impaired endurance,impaired balance,decreased lower extremity function,decreased safety awareness,impaired fine motor,impaired skin,impaired cognition,impaired self care skills,decreased coordination,impaired functional mobilty.     PROM BUE and dependent scooting/rolling/pericare at bed level. Followed 2 one-step commands- lethargic, confused speech, inattentive.     Rehab Prognosis:  Poor; patient would benefit from acute skilled OT services to address these deficits and reach maximum level of function.       Plan:     Patient to be seen  (3-5x/week) to address the above listed problems via self-care/home management,therapeutic activities,therapeutic exercises  · Plan of Care Expires: 02/10/22  · Plan of Care Reviewed with: patient    Subjective     Chief complaint: n/a   Patient/family comments/ goals: opened his eyes upon arrival 1x then mostly lethargic and not following commands     Pain/Comfort:  · Pain Rating 1: 0/10    Objective:     Patient found HOB elevated with pressure relief boots,telemetry,bed alarm,oxygen,santiago catheter,PICC line,peripheral IV upon OT entry to room.    General Precautions: Standard, fall,respiratory,NPO   Orthopedic Precautions:N/A   Braces: N/A  Respiratory Status: Nasal cannula, flow 2 L/min     Occupational Performance:     Bed Mobility:    · Patient completed Rolling/Turning to Left with   dependent  · Patient completed Rolling/Turning to Right with dependent  · Patient completed Scooting in supine to HOB with dependent and 2 persons     Activities of Daily Living:  · Toileting: total assistance for pericare s/p BM    · Lower Body Dressing: total assistance to doff socks       [facility] Click ADL: 6    Treatment & Education:  · BUE PROM x10: forearm supination/pronation, elbow flex/ext, shoulder flex/ext, shoulder horizontal ab/dduction  · Pt resisting to LUE towards the end of reps   · Multiple self-care tasks/functional mobility completed- assistance level noted above   · All questions/concerns answered within OT scope of practice       Patient left HOB elevated L sidelying with pillow in-between BLE, LUE elevated, and B/L pressure relief boots on with all lines intact, call button in reach, bed alarm on, nurse, Terra,notified, safety sitter present and safety measures in place. no family presentEducation:      GOALS:   Multidisciplinary Problems     Occupational Therapy Goals        Problem: Occupational Therapy Goal    Goal Priority Disciplines Outcome Interventions   Occupational Therapy Goal     OT, PT/OT Ongoing, Not Progressing    Description: Goals to be met by: 02/10/22    Patient will increase functional independence with ADLs by performing:    Feeding with Modified [address].  UE Dressing with Supervision.  LE Dressing with Minimal Assistance.  Grooming while seated with Stand-by Assistance.  Sitting at edge of bed x15 minutes with Supervision.  Sit to stand with Minimal Assistance and use of AD.    Supine to sit with Contact Guard Assistance.  Upper extremity exercise program x10 reps per handout, with supervision.  Step transfer with Moderate Assistance  Stand pivot transfer with Moderate Assistance   Toilet transfer to bedside commode with Moderate Assistance                      Time Tracking:     OT Date of Treatment: 01/27/22  OT Start Time: 1552  OT Stop Time: 1609  OT Total Time  (min): 17 min    Billable Minutes:Therapeutic Exercise 8 min  Total Time 17 min (PTA present)    OT/ALOK: OT          1/27/2022

## 2022-01-27 NOTE — SUBJECTIVE & OBJECTIVE
Interval History: resting comfortably    Review of Systems   Unable to perform ROS: Mental status change     Objective:     Temp:  [97.6 °F (36.4 °C)-98.2 °F (36.8 °C)] 97.7 °F (36.5 °C)  Pulse:  [78-82] 79  Resp:  [16-20] 18  SpO2:  [92 %-97 %] 97 %  BP: (114-146)/(61-86) 121/72     Body mass index is 19.92 kg/m².           Drains     Drain                 Urethral Catheter 01/05/22 1050 Double-lumen 20 Fr. 22 days                Physical Exam  Vitals reviewed.   Constitutional:       General: He is not in acute distress.     Appearance: He is well-developed. He is not ill-appearing, toxic-appearing or diaphoretic.   HENT:      Head: Normocephalic and atraumatic.      Mouth/Throat:      Mouth: Mucous membranes are moist.   Eyes:      Conjunctiva/sclera: Conjunctivae normal.   Pulmonary:      Effort: Pulmonary effort is normal. No respiratory distress.   Abdominal:      General: Abdomen is flat. There is no distension.      Palpations: Abdomen is soft.      Tenderness: There is no abdominal tenderness.   Genitourinary:     Comments: Wound edges viable  Healthy appearing granulation tissue in wound bed  No dressing in place  Sahu in place draining well    Musculoskeletal:         General: No swelling or deformity.      Cervical back: Neck supple.   Skin:     General: Skin is warm.      Findings: No rash.   Neurological:      Mental Status: He is disoriented.         Significant Labs:    BMP:  Recent Labs   Lab 01/22/22  0446 01/23/22  0413 01/26/22  0034    140 141   K 4.3 3.9 4.0    105 109   CO2 29 27 26   BUN 5* 6 9   CREATININE 0.9 0.9 1.4   CALCIUM 8.3* 7.9* 7.8*       CBC:   Recent Labs   Lab 01/23/22  0413 01/24/22  0145 01/26/22  0034   WBC 8.89 8.83 9.02   HGB 10.9* 10.8* 9.9*   HCT 33.6* 33.1* 30.0*   PLT 56* 63* 112*

## 2022-01-27 NOTE — SUBJECTIVE & OBJECTIVE
Interval History: CC today is confusion.    Review of Systems  ,not able be done,confused.  Objective:     Vital Signs (Most Recent):  Temp: 97.7 °F (36.5 °C) (01/27/22 1105)  Pulse: 79 (01/27/22 1105)  Resp: 18 (01/27/22 1105)  BP: 121/72 (01/27/22 1105)  SpO2: 97 % (01/27/22 1105) Vital Signs (24h Range):  Temp:  [97.6 °F (36.4 °C)-98.2 °F (36.8 °C)] 97.7 °F (36.5 °C)  Pulse:  [78-82] 79  Resp:  [16-20] 18  SpO2:  [92 %-97 %] 97 %  BP: (114-146)/(61-86) 121/72     Weight: 61.2 kg (134 lb 14.7 oz)  Body mass index is 19.92 kg/m².    Intake/Output Summary (Last 24 hours) at 1/27/2022 1203  Last data filed at 1/27/2022 0600  Gross per 24 hour   Intake --   Output 3275 ml   Net -3275 ml      Physical Exam  Constitutional:       Appearance: He is ill-appearing.   HENT:      Mouth/Throat:      Mouth: Mucous membranes are dry.   Cardiovascular:      Rate and Rhythm: Normal rate and regular rhythm.   Pulmonary:      Effort: No respiratory distress.      Breath sounds: No wheezing.   Abdominal:      General: There is no distension.      Tenderness: There is no abdominal tenderness.   Genitourinary:     Comments: Dressing on scrotal area  Musculoskeletal:         General: No swelling or deformity.   Skin:     Coloration: Skin is not jaundiced.      Findings: No bruising.         Significant Labs:   All pertinent labs within the past 24 hours have been reviewed.  BMP:   Recent Labs   Lab 01/26/22 0034   *      K 4.0      CO2 26   BUN 9   CREATININE 1.4   CALCIUM 7.8*     CBC:   Recent Labs   Lab 01/26/22 0034   WBC 9.02   HGB 9.9*   HCT 30.0*   *     CMP:   Recent Labs   Lab 01/26/22 0034      K 4.0      CO2 26   *   BUN 9   CREATININE 1.4   CALCIUM 7.8*   PROT 6.9   ALBUMIN 1.2*   BILITOT 0.2   ALKPHOS 246*   AST 17   ALT 9*   ANIONGAP 6*   EGFRNONAA 55*       Significant Imaging: I have reviewed all pertinent imaging results/findings within the past 24 hours.

## 2022-01-28 LAB
ALBUMIN SERPL BCP-MCNC: 1.2 G/DL (ref 3.5–5.2)
ALP SERPL-CCNC: 197 U/L (ref 55–135)
ALT SERPL W/O P-5'-P-CCNC: 15 U/L (ref 10–44)
ANION GAP SERPL CALC-SCNC: 8 MMOL/L (ref 8–16)
AST SERPL-CCNC: 30 U/L (ref 10–40)
BACTERIA BLD CULT: NORMAL
BASOPHILS # BLD AUTO: 0.07 K/UL (ref 0–0.2)
BASOPHILS NFR BLD: 0.6 % (ref 0–1.9)
BILIRUB SERPL-MCNC: 0.2 MG/DL (ref 0.1–1)
BUN SERPL-MCNC: 15 MG/DL (ref 6–20)
CALCIUM SERPL-MCNC: 8.5 MG/DL (ref 8.7–10.5)
CHLORIDE SERPL-SCNC: 104 MMOL/L (ref 95–110)
CO2 SERPL-SCNC: 23 MMOL/L (ref 23–29)
CREAT SERPL-MCNC: 1.2 MG/DL (ref 0.5–1.4)
DIFFERENTIAL METHOD: ABNORMAL
EOSINOPHIL # BLD AUTO: 0.1 K/UL (ref 0–0.5)
EOSINOPHIL NFR BLD: 0.4 % (ref 0–8)
ERYTHROCYTE [DISTWIDTH] IN BLOOD BY AUTOMATED COUNT: 13.5 % (ref 11.5–14.5)
EST. GFR  (AFRICAN AMERICAN): >60 ML/MIN/1.73 M^2
EST. GFR  (NON AFRICAN AMERICAN): >60 ML/MIN/1.73 M^2
GLUCOSE SERPL-MCNC: 141 MG/DL (ref 70–110)
HCT VFR BLD AUTO: 29.8 % (ref 40–54)
HGB BLD-MCNC: 9.8 G/DL (ref 14–18)
IMM GRANULOCYTES # BLD AUTO: 0.11 K/UL (ref 0–0.04)
IMM GRANULOCYTES NFR BLD AUTO: 0.9 % (ref 0–0.5)
LYMPHOCYTES # BLD AUTO: 2.5 K/UL (ref 1–4.8)
LYMPHOCYTES NFR BLD: 21 % (ref 18–48)
MCH RBC QN AUTO: 28.2 PG (ref 27–31)
MCHC RBC AUTO-ENTMCNC: 32.9 G/DL (ref 32–36)
MCV RBC AUTO: 86 FL (ref 82–98)
MONOCYTES # BLD AUTO: 1.1 K/UL (ref 0.3–1)
MONOCYTES NFR BLD: 8.8 % (ref 4–15)
NEUTROPHILS # BLD AUTO: 8.3 K/UL (ref 1.8–7.7)
NEUTROPHILS NFR BLD: 68.3 % (ref 38–73)
NRBC BLD-RTO: 0 /100 WBC
PLATELET # BLD AUTO: 133 K/UL (ref 150–450)
PMV BLD AUTO: 11.4 FL (ref 9.2–12.9)
POCT GLUCOSE: 159 MG/DL (ref 70–110)
POCT GLUCOSE: 159 MG/DL (ref 70–110)
POCT GLUCOSE: 164 MG/DL (ref 70–110)
POCT GLUCOSE: 173 MG/DL (ref 70–110)
POCT GLUCOSE: 177 MG/DL (ref 70–110)
POCT GLUCOSE: 205 MG/DL (ref 70–110)
POTASSIUM SERPL-SCNC: 4.2 MMOL/L (ref 3.5–5.1)
PROT SERPL-MCNC: 7.9 G/DL (ref 6–8.4)
RBC # BLD AUTO: 3.47 M/UL (ref 4.6–6.2)
RPR SER QL: NORMAL
SODIUM SERPL-SCNC: 135 MMOL/L (ref 136–145)
WBC # BLD AUTO: 12.12 K/UL (ref 3.9–12.7)

## 2022-01-28 PROCEDURE — 85025 COMPLETE CBC W/AUTO DIFF WBC: CPT | Performed by: HOSPITALIST

## 2022-01-28 PROCEDURE — C9254 INJECTION, LACOSAMIDE: HCPCS | Performed by: STUDENT IN AN ORGANIZED HEALTH CARE EDUCATION/TRAINING PROGRAM

## 2022-01-28 PROCEDURE — 25000003 PHARM REV CODE 250: Performed by: STUDENT IN AN ORGANIZED HEALTH CARE EDUCATION/TRAINING PROGRAM

## 2022-01-28 PROCEDURE — B4185 PARENTERAL SOL 10 GM LIPIDS: HCPCS | Performed by: HOSPITALIST

## 2022-01-28 PROCEDURE — 36415 COLL VENOUS BLD VENIPUNCTURE: CPT | Performed by: HOSPITALIST

## 2022-01-28 PROCEDURE — 63600175 PHARM REV CODE 636 W HCPCS: Performed by: HOSPITALIST

## 2022-01-28 PROCEDURE — 92526 ORAL FUNCTION THERAPY: CPT

## 2022-01-28 PROCEDURE — A4216 STERILE WATER/SALINE, 10 ML: HCPCS | Performed by: STUDENT IN AN ORGANIZED HEALTH CARE EDUCATION/TRAINING PROGRAM

## 2022-01-28 PROCEDURE — C1751 CATH, INF, PER/CENT/MIDLINE: HCPCS

## 2022-01-28 PROCEDURE — 97535 SELF CARE MNGMENT TRAINING: CPT | Mod: CO

## 2022-01-28 PROCEDURE — 63600175 PHARM REV CODE 636 W HCPCS: Performed by: STUDENT IN AN ORGANIZED HEALTH CARE EDUCATION/TRAINING PROGRAM

## 2022-01-28 PROCEDURE — 11000001 HC ACUTE MED/SURG PRIVATE ROOM

## 2022-01-28 PROCEDURE — 25000003 PHARM REV CODE 250: Performed by: HOSPITALIST

## 2022-01-28 PROCEDURE — 97110 THERAPEUTIC EXERCISES: CPT

## 2022-01-28 PROCEDURE — 97535 SELF CARE MNGMENT TRAINING: CPT

## 2022-01-28 PROCEDURE — 99024 POSTOP FOLLOW-UP VISIT: CPT | Mod: ,,, | Performed by: UROLOGY

## 2022-01-28 PROCEDURE — 80053 COMPREHEN METABOLIC PANEL: CPT | Performed by: HOSPITALIST

## 2022-01-28 PROCEDURE — 99024 PR POST-OP FOLLOW-UP VISIT: ICD-10-PCS | Mod: ,,, | Performed by: UROLOGY

## 2022-01-28 RX ADMIN — PIPERACILLIN AND TAZOBACTAM 4.5 G: 4; .5 INJECTION, POWDER, LYOPHILIZED, FOR SOLUTION INTRAVENOUS; PARENTERAL at 05:01

## 2022-01-28 RX ADMIN — DEXTROSE 500 MG: 50 INJECTION, SOLUTION INTRAVENOUS at 09:01

## 2022-01-28 RX ADMIN — SODIUM CHLORIDE 200 MG: 9 INJECTION, SOLUTION INTRAVENOUS at 11:01

## 2022-01-28 RX ADMIN — DEXTROSE 500 MG: 50 INJECTION, SOLUTION INTRAVENOUS at 10:01

## 2022-01-28 RX ADMIN — PIPERACILLIN AND TAZOBACTAM 4.5 G: 4; .5 INJECTION, POWDER, LYOPHILIZED, FOR SOLUTION INTRAVENOUS; PARENTERAL at 09:01

## 2022-01-28 RX ADMIN — FLUCONAZOLE 400 MG: 2 INJECTION, SOLUTION INTRAVENOUS at 11:01

## 2022-01-28 RX ADMIN — Medication 10 ML: at 11:01

## 2022-01-28 RX ADMIN — ENOXAPARIN SODIUM 40 MG: 40 INJECTION SUBCUTANEOUS at 04:01

## 2022-01-28 RX ADMIN — SOYBEAN OIL 250 ML: 20 INJECTION, SOLUTION INTRAVENOUS at 09:01

## 2022-01-28 RX ADMIN — RETINOL, ERGOCALCIFEROL, .ALPHA.-TOCOPHEROL ACETATE, DL-, PHYTONADIONE, ASCORBIC ACID, NIACINAMIDE, RIBOFLAVIN 5-PHOSPHATE SODIUM, THIAMINE HYDROCHLORIDE, PYRIDOXINE HYDROCHLORIDE, DEXPANTHENOL, BIOTIN, FOLIC ACID, AND CYANOCOBALAMIN: KIT at 09:01

## 2022-01-28 RX ADMIN — Medication 10 ML: at 06:01

## 2022-01-28 RX ADMIN — PIPERACILLIN AND TAZOBACTAM 4.5 G: 4; .5 INJECTION, POWDER, LYOPHILIZED, FOR SOLUTION INTRAVENOUS; PARENTERAL at 03:01

## 2022-01-28 NOTE — ASSESSMENT & PLAN NOTE
Repeated episodes of AMS   - neurology consulted  - AEDs per neurology   - EEG no seizure.    Was hypoglycemic over night,started on D % IVF,added appetizer and supplement,will monitor.  Again in altered,could not pass ST,back NPO,started on TPN,consulted palliative care.    Had family meeting,they have been updated,wife want patient remains full code,B 12,folic acid is normal,HIV is negative,RPR is pending.

## 2022-01-28 NOTE — PROGRESS NOTES
[facility]  Urology  Progress Note    Patient Name: Abhishek Zhao  MRN: [id number]  Admission Date: 1/4/2022  Hospital Length of Stay: 23 days  Code Status: Full Code   Attending Provider: Quinten Rosario MD   Primary Care Physician: To Obtain Unable    Subjective:     HPI:  Scrotal Swelling  Abhishek Zhao is a 61 y.o. male who was been experiencing scrotal pain and swelling since 12/31/2021.  He denies any fever.  He has had issues voiding since the swelling began.  Hemostat having issues in the past with urinary problems.  He denies having any previous issues with scrotal infection or swelling.  He recalls that he had procedures in the past but cannot recall specifically what to place.  He does not have urologist locally but moved back from [place] about 1 year ago.    Review of care everywhere shows that he had a cystoscopy with urethral dilation of a urethral stricture in the bulbar urethra in 2019 at [facility].  And there are reports that in approximately 2015 he [facility].      Interval History: still with MS changes, no apparent pain.    Review of Systems   Unable to perform ROS: Mental status change     Objective:     Temp:  [97.6 °F (36.4 °C)-98.6 °F (37 °C)] 98.6 °F (37 °C)  Pulse:  [78-90] 78  Resp:  [17-18] 18  SpO2:  [94 %-100 %] 100 %  BP: (121-159)/(72-81) 147/80     Body mass index is 19.92 kg/m².           Drains     Drain                 Urethral Catheter 01/05/22 1050 Double-lumen 20 Fr. 22 days                Physical Exam  Vitals and nursing note reviewed.   Constitutional:       Appearance: He is well-developed and well-nourished.   HENT:      Head: Normocephalic.   Eyes:      Conjunctiva/sclera: Conjunctivae normal.   Neck:      Thyroid: No thyromegaly.      Trachea: No tracheal deviation.   Cardiovascular:      Rate and Rhythm: Normal rate.      Heart sounds: Normal heart sounds.   Pulmonary:      Effort: Pulmonary effort is normal. No  respiratory distress.      Breath sounds: Normal breath sounds. No wheezing.   Abdominal:      General: Bowel sounds are normal.      Palpations: Abdomen is soft. There is no hepatosplenomegaly.      Tenderness: There is no abdominal tenderness. There is no CVA tenderness or rebound.      Hernia: No hernia is present.   Genitourinary:     Comments: Santiago with yellow urine  Wound with granulation tissue.   Palpated wound, no fluctuance.  Musculoskeletal:         General: No tenderness or edema. Normal range of motion.      Cervical back: Normal range of motion and neck supple.   Lymphadenopathy:      Cervical: No cervical adenopathy.   Skin:     General: Skin is warm and dry.      Findings: No erythema or rash.   Neurological:      Mental Status: He is alert and oriented to person, place, and time.   Psychiatric:         Mood and Affect: Mood and affect normal.         Behavior: Behavior normal.         Thought Content: Thought content normal.         Judgment: Judgment normal.         Significant Labs:    BMP:  Recent Labs   Lab 01/23/22  0413 01/26/22  0034 01/28/22  0600    141 135*   K 3.9 4.0 4.2    109 104   CO2 27 26 23   BUN 6 9 15   CREATININE 0.9 1.4 1.2   CALCIUM 7.9* 7.8* 8.5*       CBC:   Recent Labs   Lab 01/24/22  0145 01/26/22  0034 01/28/22  0600   WBC 8.83 9.02 12.12   HGB 10.8* 9.9* 9.8*   HCT 33.1* 30.0* 29.8*   PLT 63* 112* 133*       Blood Culture:   Recent Labs   Lab 01/24/22  0137 01/26/22  1600   LABBLOO No Growth after 4 days.  No Growth to date  No Growth to date     Urine Culture: No results for input(s): LABURIN in the last 168 hours.    Significant Imaging:                    Assessment/Plan:     * Scrotal abscess  s/p cystoscopy with santiago placement and debridement of abscess/necrosis of scrotum 1/5/22 with Dr. Rangel.  Fluid collection drained at bedside 1/14/2022  CT from 1/18/22 w/ R corporal body fluid collection concerning for residual abscess vs fluid  collection  Additional purulence expressed from penis on 1/20/21    Continue abx per primary  Cultures growing Candida and E coli  Appreciate wound care RN and floor RN dressing changes  Leukocytosis improved from admission, monitor    Afebrile  Will continue to monitor condition    Monitor fluid collection in corpora.   No need for intervention currently.    Will need plastic surgery evaluation in future once wound stable for possible grafting. Can be done as outpatient.             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

## 2022-01-28 NOTE — SUBJECTIVE & OBJECTIVE
Interval History: still with MS changes, no apparent pain.    Review of Systems   Unable to perform ROS: Mental status change     Objective:     Temp:  [97.6 °F (36.4 °C)-98.6 °F (37 °C)] 98.6 °F (37 °C)  Pulse:  [78-90] 78  Resp:  [17-18] 18  SpO2:  [94 %-100 %] 100 %  BP: (121-159)/(72-81) 147/80     Body mass index is 19.92 kg/m².           Drains     Drain                 Urethral Catheter 01/05/22 1050 Double-lumen 20 Fr. 22 days                Physical Exam  Vitals and nursing note reviewed.   Constitutional:       Appearance: He is well-developed and well-nourished.   HENT:      Head: Normocephalic.   Eyes:      Conjunctiva/sclera: Conjunctivae normal.   Neck:      Thyroid: No thyromegaly.      Trachea: No tracheal deviation.   Cardiovascular:      Rate and Rhythm: Normal rate.      Heart sounds: Normal heart sounds.   Pulmonary:      Effort: Pulmonary effort is normal. No respiratory distress.      Breath sounds: Normal breath sounds. No wheezing.   Abdominal:      General: Bowel sounds are normal.      Palpations: Abdomen is soft. There is no hepatosplenomegaly.      Tenderness: There is no abdominal tenderness. There is no CVA tenderness or rebound.      Hernia: No hernia is present.   Genitourinary:     Comments: Sahu with yellow urine  Wound with granulation tissue.   Palpated wound, no fluctuance.  Musculoskeletal:         General: No tenderness or edema. Normal range of motion.      Cervical back: Normal range of motion and neck supple.   Lymphadenopathy:      Cervical: No cervical adenopathy.   Skin:     General: Skin is warm and dry.      Findings: No erythema or rash.   Neurological:      Mental Status: He is alert and oriented to person, place, and time.   Psychiatric:         Mood and Affect: Mood and affect normal.         Behavior: Behavior normal.         Thought Content: Thought content normal.         Judgment: Judgment normal.         Significant Labs:    BMP:  Recent Labs   Lab  01/23/22  0413 01/26/22  0034 01/28/22  0600    141 135*   K 3.9 4.0 4.2    109 104   CO2 27 26 23   BUN 6 9 15   CREATININE 0.9 1.4 1.2   CALCIUM 7.9* 7.8* 8.5*       CBC:   Recent Labs   Lab 01/24/22  0145 01/26/22  0034 01/28/22  0600   WBC 8.83 9.02 12.12   HGB 10.8* 9.9* 9.8*   HCT 33.1* 30.0* 29.8*   PLT 63* 112* 133*       Blood Culture:   Recent Labs   Lab 01/24/22  0137 01/26/22  1600   LABBLOO No Growth after 4 days.  No Growth to date  No Growth to date     Urine Culture: No results for input(s): LABURIN in the last 168 hours.    Significant Imaging:                 normal

## 2022-01-28 NOTE — PT/OT/SLP PROGRESS
Speech Language Pathology Treatment    Patient Name:  Abhishek Zhao   MRN:  [id number]  Admitting Diagnosis: Scrotal abscess    Recommendations:                 General Recommendations:  Dysphagia therapy  Diet recommendations:  NPO, Liquid Diet Level: NPO   Aspiration Precautions: Non-oral meds and Frequent oral care   General Precautions: Standard, pureed diet,aspiration  Communication strategies:  provide increased time to answer    Subjective     ST notified from MD, as well as pt's sitter of pt's wife feeding pt banana, despite being NPO. ST entered pt's room with pt's wife providing oral care via oral swab. Pt's wife exiting pt's room within less than 5 minutes of ST entrance, and did not stay for po trials for ST to explain NPO status. Pt with limited amounts of po trial 2/2 cont fluctuating alertness.     Pain/Comfort:  · Pain Rating 1: 0/10    Respiratory Status: Room air    Objective:     Has the patient been evaluated by SLP for swallowing?   Yes  Keep patient NPO? Yes   Current Respiratory Status:        ST attempted to provide po trials of puree and thin liquids. Pt consumed x3 tsp bites of applesauce, and tsp sips of water. ST noted pt's cont holding of all boluses in the oral cavity, requiring consistent verbal cues to initiate swallows and clear boluses. x1 bolus was manually removed from oral cavity via suction 2/2 pt's dcr alertness and inability to initiate swallow. No overt s/s of aspiration noted across consistencies.    ST spoke with pt's wife following tx session to discuss rationale for NPO at this time, and ST's criteria to begin po intake (pt sustaining alertness for longer periods of time, increased oral awareness of all boluses presented, as well as pt's ability to consistently follow commands). Pt's wife was tearful and expressed feeling overwhelmed with how sick the pt is, and that this is not the same person who came into the hospital. ST provided support and answered all questions with  ST's scope of practice, specifically in regards to alternate nutrition. ST recs cont TPN, however, pt will require more permanent means of nutrition if pt is stable enough to undergo PEG placement in the near future (ST does not feel pt is stable for PEG at this time). Pt's wife was agreeable to all information provided.     Assessment:   ST recs pt remain NPO at this time, with cont TPN for nutritional needs. Pt will cont to need alternate means of nutrition. ST will cont to follow and attempt po trials.    Goals:   Multidisciplinary Problems     SLP Goals        Problem: SLP Goal    Goal Priority Disciplines Outcome   SLP Goal    Medium SLP Ongoing, Not Progressing   Description: Goals:  1. Pt will tolerate puree diet/thin liquids w/o overt s/s of aspiration (discontinued 1/25/22).  2. Pt will tolerate mech soft diet (IDDSI-5) with thin liquids w/o overt s/s of aspiration. (on hold 1/16/22, pending pt progress)                   Plan:     · Patient to be seen:  3 x/week   · Plan of Care expires:  01/29/22  · Plan of Care reviewed with:  patient   · SLP Follow-Up:  Yes       Discharge recommendations:  other (see comments) (TBD)   Barriers to Discharge:  None    Time Tracking:     SLP Treatment Date:   01/28/22  Speech Start Time:  0915  Speech Stop Time:  0940     Speech Total Time (min):  25 min    Billable Minutes: Treatment Swallowing Dysfunction 15 and Self Care/Home Management Training 10    01/28/2022

## 2022-01-28 NOTE — PLAN OF CARE
Problem: SLP Goal  Goal: SLP Goal  Description: Goals:  1. Pt will tolerate puree diet/thin liquids w/o overt s/s of aspiration (discontinued 1/25/22).  2. Pt will tolerate mech soft diet (IDDSI-5) with thin liquids w/o overt s/s of aspiration. (on hold 1/16/22, pending pt progress)  Outcome: Ongoing, Not Progressing    Pt with limited amounts of po trials 2/2 cont fluctuating alertness. ST recs pt remain NPO at this time, with alternate means cont (TPN).

## 2022-01-28 NOTE — PROGRESS NOTES
[facility] Medicine  Progress Note    Patient Name: Abhishek Zhao  MRN: [id number]  Patient Class: IP- Inpatient   Admission Date: 1/4/2022  Length of Stay: 23 days  Attending Physician: Quinten Rosario MD  Primary Care Provider: To Obtain Unable        Subjective:     Principal Problem:Scrotal abscess        HPI:  61 y.o. male with adjustment disorder with depressed mood, chronic ischemic heart disease, cocaine dependence in remission, cardiomegaly, HLD, HTN, swizure disorder, tobacco use, and DM 2 presents with a complaint of testicular pain.  Associated with swelling and redness to the scrotum and penis along with difficulty urinating, pain is rated as severe and radiates to the rectum.  Denies fever, chills, cough, SOB, chest pain, n/v/d.  In the ED, he was found to be tachypneic, with leukocytosis and elevated lactic.  CT and US shows evidence of multiple scrotal and perineal abscesses with some extension into the penile shaft.  UA with evidence of infection.  Sepsis treatment initiated, blood and urine cultures obtained, IV fluids and IV antibiotics initiated.  Urology consulted.      Overview/Hospital Course:  61 y.o. male with adjustment disorder with depressed mood, chronic ischemic heart disease, cocaine dependence in remission, cardiomegaly, HLD, HTN, swizure disorder, tobacco use, and DM 2 presents with a complaint of testicular pain, found to have multiple scrotal and perineal abscesses. Placed on broad spectrum antibiotics, underwent I&D with urology.    Pt w/ aspiration event 1/11 w/ subsequent desaturation. Respiratory culture grew pseudomonas. Kept on Zosyn. On 1/13 pt had abrupt reduction in responsiveness. Code stroke called, patient transferred to ICU. No stroke found on imaging. Started on extended EEG. Mental status improved, no sign of seizure.patient stepped back down to the floor.     His mental status improved slowly for a few days, but worsened again 1/17.    He was altered,was not speaking,head CT show no acute process,EEG,per neurology show no seizure activity,then was again alert,,appear to be delirious,will monitor.  Consulted IR for drainage of residual fluid  collection on scrotum,CT pelvic.IR procedure is not done,duo to high risk for complications,will continue with IV Abx ,spoke with Urology ,US show improvement in Abscess size,no need for intervention at this time,continue with IV Abx.  Was hypoglycemic over night,back on D5 IVF,added appetizer and supplement.  Again in altered,could not pass ST,back NPO,started on TPN,consulted palliative care.Had family meeting,they have been updated,wife want patient remains full code.B 12,folic acid is normal,HIV is negative,RPR is pending.  Palliative care on case.  CC today is weakness.      Interval History: CC today is confusion.    Review of Systems  ,not able be done,confused.  Objective:     Vital Signs (Most Recent):  Temp: 98.6 °F (37 °C) (01/28/22 0719)  Pulse: 78 (01/28/22 0719)  Resp: 18 (01/28/22 0719)  BP: (!) 147/80 (01/28/22 0719)  SpO2: 100 % (01/28/22 0719) Vital Signs (24h Range):  Temp:  [97.6 °F (36.4 °C)-98.6 °F (37 °C)] 98.6 °F (37 °C)  Pulse:  [78-90] 78  Resp:  [17-18] 18  SpO2:  [94 %-100 %] 100 %  BP: (121-159)/(72-81) 147/80     Weight: 61.2 kg (134 lb 14.7 oz)  Body mass index is 19.92 kg/m².    Intake/Output Summary (Last 24 hours) at 1/28/2022 1017  Last data filed at 1/28/2022 0500  Gross per 24 hour   Intake 400 ml   Output 1725 ml   Net -1325 ml      Physical Exam  Constitutional:       Appearance: He is ill-appearing.   HENT:      Mouth/Throat:      Mouth: Mucous membranes are dry.   Cardiovascular:      Rate and Rhythm: Normal rate and regular rhythm.   Pulmonary:      Effort: No respiratory distress.      Breath sounds: No wheezing.   Abdominal:      General: There is no distension.      Tenderness: There is no abdominal tenderness.   Genitourinary:     Comments: Dressing on scrotal  area  Musculoskeletal:         General: No swelling or deformity.   Skin:     Coloration: Skin is not jaundiced.      Findings: No bruising.         Significant Labs:   All pertinent labs within the past 24 hours have been reviewed.  BMP:   Recent Labs   Lab 01/28/22  0600   *   *   K 4.2      CO2 23   BUN 15   CREATININE 1.2   CALCIUM 8.5*     CBC:   Recent Labs   Lab 01/28/22  0600   WBC 12.12   HGB 9.8*   HCT 29.8*   *     CMP:   Recent Labs   Lab 01/28/22  0600   *   K 4.2      CO2 23   *   BUN 15   CREATININE 1.2   CALCIUM 8.5*   PROT 7.9   ALBUMIN 1.2*   BILITOT 0.2   ALKPHOS 197*   AST 30   ALT 15   ANIONGAP 8   EGFRNONAA >60       Significant Imaging: I have reviewed all pertinent imaging results/findings within the past 24 hours.      Assessment/Plan:      * Scrotal abscess  Multiple abscesses c/w Berna's gangrene, s/p I&D by urology on 1/5. Cultures growing ampicillin-resistant Ecoli and C albicans. Repeat CT A/P 1/13 w/ 4cm fluid collection c/f persistent abscess which was drained bedside by urology  - urology following  - cont zosyn and diflucan  (re-started for HCAP)  ID is on case,    Consulted IR for drainage of residual fluid  collection on CT pelvic. IR procedure is not done,duo to high risk for complications,will continue with IV Abx and repeat CT,spoke with Urology ,US show improvement in Abscess size,no need for intervention at this time,continue with IV Abx.          Alkaline phosphatase elevation  Chronic isolated alk phos (GGT elevated), worsened this admission. Possibly due to AEDs vs occult HPB process.   - further workup depending on clinical course,improving.      Pericardial effusion  Small pericardial effusion of unclear etiology      HCAP (healthcare-associated pneumonia)  - see respiratory failure       Acute respiratory failure with hypoxia and hypercarbia  Cough + new LLL opacity on imaging c/f pneumonia, however CT imaging showing  pleural effusion and atelectasis  - respiratory cultures w/ pseudomonas  - cont zosyn  - furosemide as tolerated; no significant evidence of cardiac dysfunction on TTE  - IS        Hypokalemia  Replete PRN    JOI (acute kidney injury)  Resolved      Sepsis due to Gram negative bacteria  Resolved      Berna's gangrene  As above    Diabetes mellitus type 2, controlled  Check a1c  Hold oral antihyperglycemics while inpatient  PRN sliding scale insulin  ACHS glucose monitoring       Tobacco user  5 minutes spent counseling the patient on smoking cessation and he is not currently ready to stop smoking. He will be offereded a nicotine transdermal patch while hospitalized and monitored for withdrawal.  Will provide additional smoking cessation counseling prior to discharge.     Acute encephalopathy  Repeated episodes of AMS   - neurology consulted  - AEDs per neurology   - EEG no seizure.    Was hypoglycemic over night,started on D % IVF,added appetizer and supplement,will monitor.  Again in altered,could not pass ST,back NPO,started on TPN,consulted palliative care.    Had family meeting,they have been updated,wife want patient remains full code,B 12,folic acid is normal,HIV is negative,RPR is pending.    Essential hypertension  Well controlled, continue home medications and monitor blood pressure, adjust as needed.   On prn IV hydralazine at this time.    Hyperlipidemia  Continue statin    Cardiomegaly  Pulmonary edema seen on imaging, small pericardial effusion seen on TTE. LVEF low-normal (50%)  - hold diuresis while NPO    Cocaine dependence in remission  Counseled     Chronic ischemic heart disease  No acute issue    Adjustment disorder with depressed mood  Continue home citalopram, hold home seroquel due to somnolence      VTE Risk Mitigation (From admission, onward)         Ordered     enoxaparin injection 40 mg  Daily         01/13/22 1612     IP VTE LOW RISK PATIENT  Once         01/04/22 2241     Place  sequential compression device  Until discontinued         01/04/22 2241                Discharge Planning   CHUCK:      Code Status: Full Code   Is the patient medically ready for discharge?:     Reason for patient still in hospital (select all that apply): Patient trending condition  Discharge Plan A: Skilled Nursing Facility   Discharge Delays: (!) Post-Acute Set-up              Quinten Rosario MD  Department of Hospital Medicine   [facility]

## 2022-01-28 NOTE — ASSESSMENT & PLAN NOTE
Chronic isolated alk phos (GGT elevated), worsened this admission. Possibly due to AEDs vs occult HPB process.   - further workup depending on clinical course,improving.

## 2022-01-28 NOTE — PT/OT/SLP PROGRESS
Occupational Therapy   Treatment    Name: Abhishek Zhao  MRN: [id number]  Admitting Diagnosis:  Scrotal abscess  23 Days Post-Op    Recommendations:     Discharge Recommendations: nursing facility, skilled  Discharge Equipment Recommendations:  hospital bed  Barriers to discharge:   (Patient not at PLOF, requires total assistance with all self-care)    Assessment:     Abhishek Zhao is a 61 y.o. male with a medical diagnosis of Scrotal abscess. Performance deficits affecting function are weakness,gait instability,decreased coordination,decreased lower extremity function,decreased safety awareness,decreased upper extremity function,decreased ROM,impaired balance,impaired endurance,impaired functional mobilty,impaired self care skills,impaired cognition,impaired cardiopulmonary response to activity,impaired coordination.     Rehab Prognosis:  Poor; patient would benefit from acute skilled OT services to address these deficits and reach maximum level of function.       Plan:     Patient to be seen  (3-5 x/week) to address the above listed problems via self-care/home management,therapeutic activities,therapeutic exercises  · Plan of Care Expires: 02/10/22  · Plan of Care Reviewed with: patient    Subjective     Pain/Comfort:  · Pain Rating 1: 0/10    Objective:     Communicated with: Nurse prior to session.  Patient found HOB elevated with bed alarm,telemetry,santiago catheter,PICC line,oxygen,pressure relief boots upon OT entry to room.    General Precautions: Standard, fall,respiratory,NPO   Orthopedic Precautions:N/A   Braces: N/A  Respiratory Status: Nasal cannula, flow 2 L/min     Occupational Performance:     Bed Mobility:    · Patient completed Rolling/Turning to Left with  total assistance  · Patient completed Supine to Sit with total assistance  · Patient completed Sit to Supine with total assistance     Functional Mobility/Transfers:  · Functional Mobility not performed/assessed during this treatment 2* to patient  "being lethargic and unable to tolerate prolonged sitting EOB.    Activities of Daily Living:  · Grooming: total assistance to wash his face at bed level  · Lower Body Dressing: total assistance to don/doff B non-slip socks  · Toileting: total assistance for perineal hygiene after BM via rolling at bed level.      [facility] 6 Click ADL: 6    Treatment & Education:  Patient lethargic and difficult to rouse throughout treatment, despite maximum verbal and tactile cues. Patient's performance with ADLs and bed mobility during treatment limited by lethargy, decreased endurance, decreased activity tolerance and confusion. Pt sat EOB with max A for trunk support for 1 minute before stating "I can't do it. I need to go back."     Patient left HOB elevated with all lines intact, call button in reach, bed alarm on and caregiver present.Education:      GOALS:   Multidisciplinary Problems     Occupational Therapy Goals        Problem: Occupational Therapy Goal    Goal Priority Disciplines Outcome Interventions   Occupational Therapy Goal     OT, PT/OT Ongoing, Progressing    Description: Goals to be met by: 02/10/22    Patient will increase functional independence with ADLs by performing:    Feeding with Modified [place].  UE Dressing with Supervision.  LE Dressing with Minimal Assistance.  Grooming while seated with Stand-by Assistance.  Sitting at edge of bed x15 minutes with Supervision.  Sit to stand with Minimal Assistance and use of AD.    Supine to sit with Contact Guard Assistance.  Upper extremity exercise program x10 reps per handout, with supervision.  Step transfer with Moderate Assistance  Stand pivot transfer with Moderate Assistance   Toilet transfer to bedside commode with Moderate Assistance                      Time Tracking:     OT Date of Treatment: 01/28/22  OT Start Time: 1150  OT Stop Time: 1216  OT Total Time (min): 26 min    Billable Minutes:Self Care/Home Management 12  Total Time 26 (Co-treatment with " PT)    OT/ALOK: ALOK     ALOK Visit Number: 1 1/28/2022

## 2022-01-28 NOTE — PLAN OF CARE
Problem: Physical Therapy Goal  Goal: Physical Therapy Goal  Description: Goals to be met by: 22     Patient will increase functional independence with mobility by performin. Supine to sit with Stand-by Assistance  2. Rolling to Left and Right with Stand-by Assistance.  3. Sit to stand transfer to be assessed   4. Gait to be assessed    5. Sitting at edge of bed x15 minutes with Stand-by Assistance  6. Lower extremity exercise program x10 reps per handout, with assistance as needed    Outcome: Ongoing, Not Progressing   Pt is unable to progress toward PT goals at this time.  Pt is limited 2/2 pain and confusion.  24 hr care, hospital bed, and heather lift Recommended.

## 2022-01-28 NOTE — SUBJECTIVE & OBJECTIVE
Interval History: CC today is confusion.    Review of Systems  ,not able be done,confused.  Objective:     Vital Signs (Most Recent):  Temp: 98.6 °F (37 °C) (01/28/22 0719)  Pulse: 78 (01/28/22 0719)  Resp: 18 (01/28/22 0719)  BP: (!) 147/80 (01/28/22 0719)  SpO2: 100 % (01/28/22 0719) Vital Signs (24h Range):  Temp:  [97.6 °F (36.4 °C)-98.6 °F (37 °C)] 98.6 °F (37 °C)  Pulse:  [78-90] 78  Resp:  [17-18] 18  SpO2:  [94 %-100 %] 100 %  BP: (121-159)/(72-81) 147/80     Weight: 61.2 kg (134 lb 14.7 oz)  Body mass index is 19.92 kg/m².    Intake/Output Summary (Last 24 hours) at 1/28/2022 1017  Last data filed at 1/28/2022 0500  Gross per 24 hour   Intake 400 ml   Output 1725 ml   Net -1325 ml      Physical Exam  Constitutional:       Appearance: He is ill-appearing.   HENT:      Mouth/Throat:      Mouth: Mucous membranes are dry.   Cardiovascular:      Rate and Rhythm: Normal rate and regular rhythm.   Pulmonary:      Effort: No respiratory distress.      Breath sounds: No wheezing.   Abdominal:      General: There is no distension.      Tenderness: There is no abdominal tenderness.   Genitourinary:     Comments: Dressing on scrotal area  Musculoskeletal:         General: No swelling or deformity.   Skin:     Coloration: Skin is not jaundiced.      Findings: No bruising.         Significant Labs:   All pertinent labs within the past 24 hours have been reviewed.  BMP:   Recent Labs   Lab 01/28/22  0600   *   *   K 4.2      CO2 23   BUN 15   CREATININE 1.2   CALCIUM 8.5*     CBC:   Recent Labs   Lab 01/28/22  0600   WBC 12.12   HGB 9.8*   HCT 29.8*   *     CMP:   Recent Labs   Lab 01/28/22  0600   *   K 4.2      CO2 23   *   BUN 15   CREATININE 1.2   CALCIUM 8.5*   PROT 7.9   ALBUMIN 1.2*   BILITOT 0.2   ALKPHOS 197*   AST 30   ALT 15   ANIONGAP 8   EGFRNONAA >60       Significant Imaging: I have reviewed all pertinent imaging results/findings within the past 24 hours.

## 2022-01-28 NOTE — ASSESSMENT & PLAN NOTE
s/p cystoscopy with santiago placement and debridement of abscess/necrosis of scrotum 1/5/22 with Dr. Rangel.  Fluid collection drained at bedside 1/14/2022  CT from 1/18/22 w/ R corporal body fluid collection concerning for residual abscess vs fluid collection  Additional purulence expressed from penis on 1/20/21    Continue abx per primary  Cultures growing Candida and E coli  Appreciate wound care RN and floor RN dressing changes  Leukocytosis improved from admission, monitor    Afebrile  Will continue to monitor condition    Monitor fluid collection in corpora.   No need for intervention currently.    Will need plastic surgery evaluation in future once wound stable for possible grafting. Can be done as outpatient.

## 2022-01-28 NOTE — ASSESSMENT & PLAN NOTE
s/p cystoscopy with santiago placement and debridement of abscess/necrosis of scrotum 1/5/22 with Dr. Rangel.  Fluid collection drained at bedside 1/14/2022  CT from 1/18/22 w/ R corporal body fluid collection concerning for residual abscess vs fluid collection  Additional purulence expressed from penis on 1/20/21    Continue abx per primary  Cultures growing Candida and E coli  Appreciate wound care RN and floor RN dressing changes  Leukocytosis improved from admission, monitor    Afebrile  Will continue to monitor condition    Monitor fluid collection in corpora.   No need for intervention currently.    Santiago care, please be mindful.    Will need plastic surgery evaluation in future once wound stable for possible grafting. Can be done as outpatient.

## 2022-01-28 NOTE — SUBJECTIVE & OBJECTIVE
Interval History: Sahu dislodged during PT.  Nursing staff notified me and brought a Sahu kit to the bedside.    Review of Systems   Unable to perform ROS: Mental status change     Objective:     Temp:  [97.2 °F (36.2 °C)-98.6 °F (37 °C)] 97.2 °F (36.2 °C)  Pulse:  [73-90] 73  Resp:  [17-18] 18  SpO2:  [91 %-100 %] 91 %  BP: (122-159)/(77-84) 151/84     Body mass index is 19.92 kg/m².           Drains     Drain                 Urethral Catheter 01/05/22 1050 Double-lumen 20 Fr. 23 days                Physical Exam  Vitals and nursing note reviewed.   Constitutional:       Appearance: He is well-developed and well-nourished.   HENT:      Head: Normocephalic.   Eyes:      Conjunctiva/sclera: Conjunctivae normal.   Neck:      Thyroid: No thyromegaly.      Trachea: No tracheal deviation.   Cardiovascular:      Rate and Rhythm: Normal rate.      Heart sounds: Normal heart sounds.   Pulmonary:      Effort: Pulmonary effort is normal. No respiratory distress.      Breath sounds: Normal breath sounds. No wheezing.   Abdominal:      General: Bowel sounds are normal.      Palpations: Abdomen is soft. There is no hepatosplenomegaly.      Tenderness: There is no abdominal tenderness. There is no CVA tenderness or rebound.      Hernia: No hernia is present.   Genitourinary:     Comments: No active bleeding  Musculoskeletal:         General: No tenderness or edema. Normal range of motion.      Cervical back: Normal range of motion and neck supple.   Lymphadenopathy:      Cervical: No cervical adenopathy.   Skin:     General: Skin is warm and dry.      Findings: No erythema or rash.   Neurological:      Mental Status: He is alert and oriented to person, place, and time.   Psychiatric:         Mood and Affect: Mood and affect normal.         Behavior: Behavior normal.         Thought Content: Thought content normal.         Judgment: Judgment normal.         Significant Labs:    BMP:  Recent Labs   Lab 01/23/22  1292  01/26/22  0034 01/28/22  0600    141 135*   K 3.9 4.0 4.2    109 104   CO2 27 26 23   BUN 6 9 15   CREATININE 0.9 1.4 1.2   CALCIUM 7.9* 7.8* 8.5*       CBC:   Recent Labs   Lab 01/24/22  0145 01/26/22  0034 01/28/22  0600   WBC 8.83 9.02 12.12   HGB 10.8* 9.9* 9.8*   HCT 33.1* 30.0* 29.8*   PLT 63* 112* 133*           Sahu placement:  Using sterile technique a 16 Fr Silicone Sahu was placed.  Yellow urine draining.  No difficulty with placement.  Secure lock placed.

## 2022-01-28 NOTE — PLAN OF CARE
Problem: Occupational Therapy Goal  Goal: Occupational Therapy Goal  Description: Goals to be met by: 02/10/22    Patient will increase functional independence with ADLs by performing:    Feeding with Modified [place].  UE Dressing with Supervision.  LE Dressing with Minimal Assistance.  Grooming while seated with Stand-by Assistance.  Sitting at edge of bed x15 minutes with Supervision.  Sit to stand with Minimal Assistance and use of AD.    Supine to sit with Contact Guard Assistance.  Upper extremity exercise program x10 reps per handout, with supervision.  Step transfer with Moderate Assistance  Stand pivot transfer with Moderate Assistance   Toilet transfer to bedside commode with Moderate Assistance     Outcome: Ongoing, Progressing     Patient is progressing slowly toward therapy goals. Performance is limited by lethargy throughout treatment.

## 2022-01-28 NOTE — PLAN OF CARE
Problem: Adult Inpatient Plan of Care  Goal: Plan of Care Review  Outcome: Ongoing, Progressing  Flowsheets (Taken 1/28/2022 0815)  Plan of Care Reviewed With: patient  Goal: Patient-Specific Goal (Individualized)  Outcome: Ongoing, Progressing     Problem: Diabetes Comorbidity  Goal: Blood Glucose Level Within Targeted Range  Outcome: Ongoing, Progressing  Intervention: Monitor and Manage Glycemia  Flowsheets (Taken 1/28/2022 1707)  Glycemic Management: blood glucose monitored     Problem: Adult Inpatient Plan of Care  Goal: Plan of Care Review  Outcome: Ongoing, Progressing  Flowsheets (Taken 1/28/2022 0815)  Plan of Care Reviewed With: patient     Problem: Adult Inpatient Plan of Care  Goal: Plan of Care Review  Outcome: Ongoing, Progressing  Flowsheets (Taken 1/28/2022 0815)  Plan of Care Reviewed With: patient     Problem: Adult Inpatient Plan of Care  Goal: Patient-Specific Goal (Individualized)  Outcome: Ongoing, Progressing     Problem: Adult Inpatient Plan of Care  Goal: Patient-Specific Goal (Individualized)  Outcome: Ongoing, Progressing     Problem: Diabetes Comorbidity  Goal: Blood Glucose Level Within Targeted Range  Outcome: Ongoing, Progressing  Intervention: Monitor and Manage Glycemia  Flowsheets (Taken 1/28/2022 1707)  Glycemic Management: blood glucose monitored     Problem: Diabetes Comorbidity  Goal: Blood Glucose Level Within Targeted Range  Outcome: Ongoing, Progressing     Problem: Diabetes Comorbidity  Goal: Blood Glucose Level Within Targeted Range  Intervention: Monitor and Manage Glycemia  Flowsheets (Taken 1/28/2022 1707)  Glycemic Management: blood glucose monitored     Problem: Diabetes Comorbidity  Goal: Blood Glucose Level Within Targeted Range  Intervention: Monitor and Manage Glycemia  Flowsheets (Taken 1/28/2022 1707)  Glycemic Management: blood glucose monitored      Dupixent Pregnancy And Lactation Text: This medication likely crosses the placenta but the risk for the fetus is uncertain. This medication is excreted in breast milk.

## 2022-01-28 NOTE — PROGRESS NOTES
[facility]  Urology  Progress Note    Patient Name: Abhishek Zhao  MRN: [id number]  Admission Date: 1/4/2022  Hospital Length of Stay: 23 days  Code Status: Full Code   Attending Provider: Quinten Rosario MD   Primary Care Physician: To Obtain Unable    Subjective:     HPI:  Scrotal Swelling  Abhishek Zhao is a 61 y.o. male who was been experiencing scrotal pain and swelling since 12/31/2021.  He denies any fever.  He has had issues voiding since the swelling began.  Hemostat having issues in the past with urinary problems.  He denies having any previous issues with scrotal infection or swelling.  He recalls that he had procedures in the past but cannot recall specifically what to place.  He does not have urologist locally but moved back from [place] about 1 year ago.    Review of care everywhere shows that he had a cystoscopy with urethral dilation of a urethral stricture in the bulbar urethra in 2019 at [facility].  And there are reports that in approximately 2015 [facility]      Interval History: Sahu dislodged during PT.  Nursing staff notified me and brought a Sahu kit to the bedside.    Review of Systems   Unable to perform ROS: Mental status change     Objective:     Temp:  [97.2 °F (36.2 °C)-98.6 °F (37 °C)] 97.2 °F (36.2 °C)  Pulse:  [73-90] 73  Resp:  [17-18] 18  SpO2:  [91 %-100 %] 91 %  BP: (122-159)/(77-84) 151/84     Body mass index is 19.92 kg/m².           Drains     Drain                 Urethral Catheter 01/05/22 1050 Double-lumen 20 Fr. 23 days                Physical Exam  Vitals and nursing note reviewed.   Constitutional:       Appearance: He is well-developed and well-nourished.   HENT:      Head: Normocephalic.   Eyes:      Conjunctiva/sclera: Conjunctivae normal.   Neck:      Thyroid: No thyromegaly.      Trachea: No tracheal deviation.   Cardiovascular:      Rate and Rhythm: Normal rate.      Heart sounds: Normal heart sounds.    Pulmonary:      Effort: Pulmonary effort is normal. No respiratory distress.      Breath sounds: Normal breath sounds. No wheezing.   Abdominal:      General: Bowel sounds are normal.      Palpations: Abdomen is soft. There is no hepatosplenomegaly.      Tenderness: There is no abdominal tenderness. There is no CVA tenderness or rebound.      Hernia: No hernia is present.   Genitourinary:     Comments: No active bleeding  Musculoskeletal:         General: No tenderness or edema. Normal range of motion.      Cervical back: Normal range of motion and neck supple.   Lymphadenopathy:      Cervical: No cervical adenopathy.   Skin:     General: Skin is warm and dry.      Findings: No erythema or rash.   Neurological:      Mental Status: He is alert and oriented to person, place, and time.   Psychiatric:         Mood and Affect: Mood and affect normal.         Behavior: Behavior normal.         Thought Content: Thought content normal.         Judgment: Judgment normal.         Significant Labs:    BMP:  Recent Labs   Lab 01/23/22  0413 01/26/22  0034 01/28/22  0600    141 135*   K 3.9 4.0 4.2    109 104   CO2 27 26 23   BUN 6 9 15   CREATININE 0.9 1.4 1.2   CALCIUM 7.9* 7.8* 8.5*       CBC:   Recent Labs   Lab 01/24/22  0145 01/26/22  0034 01/28/22  0600   WBC 8.83 9.02 12.12   HGB 10.8* 9.9* 9.8*   HCT 33.1* 30.0* 29.8*   PLT 63* 112* 133*           Santiago placement:  Using sterile technique a 16 Fr Silicone Santiago was placed.  Yellow urine draining.  No difficulty with placement.  Secure lock placed.                    Assessment/Plan:     * Scrotal abscess  s/p cystoscopy with santiago placement and debridement of abscess/necrosis of scrotum 1/5/22 with Dr. Rangel.  Fluid collection drained at bedside 1/14/2022  CT from 1/18/22 w/ R corporal body fluid collection concerning for residual abscess vs fluid collection  Additional purulence expressed from penis on 1/20/21    Continue abx per primary  Cultures  growing Candida and E coli  Appreciate wound care RN and floor RN dressing changes  Leukocytosis improved from admission, monitor    Afebrile  Will continue to monitor condition    Monitor fluid collection in corpora.   No need for intervention currently.    Sahu care, please be mindful.    Will need plastic surgery evaluation in future once wound stable for possible grafting. Can be done as outpatient.             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

## 2022-01-29 LAB
POCT GLUCOSE: 164 MG/DL (ref 70–110)
POCT GLUCOSE: 193 MG/DL (ref 70–110)

## 2022-01-29 PROCEDURE — 63600175 PHARM REV CODE 636 W HCPCS: Performed by: STUDENT IN AN ORGANIZED HEALTH CARE EDUCATION/TRAINING PROGRAM

## 2022-01-29 PROCEDURE — C9254 INJECTION, LACOSAMIDE: HCPCS | Performed by: STUDENT IN AN ORGANIZED HEALTH CARE EDUCATION/TRAINING PROGRAM

## 2022-01-29 PROCEDURE — 25000003 PHARM REV CODE 250: Performed by: STUDENT IN AN ORGANIZED HEALTH CARE EDUCATION/TRAINING PROGRAM

## 2022-01-29 PROCEDURE — 25000003 PHARM REV CODE 250: Performed by: HOSPITALIST

## 2022-01-29 PROCEDURE — 99233 PR SUBSEQUENT HOSPITAL CARE,LEVL III: ICD-10-PCS | Mod: ,,, | Performed by: UROLOGY

## 2022-01-29 PROCEDURE — 99233 SBSQ HOSP IP/OBS HIGH 50: CPT | Mod: ,,, | Performed by: UROLOGY

## 2022-01-29 PROCEDURE — 94761 N-INVAS EAR/PLS OXIMETRY MLT: CPT

## 2022-01-29 PROCEDURE — C1751 CATH, INF, PER/CENT/MIDLINE: HCPCS

## 2022-01-29 PROCEDURE — 92526 ORAL FUNCTION THERAPY: CPT

## 2022-01-29 PROCEDURE — 11000001 HC ACUTE MED/SURG PRIVATE ROOM

## 2022-01-29 PROCEDURE — 94799 UNLISTED PULMONARY SVC/PX: CPT

## 2022-01-29 PROCEDURE — B4185 PARENTERAL SOL 10 GM LIPIDS: HCPCS | Performed by: STUDENT IN AN ORGANIZED HEALTH CARE EDUCATION/TRAINING PROGRAM

## 2022-01-29 PROCEDURE — A4216 STERILE WATER/SALINE, 10 ML: HCPCS | Performed by: STUDENT IN AN ORGANIZED HEALTH CARE EDUCATION/TRAINING PROGRAM

## 2022-01-29 PROCEDURE — 63600175 PHARM REV CODE 636 W HCPCS: Performed by: HOSPITALIST

## 2022-01-29 RX ADMIN — DEXTROSE 500 MG: 50 INJECTION, SOLUTION INTRAVENOUS at 11:01

## 2022-01-29 RX ADMIN — Medication 10 ML: at 11:01

## 2022-01-29 RX ADMIN — PIPERACILLIN AND TAZOBACTAM 4.5 G: 4; .5 INJECTION, POWDER, LYOPHILIZED, FOR SOLUTION INTRAVENOUS; PARENTERAL at 10:01

## 2022-01-29 RX ADMIN — SODIUM CHLORIDE 200 MG: 9 INJECTION, SOLUTION INTRAVENOUS at 12:01

## 2022-01-29 RX ADMIN — Medication 10 ML: at 12:01

## 2022-01-29 RX ADMIN — FLUCONAZOLE 400 MG: 2 INJECTION, SOLUTION INTRAVENOUS at 12:01

## 2022-01-29 RX ADMIN — RETINOL, ERGOCALCIFEROL, .ALPHA.-TOCOPHEROL ACETATE, DL-, PHYTONADIONE, ASCORBIC ACID, NIACINAMIDE, RIBOFLAVIN 5-PHOSPHATE SODIUM, THIAMINE HYDROCHLORIDE, PYRIDOXINE HYDROCHLORIDE, DEXPANTHENOL, BIOTIN, FOLIC ACID, AND CYANOCOBALAMIN: KIT at 10:01

## 2022-01-29 RX ADMIN — SODIUM CHLORIDE 200 MG: 9 INJECTION, SOLUTION INTRAVENOUS at 10:01

## 2022-01-29 RX ADMIN — Medication 10 ML: at 05:01

## 2022-01-29 RX ADMIN — SOYBEAN OIL 250 ML: 20 INJECTION, SOLUTION INTRAVENOUS at 10:01

## 2022-01-29 RX ADMIN — PIPERACILLIN AND TAZOBACTAM 4.5 G: 4; .5 INJECTION, POWDER, LYOPHILIZED, FOR SOLUTION INTRAVENOUS; PARENTERAL at 06:01

## 2022-01-29 RX ADMIN — ENOXAPARIN SODIUM 40 MG: 40 INJECTION SUBCUTANEOUS at 04:01

## 2022-01-29 RX ADMIN — DEXTROSE 500 MG: 50 INJECTION, SOLUTION INTRAVENOUS at 12:01

## 2022-01-29 RX ADMIN — PIPERACILLIN AND TAZOBACTAM 4.5 G: 4; .5 INJECTION, POWDER, LYOPHILIZED, FOR SOLUTION INTRAVENOUS; PARENTERAL at 02:01

## 2022-01-29 NOTE — SUBJECTIVE & OBJECTIVE
Interval History: Much more alert today. Sahu draining well. Wound stable.     Review of Systems   Unable to perform ROS: Mental status change     Objective:     Temp:  [97.2 °F (36.2 °C)-99.5 °F (37.5 °C)] 99.5 °F (37.5 °C)  Pulse:  [72-82] 76  Resp:  [18-22] 19  SpO2:  [96 %-99 %] 99 %  BP: (133-186)/(78-95) 147/90     Body mass index is 19.92 kg/m².           Drains     Drain                 Urethral Catheter 01/05/22 1050 Double-lumen 20 Fr. 24 days                Physical Exam  Vitals and nursing note reviewed.   Constitutional:       Appearance: He is well-developed.   HENT:      Head: Normocephalic.   Neck:      Thyroid: No thyromegaly.      Trachea: No tracheal deviation.   Pulmonary:      Effort: Pulmonary effort is normal. No respiratory distress.      Breath sounds: No wheezing.   Abdominal:      General: Bowel sounds are normal.      Palpations: Abdomen is soft.      Tenderness: There is no abdominal tenderness. There is no right CVA tenderness, left CVA tenderness or rebound.      Hernia: No hernia is present.   Genitourinary:     Comments: Sahu - clear yellow urine  Wound edges clean, good granulation tissue  No purulent drainage  Musculoskeletal:         General: No tenderness. Normal range of motion.      Cervical back: Normal range of motion and neck supple.   Lymphadenopathy:      Cervical: No cervical adenopathy.   Skin:     General: Skin is warm and dry.      Findings: No erythema or rash.   Neurological:      Mental Status: He is alert.         Significant Labs:    BMP:  Recent Labs   Lab 01/23/22  0413 01/26/22  0034 01/28/22  0600    141 135*   K 3.9 4.0 4.2    109 104   CO2 27 26 23   BUN 6 9 15   CREATININE 0.9 1.4 1.2   CALCIUM 7.9* 7.8* 8.5*       CBC:   Recent Labs   Lab 01/24/22  0145 01/26/22  0034 01/28/22  0600   WBC 8.83 9.02 12.12   HGB 10.8* 9.9* 9.8*   HCT 33.1* 30.0* 29.8*   PLT 63* 112* 133*       Blood Culture:   Recent Labs   Lab 01/24/22  0137 01/26/22  1600    LABBLOO No Growth after 4 days.  No Growth to date  No Growth to date  No Growth to date     Urine Culture: No results for input(s): LABURIN in the last 168 hours.  Urine Studies: No results for input(s): COLORU, APPEARANCEUA, PHUR, SPECGRAV, PROTEINUA, GLUCUA, KETONESU, BILIRUBINUA, OCCULTUA, NITRITE, UROBILINOGEN, LEUKOCYTESUR, RBCUA, WBCUA, BACTERIA, SQUAMEPITHEL, HYALINECASTS in the last 168 hours.    Invalid input(s): TAQUERIA    Significant Imaging:  All pertinent imaging results/findings from the past 24 hours have been reviewed.

## 2022-01-29 NOTE — PLAN OF CARE
Problem: Adult Inpatient Plan of Care  Goal: Plan of Care Review  Outcome: Ongoing, Progressing  Flowsheets (Taken 1/29/2022 1628)  Plan of Care Reviewed With: patient  Goal: Patient-Specific Goal (Individualized)  Outcome: Ongoing, Progressing  Goal: Absence of Hospital-Acquired Illness or Injury  Outcome: Ongoing, Progressing  Intervention: Identify and Manage Fall Risk  Flowsheets (Taken 1/29/2022 1628)  Safety Promotion/Fall Prevention:   assistive device/personal item within reach   bed alarm set   Fall Risk reviewed with patient/family   high risk medications identified   medications reviewed   nonskid shoes/socks when out of bed   side rails raised x 2   room near unit station   instructed to call staff for mobility  Intervention: Prevent Skin Injury  Flowsheets (Taken 1/29/2022 1628)  Body Position:   turned   log-rolled  Skin Protection:   tubing/devices free from skin contact   adhesive use limited  Goal: Optimal Comfort and Wellbeing  Outcome: Ongoing, Progressing  Goal: Readiness for Transition of Care  Outcome: Ongoing, Progressing     Problem: Diabetes Comorbidity  Goal: Blood Glucose Level Within Targeted Range  Outcome: Ongoing, Progressing     Problem: Infection  Goal: Absence of Infection Signs and Symptoms  Outcome: Ongoing, Progressing  Intervention: Prevent or Manage Infection  Flowsheets (Taken 1/29/2022 1628)  Infection Management: aseptic technique maintained     Problem: Adjustment to Illness (Sepsis/Septic Shock)  Goal: Optimal Coping  Outcome: Ongoing, Progressing     Problem: Bleeding (Sepsis/Septic Shock)  Goal: Absence of Bleeding  Outcome: Ongoing, Progressing     Problem: Glycemic Control Impaired (Sepsis/Septic Shock)  Goal: Blood Glucose Level Within Desired Range  Outcome: Ongoing, Progressing     Problem: Infection Progression (Sepsis/Septic Shock)  Goal: Absence of Infection Signs and Symptoms  Outcome: Ongoing, Progressing  Intervention: Initiate Sepsis Management  Flowsheets  (Taken 1/29/2022 1628)  Infection Management: aseptic technique maintained     Problem: Nutrition Impaired (Sepsis/Septic Shock)  Goal: Optimal Nutrition Intake  Outcome: Ongoing, Progressing     Problem: Fall Injury Risk  Goal: Absence of Fall and Fall-Related Injury  Outcome: Ongoing, Progressing  Intervention: Identify and Manage Contributors  Flowsheets (Taken 1/29/2022 1628)  Self-Care Promotion:   independence encouraged   BADL personal objects within reach   BADL personal routines maintained   meal set-up provided  Medication Review/Management:   medications reviewed   high-risk medications identified     Problem: Fluid and Electrolyte Imbalance (Acute Kidney Injury/Impairment)  Goal: Fluid and Electrolyte Balance  Outcome: Ongoing, Progressing     Problem: Oral Intake Inadequate (Acute Kidney Injury/Impairment)  Goal: Optimal Nutrition Intake  Outcome: Ongoing, Progressing     Problem: Renal Function Impairment (Acute Kidney Injury/Impairment)  Goal: Effective Renal Function  Outcome: Ongoing, Progressing     Problem: Fluid Imbalance (Pneumonia)  Goal: Fluid Balance  Outcome: Ongoing, Progressing     Problem: Infection (Pneumonia)  Goal: Resolution of Infection Signs and Symptoms  Outcome: Ongoing, Progressing  Intervention: Prevent Infection Progression  Flowsheets (Taken 1/29/2022 1628)  Infection Management: aseptic technique maintained     Problem: Respiratory Compromise (Pneumonia)  Goal: Effective Oxygenation and Ventilation  Outcome: Ongoing, Progressing     Problem: Skin Injury Risk Increased  Goal: Skin Health and Integrity  Outcome: Ongoing, Progressing  Intervention: Optimize Skin Protection  Flowsheets (Taken 1/29/2022 1628)  Pressure Reduction Techniques:   frequent weight shift encouraged   weight shift assistance provided  Skin Protection:   tubing/devices free from skin contact   adhesive use limited  Head of Bed (HOB) Positioning: HOB at 30-45 degrees     Problem: Seizure Disorder  Comorbidity  Goal: Maintenance of Seizure Control  Outcome: Ongoing, Progressing     Problem: Confusion Chronic  Goal: Optimal Cognitive Function  Outcome: Ongoing, Progressing  Intervention: Minimize and Manage Confusion  Flowsheets (Taken 1/29/2022 1628)  Self-Care Promotion:   independence encouraged   BADL personal objects within reach   BADL personal routines maintained   meal set-up provided     Problem: Impaired Wound Healing  Goal: Optimal Wound Healing  Outcome: Ongoing, Progressing  Intervention: Promote Wound Healing  Flowsheets (Taken 1/29/2022 1628)  Activity Management: Rolling - L1     Problem: Skin or Soft Tissue Infection  Goal: Absence of Infection Signs and Symptoms  Outcome: Ongoing, Progressing     Problem: Coping Ineffective  Goal: Effective Coping  Outcome: Ongoing, Progressing

## 2022-01-29 NOTE — PROGRESS NOTES
[facility]  Urology  Progress Note    Patient Name: Abhishek Zhao  MRN: [id number]  Admission Date: 1/4/2022  Hospital Length of Stay: 24 days  Code Status: Full Code   Attending Provider: Velia Matthews DO   Primary Care Physician: To Obtain Unable    Subjective:     HPI:  Scrotal Swelling  Abhishek Zhao is a 61 y.o. male who was been experiencing scrotal pain and swelling since 12/31/2021.  He denies any fever.  He has had issues voiding since the swelling began.  Hemostat having issues in the past with urinary problems.  He denies having any previous issues with scrotal infection or swelling.  He recalls that he had procedures in the past but cannot recall specifically what to place.  He does not have urologist locally but moved back from [place] about 1 year ago.    Review of care everywhere shows that he had a cystoscopy with urethral dilation of a urethral stricture in the bulbar urethra in 2019 at [facility].  And there are reports that [facility] approximately [facility]      Interval History: Much more alert today. Sahu draining well. Wound stable.     Review of Systems   Unable to perform ROS: Mental status change     Objective:     Temp:  [97.2 °F (36.2 °C)-99.5 °F (37.5 °C)] 99.5 °F (37.5 °C)  Pulse:  [72-82] 76  Resp:  [18-22] 19  SpO2:  [96 %-99 %] 99 %  BP: (133-186)/(78-95) 147/90     Body mass index is 19.92 kg/m².           Drains     Drain                 Urethral Catheter 01/05/22 1050 Double-lumen 20 Fr. 24 days                Physical Exam  Vitals and nursing note reviewed.   Constitutional:       Appearance: He is well-developed.   HENT:      Head: Normocephalic.   Neck:      Thyroid: No thyromegaly.      Trachea: No tracheal deviation.   Pulmonary:      Effort: Pulmonary effort is normal. No respiratory distress.      Breath sounds: No wheezing.   Abdominal:      General: Bowel sounds are normal.      Palpations: Abdomen is soft.       Tenderness: There is no abdominal tenderness. There is no right CVA tenderness, left CVA tenderness or rebound.      Hernia: No hernia is present.   Genitourinary:     Comments: Santiago - clear yellow urine  Wound edges clean, good granulation tissue  No purulent drainage  Musculoskeletal:         General: No tenderness. Normal range of motion.      Cervical back: Normal range of motion and neck supple.   Lymphadenopathy:      Cervical: No cervical adenopathy.   Skin:     General: Skin is warm and dry.      Findings: No erythema or rash.   Neurological:      Mental Status: He is alert.         Significant Labs:    BMP:  Recent Labs   Lab 01/23/22  0413 01/26/22  0034 01/28/22  0600    141 135*   K 3.9 4.0 4.2    109 104   CO2 27 26 23   BUN 6 9 15   CREATININE 0.9 1.4 1.2   CALCIUM 7.9* 7.8* 8.5*       CBC:   Recent Labs   Lab 01/24/22  0145 01/26/22  0034 01/28/22  0600   WBC 8.83 9.02 12.12   HGB 10.8* 9.9* 9.8*   HCT 33.1* 30.0* 29.8*   PLT 63* 112* 133*       Blood Culture:   Recent Labs   Lab 01/24/22  0137 01/26/22  1600   LABBLOO No Growth after 4 days.  No Growth to date  No Growth to date  No Growth to date     Urine Culture: No results for input(s): LABURIN in the last 168 hours.  Urine Studies: No results for input(s): COLORU, APPEARANCEUA, PHUR, SPECGRAV, PROTEINUA, GLUCUA, KETONESU, BILIRUBINUA, OCCULTUA, NITRITE, UROBILINOGEN, LEUKOCYTESUR, RBCUA, WBCUA, BACTERIA, SQUAMEPITHEL, HYALINECASTS in the last 168 hours.    Invalid input(s): WRIGHTSUR    Significant Imaging:  All pertinent imaging results/findings from the past 24 hours have been reviewed.                  Assessment/Plan:     * Scrotal abscess  s/p cystoscopy with santiago placement and debridement of abscess/necrosis of scrotum 1/5/22 with Dr. Rangel.  Fluid collection drained at bedside 1/14/2022  CT from 1/18/22 w/ R corporal body fluid collection concerning for residual abscess vs fluid collection  Additional purulence expressed  from penis on 1/20/21    Continue abx per primary  Cultures growing Candida and E coli  Appreciate wound care RN and floor RN dressing changes  Leukocytosis improved from admission, monitor    Afebrile  Will continue to monitor condition    Monitor fluid collection in corpora.   No need for intervention currently.    Sahu care, please be mindful.    Will need plastic surgery evaluation in future once wound stable for possible grafting. Can be done as outpatient.             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

## 2022-01-29 NOTE — PLAN OF CARE
Problem: SLP Goal  Goal: SLP Goal  Description: Goals:  1. Pt will tolerate puree diet/thin liquids w/o overt s/s of aspiration (discontinued 1/25/22).  2. Pt will tolerate mech soft diet (IDDSI-5) with thin liquids w/o overt s/s of aspiration. (on hold 1/16/22, pending pt progress)  Outcome: Ongoing, Not Progressing     Pt unable to tolerate PO trials of ice chips and water via spoon.

## 2022-01-29 NOTE — PT/OT/SLP PROGRESS
Speech Language Pathology Treatment    Patient Name:  Abhishek Zhao   MRN:  [id number]  Admitting Diagnosis: Scrotal abscess    Recommendations:                 General Recommendations:  Dysphagia therapy  Diet recommendations:  NPO, Liquid Diet Level: NPO   Aspiration Precautions: non-oral meds, Alternate means of nutrition/hydration and Frequent oral care   General Precautions: Standard, aspiration,NPO  Communication strategies:  provide increased time to answer    Subjective     Pt asleep upon SLP arrival. Pt awoken to SLP greeting agreeable to PO trials of water. No family at bedside during tx session.    Patient goals: unable to state    Pain/Comfort:  · Pain Rating 1: 0/10    Respiratory Status: Nasal cannula, flow 2 L/min    Objective:     Has the patient been evaluated by SLP for swallowing?   Yes  Keep patient NPO?     Current Respiratory Status:        Pt repositioned with HOB elevated prior to administering therapeutic PO trials.  Oral care provided. Pt's oral cavity appeared coated and pt unable to swallow oral secretions requiring oral suctioning via yankauer.    Pt accepted trials of ice chips x2, water via spoon x4,and water thickened to nectar consistency x2. Pt exhibited oral holding, limited swallow initiation,and weak, non-productive cough post swallow.  Session ended 2/2 pt's dcr alertness and inability initiate swallow.  Pt remains unsafe for PO nutrition and meds at this time.     Assessment:     Abhishek Zhao is a 61 y.o. male with a dx of Scrotal abscess.  Pt presents severe oropharyngeal dysphagia negatively impacted by decreased DENIS. ST will con't to provide ongoing swallow assessment. Rec: con't NPO    Goals:   Multidisciplinary Problems     SLP Goals        Problem: SLP Goal    Goal Priority Disciplines Outcome   SLP Goal    Medium SLP Ongoing, Not Progressing   Description: Goals:  1. Pt will tolerate puree diet/thin liquids w/o overt s/s of aspiration (discontinued 1/25/22).  2. Pt will  tolerate [facility] soft diet (IDDSI-5) with thin liquids w/o overt s/s of aspiration. (on hold 1/16/22, pending pt progress)                   Plan:     · Patient to be seen:  3 x/week   · Plan of Care expires:  01/29/22  · Plan of Care reviewed with:  patient   · SLP Follow-Up:  Yes       Discharge recommendations:  other (see comments) (TBD)   Barriers to Discharge:  None    Time Tracking:     SLP Treatment Date:   01/29/22  Speech Start Time:  1157  Speech Stop Time:  1211     Speech Total Time (min):  14 min    Billable Minutes: Treatment Swallowing Dysfunction 14 min    01/29/2022

## 2022-01-29 NOTE — PT/OT/SLP PROGRESS
"Physical Therapy Treatment    Patient Name:  Abhishek Zhao   MRN:  [id number]    Recommendations:     Discharge Recommendations:  24hr care   Discharge Equipment Recommendations: hospital bed, heather lift   Barriers to discharge: Pt requires 24hr care and Total A for all mobility and ADL's    Assessment:     Abhishek Zhao is a 61 y.o. male admitted with a medical diagnosis of Scrotal abscess.  He presents with the following impairments/functional limitations:  weakness,impaired balance,decreased safety awareness,impaired skin,impaired endurance,pain,visual deficits,impaired cognition,impaired cardiopulmonary response to activity,impaired self care skills,decreased coordination,impaired functional mobilty,impaired coordination,decreased upper extremity function,gait instability,decreased lower extremity function,impaired fine motor limited 2/2 pain and confusion.    Rehab Prognosis: Poor; patient would benefit from acute skilled PT services to address these deficits and reach maximum level of function.    Recent Surgery: Procedure(s) (LRB):  CYSTOSCOPY, RETROGRADE URETHROGRAM, FISTULAGRAM, EXCISION OF NECROTIC SCROTAL TISSUE ABSCESS, SANTIAGO PLACEMENT (N/A)  EXCISION, ABSCESS  CYSTOSCOPY  FISTULOGRAM (N/A) 24 Days Post-Op    Plan:     During this hospitalization, patient to be seen  2x/wk to address the identified rehab impairments via gait training,therapeutic activities,therapeutic exercises,wheelchair management/training,neuromuscular re-education and progress toward the following goals:    · Plan of Care Expires:  01/27/22    Subjective     Chief Complaint: "I can't do it anymore", sit at EOB  Patient/Family Comments/goals: to lay back down  Pain/Comfort:  ·  Unable to rate or state where    Objective:     Communicated with nsg prior to session.  Patient found HOB elevated with bed alarm,oxygen,santiago catheter,pressure relief boots,peripheral IV upon PT entry to room. Lethargic, arousible to loud voice for brief " levels of time    General Precautions: Standard, fall,respiratory,NPO   Orthopedic Precautions:N/A   Braces:  N/A  Respiratory Status: Nasal cannula, flow 2 L/min     Functional Mobility:  · Bed Mobility:     · Rolling Left:  dependence  · Rolling Right: dependence  · Scooting: dependence  · Bridging: unable  · Supine to Sit: dependence  · Sit to Supine: dependence  · Balance: Poor sitting at EOB, Max A>Total A pushing posteriorly>Min A with encouragement       AM-PAC 6 CLICK MOBILITY      12    Therapeutic Activities and Exercises:   Pt sat at EOB approx 2m Total.  Unable to tolerate.  Pt repositioned to HOB dependently with Sahu catheter inadvertently removed.  Nursing notified.  Pt received B LE PROM x 10 reps in available planes and B HCS 2x15 sec    Patient left HOB elevated with call button in reach, Pressure relief boots reapplied, wedge behind R back, bed alarm on and nsg notified..    GOALS:   Multidisciplinary Problems     Physical Therapy Goals        Problem: Physical Therapy Goal    Goal Priority Disciplines Outcome Goal Variances Interventions   Physical Therapy Goal     PT, PT/OT Ongoing, Not Progressing     Description: Goals to be met by: 22     Patient will increase functional independence with mobility by performin. Supine to sit with Stand-by Assistance  2. Rolling to Left and Right with Stand-by Assistance.  3. Sit to stand transfer to be assessed   4. Gait to be assessed    5. Sitting at edge of bed x15 minutes with Stand-by Assistance  6. Lower extremity exercise program x10 reps per handout, with assistance as needed                     Time Tracking:     PT Received On: 22  PT Start Time: 1348     PT Stop Time: 1422  PT Total Time (min): 34 min     Billable Minutes: 17  TE co-treat with SCHROEDER    Treatment Type: Treatment  PT/PTA: PT     PTA Visit Number: 2     2022

## 2022-01-30 LAB
ALBUMIN SERPL BCP-MCNC: 1.3 G/DL (ref 3.5–5.2)
ALP SERPL-CCNC: 176 U/L (ref 55–135)
ALT SERPL W/O P-5'-P-CCNC: 21 U/L (ref 10–44)
ANION GAP SERPL CALC-SCNC: 9 MMOL/L (ref 8–16)
AST SERPL-CCNC: 36 U/L (ref 10–40)
BACTERIA BLD CULT: NORMAL
BASOPHILS # BLD AUTO: 0.1 K/UL (ref 0–0.2)
BASOPHILS NFR BLD: 0.9 % (ref 0–1.9)
BILIRUB SERPL-MCNC: 0.2 MG/DL (ref 0.1–1)
BUN SERPL-MCNC: 16 MG/DL (ref 6–20)
CALCIUM SERPL-MCNC: 8.6 MG/DL (ref 8.7–10.5)
CHLORIDE SERPL-SCNC: 101 MMOL/L (ref 95–110)
CO2 SERPL-SCNC: 23 MMOL/L (ref 23–29)
CREAT SERPL-MCNC: 1 MG/DL (ref 0.5–1.4)
DIFFERENTIAL METHOD: ABNORMAL
EOSINOPHIL # BLD AUTO: 0.1 K/UL (ref 0–0.5)
EOSINOPHIL NFR BLD: 0.4 % (ref 0–8)
ERYTHROCYTE [DISTWIDTH] IN BLOOD BY AUTOMATED COUNT: 13.6 % (ref 11.5–14.5)
EST. GFR  (AFRICAN AMERICAN): >60 ML/MIN/1.73 M^2
EST. GFR  (NON AFRICAN AMERICAN): >60 ML/MIN/1.73 M^2
GLUCOSE SERPL-MCNC: 152 MG/DL (ref 70–110)
HCT VFR BLD AUTO: 27.8 % (ref 40–54)
HGB BLD-MCNC: 9.2 G/DL (ref 14–18)
IMM GRANULOCYTES # BLD AUTO: 0.18 K/UL (ref 0–0.04)
IMM GRANULOCYTES NFR BLD AUTO: 1.6 % (ref 0–0.5)
LYMPHOCYTES # BLD AUTO: 2.5 K/UL (ref 1–4.8)
LYMPHOCYTES NFR BLD: 21.8 % (ref 18–48)
MCH RBC QN AUTO: 28.4 PG (ref 27–31)
MCHC RBC AUTO-ENTMCNC: 33.1 G/DL (ref 32–36)
MCV RBC AUTO: 86 FL (ref 82–98)
MONOCYTES # BLD AUTO: 1.4 K/UL (ref 0.3–1)
MONOCYTES NFR BLD: 11.9 % (ref 4–15)
NEUTROPHILS # BLD AUTO: 7.3 K/UL (ref 1.8–7.7)
NEUTROPHILS NFR BLD: 63.4 % (ref 38–73)
NRBC BLD-RTO: 0 /100 WBC
PLATELET # BLD AUTO: 147 K/UL (ref 150–450)
PMV BLD AUTO: 11.4 FL (ref 9.2–12.9)
POCT GLUCOSE: 127 MG/DL (ref 70–110)
POCT GLUCOSE: 149 MG/DL (ref 70–110)
POCT GLUCOSE: 164 MG/DL (ref 70–110)
POCT GLUCOSE: 168 MG/DL (ref 70–110)
POTASSIUM SERPL-SCNC: 4.4 MMOL/L (ref 3.5–5.1)
PROT SERPL-MCNC: 8.2 G/DL (ref 6–8.4)
RBC # BLD AUTO: 3.24 M/UL (ref 4.6–6.2)
SODIUM SERPL-SCNC: 133 MMOL/L (ref 136–145)
WBC # BLD AUTO: 11.46 K/UL (ref 3.9–12.7)

## 2022-01-30 PROCEDURE — 63600175 PHARM REV CODE 636 W HCPCS: Performed by: STUDENT IN AN ORGANIZED HEALTH CARE EDUCATION/TRAINING PROGRAM

## 2022-01-30 PROCEDURE — 11000001 HC ACUTE MED/SURG PRIVATE ROOM

## 2022-01-30 PROCEDURE — 99232 PR SUBSEQUENT HOSPITAL CARE,LEVL II: ICD-10-PCS | Mod: ,,, | Performed by: UROLOGY

## 2022-01-30 PROCEDURE — 85025 COMPLETE CBC W/AUTO DIFF WBC: CPT | Performed by: STUDENT IN AN ORGANIZED HEALTH CARE EDUCATION/TRAINING PROGRAM

## 2022-01-30 PROCEDURE — 80053 COMPREHEN METABOLIC PANEL: CPT | Performed by: STUDENT IN AN ORGANIZED HEALTH CARE EDUCATION/TRAINING PROGRAM

## 2022-01-30 PROCEDURE — 25000003 PHARM REV CODE 250: Performed by: STUDENT IN AN ORGANIZED HEALTH CARE EDUCATION/TRAINING PROGRAM

## 2022-01-30 PROCEDURE — A4216 STERILE WATER/SALINE, 10 ML: HCPCS | Performed by: STUDENT IN AN ORGANIZED HEALTH CARE EDUCATION/TRAINING PROGRAM

## 2022-01-30 PROCEDURE — C9254 INJECTION, LACOSAMIDE: HCPCS | Performed by: STUDENT IN AN ORGANIZED HEALTH CARE EDUCATION/TRAINING PROGRAM

## 2022-01-30 PROCEDURE — 99232 SBSQ HOSP IP/OBS MODERATE 35: CPT | Mod: ,,, | Performed by: UROLOGY

## 2022-01-30 PROCEDURE — B4185 PARENTERAL SOL 10 GM LIPIDS: HCPCS | Performed by: STUDENT IN AN ORGANIZED HEALTH CARE EDUCATION/TRAINING PROGRAM

## 2022-01-30 PROCEDURE — C1751 CATH, INF, PER/CENT/MIDLINE: HCPCS

## 2022-01-30 RX ADMIN — SOYBEAN OIL 250 ML: 20 INJECTION, SOLUTION INTRAVENOUS at 09:01

## 2022-01-30 RX ADMIN — DEXTROSE 500 MG: 50 INJECTION, SOLUTION INTRAVENOUS at 10:01

## 2022-01-30 RX ADMIN — Medication 10 ML: at 05:01

## 2022-01-30 RX ADMIN — SODIUM CHLORIDE 200 MG: 9 INJECTION, SOLUTION INTRAVENOUS at 12:01

## 2022-01-30 RX ADMIN — Medication 10 ML: at 06:01

## 2022-01-30 RX ADMIN — Medication 10 ML: at 12:01

## 2022-01-30 RX ADMIN — RETINOL, ERGOCALCIFEROL, .ALPHA.-TOCOPHEROL ACETATE, DL-, PHYTONADIONE, ASCORBIC ACID, NIACINAMIDE, RIBOFLAVIN 5-PHOSPHATE SODIUM, THIAMINE HYDROCHLORIDE, PYRIDOXINE HYDROCHLORIDE, DEXPANTHENOL, BIOTIN, FOLIC ACID, AND CYANOCOBALAMIN: KIT at 09:01

## 2022-01-30 RX ADMIN — FLUCONAZOLE 400 MG: 2 INJECTION, SOLUTION INTRAVENOUS at 10:01

## 2022-01-30 RX ADMIN — SODIUM CHLORIDE 200 MG: 9 INJECTION, SOLUTION INTRAVENOUS at 11:01

## 2022-01-30 RX ADMIN — ENOXAPARIN SODIUM 40 MG: 40 INJECTION SUBCUTANEOUS at 06:01

## 2022-01-30 RX ADMIN — PIPERACILLIN AND TAZOBACTAM 4.5 G: 4; .5 INJECTION, POWDER, LYOPHILIZED, FOR SOLUTION INTRAVENOUS; PARENTERAL at 03:01

## 2022-01-30 RX ADMIN — PIPERACILLIN AND TAZOBACTAM 4.5 G: 4; .5 INJECTION, POWDER, LYOPHILIZED, FOR SOLUTION INTRAVENOUS; PARENTERAL at 11:01

## 2022-01-30 RX ADMIN — Medication 10 ML: at 11:01

## 2022-01-30 RX ADMIN — PIPERACILLIN AND TAZOBACTAM 4.5 G: 4; .5 INJECTION, POWDER, LYOPHILIZED, FOR SOLUTION INTRAVENOUS; PARENTERAL at 04:01

## 2022-01-30 NOTE — ASSESSMENT & PLAN NOTE
-Multiple abscesses c/w Berna's gangrene   -Cultures growing ampicillin-resistant Ecoli and C albicans. Repeat CT A/P 1/13 w/ 4cm fluid collection c/f persistent abscess which was drained bedside by urology  -Urology consulted: s/p cystoscopy with santiago placement and debridement of abscess/necrosis of scrotum 1/5/22 with Dr. Rangel.Fluid collection drained at bedside 1/14/2022  -CT from 1/18/22 w/ R corporal body fluid collection concerning for residual abscess vs fluid collection  - cont zosyn and diflucan (re-started for HCAP)  - ID consulted, appreciate recs  -Consulted IR for drainage of residual fluid  collection on CT pelvic. IR procedure is not done,due to high risk for complications  -continue with IV Abx and repeat CT,spoke with Urology,US show improvement in Abscess size,no need for additional intervention at this time  -Monitor closely          No

## 2022-01-30 NOTE — PROGRESS NOTES
[facility] Medicine  Progress Note    Patient Name: Abhishek Zhao  MRN: [id number]  Patient Class: IP- Inpatient   Admission Date: 1/4/2022  Length of Stay: 25 days  Attending Physician: Velia Matthews DO  Primary Care Provider: To Obtain Unable        Subjective:     Principal Problem:Scrotal abscess        HPI:  61 y.o. male with adjustment disorder with depressed mood, chronic ischemic heart disease, cocaine dependence in remission, cardiomegaly, HLD, HTN, swizure disorder, tobacco use, and DM 2 presents with a complaint of testicular pain.  Associated with swelling and redness to the scrotum and penis along with difficulty urinating, pain is rated as severe and radiates to the rectum.  Denies fever, chills, cough, SOB, chest pain, n/v/d.  In the ED, he was found to be tachypneic, with leukocytosis and elevated lactic.  CT and US shows evidence of multiple scrotal and perineal abscesses with some extension into the penile shaft.  UA with evidence of infection.  Sepsis treatment initiated, blood and urine cultures obtained, IV fluids and IV antibiotics initiated.  Urology consulted.      Overview/Hospital Course:  61 y.o. 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    Urology consulted. Patient s/p cystoscopy with santiago placement and debridement of abscess/necrosis of scrotum 1/5/22 with Dr. Rangel. Fluid collection drained at bedside 1/14/2022. CT from 1/18/22 w/ R corporal body fluid collection concerning for residual abscess vs fluid collection. Hospitalization course complicated with aspiration event 1/11 w/ subsequent desaturation. Respiratory culture grew pseudomonas. Kept on Zosyn. On 1/13, pt had abrupt reduction in responsiveness. Code stroke called,  patient transferred to ICU. No stroke found on imaging. Started on extended EEG. Mental status improved, no sign of seizure. Patient stepped back down to the floor. Mental status continues to wax and wane. Given change in mental status, SLP evaluated and recommend NPO and pt started on TPN. Palliative following. B 12,folic acid is normal,HIV is negative,RPR is negative. Abscess culture with CANDIDA GLABRATA and ecoli. Blood culture with NGTD. ID is following. PT/OT consulted- recommends SNF. Had extensive discussion with patient's wife at bedside about patient's clinical status, nutrition status, and deconditioning. Discussed that patient would likely benefit from SNF, but wife is hesitant and would like to discuss with family. Also, discussed alternative means for nutrition and wife would like to discuss with family for possible PEG.       Interval History: No acute overnight events. Nurse report patient still mildly confuse. Mental status improving but worse compared to yesterday. Able to tell me his name, , and that he is in the hospital. Cannot tell me the situation. Wife is at bedside, but he told me that she was his sister. Had a long discussion with patient's spouse about goals of care and updated her on his clinical and nutritional status.     Review of Systems   Unable to perform ROS: Mental status change     Objective:     Vital Signs (Most Recent):  Temp: 98.6 °F (37 °C) (22 0725)  Pulse: 77 (22 1109)  Resp: 20 (22 1109)  BP: 134/78 (22 1109)  SpO2: (!) 90 % (22 1109) Vital Signs (24h Range):  Temp:  [97.5 °F (36.4 °C)-98.6 °F (37 °C)] 98.6 °F (37 °C)  Pulse:  [72-78] 77  Resp:  [17-21] 20  SpO2:  [90 %-97 %] 90 %  BP: (134-157)/(74-87) 134/78     Weight: 61.2 kg (134 lb 14.7 oz)  Body mass index is 19.92 kg/m².    Intake/Output Summary (Last 24 hours) at 2022 1307  Last data filed at 2022 0600  Gross per 24 hour   Intake --   Output 1900 ml   Net -1900 ml       Physical Exam  Constitutional:       General: He is not in acute distress.     Appearance: He is ill-appearing. He is not toxic-appearing.      Comments: Wife at bedside. Appears a little more confuse today than yesterday. Oriented to self, place, and time. Unable to tell me that it was his wife at bedside, told me it was his sister   HENT:      Head: Atraumatic.      Nose: Nose normal.      Mouth/Throat:      Mouth: Mucous membranes are dry.   Eyes:      Extraocular Movements: Extraocular movements intact.   Cardiovascular:      Rate and Rhythm: Normal rate and regular rhythm.   Pulmonary:      Effort: No respiratory distress.      Breath sounds: No wheezing.   Abdominal:      General: Abdomen is flat. There is no distension.      Palpations: Abdomen is soft.      Tenderness: There is no abdominal tenderness. There is no guarding.   Genitourinary:     Comments: Dressing on scrotal area.  Santiago - clear yellow urine  Musculoskeletal:         General: No swelling.      Right lower leg: No edema.      Left lower leg: No edema.   Skin:     Coloration: Skin is not jaundiced.      Findings: No bruising.   Neurological:      Mental Status: He is alert.      Comments: Alert and oriented to self, time, and place. Not to situation.          Significant Labs: All pertinent labs within the past 24 hours have been reviewed.    Significant Imaging: I have reviewed all pertinent imaging results/findings within the past 24 hours.      Assessment/Plan:      * Scrotal abscess  -Multiple abscesses c/w Berna's gangrene   -Cultures growing ampicillin-resistant Ecoli and C albicans. Repeat CT A/P 1/13 w/ 4cm fluid collection c/f persistent abscess which was drained bedside by urology  -Urology consulted: s/p cystoscopy with santiago placement and debridement of abscess/necrosis of scrotum 1/5/22 with Dr. Rangel.Fluid collection drained at bedside 1/14/2022  -CT from 1/18/22 w/ R corporal body fluid collection concerning for residual  abscess vs fluid collection  - cont zosyn and diflucan (re-started for HCAP)  - ID consulted, appreciate recs  -Consulted IR for drainage of residual fluid collection on CT pelvic. IR procedure is not done,due to high risk for complications  -continue with IV Abx and repeat CT,spoke with Urology,US show improvement in Abscess size,no need for additional intervention at this time  -Monitor closely       Berna's gangrene   -Urology consulted:  s/p debridement 1/5/22  -Will need plastic surgery evaluation in future once wound stable for possible grafting. Can be done as outpatient.     Acute encephalopathy  Repeated episodes of AMS   - neurology consulted  - AEDs per neurology   - EEG no seizure.   -SLP consulted: recommends NPO, started TPN  -Palliative care consulted  -Mental status gradually improving   -Per Dr. Calero, wife want patient remains full code  -B 12,folic acid is normal,HIV is negative,RPR is negative.    Sepsis due to Gram negative bacteria  Resolved      JOI (acute kidney injury)  Resolved      Hypokalemia  Replete PRN    Acute respiratory failure with hypoxia and hypercarbia  Cough + new LLL opacity on imaging c/f pneumonia, however CT imaging showing pleural effusion and atelectasis  - respiratory cultures w/ pseudomonas  - cont zosyn  - furosemide as tolerated; no significant evidence of cardiac dysfunction on TTE  - IS  - Wean O2 as tolerated        HCAP (healthcare-associated pneumonia)  - see respiratory failure   - Continue IV abx: Zosyn and fluconazole     Pericardial effusion  Echo with Small pericardial effusion of unclear etiology  EF of 50%  Monitor       Alkaline phosphatase elevation  Chronic isolated alk phos (GGT elevated), worsened this admission. Possibly due to AEDs vs occult HPB process.   - further workup depending on clinical course,improving.      Adjustment disorder with depressed mood  Continue home citalopram, hold home seroquel due to somnolence    Chronic ischemic heart  "disease  No acute issue    Cardiomegaly  Pulmonary edema seen on imaging, small pericardial effusion seen on TTE. LVEF low-normal (50%)  - hold diuresis while NPO    Cocaine dependence in remission  Counseled  Wife stated patient last use was "years ago"    Hyperlipidemia  Continue statin    Essential hypertension  Well controlled, continue home medications and monitor blood pressure, adjust as needed.   On prn IV hydralazine at this time.    Tobacco user  Dr. Calero: 5 minutes spent counseling the patient on smoking cessation and he is not currently ready to stop smoking  He will be offereded a nicotine transdermal patch while hospitalized and monitored for withdrawal.  Will provide additional smoking cessation counseling prior to discharge.     Diabetes mellitus type 2, controlled  A1c:   Lab Results   Component Value Date    HGBA1C 9.3 (H) 01/05/2022     Meds:  SSI PRN to maintain goal 140-180  ADA diet, accuchecks ACHS, hypoglycemic protocol          VTE Risk Mitigation (From admission, onward)         Ordered     enoxaparin injection 40 mg  Daily         01/13/22 1612     IP VTE LOW RISK PATIENT  Once         01/04/22 2241     Place sequential compression device  Until discontinued         01/04/22 2241                Discharge Planning   CHUCK:      Code Status: Full Code   Is the patient medically ready for discharge?:     Reason for patient still in hospital (select all that apply): Treatment, Consult recommendations and Pending disposition  Discharge Plan A: Skilled Nursing Facility   Discharge Delays: (!) Post-Acute Set-up              Velia Matthews DO  Department of Hospital Medicine   [facility] - Med Surg [facility] [place]"

## 2022-01-30 NOTE — ASSESSMENT & PLAN NOTE
Repeated episodes of AMS   - neurology consulted  - AEDs per neurology   - EEG no seizure.  -SLP consulted: recommends NPO, started TPN  -Palliative care consulted  -Mental status gradually improving   -Per Dr. Calero, wife want patient remains full code  -B 12,folic acid is normal,HIV is negative,RPR is negative.

## 2022-01-30 NOTE — ASSESSMENT & PLAN NOTE
Cough + new LLL opacity on imaging c/f pneumonia, however CT imaging showing pleural effusion and atelectasis  - respiratory cultures w/ pseudomonas  - cont zosyn  - furosemide as tolerated; no significant evidence of cardiac dysfunction on TTE  - IS  - Wean O2 as tolerated

## 2022-01-30 NOTE — PROGRESS NOTES
Mr. Zhao  A&Ox1. Bed bound; turned q2hr throughout night.  Pt is in bed resting. Call light in reach. Bed locked and low.Hourly rounding. All question and concerns answered. No acute changes overnight. Will continue to monitor for any changes and follow plan of care.    Wound changed done.   Hourly rounding done.   Tele box remains.

## 2022-01-30 NOTE — ASSESSMENT & PLAN NOTE
A1c:   Lab Results   Component Value Date    HGBA1C 9.3 (H) 01/05/2022     Meds:  SSI PRN to maintain goal 140-180  ADA diet, accuchecks ACHS, hypoglycemic protocol

## 2022-01-30 NOTE — PROGRESS NOTES
[facility]  Urology  Progress Note    Patient Name: Abhishek Zhao  MRN: [id number]  Admission Date: 1/4/2022  Hospital Length of Stay: 25 days  Code Status: Full Code   Attending Provider: Velia Matthews DO   Primary Care Physician: To Obtain Unable    Subjective:     HPI:  Scrotal Swelling  Abhishek Zhao is a 61 y.o. male who was been experiencing scrotal pain and swelling since 12/31/2021.  He denies any fever.  He has had issues voiding since the swelling began.  Hemostat having issues in the past with urinary problems.  He denies having any previous issues with scrotal infection or swelling.  He recalls that he had procedures in the past but cannot recall specifically what to place.  He does not have urologist locally but moved back from [place] about 1 year ago.    Review of care everywhere shows that he had a cystoscopy with urethral dilation of a urethral stricture in the bulbar urethra in 2019 at [facility].  And there are reports that in approximately 2015 [facility].      Interval History: Family member at bedside. Pt denies complaints.     Review of Systems   Unable to perform ROS: Mental status change     Objective:     Temp:  [97.5 °F (36.4 °C)-99.5 °F (37.5 °C)] 98.6 °F (37 °C)  Pulse:  [72-82] 75  Resp:  [17-21] 20  SpO2:  [95 %-97 %] 97 %  BP: (139-170)/(74-95) 151/83     Body mass index is 19.92 kg/m².           Drains     Drain                 Urethral Catheter 01/05/22 1050 Double-lumen 20 Fr. 24 days                Physical Exam  Vitals and nursing note reviewed.   Constitutional:       Appearance: He is well-developed.   HENT:      Head: Normocephalic.   Neck:      Thyroid: No thyromegaly.      Trachea: No tracheal deviation.   Pulmonary:      Effort: Pulmonary effort is normal. No respiratory distress.      Breath sounds: No wheezing.   Abdominal:      General: Bowel sounds are normal.      Palpations: Abdomen is soft.      Tenderness: There  is no abdominal tenderness. There is no right CVA tenderness, left CVA tenderness or rebound.      Hernia: No hernia is present.   Genitourinary:     Comments: Santiago - clear yellow urine  Wound edges clean, good granulation tissue  No purulent drainage  Musculoskeletal:         General: No tenderness. Normal range of motion.      Cervical back: Normal range of motion and neck supple.   Lymphadenopathy:      Cervical: No cervical adenopathy.   Skin:     General: Skin is warm and dry.      Findings: No erythema or rash.   Neurological:      Mental Status: He is alert.         Significant Labs:    BMP:  Recent Labs   Lab 01/26/22  0034 01/28/22  0600 01/30/22  0615    135* 133*   K 4.0 4.2 4.4    104 101   CO2 26 23 23   BUN 9 15 16   CREATININE 1.4 1.2 1.0   CALCIUM 7.8* 8.5* 8.6*       CBC:   Recent Labs   Lab 01/26/22  0034 01/28/22  0600 01/30/22  0615   WBC 9.02 12.12 11.46   HGB 9.9* 9.8* 9.2*   HCT 30.0* 29.8* 27.8*   * 133* 147*       Blood Culture:   Recent Labs   Lab 01/24/22  0137 01/26/22  1600   LABBLOO No Growth after 4 days.  No Growth to date  No Growth to date  No Growth to date  No Growth to date     Urine Culture: No results for input(s): LABURIN in the last 168 hours.  Urine Studies: No results for input(s): COLORU, APPEARANCEUA, PHUR, SPECGRAV, PROTEINUA, GLUCUA, KETONESU, BILIRUBINUA, OCCULTUA, NITRITE, UROBILINOGEN, LEUKOCYTESUR, RBCUA, WBCUA, BACTERIA, SQUAMEPITHEL, HYALINECASTS in the last 168 hours.    Invalid input(s): WRIGHTSUR    Significant Imaging:  All pertinent imaging results/findings from the past 24 hours have been reviewed.                  Assessment/Plan:     * Scrotal abscess  s/p cystoscopy with santiago placement and debridement of abscess/necrosis of scrotum 1/5/22 with Dr. Rangel.  Fluid collection drained at bedside 1/14/2022  CT from 1/18/22 w/ R corporal body fluid collection concerning for residual abscess vs fluid collection  Additional purulence  expressed from penis on 1/20/21    Continue abx per primary  Cultures growing Candida and E coli  Appreciate wound care RN and floor RN dressing changes  Leukocytosis improved from admission, monitor    Afebrile  Will continue to monitor condition    Sahu care, please be mindful.    Will need plastic surgery evaluation in future once wound stable for possible grafting. Can be done as outpatient.     Agree with SNF placement          Berna's gangrene   - s/p debridement 1/5/22        VTE Risk Mitigation (From admission, onward)         Ordered     enoxaparin injection 40 mg  Daily         01/13/22 1612     IP VTE LOW RISK PATIENT  Once         01/04/22 2241     Place sequential compression device  Until discontinued         01/04/22 2241                Mary Booth MD  Urology  [facility] [place] - Med Surg [place]

## 2022-01-30 NOTE — ASSESSMENT & PLAN NOTE
-Urology consulted:  s/p debridement 1/5/22  -Will need plastic surgery evaluation in future once wound stable for possible grafting. Can be done as outpatient.

## 2022-01-30 NOTE — ASSESSMENT & PLAN NOTE
s/p cystoscopy with santiago placement and debridement of abscess/necrosis of scrotum 1/5/22 with Dr. Rangel.  Fluid collection drained at bedside 1/14/2022  CT from 1/18/22 w/ R corporal body fluid collection concerning for residual abscess vs fluid collection  Additional purulence expressed from penis on 1/20/21    Continue abx per primary  Cultures growing Candida and E coli  Appreciate wound care RN and floor RN dressing changes  Leukocytosis improved from admission, monitor    Afebrile  Will continue to monitor condition    Santiago care, please be mindful.    Will need plastic surgery evaluation in future once wound stable for possible grafting. Can be done as outpatient.     Agree with SNF placement

## 2022-01-30 NOTE — SUBJECTIVE & OBJECTIVE
Interval History: No acute overnight events. Nurse report patient still mildly confuse. Mental status improving but worse compared to yesterday. Able to tell me his name, , and that he is in the hospital. Cannot tell me the situation. Wife is at bedside, but he told me that she was his sister. Had a long discussion with patient's spouse about goals of care and updated her on his clinical and nutritional status.     Review of Systems   Unable to perform ROS: Mental status change     Objective:     Vital Signs (Most Recent):  Temp: 98.6 °F (37 °C) (22 0725)  Pulse: 77 (22 1109)  Resp: 20 (22 1109)  BP: 134/78 (22 1109)  SpO2: (!) 90 % (22 1109) Vital Signs (24h Range):  Temp:  [97.5 °F (36.4 °C)-98.6 °F (37 °C)] 98.6 °F (37 °C)  Pulse:  [72-78] 77  Resp:  [17-21] 20  SpO2:  [90 %-97 %] 90 %  BP: (134-157)/(74-87) 134/78     Weight: 61.2 kg (134 lb 14.7 oz)  Body mass index is 19.92 kg/m².    Intake/Output Summary (Last 24 hours) at 2022 1307  Last data filed at 2022 0600  Gross per 24 hour   Intake --   Output 1900 ml   Net -1900 ml      Physical Exam  Constitutional:       General: He is not in acute distress.     Appearance: He is ill-appearing. He is not toxic-appearing.      Comments: Wife at bedside. Appears a little more confuse today than yesterday. Oriented to self, place, and time. Unable to tell me that it was his wife at bedside, told me it was his sister   HENT:      Head: Atraumatic.      Nose: Nose normal.      Mouth/Throat:      Mouth: Mucous membranes are dry.   Eyes:      Extraocular Movements: Extraocular movements intact.   Cardiovascular:      Rate and Rhythm: Normal rate and regular rhythm.   Pulmonary:      Effort: No respiratory distress.      Breath sounds: No wheezing.   Abdominal:      General: Abdomen is flat. There is no distension.      Palpations: Abdomen is soft.      Tenderness: There is no abdominal tenderness. There is no guarding.    Genitourinary:     Comments: Dressing on scrotal area.  Sahu - clear yellow urine  Musculoskeletal:         General: No swelling.      Right lower leg: No edema.      Left lower leg: No edema.   Skin:     Coloration: Skin is not jaundiced.      Findings: No bruising.   Neurological:      Mental Status: He is alert.      Comments: Alert and oriented to self, time, and place. Not to situation.          Significant Labs: All pertinent labs within the past 24 hours have been reviewed.    Significant Imaging: I have reviewed all pertinent imaging results/findings within the past 24 hours.

## 2022-01-30 NOTE — SUBJECTIVE & OBJECTIVE
Interval History: Family member at bedside. Pt denies complaints.     Review of Systems   Unable to perform ROS: Mental status change     Objective:     Temp:  [97.5 °F (36.4 °C)-99.5 °F (37.5 °C)] 98.6 °F (37 °C)  Pulse:  [72-82] 75  Resp:  [17-21] 20  SpO2:  [95 %-97 %] 97 %  BP: (139-170)/(74-95) 151/83     Body mass index is 19.92 kg/m².           Drains     Drain                 Urethral Catheter 01/05/22 1050 Double-lumen 20 Fr. 24 days                Physical Exam  Vitals and nursing note reviewed.   Constitutional:       Appearance: He is well-developed.   HENT:      Head: Normocephalic.   Neck:      Thyroid: No thyromegaly.      Trachea: No tracheal deviation.   Pulmonary:      Effort: Pulmonary effort is normal. No respiratory distress.      Breath sounds: No wheezing.   Abdominal:      General: Bowel sounds are normal.      Palpations: Abdomen is soft.      Tenderness: There is no abdominal tenderness. There is no right CVA tenderness, left CVA tenderness or rebound.      Hernia: No hernia is present.   Genitourinary:     Comments: Sahu - clear yellow urine  Wound edges clean, good granulation tissue  No purulent drainage  Musculoskeletal:         General: No tenderness. Normal range of motion.      Cervical back: Normal range of motion and neck supple.   Lymphadenopathy:      Cervical: No cervical adenopathy.   Skin:     General: Skin is warm and dry.      Findings: No erythema or rash.   Neurological:      Mental Status: He is alert.         Significant Labs:    BMP:  Recent Labs   Lab 01/26/22  0034 01/28/22  0600 01/30/22  0615    135* 133*   K 4.0 4.2 4.4    104 101   CO2 26 23 23   BUN 9 15 16   CREATININE 1.4 1.2 1.0   CALCIUM 7.8* 8.5* 8.6*       CBC:   Recent Labs   Lab 01/26/22  0034 01/28/22  0600 01/30/22  0615   WBC 9.02 12.12 11.46   HGB 9.9* 9.8* 9.2*   HCT 30.0* 29.8* 27.8*   * 133* 147*       Blood Culture:   Recent Labs   Lab 01/24/22  0137 01/26/22  1600   LABBLOO No  Growth after 4 days.  No Growth to date  No Growth to date  No Growth to date  No Growth to date     Urine Culture: No results for input(s): LABURIN in the last 168 hours.  Urine Studies: No results for input(s): COLORU, APPEARANCEUA, PHUR, SPECGRAV, PROTEINUA, GLUCUA, KETONESU, BILIRUBINUA, OCCULTUA, NITRITE, UROBILINOGEN, LEUKOCYTESUR, RBCUA, WBCUA, BACTERIA, SQUAMEPITHEL, HYALINECASTS in the last 168 hours.    Invalid input(s): TAQUERIA    Significant Imaging:  All pertinent imaging results/findings from the past 24 hours have been reviewed.

## 2022-01-30 NOTE — SUBJECTIVE & OBJECTIVE
Interval History: No acute overnight events. Nurse report patient still mildly confuse but believes he is at baseline. Mental status appears to be improving. Able to tell me his name, , and that he is in the hospital. Cannot tell me the situation. States he's in the hospital because someone in his family is pregnant.     Review of Systems   Unable to perform ROS: Mental status change     Objective:     Vital Signs (Most Recent):  Temp: 98.6 °F (37 °C) (22)  Pulse: 78 (22)  Resp: 19 (22)  BP: 139/87 (22)  SpO2: 95 % (22) Vital Signs (24h Range):  Temp:  [97.3 °F (36.3 °C)-99.5 °F (37.5 °C)] 98.6 °F (37 °C)  Pulse:  [73-82] 78  Resp:  [18-22] 19  SpO2:  [95 %-99 %] 95 %  BP: (139-186)/(80-95) 139/87     Weight: 61.2 kg (134 lb 14.7 oz)  Body mass index is 19.92 kg/m².    Intake/Output Summary (Last 24 hours) at 2022  Last data filed at 2022 1356  Gross per 24 hour   Intake --   Output 450 ml   Net -450 ml      Physical Exam  Constitutional:       General: He is not in acute distress.     Appearance: He is ill-appearing. He is not toxic-appearing.   HENT:      Head: Atraumatic.      Nose: Nose normal.      Mouth/Throat:      Mouth: Mucous membranes are dry.   Eyes:      Extraocular Movements: Extraocular movements intact.   Cardiovascular:      Rate and Rhythm: Normal rate and regular rhythm.   Pulmonary:      Effort: No respiratory distress.      Breath sounds: No wheezing.   Abdominal:      General: Abdomen is flat. There is no distension.      Palpations: Abdomen is soft.      Tenderness: There is no abdominal tenderness. There is no guarding.   Genitourinary:     Comments: Dressing on scrotal area.  Sahu - clear yellow urine  Musculoskeletal:         General: No swelling.      Right lower leg: No edema.      Left lower leg: No edema.   Skin:     Coloration: Skin is not jaundiced.      Findings: No bruising.   Neurological:      Mental Status:  He is alert.      Comments: Alert and oriented to self, time, and place. Not to situation.          Significant Labs: All pertinent labs within the past 24 hours have been reviewed.    Significant Imaging: I have reviewed all pertinent imaging results/findings within the past 24 hours.

## 2022-01-30 NOTE — ASSESSMENT & PLAN NOTE
Dr. Calero: 5 minutes spent counseling the patient on smoking cessation and he is not currently ready to stop smoking  He will be offereded a nicotine transdermal patch while hospitalized and monitored for withdrawal.  Will provide additional smoking cessation counseling prior to discharge.

## 2022-01-30 NOTE — PROGRESS NOTES
[facility] Medicine  Progress Note    Patient Name: Abhishek Zhao  MRN: [id number]  Patient Class: IP- Inpatient   Admission Date: 1/4/2022  Length of Stay: 24 days  Attending Physician: Velia Matthews DO  Primary Care Provider: To Obtain Unable        Subjective:     Principal Problem:Scrotal abscess        HPI:  61 y.o. male with adjustment disorder with depressed mood, chronic ischemic heart disease, cocaine dependence in remission, cardiomegaly, HLD, HTN, swizure disorder, tobacco use, and DM 2 presents with a complaint of testicular pain.  Associated with swelling and redness to the scrotum and penis along with difficulty urinating, pain is rated as severe and radiates to the rectum.  Denies fever, chills, cough, SOB, chest pain, n/v/d.  In the ED, he was found to be tachypneic, with leukocytosis and elevated lactic.  CT and US shows evidence of multiple scrotal and perineal abscesses with some extension into the penile shaft.  UA with evidence of infection.  Sepsis treatment initiated, blood and urine cultures obtained, IV fluids and IV antibiotics initiated.  Urology consulted.      Overview/Hospital Course:  61 y.o. 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    Urology consulted. Patient s/p cystoscopy with santiago placement and debridement of abscess/necrosis of scrotum 1/5/22 with Dr. Rangel. Fluid collection drained at bedside 1/14/2022  CT from 1/18/22 w/ R corporal body fluid collection concerning for residual abscess vs fluid collection. Hospitalization course complicated with aspiration event 1/11 w/ subsequent desaturation. Respiratory culture grew pseudomonas. Kept on Zosyn. On 1/13, pt had abrupt reduction in responsiveness. Code stroke called,  patient transferred to ICU. No stroke found on imaging. Started on extended EEG. Mental status improved, no sign of seizure. Patient stepped back down to the floor. Mental status continues to wax and wane. Given change in mental status, SLP evaluated and recommend NPO and pt started on TPN. Palliative following. B 12,folic acid is normal,HIV is negative,RPR is negative. Abscess culture with CANDIDA GLABRATA and ecoli. Blood culture with NGTD. ID is following. PT/OT consulted- recommends SNF      Interval History: No acute overnight events. Nurse report patient still mildly confuse but believes he is at baseline. Mental status appears to be improving. Able to tell me his name, , and that he is in the hospital. Cannot tell me the situation. States he's in the hospital because someone in his family is pregnant.     Review of Systems   Unable to perform ROS: Mental status change     Objective:     Vital Signs (Most Recent):  Temp: 98.6 °F (37 °C) (22)  Pulse: 78 (22)  Resp: 19 (22)  BP: 139/87 (22)  SpO2: 95 % (22) Vital Signs (24h Range):  Temp:  [97.3 °F (36.3 °C)-99.5 °F (37.5 °C)] 98.6 °F (37 °C)  Pulse:  [73-82] 78  Resp:  [18-22] 19  SpO2:  [95 %-99 %] 95 %  BP: (139-186)/(80-95) 139/87     Weight: 61.2 kg (134 lb 14.7 oz)  Body mass index is 19.92 kg/m².    Intake/Output Summary (Last 24 hours) at 2022  Last data filed at 2022 1356  Gross per 24 hour   Intake --   Output 450 ml   Net -450 ml      Physical Exam  Constitutional:       General: He is not in acute distress.     Appearance: He is ill-appearing. He is not toxic-appearing.   HENT:      Head: Atraumatic.      Nose: Nose normal.      Mouth/Throat:      Mouth: Mucous membranes are dry.   Eyes:      Extraocular Movements: Extraocular movements intact.   Cardiovascular:      Rate and Rhythm: Normal rate and regular rhythm.   Pulmonary:      Effort: No respiratory distress.      Breath  sounds: No wheezing.   Abdominal:      General: Abdomen is flat. There is no distension.      Palpations: Abdomen is soft.      Tenderness: There is no abdominal tenderness. There is no guarding.   Genitourinary:     Comments: Dressing on scrotal area.  Santiago - clear yellow urine  Musculoskeletal:         General: No swelling.      Right lower leg: No edema.      Left lower leg: No edema.   Skin:     Coloration: Skin is not jaundiced.      Findings: No bruising.   Neurological:      Mental Status: He is alert.      Comments: Alert and oriented to self, time, and place. Not to situation.          Significant Labs: All pertinent labs within the past 24 hours have been reviewed.    Significant Imaging: I have reviewed all pertinent imaging results/findings within the past 24 hours.      Assessment/Plan:      * Scrotal abscess  -Multiple abscesses c/w Berna's gangrene   -Cultures growing ampicillin-resistant Ecoli and C albicans. Repeat CT A/P 1/13 w/ 4cm fluid collection c/f persistent abscess which was drained bedside by urology  -Urology consulted: s/p cystoscopy with santiago placement and debridement of abscess/necrosis of scrotum 1/5/22 with Dr. Rangel.Fluid collection drained at bedside 1/14/2022  -CT from 1/18/22 w/ R corporal body fluid collection concerning for residual abscess vs fluid collection  - cont zosyn and diflucan (re-started for HCAP)  - ID consulted, appreciate recs  -Consulted IR for drainage of residual fluid  collection on CT pelvic. IR procedure is not done,due to high risk for complications  -continue with IV Abx and repeat CT,spoke with Urology,US show improvement in Abscess size,no need for additional intervention at this time  -Monitor closely           Berna's gangrene   -Urology consulted:  s/p debridement 1/5/22  -Will need plastic surgery evaluation in future once wound stable for possible grafting. Can be done as outpatient.     Acute encephalopathy  Repeated episodes of AMS   -  neurology consulted  - AEDs per neurology   - EEG no seizure.  -SLP consulted: recommends NPO, started TPN  -Palliative care consulted  -Mental status gradually improving   -Per Dr. Calero, wife want patient remains full code  -B 12,folic acid is normal,HIV is negative,RPR is negative.    Sepsis due to Gram negative bacteria  Resolved      JOI (acute kidney injury)  Resolved      Hypokalemia  Replete PRN    Acute respiratory failure with hypoxia and hypercarbia  Cough + new LLL opacity on imaging c/f pneumonia, however CT imaging showing pleural effusion and atelectasis  - respiratory cultures w/ pseudomonas  - cont zosyn  - furosemide as tolerated; no significant evidence of cardiac dysfunction on TTE  - IS  - Wean O2 as tolerated        HCAP (healthcare-associated pneumonia)  - see respiratory failure   - Continue IV abx: Zosyn and fluconazole     Pericardial effusion  Echo with Small pericardial effusion of unclear etiology  EF of 50%  Monitor       Alkaline phosphatase elevation  Chronic isolated alk phos (GGT elevated), worsened this admission. Possibly due to AEDs vs occult HPB process.   - further workup depending on clinical course,improving.      Adjustment disorder with depressed mood  Continue home citalopram, hold home seroquel due to somnolence    Chronic ischemic heart disease  No acute issue    Cardiomegaly  Pulmonary edema seen on imaging, small pericardial effusion seen on TTE. LVEF low-normal (50%)  - hold diuresis while NPO    Cocaine dependence in remission  Counseled     Hyperlipidemia  Continue statin    Essential hypertension  Well controlled, continue home medications and monitor blood pressure, adjust as needed.   On prn IV hydralazine at this time.    Tobacco user  5 minutes spent counseling the patient on smoking cessation and he is not currently ready to stop smoking. He will be offereded a nicotine transdermal patch while hospitalized and monitored for withdrawal.  Will provide additional  smoking cessation counseling prior to discharge.     Diabetes mellitus type 2, controlled  A1c:   Lab Results   Component Value Date    HGBA1C 9.3 (H) 01/05/2022     Meds:  SSI PRN to maintain goal 140-180  ADA diet, accuchecks ACHS, hypoglycemic protocol          VTE Risk Mitigation (From admission, onward)         Ordered     enoxaparin injection 40 mg  Daily         01/13/22 1612     IP VTE LOW RISK PATIENT  Once         01/04/22 2241     Place sequential compression device  Until discontinued         01/04/22 2241                Discharge Planning   CHUCK:      Code Status: Full Code   Is the patient medically ready for discharge?:     Reason for patient still in hospital (select all that apply): Patient trending condition, Treatment, Consult recommendations and Pending disposition  Discharge Plan A: Skilled Nursing Facility   Discharge Delays: (!) Post-Acute Set-up              Velia Matthews DO  Department of Hospital Medicine   [facility] [place] [facility]

## 2022-01-30 NOTE — ASSESSMENT & PLAN NOTE
-Multiple abscesses c/w Berna's gangrene   -Cultures growing ampicillin-resistant Ecoli and C albicans. Repeat CT A/P 1/13 w/ 4cm fluid collection c/f persistent abscess which was drained bedside by urology  -Urology consulted: s/p cystoscopy with santiago placement and debridement of abscess/necrosis of scrotum 1/5/22 with Dr. Rangel.Fluid collection drained at bedside 1/14/2022  -CT from 1/18/22 w/ R corporal body fluid collection concerning for residual abscess vs fluid collection  - cont zosyn and diflucan (re-started for HCAP)  - ID consulted, appreciate recs  -Consulted IR for drainage of residual fluid collection on CT pelvic. IR procedure is not done,due to high risk for complications  -continue with IV Abx and repeat CT,spoke with Urology,US show improvement in Abscess size,no need for additional intervention at this time  -Monitor closely

## 2022-01-31 PROBLEM — Z71.89 ADVANCE CARE PLANNING: Status: ACTIVE | Noted: 2022-01-31

## 2022-01-31 PROBLEM — E46 MALNOURISHED: Status: ACTIVE | Noted: 2022-01-31

## 2022-01-31 LAB
POCT GLUCOSE: 127 MG/DL (ref 70–110)
POCT GLUCOSE: 128 MG/DL (ref 70–110)
POCT GLUCOSE: 141 MG/DL (ref 70–110)

## 2022-01-31 PROCEDURE — 99223 1ST HOSP IP/OBS HIGH 75: CPT | Mod: ,,, | Performed by: INTERNAL MEDICINE

## 2022-01-31 PROCEDURE — B4185 PARENTERAL SOL 10 GM LIPIDS: HCPCS | Performed by: STUDENT IN AN ORGANIZED HEALTH CARE EDUCATION/TRAINING PROGRAM

## 2022-01-31 PROCEDURE — 25000003 PHARM REV CODE 250: Performed by: HOSPITALIST

## 2022-01-31 PROCEDURE — 25000003 PHARM REV CODE 250: Performed by: STUDENT IN AN ORGANIZED HEALTH CARE EDUCATION/TRAINING PROGRAM

## 2022-01-31 PROCEDURE — A4216 STERILE WATER/SALINE, 10 ML: HCPCS | Performed by: STUDENT IN AN ORGANIZED HEALTH CARE EDUCATION/TRAINING PROGRAM

## 2022-01-31 PROCEDURE — 99233 SBSQ HOSP IP/OBS HIGH 50: CPT | Mod: ,,, | Performed by: STUDENT IN AN ORGANIZED HEALTH CARE EDUCATION/TRAINING PROGRAM

## 2022-01-31 PROCEDURE — 99233 PR SUBSEQUENT HOSPITAL CARE,LEVL III: ICD-10-PCS | Mod: ,,, | Performed by: NURSE PRACTITIONER

## 2022-01-31 PROCEDURE — 27000221 HC OXYGEN, UP TO 24 HOURS

## 2022-01-31 PROCEDURE — 92526 ORAL FUNCTION THERAPY: CPT

## 2022-01-31 PROCEDURE — 63600175 PHARM REV CODE 636 W HCPCS: Performed by: HOSPITALIST

## 2022-01-31 PROCEDURE — 99233 PR SUBSEQUENT HOSPITAL CARE,LEVL III: ICD-10-PCS | Mod: ,,, | Performed by: STUDENT IN AN ORGANIZED HEALTH CARE EDUCATION/TRAINING PROGRAM

## 2022-01-31 PROCEDURE — 99223 PR INITIAL HOSPITAL CARE,LEVL III: ICD-10-PCS | Mod: ,,, | Performed by: INTERNAL MEDICINE

## 2022-01-31 PROCEDURE — 99497 PR ADVNCD CARE PLAN 30 MIN: ICD-10-PCS | Mod: ,,, | Performed by: NURSE PRACTITIONER

## 2022-01-31 PROCEDURE — 63600175 PHARM REV CODE 636 W HCPCS: Performed by: STUDENT IN AN ORGANIZED HEALTH CARE EDUCATION/TRAINING PROGRAM

## 2022-01-31 PROCEDURE — 99497 ADVNCD CARE PLAN 30 MIN: CPT | Mod: ,,, | Performed by: NURSE PRACTITIONER

## 2022-01-31 PROCEDURE — 99900035 HC TECH TIME PER 15 MIN (STAT)

## 2022-01-31 PROCEDURE — 11000001 HC ACUTE MED/SURG PRIVATE ROOM

## 2022-01-31 PROCEDURE — C1751 CATH, INF, PER/CENT/MIDLINE: HCPCS

## 2022-01-31 PROCEDURE — C9254 INJECTION, LACOSAMIDE: HCPCS | Performed by: STUDENT IN AN ORGANIZED HEALTH CARE EDUCATION/TRAINING PROGRAM

## 2022-01-31 PROCEDURE — 99233 SBSQ HOSP IP/OBS HIGH 50: CPT | Mod: ,,, | Performed by: NURSE PRACTITIONER

## 2022-01-31 RX ADMIN — SODIUM CHLORIDE 200 MG: 9 INJECTION, SOLUTION INTRAVENOUS at 03:01

## 2022-01-31 RX ADMIN — I.V. FAT EMULSION 250 ML: 20 EMULSION INTRAVENOUS at 10:01

## 2022-01-31 RX ADMIN — PIPERACILLIN AND TAZOBACTAM 4.5 G: 4; .5 INJECTION, POWDER, LYOPHILIZED, FOR SOLUTION INTRAVENOUS; PARENTERAL at 12:01

## 2022-01-31 RX ADMIN — Medication 10 ML: at 06:01

## 2022-01-31 RX ADMIN — FLUCONAZOLE 400 MG: 2 INJECTION, SOLUTION INTRAVENOUS at 12:01

## 2022-01-31 RX ADMIN — Medication 10 ML: at 11:01

## 2022-01-31 RX ADMIN — PIPERACILLIN AND TAZOBACTAM 4.5 G: 4; .5 INJECTION, POWDER, LYOPHILIZED, FOR SOLUTION INTRAVENOUS; PARENTERAL at 01:01

## 2022-01-31 RX ADMIN — PIPERACILLIN AND TAZOBACTAM 4.5 G: 4; .5 INJECTION, POWDER, LYOPHILIZED, FOR SOLUTION INTRAVENOUS; PARENTERAL at 07:01

## 2022-01-31 RX ADMIN — ENOXAPARIN SODIUM 40 MG: 40 INJECTION SUBCUTANEOUS at 05:01

## 2022-01-31 RX ADMIN — Medication 10 ML: at 12:01

## 2022-01-31 RX ADMIN — DEXTROSE 500 MG: 50 INJECTION, SOLUTION INTRAVENOUS at 11:01

## 2022-01-31 RX ADMIN — SODIUM CHLORIDE 200 MG: 9 INJECTION, SOLUTION INTRAVENOUS at 11:01

## 2022-01-31 RX ADMIN — ASCORBIC ACID, VITAMIN A PALMITATE, CHOLECALCIFEROL, THIAMINE HYDROCHLORIDE, RIBOFLAVIN-5 PHOSPHATE SODIUM, PYRIDOXINE HYDROCHLORIDE, NIACINAMIDE, DEXPANTHENOL, ALPHA-TOCOPHEROL ACETATE, VITAMIN K1, FOLIC ACID, BIOTIN, CYANOCOBALAMIN: 200; 3300; 200; 6; 3.6; 6; 40; 15; 10; 150; 600; 60; 5 INJECTION, SOLUTION INTRAVENOUS at 10:01

## 2022-01-31 NOTE — SUBJECTIVE & OBJECTIVE
Interval History: No acute overnight events. Nurse report patient still mildly confuse. Mental status appears similar to yesterday. Able to tell me his name, but unable to tell me his  or the place and time. Cannot tell me the situation.No family at bedside.    Palliative team and I able to reach sister and spouse and discuss possible need for alternative means of nutrition if patient does not progress with SLP. Family and spouse agrees to PEG if needed.     Review of Systems   Unable to perform ROS: Mental status change     Objective:     Vital Signs (Most Recent):  Temp: 97.5 °F (36.4 °C) (22)  Pulse: 77 (22)  Resp: 20 (22)  BP: (!) 152/85 (22)  SpO2: (!) 92 % (22) Vital Signs (24h Range):  Temp:  [97.5 °F (36.4 °C)-98.6 °F (37 °C)] 97.5 °F (36.4 °C)  Pulse:  [76-82] 77  Resp:  [17-22] 20  SpO2:  [92 %-97 %] 92 %  BP: (137-152)/(81-86) 152/85     Weight: 61.2 kg (134 lb 14.7 oz)  Body mass index is 19.92 kg/m².    Intake/Output Summary (Last 24 hours) at 2022 1426  Last data filed at 2022 0500  Gross per 24 hour   Intake --   Output 2550 ml   Net -2550 ml      Physical Exam  Constitutional:       General: He is not in acute distress.     Appearance: He is ill-appearing. He is not toxic-appearing.      Comments:  Appears a little more confuse today than yesterday. Oriented to self but today he was unable to tell me his , place, or time.    HENT:      Head: Atraumatic.      Nose: Nose normal.      Mouth/Throat:      Mouth: Mucous membranes are dry.   Eyes:      Extraocular Movements: Extraocular movements intact.   Cardiovascular:      Rate and Rhythm: Normal rate and regular rhythm.   Pulmonary:      Effort: No respiratory distress.      Breath sounds: No wheezing.   Abdominal:      General: Abdomen is flat. There is no distension.      Palpations: Abdomen is soft.      Tenderness: There is no abdominal tenderness. There is no guarding.    Genitourinary:     Comments: Dressing on scrotal area.  Sahu - clear yellow urine  Musculoskeletal:         General: No swelling.      Right lower leg: No edema.      Left lower leg: No edema.   Skin:     Coloration: Skin is not jaundiced.      Findings: No bruising.   Neurological:      Mental Status: He is alert.      Comments: Alert and oriented to self. Not oriented to time, and place. Not to situation.          Significant Labs: All pertinent labs within the past 24 hours have been reviewed.    Significant Imaging: I have reviewed all pertinent imaging results/findings within the past 24 hours.

## 2022-01-31 NOTE — PLAN OF CARE
Problem: SLP Goal  Goal: SLP Goal  Description: Goals:  1. Pt will tolerate puree diet/thin liquids w/o overt s/s of aspiration (discontinued 1/25/22).  2. Pt will tolerate mech soft diet (IDDSI-5) with thin liquids w/o overt s/s of aspiration. (on hold 1/16/22, pending pt progress)  Outcome: Ongoing, Progressing   Pt able to tolerate PO trials of pureed & TL w/o over s/s of aspiration.

## 2022-01-31 NOTE — ASSESSMENT & PLAN NOTE
Repeated episodes of AMS   - neurology consulted  - AEDs per neurology   - EEG with no seizure.   -SLP consulted: recommends NPO, continue TPN. F/u progress recs  -Palliative care consulted: may need PEG if nutritional needs are not met  -Mental status gradually improving   -Per Dr. Calero, wife want patient remains full code  -B 12,folic acid is normal,HIV is negative,RPR is negative.

## 2022-01-31 NOTE — ASSESSMENT & PLAN NOTE
I spent greater than 16 minutes of discussion regarding advanced directive and end of life planning with patient's spouse and sister.  Wife confirms patient is full code  Wife and sister agrees to PEG if continues to fail SLP evaluation due to mentation

## 2022-01-31 NOTE — PT/OT/SLP PROGRESS
Speech Language Pathology Treatment    Patient Name:  Abhishek Zhao   MRN:  [id number]  Admitting Diagnosis: Scrotal abscess    Recommendations:                 General Recommendations:  Dysphagia therapy and ongoing swawllow assessment  Diet recommendations:  NPO, Liquid Diet Level: NPO   Aspiration Precautions: Alternate means of nutrition/hydration and Frequent oral care   General Precautions: Standard, aspiration,NPO  Communication strategies:  provide increased time to answer    Subjective     Pt awake upon SLP arrival. Spoke with Dr. Fontana (GI MD ) regarding possible PEG placement for Pt. Pt remains lethargic, however, improvement noted on pt's ability to manage oral secretions from previous ST session. Pt agreeable to accept therapeutic PO trials for ongoing swallow assessment. Pt able to respond verbally to simple, personally relevant questions.    Patient goals: unable to report    Pain/Comfort:  ·  0/10    Respiratory Status: Room air    Objective:     Has the patient been evaluated by SLP for swallowing?   Yes  Keep patient NPO?     Current Respiratory Status:        Pt's HOB elevated prior to introducing PO trials. Oral care provided. Pt noted to not hold oral secretions as he in in previous ST session.  Pt accepted 1 ice chip, 4 sips of water via spoon, 2 sips of water via straw, and 4 trials of applesauce. Pt exhibited oral holding of all consistencies before initiating swalllow. SLP provided frequent verbal cues for pt to swallow. Delayed swallow noted across trials. No oral suctioning required and no coughing noted across trials.   Pt with improved swallow from previous session, however, do not feel pt is ready for PO diet this date. Will con't to re-assess pt's swallow and make diet recs when safe and appropriate. Unsure pt will meet nutritional need orally if/when pt is cleared for PO diet.    Assessment:     Abhishek Zhao is a 61 y.o. male with a dx of Scrotal abscess. Pt presents severe  oropharyngeal dysphagia negatively impacted by decreased DENIS. ST will con't to provide ongoing swallow assessment. Rec: con't NPO.    Goals:   Multidisciplinary Problems     SLP Goals        Problem: SLP Goal    Goal Priority Disciplines Outcome   SLP Goal    Medium SLP Ongoing, Progressing   Description: Goals:  1. Pt will tolerate puree diet/thin liquids w/o overt s/s of aspiration (discontinued 1/25/22).  2. Pt will tolerate mech soft diet (IDDSI-5) with thin liquids w/o overt s/s of aspiration. (on hold 1/16/22, pending pt progress)                   Plan:     · Patient to be seen:  3 x/week   · Plan of Care expires:  01/29/22  · Plan of Care reviewed with:  patient,other (see comments) (GI MD- Dr. Gonzalez)   · SLP Follow-Up:  Yes       Discharge recommendations:  other (see comments) (TBD)   Barriers to Discharge:  None    Time Tracking:     SLP Treatment Date:   01/31/22  Speech Start Time:  1346  Speech Stop Time:  1401     Speech Total Time (min):  15 min    Billable Minutes: Treatment Swallowing Dysfunction 15 min    01/31/2022

## 2022-01-31 NOTE — SUBJECTIVE & OBJECTIVE
Interval History: patient remains confused     Medications:  Continuous Infusions:   Amino acid 4.25% - dextrose 5% (CLINIMIX-E) solution with additives (1L provides 42.5 gm AA, 50 gm CHO (170 kcal/L dextrose), Na 35, K 30, Mg 5, Ca 4.5, Acetate 70, Cl 39, Phos 15) 75 mL/hr at 01/30/22 2159    Amino acid 4.25% - dextrose 5% (CLINIMIX-E) solution with additives (1L provides 42.5 gm AA, 50 gm CHO (170 kcal/L dextrose), Na 35, K 30, Mg 5, Ca 4.5, Acetate 70, Cl 39, Phos 15)       Scheduled Meds:   enoxaparin  40 mg Subcutaneous Daily    fat emulsion  250 mL Intravenous Once    fluconazole (DIFLUCAN) IVPB  400 mg Intravenous Q24H    lacosamide (VIMPAT) IVPB  200 mg Intravenous Q12H    piperacillin-tazobactam (ZOSYN) IVPB  4.5 g Intravenous Q8H    sodium chloride 0.9%  10 mL Intravenous Q6H    valproate sodium (DEPACON) IVPB  500 mg Intravenous Q12H     PRN Meds:acetaminophen, albuterol-ipratropium, albuterol-ipratropium, aluminum-magnesium hydroxide-simethicone, dextrose 10%, dextrose 10%, dextrose 50%, glucagon (human recombinant), glucose, glucose, hydrALAZINE, influenza, insulin aspart U-100, labetalol, LIDOcaine HCL 10 mg/ml (1%), lorazepam, melatonin, naloxone, ondansetron, prochlorperazine, simethicone, Flushing PICC Protocol **AND** sodium chloride 0.9% **AND** sodium chloride 0.9%    Objective:     Vital Signs (Most Recent):  Temp: 97.5 °F (36.4 °C) (01/31/22 0722)  Pulse: 77 (01/31/22 0722)  Resp: 20 (01/31/22 0722)  BP: (!) 152/85 (01/31/22 0722)  SpO2: (!) 92 % (01/31/22 0957) Vital Signs (24h Range):  Temp:  [97.5 °F (36.4 °C)-98.6 °F (37 °C)] 97.5 °F (36.4 °C)  Pulse:  [76-82] 77  Resp:  [17-22] 20  SpO2:  [92 %-97 %] 92 %  BP: (137-152)/(81-86) 152/85     Weight: 61.2 kg (134 lb 14.7 oz)  Body mass index is 19.92 kg/m².    Physical Exam  Vitals and nursing note reviewed.   Constitutional:       Appearance: He is ill-appearing and toxic-appearing.   Cardiovascular:      Rate and Rhythm: Normal rate  and regular rhythm.   Pulmonary:      Effort: Pulmonary effort is normal.   Abdominal:      General: Abdomen is flat.      Palpations: Abdomen is soft.   Skin:     General: Skin is warm and dry.   Neurological:      Mental Status: He is alert.      Comments: confused         Review of Symptoms    Symptom Assessment (ESAS 0-10 Scale)  Unable to complete assessment due to Acuity of condition             Advance Care Planning   Advance Directives:     Decision Making:  Patient unable to communicate due to disease severity/cognitive impairment and Family answered questions         Significant Labs: All pertinent labs within the past 24 hours have been reviewed.  CBC:   Recent Labs   Lab 01/30/22  0615   WBC 11.46   HGB 9.2*   HCT 27.8*   MCV 86   *     BMP:  No results for input(s): GLU, NA, K, CL, CO2, BUN, CREATININE, CALCIUM, MG in the last 24 hours.  LFT:  Lab Results   Component Value Date    AST 36 01/30/2022     (H) 01/17/2022    ALKPHOS 176 (H) 01/30/2022    BILITOT 0.2 01/30/2022     Albumin:   Albumin   Date Value Ref Range Status   01/30/2022 1.3 (L) 3.5 - 5.2 g/dL Final     Protein:   Total Protein   Date Value Ref Range Status   01/30/2022 8.2 6.0 - 8.4 g/dL Final     Lactic acid:   Lab Results   Component Value Date    LACTATE 0.8 01/24/2022    LACTATE 1.3 01/14/2022       Significant Imaging: last imagin 1/24

## 2022-01-31 NOTE — PROGRESS NOTES
[facility]  Urology  Progress Note    Patient Name: Abhishek Zhao  MRN: [id number]  Admission Date: 1/4/2022  Hospital Length of Stay: 26 days  Code Status: Full Code   Attending Provider: Velia Matthews DO   Primary Care Physician: To Obtain Unable    Subjective:     HPI:  Scrotal Swelling  Abhishek Zhao is a 61 y.o. male who was been experiencing scrotal pain and swelling since 12/31/2021.  He denies any fever.  He has had issues voiding since the swelling began.  Hemostat having issues in the past with urinary problems.  He denies having any previous issues with scrotal infection or swelling.  He recalls that he had procedures in the past but cannot recall specifically what to place.  He does not have urologist locally but moved back from [place] about 1 year ago.    Review of care everywhere shows that he had a cystoscopy with urethral dilation of a urethral stricture in the bulbar urethra in 2019 at [facility].  And there are reports that [facility] approximately 2015 [facility]      Interval History: more alert, not oriented    Review of Systems   Constitutional: Negative for activity change and appetite change.   Respiratory: Negative for shortness of breath.    Gastrointestinal: Negative for abdominal distention and abdominal pain.   Genitourinary: Positive for difficulty urinating. Negative for penile swelling and scrotal swelling.   Neurological: Positive for weakness.     Objective:     Temp:  [97.5 °F (36.4 °C)-98.6 °F (37 °C)] 97.5 °F (36.4 °C)  Pulse:  [76-82] 77  Resp:  [17-22] 20  SpO2:  [90 %-97 %] 96 %  BP: (134-152)/(78-86) 152/85     Body mass index is 19.92 kg/m².           Drains     Drain                 Urethral Catheter 01/05/22 1050 Double-lumen 20 Fr. 25 days                Physical Exam  Constitutional:       General: He is not in acute distress.     Appearance: He is well-developed. He is not ill-appearing, toxic-appearing or diaphoretic.    HENT:      Head: Normocephalic and atraumatic.      Mouth/Throat:      Mouth: Mucous membranes are moist.   Eyes:      Conjunctiva/sclera: Conjunctivae normal.   Pulmonary:      Effort: Pulmonary effort is normal. No respiratory distress.   Abdominal:      General: Abdomen is flat. There is no distension.      Palpations: Abdomen is soft. There is no mass.      Tenderness: There is no abdominal tenderness. There is no guarding.   Genitourinary:     Comments: No induration, no tenderness  Minimal swelling  Musculoskeletal:         General: No swelling or deformity.      Cervical back: Neck supple.   Skin:     General: Skin is warm.      Findings: No rash.   Neurological:      Mental Status: He is alert.      Gait: Gait normal.   Psychiatric:         Mood and Affect: Mood normal.         Thought Content: Thought content normal.         Judgment: Judgment normal.         Significant Labs:    BMP:  Recent Labs   Lab 01/26/22  0034 01/28/22  0600 01/30/22  0615    135* 133*   K 4.0 4.2 4.4    104 101   CO2 26 23 23   BUN 9 15 16   CREATININE 1.4 1.2 1.0   CALCIUM 7.8* 8.5* 8.6*       CBC:   Recent Labs   Lab 01/26/22  0034 01/28/22  0600 01/30/22  0615   WBC 9.02 12.12 11.46   HGB 9.9* 9.8* 9.2*   HCT 30.0* 29.8* 27.8*   * 133* 147*                   Assessment/Plan:     * Scrotal abscess  s/p cystoscopy with santiago placement and debridement of abscess/necrosis of scrotum 1/5/22 with Dr. Rangel.  Fluid collection drained at bedside 1/14/2022  CT from 1/18/22 w/ R corporal body fluid collection concerning for residual abscess vs fluid collection  Additional purulence expressed from penis on 1/20/21    Continue abx per primary  Cultures growing Candida and E coli  Appreciate wound care RN and floor RN dressing changes  Leukocytosis improved from admission, monitor    Afebrile  Will continue to monitor condition    Santiago care, please be mindful.    Will need plastic surgery evaluation in future once  wound stable for possible grafting. Can be done as outpatient.     Agree with SNF placement          Berna's gangrene   - s/p debridement 1/5/22        VTE Risk Mitigation (From admission, onward)         Ordered     enoxaparin injection 40 mg  Daily         01/13/22 1612     IP VTE LOW RISK PATIENT  Once         01/04/22 2241     Place sequential compression device  Until discontinued         01/04/22 2241                Melinda Mitchell MD  Urology  [facility]

## 2022-01-31 NOTE — ASSESSMENT & PLAN NOTE
Chronic isolated alk phos (GGT elevated), worsened this admission. Possibly due to AEDs vs occult HPB process.   -Alk phos trending down   - further workup depending on clinical course,improving.

## 2022-01-31 NOTE — PLAN OF CARE
Patient has 25+ SNF denials in care port. TN sent additional updates packets via care port to multiple facilities.  TN placed calls to [facility] and [facility], left detailed voice messages.     Ailyn Angel' with [facility], no bed availability. [facility], pending review.   ailyn Reardon pending review.    01/31/22 1409   Discharge Reassessment   Assessment Type Discharge Planning Reassessment   Did the patient's condition or plan change since previous assessment? Yes   Communicated CHUCK with patient/caregiver Date not available/Unable to determine   Discharge Plan A Skilled Nursing Facility   Discharge Plan B Home with family;Home Health   DME Needed Upon Discharge  hospital bed;wheelchair;raised toilet;bedside commode   Discharge Barriers Identified Nursing Home rejection;Substance Abuse   Why the patient remains in the hospital Requires continued medical care   Post-Acute Status   Post-Acute Authorization Placement   Post-Acute Placement Status Pending payor medical review/second level review   Home Health Status Pending medical clearance/testing   Other Status No Post-Acute Service Needs   Patient choice form signed by patient/caregiver List from System Post-Acute Care   Discharge Delays (!) Post-Acute Set-up

## 2022-01-31 NOTE — PROGRESS NOTES
Mr. Zhao  A&Ox1. Bed bound; turned q2hr throughout night.  Pt is in bed resting. Call light in reach. Bed locked and low.Hourly rounding. All question and concerns answered. No acute changes overnight. Will continue to monitor for any changes and follow plan of care.     Wound dressing changed done.   Hourly rounding done.   Tele box remains.

## 2022-01-31 NOTE — CARE UPDATE
Palliative team and I attempted to call the patient's spouse multiple times this morning and was unable to reach her. Able to reach the patient's sister at 9:51 am to update on the patient's clinical status. Discussed that patient may not be able to meet nutritional needs at this time and revisited the topic of PEG placement. Sister verbalized understanding and was able to three-way call the patient's spouse. After extensive discussion, patient's spouse and sister are in agreement with PEG placement if patient continues to fail with SLP and does not make progress. Will consult GI. Awaiting further SLP evaluation and recs. Will continue to monitor at this time.

## 2022-01-31 NOTE — ASSESSMENT & PLAN NOTE
Pt ill-appearing, thin and frail  Albumin 1.3  Currently on TPN  SLP consulted- continue to evaluate if safe to swallow and able to meet nutritional needs  Discussed with family and spouse in regards to possible PEG  Family (sister) and wife agreeable on 01/31/2022  Will continue to follow with SLP, if no progression, will need PEG  GI consulted

## 2022-01-31 NOTE — ASSESSMENT & PLAN NOTE
-Multiple abscesses c/w Berna's gangrene   -Cultures growing ampicillin-resistant Ecoli and C albicans. Repeat CT A/P 1/13 w/ 4cm fluid collection c/f persistent abscess which was drained bedside by urology  -Urology consulted: s/p cystoscopy with santiago placement and debridement of abscess/necrosis of scrotum 1/5/22 with Dr. Rangel.Fluid collection drained at bedside 1/14/2022  -CT from 1/18/22 w/ R corporal body fluid collection concerning for residual abscess vs fluid collection  - cont zosyn and diflucan (re-started for HCAP)  - ID consulted, appreciate recs: continue abx until resolution of abscess  -Consulted IR for drainage of residual fluid collection on CT pelvic. IR procedure is not done,due to high risk for complications  -continue with IV Abx and repeat CT,spoke with Urology,US show improvement in Abscess size,no need for additional intervention at this time  -Monitor closely

## 2022-01-31 NOTE — CONSULTS
[facility] Note    CC: PEG tube placement    HPI 61 y.o. male with adjustment disorder with depressed mood, chronic ischemic heart disease, cocaine dependence in remission, cardiomegaly, HLD, HTN, swizure disorder, tobacco use, and DM 2 admitted with scrotal abscess s/p debridement 1/5/2022 and fluid collection drained 1/14/2022, acute encephalopathy on TPN, pseudomonas pneumonia. GI consulted to evaluate for PEG tube placement    SUBJECTIVE  Attempted to reach wife via telephone - no answer  Speech therapist at bedside and states patient made improvement in swallow on today's exam    Chart reviewed and summarized here.    Past Medical History  Past Medical History:   Diagnosis Date    High cholesterol     Hypertension     Seizures        Past Surgical History  Past Surgical History:   Procedure Laterality Date    CYSTOSCOPY  1/5/2022    Procedure: CYSTOSCOPY;  Surgeon: ANIBAL Rangel MD;  Location: [facility] OR;  Service: Urology;;    FISTULOGRAM N/A 1/5/2022    Procedure: FISTULOGRAM;  Surgeon: ANIBAL Rangel MD;  Location: [facility] OR;  Service: Urology;  Laterality: N/A;    SURGICAL REMOVAL OF ABSCESS  1/5/2022    Procedure: EXCISION, ABSCESS;  Surgeon: ANIBAL Rangel MD;  Location: [facility] OR;  Service: Urology;;       Social History  Social History     Tobacco Use    Smoking status: Current Every Day Smoker     Packs/day: 1.50     Types: Cigarettes   Substance Use Topics    Alcohol use: Yes       Family History  History reviewed. No pertinent family history.    Review of Systems  General ROS: negative for chills, fever or weight loss  Psychological ROS: negative for hallucination, depression or suicidal ideation  Ophthalmic ROS: negative for blurry vision, photophobia or eye pain  ENT ROS: negative for epistaxis, sore throat or rhinorrhea  Respiratory ROS: no cough, shortness of breath, or wheezing  Cardiovascular ROS: no chest pain or dyspnea on exertion  Gastrointestinal ROS: no abdominal  "pain, change in bowel habits, or black/ bloody stools  Genito-Urinary ROS: no dysuria, trouble voiding, or hematuria  Musculoskeletal ROS: negative for gait disturbance or muscular weakness  Neurological ROS: no syncope or seizures; no ataxia  Dermatological ROS: negative for pruritis, rash and jaundice    Physical Examination  BP (!) 152/85 (BP Location: Left arm, Patient Position: Lying)   Pulse 77   Temp 97.5 °F (36.4 °C) (Oral)   Resp 20   Ht 5' 9.02" (1.753 m)   Wt 61.2 kg (134 lb 14.7 oz)   SpO2 (!) 92%   BMI 19.92 kg/m²   General appearance: alert, cooperative, no distress  HENT: Normocephalic, atraumatic, neck symmetrical, no nasal discharge   Eyes: conjunctivae/corneas clear, PERRL, EOM's intact  Lungs: clear to auscultation bilaterally, no dullness to percussion bilaterally  Heart: regular rate and rhythm without rub; no displacement of the PMI   Abdomen: soft, non-tender; bowel sounds normoactive; no organomegaly  Extremities: extremities symmetric; no clubbing, cyanosis, or edema  Integument: Skin color, texture, turgor normal; no rashes; hair distrubution normal  Neurologic: Alert and oriented X 3, normal strength, normal coordination and gait  Psychiatric: no pressured speech; normal affect; no evidence of impaired cognition     Labs:  Lab Results   Component Value Date    WBC 11.46 01/30/2022    HGB 9.2 (L) 01/30/2022    HCT 27.8 (L) 01/30/2022    MCV 86 01/30/2022     (L) 01/30/2022         CMP  Sodium   Date Value Ref Range Status   01/30/2022 133 (L) 136 - 145 mmol/L Final     Potassium   Date Value Ref Range Status   01/30/2022 4.4 3.5 - 5.1 mmol/L Final     Chloride   Date Value Ref Range Status   01/30/2022 101 95 - 110 mmol/L Final     CO2   Date Value Ref Range Status   01/30/2022 23 23 - 29 mmol/L Final     Glucose   Date Value Ref Range Status   01/30/2022 152 (H) 70 - 110 mg/dL Final     BUN   Date Value Ref Range Status   01/30/2022 16 6 - 20 mg/dL Final     Creatinine   Date " Value Ref Range Status   01/30/2022 1.0 0.5 - 1.4 mg/dL Final     Calcium   Date Value Ref Range Status   01/30/2022 8.6 (L) 8.7 - 10.5 mg/dL Final     Total Protein   Date Value Ref Range Status   01/30/2022 8.2 6.0 - 8.4 g/dL Final     Albumin   Date Value Ref Range Status   01/30/2022 1.3 (L) 3.5 - 5.2 g/dL Final     Total Bilirubin   Date Value Ref Range Status   01/30/2022 0.2 0.1 - 1.0 mg/dL Final     Comment:     For infants and newborns, interpretation of results should be based  on gestational age, weight and in agreement with clinical  observations.    Premature Infant recommended reference ranges:  Up to 24 hours.............<8.0 mg/dL  Up to 48 hours............<12.0 mg/dL  3-5 days..................<15.0 mg/dL  6-29 days.................<15.0 mg/dL       Alkaline Phosphatase   Date Value Ref Range Status   01/30/2022 176 (H) 55 - 135 U/L Final     AST   Date Value Ref Range Status   01/30/2022 36 10 - 40 U/L Final     ALT   Date Value Ref Range Status   01/30/2022 21 10 - 44 U/L Final     Anion Gap   Date Value Ref Range Status   01/30/2022 9 8 - 16 mmol/L Final     eGFR if    Date Value Ref Range Status   01/30/2022 >60 >60 mL/min/1.73 m^2 Final     eGFR if non    Date Value Ref Range Status   01/30/2022 >60 >60 mL/min/1.73 m^2 Final     Comment:     Calculation used to obtain the estimated glomerular filtration  rate (eGFR) is the CKD-EPI equation.          Imaging:  CT ABDOMEN PELVIS 1/24/2022  Similar size of a rim enhancing fluid collection in the right perineal region at the base of the penis measuring up to 4.7 cm.     Atrophic left kidney with multiple areas of hypoenhancement which may be due to decreased perfusion in the setting severe atherosclerosis of the left renal artery.  Pyelonephritis may have a similar appearance.  Correlate with urinalysis.  Persistent mild right hydronephrosis.  Sahu catheter in place.     Bilateral dependent airspace opacities and  small left pleural effusion, may be due to edema, aspiration or infection.  Resolution of right pleural effusion.     Multifocal arterial moderate to high-grade stenoses, as above.     Indeterminate right renal lesions.  Recommend further evaluation with nonemergent renal mass protocol CT or MRI.    I have personally reviewed and interpreted these images.    Assessment:   61 y.o. male with adjustment disorder with depressed mood, chronic ischemic heart disease, cocaine dependence in remission, cardiomegaly, HLD, HTN, swizure disorder, tobacco use, and DM 2 admitted with scrotal abscess s/p debridement 1/5/2022 and fluid collection drained 1/14/2022, acute encephalopathy on TPN, pseudomonas pneumonia. GI consulted to evaluate for PEG tube placement    Plan:   -speech therapy at bedside at the time of my arrival, patient with some improvement in swallow function on today's assessment. Recommend continue inpatient speech therapy / evaluation to determine if PEG tube is warranted  -please document if wife/family are agreeable with PEG tube if placement is necessary (I was unable to reach wife via telephone, she will need to be reachable to consent on patient's behalf)    Thanks for this consult. We will continue to follow along.  Michael Fontana MD

## 2022-01-31 NOTE — PROGRESS NOTES
[facility]  Palliative Medicine  Progress Note    Patient Name: Abhishek Zhao  MRN: [id number]  Admission Date: 1/4/2022  Hospital Length of Stay: 26 days  Code Status: Full Code   Attending Provider: Velia Matthews DO  Consulting Provider: Kimberly Honeycutt NP  Primary Care Physician: To Obtain Unable  Principal Problem:Scrotal abscess        Assessment/Plan:   Palliative encounter:    -F/U with patient who continues to be NPO due to mentation  -Spouse has not been able to give staff a clear answer as to her decisions about PEG and tells them she has to talk to his sister  Along with Dr Matthews we called the patient's sister -Homar after attempting the spouse x 2 with no success.   Dominik states she has not talked with the patient's spouse and also is concerned that she is not able to care for patient properly or that she doesn't understand the patient's condition.  Homra was updated about patient's clinical condition, nutritional needs not being met, option of hospice. She does ask if patient is eligible for hospice as she feels he has suffered long enough. I did let her know he is eligible.   -We addressed all her questions and concerns and she wanted to call the patient's spouse 3-way while we were present to discuss.  -After our discussion the spouse is agreeable to PEG and states she was told the patient did not have an accepting facility for SNF. I did let her know that the PEG may change this and I will speak to CM.  -Updated CM via secure chat and she is going to submtt updates and try for facility acceptance     Palliative encounter Date 1/25/22    -Palliative consult received for [facility].  -Chart reviewed   -At time of consult patient sleeping, he is not easily aroused and falls back to sleep. He does not have capacity to have a conversation.   -Noted that spouse has requested SNF but no accepting facilities. Patient is unable to take PO and is now NPO per ST.   He has an albumin 1.2, he  is very thin.   -Called spouse to update and discuss patient 's current clinical condition. She had very poor knowledge of patient;s condition and acted as though it is a surprise that he is not doing well.   -I explained that he is no longer able to take PO. We discussed PEG and she verbalized understanding of why he would need one if he went to a nursing facility. I also let her know that hospice is an option and focus on comfort which really seemed to surprise her. She quickly wanted to get off phone and said she needs to call his sister. I let her know that I will f/u with her.  -Discussed with JUST[facility] Syde and no accepting facilities at this time.  -Updated Dr Calero and asked if he could touch base with family.  -Patient meets criteria for hospice for severe protein calorie malnutrition.   -Will f/u with family tomorrow        Protein calorie malnutrition  Albumin 1.2  BMI 19.92  NPO diet; dysphagia; cannot safely take PO  Meets hospice criteria  Losing weight with TPN  PEG to be done        Encephalopathy, metabolic  Continues  Remains NPO     HCAP (healthcare-associated pneumonia)  - see respiratory failure         Acute respiratory failure with hypoxia and hypercarbia      -Mgmt per primary          Scrotal abscess      Mgmt per primary      Improvement after IV abx    Berna's gangrene  As above        Subjective:     Chief Complaint:   Chief Complaint   Patient presents with    Testicle Pain     Pt states he is having severe testicle swelling that started 3 days ago       HPI:   61 y.o. male with adjustment disorder with depressed mood, chronic ischemic heart disease, cocaine dependence in remission, cardiomegaly, HLD, HTN, swizure disorder, tobacco use, and DM 2 presents with a complaint of testicular pain.  Associated with swelling and redness to the scrotum and penis along with difficulty urinating, pain is rated as severe and radiates to the rectum.  Denies fever, chills, cough, SOB, chest pain, n/v/d.   In the ED, he was found to be tachypneic, with leukocytosis and elevated lactic.  CT and US shows evidence of multiple scrotal and perineal abscesses with some extension into the penile shaft.  UA with evidence of infection.  Sepsis treatment initiated, blood and urine cultures obtained, IV fluids and IV antibiotics initiated.  Urology consulted.        Overview/Hospital Course:  61 y.o. male with adjustment disorder with depressed mood, chronic ischemic heart disease, cocaine dependence in remission, cardiomegaly, HLD, HTN, swizure disorder, tobacco use, and DM 2 presents with a complaint of testicular pain, found to have multiple scrotal and perineal abscesses. Placed on broad spectrum antibiotics, underwent I&D with urology.     Pt w/ aspiration event 1/11 w/ subsequent desaturation. Respiratory culture grew pseudomonas. Kept on Zosyn. On 1/13 pt had abrupt reduction in responsiveness. Code stroke called, patient transferred to ICU. No stroke found on imaging. Started on extended EEG. Mental status improved, patient stepped back down to the floor.      His mental status improved slowly for a few days, but worsened again 1/17.  He was altered,was not speaking,head CT show no acute process, EEG,per neurology show no seizure activity,todayb is much alert,appear to be delirious,will monitor.much more oriented today.  Consulted IR for drainage of residual fluid  collection on scrotum,CT pelvic.IR procedure is not done,duo to high risk for complications,will continue with IV Abx and repeat CT .spoke with Urology ,US show improvement in Abscess seize,no need for intervention at this time,continue with IV Abx.  Was hypoglycemic over night,back on D5 IVF,added appetizer and supplement.  Again in altered,could not pass ST,back NPO,started on TPN,consulted palliative care.  CC today is weakness.      Hospital Course:  No notes on file    Interval History: patient remains confused     Medications:  Continuous Infusions:    Amino acid 4.25% - dextrose 5% (CLINIMIX-E) solution with additives (1L provides 42.5 gm AA, 50 gm CHO (170 kcal/L dextrose), Na 35, K 30, Mg 5, Ca 4.5, Acetate 70, Cl 39, Phos 15) 75 mL/hr at 01/30/22 2159    Amino acid 4.25% - dextrose 5% (CLINIMIX-E) solution with additives (1L provides 42.5 gm AA, 50 gm CHO (170 kcal/L dextrose), Na 35, K 30, Mg 5, Ca 4.5, Acetate 70, Cl 39, Phos 15)       Scheduled Meds:   enoxaparin  40 mg Subcutaneous Daily    fat emulsion  250 mL Intravenous Once    fluconazole (DIFLUCAN) IVPB  400 mg Intravenous Q24H    lacosamide (VIMPAT) IVPB  200 mg Intravenous Q12H    piperacillin-tazobactam (ZOSYN) IVPB  4.5 g Intravenous Q8H    sodium chloride 0.9%  10 mL Intravenous Q6H    valproate sodium (DEPACON) IVPB  500 mg Intravenous Q12H     PRN Meds:acetaminophen, albuterol-ipratropium, albuterol-ipratropium, aluminum-magnesium hydroxide-simethicone, dextrose 10%, dextrose 10%, dextrose 50%, glucagon (human recombinant), glucose, glucose, hydrALAZINE, influenza, insulin aspart U-100, labetalol, LIDOcaine HCL 10 mg/ml (1%), lorazepam, melatonin, naloxone, ondansetron, prochlorperazine, simethicone, Flushing PICC Protocol **AND** sodium chloride 0.9% **AND** sodium chloride 0.9%    Objective:     Vital Signs (Most Recent):  Temp: 97.5 °F (36.4 °C) (01/31/22 0722)  Pulse: 77 (01/31/22 0722)  Resp: 20 (01/31/22 0722)  BP: (!) 152/85 (01/31/22 0722)  SpO2: (!) 92 % (01/31/22 0957) Vital Signs (24h Range):  Temp:  [97.5 °F (36.4 °C)-98.6 °F (37 °C)] 97.5 °F (36.4 °C)  Pulse:  [76-82] 77  Resp:  [17-22] 20  SpO2:  [92 %-97 %] 92 %  BP: (137-152)/(81-86) 152/85     Weight: 61.2 kg (134 lb 14.7 oz)  Body mass index is 19.92 kg/m².    Physical Exam  Vitals and nursing note reviewed.   Constitutional:       Appearance: He is ill-appearing and toxic-appearing.   Cardiovascular:      Rate and Rhythm: Normal rate and regular rhythm.   Pulmonary:      Effort: Pulmonary effort is normal.    Abdominal:      General: Abdomen is flat.      Palpations: Abdomen is soft.   Skin:     General: Skin is warm and dry.   Neurological:      Mental Status: He is alert.      Comments: confused         Review of Symptoms    Symptom Assessment (ESAS 0-10 Scale)  Unable to complete assessment due to Acuity of condition             Advance Care Planning   Advance Directives:     Decision Making:  Patient unable to communicate due to disease severity/cognitive impairment and Family answered questions         Significant Labs: All pertinent labs within the past 24 hours have been reviewed.  CBC:   Recent Labs   Lab 01/30/22  0615   WBC 11.46   HGB 9.2*   HCT 27.8*   MCV 86   *     BMP:  No results for input(s): GLU, NA, K, CL, CO2, BUN, CREATININE, CALCIUM, MG in the last 24 hours.  LFT:  Lab Results   Component Value Date    AST 36 01/30/2022     (H) 01/17/2022    ALKPHOS 176 (H) 01/30/2022    BILITOT 0.2 01/30/2022     Albumin:   Albumin   Date Value Ref Range Status   01/30/2022 1.3 (L) 3.5 - 5.2 g/dL Final     Protein:   Total Protein   Date Value Ref Range Status   01/30/2022 8.2 6.0 - 8.4 g/dL Final     Lactic acid:   Lab Results   Component Value Date    LACTATE 0.8 01/24/2022    LACTATE 1.3 01/14/2022       Significant Imaging: last imagin 1/24      Kimberly Honeycutt NP  Palliative Medicine  [facility] [place]     18 min ACP time spent in goals of care, emotional support, formulating and communicating prognosis, exploring burden/benefit of various approaches of treatment.      50% of 30 mins spent in chart review, face to face discussion, symptom assessment, coordination of care with other specialists and d/c planning.    Total 48 mins

## 2022-01-31 NOTE — PROGRESS NOTES
[facility] Medicine  Progress Note    Patient Name: Abhishek Zhao  MRN: [id number]  Patient Class: IP- Inpatient   Admission Date: 1/4/2022  Length of Stay: 26 days  Attending Physician: Velia Matthews DO  Primary Care Provider: To Obtain Unable        Subjective:     Principal Problem:Scrotal abscess        HPI:  61 y.o. male with adjustment disorder with depressed mood, chronic ischemic heart disease, cocaine dependence in remission, cardiomegaly, HLD, HTN, swizure disorder, tobacco use, and DM 2 presents with a complaint of testicular pain.  Associated with swelling and redness to the scrotum and penis along with difficulty urinating, pain is rated as severe and radiates to the rectum.  Denies fever, chills, cough, SOB, chest pain, n/v/d.  In the ED, he was found to be tachypneic, with leukocytosis and elevated lactic.  CT and US shows evidence of multiple scrotal and perineal abscesses with some extension into the penile shaft.  UA with evidence of infection.  Sepsis treatment initiated, blood and urine cultures obtained, IV fluids and IV antibiotics initiated.  Urology consulted.      Overview/Hospital Course:  61 y.o. 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    Urology consulted. Patient s/p cystoscopy with santiago placement and debridement of abscess/necrosis of scrotum 1/5/22 with Dr. Rangel. Fluid collection drained at bedside 1/14/2022. CT from 1/18/22 w/ R corporal body fluid collection concerning for residual abscess vs fluid collection. Hospitalization course complicated with aspiration event 1/11 w/ subsequent desaturation. Respiratory culture grew pseudomonas. Kept on Zosyn. On 1/13, pt had abrupt reduction in responsiveness. Code stroke called,  patient transferred to ICU. No stroke found on imaging. Started on extended EEG. Mental status improved, no sign of seizure. Patient stepped back down to the floor. Mental status continues to wax and wane. Given change in mental status, SLP evaluated and recommend NPO and pt started on TPN. Palliative following. B 12,folic acid is normal,HIV is negative,RPR is negative. Abscess culture with CANDIDA GLABRATA and ecoli. Blood culture with NGTD. ID is following. PT/OT consulted- recommends SNF. Had extensive discussion with patient's wife at bedside about patient's clinical status, nutrition status, and deconditioning. Discussed that patient would likely benefit from SNF, but wife is hesitant but sister is agreeable. Also, palliative team and I discussed with patient's spouse and sister on 2022 about alternative means for nutrition in the event patient unable to progress with SLP. Wife and sister agreeable to PEG tube if patient does not progress with SLP. GI consulted. Will follow up with SLP progress. Plan to continue IV abx and awaiting for SNF placement      Interval History: No acute overnight events. Nurse report patient still mildly confuse. Mental status appears similar to yesterday. Able to tell me his name, but unable to tell me his  or the place and time. Cannot tell me the situation.No family at bedside.    Palliative team and I able to reach sister and spouse and discuss possible need for alternative means of nutrition if patient does not progress with SLP. Family and spouse agrees to PEG if needed.     Review of Systems   Unable to perform ROS: Mental status change     Objective:     Vital Signs (Most Recent):  Temp: 97.5 °F (36.4 °C) (22)  Pulse: 77 (22)  Resp: 20 (22)  BP: (!) 152/85 (22)  SpO2: (!) 92 % (22) Vital Signs (24h Range):  Temp:  [97.5 °F (36.4 °C)-98.6 °F (37 °C)] 97.5 °F (36.4 °C)  Pulse:  [76-82] 77  Resp:  [17-22] 20  SpO2:   [92 %-97 %] 92 %  BP: (137-152)/(81-86) 152/85     Weight: 61.2 kg (134 lb 14.7 oz)  Body mass index is 19.92 kg/m².    Intake/Output Summary (Last 24 hours) at 2022 1426  Last data filed at 2022 0500  Gross per 24 hour   Intake --   Output 2550 ml   Net -2550 ml      Physical Exam  Constitutional:       General: He is not in acute distress.     Appearance: He is ill-appearing. He is not toxic-appearing.      Comments:  Appears a little more confuse today than yesterday. Oriented to self but today he was unable to tell me his , place, or time.    HENT:      Head: Atraumatic.      Nose: Nose normal.      Mouth/Throat:      Mouth: Mucous membranes are dry.   Eyes:      Extraocular Movements: Extraocular movements intact.   Cardiovascular:      Rate and Rhythm: Normal rate and regular rhythm.   Pulmonary:      Effort: No respiratory distress.      Breath sounds: No wheezing.   Abdominal:      General: Abdomen is flat. There is no distension.      Palpations: Abdomen is soft.      Tenderness: There is no abdominal tenderness. There is no guarding.   Genitourinary:     Comments: Dressing on scrotal area.  Sahu - clear yellow urine  Musculoskeletal:         General: No swelling.      Right lower leg: No edema.      Left lower leg: No edema.   Skin:     Coloration: Skin is not jaundiced.      Findings: No bruising.   Neurological:      Mental Status: He is alert.      Comments: Alert and oriented to self. Not oriented to time, and place. Not to situation.          Significant Labs: All pertinent labs within the past 24 hours have been reviewed.    Significant Imaging: I have reviewed all pertinent imaging results/findings within the past 24 hours.      Assessment/Plan:      * Scrotal abscess  -Multiple abscesses c/w Berna's gangrene   -Cultures growing ampicillin-resistant Ecoli and C albicans. Repeat CT A/P  w/ 4cm fluid collection c/f persistent abscess which was drained bedside by urology  -Urology  consulted: s/p cystoscopy with santiago placement and debridement of abscess/necrosis of scrotum 1/5/22 with Dr. Rangel.Fluid collection drained at bedside 1/14/2022  -CT from 1/18/22 w/ R corporal body fluid collection concerning for residual abscess vs fluid collection  - cont zosyn and diflucan (re-started for HCAP)  - ID consulted, appreciate recs: continue abx until resolution of abscess  -Consulted IR for drainage of residual fluid collection on CT pelvic. IR procedure is not done,due to high risk for complications  -continue with IV Abx and repeat CT,spoke with Urology,US show improvement in Abscess size,no need for additional intervention at this time  -Monitor closely       Berna's gangrene   -Urology consulted:  s/p debridement 1/5/22  -Will need plastic surgery evaluation in future once wound stable for possible grafting. Can be done as outpatient.     Acute encephalopathy  Repeated episodes of AMS   - neurology consulted  - AEDs per neurology   - EEG with no seizure.   -SLP consulted: recommends NPO, continue TPN. F/u progress recs  -Palliative care consulted: may need PEG if nutritional needs are not met  -Mental status gradually improving   -Per Dr. Calero, wife want patient remains full code  -B 12,folic acid is normal,HIV is negative,RPR is negative.    Sepsis due to Gram negative bacteria  Resolved      JOI (acute kidney injury)  Resolved      Hypokalemia  Replete PRN    Acute respiratory failure with hypoxia and hypercarbia  On admit, Cough + new LLL opacity on imaging c/f pneumonia, however CT imaging showing pleural effusion and atelectasis  - respiratory cultures w/ pseudomonas  - cont zosyn  - furosemide as tolerated; no significant evidence of cardiac dysfunction on TTE  - IS  - Wean O2 as tolerated        HCAP (healthcare-associated pneumonia)  - see respiratory failure   - Continue IV abx: Zosyn and fluconazole     Pericardial effusion  Echo with Small pericardial effusion of unclear  "etiology  EF of 50%  Monitor       Alkaline phosphatase elevation  Chronic isolated alk phos (GGT elevated), worsened this admission. Possibly due to AEDs vs occult HPB process.   -Alk phos trending down   - further workup depending on clinical course,improving.      Adjustment disorder with depressed mood  Continue home citalopram, hold home seroquel due to somnolence    Chronic ischemic heart disease  No acute issue    Cardiomegaly  Pulmonary edema seen on imaging, small pericardial effusion seen on TTE. LVEF low-normal (50%)  - hold diuresis while NPO    Cocaine dependence in remission  Counseled  Wife stated patient last use was "years ago"    Hyperlipidemia  Continue statin    Essential hypertension  Well controlled, continue home medications and monitor blood pressure, adjust as needed.   On prn IV hydralazine at this time.    Tobacco user  Dr. Calero: 5 minutes spent counseling the patient on smoking cessation and he is not currently ready to stop smoking  He will be offereded a nicotine transdermal patch while hospitalized and monitored for withdrawal.  Will provide additional smoking cessation counseling prior to discharge.     Diabetes mellitus type 2, controlled  A1c:   Lab Results   Component Value Date    HGBA1C 9.3 (H) 01/05/2022     Meds:  SSI PRN to maintain goal 140-180  ADA diet, accuchecks ACHS, hypoglycemic protocol        Advance care planning  I spent greater than 16 minutes of discussion regarding advanced directive and end of life planning with patient's spouse and sister.  Wife confirms patient is full code  Wife and sister agrees to PEG if continues to fail SLP evaluation due to mentation        Malnourished  Pt ill-appearing, thin and frail  Albumin 1.3  Currently on TPN  SLP consulted- continue to evaluate if safe to swallow and able to meet nutritional needs  Discussed with family and spouse in regards to possible PEG  Family (sister) and wife agreeable on 01/31/2022  Will continue to " follow with SLP, if no progression, will need PEG  GI consulted       VTE Risk Mitigation (From admission, onward)         Ordered     enoxaparin injection 40 mg  Daily         01/13/22 1612     IP VTE LOW RISK PATIENT  Once         01/04/22 2241     Place sequential compression device  Until discontinued         01/04/22 2241                Discharge Planning   CHUCK:      Code Status: Full Code   Is the patient medically ready for discharge?:     Reason for patient still in hospital (select all that apply): Treatment, Consult recommendations and Pending disposition  Discharge Plan A: Skilled Nursing Facility   Discharge Delays: (!) Post-Acute Set-up              Velia Matthews DO  Department of Hospital Medicine   [facility] [place]

## 2022-01-31 NOTE — SUBJECTIVE & OBJECTIVE
Interval History: more alert, not oriented    Review of Systems   Constitutional: Negative for activity change and appetite change.   Respiratory: Negative for shortness of breath.    Gastrointestinal: Negative for abdominal distention and abdominal pain.   Genitourinary: Positive for difficulty urinating. Negative for penile swelling and scrotal swelling.   Neurological: Positive for weakness.     Objective:     Temp:  [97.5 °F (36.4 °C)-98.6 °F (37 °C)] 97.5 °F (36.4 °C)  Pulse:  [76-82] 77  Resp:  [17-22] 20  SpO2:  [90 %-97 %] 96 %  BP: (134-152)/(78-86) 152/85     Body mass index is 19.92 kg/m².           Drains     Drain                 Urethral Catheter 01/05/22 1050 Double-lumen 20 Fr. 25 days                Physical Exam  Constitutional:       General: He is not in acute distress.     Appearance: He is well-developed. He is not ill-appearing, toxic-appearing or diaphoretic.   HENT:      Head: Normocephalic and atraumatic.      Mouth/Throat:      Mouth: Mucous membranes are moist.   Eyes:      Conjunctiva/sclera: Conjunctivae normal.   Pulmonary:      Effort: Pulmonary effort is normal. No respiratory distress.   Abdominal:      General: Abdomen is flat. There is no distension.      Palpations: Abdomen is soft. There is no mass.      Tenderness: There is no abdominal tenderness. There is no guarding.   Genitourinary:     Comments: No induration, no tenderness  Minimal swelling  Musculoskeletal:         General: No swelling or deformity.      Cervical back: Neck supple.   Skin:     General: Skin is warm.      Findings: No rash.   Neurological:      Mental Status: He is alert.      Gait: Gait normal.   Psychiatric:         Mood and Affect: Mood normal.         Thought Content: Thought content normal.         Judgment: Judgment normal.         Significant Labs:    BMP:  Recent Labs   Lab 01/26/22  0034 01/28/22  0600 01/30/22  0615    135* 133*   K 4.0 4.2 4.4    104 101   CO2 26 23 23   BUN 9 15 16    CREATININE 1.4 1.2 1.0   CALCIUM 7.8* 8.5* 8.6*       CBC:   Recent Labs   Lab 01/26/22  0034 01/28/22  0600 01/30/22  0615   WBC 9.02 12.12 11.46   HGB 9.9* 9.8* 9.2*   HCT 30.0* 29.8* 27.8*   * 133* 147*

## 2022-01-31 NOTE — ASSESSMENT & PLAN NOTE
On admit, Cough + new LLL opacity on imaging c/f pneumonia, however CT imaging showing pleural effusion and atelectasis  - respiratory cultures w/ pseudomonas  - cont zosyn  - furosemide as tolerated; no significant evidence of cardiac dysfunction on TTE  - IS  - Wean O2 as tolerated

## 2022-02-01 LAB
ALBUMIN SERPL BCP-MCNC: 1.3 G/DL (ref 3.5–5.2)
ALP SERPL-CCNC: 156 U/L (ref 55–135)
ALT SERPL W/O P-5'-P-CCNC: 24 U/L (ref 10–44)
ANION GAP SERPL CALC-SCNC: 12 MMOL/L (ref 8–16)
AST SERPL-CCNC: 36 U/L (ref 10–40)
BACTERIA #/AREA URNS HPF: ABNORMAL /HPF
BASOPHILS # BLD AUTO: 0.14 K/UL (ref 0–0.2)
BASOPHILS NFR BLD: 1 % (ref 0–1.9)
BILIRUB SERPL-MCNC: 0.2 MG/DL (ref 0.1–1)
BILIRUB UR QL STRIP: NEGATIVE
BUN SERPL-MCNC: 19 MG/DL (ref 6–20)
CALCIUM SERPL-MCNC: 9.2 MG/DL (ref 8.7–10.5)
CHLORIDE SERPL-SCNC: 101 MMOL/L (ref 95–110)
CLARITY UR: CLEAR
CO2 SERPL-SCNC: 22 MMOL/L (ref 23–29)
COLOR UR: YELLOW
CREAT SERPL-MCNC: 1 MG/DL (ref 0.5–1.4)
DIFFERENTIAL METHOD: ABNORMAL
EOSINOPHIL # BLD AUTO: 0.1 K/UL (ref 0–0.5)
EOSINOPHIL NFR BLD: 0.5 % (ref 0–8)
ERYTHROCYTE [DISTWIDTH] IN BLOOD BY AUTOMATED COUNT: 14.1 % (ref 11.5–14.5)
EST. GFR  (AFRICAN AMERICAN): >60 ML/MIN/1.73 M^2
EST. GFR  (NON AFRICAN AMERICAN): >60 ML/MIN/1.73 M^2
GLUCOSE SERPL-MCNC: 125 MG/DL (ref 70–110)
GLUCOSE UR QL STRIP: NEGATIVE
HCT VFR BLD AUTO: 26.6 % (ref 40–54)
HGB BLD-MCNC: 8.7 G/DL (ref 14–18)
HGB UR QL STRIP: ABNORMAL
HYALINE CASTS #/AREA URNS LPF: 0 /LPF
IMM GRANULOCYTES # BLD AUTO: 0.25 K/UL (ref 0–0.04)
IMM GRANULOCYTES NFR BLD AUTO: 1.8 % (ref 0–0.5)
KETONES UR QL STRIP: NEGATIVE
LEUKOCYTE ESTERASE UR QL STRIP: ABNORMAL
LYMPHOCYTES # BLD AUTO: 3 K/UL (ref 1–4.8)
LYMPHOCYTES NFR BLD: 21.2 % (ref 18–48)
MCH RBC QN AUTO: 28.2 PG (ref 27–31)
MCHC RBC AUTO-ENTMCNC: 32.7 G/DL (ref 32–36)
MCV RBC AUTO: 86 FL (ref 82–98)
MICROSCOPIC COMMENT: ABNORMAL
MONOCYTES # BLD AUTO: 2.5 K/UL (ref 0.3–1)
MONOCYTES NFR BLD: 17.7 % (ref 4–15)
NEUTROPHILS # BLD AUTO: 8.2 K/UL (ref 1.8–7.7)
NEUTROPHILS NFR BLD: 57.8 % (ref 38–73)
NITRITE UR QL STRIP: NEGATIVE
NRBC BLD-RTO: 0 /100 WBC
PH UR STRIP: 7 [PH] (ref 5–8)
PLATELET # BLD AUTO: 221 K/UL (ref 150–450)
PLATELET BLD QL SMEAR: ABNORMAL
PMV BLD AUTO: 11.7 FL (ref 9.2–12.9)
POCT GLUCOSE: 156 MG/DL (ref 70–110)
POCT GLUCOSE: 156 MG/DL (ref 70–110)
POCT GLUCOSE: 182 MG/DL (ref 70–110)
POTASSIUM SERPL-SCNC: 4.6 MMOL/L (ref 3.5–5.1)
PROT SERPL-MCNC: 8.9 G/DL (ref 6–8.4)
PROT UR QL STRIP: ABNORMAL
RBC # BLD AUTO: 3.09 M/UL (ref 4.6–6.2)
RBC #/AREA URNS HPF: 4 /HPF (ref 0–4)
SODIUM SERPL-SCNC: 135 MMOL/L (ref 136–145)
SP GR UR STRIP: 1.01 (ref 1–1.03)
URN SPEC COLLECT METH UR: ABNORMAL
UROBILINOGEN UR STRIP-ACNC: NEGATIVE EU/DL
WBC # BLD AUTO: 14.24 K/UL (ref 3.9–12.7)
WBC #/AREA URNS HPF: 21 /HPF (ref 0–5)

## 2022-02-01 PROCEDURE — 87086 URINE CULTURE/COLONY COUNT: CPT | Performed by: HOSPITALIST

## 2022-02-01 PROCEDURE — 99024 PR POST-OP FOLLOW-UP VISIT: ICD-10-PCS | Mod: ,,, | Performed by: UROLOGY

## 2022-02-01 PROCEDURE — 25000003 PHARM REV CODE 250: Performed by: STUDENT IN AN ORGANIZED HEALTH CARE EDUCATION/TRAINING PROGRAM

## 2022-02-01 PROCEDURE — B4185 PARENTERAL SOL 10 GM LIPIDS: HCPCS | Performed by: HOSPITALIST

## 2022-02-01 PROCEDURE — 63600175 PHARM REV CODE 636 W HCPCS: Performed by: HOSPITALIST

## 2022-02-01 PROCEDURE — 80053 COMPREHEN METABOLIC PANEL: CPT | Performed by: STUDENT IN AN ORGANIZED HEALTH CARE EDUCATION/TRAINING PROGRAM

## 2022-02-01 PROCEDURE — 85025 COMPLETE CBC W/AUTO DIFF WBC: CPT | Performed by: STUDENT IN AN ORGANIZED HEALTH CARE EDUCATION/TRAINING PROGRAM

## 2022-02-01 PROCEDURE — 81000 URINALYSIS NONAUTO W/SCOPE: CPT | Performed by: HOSPITALIST

## 2022-02-01 PROCEDURE — C9254 INJECTION, LACOSAMIDE: HCPCS | Performed by: STUDENT IN AN ORGANIZED HEALTH CARE EDUCATION/TRAINING PROGRAM

## 2022-02-01 PROCEDURE — 99024 POSTOP FOLLOW-UP VISIT: CPT | Mod: ,,, | Performed by: UROLOGY

## 2022-02-01 PROCEDURE — 87040 BLOOD CULTURE FOR BACTERIA: CPT | Mod: 59 | Performed by: HOSPITALIST

## 2022-02-01 PROCEDURE — 63600175 PHARM REV CODE 636 W HCPCS: Performed by: STUDENT IN AN ORGANIZED HEALTH CARE EDUCATION/TRAINING PROGRAM

## 2022-02-01 PROCEDURE — 92526 ORAL FUNCTION THERAPY: CPT

## 2022-02-01 PROCEDURE — A4216 STERILE WATER/SALINE, 10 ML: HCPCS | Performed by: STUDENT IN AN ORGANIZED HEALTH CARE EDUCATION/TRAINING PROGRAM

## 2022-02-01 PROCEDURE — 25000003 PHARM REV CODE 250: Performed by: HOSPITALIST

## 2022-02-01 PROCEDURE — 11000001 HC ACUTE MED/SURG PRIVATE ROOM

## 2022-02-01 RX ORDER — MORPHINE SULFATE 4 MG/ML
1 INJECTION, SOLUTION INTRAMUSCULAR; INTRAVENOUS EVERY 4 HOURS PRN
Status: DISCONTINUED | OUTPATIENT
Start: 2022-02-01 | End: 2022-02-09 | Stop reason: HOSPADM

## 2022-02-01 RX ADMIN — MORPHINE SULFATE 1 MG: 4 INJECTION, SOLUTION INTRAMUSCULAR; INTRAVENOUS at 04:02

## 2022-02-01 RX ADMIN — I.V. FAT EMULSION 250 ML: 20 EMULSION INTRAVENOUS at 10:02

## 2022-02-01 RX ADMIN — Medication 10 ML: at 12:02

## 2022-02-01 RX ADMIN — DEXTROSE 500 MG: 50 INJECTION, SOLUTION INTRAVENOUS at 10:02

## 2022-02-01 RX ADMIN — ENOXAPARIN SODIUM 40 MG: 40 INJECTION SUBCUTANEOUS at 04:02

## 2022-02-01 RX ADMIN — PIPERACILLIN AND TAZOBACTAM 4.5 G: 4; .5 INJECTION, POWDER, LYOPHILIZED, FOR SOLUTION INTRAVENOUS; PARENTERAL at 08:02

## 2022-02-01 RX ADMIN — MORPHINE SULFATE 1 MG: 4 INJECTION, SOLUTION INTRAMUSCULAR; INTRAVENOUS at 10:02

## 2022-02-01 RX ADMIN — DEXTROSE 500 MG: 50 INJECTION, SOLUTION INTRAVENOUS at 09:02

## 2022-02-01 RX ADMIN — ASCORBIC ACID, VITAMIN A PALMITATE, CHOLECALCIFEROL, THIAMINE HYDROCHLORIDE, RIBOFLAVIN-5 PHOSPHATE SODIUM, PYRIDOXINE HYDROCHLORIDE, NIACINAMIDE, DEXPANTHENOL, ALPHA-TOCOPHEROL ACETATE, VITAMIN K1, FOLIC ACID, BIOTIN, CYANOCOBALAMIN: 200; 3300; 200; 6; 3.6; 6; 40; 15; 10; 150; 600; 60; 5 INJECTION, SOLUTION INTRAVENOUS at 10:02

## 2022-02-01 RX ADMIN — FLUCONAZOLE 400 MG: 2 INJECTION, SOLUTION INTRAVENOUS at 12:02

## 2022-02-01 RX ADMIN — PIPERACILLIN AND TAZOBACTAM 4.5 G: 4; .5 INJECTION, POWDER, LYOPHILIZED, FOR SOLUTION INTRAVENOUS; PARENTERAL at 03:02

## 2022-02-01 RX ADMIN — DEXTROSE 500 MG: 50 INJECTION, SOLUTION INTRAVENOUS at 12:02

## 2022-02-01 RX ADMIN — Medication 10 ML: at 05:02

## 2022-02-01 RX ADMIN — SODIUM CHLORIDE 200 MG: 9 INJECTION, SOLUTION INTRAVENOUS at 11:02

## 2022-02-01 RX ADMIN — PIPERACILLIN AND TAZOBACTAM 4.5 G: 4; .5 INJECTION, POWDER, LYOPHILIZED, FOR SOLUTION INTRAVENOUS; PARENTERAL at 12:02

## 2022-02-01 NOTE — PLAN OF CARE
Problem: Adult Inpatient Plan of Care  Goal: Plan of Care Review  Outcome: Ongoing, Progressing  Flowsheets (Taken 2/1/2022 1334)  Plan of Care Reviewed With:   patient   sibling  Goal: Patient-Specific Goal (Individualized)  Outcome: Ongoing, Progressing  Goal: Absence of Hospital-Acquired Illness or Injury  Outcome: Ongoing, Progressing  Intervention: Identify and Manage Fall Risk  Flowsheets (Taken 2/1/2022 1334)  Safety Promotion/Fall Prevention:   bed alarm set   room near unit station   /camera at bedside   nonskid shoes/socks when out of bed  Intervention: Prevent Skin Injury  Flowsheets (Taken 2/1/2022 1334)  Body Position: weight shifting  Skin Protection:   adhesive use limited   incontinence pads utilized  Intervention: Prevent and Manage VTE (Venous Thromboembolism) Risk  Flowsheets (Taken 2/1/2022 1334)  Activity Management: Rolling - L1  VTE Prevention/Management: intravenous hydration  Intervention: Prevent Infection  Flowsheets (Taken 2/1/2022 1334)  Infection Prevention: single patient room provided  Goal: Optimal Comfort and Wellbeing  Outcome: Ongoing, Progressing  Intervention: Monitor Pain and Promote Comfort  Flowsheets (Taken 2/1/2022 1334)  Pain Management Interventions:   care clustered   quiet environment facilitated   pillow support provided  Intervention: Provide Person-Centered Care  Flowsheets (Taken 2/1/2022 1334)  Trust Relationship/Rapport:   care explained   choices provided  Goal: Readiness for Transition of Care  Outcome: Ongoing, Progressing     Problem: Diabetes Comorbidity  Goal: Blood Glucose Level Within Targeted Range  Outcome: Ongoing, Progressing  Intervention: Monitor and Manage Glycemia  Flowsheets (Taken 2/1/2022 1334)  Glycemic Management: blood glucose monitored     Problem: Infection  Goal: Absence of Infection Signs and Symptoms  Outcome: Ongoing, Progressing  Intervention: Prevent or Manage Infection  Flowsheets (Taken 2/1/2022  1334)  Fever Reduction/Comfort Measures: lightweight clothing  Infection Management: aseptic technique maintained     Problem: Seizure Disorder Comorbidity  Goal: Maintenance of Seizure Control  Outcome: Ongoing, Progressing  Intervention: Maintain Seizure-Symptom Control  Flowsheets (Taken 2/1/2022 1334)  Seizure Precautions: clutter-free environment maintained     Problem: Confusion Chronic  Goal: Optimal Cognitive Function  Outcome: Ongoing, Progressing  Intervention: Minimize Injury Risk and Provide Safety  Flowsheets (Taken 2/1/2022 1334)  Enhanced Safety Measures:   bed alarm set   room near unit station  Intervention: Minimize and Manage Confusion  Flowsheets (Taken 2/1/2022 1334)  Environment Familiarity/Consistency: daily routine followed  Self-Care Promotion:   independence encouraged   BADL personal objects within reach   BADL personal routines maintained  Sensory Stimulation Regulation: quiet environment promoted  Family/Support System Care: support provided  Environmental Support: calm environment promoted     Problem: Impaired Wound Healing  Goal: Optimal Wound Healing  Outcome: Ongoing, Progressing  Intervention: Promote Wound Healing  Flowsheets (Taken 2/1/2022 1334)  Sleep/Rest Enhancement:   family presence promoted   noise level reduced   regular sleep/rest pattern promoted  Activity Management: Rolling - L1  Pain Management Interventions:   care clustered   quiet environment facilitated   pillow support provided

## 2022-02-01 NOTE — SUBJECTIVE & OBJECTIVE
Interval History: Much more alert today,  No pain    Review of Systems   Unable to perform ROS: Dementia     Objective:     Temp:  [96.1 °F (35.6 °C)-98.4 °F (36.9 °C)] 97.5 °F (36.4 °C)  Pulse:  [68-88] 88  Resp:  [18-78] 20  SpO2:  [93 %-100 %] 93 %  BP: (100-143)/(70-89) 119/80     Body mass index is 19.92 kg/m².           Drains     Drain                 Urethral Catheter 01/05/22 1050 Double-lumen 20 Fr. 27 days                Physical Exam  Vitals and nursing note reviewed.   Constitutional:       Appearance: He is well-developed and well-nourished.   HENT:      Head: Normocephalic.   Eyes:      Conjunctiva/sclera: Conjunctivae normal.   Neck:      Thyroid: No thyromegaly.      Trachea: No tracheal deviation.   Cardiovascular:      Rate and Rhythm: Normal rate.      Heart sounds: Normal heart sounds.   Pulmonary:      Effort: Pulmonary effort is normal. No respiratory distress.      Breath sounds: Normal breath sounds. No wheezing.   Abdominal:      General: Bowel sounds are normal.      Palpations: Abdomen is soft. There is no hepatosplenomegaly.      Tenderness: There is no abdominal tenderness. There is no CVA tenderness or rebound.      Hernia: No hernia is present.   Genitourinary:     Comments: Sahu in place with no penile edema  Wound in scrotum, clean with granulation tissue  Musculoskeletal:         General: No tenderness or edema. Normal range of motion.      Cervical back: Normal range of motion and neck supple.   Lymphadenopathy:      Cervical: No cervical adenopathy.   Skin:     General: Skin is warm and dry.      Findings: No erythema or rash.   Neurological:      Mental Status: He is alert and oriented to person, place, and time.   Psychiatric:         Mood and Affect: Mood and affect normal.         Behavior: Behavior normal.         Thought Content: Thought content normal.         Judgment: Judgment normal.         Significant Labs:    BMP:  Recent Labs   Lab 01/28/22  0600 01/30/22  0615  02/01/22  0330   * 133* 135*   K 4.2 4.4 4.6    101 101   CO2 23 23 22*   BUN 15 16 19   CREATININE 1.2 1.0 1.0   CALCIUM 8.5* 8.6* 9.2       CBC:   Recent Labs   Lab 01/28/22  0600 01/30/22  0615 02/01/22  0330   WBC 12.12 11.46 14.24*   HGB 9.8* 9.2* 8.7*   HCT 29.8* 27.8* 26.6*   * 147* 221       Blood Culture:   Recent Labs   Lab 01/26/22  1600   LABBLOO No Growth after 4 days.      Urine Culture: No results for input(s): LABURIN in the last 168 hours.    Significant Imaging:

## 2022-02-01 NOTE — PLAN OF CARE
Problem: SLP Goal  Goal: SLP Goal  Description: Goals:  1. Pt will tolerate puree diet/thin liquids w/o overt s/s of aspiration (discontinued 1/25/22).  2. Pt will tolerate mech soft diet (IDDSI-5) with thin liquids w/o overt s/s of aspiration. (on hold 1/16/22, pending pt progress)  Outcome: Ongoing, Not Progressing     Lethargic; delayed swallow with liquids & pureed.

## 2022-02-01 NOTE — NURSING
Scrotum wound pink & moist, cleansed with vashe, packed with vashe saturated gauze, covered with dry gauze, secured by mediopore tape.

## 2022-02-01 NOTE — CARE UPDATE
Discussed with ST Grissom. She reports patient less responsive today and recommended PEG to meet nutritional needs if GOC is  continued aggressive measures    Discussed PEG with GI Dr Rodriguez. Patient now with leukocytosis and PEG on hold. Apparently after she spoke with daughter the patient's children do not want PEG and will discuss with spouse.    Discussed with ANDREA Syed. No accepting facilities and patient does not have a skill at this time. Spouse does not want intermediate placement    Patient meets hospice criteria at this time. Hospice is option

## 2022-02-01 NOTE — SUBJECTIVE & OBJECTIVE
Interval History: No acute overnight events. Nurse report patient still mildly confuse. Mental status appears similar to yesterday. Able to tell me his name, but unable to tell me his  or the place and time. Cannot tell me the situation.No family at bedside.    Palliative team and I able to reach sister and spouse and discuss possible need for alternative means of nutrition if patient does not progress with SLP. Family and spouse agrees to PEG if needed.     Review of Systems   Unable to perform ROS: Mental status change     Objective:     Vital Signs (Most Recent):  Temp: 97.5 °F (36.4 °C) (22 1113)  Pulse: 88 (22 1113)  Resp: 20 (22 1113)  BP: 119/80 (22 1113)  SpO2: (!) 93 % (22 1113) Vital Signs (24h Range):  Temp:  [96.1 °F (35.6 °C)-98.4 °F (36.9 °C)] 97.5 °F (36.4 °C)  Pulse:  [68-88] 88  Resp:  [18-78] 20  SpO2:  [93 %-100 %] 93 %  BP: (100-143)/(70-89) 119/80     Weight: 61.2 kg (134 lb 14.7 oz)  Body mass index is 19.92 kg/m².    Intake/Output Summary (Last 24 hours) at 2022 1241  Last data filed at 2022 0500  Gross per 24 hour   Intake --   Output 2000 ml   Net -2000 ml      Physical Exam  Constitutional:       General: He is not in acute distress.     Appearance: He is ill-appearing. He is not toxic-appearing.      Comments:  Appears a little more confuse today than yesterday. Oriented to self but today he was unable to tell me his , place, or time.    HENT:      Head: Atraumatic.      Nose: Nose normal.      Mouth/Throat:      Mouth: Mucous membranes are dry.   Eyes:      Extraocular Movements: Extraocular movements intact.   Cardiovascular:      Rate and Rhythm: Normal rate and regular rhythm.   Pulmonary:      Effort: No respiratory distress.      Breath sounds: No wheezing.   Abdominal:      General: Abdomen is flat. There is no distension.      Palpations: Abdomen is soft.      Tenderness: There is no abdominal tenderness. There is no guarding.    Genitourinary:     Comments: Dressing on scrotal area.  Sahu - clear yellow urine  Musculoskeletal:         General: No swelling.      Right lower leg: No edema.      Left lower leg: No edema.   Skin:     Coloration: Skin is not jaundiced.      Findings: No bruising.   Neurological:      Mental Status: He is alert.      Comments: Alert and oriented to self. Not oriented to time, and place. Not to situation.          Significant Labs: All pertinent labs within the past 24 hours have been reviewed.    Significant Imaging: I have reviewed all pertinent imaging results/findings within the past 24 hours.

## 2022-02-01 NOTE — TREATMENT PLAN
Brief GI Note:    GI consulted for PEG tube placement.  Patient demonstrated some progress with speech therapy on 1/31. Recommend continued inpatient speech therapy evaluation this week to determine if PEG tube warranted.  Additionally, am labs with rising leukocytosis. Recommend infectious work up with negative blood cultures prior to proceeding with any endoscopic procedures.      Michael Fontana MD

## 2022-02-01 NOTE — PROGRESS NOTES
[facility]  Urology  Progress Note    Patient Name: Abhishek Zhao  MRN: [id number]  Admission Date: 1/4/2022  Hospital Length of Stay: 27 days  Code Status: Full Code   Attending Provider: Quinten Rosario MD   Primary Care Physician: To Obtain Unable    Subjective:     HPI:  Scrotal Swelling  Abhishek Zhao is a 61 y.o. male who was been experiencing scrotal pain and swelling since 12/31/2021.  He denies any fever.  He has had issues voiding since the swelling began.  Hemostat having issues in the past with urinary problems.  He denies having any previous issues with scrotal infection or swelling.  He recalls that he had procedures in the past but cannot recall specifically what to place.  He does not have urologist locally but moved back from [place] about 1 year ago.    Review of care everywhere shows that he had a cystoscopy with urethral dilation of a urethral stricture in the bulbar urethra in 2019 at [facility].  And there are reports that [facility] approximately 2015 [facility]      Interval History: Much more alert today,  No pain    Review of Systems   Unable to perform ROS: Dementia     Objective:     Temp:  [96.1 °F (35.6 °C)-98.4 °F (36.9 °C)] 97.5 °F (36.4 °C)  Pulse:  [68-88] 88  Resp:  [18-78] 20  SpO2:  [93 %-100 %] 93 %  BP: (100-143)/(70-89) 119/80     Body mass index is 19.92 kg/m².           Drains     Drain                 Urethral Catheter 01/05/22 1050 Double-lumen 20 Fr. 27 days                Physical Exam  Vitals and nursing note reviewed.   Constitutional:       Appearance: He is well-developed and well-nourished.   HENT:      Head: Normocephalic.   Eyes:      Conjunctiva/sclera: Conjunctivae normal.   Neck:      Thyroid: No thyromegaly.      Trachea: No tracheal deviation.   Cardiovascular:      Rate and Rhythm: Normal rate.      Heart sounds: Normal heart sounds.   Pulmonary:      Effort: Pulmonary effort is normal. No respiratory  distress.      Breath sounds: Normal breath sounds. No wheezing.   Abdominal:      General: Bowel sounds are normal.      Palpations: Abdomen is soft. There is no hepatosplenomegaly.      Tenderness: There is no abdominal tenderness. There is no CVA tenderness or rebound.      Hernia: No hernia is present.   Genitourinary:     Comments: Santiago in place with no penile edema  Wound in scrotum, clean with granulation tissue  Musculoskeletal:         General: No tenderness or edema. Normal range of motion.      Cervical back: Normal range of motion and neck supple.   Lymphadenopathy:      Cervical: No cervical adenopathy.   Skin:     General: Skin is warm and dry.      Findings: No erythema or rash.   Neurological:      Mental Status: He is alert and oriented to person, place, and time.   Psychiatric:         Mood and Affect: Mood and affect normal.         Behavior: Behavior normal.         Thought Content: Thought content normal.         Judgment: Judgment normal.         Significant Labs:    BMP:  Recent Labs   Lab 01/28/22  0600 01/30/22  0615 02/01/22  0330   * 133* 135*   K 4.2 4.4 4.6    101 101   CO2 23 23 22*   BUN 15 16 19   CREATININE 1.2 1.0 1.0   CALCIUM 8.5* 8.6* 9.2       CBC:   Recent Labs   Lab 01/28/22  0600 01/30/22  0615 02/01/22  0330   WBC 12.12 11.46 14.24*   HGB 9.8* 9.2* 8.7*   HCT 29.8* 27.8* 26.6*   * 147* 221       Blood Culture:   Recent Labs   Lab 01/26/22  1600   LABBLOO No Growth after 4 days.      Urine Culture: No results for input(s): LABURIN in the last 168 hours.    Significant Imaging:                    Assessment/Plan:     * Scrotal abscess  s/p cystoscopy with santiago placement and debridement of abscess/necrosis of scrotum 1/5/22 with Dr. Rangel.  Fluid collection drained at bedside 1/14/2022  CT from 1/18/22 w/ R corporal body fluid collection concerning for residual abscess vs fluid collection  Additional purulence expressed from penis on 1/20/21    Continue  abx per primary  Cultures growing Candida and E coli  Appreciate wound care RN and floor RN dressing changes  Leukocytosis monitor    Afebrile  Will continue to monitor condition    Sahu care, please be mindful.    Will need plastic surgery evaluation in future once wound stable for possible grafting. Can be done as outpatient.     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

## 2022-02-01 NOTE — PLAN OF CARE
TN sent additional clinicals per request to [facility], pending review. [place], pending review. TN to follow up.     Per palliative care, hospice possible alternative route for patient. TN to reach out to patient's wife.     2:30pm TN spoke with patient's sister Marifer at bedside regarding discharge planning. Ms. Caicedo stated she spoke with patient's wife, and would like to arrange an information meeting with [facility]. TN sent referral via care port to [facility].     TN spoke with Marifer with [facility], stated will reach out to family today.    02/01/22 0902   Post-Acute Status   Post-Acute Authorization Placement   Post-Acute Placement Status Pending post-acute provider review/more information requested   Discharge Delays (!) Post-Acute Set-up   Discharge Plan   Discharge Plan A Skilled Nursing Facility

## 2022-02-01 NOTE — ASSESSMENT & PLAN NOTE
s/p cystoscopy with santiago placement and debridement of abscess/necrosis of scrotum 1/5/22 with Dr. Rangel.  Fluid collection drained at bedside 1/14/2022  CT from 1/18/22 w/ R corporal body fluid collection concerning for residual abscess vs fluid collection  Additional purulence expressed from penis on 1/20/21    Continue abx per primary  Cultures growing Candida and E coli  Appreciate wound care RN and floor RN dressing changes  Leukocytosis monitor    Afebrile  Will continue to monitor condition    Santiago care, please be mindful.    Will need plastic surgery evaluation in future once wound stable for possible grafting. Can be done as outpatient.     Agree with SNF placement

## 2022-02-01 NOTE — PT/OT/SLP PROGRESS
Speech Language Pathology Treatment    Patient Name:  Abhishek Zhao   MRN:  [id number]  Admitting Diagnosis: Scrotal abscess    Recommendations:                 General Recommendations:  Dysphagia therapy  Diet recommendations:  NPO, Liquid Diet Level: NPO   Aspiration Precautions: Alternate means of nutrition/hydration and Frequent oral care   General Precautions: Standard, aspiration,NPO  Communication strategies:  provide increased time to answer    Subjective     Pt asleep upon SLP arrival. Pt's brother at bedside reporting pt up most of the night contributing to his extreme fatigue.     Communicated to Dr. Fontana with GI and Kimberly NP that pt will not meet nutritional needs orally and ST is recommending PEG placement at this time.    Patient goals: unable to report    Pain/Comfort:  · Pain Rating 1: 0/10    Respiratory Status: Nasal cannula, flow 2 L/min    Objective:     Has the patient been evaluated by SLP for swallowing?   Yes  Keep patient NPO? Yes   Current Respiratory Status:        Pt easily aroused with SLP greeting. Pt lethargic, but agreeable to participating in accepting therapeutic PO trial for tx. Pt's HOB elevated prior to beginning tx.    Pt initially orally defensive, not opening oral cavity to accept water trials. SLP encouraged pt to open mouth which he eventually did with max cues. Pt swallowed 4 sips of water via spoon and 4 (1/4 tsp) applesauce. Oral holding noted requiring max verbal cues for pt to initiate swallow across trials. Significantly delayed swallow response observed with all liquid and pureed trials.     Pt at high aspiration risk at this time. Pt unsafe for PO nutrition. Pt would not meet nutritional needs orally at this time.   Recommend PEG tube to provide alterative means of nutrition.    Assessment:     Abhishek Zhao is a 61 y.o. male with a dx of Scrotal abscess. Pt presents severe oropharyngeal dysphagia negatively impacted by decreased DENIS. ST will con't to provide ongoing  swallow assessment. Rec: con't NPO and PEG tube for alternative means of nutrition.    Goals:   Multidisciplinary Problems     SLP Goals        Problem: SLP Goal    Goal Priority Disciplines Outcome   SLP Goal    Medium SLP Ongoing, Not Progressing   Description: Goals:  1. Pt will tolerate puree diet/thin liquids w/o overt s/s of aspiration (discontinued 1/25/22).  2. Pt will tolerate mech soft diet (IDDSI-5) with thin liquids w/o overt s/s of aspiration. (on hold 1/16/22, pending pt progress)                   Plan:     · Patient to be seen:  2 x/week,3 x/week   · Plan of Care expires:  01/29/22  · Plan of Care reviewed with:  patient,sibling   · SLP Follow-Up:  Yes       Discharge recommendations:  other (see comments) (TBD)   Barriers to Discharge:  None    Time Tracking:     SLP Treatment Date:   02/01/22  Speech Start Time:  1115  Speech Stop Time:  1129     Speech Total Time (min):  14 min    Billable Minutes: Treatment Swallowing Dysfunction 14 min    02/01/2022

## 2022-02-01 NOTE — PT/OT/SLP PROGRESS
Physical Therapy      Patient Name:  Abhishek Zhao   MRN:  [id number]    Patient not seen today secondary to  (Pt lethargic and disoriented.  Cannot state his name and is talking Non-sensically.). Pt is unable to progress toward PT goals and is no longer a candidate for SNF.  24hr care, hospital bed, and [place] lift recommended.

## 2022-02-01 NOTE — PT/OT/SLP PROGRESS
Occupational Therapy      Patient Name:  Abhishek Zhao   MRN:  [id number]    Patient not seen today secondary to: pt not appropriate for therapy at this time. Pt unable to make eye contact nor follow all but one command. When asked his name, but mumbling and did not react when called by name. Will follow-up later as able. Dr. Calero exited the room with family member when therapy attempted.     2/1/2022

## 2022-02-01 NOTE — PROGRESS NOTES
[facility] Medicine  Progress Note    Patient Name: Abhishek Zhao  MRN: [id number]  Patient Class: IP- Inpatient   Admission Date: 1/4/2022  Length of Stay: 27 days  Attending Physician: Quinten Rosario MD  Primary Care Provider: To Obtain Unable        Subjective:     Principal Problem:Scrotal abscess        HPI:  61 y.o. male with adjustment disorder with depressed mood, chronic ischemic heart disease, cocaine dependence in remission, cardiomegaly, HLD, HTN, swizure disorder, tobacco use, and DM 2 presents with a complaint of testicular pain.  Associated with swelling and redness to the scrotum and penis along with difficulty urinating, pain is rated as severe and radiates to the rectum.  Denies fever, chills, cough, SOB, chest pain, n/v/d.  In the ED, he was found to be tachypneic, with leukocytosis and elevated lactic.  CT and US shows evidence of multiple scrotal and perineal abscesses with some extension into the penile shaft.  UA with evidence of infection.  Sepsis treatment initiated, blood and urine cultures obtained, IV fluids and IV antibiotics initiated.  Urology consulted.      Overview/Hospital Course:  61 y.o. 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    Urology consulted. Patient s/p cystoscopy with santiago placement and debridement of abscess/necrosis of scrotum 1/5/22 with Dr. Rangel. Fluid collection drained at bedside 1/14/2022. CT from 1/18/22 w/ R corporal body fluid collection concerning for residual abscess vs fluid collection. Hospitalization course complicated with aspiration event 1/11 w/ subsequent desaturation. Respiratory culture grew pseudomonas. Kept on Zosyn. On 1/13, pt had abrupt reduction in responsiveness. Code stroke called,  patient transferred to ICU. No stroke found on imaging. Started on extended EEG. Mental status improved, no sign of seizure. Patient stepped back down to the floor. Mental status continues to wax and wane. Given change in mental status, SLP evaluated and recommend NPO and pt started on TPN. Palliative following. B 12,folic acid is normal,HIV is negative,RPR is negative. Abscess culture with CANDIDA GLABRATA and ecoli. Blood culture with NGTD. ID is following. PT/OT consulted- recommends SNF. Had extensive discussion with patient's wife at bedside about patient's clinical status, nutrition status, and deconditioning. Discussed that patient would likely benefit from SNF, but wife is hesitant but sister is agreeable. Also, palliative team and I discussed with patient's spouse and sister on 2022 about alternative means for nutrition in the event patient unable to progress with SLP. Wife and sister agreeable to PEG tube if patient does not progress with SLP. GI consulted. Will follow up with SLP progress. Plan to continue IV abx and awaiting for SNF placement.  Remains on TPN,plan for PEG tube placement by GI.    CC today is confusion.  Interval History: No acute overnight events. Nurse report patient still mildly confuse. Mental status appears similar to yesterday. Able to tell me his name, but unable to tell me his  or the place and time. Cannot tell me the situation.No family at bedside.    Palliative team and I able to reach sister and spouse and discuss possible need for alternative means of nutrition if patient does not progress with SLP. Family and spouse agrees to PEG if needed.     Review of Systems   Unable to perform ROS: Mental status change     Objective:     Vital Signs (Most Recent):  Temp: 97.5 °F (36.4 °C) (22)  Pulse: 88 (22)  Resp: 20 (22)  BP: 119/80 (22)  SpO2: (!) 93 % (22) Vital Signs (24h Range):  Temp:  [96.1 °F (35.6 °C)-98.4 °F  (36.9 °C)] 97.5 °F (36.4 °C)  Pulse:  [68-88] 88  Resp:  [18-78] 20  SpO2:  [93 %-100 %] 93 %  BP: (100-143)/(70-89) 119/80     Weight: 61.2 kg (134 lb 14.7 oz)  Body mass index is 19.92 kg/m².    Intake/Output Summary (Last 24 hours) at 2022 1241  Last data filed at 2022 0500  Gross per 24 hour   Intake --   Output 2000 ml   Net -2000 ml      Physical Exam  Constitutional:       General: He is not in acute distress.     Appearance: He is ill-appearing. He is not toxic-appearing.      Comments:  Appears a little more confuse today than yesterday. Oriented to self but today he was unable to tell me his , place, or time.    HENT:      Head: Atraumatic.      Nose: Nose normal.      Mouth/Throat:      Mouth: Mucous membranes are dry.   Eyes:      Extraocular Movements: Extraocular movements intact.   Cardiovascular:      Rate and Rhythm: Normal rate and regular rhythm.   Pulmonary:      Effort: No respiratory distress.      Breath sounds: No wheezing.   Abdominal:      General: Abdomen is flat. There is no distension.      Palpations: Abdomen is soft.      Tenderness: There is no abdominal tenderness. There is no guarding.   Genitourinary:     Comments: Dressing on scrotal area.  Sahu - clear yellow urine  Musculoskeletal:         General: No swelling.      Right lower leg: No edema.      Left lower leg: No edema.   Skin:     Coloration: Skin is not jaundiced.      Findings: No bruising.   Neurological:      Mental Status: He is alert.      Comments: Alert and oriented to self. Not oriented to time, and place. Not to situation.          Significant Labs: All pertinent labs within the past 24 hours have been reviewed.    Significant Imaging: I have reviewed all pertinent imaging results/findings within the past 24 hours.    CC today is confusion.  Assessment/Plan:      * Scrotal abscess  -Multiple abscesses c/w Berna's gangrene   -Cultures growing ampicillin-resistant Ecoli and C albicans. Repeat CT A/P  1/13 w/ 4cm fluid collection c/f persistent abscess which was drained bedside by urology  -Urology consulted: s/p cystoscopy with santiago placement and debridement of abscess/necrosis of scrotum 1/5/22 with Dr. Rangel.Fluid collection drained at bedside 1/14/2022  -CT from 1/18/22 w/ R corporal body fluid collection concerning for residual abscess vs fluid collection  - cont zosyn and diflucan (re-started for HCAP)  - ID consulted, appreciate recs: continue abx until resolution of abscess  -Consulted IR for drainage of residual fluid collection on CT pelvic. IR procedure is not done,due to high risk for complications  -continue with IV Abx and repeat CT,spoke with Urology,US show improvement in Abscess size,no need for additional intervention at this time  -Monitor closely       Advance care planning  I spent greater than 16 minutes of discussion regarding advanced directive and end of life planning with patient's spouse and sister.  Wife confirms patient is full code  Wife and sister agrees to PEG if continues to fail SLP evaluation due to mentation        Malnourished  Pt ill-appearing, thin and frail  Albumin 1.3  Currently on TPN  SLP consulted- continue to evaluate if safe to swallow and able to meet nutritional needs  Discussed with family and spouse in regards to possible PEG  Family (sister) and wife agreeable on 01/31/2022  Will continue to follow with SLP, if no progression, will need PEG  GI consulted     Alkaline phosphatase elevation  Chronic isolated alk phos (GGT elevated), worsened this admission. Possibly due to AEDs vs occult HPB process.   -Alk phos trending down   - further workup depending on clinical course,improving.      Pericardial effusion  Echo with Small pericardial effusion of unclear etiology  EF of 50%  Monitor       HCAP (healthcare-associated pneumonia)  - see respiratory failure   - Continue IV abx: Zosyn and fluconazole     Acute respiratory failure with hypoxia and hypercarbia  On  "admit, Cough + new LLL opacity on imaging c/f pneumonia, however CT imaging showing pleural effusion and atelectasis  - respiratory cultures w/ pseudomonas  - cont zosyn  - furosemide as tolerated; no significant evidence of cardiac dysfunction on TTE  - IS  - Wean O2 as tolerated        Hypokalemia  Replete PRN    JOI (acute kidney injury)  Resolved      Sepsis due to Gram negative bacteria  Resolved      Berna's gangrene   -Urology consulted:  s/p debridement 1/5/22  -Will need plastic surgery evaluation in future once wound stable for possible grafting. Can be done as outpatient.     Diabetes mellitus type 2, controlled  A1c:   Lab Results   Component Value Date    HGBA1C 9.3 (H) 01/05/2022     Meds:  SSI PRN to maintain goal 140-180  ADA diet, accuchecks ACHS, hypoglycemic protocol        Tobacco user  Dr. Calero: 5 minutes spent counseling the patient on smoking cessation and he is not currently ready to stop smoking  He will be offereded a nicotine transdermal patch while hospitalized and monitored for withdrawal.  Will provide additional smoking cessation counseling prior to discharge.     Acute encephalopathy  Repeated episodes of AMS   - neurology consulted  - AEDs per neurology   - EEG with no seizure.   -SLP consulted: recommends NPO, continue TPN. F/u progress recs  -Palliative care consulted: may need PEG if nutritional needs are not met  -Mental status gradually improving   -Per Dr. Calero, wife want patient remains full code  -B 12,folic acid is normal,HIV is negative,RPR is negative.    Essential hypertension  Well controlled, continue home medications and monitor blood pressure, adjust as needed.   On prn IV hydralazine at this time.    Hyperlipidemia  Continue statin    Cardiomegaly  Pulmonary edema seen on imaging, small pericardial effusion seen on TTE. LVEF low-normal (50%)  - hold diuresis while NPO    Cocaine dependence in remission  Counseled  Wife stated patient last use was "years " "ago"    Chronic ischemic heart disease  No acute issue    Adjustment disorder with depressed mood  Continue home citalopram, hold home seroquel due to somnolence      VTE Risk Mitigation (From admission, onward)         Ordered     enoxaparin injection 40 mg  Daily         01/13/22 1612     IP VTE LOW RISK PATIENT  Once         01/04/22 2241     Place sequential compression device  Until discontinued         01/04/22 2241                Discharge Planning   CHUCK:      Code Status: Full Code   Is the patient medically ready for discharge?:     Reason for patient still in hospital (select all that apply): Patient trending condition  Discharge Plan A: Skilled Nursing Facility   Discharge Delays: (!) Post-Acute Set-up              Quinten Rosario MD  Department of Hospital Medicine   [facility]"

## 2022-02-01 NOTE — NURSING
Ashu cath is placed by urology, Charge nurse Sendy and Infectious disease notified of the U/A order.. New urinary bag replaced and urine collected from the proximal port per ID instruction, then sent to Lab.

## 2022-02-02 LAB
BASOPHILS # BLD AUTO: 0.13 K/UL (ref 0–0.2)
BASOPHILS NFR BLD: 0.9 % (ref 0–1.9)
DIFFERENTIAL METHOD: ABNORMAL
EOSINOPHIL # BLD AUTO: 0.1 K/UL (ref 0–0.5)
EOSINOPHIL NFR BLD: 0.4 % (ref 0–8)
ERYTHROCYTE [DISTWIDTH] IN BLOOD BY AUTOMATED COUNT: 14.7 % (ref 11.5–14.5)
HCT VFR BLD AUTO: 26.3 % (ref 40–54)
HGB BLD-MCNC: 8.6 G/DL (ref 14–18)
IMM GRANULOCYTES # BLD AUTO: 0.21 K/UL (ref 0–0.04)
IMM GRANULOCYTES NFR BLD AUTO: 1.4 % (ref 0–0.5)
LYMPHOCYTES # BLD AUTO: 2.4 K/UL (ref 1–4.8)
LYMPHOCYTES NFR BLD: 16.2 % (ref 18–48)
MCH RBC QN AUTO: 28.4 PG (ref 27–31)
MCHC RBC AUTO-ENTMCNC: 32.7 G/DL (ref 32–36)
MCV RBC AUTO: 87 FL (ref 82–98)
MONOCYTES # BLD AUTO: 3.1 K/UL (ref 0.3–1)
MONOCYTES NFR BLD: 21.1 % (ref 4–15)
NEUTROPHILS # BLD AUTO: 8.7 K/UL (ref 1.8–7.7)
NEUTROPHILS NFR BLD: 60 % (ref 38–73)
NRBC BLD-RTO: 0 /100 WBC
PLATELET # BLD AUTO: 233 K/UL (ref 150–450)
PMV BLD AUTO: 11.3 FL (ref 9.2–12.9)
POCT GLUCOSE: 125 MG/DL (ref 70–110)
POCT GLUCOSE: 158 MG/DL (ref 70–110)
POCT GLUCOSE: 161 MG/DL (ref 70–110)
POCT GLUCOSE: 178 MG/DL (ref 70–110)
RBC # BLD AUTO: 3.03 M/UL (ref 4.6–6.2)
WBC # BLD AUTO: 14.56 K/UL (ref 3.9–12.7)

## 2022-02-02 PROCEDURE — 63600175 PHARM REV CODE 636 W HCPCS: Performed by: STUDENT IN AN ORGANIZED HEALTH CARE EDUCATION/TRAINING PROGRAM

## 2022-02-02 PROCEDURE — 11000001 HC ACUTE MED/SURG PRIVATE ROOM

## 2022-02-02 PROCEDURE — 63600175 PHARM REV CODE 636 W HCPCS: Performed by: HOSPITALIST

## 2022-02-02 PROCEDURE — 99233 PR SUBSEQUENT HOSPITAL CARE,LEVL III: ICD-10-PCS | Mod: ,,, | Performed by: STUDENT IN AN ORGANIZED HEALTH CARE EDUCATION/TRAINING PROGRAM

## 2022-02-02 PROCEDURE — 25000003 PHARM REV CODE 250: Performed by: STUDENT IN AN ORGANIZED HEALTH CARE EDUCATION/TRAINING PROGRAM

## 2022-02-02 PROCEDURE — 85025 COMPLETE CBC W/AUTO DIFF WBC: CPT | Performed by: INTERNAL MEDICINE

## 2022-02-02 PROCEDURE — A4216 STERILE WATER/SALINE, 10 ML: HCPCS | Performed by: STUDENT IN AN ORGANIZED HEALTH CARE EDUCATION/TRAINING PROGRAM

## 2022-02-02 PROCEDURE — 97535 SELF CARE MNGMENT TRAINING: CPT

## 2022-02-02 PROCEDURE — B4185 PARENTERAL SOL 10 GM LIPIDS: HCPCS | Performed by: HOSPITALIST

## 2022-02-02 PROCEDURE — 97110 THERAPEUTIC EXERCISES: CPT

## 2022-02-02 PROCEDURE — 97530 THERAPEUTIC ACTIVITIES: CPT

## 2022-02-02 PROCEDURE — 99233 SBSQ HOSP IP/OBS HIGH 50: CPT | Mod: ,,, | Performed by: STUDENT IN AN ORGANIZED HEALTH CARE EDUCATION/TRAINING PROGRAM

## 2022-02-02 PROCEDURE — C1751 CATH, INF, PER/CENT/MIDLINE: HCPCS

## 2022-02-02 PROCEDURE — 92526 ORAL FUNCTION THERAPY: CPT

## 2022-02-02 PROCEDURE — 25000003 PHARM REV CODE 250: Performed by: HOSPITALIST

## 2022-02-02 PROCEDURE — 36415 COLL VENOUS BLD VENIPUNCTURE: CPT | Performed by: INTERNAL MEDICINE

## 2022-02-02 PROCEDURE — C9254 INJECTION, LACOSAMIDE: HCPCS | Performed by: STUDENT IN AN ORGANIZED HEALTH CARE EDUCATION/TRAINING PROGRAM

## 2022-02-02 RX ADMIN — SODIUM CHLORIDE 200 MG: 9 INJECTION, SOLUTION INTRAVENOUS at 12:02

## 2022-02-02 RX ADMIN — PIPERACILLIN AND TAZOBACTAM 4.5 G: 4; .5 INJECTION, POWDER, LYOPHILIZED, FOR SOLUTION INTRAVENOUS; PARENTERAL at 12:02

## 2022-02-02 RX ADMIN — ENOXAPARIN SODIUM 40 MG: 40 INJECTION SUBCUTANEOUS at 04:02

## 2022-02-02 RX ADMIN — PIPERACILLIN AND TAZOBACTAM 4.5 G: 4; .5 INJECTION, POWDER, LYOPHILIZED, FOR SOLUTION INTRAVENOUS; PARENTERAL at 07:02

## 2022-02-02 RX ADMIN — DEXTROSE 500 MG: 50 INJECTION, SOLUTION INTRAVENOUS at 10:02

## 2022-02-02 RX ADMIN — PIPERACILLIN AND TAZOBACTAM 4.5 G: 4; .5 INJECTION, POWDER, LYOPHILIZED, FOR SOLUTION INTRAVENOUS; PARENTERAL at 04:02

## 2022-02-02 RX ADMIN — RETINOL, ERGOCALCIFEROL, .ALPHA.-TOCOPHEROL ACETATE, DL-, PHYTONADIONE, ASCORBIC ACID, NIACINAMIDE, RIBOFLAVIN 5-PHOSPHATE SODIUM, THIAMINE HYDROCHLORIDE, PYRIDOXINE HYDROCHLORIDE, DEXPANTHENOL, BIOTIN, FOLIC ACID, AND CYANOCOBALAMIN: KIT at 10:02

## 2022-02-02 RX ADMIN — I.V. FAT EMULSION 250 ML: 20 EMULSION INTRAVENOUS at 10:02

## 2022-02-02 RX ADMIN — SODIUM CHLORIDE 200 MG: 9 INJECTION, SOLUTION INTRAVENOUS at 11:02

## 2022-02-02 RX ADMIN — FLUCONAZOLE 400 MG: 2 INJECTION, SOLUTION INTRAVENOUS at 10:02

## 2022-02-02 RX ADMIN — Medication 10 ML: at 04:02

## 2022-02-02 RX ADMIN — Medication 10 ML: at 11:02

## 2022-02-02 RX ADMIN — MORPHINE SULFATE 1 MG: 4 INJECTION, SOLUTION INTRAMUSCULAR; INTRAVENOUS at 07:02

## 2022-02-02 RX ADMIN — Medication 10 ML: at 05:02

## 2022-02-02 RX ADMIN — Medication 10 ML: at 12:02

## 2022-02-02 NOTE — SUBJECTIVE & OBJECTIVE
Interval History: alert, oriented to person. Denies penoscrotal pain.       Review of Systems   Unable to perform ROS: Dementia     Objective:     Temp:  [97.5 °F (36.4 °C)-100.5 °F (38.1 °C)] 97.6 °F (36.4 °C)  Pulse:  [71-88] 82  Resp:  [18-26] 19  SpO2:  [93 %-100 %] 98 %  BP: (102-121)/(58-80) 112/71     Body mass index is 19.92 kg/m².    Date 02/02/22 0700 - 02/03/22 0659   Shift 1751-0086 9174-6010 5062-5620 24 Hour Total   INTAKE   Shift Total(mL/kg)       OUTPUT   Urine(mL/kg/hr) 750   750   Shift Total(mL/kg) 750(12.3)   750(12.3)   Weight (kg) 61.2 61.2 61.2 61.2          Drains     Drain                 Urethral Catheter 01/05/22 1050 Double-lumen 20 Fr. 27 days                Physical Exam  Vitals reviewed.   Constitutional:       General: He is not in acute distress.     Appearance: He is well-developed. He is not ill-appearing, toxic-appearing or diaphoretic.   HENT:      Head: Normocephalic and atraumatic.      Mouth/Throat:      Mouth: Mucous membranes are moist.   Eyes:      Conjunctiva/sclera: Conjunctivae normal.   Pulmonary:      Effort: Pulmonary effort is normal. No respiratory distress.   Abdominal:      General: Abdomen is flat. There is no distension.      Palpations: Abdomen is soft. There is no mass.      Tenderness: There is no abdominal tenderness. There is no guarding.   Genitourinary:     Comments: Wound with healthy granulation tissue  No penoscrotal swelling  Sahu draining well  Musculoskeletal:         General: No swelling or deformity.      Cervical back: Neck supple.   Skin:     General: Skin is warm.      Findings: No rash.   Neurological:      Mental Status: He is alert.      Gait: Gait normal.         Significant Labs:    BMP:  Recent Labs   Lab 01/28/22  0600 01/30/22  0615 02/01/22  0330   * 133* 135*   K 4.2 4.4 4.6    101 101   CO2 23 23 22*   BUN 15 16 19   CREATININE 1.2 1.0 1.0   CALCIUM 8.5* 8.6* 9.2       CBC:   Recent Labs   Lab 01/28/22  0600  01/30/22  0615 02/01/22  0330   WBC 12.12 11.46 14.24*   HGB 9.8* 9.2* 8.7*   HCT 29.8* 27.8* 26.6*   * 147* 221

## 2022-02-02 NOTE — PROGRESS NOTES
[facility] Medicine  Progress Note    Patient Name: Abhishek Zhao  MRN: [id number]  Patient Class: IP- Inpatient   Admission Date: 1/4/2022  Length of Stay: 28 days  Attending Physician: Quinten Rosario MD  Primary Care Provider: To Obtain Unable        Subjective:     Principal Problem:Scrotal abscess        HPI:  61 y.o. male with adjustment disorder with depressed mood, chronic ischemic heart disease, cocaine dependence in remission, cardiomegaly, HLD, HTN, swizure disorder, tobacco use, and DM 2 presents with a complaint of testicular pain.  Associated with swelling and redness to the scrotum and penis along with difficulty urinating, pain is rated as severe and radiates to the rectum.  Denies fever, chills, cough, SOB, chest pain, n/v/d.  In the ED, he was found to be tachypneic, with leukocytosis and elevated lactic.  CT and US shows evidence of multiple scrotal and perineal abscesses with some extension into the penile shaft.  UA with evidence of infection.  Sepsis treatment initiated, blood and urine cultures obtained, IV fluids and IV antibiotics initiated.  Urology consulted.      Overview/Hospital Course:  61 y.o. 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    Urology consulted. Patient s/p cystoscopy with santiago placement and debridement of abscess/necrosis of scrotum 1/5/22 with Dr. Rangel. Fluid collection drained at bedside 1/14/2022. CT from 1/18/22 w/ R corporal body fluid collection concerning for residual abscess vs fluid collection. Hospitalization course complicated with aspiration event 1/11 w/ subsequent desaturation. Respiratory culture grew pseudomonas. Kept on Zosyn. On 1/13, pt had abrupt reduction in responsiveness. Code stroke called,  patient transferred to ICU. No stroke found on imaging. Started on extended EEG. Mental status improved, no sign of seizure. Patient stepped back down to the floor. Mental status continues to wax and wane. Given change in mental status, SLP evaluated and recommend NPO and pt started on TPN. Palliative following. B 12,folic acid is normal,HIV is negative,RPR is negative. Abscess culture with CANDIDA GLABRATA and ecoli. Blood culture with NGTD. ID is following. PT/OT consulted- recommends SNF. Had extensive discussion with patient's wife at bedside about patient's clinical status, nutrition status, and deconditioning. Discussed that patient would likely benefit from SNF, but wife is hesitant but sister is agreeable. Also, palliative team and I discussed with patient's spouse and sister on 2022 about alternative means for nutrition in the event patient unable to progress with SLP. Wife and sister agreeable to PEG tube if patient does not progress with SLP. GI consulted. Will follow up with SLP progress. Plan to continue IV abx and awaiting for SNF placement.  Remains on TPN,plan for PEG tube placement by GI.but per GI patient has still leucocytosis,I already repeated blood and urine culture.updated wife.  CC is confusion.      Interval History: No acute overnight events. Nurse report patient still mildly confuse. Mental status appears similar to yesterday. Able to tell me his name, but unable to tell me his  or the place and time. Cannot tell me the situation.No family at bedside.    Palliative team and I able to reach sister and spouse and discuss possible need for alternative means of nutrition if patient does not progress with SLP. Family and spouse agrees to PEG if needed.   CC is confusion.  Review of Systems   Unable to perform ROS: Mental status change     Objective:     Vital Signs (Most Recent):  Temp: 97.3 °F (36.3 °C) (22 1140)  Pulse: 83 (22 1140)  Resp: 19 (22 1140)  BP: 139/80  (22 1140)  SpO2: (!) 91 % (22 1140) Vital Signs (24h Range):  Temp:  [97.3 °F (36.3 °C)-100.5 °F (38.1 °C)] 97.3 °F (36.3 °C)  Pulse:  [71-87] 83  Resp:  [18-26] 19  SpO2:  [91 %-100 %] 91 %  BP: (102-139)/(58-80) 139/80     Weight: 61.2 kg (134 lb 14.7 oz)  Body mass index is 19.92 kg/m².    Intake/Output Summary (Last 24 hours) at 2022 1154  Last data filed at 2022 0830  Gross per 24 hour   Intake 900 ml   Output 2650 ml   Net -1750 ml      Physical Exam  Constitutional:       General: He is not in acute distress.     Appearance: He is ill-appearing. He is not toxic-appearing.      Comments:  Appears a little more confuse today than yesterday. Oriented to self but today he was unable to tell me his , place, or time.    HENT:      Head: Atraumatic.      Nose: Nose normal.      Mouth/Throat:      Mouth: Mucous membranes are dry.   Eyes:      Extraocular Movements: Extraocular movements intact.   Cardiovascular:      Rate and Rhythm: Normal rate and regular rhythm.   Pulmonary:      Effort: No respiratory distress.      Breath sounds: No wheezing.   Abdominal:      General: Abdomen is flat. There is no distension.      Palpations: Abdomen is soft.      Tenderness: There is no abdominal tenderness. There is no guarding.   Genitourinary:     Comments: Dressing on scrotal area.  Sahu - clear yellow urine  Musculoskeletal:         General: No swelling.      Right lower leg: No edema.      Left lower leg: No edema.   Skin:     Coloration: Skin is not jaundiced.      Findings: No bruising.   Neurological:      Mental Status: He is alert.      Comments: Alert and oriented to self. Not oriented to time, and place. Not to situation.          Significant Labs: All pertinent labs within the past 24 hours have been reviewed.    Significant Imaging: I have reviewed all pertinent imaging results/findings within the past 24 hours.      Assessment/Plan:      * Scrotal abscess  -Multiple abscesses c/w  Berna's gangrene   -Cultures growing ampicillin-resistant Ecoli and C albicans. Repeat CT A/P 1/13 w/ 4cm fluid collection c/f persistent abscess which was drained bedside by urology  -Urology consulted: s/p cystoscopy with santiago placement and debridement of abscess/necrosis of scrotum 1/5/22 with Dr. Rangel.Fluid collection drained at bedside 1/14/2022  -CT from 1/18/22 w/ R corporal body fluid collection concerning for residual abscess vs fluid collection  - cont zosyn and diflucan (re-started for HCAP)  - ID consulted, appreciate recs: continue abx until resolution of abscess  -Consulted IR for drainage of residual fluid collection on CT pelvic. IR procedure is not done,due to high risk for complications  -continue with IV Abx and repeat CT,spoke with Urology,US show improvement in Abscess size,no need for additional intervention at this time  -Monitor closely       Advance care planning  I spent greater than 16 minutes of discussion regarding advanced directive and end of life planning with patient's spouse and sister.  Wife confirms patient is full code  Wife and sister agrees to PEG if continues to fail SLP evaluation due to mentation        Malnourished  Pt ill-appearing, thin and frail  Albumin 1.3  Currently on TPN  SLP consulted- continue to evaluate if safe to swallow and able to meet nutritional needs  Discussed with family and spouse in regards to possible PEG  Family (sister) and wife agreeable on 01/31/2022  Will continue to follow with SLP, if no progression, will need PEG  GI consulted     Alkaline phosphatase elevation  Chronic isolated alk phos (GGT elevated), worsened this admission. Possibly due to AEDs vs occult HPB process.   -Alk phos trending down   - further workup depending on clinical course,improving.      Pericardial effusion  Echo with Small pericardial effusion of unclear etiology  EF of 50%  Monitor       HCAP (healthcare-associated pneumonia)  - see respiratory failure   -  Continue IV abx: Zosyn and fluconazole     Acute respiratory failure with hypoxia and hypercarbia  On admit, Cough + new LLL opacity on imaging c/f pneumonia, however CT imaging showing pleural effusion and atelectasis  - respiratory cultures w/ pseudomonas  - cont zosyn  - furosemide as tolerated; no significant evidence of cardiac dysfunction on TTE  - IS  - Wean O2 as tolerated        Hypokalemia  Replete PRN    JOI (acute kidney injury)  Resolved      Sepsis due to Gram negative bacteria  Resolved      Berna's gangrene   -Urology consulted:  s/p debridement 1/5/22  -Will need plastic surgery evaluation in future once wound stable for possible grafting. Can be done as outpatient.     Diabetes mellitus type 2, controlled  A1c:   Lab Results   Component Value Date    HGBA1C 9.3 (H) 01/05/2022     Meds:  SSI PRN to maintain goal 140-180  ADA diet, accuchecks ACHS, hypoglycemic protocol        Tobacco user  Dr. Calero: 5 minutes spent counseling the patient on smoking cessation and he is not currently ready to stop smoking  He will be offereded a nicotine transdermal patch while hospitalized and monitored for withdrawal.  Will provide additional smoking cessation counseling prior to discharge.     Acute encephalopathy  Repeated episodes of AMS   - neurology consulted  - AEDs per neurology   - EEG with no seizure.   -SLP consulted: recommends NPO, continue TPN. F/u progress recs  -Palliative care consulted: may need PEG if nutritional needs are not met  -Mental status gradually improving   -Per Dr. Calero, wife want patient remains full code  -B 12,folic acid is normal,HIV is negative,RPR is negative.  but per GI patient has still leucocytosis,I already repeated blood and urine culture.updated wife.    Essential hypertension  Well controlled, continue home medications and monitor blood pressure, adjust as needed.   On prn IV hydralazine at this time.    Hyperlipidemia  Continue statin    Cardiomegaly  Pulmonary edema  "seen on imaging, small pericardial effusion seen on TTE. LVEF low-normal (50%)  - hold diuresis while NPO    Cocaine dependence in remission  Counseled  Wife stated patient last use was "years ago"    Chronic ischemic heart disease  No acute issue    Adjustment disorder with depressed mood  Continue home citalopram, hold home seroquel due to somnolence      VTE Risk Mitigation (From admission, onward)         Ordered     enoxaparin injection 40 mg  Daily         01/13/22 1612     IP VTE LOW RISK PATIENT  Once         01/04/22 2241     Place sequential compression device  Until discontinued         01/04/22 2241                Discharge Planning   CHUCK:      Code Status: Full Code   Is the patient medically ready for discharge?:     Reason for patient still in hospital (select all that apply): Patient trending condition  Discharge Plan A: Skilled Nursing Facility   Discharge Delays: (!) Post-Acute Set-up              Quinten Rosario MD  Department of Hospital Medicine   [facility] - Med Surg [facility]"

## 2022-02-02 NOTE — PT/OT/SLP PROGRESS
Physical Therapy Treatment/Discharge summary    Patient Name:  Abhishek Zhao   MRN:  [id number]    Recommendations:     Discharge Recommendations:   (24hr care)   Discharge Equipment Recommendations: lift device,hospital bed   Barriers to discharge: 24hr care required    Assessment:     Abhishek Zhao is a 61 y.o. male admitted with a medical diagnosis of Scrotal abscess.  He presents with the following impairments/functional limitations:  weakness,impaired balance,decreased safety awareness,impaired skin,impaired endurance,pain,visual deficits,impaired cognition,impaired cardiopulmonary response to activity,impaired self care skills,decreased coordination,decreased ROM,impaired functional mobilty,decreased upper extremity function,impaired coordination,decreased lower extremity function Pt is unable to progress toward PT goals.    Rehab Prognosis: Poor    Recent Surgery: Procedure(s) (LRB):  CYSTOSCOPY, RETROGRADE URETHROGRAM, FISTULAGRAM, EXCISION OF NECROTIC SCROTAL TISSUE ABSCESS, BARKER PLACEMENT (N/A)  EXCISION, ABSCESS  CYSTOSCOPY  FISTULOGRAM (N/A) 28 Days Post-Op    Plan:     During this hospitalization, patient to be seen  (N/A, PT to sign off.) to address the identified rehab impairments via  (PT to sign off.  Turning, positioning and PROM via Nursing staff recommended) and progress toward the following goals:    · Plan of Care Expires:  02/02/22    Subjective     Chief Complaint: Moaning with PROM and bed mobility, non-verbal,   Patient/Family Comments/goals: Unable to state  Pain/Comfort:  · Pain Rating 1:  (Moaning in pain with bed mobility and PROM BLE's)      Objective:     Communicated with nsg prior to session.  Patient found supine with bed alarm,telemetry,oxygen,PICC line,peripheral IV (safety sitter) upon PT entry to room.     General Precautions: Standard, fall,NPO,aspiration   Orthopedic Precautions:N/A   Braces: N/A  Respiratory Status: Nasal cannula, flow 3 L/min     Functional Mobility:  · Bed  Mobility:     · Rolling Left:  dependence  · Scooting: dependence to HOB in supine  · Supine to Sit: dependence with Hospital bed      AM-PAC 6 CLICK MOBILITY  Turning over in bed (including adjusting bedclothes, sheets and blankets)?: 2  Sitting down on and standing up from a chair with arms (e.g., wheelchair, bedside commode, etc.): 1  Moving from lying on back to sitting on the side of the bed?: 2  Moving to and from a bed to a chair (including a wheelchair)?: 1  Need to walk in hospital room?: 1  Climbing 3-5 steps with a railing?: 1  Basic Mobility Total Score: 8       Therapeutic Activities and Exercises:   Pt repositioned with HOB elevated and wedge behind R back with pillow between knees and pressure relief boots donned.  Pt received B LE PROM in available planes x 10 reps and HCS x 30 sec    Patient left as above with all lines intact, call button in reach, bed alarm on and nsg notified..    GOALS:   Multidisciplinary Problems     Physical Therapy Goals        Problem: Physical Therapy Goal    Goal Priority Disciplines Outcome Goal Variances Interventions   Physical Therapy Goal     PT, PT/OT Unable to Meet, Plan Revised     Description: Goals to be met by: 22     Patient will increase functional independence with mobility by performin. Supine to sit with Stand-by Assistance  2. Rolling to Left and Right with Stand-by Assistance.  3. Sit to stand transfer to be assessed   4. Gait to be assessed    5. Sitting at edge of bed x15 minutes with Stand-by Assistance  6. Lower extremity exercise program x10 reps per handout, with assistance as needed                     Time Tracking:     PT Received On: 22  PT Start Time: 1413     PT Stop Time: 1421  PT Total Time (min): 8 min     Billable Minutes: Therapeutic Exercise 8    Treatment Type: Treatment  PT/PTA: PT     PTA Visit Number: 0     2022

## 2022-02-02 NOTE — PLAN OF CARE
Problem: Adult Inpatient Plan of Care  Goal: Plan of Care Review  Outcome: Ongoing, Progressing  Flowsheets (Taken 2/2/2022 1549)  Plan of Care Reviewed With: spouse  Goal: Patient-Specific Goal (Individualized)  Outcome: Ongoing, Progressing  Goal: Absence of Hospital-Acquired Illness or Injury  Outcome: Ongoing, Progressing  Intervention: Identify and Manage Fall Risk  Flowsheets (Taken 2/2/2022 1549)  Safety Promotion/Fall Prevention:   bed alarm set   room near unit station   nonskid shoes/socks when out of bed   /camera at bedside  Intervention: Prevent Skin Injury  Flowsheets (Taken 2/2/2022 1549)  Body Position: weight shifting  Skin Protection:   incontinence pads utilized   skin-to-device areas padded  Intervention: Prevent and Manage VTE (Venous Thromboembolism) Risk  Flowsheets (Taken 2/2/2022 1549)  Activity Management: Rolling - L1  VTE Prevention/Management: intravenous hydration  Intervention: Prevent Infection  Flowsheets (Taken 2/2/2022 1549)  Infection Prevention: single patient room provided  Goal: Optimal Comfort and Wellbeing  Outcome: Ongoing, Progressing  Intervention: Monitor Pain and Promote Comfort  Flowsheets (Taken 2/2/2022 1549)  Pain Management Interventions:   quiet environment facilitated   pillow support provided  Intervention: Provide Person-Centered Care  Flowsheets (Taken 2/2/2022 1549)  Trust Relationship/Rapport: choices provided  Goal: Readiness for Transition of Care  Outcome: Ongoing, Progressing     Problem: Diabetes Comorbidity  Goal: Blood Glucose Level Within Targeted Range  Outcome: Ongoing, Progressing  Intervention: Monitor and Manage Glycemia  Flowsheets (Taken 2/2/2022 1549)  Glycemic Management: blood glucose monitored     Problem: Infection  Goal: Absence of Infection Signs and Symptoms  Outcome: Ongoing, Progressing  Intervention: Prevent or Manage Infection  Flowsheets (Taken 2/2/2022 1549)  Fever Reduction/Comfort Measures: lightweight  clothing  Infection Management: aseptic technique maintained     Problem: Infection (Pneumonia)  Goal: Resolution of Infection Signs and Symptoms  Outcome: Ongoing, Progressing  Intervention: Prevent Infection Progression  Flowsheets (Taken 2/2/2022 6456)  Fever Reduction/Comfort Measures: lightweight clothing  Infection Management: aseptic technique maintained     Problem: Impaired Wound Healing  Goal: Optimal Wound Healing  Outcome: Ongoing, Progressing  Intervention: Promote Wound Healing  Flowsheets (Taken 2/2/2022 1545)  Oral Nutrition Promotion: rest periods promoted  Sleep/Rest Enhancement: family presence promoted  Activity Management: Rolling - L1  Pain Management Interventions:   quiet environment facilitated   pillow support provided

## 2022-02-02 NOTE — PLAN OF CARE
Plan of care  continued with  no new concerns noted or voiced. Pt is only oriented to self  . Rested intermittently in bed with eyes closed with  even rise and fall of chest noted. Afebrile.Remains on  oxygen therapy 3LPM/NC with sat readings  within ordered parameters..TPN infusing as ordered. Scheduled antibiotics given per order with no adverse reactions noted. Sahu catheter to gravity;patency maintained and UOP recorded. Pt requiring full assistance with ADL's; Incontinence pads checked/ changed and pt repositioned q2h. Sacral dressing  remains in place and pressure points protected.  Repositioned  q2h; wedge and pillows in use. Safety measures  in place all shift ;Bed in lowest position  with wheels locked and alarm on. Will continue to monitor and follow treatment plan.   Problem: Diabetes Comorbidity  Goal: Blood Glucose Level Within Targeted Range  Outcome: Ongoing, Progressing  Intervention: Monitor and Manage Glycemia  Flowsheets (Taken 2/2/2022 0501)  Glycemic Management: blood glucose monitored     Problem: Infection  Goal: Absence of Infection Signs and Symptoms  Outcome: Ongoing, Progressing  Intervention: Prevent or Manage Infection  Flowsheets (Taken 2/2/2022 0501)  Fever Reduction/Comfort Measures:   lightweight bedding   lightweight clothing  Infection Management: aseptic technique maintained  Isolation Precautions: precautions maintained     Problem: Fall Injury Risk  Goal: Absence of Fall and Fall-Related Injury  Outcome: Ongoing, Progressing

## 2022-02-02 NOTE — PLAN OF CARE
Problem: Physical Therapy Goal  Goal: Physical Therapy Goal  Description: Goals to be met by: 22     Patient will increase functional independence with mobility by performin. Supine to sit with Stand-by Assistance  2. Rolling to Left and Right with Stand-by Assistance.  3. Sit to stand transfer to be assessed   4. Gait to be assessed    5. Sitting at edge of bed x15 minutes with Stand-by Assistance  6. Lower extremity exercise program x10 reps per handout, with assistance as needed    Outcome: Unable to Meet, Plan Revised   Pt unresponsive and unable to follow commands.  Moaning in pain with PROM and bed mobility.  Pt is unable to progress toward PT goals and will be D/C'd from skilled PT services.  24hr care, hospital bed and heather lift recommended.  Pt to sign off.

## 2022-02-02 NOTE — ASSESSMENT & PLAN NOTE
Repeated episodes of AMS   - neurology consulted  - AEDs per neurology   - EEG with no seizure.   -SLP consulted: recommends NPO, continue TPN. F/u progress recs  -Palliative care consulted: may need PEG if nutritional needs are not met  -Mental status gradually improving   -Per Dr. Calero, wife want patient remains full code  -B 12,folic acid is normal,HIV is negative,RPR is negative.  but per GI patient has still leucocytosis,I already repeated blood and urine culture.updated wife.

## 2022-02-02 NOTE — TREATMENT PLAN
[facility] Note    Consulted for PEG tube placement.    Patient with low grade fever 100.5 on 2/1 and leukocytosis.   Will hold off on PEG tube placement until active infection has been ruled out. Consider re-imaging pelvis.    Michael Fontana MD

## 2022-02-02 NOTE — PLAN OF CARE
Problem: Occupational Therapy Goal  Goal: Occupational Therapy Goal  Description: Goals not met: 02/02/2022    Patient will increase functional independence with ADLs by performing:    Feeding with Modified [place].  UE Dressing with Supervision.  LE Dressing with Minimal Assistance.  Grooming while seated with Stand-by Assistance.  Sitting at edge of bed x15 minutes with Supervision.  Sit to stand with Minimal Assistance and use of AD.    Supine to sit with Contact Guard Assistance.  Upper extremity exercise program x10 reps per handout, with supervision.  Step transfer with Moderate Assistance  Stand pivot transfer with Moderate Assistance   Toilet transfer to bedside commode with Moderate Assistance     Outcome: Unable to Meet, Plan Revised    Pt remains total assist x2 with bed mobility. Pt is lethargic, disoriented, and unable to participate with any task. Pt is no longer appropriate for further acute OT services. OT rec 24 hour care with hospital bed, w/c, and heather lift at d/c. Pt is a level 1 progressive mobility with nursing staff. Thank you.

## 2022-02-02 NOTE — SUBJECTIVE & OBJECTIVE
Interval History: No acute overnight events. Nurse report patient still mildly confuse. Mental status appears similar to yesterday. Able to tell me his name, but unable to tell me his  or the place and time. Cannot tell me the situation.No family at bedside.    Palliative team and I able to reach sister and spouse and discuss possible need for alternative means of nutrition if patient does not progress with SLP. Family and spouse agrees to PEG if needed.   CC is confusion.  Review of Systems   Unable to perform ROS: Mental status change     Objective:     Vital Signs (Most Recent):  Temp: 97.3 °F (36.3 °C) (22 1140)  Pulse: 83 (22 1140)  Resp: 19 (22 1140)  BP: 139/80 (22 1140)  SpO2: (!) 91 % (22 1140) Vital Signs (24h Range):  Temp:  [97.3 °F (36.3 °C)-100.5 °F (38.1 °C)] 97.3 °F (36.3 °C)  Pulse:  [71-87] 83  Resp:  [18-26] 19  SpO2:  [91 %-100 %] 91 %  BP: (102-139)/(58-80) 139/80     Weight: 61.2 kg (134 lb 14.7 oz)  Body mass index is 19.92 kg/m².    Intake/Output Summary (Last 24 hours) at 2022 1154  Last data filed at 2022 0830  Gross per 24 hour   Intake 900 ml   Output 2650 ml   Net -1750 ml      Physical Exam  Constitutional:       General: He is not in acute distress.     Appearance: He is ill-appearing. He is not toxic-appearing.      Comments:  Appears a little more confuse today than yesterday. Oriented to self but today he was unable to tell me his , place, or time.    HENT:      Head: Atraumatic.      Nose: Nose normal.      Mouth/Throat:      Mouth: Mucous membranes are dry.   Eyes:      Extraocular Movements: Extraocular movements intact.   Cardiovascular:      Rate and Rhythm: Normal rate and regular rhythm.   Pulmonary:      Effort: No respiratory distress.      Breath sounds: No wheezing.   Abdominal:      General: Abdomen is flat. There is no distension.      Palpations: Abdomen is soft.      Tenderness: There is no abdominal tenderness. There is no  guarding.   Genitourinary:     Comments: Dressing on scrotal area.  Sahu - clear yellow urine  Musculoskeletal:         General: No swelling.      Right lower leg: No edema.      Left lower leg: No edema.   Skin:     Coloration: Skin is not jaundiced.      Findings: No bruising.   Neurological:      Mental Status: He is alert.      Comments: Alert and oriented to self. Not oriented to time, and place. Not to situation.          Significant Labs: All pertinent labs within the past 24 hours have been reviewed.    Significant Imaging: I have reviewed all pertinent imaging results/findings within the past 24 hours.
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
## 2022-02-02 NOTE — PT/OT/SLP PROGRESS
Occupational Therapy   Treatment and Discharge    Name: Abhishek Zhao  MRN: [id number]  Admitting Diagnosis:  Scrotal abscess  28 Days Post-Op    Recommendations:     Discharge Recommendations:  (24 hour care at d/c)  Discharge Equipment Recommendations:  hospital bed,wheelchair (heather lift)  Barriers to discharge:   (pt will require 24 hour care at d/c)    Assessment:     Abhishek Zhao is a 61 y.o. male with a medical diagnosis of Scrotal abscess. Performance deficits affecting function are weakness,decreased ROM,impaired cognition,impaired endurance,decreased coordination,decreased upper extremity function,impaired self care skills,impaired fine motor,impaired skin,decreased lower extremity function,impaired functional mobilty,gait instability,decreased safety awareness,pain,impaired cardiopulmonary response to activity.     Pt remains total assist x2 with bed mobility. Pt is lethargic, disoriented, and unable to participate with any task.     Rehab Prognosis:  Poor; pt is no longer appropriate for OT services.       Plan:     Pt is no longer appropriate for further acute OT services. OT rec 24 hour care with hospital bed, w/c, and heather lift at d/c. Pt is a level 1 progressive mobility with nursing staff.    · Plan of Care Expires: 02/02/22  · Plan of Care Reviewed with: patient (no family present)    Subjective     Chief complaint: pt unable to report any needs   Patient/family comments/ goals: pt unable to verbalize anything to therapy; no family present     Pain/Comfort:  · Pain Rating 1:  (moaned in pain with total assist x2 to reposition pt)  · Pain Addressed 1: Reposition,Distraction,Cessation of Activity    Objective:     Patient found HOB elevated R sidelying  with bed alarm,telemetry,oxygen,PICC line,peripheral IV,santiago catheter (safety sitter) upon OT entry to room.    General Precautions: Standard, NPO,fall,aspiration   Orthopedic Precautions:N/A   Braces: N/A  Respiratory Status: Nasal cannula, flow 3  L/min     Occupational Performance:     Bed Mobility:    · Patient completed Rolling/Turning to Left with  dependent  · Patient completed Scooting to HOB with dependent and 2 persons     Activities of Daily Living:  · Total assist for yankauer suction at bed level       [facility] 6 Click ADL: 6    Treatment & Education:  · No family present for any education.   · Pt repositioned and scooted to the HOB and turned for pressure relief  · Pt unable to visually attend when called by name   · Moaned with attempts for gentle BUE PROM so OT stopped     Patient left HOB elevated L sidelying with B/L pressure relief boots in placewith all lines intact, call button in reach, bed alarm on, safety sitter  present and safety measures in placeEducation:      GOALS:   Multidisciplinary Problems     Occupational Therapy Goals        Problem: Occupational Therapy Goal    Goal Priority Disciplines Outcome Interventions   Occupational Therapy Goal     OT, PT/OT Unable to Meet, Plan Revised    Description: Goals to be met by: 02/10/22    Patient will increase functional independence with ADLs by performing:    Feeding with Modified [place].  UE Dressing with Supervision.  LE Dressing with Minimal Assistance.  Grooming while seated with Stand-by Assistance.  Sitting at edge of bed x15 minutes with Supervision.  Sit to stand with Minimal Assistance and use of AD.    Supine to sit with Contact Guard Assistance.  Upper extremity exercise program x10 reps per handout, with supervision.  Step transfer with Moderate Assistance  Stand pivot transfer with Moderate Assistance   Toilet transfer to bedside commode with Moderate Assistance                      Time Tracking:     OT Date of Treatment: 02/02/22  OT Start Time: 1413  OT Stop Time: 1421  OT Total Time (min): 8 min    Billable Minutes:Therapeutic Activity 8 min   Total Time 8 min (PT present)    OT/ALOK: OT     ALOK Visit Number: 1    2/2/2022

## 2022-02-02 NOTE — ASSESSMENT & PLAN NOTE
s/p cystoscopy with santiago placement and debridement of abscess/necrosis of scrotum 1/5/22 with Dr. Rangel.  Fluid collection drained at bedside 1/14/2022  CT from 1/18/22 w/ R corporal body fluid collection concerning for residual abscess vs fluid collection  Additional purulence expressed from penis on 1/20/21    Continue abx per primary  Cultures growing Candida and E coli  Appreciate wound care RN and floor RN dressing changes  Leukocytosis monitor    Afebrile  Will continue to monitor condition    Santiago care, due for changing on 2/5/2022. Will consider bedside cystoscopy if unable to be placed without direct visualization given urethral stricture history and previous cystoscopic assistance.    Will need plastic surgery evaluation in future once wound stable for possible grafting. Can be done as outpatient.     Agree with SNF placement

## 2022-02-02 NOTE — NURSING
Both lumens for R PICC flushes without difficulty but No blood return, Lab notified to send a phlebotomist to stick, changed to Lab collect.

## 2022-02-02 NOTE — PLAN OF CARE
Problem: SLP Goal  Goal: SLP Goal  Description: Goals:  1. Pt will tolerate puree diet/thin liquids w/o overt s/s of aspiration (discontinued 1/25/22).  2. Pt will tolerate mech soft diet (IDDSI-5) with thin liquids w/o overt s/s of aspiration. (on hold 1/16/22, pending pt progress)  Outcome: Ongoing, Not Progressing     Ongoing confusion with fluctuating DENIS. ST recs continue alternate means nutrition/hydration at this time. ST will continue to follow.

## 2022-02-02 NOTE — PT/OT/SLP PROGRESS
Speech Language Pathology Treatment    Patient Name:  Abhishek Zhao   MRN:  [id number]  Admitting Diagnosis: Scrotal abscess    Recommendations:                 General Recommendations:  Dysphagia therapy  Diet recommendations:  NPO, Liquid Diet Level: NPO   Aspiration Precautions: Frequent oral care, Ice chips sparingly   General Precautions: Standard, aspiration,NPO  Communication strategies:  none    Subjective     · Responded to verbal stimuli, became awake/alert.   · Pt oriented to self. Did not recognize spouse present at b/s. Rambling with garbled speech.   · Emaciated physical appearance.     Pain/Comfort:  · Pain Rating 1:  (Unable to state)    Respiratory Status: Nasal cannula, flow 3 L/min    Objective:     Has the patient been evaluated by SLP for swallowing?   Yes  Keep patient NPO? No   Current Respiratory Status:    Nasal cannula, flow 3 L/min    Moistened oral toothette applied x3, wet vocal quality following small ice chip x1 . Ice chip trials discontinued as pt was unable to clear with cued cough. When attempted pt's cough response was weak; he was unable to expectorate and demo difficulty initiating cued consecutive swallow. Yankauer suction was applied to clear excess oral secretions.     Education provided regarding frequent oral care and weak/absent cough for effective airway clearance/protection.     Assessment:   Abhishek Zhao is a 61 y.o. male with a dx of Scrotal abscess. Pt presents with severe oropharyngeal dysphagia negatively impacted by waxing and waning DENIS. ST will con't to follow.  Rec: con't NPO and alternate means nutrition/hydration at this time.       Goals:   Multidisciplinary Problems     SLP Goals        Problem: SLP Goal    Goal Priority Disciplines Outcome   SLP Goal    Medium SLP Ongoing, Not Progressing   Description: Goals:  1. Pt will tolerate puree diet/thin liquids w/o overt s/s of aspiration (discontinued 1/25/22).  2. Pt will tolerate [facility] soft diet (IDDSI-5) with  thin liquids w/o overt s/s of aspiration. (on hold 1/16/22, pending pt progress)                   Plan:     · Patient to be seen:  3 x/week   · Plan of Care expires:  01/29/22  · Plan of Care reviewed with:  spouse,patient   · SLP Follow-Up:  Yes       Discharge recommendations:  other (see comments) (TBD)   Barriers to Discharge:  Level of Skilled Assistance Needed     Time Tracking:     SLP Treatment Date:   02/02/22  Speech Start Time:  0940  Speech Stop Time:  1004     Speech Total Time (min):  24 min    Billable Minutes: Treatment Swallowing Dysfunction 24min    02/02/2022

## 2022-02-02 NOTE — PROGRESS NOTES
[facility]  Urology  Progress Note    Patient Name: Abhishek Zhao  MRN: [id number]  Admission Date: 1/4/2022  Hospital Length of Stay: 28 days  Code Status: Full Code   Attending Provider: Quinten Rosario MD   Primary Care Physician: To Obtain Unable    Subjective:     HPI:  Scrotal Swelling  Abhishek Zhao is a 61 y.o. male who was been experiencing scrotal pain and swelling since 12/31/2021.  He denies any fever.  He has had issues voiding since the swelling began.  Hemostat having issues in the past with urinary problems.  He denies having any previous issues with scrotal infection or swelling.  He recalls that he had procedures in the past but cannot recall specifically what to place.  He does not have urologist locally but moved back from [place] about 1 year ago.    Review of care everywhere shows that he had a cystoscopy with urethral dilation of a urethral stricture in the bulbar urethra in 2019 at [facility].  And there are reports that in approximately 2015 [facility]      Interval History: alert, oriented to person. Denies penoscrotal pain.       Review of Systems   Unable to perform ROS: Dementia     Objective:     Temp:  [97.5 °F (36.4 °C)-100.5 °F (38.1 °C)] 97.6 °F (36.4 °C)  Pulse:  [71-88] 82  Resp:  [18-26] 19  SpO2:  [93 %-100 %] 98 %  BP: (102-121)/(58-80) 112/71     Body mass index is 19.92 kg/m².    Date 02/02/22 0700 - 02/03/22 0659   Shift 7703-4745 0802-0594 3121-3525 24 Hour Total   INTAKE   Shift Total(mL/kg)       OUTPUT   Urine(mL/kg/hr) 750   750   Shift Total(mL/kg) 750(12.3)   750(12.3)   Weight (kg) 61.2 61.2 61.2 61.2          Drains     Drain                 Urethral Catheter 01/05/22 1050 Double-lumen 20 Fr. 27 days                Physical Exam  Vitals reviewed.   Constitutional:       General: He is not in acute distress.     Appearance: He is well-developed. He is not ill-appearing, toxic-appearing or diaphoretic.    HENT:      Head: Normocephalic and atraumatic.      Mouth/Throat:      Mouth: Mucous membranes are moist.   Eyes:      Conjunctiva/sclera: Conjunctivae normal.   Pulmonary:      Effort: Pulmonary effort is normal. No respiratory distress.   Abdominal:      General: Abdomen is flat. There is no distension.      Palpations: Abdomen is soft. There is no mass.      Tenderness: There is no abdominal tenderness. There is no guarding.   Genitourinary:     Comments: Wound with healthy granulation tissue  No penoscrotal swelling  Santiago draining well  Musculoskeletal:         General: No swelling or deformity.      Cervical back: Neck supple.   Skin:     General: Skin is warm.      Findings: No rash.   Neurological:      Mental Status: He is alert.      Gait: Gait normal.         Significant Labs:    BMP:  Recent Labs   Lab 01/28/22  0600 01/30/22  0615 02/01/22  0330   * 133* 135*   K 4.2 4.4 4.6    101 101   CO2 23 23 22*   BUN 15 16 19   CREATININE 1.2 1.0 1.0   CALCIUM 8.5* 8.6* 9.2       CBC:   Recent Labs   Lab 01/28/22  0600 01/30/22  0615 02/01/22  0330   WBC 12.12 11.46 14.24*   HGB 9.8* 9.2* 8.7*   HCT 29.8* 27.8* 26.6*   * 147* 221                   Assessment/Plan:     * Scrotal abscess  s/p cystoscopy with santiago placement and debridement of abscess/necrosis of scrotum 1/5/22 with Dr. Rangel.  Fluid collection drained at bedside 1/14/2022  CT from 1/18/22 w/ R corporal body fluid collection concerning for residual abscess vs fluid collection  Additional purulence expressed from penis on 1/20/21    Continue abx per primary  Cultures growing Candida and E coli  Appreciate wound care RN and floor RN dressing changes  Leukocytosis monitor    Afebrile  Will continue to monitor condition    Santiago was changed 1 week ago per Dr. Rangel without issue    Will need plastic surgery evaluation in future once wound stable for possible grafting. Can be done as outpatient.     Agree with SNF  placement          Berna's gangrene   - s/p debridement 1/5/22        VTE Risk Mitigation (From admission, onward)         Ordered     enoxaparin injection 40 mg  Daily         01/13/22 1612     IP VTE LOW RISK PATIENT  Once         01/04/22 2241     Place sequential compression device  Until discontinued         01/04/22 2241                Melinda Mitchell MD  Urology  [facility]

## 2022-02-03 ENCOUNTER — ANESTHESIA (OUTPATIENT)
Dept: ENDOSCOPY | Facility: HOSPITAL | Age: 62
DRG: 871 | End: 2022-02-03
Payer: OTHER GOVERNMENT

## 2022-02-03 ENCOUNTER — ANESTHESIA EVENT (OUTPATIENT)
Dept: ENDOSCOPY | Facility: HOSPITAL | Age: 62
DRG: 871 | End: 2022-02-03
Payer: OTHER GOVERNMENT

## 2022-02-03 LAB
BACTERIA UR CULT: NO GROWTH
BASOPHILS # BLD AUTO: 0.21 K/UL (ref 0–0.2)
BASOPHILS NFR BLD: 1.7 % (ref 0–1.9)
DIFFERENTIAL METHOD: ABNORMAL
EOSINOPHIL # BLD AUTO: 0.1 K/UL (ref 0–0.5)
EOSINOPHIL NFR BLD: 0.7 % (ref 0–8)
ERYTHROCYTE [DISTWIDTH] IN BLOOD BY AUTOMATED COUNT: 15.5 % (ref 11.5–14.5)
HCT VFR BLD AUTO: 26 % (ref 40–54)
HGB BLD-MCNC: 8.2 G/DL (ref 14–18)
IMM GRANULOCYTES # BLD AUTO: 0.24 K/UL (ref 0–0.04)
IMM GRANULOCYTES NFR BLD AUTO: 2 % (ref 0–0.5)
LYMPHOCYTES # BLD AUTO: 2.5 K/UL (ref 1–4.8)
LYMPHOCYTES NFR BLD: 20.2 % (ref 18–48)
MCH RBC QN AUTO: 27.4 PG (ref 27–31)
MCHC RBC AUTO-ENTMCNC: 31.5 G/DL (ref 32–36)
MCV RBC AUTO: 87 FL (ref 82–98)
MONOCYTES # BLD AUTO: 2.5 K/UL (ref 0.3–1)
MONOCYTES NFR BLD: 20.3 % (ref 4–15)
NEUTROPHILS # BLD AUTO: 6.7 K/UL (ref 1.8–7.7)
NEUTROPHILS NFR BLD: 55.1 % (ref 38–73)
NRBC BLD-RTO: 0 /100 WBC
PLATELET # BLD AUTO: 293 K/UL (ref 150–450)
PMV BLD AUTO: 10.8 FL (ref 9.2–12.9)
POCT GLUCOSE: 157 MG/DL (ref 70–110)
POCT GLUCOSE: 159 MG/DL (ref 70–110)
POCT GLUCOSE: 175 MG/DL (ref 70–110)
POCT GLUCOSE: 205 MG/DL (ref 70–110)
RBC # BLD AUTO: 2.99 M/UL (ref 4.6–6.2)
WBC # BLD AUTO: 12.14 K/UL (ref 3.9–12.7)

## 2022-02-03 PROCEDURE — A4216 STERILE WATER/SALINE, 10 ML: HCPCS | Performed by: STUDENT IN AN ORGANIZED HEALTH CARE EDUCATION/TRAINING PROGRAM

## 2022-02-03 PROCEDURE — 99900035 HC TECH TIME PER 15 MIN (STAT)

## 2022-02-03 PROCEDURE — 25000003 PHARM REV CODE 250: Performed by: STUDENT IN AN ORGANIZED HEALTH CARE EDUCATION/TRAINING PROGRAM

## 2022-02-03 PROCEDURE — 63600175 PHARM REV CODE 636 W HCPCS: Performed by: HOSPITALIST

## 2022-02-03 PROCEDURE — 36415 COLL VENOUS BLD VENIPUNCTURE: CPT | Performed by: HOSPITALIST

## 2022-02-03 PROCEDURE — 27000221 HC OXYGEN, UP TO 24 HOURS

## 2022-02-03 PROCEDURE — 25000003 PHARM REV CODE 250: Performed by: HOSPITALIST

## 2022-02-03 PROCEDURE — 63600175 PHARM REV CODE 636 W HCPCS: Performed by: STUDENT IN AN ORGANIZED HEALTH CARE EDUCATION/TRAINING PROGRAM

## 2022-02-03 PROCEDURE — C1751 CATH, INF, PER/CENT/MIDLINE: HCPCS

## 2022-02-03 PROCEDURE — 99232 PR SUBSEQUENT HOSPITAL CARE,LEVL II: ICD-10-PCS | Mod: ,,, | Performed by: UROLOGY

## 2022-02-03 PROCEDURE — B4185 PARENTERAL SOL 10 GM LIPIDS: HCPCS | Performed by: HOSPITALIST

## 2022-02-03 PROCEDURE — 99232 SBSQ HOSP IP/OBS MODERATE 35: CPT | Mod: ,,, | Performed by: UROLOGY

## 2022-02-03 PROCEDURE — 11000001 HC ACUTE MED/SURG PRIVATE ROOM

## 2022-02-03 PROCEDURE — 85025 COMPLETE CBC W/AUTO DIFF WBC: CPT | Performed by: HOSPITALIST

## 2022-02-03 PROCEDURE — C9254 INJECTION, LACOSAMIDE: HCPCS | Performed by: STUDENT IN AN ORGANIZED HEALTH CARE EDUCATION/TRAINING PROGRAM

## 2022-02-03 RX ADMIN — Medication 10 ML: at 06:02

## 2022-02-03 RX ADMIN — PIPERACILLIN AND TAZOBACTAM 4.5 G: 4; .5 INJECTION, POWDER, LYOPHILIZED, FOR SOLUTION INTRAVENOUS; PARENTERAL at 08:02

## 2022-02-03 RX ADMIN — Medication 10 ML: at 12:02

## 2022-02-03 RX ADMIN — PIPERACILLIN AND TAZOBACTAM 4.5 G: 4; .5 INJECTION, POWDER, LYOPHILIZED, FOR SOLUTION INTRAVENOUS; PARENTERAL at 12:02

## 2022-02-03 RX ADMIN — I.V. FAT EMULSION 250 ML: 20 EMULSION INTRAVENOUS at 10:02

## 2022-02-03 RX ADMIN — Medication 10 ML: at 05:02

## 2022-02-03 RX ADMIN — RETINOL, ERGOCALCIFEROL, .ALPHA.-TOCOPHEROL ACETATE, DL-, PHYTONADIONE, ASCORBIC ACID, NIACINAMIDE, RIBOFLAVIN 5-PHOSPHATE SODIUM, THIAMINE HYDROCHLORIDE, PYRIDOXINE HYDROCHLORIDE, DEXPANTHENOL, BIOTIN, FOLIC ACID, AND CYANOCOBALAMIN: KIT at 10:02

## 2022-02-03 RX ADMIN — SODIUM CHLORIDE 200 MG: 9 INJECTION, SOLUTION INTRAVENOUS at 01:02

## 2022-02-03 RX ADMIN — DEXTROSE 500 MG: 50 INJECTION, SOLUTION INTRAVENOUS at 10:02

## 2022-02-03 RX ADMIN — FLUCONAZOLE 400 MG: 2 INJECTION, SOLUTION INTRAVENOUS at 12:02

## 2022-02-03 RX ADMIN — DEXTROSE 500 MG: 50 INJECTION, SOLUTION INTRAVENOUS at 12:02

## 2022-02-03 RX ADMIN — SODIUM CHLORIDE 200 MG: 9 INJECTION, SOLUTION INTRAVENOUS at 11:02

## 2022-02-03 RX ADMIN — PIPERACILLIN AND TAZOBACTAM 4.5 G: 4; .5 INJECTION, POWDER, LYOPHILIZED, FOR SOLUTION INTRAVENOUS; PARENTERAL at 05:02

## 2022-02-03 NOTE — PROGRESS NOTES
"[facility]  Adult Nutrition   Progress Note (Follow-Up)    SUMMARY     Recommendations/Interventions:  1) Continue current TPN as tolerated  2) Once PEG placed initiate as medically able and tolerated - Diabetisource AC @ 10 mL/hr increasing 10 mL q6h until goal rate of 70 mL is reached, hold for residuals > 400.  mL q6h.   Provides 2016 kcal, 100 g P, 168 g CHO, 1970 mL H2O   94% EEN and 100% EPN. Additional fluids managed by MD.   3) hyperglycemia, hyponatremia  4) Provide an updated wt - last wt 1/19    Goals:   1) Pt to tolerate initiation and advancement to TF within 24-48 hours of PEG placement.  2) Pt to consistently meet >75% of EEN/EPN by RD f/u  3) Nutrition related labs to trend WNL    Nutrition Goal Status: new  Communication of RD Recs:  (POC)    Dietitian Rounds Brief  PEG placement planned for 2/4.    Assessment and Plan  Nutrition Problem  Inadequate parenteral infusion     Related to (etiology):   TPN formula/rate not consistent with needs      Signs and Symptoms (as evidenced by):   TPN providing only 52% of EEN     Interventions/Recommendations (treatment strategy):  Enteral nutrition  Collaboration of care with other providers     Nutrition Diagnosis Status:   Ongoing      Reason for Assessment    Reason For Assessment: RD follow-up  Diagnosis:  (scrotal abscess)  Relevant Medical History: HTN, seizure disorder, adjustment disorder, chronic ischemic heart disease, cocaine dependence in remission, cardiomegaly, HLD, HTN, DM2, tobacco use    Nutrition Risk Screen    Nutrition Risk Screen: no indicators present    Nutrition/Diet History    Spiritual, Cultural Beliefs, Quaker Practices, Values that Affect Care: no  Food Allergies: NKFA  Factors Affecting Nutritional Intake: NPO,difficulty/impaired swallowing    Anthropometrics    Temp: 98 °F (36.7 °C)  Height: 5' 9.02" (175.3 cm)  Height (inches): 69.02 in  Weight Method: Bed Scale  Weight: 61.2 kg (134 lb 14.7 oz)  Weight " (lb): 134.92 lb  Ideal Body Weight (IBW), Male: 160.12 lb  % Ideal Body Weight, Male (lb): 84.26 %  BMI (Calculated): 19.9  Usual Body Weight (UBW), k.09 kg  % Usual Body Weight: 95.69  % Weight Change From Usual Weight: -4.51 %       Weight History:  Wt Readings from Last 10 Encounters:   22 61.2 kg (134 lb 14.7 oz)   21 64 kg (141 lb)   21 64 kg (141 lb)   16 81.6 kg (180 lb)     Lab/Procedures/Meds: Pertinent Labs Reviewed  CBC:  Recent Labs   Lab 22  0512   WBC 12.14   HGB 8.2*   HCT 26.0*          Medications:Pertinent Medications Reviewed  Scheduled Meds:   cefazolin 1g in dextrose 5% 50 mL IVPB (ready to mix system)  1 g Intravenous Once    enoxaparin  40 mg Subcutaneous Daily    fat emulsion  250 mL Intravenous Daily    fat emulsion  250 mL Intravenous Daily    fluconazole (DIFLUCAN) IVPB  400 mg Intravenous Q24H    lacosamide (VIMPAT) IVPB  200 mg Intravenous Q12H    piperacillin-tazobactam (ZOSYN) IVPB  4.5 g Intravenous Q8H    sodium chloride 0.9%  10 mL Intravenous Q6H    valproate sodium (DEPACON) IVPB  500 mg Intravenous Q12H     Continuous Infusions:   Amino acid 4.25% - dextrose 5% (CLINIMIX-E) solution with additives (1L provides 42.5 gm AA, 50 gm CHO (170 kcal/L dextrose), Na 35, K 30, Mg 5, Ca 4.5, Acetate 70, Cl 39, Phos 15) 75 mL/hr at 22 2222    Amino acid 4.25% - dextrose 5% (CLINIMIX-E) solution with additives (1L provides 42.5 gm AA, 50 gm CHO (170 kcal/L dextrose), Na 35, K 30, Mg 5, Ca 4.5, Acetate 70, Cl 39, Phos 15)       PRN Meds:.acetaminophen, albuterol-ipratropium, albuterol-ipratropium, aluminum-magnesium hydroxide-simethicone, dextrose 10%, dextrose 10%, dextrose 50%, glucagon (human recombinant), glucose, glucose, hydrALAZINE, influenza, insulin aspart U-100, labetalol, LIDOcaine HCL 10 mg/ml (1%), lorazepam, melatonin, morphine, naloxone, ondansetron, prochlorperazine, simethicone, Flushing PICC Protocol **AND** sodium  chloride 0.9% **AND** sodium chloride 0.9%    Estimated/Assessed Needs    Weight Used For Calorie Calculations: 61.2 kg (134 lb 14.7 oz)  Energy Calorie Requirements (kcal): 5257-4519  Energy Need Method: Kcal/kg (35-40 kcal/kg per pressure injury and wt loss)  Protein Requirements: 74-92 g (1.2-1.5 g/kg per pressure injury)  Weight Used For Protein Calculations: 61.2 kg (134 lb 14.7 oz)  Fluid Requirements (mL): 2142  Estimated Fluid Requirement Method: RDA Method (or per MD orders)   CHO Requirement: 268 g    Nutrition Prescription Ordered    Current Diet Order: NPO    Evaluation of Received Nutrient/Fluid Intake    Parenteral Calories (kcal): 1112  Parenteral Protein (gm): 76.5  Lipid Calories (kcals): 500 kcals  GIR (Glucose Infusion Rate) (mg/kg/min): 1.02 mg/kg/min  Energy Calories Required: not meeting needs  Protein Required: meeting needs  Fluid Required: not meeting needs  Comments: LBM 1/30  % Intake of Estimated Energy Needs: 50 - 75 %  % Meal Intake: NPO    Intake/Output Summary (Last 24 hours) at 2/3/2022 1502  Last data filed at 2/3/2022 0600  Gross per 24 hour   Intake --   Output 1000 ml   Net -1000 ml      Nutrition Risk    Level of Risk/Frequency of Follow-up:  (1-2x/week)     Monitor and Evaluation    Food and Nutrient Intake: energy intake,food and beverage intake  Food and Nutrient Adminstration: diet order  Knowledge/Beliefs/Attitudes: food and nutrition knowledge/skill  Physical Activity and Function: nutrition-related ADLs and IADLs  Anthropometric Measurements: weight,weight change,body mass index  Biochemical Data, Medical Tests and Procedures: electrolyte and renal panel,gastrointestinal profile,glucose/endocrine profile,inflammatory profile,lipid profile  Nutrition-Focused Physical Findings: overall appearance,extremities, muscles and bones,head and eyes,skin     Nutrition Follow-Up    RD Follow-up?: Yes

## 2022-02-03 NOTE — PLAN OF CARE
Recommendations:  1) Continue current TPN as tolerated  2) Once PEG placed initiate as medically able and tolerated - Diabetisource AC @ 10 mL/hr increasing 10 mL q6h until goal rate of 70 mL is reached, hold for residuals > 400.  mL q6h.              Provides 2016 kcal, 100 g P, 168 g CHO, 1970 mL H2O              94% EEN and 100% EPN. Additional fluids managed by MD.   3) hyperglycemia, hyponatremia  4) Provide an updated wt - last wt 1/19     Goals:   1) Pt to tolerate initiation and advancement to TF within 24-48 hours of PEG placement.  2) Pt to consistently meet >75% of EEN/EPN by RD f/u  3) Nutrition related labs to trend WNL     Nutrition Goal Status: new  Communication of [employer] [place]:  (POC)

## 2022-02-03 NOTE — PLAN OF CARE
TN spoke with patient spouse and sisterHomar at bedside regarding discharge planning. Patient to have peg tube placed today or tomorrow. Once medically ready for discharge, plan for home health and home infusion with spouse.    02/03/22 1646   Discharge Reassessment   Assessment Type Discharge Planning Reassessment   Did the patient's condition or plan change since previous assessment? Yes   Discharge Plan discussed with: Spouse/sig other;Sibling   Name(s) and Number(s) Rossi Zhao (Spouse)   [phone]   Communicated CHUCK with patient/caregiver Date not available/Unable to determine   Discharge Plan A Home Health;Home with family   Discharge Plan B Home   DME Needed Upon Discharge  hospital bed;wheelchair;lift device   Discharge Barriers Identified Nursing Home rejection;Substance Abuse   Why the patient remains in the hospital Requires continued medical care   Post-Acute Status   Post-Acute Authorization Home Health   Discharge Delays (!) Procedure Scheduling (IR, OR, Labs, Echo, Cath, Echo, EEG)

## 2022-02-03 NOTE — SUBJECTIVE & OBJECTIVE
Interval History: Wound clean. Dressing in place.     Review of Systems   Unable to perform ROS: Dementia     Objective:     Temp:  [97.3 °F (36.3 °C)-98.3 °F (36.8 °C)] 97.5 °F (36.4 °C)  Pulse:  [71-83] 78  Resp:  [17-19] 18  SpO2:  [91 %-97 %] 97 %  BP: (131-152)/(77-85) 152/79     Body mass index is 19.92 kg/m².           Drains     Drain                 Urethral Catheter 01/05/22 1050 Double-lumen 20 Fr. 28 days                Physical Exam  Vitals reviewed.   Constitutional:       General: He is not in acute distress.     Appearance: He is well-developed. He is not ill-appearing, toxic-appearing or diaphoretic.   HENT:      Head: Normocephalic and atraumatic.      Mouth/Throat:      Mouth: Mucous membranes are moist.   Eyes:      Conjunctiva/sclera: Conjunctivae normal.   Pulmonary:      Effort: Pulmonary effort is normal. No respiratory distress.   Abdominal:      General: Abdomen is flat. There is no distension.      Palpations: Abdomen is soft. There is no mass.      Tenderness: There is no abdominal tenderness. There is no guarding.   Genitourinary:     Comments: Wound with healthy granulation tissue  No penoscrotal swelling  Sahu draining well  Musculoskeletal:         General: No swelling or deformity.      Cervical back: Neck supple.   Skin:     General: Skin is warm.      Findings: No rash.   Neurological:      Mental Status: He is alert.      Gait: Gait normal.         Significant Labs:    BMP:  Recent Labs   Lab 01/28/22  0600 01/30/22  0615 02/01/22  0330   * 133* 135*   K 4.2 4.4 4.6    101 101   CO2 23 23 22*   BUN 15 16 19   CREATININE 1.2 1.0 1.0   CALCIUM 8.5* 8.6* 9.2       CBC:   Recent Labs   Lab 02/01/22  0330 02/02/22  1025 02/03/22  0512   WBC 14.24* 14.56* 12.14   HGB 8.7* 8.6* 8.2*   HCT 26.6* 26.3* 26.0*    233 293       Blood Culture:   Recent Labs   Lab 02/01/22  1227   LABBLOO No Growth to date  No Growth to date  No Growth to date  No Growth to date     Urine  Culture:   Recent Labs   Lab 02/01/22  1434   LABURIN No growth to date     Urine Studies:   Recent Labs   Lab 02/01/22  1434   COLORU Yellow   APPEARANCEUA Clear   PHUR 7.0   SPECGRAV 1.015   PROTEINUA 1+*   GLUCUA Negative   KETONESU Negative   BILIRUBINUA Negative   OCCULTUA 2+*   NITRITE Negative   UROBILINOGEN Negative   LEUKOCYTESUR 3+*   RBCUA 4   WBCUA 21*   BACTERIA None   HYALINECASTS 0       Significant Imaging:  All pertinent imaging results/findings from the past 24 hours have been reviewed.

## 2022-02-03 NOTE — ASSESSMENT & PLAN NOTE
Repeated episodes of AMS   - neurology consulted  - AEDs per neurology   - EEG with no seizure.   -SLP consulted: recommends NPO, continue TPN. F/u progress recs  -Palliative care consulted: may need PEG if nutritional needs are not met  -Mental status gradually improving   -Per Dr. Calero, wife want patient remains full code,but today,wife is agree with DNR and hospice placement,but they want still PEG tube.  -B 12,folic acid is normal,HIV is negative,RPR is negative.  but per GI patient has still leucocytosis,I already repeated blood and urine culture.updated wife.

## 2022-02-03 NOTE — PROGRESS NOTES
[facility]  Urology  Progress Note    Patient Name: Abhishek Zhao  MRN: [id number]  Admission Date: 1/4/2022  Hospital Length of Stay: 29 days  Code Status: Full Code   Attending Provider: Quinten Rosario MD   Primary Care Physician: To Obtain Unable    Subjective:     HPI:  Scrotal Swelling  Abhishek Zhao is a 61 y.o. male who was been experiencing scrotal pain and swelling since 12/31/2021.  He denies any fever.  He has had issues voiding since the swelling began.  Hemostat having issues in the past with urinary problems.  He denies having any previous issues with scrotal infection or swelling.  He recalls that he had procedures in the past but cannot recall specifically what to place.  He does not have urologist locally but moved back from [place] about 1 year ago.    Review of care everywhere shows that he had a cystoscopy with urethral dilation of a urethral stricture in the bulbar urethra in 2019 at [facility].  And there are reports that [facility] approximately [facility]      Interval History: Wound clean. Dressing in place.     Review of Systems   Unable to perform ROS: Dementia     Objective:     Temp:  [97.3 °F (36.3 °C)-98.3 °F (36.8 °C)] 97.5 °F (36.4 °C)  Pulse:  [71-83] 78  Resp:  [17-19] 18  SpO2:  [91 %-97 %] 97 %  BP: (131-152)/(77-85) 152/79     Body mass index is 19.92 kg/m².           Drains     Drain                 Urethral Catheter 01/05/22 1050 Double-lumen 20 Fr. 28 days                Physical Exam  Vitals reviewed.   Constitutional:       General: He is not in acute distress.     Appearance: He is well-developed. He is not ill-appearing, toxic-appearing or diaphoretic.   HENT:      Head: Normocephalic and atraumatic.      Mouth/Throat:      Mouth: Mucous membranes are moist.   Eyes:      Conjunctiva/sclera: Conjunctivae normal.   Pulmonary:      Effort: Pulmonary effort is normal. No respiratory distress.   Abdominal:      General:  Abdomen is flat. There is no distension.      Palpations: Abdomen is soft. There is no mass.      Tenderness: There is no abdominal tenderness. There is no guarding.   Genitourinary:     Comments: Wound with healthy granulation tissue  No penoscrotal swelling  Santiago draining well  Musculoskeletal:         General: No swelling or deformity.      Cervical back: Neck supple.   Skin:     General: Skin is warm.      Findings: No rash.   Neurological:      Mental Status: He is alert.      Gait: Gait normal.         Significant Labs:    BMP:  Recent Labs   Lab 01/28/22  0600 01/30/22  0615 02/01/22  0330   * 133* 135*   K 4.2 4.4 4.6    101 101   CO2 23 23 22*   BUN 15 16 19   CREATININE 1.2 1.0 1.0   CALCIUM 8.5* 8.6* 9.2       CBC:   Recent Labs   Lab 02/01/22  0330 02/02/22  1025 02/03/22  0512   WBC 14.24* 14.56* 12.14   HGB 8.7* 8.6* 8.2*   HCT 26.6* 26.3* 26.0*    233 293       Blood Culture:   Recent Labs   Lab 02/01/22  1227   LABBLOO No Growth to date  No Growth to date  No Growth to date  No Growth to date     Urine Culture:   Recent Labs   Lab 02/01/22  1434   LABURIN No growth to date     Urine Studies:   Recent Labs   Lab 02/01/22  1434   COLORU Yellow   APPEARANCEUA Clear   PHUR 7.0   SPECGRAV 1.015   PROTEINUA 1+*   GLUCUA Negative   KETONESU Negative   BILIRUBINUA Negative   OCCULTUA 2+*   NITRITE Negative   UROBILINOGEN Negative   LEUKOCYTESUR 3+*   RBCUA 4   WBCUA 21*   BACTERIA None   HYALINECASTS 0       Significant Imaging:  All pertinent imaging results/findings from the past 24 hours have been reviewed.                  Assessment/Plan:     * Scrotal abscess  s/p cystoscopy with santiago placement and debridement of abscess/necrosis of scrotum 1/5/22 with Dr. Rangel.  Fluid collection drained at bedside 1/14/2022  CT from 1/18/22 w/ R corporal body fluid collection concerning for residual abscess vs fluid collection  Additional purulence expressed from penis on 1/20/21    Continue  abx per primary  Cultures growing Candida and E coli  Appreciate wound care RN and floor RN dressing changes  Leukocytosis monitor    Afebrile  Will continue to monitor condition    Sahu changed last week without issue at the bedside    Will need plastic surgery evaluation in future once wound stable for possible grafting. Can be done as outpatient.     Agree with SNF placement          Berna's gangrene   - s/p debridement 1/5/22        VTE Risk Mitigation (From admission, onward)         Ordered     enoxaparin injection 40 mg  Daily         01/13/22 1612     IP VTE LOW RISK PATIENT  Once         01/04/22 2241     Place sequential compression device  Until discontinued         01/04/22 2241                Mary Booth MD  Urology  [facility] [place]

## 2022-02-03 NOTE — PROGRESS NOTES
[facility] Medicine  Progress Note    Patient Name: Abhishek Zhao  MRN: [id number]  Patient Class: IP- Inpatient   Admission Date: 1/4/2022  Length of Stay: 29 days  Attending Physician: Quinten Rosario MD  Primary Care Provider: To Obtain Unable        Subjective:     Principal Problem:Scrotal abscess        HPI:  61 y.o. male with adjustment disorder with depressed mood, chronic ischemic heart disease, cocaine dependence in remission, cardiomegaly, HLD, HTN, swizure disorder, tobacco use, and DM 2 presents with a complaint of testicular pain.  Associated with swelling and redness to the scrotum and penis along with difficulty urinating, pain is rated as severe and radiates to the rectum.  Denies fever, chills, cough, SOB, chest pain, n/v/d.  In the ED, he was found to be tachypneic, with leukocytosis and elevated lactic.  CT and US shows evidence of multiple scrotal and perineal abscesses with some extension into the penile shaft.  UA with evidence of infection.  Sepsis treatment initiated, blood and urine cultures obtained, IV fluids and IV antibiotics initiated.  Urology consulted.      Overview/Hospital Course:  61 y.o. 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    Urology consulted. Patient s/p cystoscopy with santiago placement and debridement of abscess/necrosis of scrotum 1/5/22 with Dr. Rangel. Fluid collection drained at bedside 1/14/2022. CT from 1/18/22 w/ R corporal body fluid collection concerning for residual abscess vs fluid collection. Hospitalization course complicated with aspiration event 1/11 w/ subsequent desaturation. Respiratory culture grew pseudomonas. Kept on Zosyn. On 1/13, pt had abrupt reduction in responsiveness. Code stroke called,  patient transferred to ICU. No stroke found on imaging. Started on extended EEG. Mental status improved, no sign of seizure. Patient stepped back down to the floor. Mental status continues to wax and wane. Given change in mental status, SLP evaluated and recommend NPO and pt started on TPN. Palliative following. B 12,folic acid is normal,HIV is negative,RPR is negative. Abscess culture with CANDIDA GLABRATA and ecoli. Blood culture with NGTD. ID is following. PT/OT consulted- recommends SNF. Had extensive discussion with patient's wife at bedside about patient's clinical status, nutrition status, and deconditioning. Discussed that patient would likely benefit from SNF, but wife is hesitant but sister is agreeable. Also, palliative team and I discussed with patient's spouse and sister on 2022 about alternative means for nutrition in the event patient unable to progress with SLP. Wife and sister agreeable to PEG tube if patient does not progress with SLP. GI consulted. Will follow up with SLP progress. Plan to continue IV abx and awaiting for SNF placement.  Remains on TPN,plan for PEG tube placement by GI.but per GI patient has still leucocytosis,I already repeated blood and urine culture.updated wife.wife is agree with DNR and hospice placement,but they want still PEG tube.  CC is confusion.      Interval History: No acute overnight events. Nurse report patient still mildly confuse. Mental status appears similar to yesterday. Able to tell me his name, but unable to tell me his  or the place and time. Cannot tell me the situation.No family at bedside.    Palliative team and I able to reach sister and spouse and discuss possible need for alternative means of nutrition if patient does not progress with SLP. Family and spouse agrees to PEG if needed.   CC is confusion.  Review of Systems   Unable to perform ROS: Mental status change     Objective:     Vital Signs (Most Recent):  Temp: 98 °F (36.7 °C) (22  1217)  Pulse: 73 (22 1217)  Resp: 19 (22 1217)  BP: (!) 142/75 (22 1217)  SpO2: 97 % (22 1217) Vital Signs (24h Range):  Temp:  [97.5 °F (36.4 °C)-98.3 °F (36.8 °C)] 98 °F (36.7 °C)  Pulse:  [71-83] 73  Resp:  [17-19] 19  SpO2:  [92 %-97 %] 97 %  BP: (131-152)/(75-85) 142/75     Weight: 61.2 kg (134 lb 14.7 oz)  Body mass index is 19.92 kg/m².    Intake/Output Summary (Last 24 hours) at 2/3/2022 1308  Last data filed at 2/3/2022 0600  Gross per 24 hour   Intake --   Output 1750 ml   Net -1750 ml      Physical Exam  Constitutional:       General: He is not in acute distress.     Appearance: He is ill-appearing. He is not toxic-appearing.      Comments:  Appears a little more confuse today than yesterday. Oriented to self but today he was unable to tell me his , place, or time.    HENT:      Head: Atraumatic.      Nose: Nose normal.      Mouth/Throat:      Mouth: Mucous membranes are dry.   Eyes:      Extraocular Movements: Extraocular movements intact.   Cardiovascular:      Rate and Rhythm: Normal rate and regular rhythm.   Pulmonary:      Effort: No respiratory distress.      Breath sounds: No wheezing.   Abdominal:      General: Abdomen is flat. There is no distension.      Palpations: Abdomen is soft.      Tenderness: There is no abdominal tenderness. There is no guarding.   Genitourinary:     Comments: Dressing on scrotal area.  Sahu - clear yellow urine  Musculoskeletal:         General: No swelling.      Right lower leg: No edema.      Left lower leg: No edema.   Skin:     Coloration: Skin is not jaundiced.      Findings: No bruising.   Neurological:      Mental Status: He is alert.      Comments: Alert and oriented to self. Not oriented to time, and place. Not to situation.          Significant Labs: All pertinent labs within the past 24 hours have been reviewed.    Significant Imaging: I have reviewed all pertinent imaging results/findings within the past 24  hours.      Assessment/Plan:      * Scrotal abscess  -Multiple abscesses c/w Berna's gangrene   -Cultures growing ampicillin-resistant Ecoli and C albicans. Repeat CT A/P 1/13 w/ 4cm fluid collection c/f persistent abscess which was drained bedside by urology  -Urology consulted: s/p cystoscopy with santiago placement and debridement of abscess/necrosis of scrotum 1/5/22 with Dr. Rangel.Fluid collection drained at bedside 1/14/2022  -CT from 1/18/22 w/ R corporal body fluid collection concerning for residual abscess vs fluid collection  - cont zosyn and diflucan (re-started for HCAP)  - ID consulted, appreciate recs: continue abx until resolution of abscess  -Consulted IR for drainage of residual fluid collection on CT pelvic. IR procedure is not done,due to high risk for complications  -continue with IV Abx and repeat CT,spoke with Urology,US show improvement in Abscess size,no need for additional intervention at this time  -Monitor closely       Advance care planning  I spent greater than 16 minutes of discussion regarding advanced directive and end of life planning with patient's spouse and sister.  Wife confirms patient is full code  Wife and sister agrees to PEG if continues to fail SLP evaluation due to mentation        Malnourished  Pt ill-appearing, thin and frail  Albumin 1.3  Currently on TPN  SLP consulted- continue to evaluate if safe to swallow and able to meet nutritional needs  Discussed with family and spouse in regards to possible PEG  Family (sister) and wife agreeable on 01/31/2022  Will continue to follow with SLP, if no progression, will need PEG  GI consulted     Alkaline phosphatase elevation  Chronic isolated alk phos (GGT elevated), worsened this admission. Possibly due to AEDs vs occult HPB process.   -Alk phos trending down   - further workup depending on clinical course,improving.      Pericardial effusion  Echo with Small pericardial effusion of unclear etiology  EF of 50%  Monitor        HCAP (healthcare-associated pneumonia)  - see respiratory failure   - Continue IV abx: Zosyn and fluconazole     Acute respiratory failure with hypoxia and hypercarbia  On admit, Cough + new LLL opacity on imaging c/f pneumonia, however CT imaging showing pleural effusion and atelectasis  - respiratory cultures w/ pseudomonas  - cont zosyn  - furosemide as tolerated; no significant evidence of cardiac dysfunction on TTE  - IS  - Wean O2 as tolerated        Hypokalemia  Replete PRN    JOI (acute kidney injury)  Resolved      Sepsis due to Gram negative bacteria  Resolved      Berna's gangrene   -Urology consulted:  s/p debridement 1/5/22  -Will need plastic surgery evaluation in future once wound stable for possible grafting. Can be done as outpatient.     Diabetes mellitus type 2, controlled  A1c:   Lab Results   Component Value Date    HGBA1C 9.3 (H) 01/05/2022     Meds:  SSI PRN to maintain goal 140-180  ADA diet, accuchecks ACHS, hypoglycemic protocol        Tobacco user  Dr. Calero: 5 minutes spent counseling the patient on smoking cessation and he is not currently ready to stop smoking  He will be offereded a nicotine transdermal patch while hospitalized and monitored for withdrawal.  Will provide additional smoking cessation counseling prior to discharge.     Acute encephalopathy  Repeated episodes of AMS   - neurology consulted  - AEDs per neurology   - EEG with no seizure.   -SLP consulted: recommends NPO, continue TPN. F/u progress recs  -Palliative care consulted: may need PEG if nutritional needs are not met  -Mental status gradually improving   -Per Dr. Calero, wife want patient remains full code,but today,wife is agree with DNR and hospice placement,but they want still PEG tube.  -B 12,folic acid is normal,HIV is negative,RPR is negative.  but per GI patient has still leucocytosis,I already repeated blood and urine culture.updated wife.    Essential hypertension  Well controlled, continue home  "medications and monitor blood pressure, adjust as needed.   On prn IV hydralazine at this time.    Hyperlipidemia  Continue statin    Cardiomegaly  Pulmonary edema seen on imaging, small pericardial effusion seen on TTE. LVEF low-normal (50%)  - hold diuresis while NPO    Cocaine dependence in remission  Counseled  Wife stated patient last use was "years ago"    Chronic ischemic heart disease  No acute issue    Adjustment disorder with depressed mood  Continue home citalopram, hold home seroquel due to somnolence      VTE Risk Mitigation (From admission, onward)         Ordered     enoxaparin injection 40 mg  Daily         01/13/22 1612     IP VTE LOW RISK PATIENT  Once         01/04/22 2241     Place sequential compression device  Until discontinued         01/04/22 2241                Discharge Planning   CHUKC:      Code Status: DNR   Is the patient medically ready for discharge?:     Reason for patient still in hospital (select all that apply): Patient trending condition  Discharge Plan A: Skilled Nursing Facility   Discharge Delays: (!) Post-Acute Set-up              Quinten Rosario MD  Department of Hospital Medicine   [facility]"

## 2022-02-03 NOTE — H&P
Endoscopy History and Physical    PCP - To Obtain Unable     Procedure - EGD with PEG tube  ASA - per anesthesia  Mallampati - per anesthesia  History of Anesthesia problems - no  Family history Anesthesia problems -  no   Plan of anesthesia - General, MAC    HPI:  This is a 61 y.o. male here for PEG tube placement.    ROS:  Constitutional: No fevers, chills, No weight loss  CV: No chest pain  Pulm: No cough, No shortness of breath  Ophtho: No vision changes  GI: see HPI  Derm: No rash    Medical History:  has a past medical history of High cholesterol, Hypertension, and Seizures.    Surgical History:  has a past surgical history that includes Surgical removal of abscess (1/5/2022); Cystoscopy (1/5/2022); and Fistulogram (N/A, 1/5/2022).    Family History: family history is not on file.. Otherwise no colon cancer, inflammatory bowel disease, or GI malignancies.    Social History:  reports that he has been smoking cigarettes. He has been smoking about 1.50 packs per day. He does not have any smokeless tobacco history on file. He reports current alcohol use.    Review of patient's allergies indicates:  No Known Allergies    Medications:   Medications Prior to Admission   Medication Sig Dispense Refill Last Dose    amLODIPine (NORVASC) 10 MG tablet Take 10 mg by mouth.       amlodipine-benazepril 5-20 mg (LOTREL) 5-20 mg per capsule TAKE 1 CAPSULE BY MOUTH EVERY DAY FOR HEART AND BLOOD PRESSURE       ASCORBATE CALCIUM (VITAMIN C ORAL) Take by mouth.       aspirin (ECOTRIN) 81 MG EC tablet Take 81 mg by mouth once daily.       atorvastatin (LIPITOR) 80 MG tablet TAKE ONE TABLET BY MOUTH EVERY DAY FOR CHOLESTEROL       cetirizine (ZYRTEC) 10 MG tablet TAKE ONE TABLET BY MOUTH DAILY .  TAKE ONE DAILY AS NEEDED FOR ITCHING. MAY TAKE UP TO 4 TIMES DAILY AS  NEEDD .  TAKE ONE DAILY AS NEEDED FOR ITCHING. MAY TAKE UP TO 4 TIMES DAILY AS  NEEDD       cholecalciferol, vitamin D3, (VITAMIN D3) 50 mcg (2,000 unit) Tab  TAKE ONE TABLET BY MOUTH EVERY DAY AS A VITAMIN SUPPLEMENT       citalopram (CELEXA) 10 MG tablet Take by mouth.       divalproex (DEPAKOTE) 500 MG Tb24 Take 500 mg by mouth once daily.       HYDROPHILIC CREAM TOP APPLY LIBERAL AMOUNT TO THE SKIN TWICE A DAY . APPLY TO THE SKIN TWICE DAILY AS NEEDED FOR DRY SKIN AND ITCING       insulin detemir U-100 (LEVEMIR) 100 unit/mL injection Inject 15 Units into the skin.       insulin glargine (LANTUS) 100 unit/mL injection Inject 20 Units into the skin.       insulin glargine 100 units/mL (3mL) SubQ pen INJECT 18 UNITS SUBCUTANEOUSLY EVERY MORNING AND INJECT 14 UNITS AT BEDTIME       lacosamide (VIMPAT) 200 mg Tab tablet Take 200 mg by mouth.       lamoTRIgine (LAMICTAL) 200 MG tablet TAKE ONE TABLET BY MOUTH TWICE A DAY FOR SEIZURES       lisinopriL 10 MG tablet TAKE ONE TABLET BY MOUTH DAILY FOR HEART/ BLOOD PRESSURE       metFORMIN (GLUCOPHAGE) 1000 MG tablet TAKE ONE TABLET BY MOUTH TWICE A DAY WITH FOOD FOR DIABETES       metFORMIN (GLUCOPHAGE) 500 MG tablet Take 500 mg by mouth.       metoprolol succinate (TOPROL-XL) 50 MG 24 hr tablet Take 50 mg by mouth once daily.       nitroGLYCERIN (NITROSTAT) 0.4 MG SL tablet DISSOLVE ONE TABLET UNDER TONGUE [id number] FOR CHEST PAIN       oxycodone-acetaminophen (PERCOCET) 5-325 mg per tablet Take 1 tablet by mouth every 4 (four) hours as needed for Pain (do not drink alcohol, drive, or operate heavy machinery while taking this medication.). (Patient not taking: Reported on 4/28/2021) 20 tablet 0     pantoprazole (PROTONIX) 40 MG tablet Take 1 tablet (40 mg total) by mouth once daily. 30 tablet 3     potassium chloride (KLOR-CON) 10 MEQ TbSR TAKE FOUR TABLETS BY MOUTH ONCE DAILY TO INCREASE POTASSIUM       QUEtiapine (SEROQUEL) 25 MG Tab TAKE ONE-HALF TABLET BY MOUTH AT BEDTIME FOR MOOD OR PSYCHOSIS AND INSOMNIA.. MAY USE WHOLE TABLET IF NECESSARY       rosuvastatin (CRESTOR) 40 MG Tab TAKE ONE TABLET BY MOUTH  DAILY FOR CHOLESTEROL (REPLACES ATORVASTATIN)       tamsulosin (FLOMAX) 0.4 mg Cap TAKE 1 CAPSULE BY MOUTH EVERY DAY FOR BPH       triamcinolone acetonide 0.1% (KENALOG) 0.1 % cream APPLY MODERATE AMOUNT TOPICALLY TWICE A DAY       varenicline (CHANTIX JOSE) 0.5 mg (11)- 1 mg (42) tablet TAKE AS DIRECTED BY MOUTH UD FOR SMOKING CESSATION       [DISCONTINUED] divalproex (DEPAKOTE) 125 MG EC tablet Take 500 mg by mouth.       [DISCONTINUED] lisinopriL (PRINIVIL,ZESTRIL) 2.5 MG tablet Take 2.5 mg by mouth.       [DISCONTINUED] metoprolol tartrate (LOPRESSOR) 50 MG tablet TAKE ONE-HALF TABLET BY MOUTH TWICE A DAY FOR HEART/ BLOOD PRESSURE       [DISCONTINUED] rosuvastatin (CRESTOR) 20 MG tablet Take 20 mg by mouth.          Physical Exam:    Vital Signs:   Vitals:    02/03/22 0747   BP: (!) 152/79   Pulse: 78   Resp: 18   Temp: 97.5 °F (36.4 °C)       Gen: NAD, lying comfortably  HENT: NCAT, oropharynx clear  Eyes: anicteric sclerae, EOMI grossly  Neck: supple, no visible masses/goiter  Cardiac: RRR  Lungs: non-labored breathing  Abd: soft, NT/ND, normoactive BS  Ext: no LE edema, warm, well perfused  Skin: skin intact on exposed body parts, no visible rashes, lesions  Neuro: A&Ox4, neuro exam grossly intact, moves all extremities  Psych: appropriate mood, affect      Labs:  Lab Results   Component Value Date    WBC 12.14 02/03/2022    HGB 8.2 (L) 02/03/2022    HCT 26.0 (L) 02/03/2022     02/03/2022    ALT 24 02/01/2022    AST 36 02/01/2022     (L) 02/01/2022    K 4.6 02/01/2022     02/01/2022    CREATININE 1.0 02/01/2022    BUN 19 02/01/2022    CO2 22 (L) 02/01/2022    TSH 7.939 (H) 01/24/2022    INR 1.1 04/28/2021    HGBA1C 9.3 (H) 01/05/2022       Plan:  EGD for PEG tube placement.     I have explained the risks and benefits of endoscopy procedures to the patient including but not limited to bleeding, perforation, infection, and death.  The patient was asked if they understand and allowed to  ask any further questions to their satisfaction.      Michael oFntana MD

## 2022-02-03 NOTE — PROGRESS NOTES
Plan for PEG tube placement tomorrow - no anesthesia availability this afternoon.    Michael Fontana MD

## 2022-02-03 NOTE — TREATMENT PLAN
Leukocytosis resolved, afebrile.  Blood and urine cultures no growth.    Attempts at reaching wife unsuccessful for PEG tube consent.  Primary team notified.    Michael Fontana MD

## 2022-02-03 NOTE — ASSESSMENT & PLAN NOTE
s/p cystoscopy with santiago placement and debridement of abscess/necrosis of scrotum 1/5/22 with Dr. Rangel.  Fluid collection drained at bedside 1/14/2022  CT from 1/18/22 w/ R corporal body fluid collection concerning for residual abscess vs fluid collection  Additional purulence expressed from penis on 1/20/21    Continue abx per primary  Cultures growing Candida and E coli  Appreciate wound care RN and floor RN dressing changes  Leukocytosis monitor    Afebrile  Will continue to monitor condition    Santiago changed last week without issue at the bedside    Will need plastic surgery evaluation in future once wound stable for possible grafting. Can be done as outpatient.     Agree with SNF placement

## 2022-02-03 NOTE — SUBJECTIVE & OBJECTIVE
Interval History: No acute overnight events. Nurse report patient still mildly confuse. Mental status appears similar to yesterday. Able to tell me his name, but unable to tell me his  or the place and time. Cannot tell me the situation.No family at bedside.    Palliative team and I able to reach sister and spouse and discuss possible need for alternative means of nutrition if patient does not progress with SLP. Family and spouse agrees to PEG if needed.   CC is confusion.  Review of Systems   Unable to perform ROS: Mental status change     Objective:     Vital Signs (Most Recent):  Temp: 98 °F (36.7 °C) (22)  Pulse: 73 (22)  Resp: 19 (22)  BP: (!) 142/75 (22)  SpO2: 97 % (22) Vital Signs (24h Range):  Temp:  [97.5 °F (36.4 °C)-98.3 °F (36.8 °C)] 98 °F (36.7 °C)  Pulse:  [71-83] 73  Resp:  [17-19] 19  SpO2:  [92 %-97 %] 97 %  BP: (131-152)/(75-85) 142/75     Weight: 61.2 kg (134 lb 14.7 oz)  Body mass index is 19.92 kg/m².    Intake/Output Summary (Last 24 hours) at 2/3/2022 1308  Last data filed at 2/3/2022 0600  Gross per 24 hour   Intake --   Output 1750 ml   Net -1750 ml      Physical Exam  Constitutional:       General: He is not in acute distress.     Appearance: He is ill-appearing. He is not toxic-appearing.      Comments:  Appears a little more confuse today than yesterday. Oriented to self but today he was unable to tell me his , place, or time.    HENT:      Head: Atraumatic.      Nose: Nose normal.      Mouth/Throat:      Mouth: Mucous membranes are dry.   Eyes:      Extraocular Movements: Extraocular movements intact.   Cardiovascular:      Rate and Rhythm: Normal rate and regular rhythm.   Pulmonary:      Effort: No respiratory distress.      Breath sounds: No wheezing.   Abdominal:      General: Abdomen is flat. There is no distension.      Palpations: Abdomen is soft.      Tenderness: There is no abdominal tenderness. There is no guarding.    Genitourinary:     Comments: Dressing on scrotal area.  Sahu - clear yellow urine  Musculoskeletal:         General: No swelling.      Right lower leg: No edema.      Left lower leg: No edema.   Skin:     Coloration: Skin is not jaundiced.      Findings: No bruising.   Neurological:      Mental Status: He is alert.      Comments: Alert and oriented to self. Not oriented to time, and place. Not to situation.          Significant Labs: All pertinent labs within the past 24 hours have been reviewed.    Significant Imaging: I have reviewed all pertinent imaging results/findings within the past 24 hours.

## 2022-02-03 NOTE — PLAN OF CARE
Pt assessed. Pt is alert to self. No discomfort or pain noted. Pt turned q2 throughout shift. Family at bedside to speak w/ team in regards to pt's POC. Pt code status update. Pt anticipating surgery w/ GI; CHG bath given and Lovenox held (MD notified). Wound care completed. TPN infusing. ABX given. Oral Care completed. Bed in lowest position. Bed alarm is on and audible w/ sitter at the bedside. Call light is in reach along w/ personal items. Hourly rounding. No acute changes. Will continue to monitor.     Problem: Adult Inpatient Plan of Care  Goal: Plan of Care Review  Outcome: Ongoing, Progressing  Goal: Patient-Specific Goal (Individualized)  Outcome: Ongoing, Progressing  Goal: Absence of Hospital-Acquired Illness or Injury  Outcome: Ongoing, Progressing  Goal: Optimal Comfort and Wellbeing  Outcome: Ongoing, Progressing  Goal: Readiness for Transition of Care  Outcome: Ongoing, Progressing     Problem: Diabetes Comorbidity  Goal: Blood Glucose Level Within Targeted Range  Outcome: Ongoing, Progressing     Problem: Infection  Goal: Absence of Infection Signs and Symptoms  Outcome: Ongoing, Progressing     Problem: Adjustment to Illness (Sepsis/Septic Shock)  Goal: Optimal Coping  Outcome: Ongoing, Progressing     Problem: Bleeding (Sepsis/Septic Shock)  Goal: Absence of Bleeding  Outcome: Ongoing, Progressing     Problem: Glycemic Control Impaired (Sepsis/Septic Shock)  Goal: Blood Glucose Level Within Desired Range  Outcome: Ongoing, Progressing     Problem: Infection Progression (Sepsis/Septic Shock)  Goal: Absence of Infection Signs and Symptoms  Outcome: Ongoing, Progressing     Problem: Nutrition Impaired (Sepsis/Septic Shock)  Goal: Optimal Nutrition Intake  Outcome: Ongoing, Progressing     Problem: Fall Injury Risk  Goal: Absence of Fall and Fall-Related Injury  Outcome: Ongoing, Progressing     Problem: Fluid and Electrolyte Imbalance (Acute Kidney Injury/Impairment)  Goal: Fluid and Electrolyte  Balance  Outcome: Ongoing, Progressing     Problem: Oral Intake Inadequate (Acute Kidney Injury/Impairment)  Goal: Optimal Nutrition Intake  Outcome: Ongoing, Progressing     Problem: Renal Function Impairment (Acute Kidney Injury/Impairment)  Goal: Effective Renal Function  Outcome: Ongoing, Progressing     Problem: Fluid Imbalance (Pneumonia)  Goal: Fluid Balance  Outcome: Ongoing, Progressing     Problem: Infection (Pneumonia)  Goal: Resolution of Infection Signs and Symptoms  Outcome: Ongoing, Progressing     Problem: Respiratory Compromise (Pneumonia)  Goal: Effective Oxygenation and Ventilation  Outcome: Ongoing, Progressing     Problem: Skin Injury Risk Increased  Goal: Skin Health and Integrity  Outcome: Ongoing, Progressing     Problem: Seizure Disorder Comorbidity  Goal: Maintenance of Seizure Control  Outcome: Ongoing, Progressing     Problem: Confusion Chronic  Goal: Optimal Cognitive Function  Outcome: Ongoing, Progressing     Problem: Impaired Wound Healing  Goal: Optimal Wound Healing  Outcome: Ongoing, Progressing     Problem: Skin or Soft Tissue Infection  Goal: Absence of Infection Signs and Symptoms  Outcome: Ongoing, Progressing     Problem: Coping Ineffective  Goal: Effective Coping  Outcome: Ongoing, Progressing

## 2022-02-04 LAB
POCT GLUCOSE: 181 MG/DL (ref 70–110)
POCT GLUCOSE: 193 MG/DL (ref 70–110)
POCT GLUCOSE: 96 MG/DL (ref 70–110)
SARS-COV-2 RDRP RESP QL NAA+PROBE: POSITIVE

## 2022-02-04 PROCEDURE — 63600175 PHARM REV CODE 636 W HCPCS: Performed by: STUDENT IN AN ORGANIZED HEALTH CARE EDUCATION/TRAINING PROGRAM

## 2022-02-04 PROCEDURE — 25000003 PHARM REV CODE 250: Performed by: HOSPITALIST

## 2022-02-04 PROCEDURE — 27201018 HC KIT, PEG (ANY): Performed by: INTERNAL MEDICINE

## 2022-02-04 PROCEDURE — 25000003 PHARM REV CODE 250: Performed by: STUDENT IN AN ORGANIZED HEALTH CARE EDUCATION/TRAINING PROGRAM

## 2022-02-04 PROCEDURE — D9220A PRA ANESTHESIA: ICD-10-PCS | Mod: ,,, | Performed by: ANESTHESIOLOGY

## 2022-02-04 PROCEDURE — 27000207 HC ISOLATION

## 2022-02-04 PROCEDURE — 63600175 PHARM REV CODE 636 W HCPCS: Performed by: HOSPITALIST

## 2022-02-04 PROCEDURE — A4216 STERILE WATER/SALINE, 10 ML: HCPCS | Performed by: STUDENT IN AN ORGANIZED HEALTH CARE EDUCATION/TRAINING PROGRAM

## 2022-02-04 PROCEDURE — 43246 EGD PLACE GASTROSTOMY TUBE: CPT | Mod: ,,, | Performed by: INTERNAL MEDICINE

## 2022-02-04 PROCEDURE — 43246 EGD PLACE GASTROSTOMY TUBE: CPT | Performed by: INTERNAL MEDICINE

## 2022-02-04 PROCEDURE — 37000009 HC ANESTHESIA EA ADD 15 MINS: Performed by: INTERNAL MEDICINE

## 2022-02-04 PROCEDURE — 25000003 PHARM REV CODE 250: Performed by: REGISTERED NURSE

## 2022-02-04 PROCEDURE — C9254 INJECTION, LACOSAMIDE: HCPCS | Performed by: STUDENT IN AN ORGANIZED HEALTH CARE EDUCATION/TRAINING PROGRAM

## 2022-02-04 PROCEDURE — 99024 POSTOP FOLLOW-UP VISIT: CPT | Mod: ,,, | Performed by: UROLOGY

## 2022-02-04 PROCEDURE — 63600175 PHARM REV CODE 636 W HCPCS: Performed by: REGISTERED NURSE

## 2022-02-04 PROCEDURE — C1751 CATH, INF, PER/CENT/MIDLINE: HCPCS

## 2022-02-04 PROCEDURE — 43246 PR EGD, FLEX, W/PLCMT, GASTROSTOMY TUBE: ICD-10-PCS | Mod: ,,, | Performed by: INTERNAL MEDICINE

## 2022-02-04 PROCEDURE — 37000008 HC ANESTHESIA 1ST 15 MINUTES: Performed by: INTERNAL MEDICINE

## 2022-02-04 PROCEDURE — U0002 COVID-19 LAB TEST NON-CDC: HCPCS | Performed by: ANESTHESIOLOGY

## 2022-02-04 PROCEDURE — 11000001 HC ACUTE MED/SURG PRIVATE ROOM

## 2022-02-04 PROCEDURE — D9220A PRA ANESTHESIA: Mod: ,,, | Performed by: ANESTHESIOLOGY

## 2022-02-04 PROCEDURE — 99024 PR POST-OP FOLLOW-UP VISIT: ICD-10-PCS | Mod: ,,, | Performed by: UROLOGY

## 2022-02-04 RX ORDER — LIDOCAINE HYDROCHLORIDE 20 MG/ML
INJECTION INTRAVENOUS
Status: DISCONTINUED | OUTPATIENT
Start: 2022-02-04 | End: 2022-02-04

## 2022-02-04 RX ORDER — EPHEDRINE SULFATE 50 MG/ML
INJECTION, SOLUTION INTRAVENOUS
Status: DISCONTINUED
Start: 2022-02-04 | End: 2022-02-04 | Stop reason: WASHOUT

## 2022-02-04 RX ORDER — FENTANYL CITRATE 50 UG/ML
INJECTION, SOLUTION INTRAMUSCULAR; INTRAVENOUS
Status: COMPLETED
Start: 2022-02-04 | End: 2022-02-04

## 2022-02-04 RX ORDER — PHENYLEPHRINE HYDROCHLORIDE 10 MG/ML
INJECTION INTRAVENOUS
Status: DISCONTINUED | OUTPATIENT
Start: 2022-02-04 | End: 2022-02-04

## 2022-02-04 RX ORDER — ROCURONIUM BROMIDE 10 MG/ML
INJECTION, SOLUTION INTRAVENOUS
Status: DISPENSED
Start: 2022-02-04 | End: 2022-02-05

## 2022-02-04 RX ORDER — ONDANSETRON 2 MG/ML
INJECTION INTRAMUSCULAR; INTRAVENOUS
Status: DISCONTINUED | OUTPATIENT
Start: 2022-02-04 | End: 2022-02-04

## 2022-02-04 RX ORDER — FENTANYL CITRATE 50 UG/ML
INJECTION, SOLUTION INTRAMUSCULAR; INTRAVENOUS
Status: DISCONTINUED | OUTPATIENT
Start: 2022-02-04 | End: 2022-02-04

## 2022-02-04 RX ORDER — PROPOFOL 10 MG/ML
INJECTION, EMULSION INTRAVENOUS
Status: DISPENSED
Start: 2022-02-04 | End: 2022-02-05

## 2022-02-04 RX ORDER — CEFAZOLIN SODIUM 1 G/50ML
SOLUTION INTRAVENOUS
Status: DISPENSED
Start: 2022-02-04 | End: 2022-02-05

## 2022-02-04 RX ORDER — PROPOFOL 10 MG/ML
VIAL (ML) INTRAVENOUS
Status: DISCONTINUED | OUTPATIENT
Start: 2022-02-04 | End: 2022-02-04

## 2022-02-04 RX ORDER — ONDANSETRON 2 MG/ML
INJECTION INTRAMUSCULAR; INTRAVENOUS
Status: COMPLETED
Start: 2022-02-04 | End: 2022-02-04

## 2022-02-04 RX ORDER — PHENYLEPHRINE HCL IN 0.9% NACL 1 MG/10 ML
SYRINGE (ML) INTRAVENOUS
Status: DISPENSED
Start: 2022-02-04 | End: 2022-02-05

## 2022-02-04 RX ORDER — LIDOCAINE HYDROCHLORIDE 20 MG/ML
INJECTION, SOLUTION EPIDURAL; INFILTRATION; INTRACAUDAL; PERINEURAL
Status: DISPENSED
Start: 2022-02-04 | End: 2022-02-05

## 2022-02-04 RX ADMIN — PIPERACILLIN AND TAZOBACTAM 4.5 G: 4; .5 INJECTION, POWDER, LYOPHILIZED, FOR SOLUTION INTRAVENOUS; PARENTERAL at 04:02

## 2022-02-04 RX ADMIN — FENTANYL CITRATE 25 MCG: 50 INJECTION, SOLUTION INTRAMUSCULAR; INTRAVENOUS at 02:02

## 2022-02-04 RX ADMIN — ENOXAPARIN SODIUM 40 MG: 40 INJECTION SUBCUTANEOUS at 06:02

## 2022-02-04 RX ADMIN — PHENYLEPHRINE HYDROCHLORIDE 200 MCG: 10 INJECTION INTRAVENOUS at 02:02

## 2022-02-04 RX ADMIN — DEXTROSE 500 MG: 50 INJECTION, SOLUTION INTRAVENOUS at 10:02

## 2022-02-04 RX ADMIN — DEXTROSE 500 MG: 50 INJECTION, SOLUTION INTRAVENOUS at 01:02

## 2022-02-04 RX ADMIN — Medication 10 ML: at 11:02

## 2022-02-04 RX ADMIN — DEXTROSE 500 MG: 50 INJECTION, SOLUTION INTRAVENOUS at 11:02

## 2022-02-04 RX ADMIN — Medication 10 ML: at 06:02

## 2022-02-04 RX ADMIN — SODIUM CHLORIDE: 0.9 INJECTION, SOLUTION INTRAVENOUS at 01:02

## 2022-02-04 RX ADMIN — PROPOFOL 50 MG: 10 INJECTION, EMULSION INTRAVENOUS at 02:02

## 2022-02-04 RX ADMIN — PROPOFOL 10 MG: 10 INJECTION, EMULSION INTRAVENOUS at 02:02

## 2022-02-04 RX ADMIN — SODIUM CHLORIDE 200 MG: 9 INJECTION, SOLUTION INTRAVENOUS at 12:02

## 2022-02-04 RX ADMIN — Medication 10 ML: at 12:02

## 2022-02-04 RX ADMIN — ONDANSETRON 4 MG: 2 INJECTION, SOLUTION INTRAMUSCULAR; INTRAVENOUS at 02:02

## 2022-02-04 RX ADMIN — PHENYLEPHRINE HYDROCHLORIDE 100 MCG: 10 INJECTION INTRAVENOUS at 02:02

## 2022-02-04 RX ADMIN — LIDOCAINE HYDROCHLORIDE 50 MG: 20 INJECTION, SOLUTION INTRAVENOUS at 02:02

## 2022-02-04 RX ADMIN — SODIUM CHLORIDE 200 MG: 9 INJECTION, SOLUTION INTRAVENOUS at 11:02

## 2022-02-04 RX ADMIN — PIPERACILLIN AND TAZOBACTAM 4.5 G: 4; .5 INJECTION, POWDER, LYOPHILIZED, FOR SOLUTION INTRAVENOUS; PARENTERAL at 08:02

## 2022-02-04 RX ADMIN — PROPOFOL 30 MG: 10 INJECTION, EMULSION INTRAVENOUS at 02:02

## 2022-02-04 NOTE — OR NURSING
PROCEDURE AND RECOVERY COMPLETED. REPORT CALLED TO FLOOR NURSE;  PATIENT RESPONDING TO VERBAL AND TACTILE STIMULI WITH MINIMAL INCOMPREHENSIVE COMMUNICATION; NAD NOTED. PEG SITE NOTE WITH DRSG DRY AND INTACT; ABD BINDER APPLIED FOR SECUREMENT OF DEVICE. PATIENT READY TO TRANSPORT TO ROOM; PRINTED REPORT PLACED IN PATIENT'S CHART

## 2022-02-04 NOTE — SUBJECTIVE & OBJECTIVE
Interval History: no significant changes.    Review of Systems   Unable to perform ROS: Mental status change     Objective:     Temp:  [97.3 °F (36.3 °C)-98 °F (36.7 °C)] 98 °F (36.7 °C)  Pulse:  [69-81] 69  Resp:  [16-19] 18  SpO2:  [94 %-98 %] 98 %  BP: (137-159)/(75-89) 149/84     Body mass index is 19.92 kg/m².           Drains     Drain                 Urethral Catheter 01/05/22 1050 Double-lumen 20 Fr. 29 days                Physical Exam  Vitals and nursing note reviewed.   Constitutional:       General: He is not in acute distress.     Appearance: He is well-developed and well-nourished.   HENT:      Head: Normocephalic.   Eyes:      Conjunctiva/sclera: Conjunctivae normal.   Neck:      Thyroid: No thyromegaly.      Trachea: No tracheal deviation.   Cardiovascular:      Rate and Rhythm: Normal rate.      Heart sounds: Normal heart sounds.   Pulmonary:      Effort: Pulmonary effort is normal. No respiratory distress.      Breath sounds: Normal breath sounds. No wheezing.   Abdominal:      General: Bowel sounds are normal.      Palpations: Abdomen is soft. There is no hepatosplenomegaly.      Tenderness: There is no abdominal tenderness. There is no CVA tenderness or rebound.      Hernia: No hernia is present.   Genitourinary:     Comments: Sahu in place with yellow urine  Wound clean, packed  Musculoskeletal:         General: No tenderness or edema. Normal range of motion.      Cervical back: Normal range of motion and neck supple.   Lymphadenopathy:      Cervical: No cervical adenopathy.   Skin:     General: Skin is warm and dry.      Findings: No erythema or rash.   Neurological:      Mental Status: He is alert and oriented to person, place, and time.   Psychiatric:         Mood and Affect: Mood and affect normal.         Behavior: Behavior normal.         Thought Content: Thought content normal.         Judgment: Judgment normal.         Significant Labs:    BMP:  Recent Labs   Lab 01/30/22  0615  02/01/22  0330   * 135*   K 4.4 4.6    101   CO2 23 22*   BUN 16 19   CREATININE 1.0 1.0   CALCIUM 8.6* 9.2       CBC:   Recent Labs   Lab 02/01/22  0330 02/02/22  1025 02/03/22  0512   WBC 14.24* 14.56* 12.14   HGB 8.7* 8.6* 8.2*   HCT 26.6* 26.3* 26.0*    233 293       Blood Culture:   Recent Labs   Lab 02/01/22  1227   LABBLOO No Growth to date  No Growth to date  No Growth to date  No Growth to date  No Growth to date  No Growth to date     Urine Culture:   Recent Labs   Lab 02/01/22  1434   LABURIN No growth       Significant Imaging:

## 2022-02-04 NOTE — TRANSFER OF CARE
"Anesthesia Transfer of Care Note    Patient: Abhishek Zhao    Procedure(s) Performed: Procedure(s) (LRB):  INSERTION, PEG TUBE (N/A)    Patient location: GI    Anesthesia Type: general    Transport from OR: Transported from OR on 2-3 L/min O2 by NC with adequate spontaneous ventilation    Post pain: adequate analgesia    Post assessment: no apparent anesthetic complications and tolerated procedure well    Post vital signs: stable    Post-procedure mental status: LOC returned to baseline presentation     Nausea/Vomiting: no nausea/vomiting    Complications: none    Transfer of care protocol was followed      Last vitals:   Visit Vitals  BP (!) 142/71 (BP Location: Left arm, Patient Position: Lying)   Pulse (!) 56   Temp 36.6 °C (97.8 °F) (Skin)   Resp 16   Ht 5' 9.02" (1.753 m)   Wt 61.2 kg (134 lb 14.7 oz)   SpO2 100%   BMI 19.92 kg/m²     "

## 2022-02-04 NOTE — PROGRESS NOTES
Scrotal wound assessed- Wound beefy red, granulating and tena with opening 3 cm(L) 3.5 cm(W). No undermining. Scant yellow drainage. No surrounding erythema/induration. Sahu catheter.  Right posterior heel- Blister on heel dried. Wound healed.   Bilateral dorsal feet- 5 mm areas of DTI on feet. Recommend avoiding pressure to dorsal foot and allow areas of DTI to evolve. Wearing EHOB boots bilaterally.   Recommend- Continue local wound care to scrotal wound daily with Vashe moistened gauze. Continue offloading pressure from heels.

## 2022-02-04 NOTE — SUBJECTIVE & OBJECTIVE
Interval History: No acute overnight events. Nurse report patient still mildly confuse. Mental status appears similar to yesterday. Able to tell me his name, but unable to tell me his  or the place and time. Cannot tell me the situation.No family at bedside.    Palliative team and I able to reach sister and spouse and discuss possible need for alternative means of nutrition if patient does not progress with SLP. Family and spouse agrees to PEG if needed.   CC is confusion.  Review of Systems   Unable to perform ROS: Mental status change     Objective:     Vital Signs (Most Recent):  Temp: 98 °F (36.7 °C) (22)  Pulse: 69 (22)  Resp: 18 (22)  BP: (!) 149/84 (22)  SpO2: 98 % (22) Vital Signs (24h Range):  Temp:  [97.3 °F (36.3 °C)-98 °F (36.7 °C)] 98 °F (36.7 °C)  Pulse:  [69-81] 69  Resp:  [16-19] 18  SpO2:  [94 %-98 %] 98 %  BP: (137-159)/(75-89) 149/84     Weight: 61.2 kg (134 lb 14.7 oz)  Body mass index is 19.92 kg/m².  No intake or output data in the 24 hours ending 22 1030   Physical Exam  Constitutional:       General: He is not in acute distress.     Appearance: He is ill-appearing. He is not toxic-appearing.      Comments:  Appears a little more confuse today than yesterday. Oriented to self but today he was unable to tell me his , place, or time.    HENT:      Head: Atraumatic.      Nose: Nose normal.      Mouth/Throat:      Mouth: Mucous membranes are dry.   Eyes:      Extraocular Movements: Extraocular movements intact.   Cardiovascular:      Rate and Rhythm: Normal rate and regular rhythm.   Pulmonary:      Effort: No respiratory distress.      Breath sounds: No wheezing.   Abdominal:      General: Abdomen is flat. There is no distension.      Palpations: Abdomen is soft.      Tenderness: There is no abdominal tenderness. There is no guarding.   Genitourinary:     Comments: Dressing on scrotal area.  Sahu - clear yellow  urine  Musculoskeletal:         General: No swelling.      Right lower leg: No edema.      Left lower leg: No edema.   Skin:     Coloration: Skin is not jaundiced.      Findings: No bruising.   Neurological:      Mental Status: He is alert.      Comments: Alert and oriented to self. Not oriented to time, and place. Not to situation.          Significant Labs: All pertinent labs within the past 24 hours have been reviewed.    Significant Imaging: I have reviewed all pertinent imaging results/findings within the past 24 hours.

## 2022-02-04 NOTE — NURSING
Patient's wife and sister informed of patient's isolation status with the visitation's policy.  Both voiced understanding.

## 2022-02-04 NOTE — PROGRESS NOTES
[facility]  Urology  Progress Note    Patient Name: Abhishek Zhao  MRN: [id number]  Admission Date: 1/4/2022  Hospital Length of Stay: 30 days  Code Status: DNR   Attending Provider: Quinten Rosario MD   Primary Care Physician: To Obtain Unable    Subjective:     HPI:  Scrotal Swelling  Abhishek Zhao is a 61 y.o. male who was been experiencing scrotal pain and swelling since 12/31/2021.  He denies any fever.  He has had issues voiding since the swelling began.  Hemostat having issues in the past with urinary problems.  He denies having any previous issues with scrotal infection or swelling.  He recalls that he had procedures in the past but cannot recall specifically what to place.  He does not have urologist locally but moved back from [place] about 1 year ago.    Review of care everywhere shows that he had a cystoscopy with urethral dilation of a urethral stricture in the bulbar urethra in 2019 at [facility].  And there are reports that in approximately 2015 [facility].      Interval History: no significant changes.    Review of Systems   Unable to perform ROS: Mental status change     Objective:     Temp:  [97.3 °F (36.3 °C)-98 °F (36.7 °C)] 98 °F (36.7 °C)  Pulse:  [69-81] 69  Resp:  [16-19] 18  SpO2:  [94 %-98 %] 98 %  BP: (137-159)/(75-89) 149/84     Body mass index is 19.92 kg/m².           Drains     Drain                 Urethral Catheter 01/05/22 1050 Double-lumen 20 Fr. 29 days                Physical Exam  Vitals and nursing note reviewed.   Constitutional:       General: He is not in acute distress.     Appearance: He is well-developed and well-nourished.   HENT:      Head: Normocephalic.   Eyes:      Conjunctiva/sclera: Conjunctivae normal.   Neck:      Thyroid: No thyromegaly.      Trachea: No tracheal deviation.   Cardiovascular:      Rate and Rhythm: Normal rate.      Heart sounds: Normal heart sounds.   Pulmonary:      Effort: Pulmonary  effort is normal. No respiratory distress.      Breath sounds: Normal breath sounds. No wheezing.   Abdominal:      General: Bowel sounds are normal.      Palpations: Abdomen is soft. There is no hepatosplenomegaly.      Tenderness: There is no abdominal tenderness. There is no CVA tenderness or rebound.      Hernia: No hernia is present.   Genitourinary:     Comments: Santiago in place with yellow urine  Wound clean, packed  Musculoskeletal:         General: No tenderness or edema. Normal range of motion.      Cervical back: Normal range of motion and neck supple.   Lymphadenopathy:      Cervical: No cervical adenopathy.   Skin:     General: Skin is warm and dry.      Findings: No erythema or rash.   Neurological:      Mental Status: He is alert and oriented to person, place, and time.   Psychiatric:         Mood and Affect: Mood and affect normal.         Behavior: Behavior normal.         Thought Content: Thought content normal.         Judgment: Judgment normal.         Significant Labs:    BMP:  Recent Labs   Lab 01/30/22  0615 02/01/22  0330   * 135*   K 4.4 4.6    101   CO2 23 22*   BUN 16 19   CREATININE 1.0 1.0   CALCIUM 8.6* 9.2       CBC:   Recent Labs   Lab 02/01/22  0330 02/02/22  1025 02/03/22  0512   WBC 14.24* 14.56* 12.14   HGB 8.7* 8.6* 8.2*   HCT 26.6* 26.3* 26.0*    233 293       Blood Culture:   Recent Labs   Lab 02/01/22  1227   LABBLOO No Growth to date  No Growth to date  No Growth to date  No Growth to date  No Growth to date  No Growth to date     Urine Culture:   Recent Labs   Lab 02/01/22  1434   LABURIN No growth       Significant Imaging:                    Assessment/Plan:     * Scrotal abscess  s/p cystoscopy with santiago placement and debridement of abscess/necrosis of scrotum 1/5/22 with Dr. Rangel.  Fluid collection drained at bedside 1/14/2022  CT from 1/18/22 w/ R corporal body fluid collection concerning for residual abscess vs fluid collection  Additional  purulence expressed from penis on 1/20/21    Continue abx per primary  Cultures growing Candida and E coli  Appreciate wound care RN and floor RN dressing changes  Leukocytosis monitor    Afebrile  Will continue to monitor condition    Sahu changed last week without issue at the bedside    Noted home hospice  Follow up next week to evaluate, okay to d/c home from a  standpoint  Home with Sahu    Will need plastic surgery evaluation in future once wound stable for possible grafting. Can be done as outpatient.               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

## 2022-02-04 NOTE — ANESTHESIA PREPROCEDURE EVALUATION
02/04/2022  Abhishek Zhao is a 61 y.o., male.    Anesthesia Evaluation    I have reviewed the Patient Summary Reports.    I have reviewed the Nursing Notes.       Review of Systems  Anesthesia Hx:  No problems with previous Anesthesia    Social:  Smoker Hx cocaine use, in remission   Hematology/Oncology:         -- Anemia: Hematology Comments: Latest H/H 8/26 on 2/3   Cardiovascular:   Hypertension hyperlipidemia 1/14/2022 Echo: EF 50%; PAP 27   Pulmonary:   Pneumonia Had episode of aspiration PNA during this hospitalization   Renal/:   Chronic Renal Disease Initial admission for Berna's gangrene   Neurological:   Seizures Encephalopathy; CT head negative for any acute changes   Endocrine:   Diabetes, type 2, using insulin    Psych:   Psychiatric History          Physical Exam  General:  frail   Airway/Jaw/Neck:  Airway Findings:      Chest/Lungs:  Chest/Lungs Findings: Normal Respiratory Rate     Heart/Vascular:  Heart Findings: Rate: Normal        Mental Status:  Mental Status Findings:  Confusion  Pt mumbles and occasionally answers questions appropriately, but mostly is confused       Anesthesia Plan  Type of Anesthesia, risks & benefits discussed:  Anesthesia Type:  general    Patient's Preference:   Plan Factors:          Intra-op Monitoring Plan: standard ASA monitors  Intra-op Monitoring Plan Comments:   Post Op Pain Control Plan: multimodal analgesia and per primary service following discharge from PACU  Post Op Pain Control Plan Comments:     Induction:   IV  Beta Blocker:  Patient is not currently on a Beta-Blocker (No further documentation required).       Informed Consent: Patient representative understands risks and agrees with Anesthesia plan.  Questions answered. Anesthesia consent signed with patient representative.  ASA Score: 3     Day of Surgery Review of History & Physical: I have  interviewed and examined the patient. I have reviewed the patient's H&P dated:        Anesthesia Plan Notes: 61 yr old man w/ PMH of ischemic heart disease, seizure, insulin-dependent DM, smoking, and cocaine abuse in remission   admitted about 1 month ago w/ Berna's gangrene and sepsis. His early hospital course was complicated by   aspiration and he continues to have altered mental status.  Pt's wife signed electronic consent in person on 2/3 but did not discuss suspension of current DNR code status.    2/4/2022   Anesthesia consent obtained by Dr. Judd from patient's wife on 2/3/2022. I called patient's wife today to verify his code status and discuss suspending his DNR/DNI status in the perioperative period. She agrees to suspend it. Phone conversation was witnessed by EUGENE Dillon.         Ready For Surgery From Anesthesia Perspective.

## 2022-02-04 NOTE — H&P
Endoscopy History and Physical    PCP - To Obtain Unable     Procedure - EGD  ASA - per anesthesia  Mallampati - per anesthesia  History of Anesthesia problems - no  Family history Anesthesia problems -  no   Plan of anesthesia - General, MAC    HPI:  This is a 61 y.o. male here for evaluation of :       PEG tube placement    ROS:  Constitutional: No fevers, chills, No weight loss  CV: No chest pain  Pulm: No cough, No shortness of breath  Ophtho: No vision changes  GI: see HPI  Derm: No rash    Medical History:  has a past medical history of High cholesterol, Hypertension, and Seizures.    Surgical History:  has a past surgical history that includes Surgical removal of abscess (1/5/2022); Cystoscopy (1/5/2022); and Fistulogram (N/A, 1/5/2022).    Family History: family history is not on file.. Otherwise no colon cancer, inflammatory bowel disease, or GI malignancies.    Social History:  reports that he has been smoking cigarettes. He has been smoking about 1.50 packs per day. He does not have any smokeless tobacco history on file. He reports current alcohol use.    Review of patient's allergies indicates:  No Known Allergies    Medications:   Medications Prior to Admission   Medication Sig Dispense Refill Last Dose    amLODIPine (NORVASC) 10 MG tablet Take 10 mg by mouth.       amlodipine-benazepril 5-20 mg (LOTREL) 5-20 mg per capsule TAKE 1 CAPSULE BY MOUTH EVERY DAY FOR HEART AND BLOOD PRESSURE       ASCORBATE CALCIUM (VITAMIN C ORAL) Take by mouth.       aspirin (ECOTRIN) 81 MG EC tablet Take 81 mg by mouth once daily.       atorvastatin (LIPITOR) 80 MG tablet TAKE ONE TABLET BY MOUTH EVERY DAY FOR CHOLESTEROL       cetirizine (ZYRTEC) 10 MG tablet TAKE ONE TABLET BY MOUTH DAILY .  TAKE ONE DAILY AS NEEDED FOR ITCHING. MAY TAKE UP TO 4 TIMES DAILY AS  NEEDD .  TAKE ONE DAILY AS NEEDED FOR ITCHING. MAY TAKE UP TO 4 TIMES DAILY AS  NEEDD       cholecalciferol, vitamin D3, (VITAMIN D3) 50 mcg (2,000  unit) Tab TAKE ONE TABLET BY MOUTH EVERY DAY AS A VITAMIN SUPPLEMENT       citalopram (CELEXA) 10 MG tablet Take by mouth.       divalproex (DEPAKOTE) 500 MG Tb24 Take 500 mg by mouth once daily.       HYDROPHILIC CREAM TOP APPLY LIBERAL AMOUNT TO THE SKIN TWICE A DAY . APPLY TO THE SKIN TWICE DAILY AS NEEDED FOR DRY SKIN AND ITCING       insulin detemir U-100 (LEVEMIR) 100 unit/mL injection Inject 15 Units into the skin.       insulin glargine (LANTUS) 100 unit/mL injection Inject 20 Units into the skin.       insulin glargine 100 units/mL (3mL) SubQ pen INJECT 18 UNITS SUBCUTANEOUSLY EVERY MORNING AND INJECT 14 UNITS AT BEDTIME       lacosamide (VIMPAT) 200 mg Tab tablet Take 200 mg by mouth.       lamoTRIgine (LAMICTAL) 200 MG tablet TAKE ONE TABLET BY MOUTH TWICE A DAY FOR SEIZURES       lisinopriL 10 MG tablet TAKE ONE TABLET BY MOUTH DAILY FOR HEART/ BLOOD PRESSURE       metFORMIN (GLUCOPHAGE) 1000 MG tablet TAKE ONE TABLET BY MOUTH TWICE A DAY WITH FOOD FOR DIABETES       metFORMIN (GLUCOPHAGE) 500 MG tablet Take 500 mg by mouth.       metoprolol succinate (TOPROL-XL) 50 MG 24 hr tablet Take 50 mg by mouth once daily.       nitroGLYCERIN (NITROSTAT) 0.4 MG SL tablet DISSOLVE ONE TABLET UNDER TONGUE [id number] FOR CHEST PAIN       oxycodone-acetaminophen (PERCOCET) 5-325 mg per tablet Take 1 tablet by mouth every 4 (four) hours as needed for Pain (do not drink alcohol, drive, or operate heavy machinery while taking this medication.). (Patient not taking: Reported on 4/28/2021) 20 tablet 0     pantoprazole (PROTONIX) 40 MG tablet Take 1 tablet (40 mg total) by mouth once daily. 30 tablet 3     potassium chloride (KLOR-CON) 10 MEQ TbSR TAKE FOUR TABLETS BY MOUTH ONCE DAILY TO INCREASE POTASSIUM       QUEtiapine (SEROQUEL) 25 MG Tab TAKE ONE-HALF TABLET BY MOUTH AT BEDTIME FOR MOOD OR PSYCHOSIS AND INSOMNIA.. MAY USE WHOLE TABLET IF NECESSARY       rosuvastatin (CRESTOR) 40 MG Tab TAKE ONE TABLET BY  MOUTH DAILY FOR CHOLESTEROL (REPLACES ATORVASTATIN)       tamsulosin (FLOMAX) 0.4 mg Cap TAKE 1 CAPSULE BY MOUTH EVERY DAY FOR BPH       triamcinolone acetonide 0.1% (KENALOG) 0.1 % cream APPLY MODERATE AMOUNT TOPICALLY TWICE A DAY       varenicline (CHANTIX JOSE) 0.5 mg (11)- 1 mg (42) tablet TAKE AS DIRECTED BY MOUTH UD FOR SMOKING CESSATION       [DISCONTINUED] divalproex (DEPAKOTE) 125 MG EC tablet Take 500 mg by mouth.       [DISCONTINUED] lisinopriL (PRINIVIL,ZESTRIL) 2.5 MG tablet Take 2.5 mg by mouth.       [DISCONTINUED] metoprolol tartrate (LOPRESSOR) 50 MG tablet TAKE ONE-HALF TABLET BY MOUTH TWICE A DAY FOR HEART/ BLOOD PRESSURE       [DISCONTINUED] rosuvastatin (CRESTOR) 20 MG tablet Take 20 mg by mouth.          Physical Exam:    Vital Signs:   Vitals:    02/04/22 1150   BP: (!) 154/78   Pulse: 81   Resp: 17   Temp: 96.8 °F (36 °C)       Gen: NAD, lying comfortably  HENT: NCAT, oropharynx clear  Eyes: anicteric sclerae, EOMI grossly  Neck: supple, no visible masses/goiter  Cardiac: RRR  Lungs: non-labored breathing  Abd: soft, NT/ND, normoactive BS  Ext: no LE edema, warm, well perfused  Skin: skin intact on exposed body parts, no visible rashes, lesions  Neuro: A&Ox4, neuro exam grossly intact, moves all extremities  Psych: appropriate mood, affect      Labs:  Lab Results   Component Value Date    WBC 12.14 02/03/2022    HGB 8.2 (L) 02/03/2022    HCT 26.0 (L) 02/03/2022     02/03/2022    ALT 24 02/01/2022    AST 36 02/01/2022     (L) 02/01/2022    K 4.6 02/01/2022     02/01/2022    CREATININE 1.0 02/01/2022    BUN 19 02/01/2022    CO2 22 (L) 02/01/2022    TSH 7.939 (H) 01/24/2022    INR 1.1 04/28/2021    HGBA1C 9.3 (H) 01/05/2022       Plan:  EGD for PEG tube placement    I have explained the risks and benefits of endoscopy procedures to the patient including but not limited to bleeding, perforation, infection, and death.  The patient was asked if they understand and allowed  to ask any further questions to their satisfaction.      Michael Fontana MD

## 2022-02-04 NOTE — ASSESSMENT & PLAN NOTE
Repeated episodes of AMS   - neurology consulted  - AEDs per neurology   - EEG with no seizure.   -SLP consulted: recommends NPO, continue TPN. F/u progress recs  -Palliative care consulted: may need PEG if nutritional needs are not met  -Mental status gradually improving   -Per Dr. Calero, wife want patient remains full code,but today,wife is agree with DNR and hospice placement,but they want still PEG tube.  -B 12,folic acid is normal,HIV is negative,RPR is negative.  but per GI patient has still leucocytosis,I already repeated blood and urine culture.updated wife.  Leucocytosis is resolved,afebrile,cultures negative,will have PEG tube placed today.

## 2022-02-04 NOTE — ASSESSMENT & PLAN NOTE
s/p cystoscopy with santiago placement and debridement of abscess/necrosis of scrotum 1/5/22 with Dr. Rangel.  Fluid collection drained at bedside 1/14/2022  CT from 1/18/22 w/ R corporal body fluid collection concerning for residual abscess vs fluid collection  Additional purulence expressed from penis on 1/20/21    Continue abx per primary  Cultures growing Candida and E coli  Appreciate wound care RN and floor RN dressing changes  Leukocytosis monitor    Afebrile  Will continue to monitor condition    Santiago changed last week without issue at the bedside    Noted home hospice  Follow up next week to evaluate, okay to d/c home from a  standpoint  Home with Santiago    Will need plastic surgery evaluation in future once wound stable for possible grafting. Can be done as outpatient.

## 2022-02-04 NOTE — PLAN OF CARE
Pt assessed. Pt is alert to self. No discomfort or pain noted. Pt turned q2 throughout shift. Pt anticipating surgery w/ GI; CHG bath given per night shift.  Wound care completed. TPN infusing. Medications administered. Oral Care completed. Bed in lowest position. Bed alarm is on and audible w/ sitter at the bedside. Call light is in reach along w/ personal items. Hourly rounding. No acute changes. Will continue to monitor.      Pt returned to the unit w. A/C/D precautions initiated/maintained. PEG tube assessed w/ abdomen binder in place- orders given to hold tube feedings until GI assess pt tomorrow. TPN restarted and medication administered per order. No complaints or discomfort at this time. Bed in lowest position, bed alarm is on and audible. Call light is in reach along w/ personal items. Hourly rounding. No acute changes. Will continue to monitor.      Problem: Adult Inpatient Plan of Care  Goal: Plan of Care Review  Outcome: Ongoing, Progressing  Goal: Patient-Specific Goal (Individualized)  Outcome: Ongoing, Progressing  Goal: Absence of Hospital-Acquired Illness or Injury  Outcome: Ongoing, Progressing  Goal: Optimal Comfort and Wellbeing  Outcome: Ongoing, Progressing  Goal: Readiness for Transition of Care  Outcome: Ongoing, Progressing     Problem: Diabetes Comorbidity  Goal: Blood Glucose Level Within Targeted Range  Outcome: Ongoing, Progressing     Problem: Infection  Goal: Absence of Infection Signs and Symptoms  Outcome: Ongoing, Progressing     Problem: Adjustment to Illness (Sepsis/Septic Shock)  Goal: Optimal Coping  Outcome: Ongoing, Progressing     Problem: Bleeding (Sepsis/Septic Shock)  Goal: Absence of Bleeding  Outcome: Ongoing, Progressing     Problem: Glycemic Control Impaired (Sepsis/Septic Shock)  Goal: Blood Glucose Level Within Desired Range  Outcome: Ongoing, Progressing     Problem: Infection Progression (Sepsis/Septic Shock)  Goal: Absence of Infection Signs and Symptoms  Outcome:  Ongoing, Progressing     Problem: Nutrition Impaired (Sepsis/Septic Shock)  Goal: Optimal Nutrition Intake  Outcome: Ongoing, Progressing     Problem: Fall Injury Risk  Goal: Absence of Fall and Fall-Related Injury  Outcome: Ongoing, Progressing     Problem: Fluid and Electrolyte Imbalance (Acute Kidney Injury/Impairment)  Goal: Fluid and Electrolyte Balance  Outcome: Ongoing, Progressing     Problem: Oral Intake Inadequate (Acute Kidney Injury/Impairment)  Goal: Optimal Nutrition Intake  Outcome: Ongoing, Progressing     Problem: Renal Function Impairment (Acute Kidney Injury/Impairment)  Goal: Effective Renal Function  Outcome: Ongoing, Progressing     Problem: Fluid Imbalance (Pneumonia)  Goal: Fluid Balance  Outcome: Ongoing, Progressing     Problem: Infection (Pneumonia)  Goal: Resolution of Infection Signs and Symptoms  Outcome: Ongoing, Progressing     Problem: Respiratory Compromise (Pneumonia)  Goal: Effective Oxygenation and Ventilation  Outcome: Ongoing, Progressing     Problem: Skin Injury Risk Increased  Goal: Skin Health and Integrity  Outcome: Ongoing, Progressing     Problem: Seizure Disorder Comorbidity  Goal: Maintenance of Seizure Control  Outcome: Ongoing, Progressing     Problem: Confusion Chronic  Goal: Optimal Cognitive Function  Outcome: Ongoing, Progressing     Problem: Impaired Wound Healing  Goal: Optimal Wound Healing  Outcome: Ongoing, Progressing     Problem: Skin or Soft Tissue Infection  Goal: Absence of Infection Signs and Symptoms  Outcome: Ongoing, Progressing     Problem: Coping Ineffective  Goal: Effective Coping  Outcome: Ongoing, Progressing

## 2022-02-04 NOTE — PT/OT/SLP PROGRESS
Speech Language Pathology      Abhishek Zhao  MRN: [id number]    Patient not seen today secondary to pt being NPO for possible PEG placement. ST will attempt to follow-up 2/5.    Deloris Newby CCC-SLP

## 2022-02-04 NOTE — PLAN OF CARE
As per discussion with patient's wife and sister Homar yesterday, dme orders and [facility] with [facility], pending review.     TN placed call to patient's sister, Homar ([phone]) regarding discharge planning. TN clarified the differences in level of care for patient home health vs. Hospice. TN explained that therapy recommends 24 hours supervision with maximum assistance and confirmed current level of care. Ms. Graf verbalized understanding.TN expressed concern for patient to safely dc home with wife alone and offered resources for sitter services. Ms. Graf stated financially sitter would not be an option however, she confirmed the family will support the patient and wife once home. Ms. Graf stated she will discuss home health vs. Hospice with patient's wife.     TN offered to reach out to [facility] once again to re-assess the discharge plan. Ms. Graf verbalized acceptance.     TN spoke with Marija with [facility], plan to reach out to family and re-assess on Monday.     Physician notified for request on clarification on wound care and follow up with palliative care with anticipated discharge for Monday.

## 2022-02-04 NOTE — PROGRESS NOTES
[facility]  Progress Note    Patient Name: Abhishek Zhao  MRN: [id number]  Patient Class: IP- Inpatient   Admission Date: 1/4/2022  Length of Stay: 30 days  Attending Physician: Quinten Rosario MD  Primary Care Provider: To Obtain Unable        Subjective:     Principal Problem:Scrotal abscess        HPI:  61 y.o. male with adjustment disorder with depressed mood, chronic ischemic heart disease, cocaine dependence in remission, cardiomegaly, HLD, HTN, swizure disorder, tobacco use, and DM 2 presents with a complaint of testicular pain.  Associated with swelling and redness to the scrotum and penis along with difficulty urinating, pain is rated as severe and radiates to the rectum.  Denies fever, chills, cough, SOB, chest pain, n/v/d.  In the ED, he was found to be tachypneic, with leukocytosis and elevated lactic.  CT and US shows evidence of multiple scrotal and perineal abscesses with some extension into the penile shaft.  UA with evidence of infection.  Sepsis treatment initiated, blood and urine cultures obtained, IV fluids and IV antibiotics initiated.  Urology consulted.      Overview/Hospital Course:  61 y.o. 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    Urology consulted. Patient s/p cystoscopy with santiago placement and debridement of abscess/necrosis of scrotum 1/5/22 with Dr. Rangel. Fluid collection drained at bedside 1/14/2022. CT from 1/18/22 w/ R corporal body fluid collection concerning for residual abscess vs fluid collection. Hospitalization course complicated with aspiration event 1/11 w/ subsequent desaturation. Respiratory culture grew pseudomonas. Kept on Zosyn. On 1/13, pt had abrupt reduction in responsiveness. Code stroke called,  patient transferred to ICU. No stroke found on imaging. Started on extended EEG. Mental status improved, no sign of seizure. Patient stepped back down to the floor. Mental status continues to wax and wane. Given change in mental status, SLP evaluated and recommend NPO and pt started on TPN. Palliative following. B 12,folic acid is normal,HIV is negative,RPR is negative. Abscess culture with CANDIDA GLABRATA and ecoli. Blood culture with NGTD. ID is following. PT/OT consulted- recommends SNF. Had extensive discussion with patient's wife at bedside about patient's clinical status, nutrition status, and deconditioning. Discussed that patient would likely benefit from SNF, but wife is hesitant but sister is agreeable. Also, palliative team and I discussed with patient's spouse and sister on 2022 about alternative means for nutrition in the event patient unable to progress with SLP. Wife and sister agreeable to PEG tube if patient does not progress with SLP. GI consulted. Will follow up with SLP progress. Plan to continue IV abx and awaiting for SNF placement.  Remains on TPN,plan for PEG tube placement by GI.but per GI patient has still leucocytosis,I already repeated blood and urine culture.updated wife.wife is agree with DNR and hospice placement,but they want still PEG tube.  Will have PEG tube today.  CC is confusion.      Interval History: No acute overnight events. Nurse report patient still mildly confuse. Mental status appears similar to yesterday. Able to tell me his name, but unable to tell me his  or the place and time. Cannot tell me the situation.No family at bedside.    Palliative team and I able to reach sister and spouse and discuss possible need for alternative means of nutrition if patient does not progress with SLP. Family and spouse agrees to PEG if needed.   CC is confusion.  Review of Systems   Unable to perform ROS: Mental status change     Objective:     Vital Signs (Most Recent):  Temp:  98 °F (36.7 °C) (22 075)  Pulse: 69 (22)  Resp: 18 (22)  BP: (!) 149/84 (22)  SpO2: 98 % (22) Vital Signs (24h Range):  Temp:  [97.3 °F (36.3 °C)-98 °F (36.7 °C)] 98 °F (36.7 °C)  Pulse:  [69-81] 69  Resp:  [16-19] 18  SpO2:  [94 %-98 %] 98 %  BP: (137-159)/(75-89) 149/84     Weight: 61.2 kg (134 lb 14.7 oz)  Body mass index is 19.92 kg/m².  No intake or output data in the 24 hours ending 22 1030   Physical Exam  Constitutional:       General: He is not in acute distress.     Appearance: He is ill-appearing. He is not toxic-appearing.      Comments:  Appears a little more confuse today than yesterday. Oriented to self but today he was unable to tell me his , place, or time.    HENT:      Head: Atraumatic.      Nose: Nose normal.      Mouth/Throat:      Mouth: Mucous membranes are dry.   Eyes:      Extraocular Movements: Extraocular movements intact.   Cardiovascular:      Rate and Rhythm: Normal rate and regular rhythm.   Pulmonary:      Effort: No respiratory distress.      Breath sounds: No wheezing.   Abdominal:      General: Abdomen is flat. There is no distension.      Palpations: Abdomen is soft.      Tenderness: There is no abdominal tenderness. There is no guarding.   Genitourinary:     Comments: Dressing on scrotal area.  Sahu - clear yellow urine  Musculoskeletal:         General: No swelling.      Right lower leg: No edema.      Left lower leg: No edema.   Skin:     Coloration: Skin is not jaundiced.      Findings: No bruising.   Neurological:      Mental Status: He is alert.      Comments: Alert and oriented to self. Not oriented to time, and place. Not to situation.          Significant Labs: All pertinent labs within the past 24 hours have been reviewed.    Significant Imaging: I have reviewed all pertinent imaging results/findings within the past 24 hours.      Assessment/Plan:      * Scrotal abscess  -Multiple abscesses c/w Berna's  gangrene   -Cultures growing ampicillin-resistant Ecoli and C albicans. Repeat CT A/P 1/13 w/ 4cm fluid collection c/f persistent abscess which was drained bedside by urology  -Urology consulted: s/p cystoscopy with santiago placement and debridement of abscess/necrosis of scrotum 1/5/22 with Dr. Rangel.Fluid collection drained at bedside 1/14/2022  -CT from 1/18/22 w/ R corporal body fluid collection concerning for residual abscess vs fluid collection  - cont zosyn and diflucan (re-started for HCAP)  - ID consulted, appreciate recs: continue abx until resolution of abscess  -Consulted IR for drainage of residual fluid collection on CT pelvic. IR procedure is not done,due to high risk for complications  -continue with IV Abx and repeat CT,spoke with Urology,US show improvement in Abscess size,no need for additional intervention at this time  -Monitor closely       Advance care planning  I spent greater than 16 minutes of discussion regarding advanced directive and end of life planning with patient's spouse and sister.  Wife confirms patient is full code  Wife and sister agrees to PEG if continues to fail SLP evaluation due to mentation        Malnourished  Pt ill-appearing, thin and frail  Albumin 1.3  Currently on TPN  SLP consulted- continue to evaluate if safe to swallow and able to meet nutritional needs  Discussed with family and spouse in regards to possible PEG  Family (sister) and wife agreeable on 01/31/2022  Will continue to follow with SLP, if no progression, will need PEG  GI consulted     Alkaline phosphatase elevation  Chronic isolated alk phos (GGT elevated), worsened this admission. Possibly due to AEDs vs occult HPB process.   -Alk phos trending down   - further workup depending on clinical course,improving.      Pericardial effusion  Echo with Small pericardial effusion of unclear etiology  EF of 50%  Monitor       HCAP (healthcare-associated pneumonia)  - see respiratory failure   - Continue IV abx:  Zosyn and fluconazole     Acute respiratory failure with hypoxia and hypercarbia  On admit, Cough + new LLL opacity on imaging c/f pneumonia, however CT imaging showing pleural effusion and atelectasis  - respiratory cultures w/ pseudomonas  - cont zosyn  - furosemide as tolerated; no significant evidence of cardiac dysfunction on TTE  - IS  - Wean O2 as tolerated        Hypokalemia  Replete PRN    JOI (acute kidney injury)  Resolved      Sepsis due to Gram negative bacteria  Resolved      Berna's gangrene   -Urology consulted:  s/p debridement 1/5/22  -Will need plastic surgery evaluation in future once wound stable for possible grafting. Can be done as outpatient.     Diabetes mellitus type 2, controlled  A1c:   Lab Results   Component Value Date    HGBA1C 9.3 (H) 01/05/2022     Meds:  SSI PRN to maintain goal 140-180  ADA diet, accuchecks ACHS, hypoglycemic protocol        Tobacco user  Dr. Calero: 5 minutes spent counseling the patient on smoking cessation and he is not currently ready to stop smoking  He will be offereded a nicotine transdermal patch while hospitalized and monitored for withdrawal.  Will provide additional smoking cessation counseling prior to discharge.     Acute encephalopathy  Repeated episodes of AMS   - neurology consulted  - AEDs per neurology   - EEG with no seizure.   -SLP consulted: recommends NPO, continue TPN. F/u progress recs  -Palliative care consulted: may need PEG if nutritional needs are not met  -Mental status gradually improving   -Per Dr. Calero, wife want patient remains full code,but today,wife is agree with DNR and hospice placement,but they want still PEG tube.  -B 12,folic acid is normal,HIV is negative,RPR is negative.  but per GI patient has still leucocytosis,I already repeated blood and urine culture.updated wife.  Leucocytosis is resolved,afebrile,cultures negative,will have PEG tube placed today.      Essential hypertension  Well controlled, continue home  "medications and monitor blood pressure, adjust as needed.   On prn IV hydralazine at this time.    Hyperlipidemia  Continue statin    Cardiomegaly  Pulmonary edema seen on imaging, small pericardial effusion seen on TTE. LVEF low-normal (50%)  - hold diuresis while NPO    Cocaine dependence in remission  Counseled  Wife stated patient last use was "years ago"    Chronic ischemic heart disease  No acute issue    Adjustment disorder with depressed mood  Continue home citalopram, hold home seroquel due to somnolence      VTE Risk Mitigation (From admission, onward)         Ordered     enoxaparin injection 40 mg  Daily         01/13/22 1612     IP VTE LOW RISK PATIENT  Once         01/04/22 2241     Place sequential compression device  Until discontinued         01/04/22 2241                Discharge Planning   CHUCK:      Code Status: DNR   Is the patient medically ready for discharge?:     Reason for patient still in hospital (select all that apply): Patient trending condition  Discharge Plan A: Home Health,Home with family   Discharge Delays: (!) Procedure Scheduling (IR, OR, Labs, Echo, Cath, Echo, EEG)              Quinten Rosario MD  Department of Hospital Medicine   [facility] [place] [facility]    "

## 2022-02-04 NOTE — ACP (ADVANCE CARE PLANNING)
Advance Care Planning     Date: 02/04/2022    Code Status  In light of the patients advanced and life limiting illness,I engaged the the family in a conversation about the patient's preferences for care  at the very end of life. The patient wishes to have a natural, peaceful death.  Along those lines, the patient does not wish to have CPR or other invasive treatments performed when his heart and/or breathing stops. I communicated to the family that a DNR order would be placed in his medical record to reflect this preference.  I spent a total of 30  minutes engaging the patient in this advance care planning discussion.

## 2022-02-04 NOTE — PROVATION PATIENT INSTRUCTIONS
Discharge Summary/Instructions after an Endoscopic Procedure  Patient Name: Abhishek Zhao  Patient MRN: [id number]  Patient YOB: 1960 Friday, February 4, 2022  Michael Fontana MD  Dear patient,  As a result of recent federal legislation (The Federal Cures Act), you may   receive lab or pathology results from your procedure in your [facility]   account before your physician is able to contact you. Your physician or   their representative will relay the results to you with their   recommendations at their soonest availability.  Thank you,  RESTRICTIONS:  During your procedure today, you received medications for sedation.  These   medications may affect your judgment, balance and coordination.  Therefore,   for 24 hours, you have the following restrictions:   - DO NOT drive a car, operate machinery, make legal/financial decisions,   sign important papers or drink alcohol.    ACTIVITY:  Today: no heavy lifting, straining or running due to procedural   sedation/anesthesia.  The following day: return to full activity including work.  DIET:  Eat and drink normally unless instructed otherwise.     TREATMENT FOR COMMON SIDE EFFECTS:  - Mild abdominal pain, nausea, belching, bloating or excessive gas:  rest,   eat lightly and use a heating pad.  - Sore Throat: treat with throat lozenges and/or gargle with warm salt   water.  - Because air was used during the procedure, expelling large amounts of air   from your rectum or belching is normal.  - If a bowel prep was taken, you may not have a bowel movement for 1-3 days.    This is normal.  SYMPTOMS TO WATCH FOR AND REPORT TO YOUR PHYSICIAN:  1. Abdominal pain or bloating, other than gas cramps.  2. Chest pain.  3. Back pain.  4. Signs of infection such as: chills or fever occurring within 24 hours   after the procedure.  5. Rectal bleeding, which would show as bright red, maroon, or black stools.   (A tablespoon of blood from the rectum is not serious, especially if    hemorrhoids are present.)  6. Vomiting.  7. Weakness or dizziness.  GO DIRECTLY TO THE NEAREST EMERGENCY ROOM IF YOU HAVE ANY OF THE FOLLOWING:      Difficulty breathing              Chills and/or fever over 101 F   Persistent vomiting and/or vomiting blood   Severe abdominal pain   Severe chest pain   Black, tarry stools   Bleeding- more than one tablespoon   Any other symptom or condition that you feel may need urgent attention  Your doctor recommends these additional instructions:  If any biopsies were taken, your doctors clinic will contact you in 1 to 2   weeks with any results.  - Return patient to hospital bodwen for ongoing care.   - In 4 hours, okay to administer medications via PEG tube.   - Inpatient GI team to perform post-PEG check tomorrow. Await GI evaluation   tomorrow prior to tube feed initiation.  - Flush tubing with 50 cc saline before and after use  For questions, problems or results please call your physician - Michael Fontana MD at Work:  [phone].  [facility] can be reached at [phone]     IF A COMPLICATION OR EMERGENCY SITUATION ARISES AND YOU ARE UNABLE TO REACH   YOUR PHYSICIAN - GO DIRECTLY TO THE EMERGENCY ROOM.  Michael Fontana MD  2/4/2022 2:49:46 PM  This report has been verified and signed electronically.  Dear patient,  As a result of recent federal legislation (The Federal Cures Act), you may   receive lab or pathology results from your procedure in your [facility]   account before your physician is able to contact you. Your physician or   their representative will relay the results to you with their   recommendations at their soonest availability.  Thank you,  PROVATION

## 2022-02-05 PROBLEM — U07.1 PNEUMONIA DUE TO COVID-19 VIRUS: Status: ACTIVE | Noted: 2022-02-05

## 2022-02-05 PROBLEM — J12.82 PNEUMONIA DUE TO COVID-19 VIRUS: Status: ACTIVE | Noted: 2022-02-05

## 2022-02-05 LAB
ALLENS TEST: ABNORMAL
BACTERIA BLD CULT: NORMAL
BACTERIA BLD CULT: NORMAL
DELSYS: ABNORMAL
FLOW: 15
HCO3 UR-SCNC: 25.3 MMOL/L (ref 24–28)
MODE: ABNORMAL
PCO2 BLDA: 50 MMHG (ref 35–45)
PH SMN: 7.31 [PH] (ref 7.35–7.45)
PO2 BLDA: 29 MMHG (ref 80–100)
POC BE: -1 MMOL/L
POC SATURATED O2: 49 % (ref 95–100)
POC TCO2: 27 MMOL/L (ref 23–27)
POCT GLUCOSE: 106 MG/DL (ref 70–110)
POCT GLUCOSE: 142 MG/DL (ref 70–110)
POCT GLUCOSE: 53 MG/DL (ref 70–110)
POCT GLUCOSE: 86 MG/DL (ref 70–110)
POCT GLUCOSE: 87 MG/DL (ref 70–110)
SAMPLE: ABNORMAL
SITE: ABNORMAL

## 2022-02-05 PROCEDURE — 36600 WITHDRAWAL OF ARTERIAL BLOOD: CPT

## 2022-02-05 PROCEDURE — 63600175 PHARM REV CODE 636 W HCPCS: Performed by: STUDENT IN AN ORGANIZED HEALTH CARE EDUCATION/TRAINING PROGRAM

## 2022-02-05 PROCEDURE — 25000003 PHARM REV CODE 250: Performed by: HOSPITALIST

## 2022-02-05 PROCEDURE — 25000003 PHARM REV CODE 250: Performed by: STUDENT IN AN ORGANIZED HEALTH CARE EDUCATION/TRAINING PROGRAM

## 2022-02-05 PROCEDURE — C9254 INJECTION, LACOSAMIDE: HCPCS | Performed by: STUDENT IN AN ORGANIZED HEALTH CARE EDUCATION/TRAINING PROGRAM

## 2022-02-05 PROCEDURE — C1751 CATH, INF, PER/CENT/MIDLINE: HCPCS

## 2022-02-05 PROCEDURE — 63600175 PHARM REV CODE 636 W HCPCS: Performed by: HOSPITALIST

## 2022-02-05 PROCEDURE — 99232 PR SUBSEQUENT HOSPITAL CARE,LEVL II: ICD-10-PCS | Mod: ,,, | Performed by: UROLOGY

## 2022-02-05 PROCEDURE — A4216 STERILE WATER/SALINE, 10 ML: HCPCS | Performed by: STUDENT IN AN ORGANIZED HEALTH CARE EDUCATION/TRAINING PROGRAM

## 2022-02-05 PROCEDURE — 99232 PR SUBSEQUENT HOSPITAL CARE,LEVL II: ICD-10-PCS | Mod: ,,, | Performed by: INTERNAL MEDICINE

## 2022-02-05 PROCEDURE — 82803 BLOOD GASES ANY COMBINATION: CPT

## 2022-02-05 PROCEDURE — 99232 SBSQ HOSP IP/OBS MODERATE 35: CPT | Mod: ,,, | Performed by: INTERNAL MEDICINE

## 2022-02-05 PROCEDURE — 99232 SBSQ HOSP IP/OBS MODERATE 35: CPT | Mod: ,,, | Performed by: UROLOGY

## 2022-02-05 PROCEDURE — 11000001 HC ACUTE MED/SURG PRIVATE ROOM

## 2022-02-05 PROCEDURE — 27000207 HC ISOLATION

## 2022-02-05 PROCEDURE — 99900035 HC TECH TIME PER 15 MIN (STAT)

## 2022-02-05 RX ADMIN — Medication 10 ML: at 11:02

## 2022-02-05 RX ADMIN — DEXTROSE 500 MG: 50 INJECTION, SOLUTION INTRAVENOUS at 10:02

## 2022-02-05 RX ADMIN — REMDESIVIR 200 MG: 100 INJECTION, POWDER, LYOPHILIZED, FOR SOLUTION INTRAVENOUS at 10:02

## 2022-02-05 RX ADMIN — DEXTROSE 500 MG: 50 INJECTION, SOLUTION INTRAVENOUS at 11:02

## 2022-02-05 RX ADMIN — PIPERACILLIN AND TAZOBACTAM 4.5 G: 4; .5 INJECTION, POWDER, LYOPHILIZED, FOR SOLUTION INTRAVENOUS; PARENTERAL at 11:02

## 2022-02-05 RX ADMIN — FLUCONAZOLE 400 MG: 2 INJECTION, SOLUTION INTRAVENOUS at 11:02

## 2022-02-05 RX ADMIN — PIPERACILLIN AND TAZOBACTAM 4.5 G: 4; .5 INJECTION, POWDER, LYOPHILIZED, FOR SOLUTION INTRAVENOUS; PARENTERAL at 08:02

## 2022-02-05 RX ADMIN — SODIUM CHLORIDE 200 MG: 9 INJECTION, SOLUTION INTRAVENOUS at 01:02

## 2022-02-05 RX ADMIN — PIPERACILLIN AND TAZOBACTAM 4.5 G: 4; .5 INJECTION, POWDER, LYOPHILIZED, FOR SOLUTION INTRAVENOUS; PARENTERAL at 04:02

## 2022-02-05 NOTE — PROGRESS NOTES
[facility]  Urology  Progress Note    Patient Name: Abhishek Zhao  MRN: [id number]  Admission Date: 1/4/2022  Hospital Length of Stay: 31 days  Code Status: DNR   Attending Provider: Quinten Rosario MD   Primary Care Physician: To Obtain Unable    Subjective:     HPI:  Scrotal Swelling  Abhishek Zhao is a 61 y.o. male who was been experiencing scrotal pain and swelling since 12/31/2021.  He denies any fever.  He has had issues voiding since the swelling began.  Hemostat having issues in the past with urinary problems.  He denies having any previous issues with scrotal infection or swelling.  He recalls that he had procedures in the past but cannot recall specifically what to place.  He does not have urologist locally but moved back from [place] about 1 year ago.    Review of care everywhere shows that he had a cystoscopy with urethral dilation of a urethral stricture in the bulbar urethra in 2019 at [facility].  And there are reports that in approximately 2015 [facility].      Interval History: PEG yesterday.      Review of Systems   Unable to perform ROS: Dementia     Objective:     Temp:  [95.3 °F (35.2 °C)-97.8 °F (36.6 °C)] 97.1 °F (36.2 °C)  Pulse:  [56-86] 78  Resp:  [16-20] 18  SpO2:  [94 %-100 %] 96 %  BP: (113-150)/(66-83) 132/66     Body mass index is 19.92 kg/m².           Drains     Drain                 Urethral Catheter 01/05/22 1050 Double-lumen 20 Fr. 31 days         Gastrostomy/Enterostomy 02/04/22 1340 Percutaneous endoscopic gastrostomy (PEG) midline feeding 1 day                Physical Exam  Vitals and nursing note reviewed.   Constitutional:       General: He is not in acute distress.     Appearance: He is well-developed and well-nourished. He is ill-appearing.   HENT:      Head: Normocephalic.   Eyes:      Conjunctiva/sclera: Conjunctivae normal.   Neck:      Thyroid: No thyromegaly.      Trachea: No tracheal deviation.    Cardiovascular:      Rate and Rhythm: Normal rate.      Heart sounds: Normal heart sounds.   Pulmonary:      Effort: Pulmonary effort is normal. No respiratory distress.      Breath sounds: Normal breath sounds. No wheezing.   Abdominal:      General: Bowel sounds are normal.      Palpations: Abdomen is soft. There is no hepatosplenomegaly.      Tenderness: There is no abdominal tenderness. There is no CVA tenderness or rebound.      Hernia: No hernia is present.   Genitourinary:     Comments: Santiago in place, yellow urine  Wound clean, dressing changed.    I showed his wife how to change the dressing, she is comfortable doing that at home.  Musculoskeletal:         General: No tenderness or edema. Normal range of motion.      Cervical back: Normal range of motion and neck supple.   Lymphadenopathy:      Cervical: No cervical adenopathy.   Skin:     General: Skin is warm and dry.      Findings: No erythema or rash.   Neurological:      Mental Status: He is alert. He is disoriented.   Psychiatric:         Mood and Affect: Mood and affect normal.         Behavior: Behavior normal.         Thought Content: Thought content normal.         Judgment: Judgment normal.         Significant Labs:    BMP:  Recent Labs   Lab 01/30/22  0615 02/01/22  0330   * 135*   K 4.4 4.6    101   CO2 23 22*   BUN 16 19   CREATININE 1.0 1.0   CALCIUM 8.6* 9.2       CBC:   Recent Labs   Lab 02/01/22  0330 02/02/22  1025 02/03/22  0512   WBC 14.24* 14.56* 12.14   HGB 8.7* 8.6* 8.2*   HCT 26.6* 26.3* 26.0*    233 293       Blood Culture:   Recent Labs   Lab 02/01/22  1227   LABBLOO No Growth after 4 days.   No Growth after 4 days.      Urine Culture:   Recent Labs   Lab 02/01/22  1434   LABURIN No growth       Significant Imaging:                    Assessment/Plan:     * Scrotal abscess  s/p cystoscopy with santiago placement and debridement of abscess/necrosis of scrotum 1/5/22 with Dr. Rangel.  Fluid collection drained at  bedside 1/14/2022  CT from 1/18/22 w/ R corporal body fluid collection concerning for residual abscess vs fluid collection  Additional purulence expressed from penis on 1/20/21    Continue abx per primary  Cultures growing Candida and E coli  Appreciate wound care RN and floor RN dressing changes  Leukocytosis monitor    Afebrile  Will continue to monitor condition    Sahu changed last week without issue at the bedside    Noted home hospice  Follow up next week to evaluate, okay to d/c home from a  standpoint  Home with Sahu    Will need plastic surgery evaluation in future once wound stable for possible grafting. Can be done as outpatient.               Berna's gangrene   - s/p debridement 1/5/22        VTE Risk Mitigation (From admission, onward)         Ordered     enoxaparin injection 40 mg  Daily         01/13/22 1612     IP VTE LOW RISK PATIENT  Once         01/04/22 2241     Place sequential compression device  Until discontinued         01/04/22 2241                LATASHA Rangel MD  Urology  [facility] [place]

## 2022-02-05 NOTE — SUBJECTIVE & OBJECTIVE
Interval History: PEG yesterday.      Review of Systems   Unable to perform ROS: Dementia     Objective:     Temp:  [95.3 °F (35.2 °C)-97.8 °F (36.6 °C)] 97.1 °F (36.2 °C)  Pulse:  [56-86] 78  Resp:  [16-20] 18  SpO2:  [94 %-100 %] 96 %  BP: (113-150)/(66-83) 132/66     Body mass index is 19.92 kg/m².           Drains     Drain                 Urethral Catheter 01/05/22 1050 Double-lumen 20 Fr. 31 days         Gastrostomy/Enterostomy 02/04/22 1340 Percutaneous endoscopic gastrostomy (PEG) midline feeding 1 day                Physical Exam  Vitals and nursing note reviewed.   Constitutional:       General: He is not in acute distress.     Appearance: He is well-developed and well-nourished. He is ill-appearing.   HENT:      Head: Normocephalic.   Eyes:      Conjunctiva/sclera: Conjunctivae normal.   Neck:      Thyroid: No thyromegaly.      Trachea: No tracheal deviation.   Cardiovascular:      Rate and Rhythm: Normal rate.      Heart sounds: Normal heart sounds.   Pulmonary:      Effort: Pulmonary effort is normal. No respiratory distress.      Breath sounds: Normal breath sounds. No wheezing.   Abdominal:      General: Bowel sounds are normal.      Palpations: Abdomen is soft. There is no hepatosplenomegaly.      Tenderness: There is no abdominal tenderness. There is no CVA tenderness or rebound.      Hernia: No hernia is present.   Genitourinary:     Comments: Sahu in place, yellow urine  Wound clean, dressing changed.    I showed his wife how to change the dressing, she is comfortable doing that at home.  Musculoskeletal:         General: No tenderness or edema. Normal range of motion.      Cervical back: Normal range of motion and neck supple.   Lymphadenopathy:      Cervical: No cervical adenopathy.   Skin:     General: Skin is warm and dry.      Findings: No erythema or rash.   Neurological:      Mental Status: He is alert. He is disoriented.   Psychiatric:         Mood and Affect: Mood and affect normal.          Behavior: Behavior normal.         Thought Content: Thought content normal.         Judgment: Judgment normal.         Significant Labs:    BMP:  Recent Labs   Lab 01/30/22  0615 02/01/22  0330   * 135*   K 4.4 4.6    101   CO2 23 22*   BUN 16 19   CREATININE 1.0 1.0   CALCIUM 8.6* 9.2       CBC:   Recent Labs   Lab 02/01/22  0330 02/02/22  1025 02/03/22  0512   WBC 14.24* 14.56* 12.14   HGB 8.7* 8.6* 8.2*   HCT 26.6* 26.3* 26.0*    233 293       Blood Culture:   Recent Labs   Lab 02/01/22  1227   LABBLOO No Growth after 4 days.   No Growth after 4 days.      Urine Culture:   Recent Labs   Lab 02/01/22  1434   LABURIN No growth       Significant Imaging:

## 2022-02-05 NOTE — ASSESSMENT & PLAN NOTE
Pt ill-appearing, thin and frail  Albumin 1.3  Currently on TPN  SLP consulted- continue to evaluate if safe to swallow and able to meet nutritional needs  Discussed with family and spouse in regards to possible PEG  Family (sister) and wife agreeable on 01/31/2022  Will continue to follow with SLP, if no progression, will need PEG  GI consulted .  S/P PEG tube placement,started on feeding.

## 2022-02-05 NOTE — CARE UPDATE
[facility] MEWS TRIGGER FOLLOW UP       MEWS Monitoring, Score is: 3  Indication for review: RN Concern    Charge Nurse, Sendy contacted, MD aware/ following, instructed to call [phone] for further concerns or assistance..

## 2022-02-05 NOTE — PROGRESS NOTES
Mr. Zhao  A&Ox1. Bed bound; turned q2hr throughout night. Pt is in bed resting. Call light in reach. Bed locked and low.Hourly rounding. No acute changes overnight. Will continue to monitor for any changes and follow plan of care.     Wound dressing complete  Sitter at bedside  Tele box remains  Peg tube in place w/ abd binder

## 2022-02-05 NOTE — PROGRESS NOTES
[facility]  Gastroenterology  Progress Note    Patient Name: Abhishek Zhao  MRN: [id number]  Admission Date: 1/4/2022  Hospital Length of Stay: 31 days    Subjective:     HPI: Abhishek Zhao is a 61 y.o. male adjustment disorder with depressed mood, chronic ischemic heart disease, cocaine dependence in remission, cardiomegaly, HLD, HTN, seizure disorder, tobacco use, and DM 2 admitted with scrotal abscess s/p debridement 1/5/2022 and fluid collection drained 1/14/2022, acute encephalopathy on TPN, pseudomonas pneumonia. Getting inpatient speech therapy evaluation to determine PEG tube is warranted.    Interval History:  PEG tube placed yesterday  No fevers overnight or bleeding from site     No current facility-administered medications on file prior to encounter.     Current Outpatient Medications on File Prior to Encounter   Medication Sig Dispense Refill    amLODIPine (NORVASC) 10 MG tablet Take 10 mg by mouth.      amlodipine-benazepril 5-20 mg (LOTREL) 5-20 mg per capsule TAKE 1 CAPSULE BY MOUTH EVERY DAY FOR HEART AND BLOOD PRESSURE      ASCORBATE CALCIUM (VITAMIN C ORAL) Take by mouth.      aspirin (ECOTRIN) 81 MG EC tablet Take 81 mg by mouth once daily.      atorvastatin (LIPITOR) 80 MG tablet TAKE ONE TABLET BY MOUTH EVERY DAY FOR CHOLESTEROL      cetirizine (ZYRTEC) 10 MG tablet TAKE ONE TABLET BY MOUTH DAILY .  TAKE ONE DAILY AS NEEDED FOR ITCHING. MAY TAKE UP TO 4 TIMES DAILY AS  NEEDD .  TAKE ONE DAILY AS NEEDED FOR ITCHING. MAY TAKE UP TO 4 TIMES DAILY AS  NEEDD      cholecalciferol, vitamin D3, (VITAMIN D3) 50 mcg (2,000 unit) Tab TAKE ONE TABLET BY MOUTH EVERY DAY AS A VITAMIN SUPPLEMENT      citalopram (CELEXA) 10 MG tablet Take by mouth.      divalproex (DEPAKOTE) 500 MG Tb24 Take 500 mg by mouth once daily.      HYDROPHILIC CREAM TOP APPLY LIBERAL AMOUNT TO THE SKIN TWICE A DAY . APPLY TO THE SKIN TWICE DAILY AS NEEDED FOR DRY SKIN AND ITCING      insulin detemir U-100  (LEVEMIR) 100 unit/mL injection Inject 15 Units into the skin.      insulin glargine (LANTUS) 100 unit/mL injection Inject 20 Units into the skin.      insulin glargine 100 units/mL (3mL) SubQ pen INJECT 18 UNITS SUBCUTANEOUSLY EVERY MORNING AND INJECT 14 UNITS AT BEDTIME      lacosamide (VIMPAT) 200 mg Tab tablet Take 200 mg by mouth.      lamoTRIgine (LAMICTAL) 200 MG tablet TAKE ONE TABLET BY MOUTH TWICE A DAY FOR SEIZURES      lisinopriL 10 MG tablet TAKE ONE TABLET BY MOUTH DAILY FOR HEART/ BLOOD PRESSURE      metFORMIN (GLUCOPHAGE) 1000 MG tablet TAKE ONE TABLET BY MOUTH TWICE A DAY WITH FOOD FOR DIABETES      metFORMIN (GLUCOPHAGE) 500 MG tablet Take 500 mg by mouth.      metoprolol succinate (TOPROL-XL) 50 MG 24 hr tablet Take 50 mg by mouth once daily.      nitroGLYCERIN (NITROSTAT) 0.4 MG SL tablet DISSOLVE ONE TABLET UNDER TONGUE Q5MX3 FOR CHEST PAIN      oxycodone-acetaminophen (PERCOCET) 5-325 mg per tablet Take 1 tablet by mouth every 4 (four) hours as needed for Pain (do not drink alcohol, drive, or operate heavy machinery while taking this medication.). (Patient not taking: Reported on 4/28/2021) 20 tablet 0    pantoprazole (PROTONIX) 40 MG tablet Take 1 tablet (40 mg total) by mouth once daily. 30 tablet 3    potassium chloride (KLOR-CON) 10 MEQ TbSR TAKE FOUR TABLETS BY MOUTH ONCE DAILY TO INCREASE POTASSIUM      QUEtiapine (SEROQUEL) 25 MG Tab TAKE ONE-HALF TABLET BY MOUTH AT BEDTIME FOR MOOD OR PSYCHOSIS AND INSOMNIA.. MAY USE WHOLE TABLET IF NECESSARY      rosuvastatin (CRESTOR) 40 MG Tab TAKE ONE TABLET BY MOUTH DAILY FOR CHOLESTEROL (REPLACES ATORVASTATIN)      tamsulosin (FLOMAX) 0.4 mg Cap TAKE 1 CAPSULE BY MOUTH EVERY DAY FOR BPH      triamcinolone acetonide 0.1% (KENALOG) 0.1 % cream APPLY MODERATE AMOUNT TOPICALLY TWICE A DAY      varenicline (CHANTIX JOSE) 0.5 mg (11)- 1 mg (42) tablet TAKE AS DIRECTED BY MOUTH UD FOR SMOKING CESSATION         Review of patient's  allergies indicates:  No Known Allergies      Objective:     Vitals:    02/05/22 0804   BP:    Pulse:    Resp:    Temp: (!) 95.3 °F (35.2 °C)       Constitutional: not responsive, cachetic  GI: soft and nondistended, peg tube site looks good intact with no signs of bleeding or infection, some dried blood near site  Significant Labs:  Recent Labs   Lab 02/01/22  0330 02/02/22  1025 02/03/22  0512   HGB 8.7* 8.6* 8.2*       Lab Results   Component Value Date    WBC 12.14 02/03/2022    HGB 8.2 (L) 02/03/2022    HCT 26.0 (L) 02/03/2022    MCV 87 02/03/2022     02/03/2022       Lab Results   Component Value Date     (L) 02/01/2022    K 4.6 02/01/2022     02/01/2022    CO2 22 (L) 02/01/2022    BUN 19 02/01/2022    CREATININE 1.0 02/01/2022    CALCIUM 9.2 02/01/2022    ANIONGAP 12 02/01/2022    ESTGFRAFRICA >60 02/01/2022    EGFRNONAA >60 02/01/2022       Lab Results   Component Value Date    ALT 24 02/01/2022    AST 36 02/01/2022     (H) 01/17/2022    ALKPHOS 156 (H) 02/01/2022    BILITOT 0.2 02/01/2022       Lab Results   Component Value Date    INR 1.1 04/28/2021       Significant Imaging:  Reviewed pertinent radiology findings.     Assessment/Plan:     Abhishek Zhao is a 61 y.o. male S/P PEG tube placement. No signs of infection, bleeding.  No fevers overnight. Assessed peg tube site and okay to start using today. Call with any issues.     Thank you for involving us in the care of Abhishek Zhao. Please call with any additional questions, concerns. We will sign off.    Jluis Zurita MD  Gastroenterology Attending  [facility]

## 2022-02-05 NOTE — ASSESSMENT & PLAN NOTE
Repeated episodes of AMS   - neurology consulted  - AEDs per neurology   - EEG with no seizure.   -SLP consulted: recommends NPO, continue TPN. F/u progress recs  -Palliative care consulted: may need PEG if nutritional needs are not met  -Mental status gradually improving   -Per Dr. Calero, wife want patient remains full code,but today,wife is agree with DNR and hospice placement,but they want still PEG tube.  -B 12,folic acid is normal,HIV is negative,RPR is negative.  but per GI patient has still leucocytosis,I already repeated blood and urine culture.updated wife.  Leucocytosis is resolved,afebrile,cultures negative,  S/P PEG tube placement,started on feeding.    Had discussion with wife,she is agree going  home with [facility] on Monday,when all equipment arrived.

## 2022-02-05 NOTE — SUBJECTIVE & OBJECTIVE
Interval History:has worsening respiratory status.  CC is confusion.  Review of Systems   Unable to perform ROS: Mental status change     Objective:     Vital Signs (Most Recent):  Temp: (!) 95.3 °F (35.2 °C) (22 0804)  Pulse: 86 (22 07)  Resp: 16 (22)  BP: 113/75 (22)  SpO2: 100 % (22) Vital Signs (24h Range):  Temp:  [95.3 °F (35.2 °C)-97.8 °F (36.6 °C)] 95.3 °F (35.2 °C)  Pulse:  [56-86] 86  Resp:  [16-20] 16  SpO2:  [94 %-100 %] 100 %  BP: (113-154)/(68-83) 113/75     Weight: 61.2 kg (134 lb 14.7 oz)  Body mass index is 19.92 kg/m².    Intake/Output Summary (Last 24 hours) at 2022 1010  Last data filed at 2022 1700  Gross per 24 hour   Intake 200 ml   Output 1750 ml   Net -1550 ml      Physical Exam  Constitutional:       General: He is not in acute distress.     Appearance: He is ill-appearing. He is not toxic-appearing.      Comments:  Appears a little more confuse today than yesterday. Oriented to self but today he was unable to tell me his , place, or time.    HENT:      Head: Atraumatic.      Nose: Nose normal.      Mouth/Throat:      Mouth: Mucous membranes are dry.   Eyes:      Extraocular Movements: Extraocular movements intact.   Cardiovascular:      Rate and Rhythm: Normal rate and regular rhythm.   Pulmonary:      Effort: No respiratory distress.      Breath sounds: No wheezing.   Abdominal:      General: Abdomen is flat. There is no distension.      Palpations: Abdomen is soft.      Tenderness: There is no abdominal tenderness. There is no guarding.   Genitourinary:     Comments: Dressing on scrotal area.  Sahu - clear yellow urine  Musculoskeletal:         General: No swelling.      Right lower leg: No edema.      Left lower leg: No edema.   Skin:     Coloration: Skin is not jaundiced.      Findings: No bruising.   Neurological:      Mental Status: He is alert.      Comments: Alert and oriented to self. Not oriented to time, and place. Not  to situation.          Significant Labs: All pertinent labs within the past 24 hours have been reviewed.    Significant Imaging: I have reviewed all pertinent imaging results/findings within the past 24 hours.

## 2022-02-05 NOTE — PROGRESS NOTES
[facility] Medicine  Progress Note    Patient Name: Abhishek Zhao  MRN: [id number]  Patient Class: IP- Inpatient   Admission Date: 1/4/2022  Length of Stay: 31 days  Attending Physician: Quinten Rosario MD  Primary Care Provider: To Obtain Unable        Subjective:     Principal Problem:Scrotal abscess        HPI:  61 y.o. male with adjustment disorder with depressed mood, chronic ischemic heart disease, cocaine dependence in remission, cardiomegaly, HLD, HTN, swizure disorder, tobacco use, and DM 2 presents with a complaint of testicular pain.  Associated with swelling and redness to the scrotum and penis along with difficulty urinating, pain is rated as severe and radiates to the rectum.  Denies fever, chills, cough, SOB, chest pain, n/v/d.  In the ED, he was found to be tachypneic, with leukocytosis and elevated lactic.  CT and US shows evidence of multiple scrotal and perineal abscesses with some extension into the penile shaft.  UA with evidence of infection.  Sepsis treatment initiated, blood and urine cultures obtained, IV fluids and IV antibiotics initiated.  Urology consulted.      Overview/Hospital Course:  61 y.o. 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    Urology consulted. Patient s/p cystoscopy with santiago placement and debridement of abscess/necrosis of scrotum 1/5/22 with Dr. Rangel. Fluid collection drained at bedside 1/14/2022. CT from 1/18/22 w/ R corporal body fluid collection concerning for residual abscess vs fluid collection. Hospitalization course complicated with aspiration event 1/11 w/ subsequent desaturation. Respiratory culture grew pseudomonas. Kept on Zosyn. On 1/13, pt had abrupt reduction in responsiveness. Code stroke called,  patient transferred to ICU. No stroke found on imaging. Started on extended EEG. Mental status improved, no sign of seizure. Patient stepped back down to the floor. Mental status continues to wax and wane. Given change in mental status, SLP evaluated and recommend NPO and pt started on TPN. Palliative following. B 12,folic acid is normal,HIV is negative,RPR is negative. Abscess culture with CANDIDA GLABRATA and ecoli. Blood culture with NGTD. ID is following. PT/OT consulted- recommends SNF. Had extensive discussion with patient's wife at bedside about patient's clinical status, nutrition status, and deconditioning. Discussed that patient would likely benefit from SNF, but wife is hesitant but sister is agreeable. Also, palliative team and I discussed with patient's spouse and sister on 01/31/2022 about alternative means for nutrition in the event patient unable to progress with SLP. Wife and sister agreeable to PEG tube if patient does not progress with SLP. GI consulted. Will follow up with SLP progress. Plan to continue IV abx and awaiting for SNF placement.  Remains on TPN,plan for PEG tube placement by GI.but per GI patient has still leucocytosis,I already repeated blood and urine culture.updated wife.wife is agree with DNR and hospice placement,but they want still PEG tube.  S/P PEG tube placement,started on feeding.  Patient is covid positive with worsening respiratory status,on NRM,started on remdesivir,wife is agree.  Had discussion with wife,she is agree going  home with [employer] on Monday,when all equipment arrived.  CC is confusion.      Interval History:has worsening respiratory status.  CC is confusion.  Review of Systems   Unable to perform ROS: Mental status change     Objective:     Vital Signs (Most Recent):  Temp: (!) 95.3 °F (35.2 °C) (02/05/22 0804)  Pulse: 86 (02/05/22 0749)  Resp: 16 (02/05/22 0749)  BP: 113/75 (02/05/22 0749)  SpO2: 100 % (02/05/22 0749) Vital Signs (24h Range):  Temp:  [95.3  °F (35.2 °C)-97.8 °F (36.6 °C)] 95.3 °F (35.2 °C)  Pulse:  [56-86] 86  Resp:  [16-20] 16  SpO2:  [94 %-100 %] 100 %  BP: (113-154)/(68-83) 113/75     Weight: 61.2 kg (134 lb 14.7 oz)  Body mass index is 19.92 kg/m².    Intake/Output Summary (Last 24 hours) at 2022 1010  Last data filed at 2022 1700  Gross per 24 hour   Intake 200 ml   Output 1750 ml   Net -1550 ml      Physical Exam  Constitutional:       General: He is not in acute distress.     Appearance: He is ill-appearing. He is not toxic-appearing.      Comments:  Appears a little more confuse today than yesterday. Oriented to self but today he was unable to tell me his , place, or time.    HENT:      Head: Atraumatic.      Nose: Nose normal.      Mouth/Throat:      Mouth: Mucous membranes are dry.   Eyes:      Extraocular Movements: Extraocular movements intact.   Cardiovascular:      Rate and Rhythm: Normal rate and regular rhythm.   Pulmonary:      Effort: No respiratory distress.      Breath sounds: No wheezing.   Abdominal:      General: Abdomen is flat. There is no distension.      Palpations: Abdomen is soft.      Tenderness: There is no abdominal tenderness. There is no guarding.   Genitourinary:     Comments: Dressing on scrotal area.  Sahu - clear yellow urine  Musculoskeletal:         General: No swelling.      Right lower leg: No edema.      Left lower leg: No edema.   Skin:     Coloration: Skin is not jaundiced.      Findings: No bruising.   Neurological:      Mental Status: He is alert.      Comments: Alert and oriented to self. Not oriented to time, and place. Not to situation.          Significant Labs: All pertinent labs within the past 24 hours have been reviewed.    Significant Imaging: I have reviewed all pertinent imaging results/findings within the past 24 hours.      Assessment/Plan:      * Scrotal abscess  -Multiple abscesses c/w Berna's gangrene   -Cultures growing ampicillin-resistant Ecoli and C albicans. Repeat CT  A/P 1/13 w/ 4cm fluid collection c/f persistent abscess which was drained bedside by urology  -Urology consulted: s/p cystoscopy with santiago placement and debridement of abscess/necrosis of scrotum 1/5/22 with Dr. Rangel.Fluid collection drained at bedside 1/14/2022  -CT from 1/18/22 w/ R corporal body fluid collection concerning for residual abscess vs fluid collection  - cont zosyn and diflucan (re-started for HCAP)  - ID consulted, appreciate recs: continue abx until resolution of abscess  -Consulted IR for drainage of residual fluid collection on CT pelvic. IR procedure is not done,due to high risk for complications  -continue with IV Abx and repeat CT,spoke with Urology,US show improvement in Abscess size,no need for additional intervention at this time  -Monitor closely       Pneumonia due to COVID-19 virus    ,started on remdesivir,wife is agree.alreday on IV Abx.        Advance care planning  I spent greater than 16 minutes of discussion regarding advanced directive and end of life planning with patient's spouse and sister.  Wife confirms patient is full code  Wife and sister agrees to PEG if continues to fail SLP evaluation due to mentation        Malnourished  Pt ill-appearing, thin and frail  Albumin 1.3  Currently on TPN  SLP consulted- continue to evaluate if safe to swallow and able to meet nutritional needs  Discussed with family and spouse in regards to possible PEG  Family (sister) and wife agreeable on 01/31/2022  Will continue to follow with SLP, if no progression, will need PEG  GI consulted .  S/P PEG tube placement,started on feeding.      Alkaline phosphatase elevation  Chronic isolated alk phos (GGT elevated), worsened this admission. Possibly due to AEDs vs occult HPB process.   -Alk phos trending down   - further workup depending on clinical course,improving.      Pericardial effusion  Echo with Small pericardial effusion of unclear etiology  EF of 50%  Monitor       HCAP  (healthcare-associated pneumonia)  - see respiratory failure   - Continue IV abx: Zosyn and fluconazole     Acute respiratory failure with hypoxia and hypercarbia  On admit, Cough + new LLL opacity on imaging c/f pneumonia, however CT imaging showing pleural effusion and atelectasis  - respiratory cultures w/ pseudomonas  - cont zosyn  - furosemide as tolerated; no significant evidence of cardiac dysfunction on TTE  - IS  - Wean O2 as tolerated.    Patient is covid positive with worsening respiratory status,on NRM,started on remdesivir,wife is agree.  Had discussion with wife,she is agree going  home with [employer] on Monday,when all equipment arrived.        Hypokalemia  Replete PRN    JOI (acute kidney injury)  Resolved      Sepsis due to Gram negative bacteria  Resolved      Berna's gangrene   -Urology consulted:  s/p debridement 1/5/22  -Will need plastic surgery evaluation in future once wound stable for possible grafting. Can be done as outpatient.     Diabetes mellitus type 2, controlled  A1c:   Lab Results   Component Value Date    HGBA1C 9.3 (H) 01/05/2022     Meds:  SSI PRN to maintain goal 140-180  ADA diet, accuchecks ACHS, hypoglycemic protocol        Tobacco user  Dr. Calero: 5 minutes spent counseling the patient on smoking cessation and he is not currently ready to stop smoking  He will be offereded a nicotine transdermal patch while hospitalized and monitored for withdrawal.  Will provide additional smoking cessation counseling prior to discharge.     Acute encephalopathy  Repeated episodes of AMS   - neurology consulted  - AEDs per neurology   - EEG with no seizure.   -SLP consulted: recommends NPO, continue TPN. F/u progress recs  -Palliative care consulted: may need PEG if nutritional needs are not met  -Mental status gradually improving   -Per Dr. Calero, wife want patient remains full code,but today,wife is agree with DNR and hospice placement,but they want still PEG tube.  -B 12,folic acid is  "normal,HIV is negative,RPR is negative.  but per GI patient has still leucocytosis,I already repeated blood and urine culture.updated wife.  Leucocytosis is resolved,afebrile,cultures negative,  S/P PEG tube placement,started on feeding.    Had discussion with wife,she is agree going  home with [employer] on Monday,when all equipment arrived.    Essential hypertension  Well controlled, continue home medications and monitor blood pressure, adjust as needed.   On prn IV hydralazine at this time.    Hyperlipidemia  Continue statin    Cardiomegaly  Pulmonary edema seen on imaging, small pericardial effusion seen on TTE. LVEF low-normal (50%)  - hold diuresis while NPO    Cocaine dependence in remission  Counseled  Wife stated patient last use was "years ago"    Chronic ischemic heart disease  No acute issue    Adjustment disorder with depressed mood  Continue home citalopram, hold home seroquel due to somnolence      VTE Risk Mitigation (From admission, onward)         Ordered     enoxaparin injection 40 mg  Daily         01/13/22 1612     IP VTE LOW RISK PATIENT  Once         01/04/22 2241     Place sequential compression device  Until discontinued         01/04/22 2241                Discharge Planning   CHUCK:      Code Status: DNR   Is the patient medically ready for discharge?:     Reason for patient still in hospital (select all that apply): Patient trending condition  Discharge Plan A: Home Health,Home with family   Discharge Delays: (!) Procedure Scheduling (IR, OR, Labs, Echo, Cath, Echo, EEG)              Quinten Rosario MD  Department of Hospital Medicine   [facility]"

## 2022-02-05 NOTE — ASSESSMENT & PLAN NOTE
On admit, Cough + new LLL opacity on imaging c/f pneumonia, however CT imaging showing pleural effusion and atelectasis  - respiratory cultures w/ pseudomonas  - cont zosyn  - furosemide as tolerated; no significant evidence of cardiac dysfunction on TTE  - IS  - Wean O2 as tolerated.    Patient is covid positive with worsening respiratory status,on NRM,started on remdesivir,wife is agree.  Had discussion with wife,she is agree going  home with [employer] on Monday,when all equipment arrived.

## 2022-02-05 NOTE — PROGRESS NOTES
Mr. Zhao appeared to be pale. Nurse went to obtain O2, pt sats where 84 on monitor. Pt then placed on 15L nonrebreather w/ improved sats. Pt did not appear to be in distress.  Was notified; orders received.  Dr Shipley at bedside. Day shift nurse given updated report.

## 2022-02-06 LAB
POCT GLUCOSE: 165 MG/DL (ref 70–110)
POCT GLUCOSE: 187 MG/DL (ref 70–110)
POCT GLUCOSE: 225 MG/DL (ref 70–110)
POCT GLUCOSE: 45 MG/DL (ref 70–110)
POCT GLUCOSE: 48 MG/DL (ref 70–110)
POCT GLUCOSE: 69 MG/DL (ref 70–110)
POCT GLUCOSE: 75 MG/DL (ref 70–110)

## 2022-02-06 PROCEDURE — 27000221 HC OXYGEN, UP TO 24 HOURS

## 2022-02-06 PROCEDURE — C9254 INJECTION, LACOSAMIDE: HCPCS | Performed by: STUDENT IN AN ORGANIZED HEALTH CARE EDUCATION/TRAINING PROGRAM

## 2022-02-06 PROCEDURE — C1751 CATH, INF, PER/CENT/MIDLINE: HCPCS

## 2022-02-06 PROCEDURE — 63600175 PHARM REV CODE 636 W HCPCS: Performed by: STUDENT IN AN ORGANIZED HEALTH CARE EDUCATION/TRAINING PROGRAM

## 2022-02-06 PROCEDURE — 25000003 PHARM REV CODE 250: Performed by: STUDENT IN AN ORGANIZED HEALTH CARE EDUCATION/TRAINING PROGRAM

## 2022-02-06 PROCEDURE — 11000001 HC ACUTE MED/SURG PRIVATE ROOM

## 2022-02-06 PROCEDURE — 99232 PR SUBSEQUENT HOSPITAL CARE,LEVL II: ICD-10-PCS | Mod: ,,, | Performed by: UROLOGY

## 2022-02-06 PROCEDURE — 94761 N-INVAS EAR/PLS OXIMETRY MLT: CPT

## 2022-02-06 PROCEDURE — 63600175 PHARM REV CODE 636 W HCPCS: Performed by: HOSPITALIST

## 2022-02-06 PROCEDURE — 99900035 HC TECH TIME PER 15 MIN (STAT)

## 2022-02-06 PROCEDURE — A4216 STERILE WATER/SALINE, 10 ML: HCPCS | Performed by: STUDENT IN AN ORGANIZED HEALTH CARE EDUCATION/TRAINING PROGRAM

## 2022-02-06 PROCEDURE — 27000207 HC ISOLATION

## 2022-02-06 PROCEDURE — 25000003 PHARM REV CODE 250: Performed by: HOSPITALIST

## 2022-02-06 PROCEDURE — 99232 SBSQ HOSP IP/OBS MODERATE 35: CPT | Mod: ,,, | Performed by: UROLOGY

## 2022-02-06 RX ORDER — DEXTROSE MONOHYDRATE AND SODIUM CHLORIDE 5; .9 G/100ML; G/100ML
INJECTION, SOLUTION INTRAVENOUS CONTINUOUS
Status: DISCONTINUED | OUTPATIENT
Start: 2022-02-06 | End: 2022-02-07

## 2022-02-06 RX ADMIN — PIPERACILLIN AND TAZOBACTAM 4.5 G: 4; .5 INJECTION, POWDER, LYOPHILIZED, FOR SOLUTION INTRAVENOUS; PARENTERAL at 09:02

## 2022-02-06 RX ADMIN — ENOXAPARIN SODIUM 40 MG: 40 INJECTION SUBCUTANEOUS at 05:02

## 2022-02-06 RX ADMIN — SODIUM CHLORIDE 200 MG: 9 INJECTION, SOLUTION INTRAVENOUS at 12:02

## 2022-02-06 RX ADMIN — DEXTROSE 500 MG: 50 INJECTION, SOLUTION INTRAVENOUS at 10:02

## 2022-02-06 RX ADMIN — DEXTROSE AND SODIUM CHLORIDE: 5; .9 INJECTION, SOLUTION INTRAVENOUS at 12:02

## 2022-02-06 RX ADMIN — FLUCONAZOLE 400 MG: 2 INJECTION, SOLUTION INTRAVENOUS at 11:02

## 2022-02-06 RX ADMIN — SODIUM CHLORIDE 200 MG: 9 INJECTION, SOLUTION INTRAVENOUS at 11:02

## 2022-02-06 RX ADMIN — DEXTROSE 500 MG: 50 INJECTION, SOLUTION INTRAVENOUS at 09:02

## 2022-02-06 RX ADMIN — Medication 10 ML: at 12:02

## 2022-02-06 RX ADMIN — REMDESIVIR 100 MG: 100 INJECTION, POWDER, LYOPHILIZED, FOR SOLUTION INTRAVENOUS at 09:02

## 2022-02-06 RX ADMIN — Medication 10 ML: at 05:02

## 2022-02-06 RX ADMIN — DEXTROSE 250 ML: 10 SOLUTION INTRAVENOUS at 11:02

## 2022-02-06 RX ADMIN — Medication 10 ML: at 06:02

## 2022-02-06 RX ADMIN — PIPERACILLIN AND TAZOBACTAM 4.5 G: 4; .5 INJECTION, POWDER, LYOPHILIZED, FOR SOLUTION INTRAVENOUS; PARENTERAL at 11:02

## 2022-02-06 RX ADMIN — Medication 10 ML: at 11:02

## 2022-02-06 RX ADMIN — PIPERACILLIN AND TAZOBACTAM 4.5 G: 4; .5 INJECTION, POWDER, LYOPHILIZED, FOR SOLUTION INTRAVENOUS; PARENTERAL at 05:02

## 2022-02-06 NOTE — NURSING
Patient placed on Venti mask 12L 35%, o2 sats 98-97%, patient resting in bed, respiration even and labored.

## 2022-02-06 NOTE — PROGRESS NOTES
[facility] Medicine  Progress Note    Patient Name: Abhishek Zhao  MRN: [id number]  Patient Class: IP- Inpatient   Admission Date: 1/4/2022  Length of Stay: 32 days  Attending Physician: Quinten Rosario MD  Primary Care Provider: To Obtain Unable        Subjective:     Principal Problem:Scrotal abscess        HPI:  61 y.o. male with adjustment disorder with depressed mood, chronic ischemic heart disease, cocaine dependence in remission, cardiomegaly, HLD, HTN, swizure disorder, tobacco use, and DM 2 presents with a complaint of testicular pain.  Associated with swelling and redness to the scrotum and penis along with difficulty urinating, pain is rated as severe and radiates to the rectum.  Denies fever, chills, cough, SOB, chest pain, n/v/d.  In the ED, he was found to be tachypneic, with leukocytosis and elevated lactic.  CT and US shows evidence of multiple scrotal and perineal abscesses with some extension into the penile shaft.  UA with evidence of infection.  Sepsis treatment initiated, blood and urine cultures obtained, IV fluids and IV antibiotics initiated.  Urology consulted.      Overview/Hospital Course:  61 y.o. 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    Urology consulted. Patient s/p cystoscopy with santiago placement and debridement of abscess/necrosis of scrotum 1/5/22 with Dr. Rangel. Fluid collection drained at bedside 1/14/2022. CT from 1/18/22 w/ R corporal body fluid collection concerning for residual abscess vs fluid collection. Hospitalization course complicated with aspiration event 1/11 w/ subsequent desaturation. Respiratory culture grew pseudomonas. Kept on Zosyn. On 1/13, pt had abrupt reduction in responsiveness. Code stroke called,  patient transferred to ICU. No stroke found on imaging. Started on extended EEG. Mental status improved, no sign of seizure. Patient stepped back down to the floor. Mental status continues to wax and wane. Given change in mental status, SLP evaluated and recommend NPO and pt started on TPN. Palliative following. B 12,folic acid is normal,HIV is negative,RPR is negative. Abscess culture with CANDIDA GLABRATA and ecoli. Blood culture with NGTD. ID is following. PT/OT consulted- recommends SNF. Had extensive discussion with patient's wife at bedside about patient's clinical status, nutrition status, and deconditioning. Discussed that patient would likely benefit from SNF, but wife is hesitant but sister is agreeable. Also, palliative team and I discussed with patient's spouse and sister on 01/31/2022 about alternative means for nutrition in the event patient unable to progress with SLP. Wife and sister agreeable to PEG tube if patient does not progress with SLP. GI consulted. Will follow up with SLP progress. Plan to continue IV abx and awaiting for SNF placement.  Remains on TPN,plan for PEG tube placement by GI.but per GI patient has still leucocytosis,I already repeated blood and urine culture.updated wife.wife is agree with DNR and hospice placement,but they want still PEG tube.  S/P PEG tube placement,started on feeding.  Patient is covid positive with worsening respiratory status,on NRM,started on remdesivir,wife is agree.  Had discussion with wife,she is agree going  home with [employer] on Monday,when all equipment arrived.  CC is confusion.      Interval History:has worsening respiratory status.  CC is confusion.  Review of Systems   Unable to perform ROS: Mental status change     Objective:     Vital Signs (Most Recent):  Temp: 98.9 °F (37.2 °C) (02/06/22 0805)  Pulse: 85 (02/06/22 0805)  Resp: 20 (02/06/22 0805)  BP: 116/69 (02/06/22 0805)  SpO2: 100 % (02/06/22 0805) Vital Signs (24h Range):  Temp:  [96.3 °F  (35.7 °C)-98.9 °F (37.2 °C)] 98.9 °F (37.2 °C)  Pulse:  [73-85] 85  Resp:  [18-28] 20  SpO2:  [96 %-100 %] 100 %  BP: (116-159)/(64-72) 116/69     Weight: 61.2 kg (134 lb 14.7 oz)  Body mass index is 19.92 kg/m².    Intake/Output Summary (Last 24 hours) at 2022 1125  Last data filed at 2022 0800  Gross per 24 hour   Intake 1242.41 ml   Output 725 ml   Net 517.41 ml      Physical Exam  Constitutional:       General: He is not in acute distress.     Appearance: He is ill-appearing. He is not toxic-appearing.      Comments:  Appears a little more confuse today than yesterday. Oriented to self but today he was unable to tell me his , place, or time.    HENT:      Head: Atraumatic.      Nose: Nose normal.      Mouth/Throat:      Mouth: Mucous membranes are dry.   Eyes:      Extraocular Movements: Extraocular movements intact.   Cardiovascular:      Rate and Rhythm: Normal rate and regular rhythm.   Pulmonary:      Effort: No respiratory distress.      Breath sounds: No wheezing.   Abdominal:      General: Abdomen is flat. There is no distension.      Palpations: Abdomen is soft.      Tenderness: There is no abdominal tenderness. There is no guarding.   Genitourinary:     Comments: Dressing on scrotal area.  Sahu - clear yellow urine  Musculoskeletal:         General: No swelling.      Right lower leg: No edema.      Left lower leg: No edema.   Skin:     Coloration: Skin is not jaundiced.      Findings: No bruising.   Neurological:      Mental Status: He is alert.      Comments: Alert and oriented to self. Not oriented to time, and place. Not to situation.          Significant Labs: All pertinent labs within the past 24 hours have been reviewed.    Significant Imaging: I have reviewed all pertinent imaging results/findings within the past 24 hours.      Assessment/Plan:      * Scrotal abscess  -Multiple abscesses c/w Berna's gangrene   -Cultures growing ampicillin-resistant Ecoli and C albicans. Repeat CT  A/P 1/13 w/ 4cm fluid collection c/f persistent abscess which was drained bedside by urology  -Urology consulted: s/p cystoscopy with santiago placement and debridement of abscess/necrosis of scrotum 1/5/22 with Dr. Rangel.Fluid collection drained at bedside 1/14/2022  -CT from 1/18/22 w/ R corporal body fluid collection concerning for residual abscess vs fluid collection  - cont zosyn and diflucan (re-started for HCAP)  - ID consulted, appreciate recs: continue abx until resolution of abscess  -Consulted IR for drainage of residual fluid collection on CT pelvic. IR procedure is not done,due to high risk for complications  -continue with IV Abx and repeat CT,spoke with Urology,US show improvement in Abscess size,no need for additional intervention at this time  -Monitor closely       Pneumonia due to COVID-19 virus    ,started on remdesivir,wife is agree.alreday on IV Abx.        Advance care planning  I spent greater than 16 minutes of discussion regarding advanced directive and end of life planning with patient's spouse and sister.  Wife confirms patient is full code  Wife and sister agrees to PEG if continues to fail SLP evaluation due to mentation        Malnourished  Pt ill-appearing, thin and frail  Albumin 1.3  Currently on TPN  SLP consulted- continue to evaluate if safe to swallow and able to meet nutritional needs  Discussed with family and spouse in regards to possible PEG  Family (sister) and wife agreeable on 01/31/2022  Will continue to follow with SLP, if no progression, will need PEG  GI consulted .  S/P PEG tube placement,started on feeding.      Alkaline phosphatase elevation  Chronic isolated alk phos (GGT elevated), worsened this admission. Possibly due to AEDs vs occult HPB process.   -Alk phos trending down   - further workup depending on clinical course,improving.      Pericardial effusion  Echo with Small pericardial effusion of unclear etiology  EF of 50%  Monitor       HCAP  (healthcare-associated pneumonia)  - see respiratory failure   - Continue IV abx: Zosyn and fluconazole     Acute respiratory failure with hypoxia and hypercarbia  On admit, Cough + new LLL opacity on imaging c/f pneumonia, however CT imaging showing pleural effusion and atelectasis  - respiratory cultures w/ pseudomonas  - cont zosyn  - furosemide as tolerated; no significant evidence of cardiac dysfunction on TTE  - IS  - Wean O2 as tolerated.    Patient is covid positive with worsening respiratory status,on NRM,started on remdesivir,wife is agree.  Had discussion with wife,she is agree going  home with [employer] on Monday,when all equipment arrived.        Hypokalemia  Replete PRN    JOI (acute kidney injury)  Resolved      Sepsis due to Gram negative bacteria  Resolved      Berna's gangrene   -Urology consulted:  s/p debridement 1/5/22  -Will need plastic surgery evaluation in future once wound stable for possible grafting. Can be done as outpatient.     Diabetes mellitus type 2, controlled  A1c:   Lab Results   Component Value Date    HGBA1C 9.3 (H) 01/05/2022     Meds:  SSI PRN to maintain goal 140-180  ADA diet, accuchecks ACHS, hypoglycemic protocol        Tobacco user  Dr. Calero: 5 minutes spent counseling the patient on smoking cessation and he is not currently ready to stop smoking  He will be offereded a nicotine transdermal patch while hospitalized and monitored for withdrawal.  Will provide additional smoking cessation counseling prior to discharge.     Acute encephalopathy  Repeated episodes of AMS   - neurology consulted  - AEDs per neurology   - EEG with no seizure.   -SLP consulted: recommends NPO, continue TPN. F/u progress recs  -Palliative care consulted: may need PEG if nutritional needs are not met  -Mental status gradually improving   -Per Dr. Calero, wife want patient remains full code,but today,wife is agree with DNR and hospice placement,but they want still PEG tube.  -B 12,folic acid is  "normal,HIV is negative,RPR is negative.  but per GI patient has still leucocytosis,I already repeated blood and urine culture.updated wife.  Leucocytosis is resolved,afebrile,cultures negative,  S/P PEG tube placement,started on feeding.    Had discussion with wife,she is agree going  home with [employer] on Monday,when all equipment arrived.    Essential hypertension  Well controlled, continue home medications and monitor blood pressure, adjust as needed.   On prn IV hydralazine at this time.    Hyperlipidemia  Continue statin    Cardiomegaly  Pulmonary edema seen on imaging, small pericardial effusion seen on TTE. LVEF low-normal (50%)  - hold diuresis while NPO    Cocaine dependence in remission  Counseled  Wife stated patient last use was "years ago"    Chronic ischemic heart disease  No acute issue    Adjustment disorder with depressed mood  Continue home citalopram, hold home seroquel due to somnolence      VTE Risk Mitigation (From admission, onward)         Ordered     enoxaparin injection 40 mg  Daily         01/13/22 1612     IP VTE LOW RISK PATIENT  Once         01/04/22 2241     Place sequential compression device  Until discontinued         01/04/22 2241                Discharge Planning   CHUCK:      Code Status: DNR   Is the patient medically ready for discharge?:     Reason for patient still in hospital (select all that apply): Patient trending condition  Discharge Plan A: Home Health,Home with family   Discharge Delays: (!) Procedure Scheduling (IR, OR, Labs, Echo, Cath, Echo, EEG)              Quinten Rosario MD  Department of Hospital Medicine   [facility]"

## 2022-02-06 NOTE — SUBJECTIVE & OBJECTIVE
Interval History: doing okay.  No significant changes.    Review of Systems   Unable to perform ROS: Mental status change     Objective:     Temp:  [96.3 °F (35.7 °C)-98.9 °F (37.2 °C)] 98.9 °F (37.2 °C)  Pulse:  [73-85] 85  Resp:  [18-28] 20  SpO2:  [96 %-100 %] 100 %  BP: (116-159)/(64-72) 116/69     Body mass index is 19.92 kg/m².    Date 02/06/22 0700 - 02/07/22 0659   Shift 7667-1349 4140-6388 7386-8790 24 Hour Total   INTAKE   NG/   300   Shift Total(mL/kg) 300(4.9)   300(4.9)   OUTPUT   Urine(mL/kg/hr) 500   500   Shift Total(mL/kg) 500(8.2)   500(8.2)   Weight (kg) 61.2 61.2 61.2 61.2          Drains     Drain                 Urethral Catheter 01/05/22 1050 Double-lumen 20 Fr. 32 days         Gastrostomy/Enterostomy 02/04/22 1340 Percutaneous endoscopic gastrostomy (PEG) midline feeding 1 day                Physical Exam  Vitals and nursing note reviewed.   Constitutional:       Appearance: He is well-developed and well-nourished.   HENT:      Head: Normocephalic.   Eyes:      Conjunctiva/sclera: Conjunctivae normal.   Neck:      Thyroid: No thyromegaly.      Trachea: No tracheal deviation.   Cardiovascular:      Rate and Rhythm: Normal rate.      Heart sounds: Normal heart sounds.   Pulmonary:      Effort: Pulmonary effort is normal. No respiratory distress.      Breath sounds: Normal breath sounds. No wheezing.   Abdominal:      General: Bowel sounds are normal.      Palpations: Abdomen is soft. There is no hepatosplenomegaly.      Tenderness: There is no abdominal tenderness. There is no CVA tenderness or rebound.      Hernia: No hernia is present.   Genitourinary:     Comments: Sahu in place with yellow urine  Wound clean, dressed  Musculoskeletal:         General: No tenderness or edema. Normal range of motion.      Cervical back: Normal range of motion and neck supple.   Lymphadenopathy:      Cervical: No cervical adenopathy.   Skin:     General: Skin is warm and dry.      Findings: No erythema  or rash.   Neurological:      Mental Status: He is alert. He is disoriented.   Psychiatric:         Mood and Affect: Mood and affect normal.         Behavior: Behavior normal.         Thought Content: Thought content normal.         Judgment: Judgment normal.         Significant Labs:    BMP:  Recent Labs   Lab 02/01/22  0330   *   K 4.6      CO2 22*   BUN 19   CREATININE 1.0   CALCIUM 9.2       CBC:   Recent Labs   Lab 02/01/22  0330 02/02/22  1025 02/03/22  0512   WBC 14.24* 14.56* 12.14   HGB 8.7* 8.6* 8.2*   HCT 26.6* 26.3* 26.0*    233 293           Significant Imaging:

## 2022-02-06 NOTE — NURSING
PCT tech notified nurse BG 45, repeated twice, Gave PRN bolus, MD notified, new orders given, will repeat BG after bolus .

## 2022-02-06 NOTE — PROGRESS NOTES
[facility]  Urology  Progress Note    Patient Name: Abhishek Zhao  MRN: [id number]  Admission Date: 1/4/2022  Hospital Length of Stay: 32 days  Code Status: DNR   Attending Provider: Quinten Rosario MD   Primary Care Physician: To Obtain Unable    Subjective:     HPI:  Scrotal Swelling  Abhishek Zhao is a 61 y.o. male who was been experiencing scrotal pain and swelling since 12/31/2021.  He denies any fever.  He has had issues voiding since the swelling began.  Hemostat having issues in the past with urinary problems.  He denies having any previous issues with scrotal infection or swelling.  He recalls that he had procedures in the past but cannot recall specifically what to place.  He does not have urologist locally but moved back from [place] about 1 year ago.    Review of care everywhere shows that he had a cystoscopy with urethral dilation of a urethral stricture in the bulbar urethra in 2019 at [facility].  And there are reports that in approximately 2015 [facility].      Interval History: doing okay.  No significant changes.    Review of Systems   Unable to perform ROS: Mental status change     Objective:     Temp:  [96.3 °F (35.7 °C)-98.9 °F (37.2 °C)] 98.9 °F (37.2 °C)  Pulse:  [73-85] 85  Resp:  [18-28] 20  SpO2:  [96 %-100 %] 100 %  BP: (116-159)/(64-72) 116/69     Body mass index is 19.92 kg/m².    Date 02/06/22 0700 - 02/07/22 0659   Shift 9534-2745 3744-4031 2234-7734 24 Hour Total   INTAKE   NG/   300   Shift Total(mL/kg) 300(4.9)   300(4.9)   OUTPUT   Urine(mL/kg/hr) 500   500   Shift Total(mL/kg) 500(8.2)   500(8.2)   Weight (kg) 61.2 61.2 61.2 61.2          Drains     Drain                 Urethral Catheter 01/05/22 1050 Double-lumen 20 Fr. 32 days         Gastrostomy/Enterostomy 02/04/22 1340 Percutaneous endoscopic gastrostomy (PEG) midline feeding 1 day                Physical Exam  Vitals and nursing note reviewed.    Constitutional:       Appearance: He is well-developed and well-nourished.   HENT:      Head: Normocephalic.   Eyes:      Conjunctiva/sclera: Conjunctivae normal.   Neck:      Thyroid: No thyromegaly.      Trachea: No tracheal deviation.   Cardiovascular:      Rate and Rhythm: Normal rate.      Heart sounds: Normal heart sounds.   Pulmonary:      Effort: Pulmonary effort is normal. No respiratory distress.      Breath sounds: Normal breath sounds. No wheezing.   Abdominal:      General: Bowel sounds are normal.      Palpations: Abdomen is soft. There is no hepatosplenomegaly.      Tenderness: There is no abdominal tenderness. There is no CVA tenderness or rebound.      Hernia: No hernia is present.   Genitourinary:     Comments: Santiago in place with yellow urine  Wound clean, dressed  Musculoskeletal:         General: No tenderness or edema. Normal range of motion.      Cervical back: Normal range of motion and neck supple.   Lymphadenopathy:      Cervical: No cervical adenopathy.   Skin:     General: Skin is warm and dry.      Findings: No erythema or rash.   Neurological:      Mental Status: He is alert. He is disoriented.   Psychiatric:         Mood and Affect: Mood and affect normal.         Behavior: Behavior normal.         Thought Content: Thought content normal.         Judgment: Judgment normal.         Significant Labs:    BMP:  Recent Labs   Lab 02/01/22  0330   *   K 4.6      CO2 22*   BUN 19   CREATININE 1.0   CALCIUM 9.2       CBC:   Recent Labs   Lab 02/01/22  0330 02/02/22  1025 02/03/22  0512   WBC 14.24* 14.56* 12.14   HGB 8.7* 8.6* 8.2*   HCT 26.6* 26.3* 26.0*    233 293           Significant Imaging:                    Assessment/Plan:     * Scrotal abscess  s/p cystoscopy with santiago placement and debridement of abscess/necrosis of scrotum 1/5/22 with Dr. Rangel.  Fluid collection drained at bedside 1/14/2022  CT from 1/18/22 w/ R corporal body fluid collection concerning for  residual abscess vs fluid collection  Additional purulence expressed from penis on 1/20/21    Continue abx per primary  Cultures growing Candida and E coli  Appreciate wound care RN and floor RN dressing changes  Leukocytosis monitor    Afebrile  Will continue to monitor condition    Sahu changed last week without issue at the bedside    Noted home hospice  Follow up next week to evaluate, okay to d/c home from a  standpoint  Home with Sahu    Will need plastic surgery evaluation in future once wound stable for possible grafting. Can be done as outpatient.               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

## 2022-02-06 NOTE — NURSING
Patient being weaned from non-rebreather to Venti mask,  Patient placed on venti mask 40% 15L NC o2 sats 100-99 %

## 2022-02-07 LAB
POCT GLUCOSE: 197 MG/DL (ref 70–110)
POCT GLUCOSE: 234 MG/DL (ref 70–110)
POCT GLUCOSE: 91 MG/DL (ref 70–110)

## 2022-02-07 PROCEDURE — 63600175 PHARM REV CODE 636 W HCPCS: Performed by: HOSPITALIST

## 2022-02-07 PROCEDURE — 63600175 PHARM REV CODE 636 W HCPCS: Performed by: STUDENT IN AN ORGANIZED HEALTH CARE EDUCATION/TRAINING PROGRAM

## 2022-02-07 PROCEDURE — C9254 INJECTION, LACOSAMIDE: HCPCS | Performed by: STUDENT IN AN ORGANIZED HEALTH CARE EDUCATION/TRAINING PROGRAM

## 2022-02-07 PROCEDURE — 25000003 PHARM REV CODE 250: Performed by: STUDENT IN AN ORGANIZED HEALTH CARE EDUCATION/TRAINING PROGRAM

## 2022-02-07 PROCEDURE — A4216 STERILE WATER/SALINE, 10 ML: HCPCS | Performed by: STUDENT IN AN ORGANIZED HEALTH CARE EDUCATION/TRAINING PROGRAM

## 2022-02-07 PROCEDURE — 27000207 HC ISOLATION

## 2022-02-07 PROCEDURE — 25000242 PHARM REV CODE 250 ALT 637 W/ HCPCS: Performed by: HOSPITALIST

## 2022-02-07 PROCEDURE — 11000001 HC ACUTE MED/SURG PRIVATE ROOM

## 2022-02-07 PROCEDURE — C1751 CATH, INF, PER/CENT/MIDLINE: HCPCS

## 2022-02-07 PROCEDURE — 94761 N-INVAS EAR/PLS OXIMETRY MLT: CPT

## 2022-02-07 PROCEDURE — 25000003 PHARM REV CODE 250: Performed by: HOSPITALIST

## 2022-02-07 RX ORDER — FAMOTIDINE 20 MG/1
20 TABLET, FILM COATED ORAL 2 TIMES DAILY
Status: DISCONTINUED | OUTPATIENT
Start: 2022-02-07 | End: 2022-02-09 | Stop reason: HOSPADM

## 2022-02-07 RX ADMIN — INSULIN ASPART 1 UNITS: 100 INJECTION, SOLUTION INTRAVENOUS; SUBCUTANEOUS at 01:02

## 2022-02-07 RX ADMIN — FAMOTIDINE 20 MG: 20 TABLET, FILM COATED ORAL at 12:02

## 2022-02-07 RX ADMIN — DEXTROSE AND SODIUM CHLORIDE: 5; .9 INJECTION, SOLUTION INTRAVENOUS at 03:02

## 2022-02-07 RX ADMIN — DEXAMETHASONE 6 MG: 2 TABLET ORAL at 12:02

## 2022-02-07 RX ADMIN — REMDESIVIR 100 MG: 100 INJECTION, POWDER, LYOPHILIZED, FOR SOLUTION INTRAVENOUS at 08:02

## 2022-02-07 RX ADMIN — Medication 10 ML: at 06:02

## 2022-02-07 RX ADMIN — DEXTROSE 500 MG: 50 INJECTION, SOLUTION INTRAVENOUS at 10:02

## 2022-02-07 RX ADMIN — PIPERACILLIN AND TAZOBACTAM 4.5 G: 4; .5 INJECTION, POWDER, LYOPHILIZED, FOR SOLUTION INTRAVENOUS; PARENTERAL at 05:02

## 2022-02-07 RX ADMIN — ENOXAPARIN SODIUM 40 MG: 40 INJECTION SUBCUTANEOUS at 04:02

## 2022-02-07 RX ADMIN — FLUCONAZOLE 400 MG: 2 INJECTION, SOLUTION INTRAVENOUS at 11:02

## 2022-02-07 RX ADMIN — PIPERACILLIN AND TAZOBACTAM 4.5 G: 4; .5 INJECTION, POWDER, LYOPHILIZED, FOR SOLUTION INTRAVENOUS; PARENTERAL at 10:02

## 2022-02-07 RX ADMIN — Medication 10 ML: at 12:02

## 2022-02-07 RX ADMIN — Medication 10 ML: at 05:02

## 2022-02-07 RX ADMIN — PIPERACILLIN AND TAZOBACTAM 4.5 G: 4; .5 INJECTION, POWDER, LYOPHILIZED, FOR SOLUTION INTRAVENOUS; PARENTERAL at 04:02

## 2022-02-07 RX ADMIN — FAMOTIDINE 20 MG: 20 TABLET, FILM COATED ORAL at 08:02

## 2022-02-07 RX ADMIN — SODIUM CHLORIDE 200 MG: 9 INJECTION, SOLUTION INTRAVENOUS at 03:02

## 2022-02-07 NOTE — CONSULTS
Nursing consult for altered skin integrity coccyx  Assessed multiple areas of skin breakdown 2/4/22- scrotal I&D site, Stage 2 right posterior heel- healed, and DTI bilateral dorsal feet  On Isoflex mattress; Constantin score 12  Assessment:  Sacrum- Stage 2 pressure injury 2.5 cm(L) 2 cm(W) with pink base. Scant serous drainage.   Having large amount brown liquid stool.   Treatment:  Local wound care with Mepilex sacral dressing. Frequent pressure shifts every 1-2 hours.   Moisture management.

## 2022-02-07 NOTE — ASSESSMENT & PLAN NOTE
Repeated episodes of AMS   - neurology consulted  - AEDs per neurology   - EEG with no seizure.   -SLP consulted: recommends NPO, continue TPN. F/u progress recs  -Palliative care consulted: may need PEG if nutritional needs are not met  -Mental status gradually improving   -Per Dr. Calero, wife want patient remains full code,but today,wife is agree with DNR and hospice placement,but they want still PEG tube.  -B 12,folic acid is normal,HIV is negative,RPR is negative.  but per GI patient has still leucocytosis,I already repeated blood and urine culture.updated wife.  Leucocytosis is resolved,afebrile,cultures negative,  S/P PEG tube placement,started on feeding.  she is agree going  home with Hospice, all equipment is ordered.will finish remdesivir on Wednesday ,then can go home with Hospice.  Had discussion with wife,she is agree going  home with Hosoice ,

## 2022-02-07 NOTE — NURSING
Pt removed HFNC, currently O2 sat 92% on room air. Isosource feeding to PEG tube 55mL/hr goal rate.  Placed pt on 5L HFNC. Will continue to monitor pt.

## 2022-02-07 NOTE — ASSESSMENT & PLAN NOTE
-Multiple abscesses c/w Berna's gangrene   -Cultures growing ampicillin-resistant Ecoli and C albicans. Repeat CT A/P 1/13 w/ 4cm fluid collection c/f persistent abscess which was drained bedside by urology  -Urology consulted: s/p cystoscopy with santiago placement and debridement of abscess/necrosis of scrotum 1/5/22 with Dr. Rangel.Fluid collection drained at bedside 1/14/2022  -CT from 1/18/22 w/ R corporal body fluid collection concerning for residual abscess vs fluid collection  - cont zosyn and diflucan (re-started for HCAP)  - ID consulted, appreciate recs: continue abx until resolution of abscess  -Consulted IR for drainage of residual fluid collection on CT pelvic. IR procedure is not done,due to high risk for complications  -continue with IV Abx and repeat CT,spoke with Urology,US show improvement in Abscess size,no need for additional intervention at this time  -Monitor closely .  Can go with PO cipro for 2 weeks at DC time.

## 2022-02-07 NOTE — PROGRESS NOTES
"[facility]  Adult Nutrition   Progress Note (Follow-Up)    SUMMARY     Recommendations/Interventions:  1) Continue Isosource 1.5 @ 55 mL/hr to provide 1980 kcal, 90 g P, 232 g CHO, and 1008 mL H2O. 92% of EEN and 100% of EPN.  2)  Hyperglycemia, hyponatremia    Goals:   1) Pt to consistently meet >75% of EEN/EPN by TF  2) Nutrition related labs to trend WNL    Nutrition Goal Status: new  Communication of RD Recs:  (POC)    Assessment and Plan  Nutrition Problem  Increased nutrient needs    Related to (etiology):   Hypermetabolic state    Signs and Symptoms (as evidenced by):   COVID+, scrotal abscess    Interventions/Recommendations (treatment strategy):  Enteral nutrition (Isosource 1.5)  Collaboration of care with other providers    Nutrition Diagnosis Status:   Continues    Reason for Assessment    Reason For Assessment: RD follow-up  Diagnosis:  (scrotal abscess)  Relevant Medical History: HTN, seizure disorder, adjustment disorder, chronic ischemic heart disease, cocaine dependence in remission, cardiomegaly, HLD, HTN, DM2, tobacco use    Nutrition Risk Screen    Nutrition Risk Screen: tube feeding or parenteral nutrition    Nutrition/Diet History    Spiritual, Cultural Beliefs, Mandaen Practices, Values that Affect Care: no  Food Allergies: NKFA  Factors Affecting Nutritional Intake: NPO,difficulty/impaired swallowing    Anthropometrics    Temp: 98.4 °F (36.9 °C)  Height: 5' 9.02" (175.3 cm)  Height (inches): 69.02 in  Weight Method: Bed Scale  Weight: 61.2 kg (134 lb 14.7 oz)  Weight (lb): 134.92 lb  Ideal Body Weight (IBW), Male: 160.12 lb  % Ideal Body Weight, Male (lb): 84.26 %  BMI (Calculated): 19.9  Usual Body Weight (UBW), k.09 kg  % Usual Body Weight: 95.69  % Weight Change From Usual Weight: -4.51 %       Weight History:  Wt Readings from Last 10 Encounters:   22 61.2 kg (134 lb 14.7 oz)   21 64 kg (141 lb)   21 64 kg (141 lb)   16 81.6 kg (180 lb) "     Lab/Procedures/Meds: Pertinent Labs Reviewed    Medications:Pertinent Medications Reviewed  Scheduled Meds:   dexAMETHasone  6 mg Oral Daily    enoxaparin  40 mg Subcutaneous Daily    famotidine  20 mg Oral BID    fluconazole (DIFLUCAN) IVPB  400 mg Intravenous Q24H    lacosamide (VIMPAT) IVPB  200 mg Intravenous Q12H    piperacillin-tazobactam (ZOSYN) IVPB  4.5 g Intravenous Q8H    remdesivir infusion  100 mg Intravenous Daily    sodium chloride 0.9%  10 mL Intravenous Q6H    valproate sodium (DEPACON) IVPB  500 mg Intravenous Q12H     Continuous Infusions:  PRN Meds:.acetaminophen, albuterol-ipratropium, albuterol-ipratropium, aluminum-magnesium hydroxide-simethicone, dextrose 10%, dextrose 10%, dextrose 50%, glucagon (human recombinant), glucose, glucose, hydrALAZINE, influenza, insulin aspart U-100, labetalol, LIDOcaine HCL 10 mg/ml (1%), lorazepam, melatonin, morphine, naloxone, ondansetron, prochlorperazine, simethicone, Flushing PICC Protocol **AND** sodium chloride 0.9% **AND** sodium chloride 0.9%    Estimated/Assessed Needs    Weight Used For Calorie Calculations: 61.2 kg (134 lb 14.7 oz)  Energy Calorie Requirements (kcal): 8204-8675  Energy Need Method: Kcal/kg (35-40 kcal/kg per pressure injury and wt loss)  Protein Requirements: 74-92 g (1.2-1.5 g/kg per pressure injury)  Weight Used For Protein Calculations: 61.2 kg (134 lb 14.7 oz)  Fluid Requirements (mL): 2142  Estimated Fluid Requirement Method: RDA Method (or per MD orders)   CHO Requirement: 268 g    Nutrition Prescription Ordered    Current Diet Order: NPO  Current Nutrition Support Formula Ordered: Isosource 1.5  Current Nutrition Support Rate Ordered: 55 (ml) (ml/hr)  Current Nutrition Support Frequency Ordered: continuous    Evaluation of Received Nutrient/Fluid Intake    Enteral Calories (kcal): 1320  Enteral Protein (gm): 90  Enteral (Free Water) Fluid (mL): 1008  Free Water Flush Fluid (mL): 900  Energy Calories Required: not  meeting needs  Protein Required: meeting needs  Fluid Required: not meeting needs  Comments: LBM 2/6  % Intake of Estimated Energy Needs: 75 - 100 %  % Meal Intake: NPO    Intake/Output Summary (Last 24 hours) at 2/7/2022 1601  Last data filed at 2/7/2022 0600  Gross per 24 hour   Intake 300 ml   Output 1925 ml   Net -1625 ml      Nutrition Risk    Level of Risk/Frequency of Follow-up:  (1-2x/week)     Monitor and Evaluation    Food and Nutrient Intake: energy intake,food and beverage intake  Food and Nutrient Adminstration: diet order  Knowledge/Beliefs/Attitudes: food and nutrition knowledge/skill  Physical Activity and Function: nutrition-related ADLs and IADLs  Anthropometric Measurements: weight,weight change,body mass index  Biochemical Data, Medical Tests and Procedures: electrolyte and renal panel,gastrointestinal profile,glucose/endocrine profile,inflammatory profile,lipid profile  Nutrition-Focused Physical Findings: overall appearance,extremities, muscles and bones,head and eyes,skin     Nutrition Follow-Up    RD Follow-up?: Yes

## 2022-02-07 NOTE — SUBJECTIVE & OBJECTIVE
Interval History: respiratory status is improving.  CC is confusion.  Review of Systems   Unable to perform ROS: Mental status change     Objective:     Vital Signs (Most Recent):  Temp: 98.4 °F (36.9 °C) (22)  Pulse: 93 (22)  Resp: 18 (22)  BP: (!) 145/71 (22)  SpO2: (!) 94 % (22) Vital Signs (24h Range):  Temp:  [98.1 °F (36.7 °C)-99.4 °F (37.4 °C)] 98.4 °F (36.9 °C)  Pulse:  [83-94] 93  Resp:  [17-20] 18  SpO2:  [93 %-100 %] 94 %  BP: (119-151)/(66-78) 145/71     Weight: 61.2 kg (134 lb 14.7 oz)  Body mass index is 19.92 kg/m².    Intake/Output Summary (Last 24 hours) at 2022 1122  Last data filed at 2022 0600  Gross per 24 hour   Intake 950 ml   Output 1925 ml   Net -975 ml      Physical Exam  Constitutional:       General: He is not in acute distress.     Appearance: He is ill-appearing. He is not toxic-appearing.      Comments:  Appears a little more confuse today than yesterday. Oriented to self but today he was unable to tell me his , place, or time.    HENT:      Head: Atraumatic.      Nose: Nose normal.      Mouth/Throat:      Mouth: Mucous membranes are dry.   Eyes:      Extraocular Movements: Extraocular movements intact.   Cardiovascular:      Rate and Rhythm: Normal rate and regular rhythm.   Pulmonary:      Effort: No respiratory distress.      Breath sounds: No wheezing.   Abdominal:      General: Abdomen is flat. There is no distension.      Palpations: Abdomen is soft.      Tenderness: There is no abdominal tenderness. There is no guarding.   Genitourinary:     Comments: Dressing on scrotal area.  Sahu - clear yellow urine  Musculoskeletal:         General: No swelling.      Right lower leg: No edema.      Left lower leg: No edema.   Skin:     Coloration: Skin is not jaundiced.      Findings: No bruising.   Neurological:      Mental Status: He is alert.      Comments: Alert and oriented to self. Not oriented to time, and place. Not  to situation.          Significant Labs: All pertinent labs within the past 24 hours have been reviewed.    Significant Imaging: I have reviewed all pertinent imaging results/findings within the past 24 hours.

## 2022-02-07 NOTE — PROGRESS NOTES
[facility]  Progress Note    Patient Name: Abhisehk Zhao  MRN: [id number]  Patient Class: IP- Inpatient   Admission Date: 1/4/2022  Length of Stay: 33 days  Attending Physician: Quinten Rosario MD  Primary Care Provider: To Obtain Unable        Subjective:     Principal Problem:Scrotal abscess        HPI:  61 y.o. male with adjustment disorder with depressed mood, chronic ischemic heart disease, cocaine dependence in remission, cardiomegaly, HLD, HTN, swizure disorder, tobacco use, and DM 2 presents with a complaint of testicular pain.  Associated with swelling and redness to the scrotum and penis along with difficulty urinating, pain is rated as severe and radiates to the rectum.  Denies fever, chills, cough, SOB, chest pain, n/v/d.  In the ED, he was found to be tachypneic, with leukocytosis and elevated lactic.  CT and US shows evidence of multiple scrotal and perineal abscesses with some extension into the penile shaft.  UA with evidence of infection.  Sepsis treatment initiated, blood and urine cultures obtained, IV fluids and IV antibiotics initiated.  Urology consulted.      Overview/Hospital Course:  61 y.o. 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    Urology consulted. Patient s/p cystoscopy with santiago placement and debridement of abscess/necrosis of scrotum 1/5/22 with Dr. Rangel. Fluid collection drained at bedside 1/14/2022. CT from 1/18/22 w/ R corporal body fluid collection concerning for residual abscess vs fluid collection. Hospitalization course complicated with aspiration event 1/11 w/ subsequent desaturation. Respiratory culture grew pseudomonas. Kept on Zosyn. On 1/13, pt had abrupt reduction in responsiveness. Code stroke called,  patient transferred to ICU. No stroke found on imaging. Started on extended EEG. Mental status improved, no sign of seizure. Patient stepped back down to the floor. Mental status continues to wax and wane. Given change in mental status, SLP evaluated and recommend NPO and pt started on TPN. Palliative following. B 12,folic acid is normal,HIV is negative,RPR is negative. Abscess culture with CANDIDA GLABRATA and ecoli. Blood culture with NGTD. ID is following. PT/OT consulted- recommends SNF. Had extensive discussion with patient's wife at bedside about patient's clinical status, nutrition status, and deconditioning. Discussed that patient would likely benefit from SNF, but wife is hesitant but sister is agreeable. Also, palliative team and I discussed with patient's spouse and sister on 01/31/2022 about alternative means for nutrition in the event patient unable to progress with SLP. Wife and sister agreeable to PEG tube if patient does not progress with SLP. GI consulted. Will follow up with SLP progress. Plan to continue IV abx and awaiting for SNF placement.  Remains on TPN,plan for PEG tube placement by GI.but per GI patient has still leucocytosis,I already repeated blood and urine culture.updated wife.wife is agree with DNR and hospice placement,but they want still PEG tube.  S/P PEG tube placement,started on feeding.  Patient is covid positive with worsening respiratory status,on NRM,started on remdesivir,wife was patient recieve remdesivir,respiratory status is improved,stable on NC O 2,  Had discussion with wife,she is agree going  home with Hospice, all equipment is ordered.will finish remdesivir on Wednesday ,then can go home with Hospice.  CC is confusion.      Interval History: respiratory status is improving.  CC is confusion.  Review of Systems   Unable to perform ROS: Mental status change     Objective:     Vital Signs (Most Recent):  Temp: 98.4 °F (36.9 °C) (02/07/22 0843)  Pulse: 93 (02/07/22  0843)  Resp: 18 (22)  BP: (!) 145/71 (22)  SpO2: (!) 94 % (22) Vital Signs (24h Range):  Temp:  [98.1 °F (36.7 °C)-99.4 °F (37.4 °C)] 98.4 °F (36.9 °C)  Pulse:  [83-94] 93  Resp:  [17-20] 18  SpO2:  [93 %-100 %] 94 %  BP: (119-151)/(66-78) 145/71     Weight: 61.2 kg (134 lb 14.7 oz)  Body mass index is 19.92 kg/m².    Intake/Output Summary (Last 24 hours) at 2022 1122  Last data filed at 2022 0600  Gross per 24 hour   Intake 950 ml   Output 1925 ml   Net -975 ml      Physical Exam  Constitutional:       General: He is not in acute distress.     Appearance: He is ill-appearing. He is not toxic-appearing.      Comments:  Appears a little more confuse today than yesterday. Oriented to self but today he was unable to tell me his , place, or time.    HENT:      Head: Atraumatic.      Nose: Nose normal.      Mouth/Throat:      Mouth: Mucous membranes are dry.   Eyes:      Extraocular Movements: Extraocular movements intact.   Cardiovascular:      Rate and Rhythm: Normal rate and regular rhythm.   Pulmonary:      Effort: No respiratory distress.      Breath sounds: No wheezing.   Abdominal:      General: Abdomen is flat. There is no distension.      Palpations: Abdomen is soft.      Tenderness: There is no abdominal tenderness. There is no guarding.   Genitourinary:     Comments: Dressing on scrotal area.  Sahu - clear yellow urine  Musculoskeletal:         General: No swelling.      Right lower leg: No edema.      Left lower leg: No edema.   Skin:     Coloration: Skin is not jaundiced.      Findings: No bruising.   Neurological:      Mental Status: He is alert.      Comments: Alert and oriented to self. Not oriented to time, and place. Not to situation.          Significant Labs: All pertinent labs within the past 24 hours have been reviewed.    Significant Imaging: I have reviewed all pertinent imaging results/findings within the past 24 hours.      Assessment/Plan:      *  Scrotal abscess  -Multiple abscesses c/w Berna's gangrene   -Cultures growing ampicillin-resistant Ecoli and C albicans. Repeat CT A/P 1/13 w/ 4cm fluid collection c/f persistent abscess which was drained bedside by urology  -Urology consulted: s/p cystoscopy with santiago placement and debridement of abscess/necrosis of scrotum 1/5/22 with Dr. Rangel.Fluid collection drained at bedside 1/14/2022  -CT from 1/18/22 w/ R corporal body fluid collection concerning for residual abscess vs fluid collection  - cont zosyn and diflucan (re-started for HCAP)  - ID consulted, appreciate recs: continue abx until resolution of abscess  -Consulted IR for drainage of residual fluid collection on CT pelvic. IR procedure is not done,due to high risk for complications  -continue with IV Abx and repeat CT,spoke with Urology,US show improvement in Abscess size,no need for additional intervention at this time  -Monitor closely .  Can go with PO cipro for 2 weeks at DC time.      Pneumonia due to COVID-19 virus    ,started on remdesivir,wife is agree.alreday on IV Abx.will finish remdesivir on wednesday.        Advance care planning  I spent greater than 16 minutes of discussion regarding advanced directive and end of life planning with patient's spouse and sister.  Wife confirms patient is full code  Wife and sister agrees to PEG if continues to fail SLP evaluation due to mentation        Malnourished  Pt ill-appearing, thin and frail  Albumin 1.3  Currently on TPN  SLP consulted- continue to evaluate if safe to swallow and able to meet nutritional needs  Discussed with family and spouse in regards to possible PEG  Family (sister) and wife agreeable on 01/31/2022  Will continue to follow with SLP, if no progression, will need PEG  GI consulted .  S/P PEG tube placement,started on feeding.      Alkaline phosphatase elevation  Chronic isolated alk phos (GGT elevated), worsened this admission. Possibly due to AEDs vs occult HPB process.    -Alk phos trending down   - further workup depending on clinical course,improving.      Pericardial effusion  Echo with Small pericardial effusion of unclear etiology  EF of 50%  Monitor       HCAP (healthcare-associated pneumonia)  - see respiratory failure   - Continue IV abx: Zosyn and fluconazole     Acute respiratory failure with hypoxia and hypercarbia  On admit, Cough + new LLL opacity on imaging c/f pneumonia, however CT imaging showing pleural effusion and atelectasis  - respiratory cultures w/ pseudomonas  - cont zosyn  - furosemide as tolerated; no significant evidence of cardiac dysfunction on TTE  - IS  - Wean O2 as tolerated.    Patient is covid positive with worsening respiratory status,on NRM,started on remdesivir,wife is agree.respiratory status is improving./  Had discussion with wife,she is agree going  home with [facility] on Monday,when all equipment arrived.        Hypokalemia  Replete PRN    JOI (acute kidney injury)  Resolved      Sepsis due to Gram negative bacteria  Resolved      Berna's gangrene   -Urology consulted:  s/p debridement 1/5/22  -Will need plastic surgery evaluation in future once wound stable for possible grafting. Can be done as outpatient.   PO cipro for 2 weeks at DC time.    Diabetes mellitus type 2, controlled  A1c:   Lab Results   Component Value Date    HGBA1C 9.3 (H) 01/05/2022     Meds:  SSI PRN to maintain goal 140-180  ADA diet, accuchecks ACHS, hypoglycemic protocol        Tobacco user  Dr. Calero: 5 minutes spent counseling the patient on smoking cessation and he is not currently ready to stop smoking  He will be offereded a nicotine transdermal patch while hospitalized and monitored for withdrawal.  Will provide additional smoking cessation counseling prior to discharge.     Acute encephalopathy  Repeated episodes of AMS   - neurology consulted  - AEDs per neurology   - EEG with no seizure.   -SLP consulted: recommends NPO, continue TPN. F/u progress  "recs  -Palliative care consulted: may need PEG if nutritional needs are not met  -Mental status gradually improving   -Per Dr. Calero, wife want patient remains full code,but today,wife is agree with DNR and hospice placement,but they want still PEG tube.  -B 12,folic acid is normal,HIV is negative,RPR is negative.  but per GI patient has still leucocytosis,I already repeated blood and urine culture.updated wife.  Leucocytosis is resolved,afebrile,cultures negative,  S/P PEG tube placement,started on feeding.  she is agree going  home with Hospice, all equipment is ordered.will finish remdesivir on Wednesday ,then can go home with Hospice.  Had discussion with wife,she is agree going  home with [facility] ,    Essential hypertension  Well controlled, continue home medications and monitor blood pressure, adjust as needed.   On prn IV hydralazine at this time.    Hyperlipidemia  Continue statin    Cardiomegaly  Pulmonary edema seen on imaging, small pericardial effusion seen on TTE. LVEF low-normal (50%)  - hold diuresis while NPO    Cocaine dependence in remission  Counseled  Wife stated patient last use was "years ago"    Chronic ischemic heart disease  No acute issue    Adjustment disorder with depressed mood  Continue home citalopram, hold home seroquel due to somnolence      VTE Risk Mitigation (From admission, onward)         Ordered     enoxaparin injection 40 mg  Daily         01/13/22 1612     IP VTE LOW RISK PATIENT  Once         01/04/22 2241     Place sequential compression device  Until discontinued         01/04/22 2241                Discharge Planning   CHUCK:      Code Status: DNR   Is the patient medically ready for discharge?:     Reason for patient still in hospital (select all that apply): Patient trending condition  Discharge Plan A: Hospice/home   Discharge Delays: (!) Post-Acute Set-up              Quinten Rosario MD  Department of Hospital Medicine   [facility] [facility]"

## 2022-02-07 NOTE — ASSESSMENT & PLAN NOTE
-Urology consulted:  s/p debridement 1/5/22  -Will need plastic surgery evaluation in future once wound stable for possible grafting. Can be done as outpatient.   PO cipro for 2 weeks at DC time.

## 2022-02-07 NOTE — PLAN OF CARE
Pt free from falls, injury or any further trauma throughout shift. Pt alert to self. PEG LLQ with isosource continuous feeding at goal rate @55mL/hr. Scrotal wound card dressing changed. Indwelling santiago in place; drainage bag to gravity.  Continued medications as ordered. Complaints of pain, controlled with medications. Pt in no distress. Bed at lowest position, bed alarm on, call light in reach, instruct pt to call for assistance. Airborne/Contact/Droplet isolation precaution maintained throughout.  Will cont to monitor.      Problem: Adult Inpatient Plan of Care  Goal: Plan of Care Review  Outcome: Ongoing, Progressing     Problem: Diabetes Comorbidity  Goal: Blood Glucose Level Within Targeted Range  Outcome: Ongoing, Progressing     Problem: Infection  Goal: Absence of Infection Signs and Symptoms  Outcome: Ongoing, Progressing     Problem: Fall Injury Risk  Goal: Absence of Fall and Fall-Related Injury  Outcome: Ongoing, Progressing     Problem: Skin Injury Risk Increased  Goal: Skin Health and Integrity  Outcome: Ongoing, Progressing     Problem: Confusion Chronic  Goal: Optimal Cognitive Function  Outcome: Ongoing, Progressing     Problem: Impaired Wound Healing  Goal: Optimal Wound Healing  Outcome: Ongoing, Progressing     Problem: Skin or Soft Tissue Infection  Goal: Absence of Infection Signs and Symptoms  Outcome: Ongoing, Progressing     Problem: Coping Ineffective  Goal: Effective Coping  Outcome: Ongoing, Progressing

## 2022-02-07 NOTE — PLAN OF CARE
TN spoke with Mukul with [facility], stated will follow up with patient's spouse, Rossi and patient's sister Homar to confirm plan of care for home hospice. TN to follow up.     Per ADAN Gilman will follow patient with anticipated discharge for Wednesday.     3:50pm TN spoke with Homar, patient's sister regarding discharge home with [facility]. Ms. Graf confirmed she had spoken with Mukul, with [facility], and pt's spouse and family has agreed to home hospice upon discharge. Ms. Thomas stated she will also update pt's daughter Js for discharge plan.     4:22pm TN received call from Ms. Richey, patient's spouse regarding discharge planning. TN confirmed with ms. Richey for discharge home with [facility]. Ms. Richey verbalized agreement and understanding.    02/07/22 0851   Discharge Reassessment   Assessment Type Discharge Planning Reassessment   Did the patient's condition or plan change since previous assessment? Yes   Name(s) and Number(s) Rossi Zhao (spouse) [phone]   Communicated CHUCK with patient/caregiver Date not available/Unable to determine   Discharge Plan A Hospice/home   Discharge Plan B Home   DME Needed Upon Discharge  hospital bed;wheelchair;lift device   Discharge Barriers Identified Nursing Home rejection;Substance Abuse   Why the patient remains in the hospital Requires continued medical care   Post-Acute Status   Post-Acute Authorization Hospice   Hospice Status Pending medical clearance/testing   Patient choice form signed by patient/caregiver List from System Post-Acute Care   Discharge Delays (!) Post-Acute Set-up

## 2022-02-07 NOTE — NURSING
Pt continues to remove nasal cannula. Currently on room air, O2 sat 93%. Will continue to monitor.

## 2022-02-07 NOTE — ASSESSMENT & PLAN NOTE
On admit, Cough + new LLL opacity on imaging c/f pneumonia, however CT imaging showing pleural effusion and atelectasis  - respiratory cultures w/ pseudomonas  - cont zosyn  - furosemide as tolerated; no significant evidence of cardiac dysfunction on TTE  - IS  - Wean O2 as tolerated.    Patient is covid positive with worsening respiratory status,on NRM,started on remdesivir,wife is agree.respiratory status is improving./  Had discussion with wife,she is agree going  home with [facility] on Monday,when all equipment arrived.

## 2022-02-07 NOTE — PLAN OF CARE
AAO to self, IV fluids infusing, BG monitored, IV abx administered, PICC line care provided, Sahu care done, repositioned q 2 hours, oral care done through out shift, wound care done to scrotum, 300 FWF given q 8 hours, tube feeding is at goal 55 ml/hr, HOB greater than 30, heels elevated off bed with green boots, patient weaned to 7LHF with a o2 sat of 99%, patient safety maintained, bed in low locked position with call bell in reach, isolation precautions maintained.   Problem: Adult Inpatient Plan of Care  Goal: Plan of Care Review  Outcome: Ongoing, Progressing  Goal: Patient-Specific Goal (Individualized)  Outcome: Ongoing, Progressing  Goal: Absence of Hospital-Acquired Illness or Injury  Outcome: Ongoing, Progressing  Goal: Optimal Comfort and Wellbeing  Outcome: Ongoing, Progressing  Goal: Readiness for Transition of Care  Outcome: Ongoing, Progressing     Problem: Diabetes Comorbidity  Goal: Blood Glucose Level Within Targeted Range  Outcome: Ongoing, Progressing     Problem: Infection  Goal: Absence of Infection Signs and Symptoms  Outcome: Ongoing, Progressing     Problem: Adjustment to Illness (Sepsis/Septic Shock)  Goal: Optimal Coping  Outcome: Ongoing, Progressing     Problem: Bleeding (Sepsis/Septic Shock)  Goal: Absence of Bleeding  Outcome: Ongoing, Progressing     Problem: Glycemic Control Impaired (Sepsis/Septic Shock)  Goal: Blood Glucose Level Within Desired Range  Outcome: Ongoing, Progressing     Problem: Infection Progression (Sepsis/Septic Shock)  Goal: Absence of Infection Signs and Symptoms  Outcome: Ongoing, Progressing     Problem: Nutrition Impaired (Sepsis/Septic Shock)  Goal: Optimal Nutrition Intake  Outcome: Ongoing, Progressing     Problem: Fall Injury Risk  Goal: Absence of Fall and Fall-Related Injury  Outcome: Ongoing, Progressing

## 2022-02-07 NOTE — PHYSICIAN QUERY
PT Name: Abhishek Zhao  MR #: [id number]     DOCUMENTATION CLARIFICATION     CDS/: Zabrina Mackenzie RN               Contact information:[email or web address]  This form is a permanent document in the medical record.     Query Date: February 7, 2022    By submitting this query, we are merely seeking further clarification of documentation.  Please utilize your independent clinical judgment when addressing the question(s) below.    The Medical Record contains the following:   Indicators   Supporting Clinical Findings Location in Medical Record    Non-blanchable erythema/redness      Ulcer/Injury/Skin Breakdown     x Deep Tissue Injury Bilateral dorsal feet- 5 mm areas of DTI on feet Wound care PN 2/4    Wound care consult      Acute/Chronic Illness     x Medication/Treatment Recommend avoiding pressure to dorsal foot and allow areas of DTI to evolve. Wearing EHOB boots bilaterally.  Wound care PN 2/4    Other       The clinical guidelines noted are only a system guideline. It does not replace the providers clinical judgment.    Per the National Pressure Injury Advisory Panel:   A pressure injury is localized damage to the skin and underlying soft tissue usually over a bony prominence or related to a medical or other device. The injury can present as intact skin or an open ulcer and may be painful. The injury occurs as a result of intense and/or prolonged pressure or pressure in combination with shear. The tolerance of soft tissue for pressure and shear may also be affected by microclimate, nutrition, perfusion, co-morbidities and condition of the soft tissue.       Deep Tissue Pressure Injury:  Intact or non-intact skin with localized area of persistent non-blanchable deep red, maroon, purple discoloration or epidermal separation revealing a dark wound bed or blood filled blister. This injury results from intense and/or prolonged pressure and shear forces at the bone-muscle interface. The wound may evolve rapidly  to reveal the actual extent of tissue injury, or may resolve without tissue loss. If necrotic tissue, subcutaneous tissue, granulation tissue, fascia, muscle or other underlying structures are visible, this indicates a full thickness pressure injury (Unstageable, Stage 3 or Stage 4). Do not use DTPI to describe vascular, traumatic, neuropathic, or dermatologic conditions.       Provider, please provide the integumentary diagnosis related to the documentation of right and left dorsal foot:     [  x ] Deep Tissue Pressure Injury   [   ] Other Integumentary Diagnosis (please specify):______________   [   ] Clinically Undetermined               Please document in your progress notes daily for the duration of treatment until resolved and include in your discharge summary.    Reference:    JOAN Ruth., Lonnie, J. M., Goldberg, M., DOUG Gaspar., DOUG Barnard., & KALEE Celaya. (2016). Revised National Pressure Ulcer Advisory Panel Pressure Injury Staging System: Revised Pressure Injury Staging System. J Wound Ostomy Continence Nurs, 43(6), 585-597. doi:10.1097/won.9711232252025573    Form No.[id number]

## 2022-02-07 NOTE — PLAN OF CARE
Recommendations:  1) Continue Isosource 1.5 @ 55 mL/hr to provide 1980 kcal, 90 g P, 232 g CHO, and 1008 mL H2O. 92% of EEN and 100% of EPN.  2)  Hyperglycemia, hyponatremia    Goals:   1) Pt to consistently meet >75% of EEN/EPN by TF  2) Nutrition related labs to trend WNL    Nutrition Goal Status: new  Communication of RD Recs:  (POC)

## 2022-02-08 LAB
ALBUMIN SERPL BCP-MCNC: 1.2 G/DL (ref 3.5–5.2)
ALP SERPL-CCNC: 109 U/L (ref 55–135)
ALT SERPL W/O P-5'-P-CCNC: 19 U/L (ref 10–44)
ANION GAP SERPL CALC-SCNC: 8 MMOL/L (ref 8–16)
AST SERPL-CCNC: 21 U/L (ref 10–40)
BASOPHILS # BLD AUTO: 0.12 K/UL (ref 0–0.2)
BASOPHILS NFR BLD: 0.9 % (ref 0–1.9)
BILIRUB SERPL-MCNC: 0.2 MG/DL (ref 0.1–1)
BUN SERPL-MCNC: 20 MG/DL (ref 6–20)
CALCIUM SERPL-MCNC: 9.5 MG/DL (ref 8.7–10.5)
CHLORIDE SERPL-SCNC: 107 MMOL/L (ref 95–110)
CO2 SERPL-SCNC: 24 MMOL/L (ref 23–29)
CREAT SERPL-MCNC: 1.3 MG/DL (ref 0.5–1.4)
DIFFERENTIAL METHOD: ABNORMAL
EOSINOPHIL # BLD AUTO: 0.1 K/UL (ref 0–0.5)
EOSINOPHIL NFR BLD: 0.9 % (ref 0–8)
ERYTHROCYTE [DISTWIDTH] IN BLOOD BY AUTOMATED COUNT: 18.8 % (ref 11.5–14.5)
EST. GFR  (AFRICAN AMERICAN): >60 ML/MIN/1.73 M^2
EST. GFR  (NON AFRICAN AMERICAN): 60 ML/MIN/1.73 M^2
FUNGUS SPEC CULT: ABNORMAL
GLUCOSE SERPL-MCNC: 136 MG/DL (ref 70–110)
HCT VFR BLD AUTO: 24 % (ref 40–54)
HGB BLD-MCNC: 7.4 G/DL (ref 14–18)
IMM GRANULOCYTES # BLD AUTO: 0.33 K/UL (ref 0–0.04)
IMM GRANULOCYTES NFR BLD AUTO: 2.6 % (ref 0–0.5)
LYMPHOCYTES # BLD AUTO: 2.4 K/UL (ref 1–4.8)
LYMPHOCYTES NFR BLD: 19 % (ref 18–48)
MCH RBC QN AUTO: 28.5 PG (ref 27–31)
MCHC RBC AUTO-ENTMCNC: 30.8 G/DL (ref 32–36)
MCV RBC AUTO: 92 FL (ref 82–98)
MONOCYTES # BLD AUTO: 2.5 K/UL (ref 0.3–1)
MONOCYTES NFR BLD: 19.4 % (ref 4–15)
NEUTROPHILS # BLD AUTO: 7.4 K/UL (ref 1.8–7.7)
NEUTROPHILS NFR BLD: 57.2 % (ref 38–73)
NRBC BLD-RTO: 0 /100 WBC
PLATELET # BLD AUTO: 390 K/UL (ref 150–450)
PMV BLD AUTO: 10.2 FL (ref 9.2–12.9)
POCT GLUCOSE: 148 MG/DL (ref 70–110)
POCT GLUCOSE: 164 MG/DL (ref 70–110)
POCT GLUCOSE: 191 MG/DL (ref 70–110)
POCT GLUCOSE: 324 MG/DL (ref 70–110)
POTASSIUM SERPL-SCNC: 5.3 MMOL/L (ref 3.5–5.1)
PROT SERPL-MCNC: 9 G/DL (ref 6–8.4)
RBC # BLD AUTO: 2.6 M/UL (ref 4.6–6.2)
SODIUM SERPL-SCNC: 139 MMOL/L (ref 136–145)
WBC # BLD AUTO: 12.87 K/UL (ref 3.9–12.7)

## 2022-02-08 PROCEDURE — 99232 PR SUBSEQUENT HOSPITAL CARE,LEVL II: ICD-10-PCS | Mod: ,,, | Performed by: UROLOGY

## 2022-02-08 PROCEDURE — 27000207 HC ISOLATION

## 2022-02-08 PROCEDURE — 94761 N-INVAS EAR/PLS OXIMETRY MLT: CPT

## 2022-02-08 PROCEDURE — 63600175 PHARM REV CODE 636 W HCPCS: Performed by: STUDENT IN AN ORGANIZED HEALTH CARE EDUCATION/TRAINING PROGRAM

## 2022-02-08 PROCEDURE — 25000003 PHARM REV CODE 250: Performed by: HOSPITALIST

## 2022-02-08 PROCEDURE — A4216 STERILE WATER/SALINE, 10 ML: HCPCS | Performed by: STUDENT IN AN ORGANIZED HEALTH CARE EDUCATION/TRAINING PROGRAM

## 2022-02-08 PROCEDURE — C9254 INJECTION, LACOSAMIDE: HCPCS | Performed by: STUDENT IN AN ORGANIZED HEALTH CARE EDUCATION/TRAINING PROGRAM

## 2022-02-08 PROCEDURE — 25000003 PHARM REV CODE 250: Performed by: STUDENT IN AN ORGANIZED HEALTH CARE EDUCATION/TRAINING PROGRAM

## 2022-02-08 PROCEDURE — 80053 COMPREHEN METABOLIC PANEL: CPT | Performed by: HOSPITALIST

## 2022-02-08 PROCEDURE — 99232 SBSQ HOSP IP/OBS MODERATE 35: CPT | Mod: ,,, | Performed by: UROLOGY

## 2022-02-08 PROCEDURE — 63600175 PHARM REV CODE 636 W HCPCS: Performed by: HOSPITALIST

## 2022-02-08 PROCEDURE — 25000242 PHARM REV CODE 250 ALT 637 W/ HCPCS: Performed by: HOSPITALIST

## 2022-02-08 PROCEDURE — 11000001 HC ACUTE MED/SURG PRIVATE ROOM

## 2022-02-08 PROCEDURE — 85025 COMPLETE CBC W/AUTO DIFF WBC: CPT | Performed by: HOSPITALIST

## 2022-02-08 RX ADMIN — ACETAMINOPHEN 650 MG: 325 TABLET ORAL at 04:02

## 2022-02-08 RX ADMIN — DEXTROSE 500 MG: 50 INJECTION, SOLUTION INTRAVENOUS at 09:02

## 2022-02-08 RX ADMIN — PIPERACILLIN AND TAZOBACTAM 4.5 G: 4; .5 INJECTION, POWDER, LYOPHILIZED, FOR SOLUTION INTRAVENOUS; PARENTERAL at 05:02

## 2022-02-08 RX ADMIN — INSULIN ASPART 2 UNITS: 100 INJECTION, SOLUTION INTRAVENOUS; SUBCUTANEOUS at 07:02

## 2022-02-08 RX ADMIN — FAMOTIDINE 20 MG: 20 TABLET, FILM COATED ORAL at 09:02

## 2022-02-08 RX ADMIN — PIPERACILLIN AND TAZOBACTAM 4.5 G: 4; .5 INJECTION, POWDER, LYOPHILIZED, FOR SOLUTION INTRAVENOUS; PARENTERAL at 09:02

## 2022-02-08 RX ADMIN — Medication 10 ML: at 07:02

## 2022-02-08 RX ADMIN — ENOXAPARIN SODIUM 40 MG: 40 INJECTION SUBCUTANEOUS at 04:02

## 2022-02-08 RX ADMIN — Medication 10 ML: at 12:02

## 2022-02-08 RX ADMIN — DEXTROSE 500 MG: 50 INJECTION, SOLUTION INTRAVENOUS at 11:02

## 2022-02-08 RX ADMIN — Medication 10 ML: at 06:02

## 2022-02-08 RX ADMIN — FLUCONAZOLE 400 MG: 2 INJECTION, SOLUTION INTRAVENOUS at 11:02

## 2022-02-08 RX ADMIN — SODIUM CHLORIDE 200 MG: 9 INJECTION, SOLUTION INTRAVENOUS at 03:02

## 2022-02-08 RX ADMIN — DEXAMETHASONE 6 MG: 2 TABLET ORAL at 09:02

## 2022-02-08 RX ADMIN — REMDESIVIR 100 MG: 100 INJECTION, POWDER, LYOPHILIZED, FOR SOLUTION INTRAVENOUS at 09:02

## 2022-02-08 RX ADMIN — PIPERACILLIN AND TAZOBACTAM 4.5 G: 4; .5 INJECTION, POWDER, LYOPHILIZED, FOR SOLUTION INTRAVENOUS; PARENTERAL at 02:02

## 2022-02-08 NOTE — PLAN OF CARE
Problem: Adult Inpatient Plan of Care  Goal: Plan of Care Review  Outcome: Ongoing, Progressing     Problem: Diabetes Comorbidity  Goal: Blood Glucose Level Within Targeted Range  Outcome: Ongoing, Progressing     Problem: Infection  Goal: Absence of Infection Signs and Symptoms  Outcome: Ongoing, Progressing     Problem: Adjustment to Illness (Sepsis/Septic Shock)  Goal: Optimal Coping  Outcome: Ongoing, Progressing     Problem: Bleeding (Sepsis/Septic Shock)  Goal: Absence of Bleeding  Outcome: Ongoing, Progressing     Problem: Glycemic Control Impaired (Sepsis/Septic Shock)  Goal: Blood Glucose Level Within Desired Range  Outcome: Ongoing, Progressing     Problem: Infection Progression (Sepsis/Septic Shock)  Goal: Absence of Infection Signs and Symptoms  Outcome: Ongoing, Progressing     Problem: Nutrition Impaired (Sepsis/Septic Shock)  Goal: Optimal Nutrition Intake  Outcome: Ongoing, Progressing     Problem: Fall Injury Risk  Goal: Absence of Fall and Fall-Related Injury  Outcome: Ongoing, Progressing     Problem: Fluid and Electrolyte Imbalance (Acute Kidney Injury/Impairment)  Goal: Fluid and Electrolyte Balance  Outcome: Ongoing, Progressing   Mr. Zhao is in bed resting. Free of falls. Pt removes oxygen off throughout night.; nurse replaces it; 2L.  Call light in reach. Bed locked and low.Will continue to monitor for any changes and follow plan of care.     Wound dressing complete  Freq turning throughout night  Hourly rounding  Tube feeding: Isosource @ 55ml/hr   bs 148

## 2022-02-08 NOTE — SUBJECTIVE & OBJECTIVE
Interval History: No changes.  He is hoping to go home today.    Review of Systems   Unable to perform ROS: Mental status change     Objective:     Temp:  [97.3 °F (36.3 °C)-98.4 °F (36.9 °C)] 97.7 °F (36.5 °C)  Pulse:  [77-93] 81  Resp:  [18] 18  SpO2:  [93 %-100 %] 98 %  BP: (145-151)/(70-88) 151/75     Body mass index is 19.92 kg/m².           Drains     Drain                 Urethral Catheter 01/05/22 1050 Double-lumen 20 Fr. 33 days         Gastrostomy/Enterostomy 02/04/22 1340 Percutaneous endoscopic gastrostomy (PEG) midline feeding 3 days                Physical Exam  Vitals and nursing note reviewed.   Constitutional:       Appearance: He is well-developed and well-nourished.   HENT:      Head: Normocephalic.   Eyes:      Conjunctiva/sclera: Conjunctivae normal.   Neck:      Thyroid: No thyromegaly.      Trachea: No tracheal deviation.   Cardiovascular:      Rate and Rhythm: Normal rate.      Heart sounds: Normal heart sounds.   Pulmonary:      Effort: Pulmonary effort is normal. No respiratory distress.      Breath sounds: Normal breath sounds. No wheezing.   Abdominal:      General: Bowel sounds are normal.      Palpations: Abdomen is soft. There is no hepatosplenomegaly.      Tenderness: There is no abdominal tenderness. There is no CVA tenderness or rebound.      Hernia: No hernia is present.   Genitourinary:     Comments: Wound clean, packed    Sahu with yellow urine  Musculoskeletal:         General: No tenderness or edema. Normal range of motion.      Cervical back: Normal range of motion and neck supple.   Lymphadenopathy:      Cervical: No cervical adenopathy.   Skin:     General: Skin is warm and dry.      Findings: No erythema or rash.   Neurological:      Mental Status: He is alert and oriented to person, place, and time.   Psychiatric:         Mood and Affect: Mood and affect normal.         Behavior: Behavior normal.         Thought Content: Thought content normal.         Judgment: Judgment  normal.         Significant Labs:    BMP:  Recent Labs   Lab 02/08/22  0402      K 5.3*      CO2 24   BUN 20   CREATININE 1.3   CALCIUM 9.5       CBC:   Recent Labs   Lab 02/02/22  1025 02/03/22  0512 02/08/22  0402   WBC 14.56* 12.14 12.87*   HGB 8.6* 8.2* 7.4*   HCT 26.3* 26.0* 24.0*    293 390       Blood Culture:   Recent Labs   Lab 02/01/22  1227   LABBLOO No Growth after 4 days.   No Growth after 4 days.      Urine Culture:   Recent Labs   Lab 02/01/22  1434   LABURIN No growth       Significant Imaging:

## 2022-02-08 NOTE — PHYSICIAN QUERY
PT Name: Abhishek Zhao  MR #: [id number]     DOCUMENTATION CLARIFICATION     CDS/: Zabrina Mackenzie RN               Contact information:[email or web address]  This form is a permanent document in the medical record.     Query Date: February 8, 2022    By submitting this query, we are merely seeking further clarification of documentation.  Please utilize your independent clinical judgment when addressing the question(s) below.    The Medical Record contains the following:   Indicators   Supporting Clinical Findings Location in Medical Record    Non-blanchable erythema/redness     x Ulcer/Injury/Skin Breakdown Sacrum- Stage 2 pressure injury 2.5 cm(L) 2 cm(W) with pink base. Scant serous drainage.  Wound care PN 2/7    Deep Tissue Injury      Wound care consult      Acute/Chronic Illness     x Medication/Treatment Local wound care with Mepilex sacral dressing. Frequent pressure shifts every 1-2 hours.   Moisture management. Wound care PN 2/7    Other       The clinical guidelines noted are only a system guideline. It does not replace the providers clinical judgment.    Per the National Pressure Injury Advisory Panel:   A pressure injury is localized damage to the skin and underlying soft tissue usually over a bony prominence or related to a medical or other device. The injury can present as intact skin or an open ulcer and may be painful. The injury occurs as a result of intense and/or prolonged pressure or pressure in combination with shear. The tolerance of soft tissue for pressure and shear may also be affected by microclimate, nutrition, perfusion, co-morbidities and condition of the soft tissue.       Stage 1 Pressure Injury:  Intact skin with a localized area of non-blanchable erythema, which may appear differently in darkly pigmented skin. Color changes do not include purple or maroon discoloration; these may indicate deep tissue pressure injury.    Stage 2 Pressure Injury:  Partial-thickness loss of skin  with exposed dermis. The wound bed is viable, pink or red, moist, and may also present as an intact or ruptured serum-filled blister.    Stage 3 Pressure Injury:  Full-thickness loss of skin, in which adipose (fat) is visible in the ulcer and granulation tissue and epibole (rolled wound edges) are often present. Slough and/or eschar may be visible. Undermining and tunneling may occur.        Provider, please provide the integumentary diagnosis related to the documentation of sacrum:     [  x ] Pressure Injury/Decubitus Ulcer, Stage 2   [   ] Pressure Injury/Decubitus Ulcer, Unstageable   [   ] Pressure Injury/Decubitus Ulcer, Unspecified Stage   [   ] Other Integumentary Diagnosis (please specify):______________   [   ] Clinically Undetermined               Please document in your progress notes daily for the duration of treatment until resolved and include in your discharge summary.    Reference:    JOAN Ruth., KRISTOPHER Fleming., Goldberg, M., DOUG Gaspar., OJAN Barnard, & KALEE Celaya. (2016). Revised National Pressure Ulcer Advisory Panel Pressure Injury Staging System: Revised Pressure Injury Staging System. J Wound Ostomy Continence Nurs, 43(6), 585-597. doi:10.1097/[id number]    Form No.[id number]

## 2022-02-08 NOTE — PROGRESS NOTES
[facility]  Urology  Progress Note    Patient Name: Abhishek Zhao  MRN: [id number]  Admission Date: 1/4/2022  Hospital Length of Stay: 34 days  Code Status: DNR   Attending Provider: Hugo Aviles MD   Primary Care Physician: To Obtain Unable    Subjective:     HPI:  Scrotal Swelling  Abhishek Zhao is a 61 y.o. male who was been experiencing scrotal pain and swelling since 12/31/2021.  He denies any fever.  He has had issues voiding since the swelling began.  Hemostat having issues in the past with urinary problems.  He denies having any previous issues with scrotal infection or swelling.  He recalls that he had procedures in the past but cannot recall specifically what to place.  He does not have urologist locally but moved back from [place] about 1 year ago.    Review of care everywhere shows that he had a cystoscopy with urethral dilation of a urethral stricture in the bulbar urethra in 2019 at [facility].  And there are reports that [facility] approximately 2015 [facility]      Interval History: No changes.  He is hoping to go home today.    Review of Systems   Unable to perform ROS: Mental status change     Objective:     Temp:  [97.3 °F (36.3 °C)-98.4 °F (36.9 °C)] 97.7 °F (36.5 °C)  Pulse:  [77-93] 81  Resp:  [18] 18  SpO2:  [93 %-100 %] 98 %  BP: (145-151)/(70-88) 151/75     Body mass index is 19.92 kg/m².           Drains     Drain                 Urethral Catheter 01/05/22 1050 Double-lumen 20 Fr. 33 days         Gastrostomy/Enterostomy 02/04/22 1340 Percutaneous endoscopic gastrostomy (PEG) midline feeding 3 days                Physical Exam  Vitals and nursing note reviewed.   Constitutional:       Appearance: He is well-developed and well-nourished.   HENT:      Head: Normocephalic.   Eyes:      Conjunctiva/sclera: Conjunctivae normal.   Neck:      Thyroid: No thyromegaly.      Trachea: No tracheal deviation.   Cardiovascular:      Rate and Rhythm: Normal  rate.      Heart sounds: Normal heart sounds.   Pulmonary:      Effort: Pulmonary effort is normal. No respiratory distress.      Breath sounds: Normal breath sounds. No wheezing.   Abdominal:      General: Bowel sounds are normal.      Palpations: Abdomen is soft. There is no hepatosplenomegaly.      Tenderness: There is no abdominal tenderness. There is no CVA tenderness or rebound.      Hernia: No hernia is present.   Genitourinary:     Comments: Wound clean, packed    Santiago with yellow urine  Musculoskeletal:         General: No tenderness or edema. Normal range of motion.      Cervical back: Normal range of motion and neck supple.   Lymphadenopathy:      Cervical: No cervical adenopathy.   Skin:     General: Skin is warm and dry.      Findings: No erythema or rash.   Neurological:      Mental Status: He is alert and oriented to person, place, and time.   Psychiatric:         Mood and Affect: Mood and affect normal.         Behavior: Behavior normal.         Thought Content: Thought content normal.         Judgment: Judgment normal.         Significant Labs:    BMP:  Recent Labs   Lab 02/08/22  0402      K 5.3*      CO2 24   BUN 20   CREATININE 1.3   CALCIUM 9.5       CBC:   Recent Labs   Lab 02/02/22  1025 02/03/22  0512 02/08/22  0402   WBC 14.56* 12.14 12.87*   HGB 8.6* 8.2* 7.4*   HCT 26.3* 26.0* 24.0*    293 390       Blood Culture:   Recent Labs   Lab 02/01/22  1227   LABBLOO No Growth after 4 days.   No Growth after 4 days.      Urine Culture:   Recent Labs   Lab 02/01/22  1434   LABURIN No growth       Significant Imaging:                    Assessment/Plan:     * Scrotal abscess  s/p cystoscopy with santiago placement and debridement of abscess/necrosis of scrotum 1/5/22 with Dr. Rangel.  Fluid collection drained at bedside 1/14/2022  CT from 1/18/22 w/ R corporal body fluid collection concerning for residual abscess vs fluid collection  Additional purulence expressed from penis on  1/20/21    Continue abx per primary  Cultures growing Candida and E coli  Appreciate wound care RN and floor RN dressing changes  Leukocytosis monitor    Afebrile  Will continue to monitor condition    Sahu changed last week without issue at the bedside    Noted home hospice  Follow up next week to evaluate, okay to d/c home from a  standpoint  Home with Sahu    Will need plastic surgery evaluation in future once wound stable for possible grafting. Can be done as outpatient.               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

## 2022-02-08 NOTE — SUBJECTIVE & OBJECTIVE
Interval History: No events overnight, denies complaints this AM    Review of Systems   Constitutional: Negative for chills and fever.   Respiratory: Negative for cough and shortness of breath.    Cardiovascular: Negative for chest pain and leg swelling.   Gastrointestinal: Negative for abdominal distention and abdominal pain.     Objective:     Vital Signs (Most Recent):  Temp: 97.4 °F (36.3 °C) (02/08/22 1205)  Pulse: 106 (02/08/22 1205)  Resp: 19 (02/08/22 1205)  BP: 127/85 (02/08/22 1205)  SpO2: 99 % (02/08/22 1205) Vital Signs (24h Range):  Temp:  [97.3 °F (36.3 °C)-97.7 °F (36.5 °C)] 97.4 °F (36.3 °C)  Pulse:  [] 106  Resp:  [18-19] 19  SpO2:  [93 %-100 %] 99 %  BP: (127-151)/(70-88) 127/85     Weight: 61.2 kg (134 lb 14.7 oz)  Body mass index is 19.92 kg/m².    Intake/Output Summary (Last 24 hours) at 2/8/2022 1658  Last data filed at 2/8/2022 1500  Gross per 24 hour   Intake 0 ml   Output 1000 ml   Net -1000 ml      Physical Exam  Constitutional:       General: He is not in acute distress.     Appearance: He is ill-appearing. He is not toxic-appearing or diaphoretic.   Cardiovascular:      Rate and Rhythm: Normal rate and regular rhythm.      Heart sounds: No murmur heard.  No gallop.    Pulmonary:      Effort: Pulmonary effort is normal. No respiratory distress.      Breath sounds: Normal breath sounds. No wheezing.   Abdominal:      General: Bowel sounds are normal. There is no distension.      Palpations: Abdomen is soft.      Tenderness: There is no abdominal tenderness.   Neurological:      Comments: Delayed responses, no localizing deficit         Significant Labs: All pertinent labs within the past 24 hours have been reviewed.    Significant Imaging: I have reviewed all pertinent imaging results/findings within the past 24 hours.

## 2022-02-09 VITALS
DIASTOLIC BLOOD PRESSURE: 68 MMHG | WEIGHT: 134.94 LBS | RESPIRATION RATE: 19 BRPM | BODY MASS INDEX: 19.99 KG/M2 | HEIGHT: 69 IN | SYSTOLIC BLOOD PRESSURE: 130 MMHG | OXYGEN SATURATION: 93 % | HEART RATE: 102 BPM | TEMPERATURE: 97 F

## 2022-02-09 DIAGNOSIS — U07.1 COVID-19 VIRUS DETECTED: ICD-10-CM

## 2022-02-09 LAB
POCT GLUCOSE: 234 MG/DL (ref 70–110)
POCT GLUCOSE: 361 MG/DL (ref 70–110)
POCT GLUCOSE: 393 MG/DL (ref 70–110)
POCT GLUCOSE: 451 MG/DL (ref 70–110)

## 2022-02-09 PROCEDURE — 25000003 PHARM REV CODE 250: Performed by: STUDENT IN AN ORGANIZED HEALTH CARE EDUCATION/TRAINING PROGRAM

## 2022-02-09 PROCEDURE — C9254 INJECTION, LACOSAMIDE: HCPCS | Performed by: STUDENT IN AN ORGANIZED HEALTH CARE EDUCATION/TRAINING PROGRAM

## 2022-02-09 PROCEDURE — 63600175 PHARM REV CODE 636 W HCPCS: Performed by: STUDENT IN AN ORGANIZED HEALTH CARE EDUCATION/TRAINING PROGRAM

## 2022-02-09 PROCEDURE — A4216 STERILE WATER/SALINE, 10 ML: HCPCS | Performed by: STUDENT IN AN ORGANIZED HEALTH CARE EDUCATION/TRAINING PROGRAM

## 2022-02-09 PROCEDURE — 25000003 PHARM REV CODE 250: Performed by: HOSPITALIST

## 2022-02-09 PROCEDURE — 63600175 PHARM REV CODE 636 W HCPCS: Performed by: HOSPITALIST

## 2022-02-09 RX ORDER — FAMOTIDINE 20 MG/1
20 TABLET, FILM COATED ORAL 2 TIMES DAILY
Qty: 60 TABLET | Refills: 11 | Status: SHIPPED | OUTPATIENT
Start: 2022-02-09 | End: 2023-02-09

## 2022-02-09 RX ORDER — IPRATROPIUM BROMIDE AND ALBUTEROL SULFATE 2.5; .5 MG/3ML; MG/3ML
3 SOLUTION RESPIRATORY (INHALATION) EVERY 4 HOURS PRN
Qty: 90 ML | Refills: 0 | Status: SHIPPED | OUTPATIENT
Start: 2022-02-09 | End: 2023-02-09

## 2022-02-09 RX ORDER — METRONIDAZOLE 500 MG/1
500 TABLET ORAL EVERY 8 HOURS
Qty: 30 TABLET | Refills: 0
Start: 2022-02-09 | End: 2022-02-19

## 2022-02-09 RX ORDER — CIPROFLOXACIN 500 MG/1
500 TABLET ORAL EVERY 12 HOURS
Start: 2022-02-09 | End: 2022-02-23

## 2022-02-09 RX ADMIN — SODIUM CHLORIDE 200 MG: 9 INJECTION, SOLUTION INTRAVENOUS at 02:02

## 2022-02-09 RX ADMIN — FLUCONAZOLE 400 MG: 2 INJECTION, SOLUTION INTRAVENOUS at 11:02

## 2022-02-09 RX ADMIN — INSULIN ASPART 5 UNITS: 100 INJECTION, SOLUTION INTRAVENOUS; SUBCUTANEOUS at 05:02

## 2022-02-09 RX ADMIN — FAMOTIDINE 20 MG: 20 TABLET, FILM COATED ORAL at 09:02

## 2022-02-09 RX ADMIN — DEXTROSE 500 MG: 50 INJECTION, SOLUTION INTRAVENOUS at 10:02

## 2022-02-09 RX ADMIN — Medication 10 ML: at 11:02

## 2022-02-09 RX ADMIN — PIPERACILLIN AND TAZOBACTAM 4.5 G: 4; .5 INJECTION, POWDER, LYOPHILIZED, FOR SOLUTION INTRAVENOUS; PARENTERAL at 05:02

## 2022-02-09 RX ADMIN — INSULIN ASPART 2 UNITS: 100 INJECTION, SOLUTION INTRAVENOUS; SUBCUTANEOUS at 12:02

## 2022-02-09 RX ADMIN — PIPERACILLIN AND TAZOBACTAM 4.5 G: 4; .5 INJECTION, POWDER, LYOPHILIZED, FOR SOLUTION INTRAVENOUS; PARENTERAL at 02:02

## 2022-02-09 RX ADMIN — INSULIN ASPART 3 UNITS: 100 INJECTION, SOLUTION INTRAVENOUS; SUBCUTANEOUS at 12:02

## 2022-02-09 RX ADMIN — Medication 10 ML: at 05:02

## 2022-02-09 RX ADMIN — Medication 10 ML: at 12:02

## 2022-02-09 RX ADMIN — INSULIN ASPART 3 UNITS: 100 INJECTION, SOLUTION INTRAVENOUS; SUBCUTANEOUS at 06:02

## 2022-02-09 RX ADMIN — REMDESIVIR 100 MG: 100 INJECTION, POWDER, LYOPHILIZED, FOR SOLUTION INTRAVENOUS at 09:02

## 2022-02-09 NOTE — PROGRESS NOTES
Mr. Zhao is in bed resting. Free of falls. Pt removes oxygen off throughout night.; nurse replaces it; 2L.  Call light in reach. Bed locked and low. Will continue to monitor for any changes and follow plan of care.      Wound dressing complete  Freq turning throughout night  Hourly rounding  Tube feeding: Isosource @ 55ml/hr   : 5 units + 3 additional units (per NP Patrick)= 8 units given

## 2022-02-09 NOTE — PLAN OF CARE
[facility]  Discharge Final Note    Patient ready for discharge from case management stand point. Transportation requested.     Primary Care Provider: To Obtain Unable    Expected Discharge Date: 2/9/2022    Final Discharge Note (most recent)     Final Note - 02/09/22 1448        Final Note    Assessment Type Final Discharge Note     Anticipated Discharge Disposition Hospice/Home   [facility]    What phone number can be called within the next 1-3 days to see how you are doing after discharge? [phone]     Hospital Resources/Appts/Education Provided Provided patient/caregiver with written discharge plan information        Post-Acute Status    Post-Acute Authorization Hospice     Post-Acute Placement Status Set-up Complete/Auth obtained     Hospice Status Set-up Complete/Auth obtained     Discharge Delays None known at this time                 Important Message from Medicare             Contact Info     W Manny Rangel MD   Specialty: Urology    [address]  [address]  [facility] [address]   Phone: [phone]       Next Steps: Schedule an appointment as soon as possible for a visit in 2 week(s)    Instructions: For wound re-check

## 2022-02-09 NOTE — PLAN OF CARE
TN sent hospice plan of care orders via care port to [facility].     TN spoke with Marifer with [facility], stated dme ordered awaiting delivery, will call once set up complete. TN to follow up.    1:25pm TN spoke with Marifer with [facility], stated equipment delivered, awaiting formula for tube feeds. TN to follow up.     TN spoke with Peterson [facility] manager, stated will try to order formula locally and delivered to patient home. TN spoke with patient's wife Ms. Richey to confirm discharge plan for today, verbalized understanding.     2:27 TN received call from Shaylee, nurse [facility], stated has tube feeding to hold over until orders come in.  TN placed call to [facility], left message for Marifer to call back.     2:40pm TN received call back from Marifer with [facility], stated set up complete transportation can be scheduled.    02/09/22 0845   Post-Acute Status   Post-Acute Authorization Hospice   Home Health Status Discharge Plan Changed   Hospice Status Pending equipment/medication delivery   Discharge Delays (!) Post-Acute Set-up   Discharge Plan   Discharge Plan A Hospice/home

## 2022-02-09 NOTE — PLAN OF CARE
[facility]  Department of Hospital Medicine  [address] [place] [address]  [phone] after hours  [phone] fax    HOSPICE  ORDERS    02/09/2022    Admit to Hospice:  Home Service     Diagnoses:   Active Hospital Problems    Diagnosis  POA    *Scrotal abscess [N49.2]  Yes    Pneumonia due to COVID-19 virus [U07.1, J12.82]  Yes    Malnourished [E46]  Yes    Advance care planning [Z71.89]  Not Applicable    Alkaline phosphatase elevation [R74.8]  Yes    Pericardial effusion [I31.3]  Yes    Acute respiratory failure with hypoxia and hypercarbia [J96.01, J96.02]  No    HCAP (healthcare-associated pneumonia) [J18.9]  No    Hypokalemia [E87.6]  No    Sepsis due to Gram negative bacteria [A41.50]  Yes    JOI (acute kidney injury) [N17.9]  Yes    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  Today we note that you have Diabetes and it is stable.    Continue our treatment plan as discussed.    Make sure to monitor for any side effects or symptoms.    We are ordering labs to reassess your health.    Please complete labs as instructed, preferably while fasting for a minimum of 8 hours.    Follow up in our office as planned.        Resolved Hospital Problems   No resolved problems to display.       Hospice Qualifying Diagnoses:        Patient has a life expectancy < 6 months due to:  1) Primary Hospice Diagnosis: Adult  FTT/severe protein calorie malnutrition  2) Comorbid Conditions Contributing to Decline: dementia, za gangrene, seizure disorder    Vital Signs: Routine per Hospice Protocol.    Code Status: DNR    Allergies: Review of patient's allergies indicates:  No Known Allergies    Diet: NPO    Tube Feeding: Start Glucerna at 10 cc per hours,advance 10 cc Q 4 hours,goal 55 CC per hours.  Free water 300 CC Q 8 hours    Activities: As tolerated    Nursing: Per Hospice Routine.     PEG Care:  Clean site daily.  Monitor skin integrity.    Sahu Care: Empty Sahu bag Q shift and PRN.  Change Sahu every month.    Routine Skin for Bedridden Patients: Apply moisture barrier cream to all skin folds and   wet areas in perineal area daily and after baths and all bowel movements.    PICC Care:   Scrub the Hub: Prior to accessing the line, always perform a 30 second alcohol scrub  Each lumen of the central line is to be flushed at least daily with 10 mL Normal Saline and 3 mL Heparin flush (100 units/mL)  Skilled Nurse (SN) may draw blood from IV access  Date of removal: Per hospice      Oxygen: 2L NC    Other Miscellaneous Care:      Continue local wound care to scrotal wound every 12 hours and prn soiled or not intact- Cleanse with Vashe. Fill wound with Vashe moistened gauze. Cover with ABD pad and secure with mesh pants.    Local wound care to sacrum every 2 days and prn- Cleanse with NS. Apply Mepilex sacral dressing.      Medications:      Medication List      START taking these medications    albuterol-ipratropium 2.5 mg-0.5 mg/3 mL nebulizer solution  Commonly known as: DUO-NEB  Take 3 mLs by nebulization every 4 (four) hours as needed for Wheezing or Shortness of Breath. Rescue     ciprofloxacin HCl 500 MG tablet  Commonly known as: CIPRO  Take 1 tablet (500 mg total) by mouth every 12 (twelve) hours. for 14 days     famotidine 20 MG tablet  Commonly known as: PEPCID  Take 1 tablet (20 mg total) by mouth 2 (two) times  daily.     metroNIDAZOLE 500 MG tablet  Commonly known as: FLAGYL  Take 1 tablet (500 mg total) by mouth every 8 (eight) hours for 10 days. Avoid alcohol while taking this medication        CHANGE how you take these medications    divalproex  MG Tb24  Commonly known as: DEPAKOTE  Take 500 mg by mouth once daily.  What changed: Another medication with the same name was removed. Continue taking this medication, and follow the directions you see here.     lisinopriL 10 MG tablet  TAKE ONE TABLET BY MOUTH DAILY FOR HEART/ BLOOD PRESSURE  What changed: Another medication with the same name was removed. Continue taking this medication, and follow the directions you see here.     rosuvastatin 40 MG Tab  Commonly known as: CRESTOR  TAKE ONE TABLET BY MOUTH DAILY FOR CHOLESTEROL (REPLACES ATORVASTATIN)  What changed: Another medication with the same name was removed. Continue taking this medication, and follow the directions you see here.        CONTINUE taking these medications    aspirin 81 MG EC tablet  Commonly known as: ECOTRIN  Take 81 mg by mouth once daily.     atorvastatin 80 MG tablet  Commonly known as: LIPITOR  TAKE ONE TABLET BY MOUTH EVERY DAY FOR CHOLESTEROL     CeleXA 10 MG tablet  Generic drug: citalopram  Take by mouth.     cetirizine 10 MG tablet  Commonly known as: ZYRTEC  TAKE ONE TABLET BY MOUTH DAILY .  TAKE ONE DAILY AS NEEDED FOR ITCHING. MAY TAKE UP TO 4 TIMES DAILY AS  NEEDD .  TAKE ONE DAILY AS NEEDED FOR ITCHING. MAY TAKE UP TO 4 TIMES DAILY AS  NEEDD     cholecalciferol (vitamin D3) 50 mcg (2,000 unit) Tab  Commonly known as: VITAMIN D3  TAKE ONE TABLET BY MOUTH EVERY DAY AS A VITAMIN SUPPLEMENT     HYDROPHILIC CREAM TOP  APPLY LIBERAL AMOUNT TO THE SKIN TWICE A DAY . APPLY TO THE SKIN TWICE DAILY AS NEEDED FOR DRY SKIN AND ITCING     lacosamide 200 mg Tab tablet  Commonly known as: VIMPAT  Take 200 mg by mouth.     lamoTRIgine 200 MG tablet  Commonly known as: LAMICTAL  TAKE ONE TABLET BY  MOUTH TWICE A DAY FOR SEIZURES     * metFORMIN 500 MG tablet  Commonly known as: GLUCOPHAGE  Take 500 mg by mouth.     * metFORMIN 1000 MG tablet  Commonly known as: GLUCOPHAGE  TAKE ONE TABLET BY MOUTH TWICE A DAY WITH FOOD FOR DIABETES     metoprolol succinate 50 MG 24 hr tablet  Commonly known as: TOPROL-XL  Take 50 mg by mouth once daily.     nitroGLYCERIN 0.4 MG SL tablet  Commonly known as: NITROSTAT  DISSOLVE ONE TABLET UNDER TONGUE Q5MX3 FOR CHEST PAIN     oxyCODONE-acetaminophen 5-325 mg per tablet  Commonly known as: PERCOCET  Take 1 tablet by mouth every 4 (four) hours as needed for Pain (do not drink alcohol, drive, or operate heavy machinery while taking this medication.).     potassium chloride 10 MEQ Tbsr  Commonly known as: KLOR-CON  TAKE FOUR TABLETS BY MOUTH ONCE DAILY TO INCREASE POTASSIUM     QUEtiapine 25 MG Tab  Commonly known as: SEROQUEL  TAKE ONE-HALF TABLET BY MOUTH AT BEDTIME FOR MOOD OR PSYCHOSIS AND INSOMNIA.. MAY USE WHOLE TABLET IF NECESSARY     tamsulosin 0.4 mg Cap  Commonly known as: FLOMAX  TAKE 1 CAPSULE BY MOUTH EVERY DAY FOR BPH     triamcinolone acetonide 0.1% 0.1 % cream  Commonly known as: KENALOG  APPLY MODERATE AMOUNT TOPICALLY TWICE A DAY     varenicline 0.5 mg (11)- 1 mg (42) tablet  Commonly known as: CHANTIX JOSE  TAKE AS DIRECTED BY MOUTH UD FOR SMOKING CESSATION     VITAMIN C ORAL  Take by mouth.         * This list has 2 medication(s) that are the same as other medications prescribed for you. Read the directions carefully, and ask your doctor or other care provider to review them with you.            STOP taking these medications    amLODIPine 10 MG tablet  Commonly known as: NORVASC     amlodipine-benazepril 5-20 mg 5-20 mg per capsule  Commonly known as: LOTREL     insulin detemir U-100 100 unit/mL injection  Commonly known as: Levemir     insulin glargine 100 unit/mL injection  Commonly known as: Lantus     insulin glargine 100 units/mL (3mL) SubQ pen      metoprolol tartrate 50 MG tablet  Commonly known as: LOPRESSOR     pantoprazole 40 MG tablet  Commonly known as: PROTONIX              DIABETES CARE:   Nurse to perform and educate diabetic management with blood glucose monitoring:         Fingerstick blood sugar a.m. and p.m.     Fingerstick blood sugar AC and HS     Fingerstick blood sugar every 6 hours if patient is unable to eat    Report CBG < 60 or > 350 to physician.         Insulin Sliding Scale         Glucose  Novolog Insulin Subcutaneous        0 - 60   Orange juice or glucose tablet      No insulin   201-250  2 units   251-300  4 units   301-350  6 units   351-400  8 units   >400   10 units then call physician      Future Orders:  Hospice Medical Director may dictate new orders for comfortable care measures & sign death certificate.        _________________________________  Hugo Aviles MD  02/09/2022

## 2022-02-09 NOTE — PLAN OF CARE
Problem: Adult Inpatient Plan of Care  Goal: Plan of Care Review  Outcome: Met  Goal: Patient-Specific Goal (Individualized)  Outcome: Met  Goal: Absence of Hospital-Acquired Illness or Injury  Outcome: Met  Goal: Optimal Comfort and Wellbeing  Outcome: Met  Goal: Readiness for Transition of Care  Outcome: Met     Problem: Diabetes Comorbidity  Goal: Blood Glucose Level Within Targeted Range  Outcome: Met     Problem: Infection  Goal: Absence of Infection Signs and Symptoms  Outcome: Met     Problem: Adjustment to Illness (Sepsis/Septic Shock)  Goal: Optimal Coping  Outcome: Met     Problem: Bleeding (Sepsis/Septic Shock)  Goal: Absence of Bleeding  Outcome: Met     Problem: Glycemic Control Impaired (Sepsis/Septic Shock)  Goal: Blood Glucose Level Within Desired Range  Outcome: Met     Problem: Infection Progression (Sepsis/Septic Shock)  Goal: Absence of Infection Signs and Symptoms  Outcome: Met     Problem: Nutrition Impaired (Sepsis/Septic Shock)  Goal: Optimal Nutrition Intake  Outcome: Met     Problem: Fall Injury Risk  Goal: Absence of Fall and Fall-Related Injury  Outcome: Met     Problem: Fluid and Electrolyte Imbalance (Acute Kidney Injury/Impairment)  Goal: Fluid and Electrolyte Balance  Outcome: Met     Problem: Oral Intake Inadequate (Acute Kidney Injury/Impairment)  Goal: Optimal Nutrition Intake  Outcome: Met     Problem: Renal Function Impairment (Acute Kidney Injury/Impairment)  Goal: Effective Renal Function  Outcome: Met     Problem: Fluid Imbalance (Pneumonia)  Goal: Fluid Balance  Outcome: Met     Problem: Infection (Pneumonia)  Goal: Resolution of Infection Signs and Symptoms  Outcome: Met     Problem: Respiratory Compromise (Pneumonia)  Goal: Effective Oxygenation and Ventilation  Outcome: Met     Problem: Skin Injury Risk Increased  Goal: Skin Health and Integrity  Outcome: Met     Problem: Seizure Disorder Comorbidity  Goal: Maintenance of Seizure Control  Outcome: Met     Problem:  Confusion Chronic  Goal: Optimal Cognitive Function  Outcome: Met     Problem: Impaired Wound Healing  Goal: Optimal Wound Healing  Outcome: Met     Problem: Skin or Soft Tissue Infection  Goal: Absence of Infection Signs and Symptoms  Outcome: Met     Problem: Coping Ineffective  Goal: Effective Coping  Outcome: Met

## 2022-02-09 NOTE — ANESTHESIA POSTPROCEDURE EVALUATION
Anesthesia Post Evaluation    Patient: Abhishek Zhao    Procedure(s) Performed: Procedure(s) (LRB):  INSERTION, PEG TUBE (N/A)    Final Anesthesia Type: general      Patient location during evaluation: GI PACU  Patient participation: Yes- Able to Participate  Level of consciousness: awake and alert, oriented and awake  Post-procedure vital signs: reviewed and stable  Airway patency: patent    PONV status at discharge: No PONV  Anesthetic complications: no      Cardiovascular status: blood pressure returned to baseline  Respiratory status: unassisted, spontaneous ventilation and room air  Hydration status: euvolemic  Follow-up not needed.          Vitals Value Taken Time   /68 02/09/22 1150   Temp 36.3 °C (97.4 °F) 02/09/22 1150   Pulse 102 02/09/22 1150   Resp 19 02/09/22 1150   SpO2 93 % 02/09/22 1150         Event Time   Out of Recovery 02/04/2022 15:30:00         Pain/Jorge Score: Pain Rating Prior to Med Admin: 0 (2/8/2022  9:16 PM)  Pain Rating Post Med Admin: 0 (2/8/2022  9:16 PM)  Jorge Score: 9 (2/8/2022  9:16 PM)

## 2022-02-09 NOTE — PLAN OF CARE
ADT 30 order placed for Van Transportation.  Requested  time: 3:30pm  If transportation does not arrive at ETA time nurse will be instructed to follow protocol for transportation below:   How can I get in touch directly with dispatch, if needed?                 Non-emergent dispatch: [phone]      +++NURSING:  If Van does not arrive at requested time please call the above Non Emergent Dispatcher.  If issue not resolved please escalate to your charge nurse for further instructions.    Transport to [address] [place] [address] Dr Carla LOYA [id number]

## 2022-02-14 NOTE — DISCHARGE SUMMARY
[facility] Medicine  Discharge Summary      Patient Name: Abhishek Zhao  MRN: [id number]  Patient Class: IP- Inpatient  Admission Date: 1/4/2022  Hospital Length of Stay: 35 days  Discharge Date and Time: 2/9/2022  6:22 PM  Attending Physician: No att. providers found   Discharging Provider: Hugo Aviles MD  Primary Care Provider: To Obtain Unable      HPI:   61 y.o. male with adjustment disorder with depressed mood, chronic ischemic heart disease, cocaine dependence in remission, cardiomegaly, HLD, HTN, swizure disorder, tobacco use, and DM 2 presents with a complaint of testicular pain.  Associated with swelling and redness to the scrotum and penis along with difficulty urinating, pain is rated as severe and radiates to the rectum.  Denies fever, chills, cough, SOB, chest pain, n/v/d.  In the ED, he was found to be tachypneic, with leukocytosis and elevated lactic.  CT and US shows evidence of multiple scrotal and perineal abscesses with some extension into the penile shaft.  UA with evidence of infection.  Sepsis treatment initiated, blood and urine cultures obtained, IV fluids and IV antibiotics initiated.  Urology consulted.      Procedure(s) (LRB):  INSERTION, PEG TUBE (N/A)      Hospital Course:   61 y.o. 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 He underwent debridement with urology.     His hospital course was long (one month) and had numerous complications, including:  - aspiration  - abrupt changes in mental status (EEG/MRI negative)  - inability to tolerate po requiring TPN and then PEG tube  - severe debility/deconditioning  - recurrent  abscesses  - new covid diagosis w/ hypoxia  - severe protein calorie nutrition  - worsening mentation,  lethargy    Ultimately, after a prolonged hospital course and minimal chance to return to a meaningful quality of life, the patient's wife decided to take him home w/ hospice. These wishes were honored, and the patient was discharged to hospice.        Goals of Care Treatment Preferences:  Code Status: DNR      Consults:   Consults (From admission, onward)        Status Ordering Provider     Inpatient consult to Gastroenterology  Once        Provider:  Michael Fontana MD    Completed MARI WILSON TLidia     Inpatient consult to Spiritual Care  Once        Provider:  (Not yet assigned)    Completed SHANTHI ABDOLAZIM     Inpatient consult to Palliative Care  Once        Provider:  Kimberly Honeycutt NP    Completed AMANDA MIRAMONTES     Inpatient consult to Interventional Radiology  Once        Provider:  Sabas Ingram MD    Completed UMA MAKI     Inpatient consult to Interventional Radiology  Once        Provider:  Sabas Ingram MD    Completed SHANTHI ABDOLAMING     Inpatient consult to Neurology  Once        Provider:  Salvatore Hansen MD    Completed OLIVER PEREZ     Inpatient consult to Neurology  Once        Provider:  Ashvin Giraldo MD    Completed ADAM ROBINS     Inpatient consult to Infectious Diseases  Once        Provider:  Demarco Combs MD    Completed ELIJAH ANN     Inpatient virtual consult to Hospital Medicine  Once        Provider:  Elijah Ann MD    Completed UMA MAKI     Inpatient consult to Urology  Once        Provider:  ANIBAL Rangel MD    Completed JOSELITO MURO          No new Assessment & Plan notes have been filed under this hospital service since the last note was generated.  Service: Hospital Medicine    Final Active Diagnoses:    Diagnosis Date Noted POA    PRINCIPAL PROBLEM:  Scrotal abscess [N49.2] 01/04/2022 Yes    Pneumonia due to COVID-19 virus [U07.1, J12.82] 02/05/2022 Yes    Malnourished [E46] 01/31/2022 Yes    Advance  "care planning [Z71.89] 01/31/2022 Not Applicable    Alkaline phosphatase elevation [R74.8] 01/16/2022 Yes    Pericardial effusion [I31.3] 01/15/2022 Yes    Acute respiratory failure with hypoxia and hypercarbia [J96.01, J96.02] 01/10/2022 No    HCAP (healthcare-associated pneumonia) [J18.9] 01/10/2022 No    Hypokalemia [E87.6] 01/09/2022 No    Sepsis due to Gram negative bacteria [A41.50] 01/07/2022 Yes    JOI (acute kidney injury) [N17.9] 01/07/2022 Yes    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] 01/06/2022 Yes    Adjustment disorder with depressed mood [F43.21] 01/04/2022 Yes     Chronic    Chronic ischemic heart disease [I25.9] 01/04/2022 Yes     Chronic    Cocaine dependence in remission [F14.21] 01/04/2022 Yes     Chronic    Cardiomegaly [I51.7] 01/04/2022 Yes     Chronic    Hyperlipidemia [E78.5] 01/04/2022 Yes     Chronic    Essential hypertension [I10] 01/04/2022 Yes     Chronic    Acute encephalopathy [G93.40] 01/04/2022 Yes    Tobacco user [Z72.0] 01/04/2022 Yes     Chronic    Diabetes mellitus type 2, controlled [E11.9] 08/03/2020 Yes     Chronic      Problems Resolved During this Admission:       Discharged Condition: stable    Disposition: Hospice/Home    Follow Up:   Follow-up Information     W Manny Rangel MD. Schedule an appointment as soon as possible for a visit in 2 weeks.    Specialty: Urology  Why: For wound re-check  Contact information:  [address]  [address]  [facility] [address]  [phone]                       Patient Instructions:      HOSPITAL BED FOR HOME USE     Order Specific Question Answer Comments   Type: Semi-electric    Length of need (1-99 months): 99    Does patient have medical equipment at home? cane, straight    Does patient have medical equipment at home? rollator    Does patient have medical equipment at home? bath bench    Height: 5' 9.02" (1.753 m)    Weight: 61.2 kg (134 lb 14.7 oz)    Please check all that apply: Patient requires positioning of the body in " "ways not feasible in an ordinary bed due to a medical condition which is expected to last at least one month.    Please check all that apply: Patient requires, for the alleviation of pain, positioning of the body in ways not feasible in an ordinary bed.      PATIENT (AUGUSTUS) LIFT FOR HOME USE     Order Specific Question Answer Comments   Height: 5' 9.02" (1.753 m)    Weight: 61.2 kg (134 lb 14.7 oz)    Does patient have medical equipment at home? cane, straight    Does patient have medical equipment at home? rollator    Does patient have medical equipment at home? bath bench    Length of need (1-99 months): 99      WHEELCHAIR FOR HOME USE     Order Specific Question Answer Comments   Hours in W/C per day: 8    Type of Wheelchair: Standard    Size(Width): 18"(STD adult)    Leg Support: STD footrests    Lap Belt: Velcro    Accessories: Front brakes    Cushion: Basic    Height: 5' 9.02" (1.753 m)    Weight: 61.2 kg (134 lb 14.7 oz)    Does patient have medical equipment at home? cane, straight    Does patient have medical equipment at home? rollator    Does patient have medical equipment at home? bath bench    Length of need (1-99 months): 99    Please check all that apply: Caregiver is capable and willing to operate wheelchair safely.      Activity as tolerated       Significant Diagnostic Studies: as above    Pending Diagnostic Studies:     Procedure Component Value Units Date/Time    EKG 12-lead [[id number]]     Order Status: Sent Lab Status: No result          Medications:  Reconciled Home Medications:      Medication List      START taking these medications    albuterol-ipratropium 2.5 mg-0.5 mg/3 mL nebulizer solution  Commonly known as: DUO-NEB  Inhale contents of 1 vial (3 mLs) by nebulization every 4 (four) hours as needed for Wheezing or Shortness of Breath. Rescue     ciprofloxacin HCl 500 MG tablet  Commonly known as: CIPRO  Take 1 tablet (500 mg total) by mouth every 12 (twelve) hours. for 14 days   "   famotidine 20 MG tablet  Commonly known as: PEPCID  Take 1 tablet (20 mg total) by mouth 2 (two) times daily.     metroNIDAZOLE 500 MG tablet  Commonly known as: FLAGYL  Take 1 tablet (500 mg total) by mouth every 8 (eight) hours for 10 days. Avoid alcohol while taking this medication        CHANGE how you take these medications    divalproex  MG Tb24  Commonly known as: DEPAKOTE  Take 500 mg by mouth once daily.  What changed: Another medication with the same name was removed. Continue taking this medication, and follow the directions you see here.     rosuvastatin 40 MG Tab  Commonly known as: CRESTOR  TAKE ONE TABLET BY MOUTH DAILY FOR CHOLESTEROL (REPLACES ATORVASTATIN)  What changed: Another medication with the same name was removed. Continue taking this medication, and follow the directions you see here.        CONTINUE taking these medications    aspirin 81 MG EC tablet  Commonly known as: ECOTRIN  Take 81 mg by mouth once daily.     atorvastatin 80 MG tablet  Commonly known as: LIPITOR  TAKE ONE TABLET BY MOUTH EVERY DAY FOR CHOLESTEROL     CeleXA 10 MG tablet  Generic drug: citalopram  Take by mouth.     cetirizine 10 MG tablet  Commonly known as: ZYRTEC  TAKE ONE TABLET BY MOUTH DAILY .  TAKE ONE DAILY AS NEEDED FOR ITCHING. MAY TAKE UP TO 4 TIMES DAILY AS  NEEDD .  TAKE ONE DAILY AS NEEDED FOR ITCHING. MAY TAKE UP TO 4 TIMES DAILY AS  NEEDD     cholecalciferol (vitamin D3) 50 mcg (2,000 unit) Tab  Commonly known as: VITAMIN D3  TAKE ONE TABLET BY MOUTH EVERY DAY AS A VITAMIN SUPPLEMENT     HYDROPHILIC CREAM TOP  APPLY LIBERAL AMOUNT TO THE SKIN TWICE A DAY . APPLY TO THE SKIN TWICE DAILY AS NEEDED FOR DRY SKIN AND ITCING     lacosamide 200 mg Tab tablet  Commonly known as: VIMPAT  Take 200 mg by mouth.     lamoTRIgine 200 MG tablet  Commonly known as: LAMICTAL  TAKE ONE TABLET BY MOUTH TWICE A DAY FOR SEIZURES     nitroGLYCERIN 0.4 MG SL tablet  Commonly known as: NITROSTAT  DISSOLVE ONE TABLET  UNDER TONGUE Q5MX3 FOR CHEST PAIN     oxyCODONE-acetaminophen 5-325 mg per tablet  Commonly known as: PERCOCET  Take 1 tablet by mouth every 4 (four) hours as needed for Pain (do not drink alcohol, drive, or operate heavy machinery while taking this medication.).     QUEtiapine 25 MG Tab  Commonly known as: SEROQUEL  TAKE ONE-HALF TABLET BY MOUTH AT BEDTIME FOR MOOD OR PSYCHOSIS AND INSOMNIA.. MAY USE WHOLE TABLET IF NECESSARY     tamsulosin 0.4 mg Cap  Commonly known as: FLOMAX  TAKE 1 CAPSULE BY MOUTH EVERY DAY FOR BPH     triamcinolone acetonide 0.1% 0.1 % cream  Commonly known as: KENALOG  APPLY MODERATE AMOUNT TOPICALLY TWICE A DAY     VITAMIN C ORAL  Take by mouth.        STOP taking these medications    amLODIPine 10 MG tablet  Commonly known as: NORVASC     amlodipine-benazepril 5-20 mg 5-20 mg per capsule  Commonly known as: LOTREL     insulin detemir U-100 100 unit/mL injection  Commonly known as: Levemir     insulin glargine 100 unit/mL injection  Commonly known as: Lantus     insulin glargine 100 units/mL (3mL) SubQ pen     lisinopriL 10 MG tablet     lisinopriL 2.5 MG tablet  Commonly known as: PRINIVIL,ZESTRIL     metFORMIN 1000 MG tablet  Commonly known as: GLUCOPHAGE     metFORMIN 500 MG tablet  Commonly known as: GLUCOPHAGE     metoprolol succinate 50 MG 24 hr tablet  Commonly known as: TOPROL-XL     metoprolol tartrate 50 MG tablet  Commonly known as: LOPRESSOR     pantoprazole 40 MG tablet  Commonly known as: PROTONIX     potassium chloride 10 MEQ Tbsr  Commonly known as: KLOR-CON     varenicline 0.5 mg (11)- 1 mg (42) tablet  Commonly known as: CHANTIX JOSE            Indwelling Lines/Drains at time of discharge:   Lines/Drains/Airways     Drain                 Urethral Catheter 01/05/22 1050 Double-lumen 20 Fr. 40 days         Gastrostomy/Enterostomy 02/04/22 1340 Percutaneous endoscopic gastrostomy (PEG) midline feeding 9 days                Time spent on the discharge of patient: 45 minutes          Hugo Aviles MD  Department of Hospital Medicine  [facility] - Med Surg [place]

## 2022-02-24 LAB
ACID FAST MOD KINY STN SPEC: NORMAL
MYCOBACTERIUM SPEC QL CULT: NORMAL

## 2022-03-30 NOTE — ASSESSMENT & PLAN NOTE
5 minutes spent counseling the patient on smoking cessation and he is not currently ready to stop smoking. He will be offereded a nicotine transdermal patch while hospitalized and monitored for withdrawal.  Will provide additional smoking cessation counseling prior to discharge.    no hearing difficulty/no ear pain/no gum bleeding/no dry mouth Breath sounds clear and equal bilaterally.

## 2022-04-30 NOTE — PT/OT/SLP PROGRESS
[facility] Speech Language Pathology Treatment    Patient Name:  Abhishek Zhao   MRN:  [id number]  Admitting Diagnosis: Scrotal abscess    Recommendations:                 General Recommendations:  Dysphagia therapy; RD consult   Diet recommendations:  Puree, Liquid Diet Level: Thin   Aspiration Precautions: Alternating small bites/sips, Assistance with meals, Feed only when awake/alert, Frequent oral care, HOB to 90 degrees, Meds crushed in puree and Monitor for s/s of aspiration   General Precautions: Standard, pureed diet,aspiration  Communication strategies:  provide increased time to answer    Subjective     · Pt able to state name, . He demo improved DENIS compared to prior session. Ongoing confusion, difficulty following commands.   · Per pt's spouse, she prepares foods soft food for pt when home.     Pain/Comfort:  · Pain Rating 1: 0/10    Respiratory Status: Nasal cannula, flow 4 L/min    Objective:     Has the patient been evaluated by SLP for swallowing?   Yes  Keep patient NPO? No   Current Respiratory Status:  Nasal cannula, flow 4 L/min    HOB elevated, pt repositioned with assist. Pt presented with PO trials to identify least restrictive diet/liquids. With ice chips, he demo delayed cough response x1. Post TL via straw immediate cough x1. Pt instructed to facilitate dry swallow prior to continuation of trials. No additional overt s/s of aspiration across additional TL,  Puree, or soft solid trials. With soft solid trials pt demo prolonged mastication x2, and impaired rotary chew; with oral residue x1, which he cleared with a liquid wash. Additional soft solid trial was buried in pudding to moisten, pt again demo prolonged mastication w/o oral residue, and impaired rotary chew.  Across soft solid trials delayed oral transit, in addition to delayed swallow initiation/timing, also infrequent occurrence of pharyngeal pumping noted during trials.      Education provided to pt's spouse regarding pt's safety with PO,  d/t current mental state. She demo understanding and reported exercising precautions when providing pt feeding assist in home environment.     Assessment:     Abhishek Zhao is a 61 y.o. male with an SLP diagnosis of oral and pharyngeal phase Dysphagia of a yet to be determined severity. He presents with adequate tolerance for puree trials and thin liquids. ST will follow for dysphagia treatment.    Goals:   Multidisciplinary Problems     SLP Goals        Problem: SLP Goal    Goal Priority Disciplines Outcome   SLP Goal    Medium SLP Ongoing, Progressing   Description: Goals:  1. Pt will tolerate puree diet/thin liquids w/o overt s/s of aspiration.     2. Pt will tolerate mech soft diet (IDDSI-5) with thin liquids w/o overt s/s of aspiration. (on hold 1/16/22, pending pt progress)                   Plan:     · Patient to be seen:  3 x/week   · Plan of Care expires:     · Plan of Care reviewed with:  patient,significant other   · SLP Follow-Up:  Yes       Discharge recommendations:  other (see comments) (TBD)   Barriers to Discharge:  Safety Awareness     Time Tracking:     SLP Treatment Date:   01/16/22  Speech Start Time:  1302  Speech Stop Time:  1327     Speech Total Time (min):  25 min    Billable Minutes: Eval Swallow and Oral Function 12min and Self Care/Home Management Training 13min    01/16/2022

## 2023-10-08 NOTE — PLAN OF CARE
Problem: Adult Inpatient Plan of Care  Goal: Plan of Care Review  Outcome: Ongoing, Progressing     Pt A&Ox4, free from falls/injury, and able to make needs known during shift. VSS on RA. Medications and fluids continued per orders. PRN pain meds given during shift with moderate relief. Telemetry monitoring continued on box #[id number], visible and audible on monitor at nurse's station. Blood glucose monitoring continued per orders. Sahu catheter in place draining clear, yellow urine. Wound care completed per orders. No acute distress noted. Bed locked and in lowest position. Bedside table and call light in reach. Will cont to monitor.    with patient

## 2023-12-20 NOTE — ASSESSMENT & PLAN NOTE
CT head and EEG without acute process; likely delirium secondary to infection and prolonged hospitalization. Code stroke called the night of 1/13, however no focal neuro findings, CT head and MRI without acute findings  - neurology consulted  - delirium precautions  - EEG pending  - tx of infection as noted above.   He is altered today ,not speaking,will do stat head CT,check EEG,re consulted neurology.   You received tylenol at 3:15 pm in the operating room you may take again as needed after 9:15 pm for mild to moderate pain. You also received toradol (inbruprofen/advil) at 4:30 pm, you may take as needed for mild to moderate pain after 10:30pm. Take oxycodone for moderate to severe pain, take with food to prevent nausea. You received tylenol at 3:15 pm in the operating room you may take again as needed after 9:15 pm for mild to moderate pain. You also received toradol (inbruprofen/advil) at 4:30 pm, you may take as needed for mild to moderate pain after 10:30pm. You also received oxycodone at 6:30 pm, you make take again as needed for pain after 12:30 am. Take oxycodone for moderate to severe pain, take with food to prevent nausea.

## 2024-11-13 NOTE — SIGNIFICANT EVENT
Patient and her relatives have been advised to go to [facility] emergency department at [place] for further evaluation and management.   RAPID RESPONSE NURSE NOTE        Admit Date: 2022  LOS: 5  Code Status: Full Code   Date of Consult: 01/10/2022  : 1960  Age: 61 y.o.  Weight:   Wt Readings from Last 1 Encounters:   01/10/22 61.9 kg (136 lb 7.4 oz)     Sex: male  Race: Black or    Bed: [place] [address] B:   MRN: [id number]  Time Rapid Response Team page Received: 1717  Time Rapid Response Team at Bedside: 1718  Time Rapid Response Team left Bedside:   Was the patient discharged from an ICU this admission? No   Was the patient discharged from a PACU within last 24 hours? No   Did the patient receive conscious sedation/general anesthesia in last 24 hours? No   Was the patient in the ED within the past 24 hours? No   Was the patient on NIPPV within the past 24 hours? No   Did this progress into an ARC or CPA:  no  Attending Physician: Hugo Aviles MD  Primary Service: Internal Medicine,Hospitalist     SITUATION     Notified by overhead page.  Reason for alert: Decreased O2 Sat.   Called to evaluate the patient for Respiratory    Why is the patient in the hospital?: Scrotal abscess    Patient has a past medical history of High cholesterol, Hypertension, and Seizures.    Last Vitals:  Temp: 98 °F (36.7 °C) (01/10 1713)  Pulse: 59 (01/10 1729)  Resp: 19 (01/10 1713)  BP: 161/77 (01/10 1729)  SpO2: 100 % (01/10 1729)    24 Hours Vitals Range:  Temp:  [97.5 °F (36.4 °C)-98 °F (36.7 °C)]   Pulse:  [53-62]   Resp:  [17-19]   BP: (104-163)/(57-79)   SpO2:  [84 %-100 %]     Labs:  Recent Labs     22  0430   WBC 12.65   HGB 10.5*   HCT 33.1*          Recent Labs     22  0430 01/10/22  0506    138   K 2.9* 3.4*   * 110   CO2 22* 23   CREATININE 1.1 1.0   GLU 66* 102   MG  --  1.9        Recent Labs     01/10/22  1729   PH 7.332*   PCO2 39.7   PO2 167*   HCO3 21.0*   POCSATURATED 99   BE -5        ASSESSMENT/INTERVENTIONS    Pt on NRB on arrival. Ana Hall RN, and Renetta Black RN at bedside. RT  present. Pts sats decreased and coughing up thick secretions prior to arrival. RR paged overhead. Pt drowsy but arousable. MDs aware. Assessment completed per MD Pineda. Vital signs monitored throughout. Rapid Response RN stayed at bedside to monitor pt post call, pt attempting to cough up secretions, sats 81% on 5LNC, pt replaced on NRB sats improved to 93%, RN Ana Pineda, MD Pineda arrived at bedside. NT suctioning performed. Pt tolerated. Sats improved to 94-95% and replaced on 3LNC. MD Pineda auscultated lung sounds and reports improvement to breath sounds post suction. Pt alert. Reports feeling better. Continuous pulse ox in place. Family at bedside and updated.    Interventions: Respiratory Management: Supplemental O2 and Suctioning  Monitoring: Continuous Pulse Oximetry: Initiated  ABG completed per RT.  CBG checked.    RECOMMENDATIONS    We recommend: Monitoring: Continuous Pulse Oximetry: Initiated, Orders placed per MD.     Discussed plan of care with bedside Ana KNIGHT    PROVIDER ESCALATION    Orders received and case discussed with Dr. Pineda.    Disposition: Remain in room 407    FOLLOW UP  Call the ICU Charge Nurse,  at [phone] or page RR overhead for additional questions or concerns.

## (undated) DEVICE — GLOVE BIOGEL PI MICRO SZ 7

## (undated) DEVICE — CATH URTRL OPEN END STR TIP 5F

## (undated) DEVICE — NDL SAFETY 22G X 1.5 ECLIPSE

## (undated) DEVICE — GLOVE SURG BIOGEL LATEX SZ 7.5

## (undated) DEVICE — COVER OVERHEAD SURG LT BLUE

## (undated) DEVICE — SYR ONLY LUER LOCK 20CC

## (undated) DEVICE — GAUZE SPONGE 4X4 12PLY

## (undated) DEVICE — SUPPORT ULNA NERVE PROTECTOR

## (undated) DEVICE — SOL IRR NACL .9% 3000ML

## (undated) DEVICE — TIP YANKAUERS BULB NO VENT

## (undated) DEVICE — GOWN B1 X-LG X-LONG

## (undated) DEVICE — Device

## (undated) DEVICE — CANISTER SUCTION 2 LTR

## (undated) DEVICE — BAG URINARY DRN 2000ML

## (undated) DEVICE — UNDERPANT STRTCHBL MESH GRN XL

## (undated) DEVICE — SWAB CULTURETTE II DUAL

## (undated) DEVICE — COVER SNAP KAP 26IN

## (undated) DEVICE — SPONGE DERMACEA GAUZE 4X4

## (undated) DEVICE — COLLECTOR SPECIMEN ANAEROBIC